# Patient Record
Sex: MALE
[De-identification: names, ages, dates, MRNs, and addresses within clinical notes are randomized per-mention and may not be internally consistent; named-entity substitution may affect disease eponyms.]

---

## 2017-01-12 ENCOUNTER — PRIOR ORIGINAL RECORDS (OUTPATIENT)
Dept: OTHER | Age: 52
End: 2017-01-12

## 2017-02-06 RX ORDER — FLUTICASONE PROPIONATE 50 MCG
SPRAY, SUSPENSION (ML) NASAL
Qty: 1 BOTTLE | Refills: 0 | Status: SHIPPED | OUTPATIENT
Start: 2017-02-06 | End: 2017-04-07

## 2017-03-14 DIAGNOSIS — I10 ESSENTIAL HYPERTENSION: Primary | ICD-10-CM

## 2017-03-15 RX ORDER — FUROSEMIDE 40 MG/1
TABLET ORAL
Qty: 30 TABLET | Refills: 8 | Status: SHIPPED | OUTPATIENT
Start: 2017-03-15 | End: 2017-04-07

## 2017-03-15 RX ORDER — CLOPIDOGREL BISULFATE 75 MG/1
TABLET ORAL
Refills: 6 | COMMUNITY
Start: 2017-02-05 | End: 2017-04-07

## 2017-03-27 ENCOUNTER — TELEPHONE (OUTPATIENT)
Dept: INTERNAL MEDICINE CLINIC | Facility: CLINIC | Age: 52
End: 2017-03-27

## 2017-03-27 RX ORDER — ROSUVASTATIN CALCIUM 10 MG/1
TABLET, COATED ORAL
Refills: 1 | COMMUNITY
Start: 2017-03-16 | End: 2017-04-07

## 2017-03-27 NOTE — TELEPHONE ENCOUNTER
Patient needs and appointment, [facility] insurance, called patient back unable to leave message voicemail is full

## 2017-04-07 ENCOUNTER — OFFICE VISIT (OUTPATIENT)
Dept: INTERNAL MEDICINE CLINIC | Facility: CLINIC | Age: 52
End: 2017-04-07

## 2017-04-07 ENCOUNTER — PRIOR ORIGINAL RECORDS (OUTPATIENT)
Dept: OTHER | Age: 52
End: 2017-04-07

## 2017-04-07 VITALS
RESPIRATION RATE: 14 BRPM | BODY MASS INDEX: 34.21 KG/M2 | WEIGHT: 218 LBS | DIASTOLIC BLOOD PRESSURE: 70 MMHG | TEMPERATURE: 98 F | OXYGEN SATURATION: 97 % | SYSTOLIC BLOOD PRESSURE: 136 MMHG | HEART RATE: 72 BPM | HEIGHT: 67 IN

## 2017-04-07 DIAGNOSIS — E78.2 MIXED HYPERLIPIDEMIA: ICD-10-CM

## 2017-04-07 DIAGNOSIS — E11.8 TYPE 2 DIABETES MELLITUS WITH COMPLICATION, WITHOUT LONG-TERM CURRENT USE OF INSULIN (HCC): ICD-10-CM

## 2017-04-07 DIAGNOSIS — I25.10 CORONARY ARTERY DISEASE DUE TO LIPID RICH PLAQUE: Primary | ICD-10-CM

## 2017-04-07 DIAGNOSIS — I25.83 CORONARY ARTERY DISEASE DUE TO LIPID RICH PLAQUE: Primary | ICD-10-CM

## 2017-04-07 DIAGNOSIS — R94.39 ABNORMAL STRESS TEST: ICD-10-CM

## 2017-04-07 DIAGNOSIS — I10 ESSENTIAL HYPERTENSION: ICD-10-CM

## 2017-04-07 PROCEDURE — 80061 LIPID PANEL: CPT | Performed by: INTERNAL MEDICINE

## 2017-04-07 PROCEDURE — 99214 OFFICE O/P EST MOD 30 MIN: CPT | Performed by: INTERNAL MEDICINE

## 2017-04-07 PROCEDURE — 83036 HEMOGLOBIN GLYCOSYLATED A1C: CPT | Performed by: INTERNAL MEDICINE

## 2017-04-07 PROCEDURE — 80053 COMPREHEN METABOLIC PANEL: CPT | Performed by: INTERNAL MEDICINE

## 2017-04-07 PROCEDURE — 36415 COLL VENOUS BLD VENIPUNCTURE: CPT | Performed by: INTERNAL MEDICINE

## 2017-04-07 RX ORDER — FLUTICASONE PROPIONATE 50 MCG
2 SPRAY, SUSPENSION (ML) NASAL DAILY
Qty: 1 BOTTLE | Refills: 6 | Status: SHIPPED | OUTPATIENT
Start: 2017-04-07 | End: 2018-06-26

## 2017-04-07 RX ORDER — LOSARTAN POTASSIUM 50 MG/1
50 TABLET ORAL
Qty: 90 TABLET | Refills: 3 | Status: SHIPPED | OUTPATIENT
Start: 2017-04-07 | End: 2018-06-26

## 2017-04-07 RX ORDER — CLOPIDOGREL BISULFATE 75 MG/1
75 TABLET ORAL DAILY
Qty: 90 TABLET | Refills: 3 | Status: SHIPPED | OUTPATIENT
Start: 2017-04-07 | End: 2018-06-26

## 2017-04-07 RX ORDER — CARVEDILOL 12.5 MG/1
25 TABLET ORAL 2 TIMES DAILY WITH MEALS
Qty: 180 TABLET | Refills: 3 | Status: SHIPPED | OUTPATIENT
Start: 2017-04-07 | End: 2018-04-02

## 2017-04-07 RX ORDER — ROSUVASTATIN CALCIUM 20 MG/1
20 TABLET, COATED ORAL NIGHTLY
Qty: 30 TABLET | Refills: 3 | Status: SHIPPED | OUTPATIENT
Start: 2017-04-07 | End: 2017-10-07

## 2017-04-07 RX ORDER — AMLODIPINE BESYLATE 5 MG/1
5 TABLET ORAL
Qty: 90 TABLET | Refills: 3 | Status: SHIPPED | OUTPATIENT
Start: 2017-04-07 | End: 2019-02-01

## 2017-04-07 RX ORDER — FUROSEMIDE 40 MG/1
40 TABLET ORAL 2 TIMES DAILY
Qty: 90 TABLET | Refills: 3 | Status: SHIPPED | OUTPATIENT
Start: 2017-04-07 | End: 2018-05-23

## 2017-04-07 NOTE — PROGRESS NOTES
HPI:    Patient ID: Delfina Daigle is a 46year old male. HPIpt here for fu on cad sp PCI of diagonal branch of LAD. Has been feeling well since procedure. BS control is good. Review of Systems   Constitutional: Positive for fatigue.    HENT: Posi Coronary artery disease due to lipid rich plaque  (primary encounter diagnosis)  Type 2 diabetes mellitus with complication, without long-term current use of insulin (hcc)  Mixed hyperlipidemia  Abnormal stress test  Essential hypertension    No orders o

## 2017-04-07 NOTE — PROGRESS NOTES
Pt's  verified  Pt presented for a fasting blood draw. Per Dr. Aleksandr Somers  ordered [facility]. Pt tolerated venipuncture well,specimens drawn from Lt  arm  and sent out to [address] [place] [address] lab for testing.

## 2017-04-14 LAB
CHOLESTEROL, TOTAL: 163 MG/DL
HDL CHOLESTEROL: 46 MG/DL
LDL CHOLESTEROL: 76 MG/DL
TRIGLYCERIDES: 207 MG/DL

## 2017-04-17 ENCOUNTER — PRIOR ORIGINAL RECORDS (OUTPATIENT)
Dept: OTHER | Age: 52
End: 2017-04-17

## 2017-07-05 ENCOUNTER — OFFICE VISIT (OUTPATIENT)
Dept: INTERNAL MEDICINE CLINIC | Facility: CLINIC | Age: 52
End: 2017-07-05

## 2017-07-05 VITALS
HEART RATE: 82 BPM | SYSTOLIC BLOOD PRESSURE: 136 MMHG | DIASTOLIC BLOOD PRESSURE: 76 MMHG | WEIGHT: 216 LBS | OXYGEN SATURATION: 97 % | TEMPERATURE: 98 F | RESPIRATION RATE: 19 BRPM | BODY MASS INDEX: 33.9 KG/M2 | HEIGHT: 67 IN

## 2017-07-05 DIAGNOSIS — E11.9 CONTROLLED TYPE 2 DIABETES MELLITUS WITHOUT COMPLICATION, WITHOUT LONG-TERM CURRENT USE OF INSULIN (HCC): Primary | ICD-10-CM

## 2017-07-05 DIAGNOSIS — I25.10 CORONARY ARTERY DISEASE INVOLVING NATIVE CORONARY ARTERY OF NATIVE HEART WITHOUT ANGINA PECTORIS: ICD-10-CM

## 2017-07-05 DIAGNOSIS — J20.9 ACUTE BRONCHITIS DUE TO INFECTION: ICD-10-CM

## 2017-07-05 PROCEDURE — 99214 OFFICE O/P EST MOD 30 MIN: CPT | Performed by: INTERNAL MEDICINE

## 2017-07-05 RX ORDER — AMOXICILLIN AND CLAVULANATE POTASSIUM 875; 125 MG/1; MG/1
1 TABLET, FILM COATED ORAL 2 TIMES DAILY
Qty: 20 TABLET | Refills: 0 | Status: SHIPPED | OUTPATIENT
Start: 2017-07-05 | End: 2017-07-15

## 2017-07-05 NOTE — PROGRESS NOTES
HPI:    Patient ID: Michel Encarnacion is a 46year old male. HPIpt here for fu after urgent care apt for asthmatic brocnhitis - rxed with Ventolin and prednisone. Also fu on DM type 2 . Needs fu labs.   Fu after recent PCI of LAD- has cardiology fu apt 1 Bottle Rfl: 6   furosemide 40 MG Oral Tab Take 1 tablet (40 mg total) by mouth 2 (two) times daily.  Disp: 90 tablet Rfl: 3   Mometasone Furoate 0.1 % External Cream Topically bid Disp: 60 g Rfl: 3   ASPIRIN EC LOW DOSE 81 MG Oral Tab EC  Disp:  Rfl: 1

## 2017-07-08 ENCOUNTER — NURSE ONLY (OUTPATIENT)
Dept: INTERNAL MEDICINE CLINIC | Facility: CLINIC | Age: 52
End: 2017-07-08

## 2017-07-08 DIAGNOSIS — E11.9 CONTROLLED TYPE 2 DIABETES MELLITUS WITHOUT COMPLICATION, WITHOUT LONG-TERM CURRENT USE OF INSULIN (HCC): ICD-10-CM

## 2017-07-08 LAB
ALBUMIN SERPL BCP-MCNC: 3.7 G/DL (ref 3.5–4.8)
ALBUMIN/GLOB SERPL: 1.3 {RATIO} (ref 1–2)
ALP SERPL-CCNC: 70 U/L (ref 32–100)
ALT SERPL-CCNC: 18 U/L (ref 17–63)
ANION GAP SERPL CALC-SCNC: 11 MMOL/L (ref 0–18)
AST SERPL-CCNC: 33 U/L (ref 15–41)
BILIRUB SERPL-MCNC: 0.6 MG/DL (ref 0.3–1.2)
BUN SERPL-MCNC: 14 MG/DL (ref 8–20)
BUN/CREAT SERPL: 18.4 (ref 10–20)
CALCIUM SERPL-MCNC: 8.7 MG/DL (ref 8.5–10.5)
CHLORIDE SERPL-SCNC: 101 MMOL/L (ref 95–110)
CHOLEST SERPL-MCNC: 215 MG/DL (ref 110–200)
CO2 SERPL-SCNC: 24 MMOL/L (ref 22–32)
CREAT SERPL-MCNC: 0.76 MG/DL (ref 0.5–1.5)
GLOBULIN PLAS-MCNC: 2.9 G/DL (ref 2.5–3.7)
GLUCOSE SERPL-MCNC: 147 MG/DL (ref 70–99)
HDLC SERPL-MCNC: 45 MG/DL
LDLC SERPL CALC-MCNC: 133 MG/DL (ref 0–99)
NONHDLC SERPL-MCNC: 170 MG/DL
OSMOLALITY UR CALC.SUM OF ELEC: 285 MOSM/KG (ref 275–295)
POTASSIUM SERPL-SCNC: 4 MMOL/L (ref 3.3–5.1)
PROT SERPL-MCNC: 6.6 G/DL (ref 5.9–8.4)
SODIUM SERPL-SCNC: 136 MMOL/L (ref 136–144)
TRIGL SERPL-MCNC: 186 MG/DL (ref 1–149)

## 2017-07-08 PROCEDURE — 80061 LIPID PANEL: CPT | Performed by: INTERNAL MEDICINE

## 2017-07-08 PROCEDURE — 36415 COLL VENOUS BLD VENIPUNCTURE: CPT | Performed by: INTERNAL MEDICINE

## 2017-07-08 PROCEDURE — 83036 HEMOGLOBIN GLYCOSYLATED A1C: CPT | Performed by: INTERNAL MEDICINE

## 2017-07-08 PROCEDURE — 80053 COMPREHEN METABOLIC PANEL: CPT | Performed by: INTERNAL MEDICINE

## 2017-07-08 NOTE — PROGRESS NOTES
Hemoglobin A1C,LIPID and CMP labs drawn per Dr Josiah Habermann orders.  Patient tolerated lab draw well

## 2017-07-09 LAB — HBA1C MFR BLD: 7.4 % (ref 4–6)

## 2017-07-10 ENCOUNTER — MYAURORA ACCOUNT LINK (OUTPATIENT)
Dept: OTHER | Age: 52
End: 2017-07-10

## 2017-07-10 ENCOUNTER — PRIOR ORIGINAL RECORDS (OUTPATIENT)
Dept: OTHER | Age: 52
End: 2017-07-10

## 2017-10-07 ENCOUNTER — OFFICE VISIT (OUTPATIENT)
Dept: INTERNAL MEDICINE CLINIC | Facility: CLINIC | Age: 52
End: 2017-10-07

## 2017-10-07 VITALS
HEART RATE: 97 BPM | DIASTOLIC BLOOD PRESSURE: 82 MMHG | OXYGEN SATURATION: 97 % | SYSTOLIC BLOOD PRESSURE: 138 MMHG | RESPIRATION RATE: 19 BRPM | TEMPERATURE: 99 F | WEIGHT: 211 LBS | HEIGHT: 67 IN | BODY MASS INDEX: 33.12 KG/M2

## 2017-10-07 DIAGNOSIS — I25.83 CORONARY ARTERY DISEASE DUE TO LIPID RICH PLAQUE: ICD-10-CM

## 2017-10-07 DIAGNOSIS — I25.10 CORONARY ARTERY DISEASE DUE TO LIPID RICH PLAQUE: ICD-10-CM

## 2017-10-07 DIAGNOSIS — Z23 INFLUENZA VACCINE NEEDED: Primary | ICD-10-CM

## 2017-10-07 DIAGNOSIS — E11.8 TYPE 2 DIABETES MELLITUS WITH COMPLICATION, WITHOUT LONG-TERM CURRENT USE OF INSULIN (HCC): ICD-10-CM

## 2017-10-07 DIAGNOSIS — Z12.11 COLON CANCER SCREENING: ICD-10-CM

## 2017-10-07 DIAGNOSIS — E11.9 DIABETES MELLITUS WITH NO COMPLICATION (HCC): ICD-10-CM

## 2017-10-07 DIAGNOSIS — E78.2 MIXED HYPERLIPIDEMIA: ICD-10-CM

## 2017-10-07 DIAGNOSIS — R94.39 ABNORMAL STRESS TEST: ICD-10-CM

## 2017-10-07 DIAGNOSIS — Z72.0 DECLINED SMOKING CESSATION: ICD-10-CM

## 2017-10-07 DIAGNOSIS — I10 ESSENTIAL HYPERTENSION: ICD-10-CM

## 2017-10-07 PROCEDURE — 90686 IIV4 VACC NO PRSV 0.5 ML IM: CPT | Performed by: INTERNAL MEDICINE

## 2017-10-07 PROCEDURE — 99214 OFFICE O/P EST MOD 30 MIN: CPT | Performed by: INTERNAL MEDICINE

## 2017-10-07 PROCEDURE — 90471 IMMUNIZATION ADMIN: CPT | Performed by: INTERNAL MEDICINE

## 2017-10-07 RX ORDER — ROSUVASTATIN CALCIUM 20 MG/1
20 TABLET, COATED ORAL NIGHTLY
Qty: 30 TABLET | Refills: 3 | Status: SHIPPED | OUTPATIENT
Start: 2017-10-07 | End: 2018-05-24

## 2017-10-07 RX ORDER — ROSUVASTATIN CALCIUM 10 MG/1
TABLET, COATED ORAL
Refills: 0 | COMMUNITY
Start: 2017-07-24 | End: 2017-10-07

## 2017-10-07 NOTE — PROGRESS NOTES
Pt's  verified  Per Dr Jamin Mckenzie  administered FLULAVAL in Pt's Lt arm. Pt tolerated injection well,signed consent sent to scanning.

## 2017-10-07 NOTE — PROGRESS NOTES
HPI:    Patient ID: Pallavi Hinds is a 45year old male. Pt here for a fu on dm htn and lipids. Is still struggling with weight loss and smoking but has good glucose control. Review of Systems   Constitutional: Positive for fatigue.  Negative for u Allergies:No Known Allergies   PHYSICAL EXAM:   Physical Exam   Nursing note reviewed. Constitutional: He is obese. HENT:   Head: Normocephalic and atraumatic.    Right Ear: Ear canal normal.   Left Ear: Ear canal normal.   Nose: Nose normal.   Mout

## 2018-05-23 DIAGNOSIS — I10 ESSENTIAL HYPERTENSION: ICD-10-CM

## 2018-05-23 DIAGNOSIS — I25.83 CORONARY ARTERY DISEASE DUE TO LIPID RICH PLAQUE: ICD-10-CM

## 2018-05-23 DIAGNOSIS — I25.10 CORONARY ARTERY DISEASE DUE TO LIPID RICH PLAQUE: ICD-10-CM

## 2018-05-23 DIAGNOSIS — E11.8 TYPE 2 DIABETES MELLITUS WITH COMPLICATION, WITHOUT LONG-TERM CURRENT USE OF INSULIN (HCC): ICD-10-CM

## 2018-05-23 DIAGNOSIS — E78.2 MIXED HYPERLIPIDEMIA: ICD-10-CM

## 2018-05-23 DIAGNOSIS — R94.39 ABNORMAL STRESS TEST: ICD-10-CM

## 2018-05-24 DIAGNOSIS — E78.2 MIXED HYPERLIPIDEMIA: ICD-10-CM

## 2018-05-24 DIAGNOSIS — I25.10 CORONARY ARTERY DISEASE DUE TO LIPID RICH PLAQUE: ICD-10-CM

## 2018-05-24 DIAGNOSIS — I10 ESSENTIAL HYPERTENSION: ICD-10-CM

## 2018-05-24 DIAGNOSIS — E11.8 TYPE 2 DIABETES MELLITUS WITH COMPLICATION, WITHOUT LONG-TERM CURRENT USE OF INSULIN (HCC): ICD-10-CM

## 2018-05-24 DIAGNOSIS — R94.39 ABNORMAL STRESS TEST: ICD-10-CM

## 2018-05-24 DIAGNOSIS — I25.83 CORONARY ARTERY DISEASE DUE TO LIPID RICH PLAQUE: ICD-10-CM

## 2018-05-24 RX ORDER — ROSUVASTATIN CALCIUM 20 MG/1
20 TABLET, COATED ORAL NIGHTLY
Qty: 30 TABLET | Refills: 3 | Status: SHIPPED | OUTPATIENT
Start: 2018-05-24 | End: 2018-06-26

## 2018-05-25 RX ORDER — FUROSEMIDE 40 MG/1
TABLET ORAL
Qty: 90 TABLET | Refills: 0 | Status: SHIPPED | OUTPATIENT
Start: 2018-05-25 | End: 2018-06-26

## 2018-06-07 ENCOUNTER — TELEPHONE (OUTPATIENT)
Dept: INTERNAL MEDICINE CLINIC | Facility: CLINIC | Age: 53
End: 2018-06-07

## 2018-06-07 NOTE — TELEPHONE ENCOUNTER
Atanacio Mcardle at [facility] disease management called states patient is past due for A1C last A1C done 2017

## 2018-06-26 ENCOUNTER — OFFICE VISIT (OUTPATIENT)
Dept: INTERNAL MEDICINE CLINIC | Facility: CLINIC | Age: 53
End: 2018-06-26

## 2018-06-26 VITALS
WEIGHT: 219 LBS | RESPIRATION RATE: 18 BRPM | SYSTOLIC BLOOD PRESSURE: 136 MMHG | BODY MASS INDEX: 34.37 KG/M2 | HEIGHT: 67 IN | DIASTOLIC BLOOD PRESSURE: 76 MMHG | TEMPERATURE: 99 F | HEART RATE: 81 BPM | OXYGEN SATURATION: 98 %

## 2018-06-26 DIAGNOSIS — I25.10 CORONARY ARTERY DISEASE DUE TO LIPID RICH PLAQUE: ICD-10-CM

## 2018-06-26 DIAGNOSIS — I25.83 CORONARY ARTERY DISEASE DUE TO LIPID RICH PLAQUE: ICD-10-CM

## 2018-06-26 DIAGNOSIS — I10 ESSENTIAL HYPERTENSION: ICD-10-CM

## 2018-06-26 DIAGNOSIS — Z12.11 COLON CANCER SCREENING: ICD-10-CM

## 2018-06-26 DIAGNOSIS — E78.2 MIXED HYPERLIPIDEMIA: ICD-10-CM

## 2018-06-26 DIAGNOSIS — K21.9 GASTROESOPHAGEAL REFLUX DISEASE WITHOUT ESOPHAGITIS: Primary | ICD-10-CM

## 2018-06-26 DIAGNOSIS — E11.8 TYPE 2 DIABETES MELLITUS WITH COMPLICATION, WITHOUT LONG-TERM CURRENT USE OF INSULIN (HCC): ICD-10-CM

## 2018-06-26 PROCEDURE — 99214 OFFICE O/P EST MOD 30 MIN: CPT | Performed by: INTERNAL MEDICINE

## 2018-06-26 RX ORDER — FUROSEMIDE 40 MG/1
40 TABLET ORAL DAILY
Qty: 90 TABLET | Refills: 3 | Status: SHIPPED | OUTPATIENT
Start: 2018-06-26 | End: 2019-02-01

## 2018-06-26 RX ORDER — LOSARTAN POTASSIUM 50 MG/1
50 TABLET ORAL
Qty: 90 TABLET | Refills: 3 | Status: SHIPPED | OUTPATIENT
Start: 2018-06-26 | End: 2019-02-01

## 2018-06-26 RX ORDER — CLOPIDOGREL BISULFATE 75 MG/1
75 TABLET ORAL DAILY
Qty: 90 TABLET | Refills: 3 | Status: SHIPPED | OUTPATIENT
Start: 2018-06-26 | End: 2019-02-01

## 2018-06-26 RX ORDER — PANTOPRAZOLE SODIUM 40 MG/1
40 TABLET, DELAYED RELEASE ORAL
Qty: 30 TABLET | Refills: 6 | Status: SHIPPED | OUTPATIENT
Start: 2018-06-26 | End: 2019-02-01

## 2018-06-26 RX ORDER — FLUTICASONE PROPIONATE 50 MCG
2 SPRAY, SUSPENSION (ML) NASAL DAILY
Qty: 1 BOTTLE | Refills: 6 | Status: SHIPPED | OUTPATIENT
Start: 2018-06-26 | End: 2019-02-01

## 2018-06-26 RX ORDER — ROSUVASTATIN CALCIUM 20 MG/1
20 TABLET, COATED ORAL NIGHTLY
Qty: 30 TABLET | Refills: 3 | Status: SHIPPED | OUTPATIENT
Start: 2018-06-26 | End: 2019-02-01

## 2018-06-26 NOTE — PROGRESS NOTES
HPI:    Patient ID: Gillian Yin is a 45year old male.         Review of Systems         Current Outpatient Prescriptions:  FUROSEMIDE 40 MG Oral Tab TAKE 1 TABLET(40 MG) BY MOUTH TWICE DAILY Disp: 90 tablet Rfl: 0   METFORMIN HCL 1000 MG Oral Tab TAKE

## 2018-06-26 NOTE — PROGRESS NOTES
HPI:    Patient ID: Misa Graves is a 45year old male. Pt here for fu on Dm type 2 , CAD, hyperlipidemia and COPD. Has been under a lot of stress with work. Is overdue for labs and fu. Has been out of metforminrx for over a month.     Review of Sys Mometasone Furoate 0.1 % External Cream Topically bid Disp: 60 g Rfl: 3     Allergies:No Known Allergies   PHYSICAL EXAM:   Physical Exam    Constitutional: He is obese. He appears well-developed. HENT:   Head: Normocephalic.    Post nasal drip   Eyes: Take 1 tablet (40 mg total) by mouth daily. MetFORMIN HCl 1000 MG Oral Tab 180 tablet 4      Sig: Take 1 tablet (1,000 mg total) by mouth 2 (two) times daily with meals.       Pantoprazole Sodium 40 MG Oral Tab EC 30 tablet 6      Sig: Take 1 tablet (4

## 2018-08-24 ENCOUNTER — NURSE ONLY (OUTPATIENT)
Dept: INTERNAL MEDICINE CLINIC | Facility: CLINIC | Age: 53
End: 2018-08-24

## 2018-08-24 DIAGNOSIS — E11.8 TYPE 2 DIABETES MELLITUS WITH COMPLICATION, WITHOUT LONG-TERM CURRENT USE OF INSULIN (HCC): ICD-10-CM

## 2018-08-24 DIAGNOSIS — E78.2 MIXED HYPERLIPIDEMIA: ICD-10-CM

## 2018-08-24 LAB
ALBUMIN SERPL BCP-MCNC: 3.6 G/DL (ref 3.5–4.8)
ALBUMIN/GLOB SERPL: 1.2 {RATIO} (ref 1–2)
ALP SERPL-CCNC: 75 U/L (ref 32–100)
ALT SERPL-CCNC: 21 U/L (ref 17–63)
ANION GAP SERPL CALC-SCNC: 11 MMOL/L (ref 0–18)
AST SERPL-CCNC: 35 U/L (ref 15–41)
BILIRUB SERPL-MCNC: 0.5 MG/DL (ref 0.3–1.2)
BUN SERPL-MCNC: 14 MG/DL (ref 8–20)
BUN/CREAT SERPL: 16.3 (ref 10–20)
CALCIUM SERPL-MCNC: 9 MG/DL (ref 8.5–10.5)
CHLORIDE SERPL-SCNC: 99 MMOL/L (ref 95–110)
CHOLEST SERPL-MCNC: 259 MG/DL (ref 110–200)
CO2 SERPL-SCNC: 25 MMOL/L (ref 22–32)
CREAT SERPL-MCNC: 0.86 MG/DL (ref 0.5–1.5)
GLOBULIN PLAS-MCNC: 3.1 G/DL (ref 2.5–3.7)
GLUCOSE SERPL-MCNC: 193 MG/DL (ref 70–99)
HDLC SERPL-MCNC: 41 MG/DL
LDLC SERPL CALC-MCNC: 165 MG/DL (ref 0–99)
NONHDLC SERPL-MCNC: 218 MG/DL
OSMOLALITY UR CALC.SUM OF ELEC: 286 MOSM/KG (ref 275–295)
PATIENT FASTING: YES
POTASSIUM SERPL-SCNC: 4 MMOL/L (ref 3.3–5.1)
PROT SERPL-MCNC: 6.7 G/DL (ref 5.9–8.4)
SODIUM SERPL-SCNC: 135 MMOL/L (ref 136–144)
TRIGL SERPL-MCNC: 263 MG/DL (ref 1–149)

## 2018-08-24 PROCEDURE — 80053 COMPREHEN METABOLIC PANEL: CPT | Performed by: INTERNAL MEDICINE

## 2018-08-24 PROCEDURE — 36415 COLL VENOUS BLD VENIPUNCTURE: CPT | Performed by: INTERNAL MEDICINE

## 2018-08-24 PROCEDURE — 83036 HEMOGLOBIN GLYCOSYLATED A1C: CPT | Performed by: INTERNAL MEDICINE

## 2018-08-24 PROCEDURE — 80061 LIPID PANEL: CPT | Performed by: INTERNAL MEDICINE

## 2018-08-25 LAB — HBA1C MFR BLD: 9.3 % (ref 4–6)

## 2018-09-27 ENCOUNTER — TELEPHONE (OUTPATIENT)
Dept: INTERNAL MEDICINE CLINIC | Facility: CLINIC | Age: 53
End: 2018-09-27

## 2018-09-27 NOTE — TELEPHONE ENCOUNTER
Called patient to schedule appt for A1C and to inform patient that he needs DM Eye exam. Patient stated that he does not have insurance and will call at a later time.

## 2018-10-25 ENCOUNTER — TELEPHONE (OUTPATIENT)
Dept: INTERNAL MEDICINE CLINIC | Facility: CLINIC | Age: 53
End: 2018-10-25

## 2019-01-01 ENCOUNTER — EXTERNAL RECORD (OUTPATIENT)
Dept: HEALTH INFORMATION MANAGEMENT | Facility: OTHER | Age: 54
End: 2019-01-01

## 2019-02-01 ENCOUNTER — OFFICE VISIT (OUTPATIENT)
Dept: INTERNAL MEDICINE CLINIC | Facility: CLINIC | Age: 54
End: 2019-02-01

## 2019-02-01 VITALS
BODY MASS INDEX: 32.96 KG/M2 | HEART RATE: 82 BPM | SYSTOLIC BLOOD PRESSURE: 156 MMHG | HEIGHT: 67 IN | WEIGHT: 210 LBS | OXYGEN SATURATION: 96 % | DIASTOLIC BLOOD PRESSURE: 80 MMHG

## 2019-02-01 DIAGNOSIS — I25.10 CORONARY ARTERY DISEASE DUE TO LIPID RICH PLAQUE: ICD-10-CM

## 2019-02-01 DIAGNOSIS — E11.8 TYPE 2 DIABETES MELLITUS WITH COMPLICATION, WITHOUT LONG-TERM CURRENT USE OF INSULIN (HCC): ICD-10-CM

## 2019-02-01 DIAGNOSIS — I10 ESSENTIAL HYPERTENSION: ICD-10-CM

## 2019-02-01 DIAGNOSIS — Z12.5 PROSTATE CANCER SCREENING: Primary | ICD-10-CM

## 2019-02-01 DIAGNOSIS — I25.83 CORONARY ARTERY DISEASE DUE TO LIPID RICH PLAQUE: ICD-10-CM

## 2019-02-01 DIAGNOSIS — E78.2 MIXED HYPERLIPIDEMIA: ICD-10-CM

## 2019-02-01 PROCEDURE — 99214 OFFICE O/P EST MOD 30 MIN: CPT | Performed by: INTERNAL MEDICINE

## 2019-02-01 RX ORDER — PANTOPRAZOLE SODIUM 40 MG/1
40 TABLET, DELAYED RELEASE ORAL
Qty: 30 TABLET | Refills: 6 | Status: SHIPPED | OUTPATIENT
Start: 2019-02-01 | End: 2019-05-06

## 2019-02-01 RX ORDER — FLUTICASONE PROPIONATE 50 MCG
2 SPRAY, SUSPENSION (ML) NASAL DAILY
Qty: 1 BOTTLE | Refills: 6 | Status: SHIPPED | OUTPATIENT
Start: 2019-02-01 | End: 2019-05-24

## 2019-02-01 RX ORDER — FUROSEMIDE 40 MG/1
40 TABLET ORAL DAILY
Qty: 90 TABLET | Refills: 3 | Status: SHIPPED | OUTPATIENT
Start: 2019-02-01 | End: 2020-03-03

## 2019-02-01 RX ORDER — CLOPIDOGREL BISULFATE 75 MG/1
75 TABLET ORAL DAILY
Qty: 90 TABLET | Refills: 3 | Status: SHIPPED | OUTPATIENT
Start: 2019-02-01 | End: 2020-03-03

## 2019-02-01 RX ORDER — ROSUVASTATIN CALCIUM 20 MG/1
20 TABLET, COATED ORAL NIGHTLY
Qty: 90 TABLET | Refills: 3 | Status: SHIPPED | OUTPATIENT
Start: 2019-02-01 | End: 2020-03-03

## 2019-02-01 RX ORDER — AMLODIPINE BESYLATE 5 MG/1
5 TABLET ORAL
Qty: 90 TABLET | Refills: 3 | Status: SHIPPED | OUTPATIENT
Start: 2019-02-01 | End: 2020-03-03

## 2019-02-01 RX ORDER — LOSARTAN POTASSIUM 50 MG/1
50 TABLET ORAL
Qty: 90 TABLET | Refills: 3 | Status: SHIPPED | OUTPATIENT
Start: 2019-02-01 | End: 2019-05-06

## 2019-02-01 NOTE — PROGRESS NOTES
HPI:    Patient ID: Michel Encarnacion is a 52year old male. Pt here for a fu after a long interval during which he had no insurance coverage. HAs had no meds for several days. Denies chest pains- is ooverdue for labs and cardiac fu.     Review of Syste Rfl: 6   Mometasone Furoate 0.1 % External Cream Topically bid Disp: 60 g Rfl: 3   ASPIRIN EC LOW DOSE 81 MG Oral Tab EC  Disp:  Rfl: 1     Allergies:No Known Allergies   PHYSICAL EXAM:   Physical Exam    Constitutional: He is oriented to person, place, an Tab EC 30 tablet 6     Sig: Take 1 tablet (40 mg total) by mouth every morning before breakfast.   • AmLODIPine Besylate 5 MG Oral Tab 90 tablet 3     Sig: Take 1 tablet (5 mg total) by mouth once daily.    • Glucose Blood (LORAINE CONTOUR TEST) In Vitro Stri

## 2019-02-18 ENCOUNTER — NURSE ONLY (OUTPATIENT)
Dept: INTERNAL MEDICINE CLINIC | Facility: CLINIC | Age: 54
End: 2019-02-18

## 2019-02-18 DIAGNOSIS — I25.83 CORONARY ARTERY DISEASE DUE TO LIPID RICH PLAQUE: ICD-10-CM

## 2019-02-18 DIAGNOSIS — E11.8 TYPE 2 DIABETES MELLITUS WITH COMPLICATION, WITHOUT LONG-TERM CURRENT USE OF INSULIN (HCC): ICD-10-CM

## 2019-02-18 DIAGNOSIS — I25.10 CORONARY ARTERY DISEASE DUE TO LIPID RICH PLAQUE: ICD-10-CM

## 2019-02-18 DIAGNOSIS — Z12.5 PROSTATE CANCER SCREENING: ICD-10-CM

## 2019-02-18 DIAGNOSIS — E78.2 MIXED HYPERLIPIDEMIA: ICD-10-CM

## 2019-02-18 LAB
ALBUMIN SERPL-MCNC: 3.8 G/DL (ref 3.4–5)
ALBUMIN/GLOB SERPL: 1 {RATIO} (ref 1–2)
ALP LIVER SERPL-CCNC: 79 U/L (ref 45–117)
ALT SERPL-CCNC: 15 U/L (ref 16–61)
ANION GAP SERPL CALC-SCNC: 6 MMOL/L (ref 0–18)
AST SERPL-CCNC: 19 U/L (ref 15–37)
BILIRUB SERPL-MCNC: 0.4 MG/DL (ref 0.1–2)
BUN BLD-MCNC: 9 MG/DL (ref 7–18)
BUN/CREAT SERPL: 10.1 (ref 10–20)
CALCIUM BLD-MCNC: 9.1 MG/DL (ref 8.5–10.1)
CHLORIDE SERPL-SCNC: 102 MMOL/L (ref 98–107)
CHOLEST SMN-MCNC: 180 MG/DL (ref ?–200)
CO2 SERPL-SCNC: 29 MMOL/L (ref 21–32)
COMPLEXED PSA SERPL-MCNC: 2.89 NG/ML (ref ?–4)
CREAT BLD-MCNC: 0.89 MG/DL (ref 0.7–1.3)
CREAT UR-SCNC: 203 MG/DL
EST. AVERAGE GLUCOSE BLD GHB EST-MCNC: 197 MG/DL (ref 68–126)
GLOBULIN PLAS-MCNC: 3.9 G/DL (ref 2.8–4.4)
GLUCOSE BLD-MCNC: 142 MG/DL (ref 70–99)
HBA1C MFR BLD HPLC: 8.5 % (ref ?–5.7)
HDLC SERPL-MCNC: 71 MG/DL (ref 40–59)
LDLC SERPL CALC-MCNC: 84 MG/DL (ref ?–100)
M PROTEIN MFR SERPL ELPH: 7.7 G/DL (ref 6.4–8.2)
MICROALBUMIN UR-MCNC: 8.45 MG/DL
MICROALBUMIN/CREAT 24H UR-RTO: 41.6 UG/MG (ref ?–30)
NONHDLC SERPL-MCNC: 109 MG/DL (ref ?–130)
OSMOLALITY SERPL CALC.SUM OF ELEC: 285 MOSM/KG (ref 275–295)
POTASSIUM SERPL-SCNC: 3.5 MMOL/L (ref 3.5–5.1)
SODIUM SERPL-SCNC: 137 MMOL/L (ref 136–145)
TRIGL SERPL-MCNC: 123 MG/DL (ref 30–149)

## 2019-02-18 PROCEDURE — 80061 LIPID PANEL: CPT | Performed by: INTERNAL MEDICINE

## 2019-02-18 PROCEDURE — 80053 COMPREHEN METABOLIC PANEL: CPT | Performed by: INTERNAL MEDICINE

## 2019-02-18 PROCEDURE — 82570 ASSAY OF URINE CREATININE: CPT | Performed by: INTERNAL MEDICINE

## 2019-02-18 PROCEDURE — 82043 UR ALBUMIN QUANTITATIVE: CPT | Performed by: INTERNAL MEDICINE

## 2019-02-18 PROCEDURE — 36415 COLL VENOUS BLD VENIPUNCTURE: CPT | Performed by: INTERNAL MEDICINE

## 2019-02-18 PROCEDURE — 83036 HEMOGLOBIN GLYCOSYLATED A1C: CPT | Performed by: INTERNAL MEDICINE

## 2019-02-18 NOTE — PROGRESS NOTES
Pt presented to clinic today for blood draw. Per physician able to draw orders. Orders  documented within chart. Pt tolerated lab draw well.  verified.   Orders drawn include: Microalb/creat, psa, a1c, lipid, and cmp  Site of draw: right arm

## 2019-02-28 VITALS
WEIGHT: 211 LBS | HEIGHT: 66 IN | SYSTOLIC BLOOD PRESSURE: 158 MMHG | HEART RATE: 68 BPM | BODY MASS INDEX: 33.91 KG/M2 | DIASTOLIC BLOOD PRESSURE: 70 MMHG | OXYGEN SATURATION: 97 % | RESPIRATION RATE: 10 BRPM

## 2019-03-01 VITALS
OXYGEN SATURATION: 98 % | HEART RATE: 77 BPM | DIASTOLIC BLOOD PRESSURE: 74 MMHG | WEIGHT: 220 LBS | SYSTOLIC BLOOD PRESSURE: 132 MMHG | HEIGHT: 66 IN | RESPIRATION RATE: 16 BRPM | BODY MASS INDEX: 35.36 KG/M2

## 2019-03-13 ENCOUNTER — OFFICE VISIT (OUTPATIENT)
Dept: CARDIOLOGY | Age: 54
End: 2019-03-13

## 2019-03-13 VITALS
DIASTOLIC BLOOD PRESSURE: 60 MMHG | HEART RATE: 88 BPM | WEIGHT: 211 LBS | SYSTOLIC BLOOD PRESSURE: 140 MMHG | BODY MASS INDEX: 33.12 KG/M2 | HEIGHT: 67 IN

## 2019-03-13 DIAGNOSIS — E11.59 TYPE 2 DIABETES MELLITUS WITH OTHER CIRCULATORY COMPLICATION, WITHOUT LONG-TERM CURRENT USE OF INSULIN (CMD): ICD-10-CM

## 2019-03-13 DIAGNOSIS — I25.118 ATHEROSCLEROSIS OF NATIVE CORONARY ARTERY OF NATIVE HEART WITH STABLE ANGINA PECTORIS (CMD): Primary | ICD-10-CM

## 2019-03-13 DIAGNOSIS — R09.89 CAROTID BRUIT, UNSPECIFIED LATERALITY: ICD-10-CM

## 2019-03-13 DIAGNOSIS — I10 ESSENTIAL HYPERTENSION: ICD-10-CM

## 2019-03-13 DIAGNOSIS — E78.2 MIXED HYPERLIPIDEMIA: ICD-10-CM

## 2019-03-13 PROCEDURE — 99214 OFFICE O/P EST MOD 30 MIN: CPT | Performed by: INTERNAL MEDICINE

## 2019-03-13 PROCEDURE — 3077F SYST BP >= 140 MM HG: CPT | Performed by: INTERNAL MEDICINE

## 2019-03-13 PROCEDURE — 3078F DIAST BP <80 MM HG: CPT | Performed by: INTERNAL MEDICINE

## 2019-03-13 RX ORDER — HYDROCHLOROTHIAZIDE 12.5 MG/1
12.5 TABLET ORAL DAILY
COMMUNITY
End: 2019-07-31 | Stop reason: ALTCHOICE

## 2019-03-13 RX ORDER — CARVEDILOL 12.5 MG/1
12.5 TABLET ORAL DAILY
COMMUNITY
End: 2019-06-04 | Stop reason: DRUGHIGH

## 2019-03-13 RX ORDER — CLOPIDOGREL BISULFATE 75 MG/1
75 TABLET ORAL DAILY
COMMUNITY

## 2019-03-13 RX ORDER — ATORVASTATIN CALCIUM 20 MG/1
20 TABLET, FILM COATED ORAL 2 TIMES DAILY
COMMUNITY
End: 2019-07-31 | Stop reason: ALTCHOICE

## 2019-03-13 RX ORDER — PRAVASTATIN SODIUM 80 MG/1
80 TABLET ORAL DAILY
COMMUNITY
End: 2019-03-13 | Stop reason: CLARIF

## 2019-03-13 RX ORDER — LOSARTAN POTASSIUM 50 MG/1
50 TABLET ORAL 2 TIMES DAILY
COMMUNITY

## 2019-03-13 RX ORDER — AMLODIPINE BESYLATE 5 MG/1
5 TABLET ORAL DAILY
COMMUNITY
End: 2019-06-04

## 2019-04-18 ENCOUNTER — HOSPITAL ENCOUNTER (OUTPATIENT)
Dept: CV DIAGNOSTICS | Facility: HOSPITAL | Age: 54
Discharge: HOME | End: 2019-04-18
Attending: INTERNAL MEDICINE
Payer: COMMERCIAL

## 2019-04-18 ENCOUNTER — TELEPHONE (OUTPATIENT)
Dept: CARDIOLOGY | Age: 54
End: 2019-04-18

## 2019-04-18 ENCOUNTER — TELEPHONE (OUTPATIENT)
Dept: INTERNAL MEDICINE CLINIC | Facility: CLINIC | Age: 54
End: 2019-04-18

## 2019-04-18 ENCOUNTER — HOSPITAL ENCOUNTER (OUTPATIENT)
Dept: ULTRASOUND IMAGING | Facility: HOSPITAL | Age: 54
Discharge: HOME | End: 2019-04-18
Attending: INTERNAL MEDICINE
Payer: COMMERCIAL

## 2019-04-18 DIAGNOSIS — I25.10 CAD IN NATIVE ARTERY: ICD-10-CM

## 2019-04-18 DIAGNOSIS — I25.118 ATHEROSCLEROSIS OF CORONARY ARTERY OF NATIVE HEART WITH OTHER FORM OF ANGINA PECTORIS, UNSPECIFIED VESSEL OR LESION TYPE (HCC): ICD-10-CM

## 2019-04-18 DIAGNOSIS — R09.89 CAROTID BRUIT, UNSPECIFIED LATERALITY: ICD-10-CM

## 2019-04-18 DIAGNOSIS — I25.10 CORONARY ATHEROSCLEROSIS OF NATIVE CORONARY ARTERY: ICD-10-CM

## 2019-04-18 PROCEDURE — 93306 TTE W/DOPPLER COMPLETE: CPT | Performed by: INTERNAL MEDICINE

## 2019-04-18 PROCEDURE — 93880 EXTRACRANIAL BILAT STUDY: CPT | Performed by: INTERNAL MEDICINE

## 2019-04-18 NOTE — TELEPHONE ENCOUNTER
Calling for results from Echo. Were told there were issues and to speak to MD.  The final results not in system at this time and informed spouse that PCP will be in tomorrow at which time the final results should also be readable.

## 2019-04-19 ENCOUNTER — TELEPHONE (OUTPATIENT)
Dept: CARDIOLOGY | Age: 54
End: 2019-04-19

## 2019-04-22 ENCOUNTER — OFFICE VISIT (OUTPATIENT)
Dept: CARDIOLOGY | Age: 54
End: 2019-04-22

## 2019-04-22 VITALS
DIASTOLIC BLOOD PRESSURE: 72 MMHG | BODY MASS INDEX: 33.43 KG/M2 | WEIGHT: 213 LBS | OXYGEN SATURATION: 96 % | SYSTOLIC BLOOD PRESSURE: 160 MMHG | HEART RATE: 84 BPM | HEIGHT: 67 IN

## 2019-04-22 DIAGNOSIS — E11.59 TYPE 2 DIABETES MELLITUS WITH OTHER CIRCULATORY COMPLICATION, WITHOUT LONG-TERM CURRENT USE OF INSULIN (CMD): ICD-10-CM

## 2019-04-22 DIAGNOSIS — I25.118 ATHEROSCLEROSIS OF NATIVE CORONARY ARTERY OF NATIVE HEART WITH STABLE ANGINA PECTORIS (CMD): Primary | ICD-10-CM

## 2019-04-22 DIAGNOSIS — I10 ESSENTIAL HYPERTENSION: ICD-10-CM

## 2019-04-22 DIAGNOSIS — E78.2 MIXED HYPERLIPIDEMIA: ICD-10-CM

## 2019-04-22 PROCEDURE — 99214 OFFICE O/P EST MOD 30 MIN: CPT | Performed by: INTERNAL MEDICINE

## 2019-04-22 PROCEDURE — 3078F DIAST BP <80 MM HG: CPT | Performed by: INTERNAL MEDICINE

## 2019-04-22 PROCEDURE — 3077F SYST BP >= 140 MM HG: CPT | Performed by: INTERNAL MEDICINE

## 2019-04-22 RX ORDER — FLUTICASONE PROPIONATE 50 MCG
2 SPRAY, SUSPENSION (ML) NASAL DAILY
COMMUNITY
Start: 2019-02-01 | End: 2019-04-22

## 2019-04-22 RX ORDER — PANTOPRAZOLE SODIUM 40 MG/1
40 TABLET, DELAYED RELEASE ORAL DAILY
COMMUNITY
Start: 2019-02-01 | End: 2019-07-31

## 2019-04-22 RX ORDER — ROSUVASTATIN CALCIUM 20 MG/1
20 TABLET, COATED ORAL NIGHTLY
COMMUNITY
Start: 2019-02-01 | End: 2019-06-04

## 2019-04-22 RX ORDER — ALBUTEROL SULFATE 90 UG/1
2 AEROSOL, METERED RESPIRATORY (INHALATION) PRN
COMMUNITY
Start: 2018-06-26 | End: 2019-07-31 | Stop reason: ALTCHOICE

## 2019-04-27 RX ORDER — FUROSEMIDE 40 MG/1
TABLET ORAL
COMMUNITY

## 2019-05-06 ENCOUNTER — OFFICE VISIT (OUTPATIENT)
Dept: INTERNAL MEDICINE CLINIC | Facility: CLINIC | Age: 54
End: 2019-05-06

## 2019-05-06 VITALS
DIASTOLIC BLOOD PRESSURE: 80 MMHG | HEART RATE: 98 BPM | OXYGEN SATURATION: 98 % | HEIGHT: 67 IN | SYSTOLIC BLOOD PRESSURE: 142 MMHG | WEIGHT: 215 LBS | BODY MASS INDEX: 33.74 KG/M2

## 2019-05-06 DIAGNOSIS — E11.8 TYPE 2 DIABETES MELLITUS WITH COMPLICATION, WITHOUT LONG-TERM CURRENT USE OF INSULIN (HCC): ICD-10-CM

## 2019-05-06 DIAGNOSIS — N40.1 BENIGN PROSTATIC HYPERPLASIA WITH LOWER URINARY TRACT SYMPTOMS, SYMPTOM DETAILS UNSPECIFIED: Primary | ICD-10-CM

## 2019-05-06 DIAGNOSIS — I25.10 CORONARY ARTERY DISEASE DUE TO LIPID RICH PLAQUE: ICD-10-CM

## 2019-05-06 DIAGNOSIS — E78.2 MIXED HYPERLIPIDEMIA: ICD-10-CM

## 2019-05-06 DIAGNOSIS — I10 ESSENTIAL HYPERTENSION: ICD-10-CM

## 2019-05-06 DIAGNOSIS — I25.83 CORONARY ARTERY DISEASE DUE TO LIPID RICH PLAQUE: ICD-10-CM

## 2019-05-06 PROCEDURE — 99214 OFFICE O/P EST MOD 30 MIN: CPT | Performed by: INTERNAL MEDICINE

## 2019-05-06 RX ORDER — TAMSULOSIN HYDROCHLORIDE 0.4 MG/1
0.4 CAPSULE ORAL DAILY
Qty: 90 CAPSULE | Refills: 3 | Status: SHIPPED | OUTPATIENT
Start: 2019-05-06 | End: 2019-05-06

## 2019-05-06 RX ORDER — LOSARTAN POTASSIUM 50 MG/1
50 TABLET ORAL
Qty: 180 TABLET | Refills: 3 | Status: SHIPPED | OUTPATIENT
Start: 2019-05-06 | End: 2019-05-31

## 2019-05-06 RX ORDER — PANTOPRAZOLE SODIUM 40 MG/1
40 TABLET, DELAYED RELEASE ORAL
Qty: 30 TABLET | Refills: 6 | Status: SHIPPED | OUTPATIENT
Start: 2019-05-06 | End: 2020-03-03

## 2019-05-06 RX ORDER — TAMSULOSIN HYDROCHLORIDE 0.4 MG/1
0.4 CAPSULE ORAL DAILY
Qty: 90 CAPSULE | Refills: 3 | Status: SHIPPED | OUTPATIENT
Start: 2019-05-06 | End: 2019-05-24

## 2019-05-06 RX ORDER — GLIMEPIRIDE 4 MG/1
4 TABLET ORAL
Qty: 90 TABLET | Refills: 3 | Status: SHIPPED | OUTPATIENT
Start: 2019-05-06 | End: 2019-05-24

## 2019-05-06 RX ORDER — LOSARTAN POTASSIUM 50 MG/1
50 TABLET ORAL
Qty: 180 TABLET | Refills: 3 | Status: SHIPPED | OUTPATIENT
Start: 2019-05-06 | End: 2019-05-06

## 2019-05-06 RX ORDER — GLIMEPIRIDE 4 MG/1
4 TABLET ORAL
Qty: 90 TABLET | Refills: 3 | Status: SHIPPED | OUTPATIENT
Start: 2019-05-06 | End: 2019-05-06

## 2019-05-06 NOTE — PROGRESS NOTES
HPI:    Patient ID: Ellie Coronado is a 52year old male. Pt here for a fu on Dmtype 2 - has had poor control of diabetes . Sees glucose reading over 200 lately. Is non compliant with diet.  Has stopped drinking alcohol after recent echocardiogramsho Inhale 2 puffs into the lungs every 6 (six) hours as needed for Wheezing.  Disp: 1 Inhaler Rfl: 1   LORAINE CONTOUR NEXT TEST In Vitro Strip To test 2 x daily Disp: 100 strip Rfl: 6   Mometasone Furoate 0.1 % External Cream Topically bid Disp: 60 g Rfl: 3   A mouth every morning before breakfast.   • tamsulosin HCl 0.4 MG Oral Cap 90 capsule 3     Sig: Take 1 capsule (0.4 mg total) by mouth daily.        Imaging & Referrals:  None         TIME COMPONENT:       Jesus Salgado MD  5/6/2019

## 2019-05-22 ENCOUNTER — HOSPITAL ENCOUNTER (OUTPATIENT)
Dept: NUCLEAR MEDICINE | Facility: HOSPITAL | Age: 54
Discharge: HOME | End: 2019-05-22
Attending: INTERNAL MEDICINE
Payer: COMMERCIAL

## 2019-05-22 ENCOUNTER — TELEPHONE (OUTPATIENT)
Dept: CARDIOLOGY | Age: 54
End: 2019-05-22

## 2019-05-22 ENCOUNTER — HOSPITAL ENCOUNTER (OUTPATIENT)
Dept: CV DIAGNOSTICS | Facility: HOSPITAL | Age: 54
Discharge: HOME | End: 2019-05-22
Attending: INTERNAL MEDICINE
Payer: COMMERCIAL

## 2019-05-22 DIAGNOSIS — I25.10 CORONARY ATHEROSCLEROSIS OF NATIVE CORONARY ARTERY: ICD-10-CM

## 2019-05-22 PROCEDURE — 93016 CV STRESS TEST SUPVJ ONLY: CPT | Performed by: INTERNAL MEDICINE

## 2019-05-22 PROCEDURE — 93017 CV STRESS TEST TRACING ONLY: CPT | Performed by: INTERNAL MEDICINE

## 2019-05-22 PROCEDURE — 93018 CV STRESS TEST I&R ONLY: CPT | Performed by: INTERNAL MEDICINE

## 2019-05-22 PROCEDURE — 78452 HT MUSCLE IMAGE SPECT MULT: CPT | Performed by: INTERNAL MEDICINE

## 2019-05-22 RX ORDER — 0.9 % SODIUM CHLORIDE 0.9 %
VIAL (ML) INJECTION
Status: COMPLETED
Start: 2019-05-22 | End: 2019-05-22

## 2019-05-22 RX ADMIN — 0.9 % SODIUM CHLORIDE: 0.9 % VIAL (ML) INJECTION at 09:21:00

## 2019-05-23 DIAGNOSIS — I25.118 ATHEROSCLEROSIS OF NATIVE CORONARY ARTERY OF NATIVE HEART WITH STABLE ANGINA PECTORIS (CMD): ICD-10-CM

## 2019-05-23 DIAGNOSIS — I10 BENIGN HYPERTENSION: ICD-10-CM

## 2019-05-23 DIAGNOSIS — R94.39 ABNORMAL CARDIOVASCULAR STRESS TEST: Primary | ICD-10-CM

## 2019-05-24 ENCOUNTER — PATIENT OUTREACH (OUTPATIENT)
Dept: INTERNAL MEDICINE CLINIC | Facility: CLINIC | Age: 54
End: 2019-05-24

## 2019-05-24 ENCOUNTER — LAB ENCOUNTER (OUTPATIENT)
Dept: LAB | Facility: HOSPITAL | Age: 54
End: 2019-05-24
Attending: INTERNAL MEDICINE
Payer: COMMERCIAL

## 2019-05-24 ENCOUNTER — CLINICAL ABSTRACT (OUTPATIENT)
Dept: CARDIOLOGY | Age: 54
End: 2019-05-24

## 2019-05-24 ENCOUNTER — HOSPITAL ENCOUNTER (OUTPATIENT)
Dept: GENERAL RADIOLOGY | Facility: HOSPITAL | Age: 54
Discharge: HOME | End: 2019-05-24
Attending: INTERNAL MEDICINE
Payer: COMMERCIAL

## 2019-05-24 DIAGNOSIS — I25.118 ATHEROSCLEROSIS OF NATIVE CORONARY ARTERY OF NATIVE HEART WITH STABLE ANGINA PECTORIS (HCC): ICD-10-CM

## 2019-05-24 DIAGNOSIS — E11.8 TYPE 2 DIABETES MELLITUS WITH COMPLICATION, WITHOUT LONG-TERM CURRENT USE OF INSULIN (HCC): ICD-10-CM

## 2019-05-24 DIAGNOSIS — R94.39 ABNORMAL CARDIOVASCULAR STRESS TEST: ICD-10-CM

## 2019-05-24 DIAGNOSIS — E78.2 MIXED HYPERLIPIDEMIA: ICD-10-CM

## 2019-05-24 DIAGNOSIS — I10 BENIGN HYPERTENSION: ICD-10-CM

## 2019-05-24 DIAGNOSIS — Z01.810 PRE-OPERATIVE CARDIOVASCULAR EXAMINATION: ICD-10-CM

## 2019-05-24 LAB
ABSOLUTE IMMATURE GRANULOCYTES (OFFPRE24): NORMAL
ABSOLUTE IMMATURE GRANULOCYTES (OFFPRE24): NORMAL
BASO+EOS+MONOS # BLD: NORMAL 10*3/UL
BASO+EOS+MONOS # BLD: NORMAL 10*3/UL
BASO+EOS+MONOS NFR BLD: NORMAL %
BASO+EOS+MONOS NFR BLD: NORMAL %
BASOPHILS # BLD: NORMAL 10*3/UL
BASOPHILS # BLD: NORMAL 10*3/UL
BASOPHILS NFR BLD: NORMAL %
BASOPHILS NFR BLD: NORMAL %
DIFFERENTIAL METHOD BLD: NORMAL
DIFFERENTIAL METHOD BLD: NORMAL
EOSINOPHIL # BLD: NORMAL 10*3/UL
EOSINOPHIL # BLD: NORMAL 10*3/UL
EOSINOPHIL NFR BLD: NORMAL %
EOSINOPHIL NFR BLD: NORMAL %
ERYTHROCYTE [DISTWIDTH] IN BLOOD: NORMAL %
ERYTHROCYTE [DISTWIDTH] IN BLOOD: NORMAL %
HCT VFR BLD CALC: 42.4 %
HCT VFR BLD CALC: 42.4 %
HGB BLD-MCNC: 13.8 G/DL
HGB BLD-MCNC: 13.8 G/DL
IMMATURE GRANULOCYTES (OFFPRE25): NORMAL
IMMATURE GRANULOCYTES (OFFPRE25): NORMAL
LYMPHOCYTES # BLD: NORMAL 10*3/UL
LYMPHOCYTES # BLD: NORMAL 10*3/UL
LYMPHOCYTES NFR BLD: NORMAL %
LYMPHOCYTES NFR BLD: NORMAL %
MCH RBC QN AUTO: 29.4 PG
MCH RBC QN AUTO: NORMAL PG
MCHC RBC AUTO-ENTMCNC: 32.5 G/DL
MCHC RBC AUTO-ENTMCNC: NORMAL G/DL
MCV RBC AUTO: 90.2 FL
MCV RBC AUTO: NORMAL FL
MONOCYTES # BLD: NORMAL 10*3/UL
MONOCYTES # BLD: NORMAL 10*3/UL
MONOCYTES NFR BLD: NORMAL %
MONOCYTES NFR BLD: NORMAL %
MPV (OFFPRE2): NORMAL
MPV (OFFPRE2): NORMAL
NEUTROPHILS # BLD: NORMAL 10*3/UL
NEUTROPHILS # BLD: NORMAL 10*3/UL
NEUTROPHILS NFR BLD: NORMAL %
NEUTROPHILS NFR BLD: NORMAL %
NRBC BLD MANUAL-RTO: NORMAL %
NRBC BLD MANUAL-RTO: NORMAL %
PLAT MORPH BLD: NORMAL
PLAT MORPH BLD: NORMAL
PLATELET # BLD: 310 10*3/UL
PLATELET # BLD: 310 10*3/UL
RBC # BLD: 4.7 10*6/UL
RBC # BLD: 4.7 10*6/UL
RBC MORPH BLD: NORMAL
RBC MORPH BLD: NORMAL
WBC # BLD: 10.3 10*3/UL
WBC # BLD: 10.3 10*3/UL
WBC MORPH BLD: NORMAL
WBC MORPH BLD: NORMAL

## 2019-05-24 PROCEDURE — 80061 LIPID PANEL: CPT

## 2019-05-24 PROCEDURE — 83721 ASSAY OF BLOOD LIPOPROTEIN: CPT

## 2019-05-24 PROCEDURE — 83036 HEMOGLOBIN GLYCOSYLATED A1C: CPT

## 2019-05-24 PROCEDURE — 82043 UR ALBUMIN QUANTITATIVE: CPT

## 2019-05-24 PROCEDURE — 36415 COLL VENOUS BLD VENIPUNCTURE: CPT

## 2019-05-24 PROCEDURE — 71046 X-RAY EXAM CHEST 2 VIEWS: CPT | Performed by: INTERNAL MEDICINE

## 2019-05-24 PROCEDURE — 85025 COMPLETE CBC W/AUTO DIFF WBC: CPT

## 2019-05-24 PROCEDURE — 82570 ASSAY OF URINE CREATININE: CPT

## 2019-05-24 PROCEDURE — 80053 COMPREHEN METABOLIC PANEL: CPT

## 2019-05-24 NOTE — PROGRESS NOTES
Letter and referral mailed to patient to schedule an appointment with Dr. Gail Holden office for a Colonoscopy.

## 2019-05-28 ENCOUNTER — TELEPHONE (OUTPATIENT)
Dept: CASE MANAGEMENT | Age: 54
End: 2019-05-28

## 2019-05-28 ENCOUNTER — APPOINTMENT (OUTPATIENT)
Dept: ULTRASOUND IMAGING | Facility: HOSPITAL | Age: 54
End: 2019-05-28
Attending: INTERNAL MEDICINE
Payer: COMMERCIAL

## 2019-05-28 ENCOUNTER — HOSPITAL ENCOUNTER (OUTPATIENT)
Dept: INTERVENTIONAL RADIOLOGY/VASCULAR | Facility: HOSPITAL | Age: 54
Discharge: HOME | End: 2019-05-28
Attending: INTERNAL MEDICINE | Admitting: INTERNAL MEDICINE
Payer: COMMERCIAL

## 2019-05-28 VITALS
SYSTOLIC BLOOD PRESSURE: 122 MMHG | BODY MASS INDEX: 34 KG/M2 | DIASTOLIC BLOOD PRESSURE: 74 MMHG | HEART RATE: 93 BPM | OXYGEN SATURATION: 92 % | WEIGHT: 214 LBS | RESPIRATION RATE: 15 BRPM

## 2019-05-28 DIAGNOSIS — I25.10 ASHD (ARTERIOSCLEROTIC HEART DISEASE): Primary | ICD-10-CM

## 2019-05-28 DIAGNOSIS — R94.39 ABNORMAL STRESS TEST: ICD-10-CM

## 2019-05-28 DIAGNOSIS — I25.10 CAD (CORONARY ARTERY DISEASE): ICD-10-CM

## 2019-05-28 DIAGNOSIS — I10 HTN (HYPERTENSION): ICD-10-CM

## 2019-05-28 PROCEDURE — 99153 MOD SED SAME PHYS/QHP EA: CPT

## 2019-05-28 PROCEDURE — 36415 COLL VENOUS BLD VENIPUNCTURE: CPT

## 2019-05-28 PROCEDURE — 99152 MOD SED SAME PHYS/QHP 5/>YRS: CPT

## 2019-05-28 PROCEDURE — 4A023N7 MEASUREMENT OF CARDIAC SAMPLING AND PRESSURE, LEFT HEART, PERCUTANEOUS APPROACH: ICD-10-PCS | Performed by: INTERNAL MEDICINE

## 2019-05-28 PROCEDURE — 93458 L HRT ARTERY/VENTRICLE ANGIO: CPT

## 2019-05-28 PROCEDURE — 93926 LOWER EXTREMITY STUDY: CPT | Performed by: INTERNAL MEDICINE

## 2019-05-28 PROCEDURE — B2151ZZ FLUOROSCOPY OF LEFT HEART USING LOW OSMOLAR CONTRAST: ICD-10-PCS | Performed by: INTERNAL MEDICINE

## 2019-05-28 PROCEDURE — 93010 ELECTROCARDIOGRAM REPORT: CPT | Performed by: INTERNAL MEDICINE

## 2019-05-28 PROCEDURE — 82962 GLUCOSE BLOOD TEST: CPT

## 2019-05-28 PROCEDURE — 93005 ELECTROCARDIOGRAM TRACING: CPT

## 2019-05-28 PROCEDURE — B2111ZZ FLUOROSCOPY OF MULTIPLE CORONARY ARTERIES USING LOW OSMOLAR CONTRAST: ICD-10-PCS | Performed by: INTERNAL MEDICINE

## 2019-05-28 RX ORDER — HYDRALAZINE HYDROCHLORIDE 20 MG/ML
INJECTION INTRAMUSCULAR; INTRAVENOUS
Status: COMPLETED
Start: 2019-05-28 | End: 2019-05-28

## 2019-05-28 RX ORDER — LIDOCAINE HYDROCHLORIDE 20 MG/ML
INJECTION, SOLUTION EPIDURAL; INFILTRATION; INTRACAUDAL; PERINEURAL
Status: COMPLETED
Start: 2019-05-28 | End: 2019-05-28

## 2019-05-28 RX ORDER — DEXTROSE MONOHYDRATE 25 G/50ML
50 INJECTION, SOLUTION INTRAVENOUS
Status: DISCONTINUED | OUTPATIENT
Start: 2019-05-28 | End: 2019-05-28

## 2019-05-28 RX ORDER — MIDAZOLAM HYDROCHLORIDE 1 MG/ML
INJECTION INTRAMUSCULAR; INTRAVENOUS
Status: COMPLETED
Start: 2019-05-28 | End: 2019-05-28

## 2019-05-28 RX ORDER — MORPHINE SULFATE 4 MG/ML
INJECTION, SOLUTION INTRAMUSCULAR; INTRAVENOUS
Status: DISCONTINUED
Start: 2019-05-28 | End: 2019-05-28

## 2019-05-28 RX ORDER — MORPHINE SULFATE 4 MG/ML
2 INJECTION, SOLUTION INTRAMUSCULAR; INTRAVENOUS EVERY 4 HOURS PRN
Status: DISCONTINUED | OUTPATIENT
Start: 2019-05-28 | End: 2019-05-28

## 2019-05-28 RX ORDER — SODIUM CHLORIDE 9 MG/ML
INJECTION, SOLUTION INTRAVENOUS
Status: DISCONTINUED
Start: 2019-05-28 | End: 2019-05-28

## 2019-05-28 RX ORDER — ASPIRIN 81 MG/1
324 TABLET, CHEWABLE ORAL ONCE
Status: COMPLETED | OUTPATIENT
Start: 2019-05-28 | End: 2019-05-28

## 2019-05-28 RX ORDER — DEXTROSE MONOHYDRATE 50 MG/ML
INJECTION, SOLUTION INTRAVENOUS CONTINUOUS
Status: DISCONTINUED | OUTPATIENT
Start: 2019-05-28 | End: 2019-05-28

## 2019-05-28 RX ORDER — INSULIN ASPART 100 [IU]/ML
INJECTION, SOLUTION INTRAVENOUS; SUBCUTANEOUS ONCE
Status: COMPLETED | OUTPATIENT
Start: 2019-05-28 | End: 2019-05-28

## 2019-05-28 RX ORDER — INSULIN ASPART 100 [IU]/ML
INJECTION, SOLUTION INTRAVENOUS; SUBCUTANEOUS
Status: DISCONTINUED
Start: 2019-05-28 | End: 2019-05-28 | Stop reason: WASHOUT

## 2019-05-28 RX ORDER — ASPIRIN 81 MG/1
TABLET, CHEWABLE ORAL
Status: DISCONTINUED
Start: 2019-05-28 | End: 2019-05-28

## 2019-05-28 RX ORDER — SODIUM CHLORIDE 9 MG/ML
INJECTION, SOLUTION INTRAVENOUS CONTINUOUS
Status: DISCONTINUED | OUTPATIENT
Start: 2019-05-28 | End: 2019-05-28

## 2019-05-28 RX ADMIN — ASPIRIN 324 MG: 81 TABLET, CHEWABLE ORAL at 08:45:00

## 2019-05-28 RX ADMIN — INSULIN ASPART 8 UNITS: 100 INJECTION, SOLUTION INTRAVENOUS; SUBCUTANEOUS at 09:15:00

## 2019-05-28 RX ADMIN — SODIUM CHLORIDE: 9 INJECTION, SOLUTION INTRAVENOUS at 11:23:00

## 2019-05-28 RX ADMIN — MORPHINE SULFATE 2 MG: 4 INJECTION, SOLUTION INTRAMUSCULAR; INTRAVENOUS at 11:23:00

## 2019-05-28 NOTE — TELEPHONE ENCOUNTER
Hi Dr. Radha Butler,    I received a call from Dr. Nomi Romo office in regards to him doing a consult visit in regards to Jose Alberto's cath procedure. Can you please sign this referral in regards to that consultation.     Thank you  Carolee Hooper

## 2019-05-28 NOTE — H&P
Brief history and physical.    5/28/2019    Telma Meza is a 52year old male. HPI:   18-year-old male with extensive history of CAD multiple interventions in the past most recent 1/2016 2.5 x 23 mm drug-eluting stent placed.   Presents for elective l Last dose of Plavix last night. Took 325 mg of aspirin today. Radhika Heart MD  [facility]. Interventional Cardiology.   [phone]

## 2019-05-28 NOTE — CONSULTS
[facility]    Report of Consultation    Manav Hannah Patient Status:  Outpatient in a Bed    1965 MRN [id number]   Location [facility] Attending Narciso Jaime, [address] [facility] Se Day # 0 SOLITARIO Jasmine History  History reviewed. No pertinent family history. Social History  Social History    Tobacco Use      Smoking status: Current Every Day Smoker        Packs/day: 1.00        Types: Cigarettes      Smokeless tobacco: Never Used    Alcohol use:  Yes hours as needed for Wheezing.    LORAINE CONTOUR NEXT TEST In Vitro Strip To test 2 x daily       Allergies  No Known Allergies    Review of Systems:   A limited review of systems was done due to language barrier but major findings are as above p  Physical Ex 05/24/2019     (H) 05/24/2019    CA 8.8 05/24/2019    ALB 3.8 05/24/2019    ALKPHO 99 05/24/2019    TP 7.6 05/24/2019    AST 27 05/24/2019    ALT 22 05/24/2019    TSH 1.18 11/15/2016         Imaging:           Impression:   CAD with left ventricular

## 2019-05-28 NOTE — PROGRESS NOTES
Patient awake and alert. Vitals stable. Patient started to c/o chest pain, diaphoretic, pale, BP decreased, stat ekg done, fluid bolus 250 cc started, DR Kimber Lopez aware of patients condition. MS ordered.  Later patient started oc/o right leg pain, Dr Quiroga Post

## 2019-05-28 NOTE — PROCEDURES
[facility]  [facility] Cardiac Cath Procedure Note  Ann Marie Joseia Patient Status:  Outpatient in a Bed    1965 MRN [id number]   Location [facility] Attending Oz Hameed, [address] [facility] Se Day # 0 PCP Vargas Lyons, MD  05/28/19

## 2019-05-31 ENCOUNTER — TELEPHONE (OUTPATIENT)
Dept: INTERNAL MEDICINE CLINIC | Facility: CLINIC | Age: 54
End: 2019-05-31

## 2019-06-03 PROBLEM — I25.5 ISCHEMIC CARDIOMYOPATHY: Status: ACTIVE | Noted: 2019-06-03

## 2019-06-04 ENCOUNTER — OFFICE VISIT (OUTPATIENT)
Dept: CARDIOLOGY | Age: 54
End: 2019-06-04

## 2019-06-04 VITALS
SYSTOLIC BLOOD PRESSURE: 128 MMHG | HEIGHT: 66 IN | OXYGEN SATURATION: 96 % | HEART RATE: 90 BPM | BODY MASS INDEX: 34.72 KG/M2 | DIASTOLIC BLOOD PRESSURE: 70 MMHG | WEIGHT: 216 LBS

## 2019-06-04 DIAGNOSIS — I50.22 CHRONIC SYSTOLIC HEART FAILURE (CMD): ICD-10-CM

## 2019-06-04 DIAGNOSIS — I10 ESSENTIAL HYPERTENSION: ICD-10-CM

## 2019-06-04 DIAGNOSIS — I25.118 ATHEROSCLEROSIS OF NATIVE CORONARY ARTERY OF NATIVE HEART WITH STABLE ANGINA PECTORIS (CMD): Primary | ICD-10-CM

## 2019-06-04 DIAGNOSIS — I25.5 ISCHEMIC CARDIOMYOPATHY: ICD-10-CM

## 2019-06-04 DIAGNOSIS — E78.2 MIXED HYPERLIPIDEMIA: ICD-10-CM

## 2019-06-04 PROBLEM — F17.210 DEPENDENCE ON NICOTINE FROM CIGARETTES: Status: ACTIVE | Noted: 2019-06-04

## 2019-06-04 PROCEDURE — 3074F SYST BP LT 130 MM HG: CPT | Performed by: NURSE PRACTITIONER

## 2019-06-04 PROCEDURE — 99214 OFFICE O/P EST MOD 30 MIN: CPT | Performed by: NURSE PRACTITIONER

## 2019-06-04 PROCEDURE — 3078F DIAST BP <80 MM HG: CPT | Performed by: NURSE PRACTITIONER

## 2019-06-04 RX ORDER — CARVEDILOL 25 MG/1
25 TABLET ORAL 2 TIMES DAILY WITH MEALS
Qty: 180 TABLET | Refills: 3 | Status: SHIPPED | OUTPATIENT
Start: 2019-06-04 | End: 2020-11-09

## 2019-06-05 ENCOUNTER — TELEPHONE (OUTPATIENT)
Dept: CARDIOLOGY | Age: 54
End: 2019-06-05

## 2019-06-05 ENCOUNTER — OFFICE VISIT (OUTPATIENT)
Dept: INTERVENTIONAL RADIOLOGY/VASCULAR | Facility: HOSPITAL | Age: 54
End: 2019-06-05
Attending: RADIOLOGY
Payer: COMMERCIAL

## 2019-06-05 DIAGNOSIS — I73.9 PAD (PERIPHERAL ARTERY DISEASE) (HCC): Primary | ICD-10-CM

## 2019-06-05 NOTE — CONSULTS
[facility]  Vascular Interventional Radiology Consultation    Katy Dieudonneadina Patient Status:  Outpatient    1965 MRN [id number]   Location [place] [address] Attending Capo Hinds MD   Hosp Day # 0 PCP Dereck Carmen, STILES  GI: denies abdominal pain, heartburn, diarrhea, blood in bm, tarry bm, constipation,    : denies difficulty urinating, pain, blood in urine, or frequency  SKIN: denies any unusual skin lesions or rashes  MUSCULOSKELETAL: denies back pain, joint pain glycemic control and he was strongly encouraged to quit smoking. I recommended a walking exercise program, which may aid in developing collateral perfusion and improving his claudication symptoms.   Should these conservative measures fail, he may obtain sy

## 2019-06-10 ENCOUNTER — TELEPHONE (OUTPATIENT)
Dept: CARDIOLOGY | Age: 54
End: 2019-06-10

## 2019-06-10 PROBLEM — I73.9 PAD (PERIPHERAL ARTERY DISEASE): Status: ACTIVE | Noted: 2019-06-10

## 2019-06-10 PROBLEM — I73.9 PAD (PERIPHERAL ARTERY DISEASE) (HCC): Status: ACTIVE | Noted: 2019-06-10

## 2019-06-14 ENCOUNTER — TELEPHONE (OUTPATIENT)
Dept: INTERNAL MEDICINE CLINIC | Facility: CLINIC | Age: 54
End: 2019-06-14

## 2019-06-14 NOTE — TELEPHONE ENCOUNTER
Naheed Skill - left a voice mail stating that the patient needs a referral for a life vest (CV).  Please call her back in re: to this referral

## 2019-07-15 ENCOUNTER — TELEPHONE (OUTPATIENT)
Dept: INTERNAL MEDICINE CLINIC | Facility: CLINIC | Age: 54
End: 2019-07-15

## 2019-07-15 NOTE — TELEPHONE ENCOUNTER
Needs to be 6/06/19 to 9/05/19. Needs to be billed monthly; can the authorization be for 3 visits. This is a rental piece of equipment. FAX: [phone].

## 2019-07-31 ENCOUNTER — OFFICE VISIT (OUTPATIENT)
Dept: CARDIOLOGY | Age: 54
End: 2019-07-31

## 2019-07-31 ENCOUNTER — TELEPHONE (OUTPATIENT)
Dept: CARDIOLOGY | Age: 54
End: 2019-07-31

## 2019-07-31 VITALS
OXYGEN SATURATION: 94 % | WEIGHT: 222 LBS | HEART RATE: 77 BPM | BODY MASS INDEX: 35.68 KG/M2 | SYSTOLIC BLOOD PRESSURE: 124 MMHG | HEIGHT: 66 IN | DIASTOLIC BLOOD PRESSURE: 56 MMHG

## 2019-07-31 DIAGNOSIS — I25.118 ATHEROSCLEROSIS OF NATIVE CORONARY ARTERY OF NATIVE HEART WITH STABLE ANGINA PECTORIS (CMD): ICD-10-CM

## 2019-07-31 DIAGNOSIS — I25.5 ISCHEMIC CARDIOMYOPATHY: ICD-10-CM

## 2019-07-31 DIAGNOSIS — F17.210 CIGARETTE NICOTINE DEPENDENCE WITHOUT COMPLICATION: Primary | ICD-10-CM

## 2019-07-31 DIAGNOSIS — I50.22 CHRONIC SYSTOLIC HEART FAILURE (CMD): ICD-10-CM

## 2019-07-31 PROCEDURE — 3074F SYST BP LT 130 MM HG: CPT | Performed by: INTERNAL MEDICINE

## 2019-07-31 PROCEDURE — 3078F DIAST BP <80 MM HG: CPT | Performed by: INTERNAL MEDICINE

## 2019-07-31 PROCEDURE — 99214 OFFICE O/P EST MOD 30 MIN: CPT | Performed by: INTERNAL MEDICINE

## 2019-07-31 RX ORDER — PRAVASTATIN SODIUM 80 MG/1
80 TABLET ORAL DAILY
COMMUNITY
End: 2019-09-18 | Stop reason: ALTCHOICE

## 2019-07-31 RX ORDER — AMLODIPINE BESYLATE 5 MG/1
5 TABLET ORAL DAILY
COMMUNITY

## 2019-07-31 RX ORDER — CILOSTAZOL 50 MG/1
50 TABLET ORAL 2 TIMES DAILY
Qty: 60 TABLET | Refills: 1 | Status: SHIPPED | OUTPATIENT
Start: 2019-07-31 | End: 2019-09-18 | Stop reason: ALTCHOICE

## 2019-07-31 RX ORDER — TAMSULOSIN HYDROCHLORIDE 0.4 MG/1
0.4 CAPSULE ORAL
COMMUNITY

## 2019-08-10 ENCOUNTER — HOSPITAL ENCOUNTER (OUTPATIENT)
Dept: CV DIAGNOSTICS | Facility: HOSPITAL | Age: 54
Discharge: HOME | End: 2019-08-10
Attending: INTERNAL MEDICINE
Payer: COMMERCIAL

## 2019-08-10 DIAGNOSIS — I25.5 ISCHEMIC CARDIOMYOPATHY: ICD-10-CM

## 2019-08-10 LAB
% SHORTENING: NORMAL (ref 28–41)
A' LATERAL: NORMAL
A' MEDIAL: NORMAL
ACQUISITION  TIME: NORMAL
AO CUSP SEP: NORMAL MM (ref 15–26)
AO ROOT: NORMAL MM (ref 20–37)
AORTIC VALVE: NORMAL
AV INSUFFIENCY: NORMAL
AV MEAN GRADE: NORMAL
AV PEAK GRADE: NORMAL
AV VMAX: NORMAL
AVA: NORMAL CM2 (ref 2–?)
DIMENSIONS: NORMAL
DOPPLER: NORMAL
DTI: NORMAL
E' LATERAL: NORMAL
E' MEDIAL: NORMAL
E/A: NORMAL
E/E': NORMAL
EDV: NORMAL ML
EPSS: NORMAL MM (ref 2–10)
FUNCTION: NORMAL
IVC: NORMAL MM (ref 11–25)
IVSD: NORMAL MM (ref 6–11)
L ATRIAL VOLUME: NORMAL CC/M2
L ATRIUM: NORMAL MM (ref 19–40)
LIVDD: NORMAL MM (ref 35–57)
LV EF: 42.5 %
LVIDS: NORMAL MM (ref 20–38)
LVOT DIAMETER: NORMAL MM
LVPWD: NORMAL MM (ref 6–11)
MITRAL VALVE: NORMAL CM2
MV DT: NORMAL
MV INSUFFIENCY: NORMAL
MV MAX A: NORMAL
MV MEAN GRADE: NORMAL
MV PK GRADE: NORMAL
MVA: NORMAL CM2 (ref 4–5)
MVMAX E: NORMAL VELOCITIES M/SEC
PULMONIC VALVE: NORMAL
PV INSUFFIENCY: NORMAL
PV MEAN GRADE: NORMAL
PV PK GRADE: NORMAL
PVMAX: NORMAL
RA: NORMAL
RVIDD: NORMAL MM (ref 9–26)
RVSP: NORMAL
SV: NORMAL ML
TRICUSPID VALVE: NORMAL
TV INSUFFIENCY: NORMAL
TV PK GRADE: NORMAL
TVMAX: NORMAL

## 2019-08-10 PROCEDURE — 93306 TTE W/DOPPLER COMPLETE: CPT | Performed by: INTERNAL MEDICINE

## 2019-08-12 ENCOUNTER — CLINICAL ABSTRACT (OUTPATIENT)
Dept: CARDIOLOGY | Age: 54
End: 2019-08-12

## 2019-08-13 ENCOUNTER — TELEPHONE (OUTPATIENT)
Dept: CARDIOLOGY | Age: 54
End: 2019-08-13

## 2019-08-14 ENCOUNTER — TELEPHONE (OUTPATIENT)
Dept: CARDIOLOGY | Age: 54
End: 2019-08-14

## 2019-09-10 ENCOUNTER — OFFICE VISIT (OUTPATIENT)
Dept: INTERNAL MEDICINE CLINIC | Facility: CLINIC | Age: 54
End: 2019-09-10

## 2019-09-10 VITALS
WEIGHT: 222.19 LBS | OXYGEN SATURATION: 98 % | DIASTOLIC BLOOD PRESSURE: 80 MMHG | BODY MASS INDEX: 34.87 KG/M2 | HEIGHT: 67 IN | HEART RATE: 73 BPM | SYSTOLIC BLOOD PRESSURE: 144 MMHG

## 2019-09-10 DIAGNOSIS — Z12.5 PROSTATE CANCER SCREENING: ICD-10-CM

## 2019-09-10 DIAGNOSIS — F32.0 CURRENT MILD EPISODE OF MAJOR DEPRESSIVE DISORDER WITHOUT PRIOR EPISODE (HCC): ICD-10-CM

## 2019-09-10 DIAGNOSIS — E11.8 TYPE 2 DIABETES MELLITUS WITH COMPLICATION, WITHOUT LONG-TERM CURRENT USE OF INSULIN (HCC): ICD-10-CM

## 2019-09-10 DIAGNOSIS — I25.83 CORONARY ARTERY DISEASE DUE TO LIPID RICH PLAQUE: Primary | ICD-10-CM

## 2019-09-10 DIAGNOSIS — I25.10 CORONARY ARTERY DISEASE DUE TO LIPID RICH PLAQUE: Primary | ICD-10-CM

## 2019-09-10 DIAGNOSIS — K21.9 GASTROESOPHAGEAL REFLUX DISEASE WITHOUT ESOPHAGITIS: ICD-10-CM

## 2019-09-10 LAB
ALBUMIN SERPL-MCNC: 3.4 G/DL
ALBUMIN SERPL-MCNC: 3.4 G/DL (ref 3.4–5)
ALBUMIN/GLOB SERPL: 0.9 {RATIO} (ref 1–2)
ALBUMIN/GLOB SERPL: NORMAL {RATIO}
ALP LIVER SERPL-CCNC: 68 U/L (ref 45–117)
ALP SERPL-CCNC: 68 U/L
ALT SERPL-CCNC: 23 U/L
ALT SERPL-CCNC: 23 U/L (ref 16–61)
ANION GAP SERPL CALC-SCNC: 5 MMOL/L
ANION GAP SERPL CALC-SCNC: 5 MMOL/L (ref 0–18)
AST SERPL-CCNC: 25 U/L
AST SERPL-CCNC: 25 U/L (ref 15–37)
BILIRUB SERPL-MCNC: 0.2 MG/DL (ref 0.1–2)
BILIRUB SERPL-MCNC: NORMAL MG/DL
BUN BLD-MCNC: 17 MG/DL (ref 7–18)
BUN SERPL-MCNC: 17 MG/DL
BUN/CREAT SERPL: 16.8 (ref 10–20)
BUN/CREAT SERPL: NORMAL
CALCIUM BLD-MCNC: 8.6 MG/DL (ref 8.5–10.1)
CALCIUM SERPL-MCNC: NORMAL MG/DL
CHLORIDE SERPL-SCNC: 107 MMOL/L
CHLORIDE SERPL-SCNC: 107 MMOL/L (ref 98–112)
CHOLEST SERPL-MCNC: 186 MG/DL
CHOLEST SERPL-MCNC: NORMAL MG/DL
CHOLEST SMN-MCNC: 186 MG/DL (ref ?–200)
CHOLEST/HDLC SERPL: NORMAL {RATIO}
CHOLEST/HDLC SERPL: NORMAL {RATIO}
CO2 SERPL-SCNC: 29 MMOL/L
CO2 SERPL-SCNC: 29 MMOL/L (ref 21–32)
CREAT BLD-MCNC: 1.01 MG/DL (ref 0.7–1.3)
CREAT SERPL-MCNC: 1.01 MG/DL
EST. AVERAGE GLUCOSE BLD GHB EST-MCNC: 177 MG/DL (ref 68–126)
GLOBULIN PLAS-MCNC: 3.8 G/DL (ref 2.8–4.4)
GLOBULIN SER-MCNC: 3.8 G/DL
GLUCOSE BLD-MCNC: 169 MG/DL (ref 70–99)
GLUCOSE SERPL-MCNC: 169 MG/DL
HBA1C MFR BLD HPLC: 7.8 % (ref ?–5.7)
HDLC SERPL-MCNC: 38 MG/DL
HDLC SERPL-MCNC: 38 MG/DL (ref 40–59)
HDLC SERPL-MCNC: NORMAL MG/DL
LDLC SERPL CALC-MCNC: 80 MG/DL
LDLC SERPL CALC-MCNC: 80 MG/DL (ref ?–100)
LDLC SERPL CALC-MCNC: NORMAL MG/DL
LENGTH OF FAST TIME PATIENT: NORMAL H
M PROTEIN MFR SERPL ELPH: 7.2 G/DL (ref 6.4–8.2)
NONHDLC SERPL-MCNC: 148 MG/DL
NONHDLC SERPL-MCNC: 148 MG/DL (ref ?–130)
NONHDLC SERPL-MCNC: NORMAL MG/DL
OSMOLALITY SERPL CALC.SUM OF ELEC: 297 MOSM/KG (ref 275–295)
PATIENT FASTING: YES
PATIENT FASTING: YES
POTASSIUM SERPL-SCNC: 3.7 MMOL/L
POTASSIUM SERPL-SCNC: 3.7 MMOL/L (ref 3.5–5.1)
PROT SERPL-MCNC: 7.2 G/DL
SODIUM SERPL-SCNC: 141 MMOL/L
SODIUM SERPL-SCNC: 141 MMOL/L (ref 136–145)
TRIGL SERPL-MCNC: 341 MG/DL
TRIGL SERPL-MCNC: 341 MG/DL (ref 30–149)
TRIGL SERPL-MCNC: NORMAL MG/DL
VLDLC SERPL CALC-MCNC: 68 MG/DL
VLDLC SERPL CALC-MCNC: 68 MG/DL (ref 0–30)
VLDLC SERPL CALC-MCNC: NORMAL MG/DL

## 2019-09-10 PROCEDURE — 80053 COMPREHEN METABOLIC PANEL: CPT | Performed by: INTERNAL MEDICINE

## 2019-09-10 PROCEDURE — 83036 HEMOGLOBIN GLYCOSYLATED A1C: CPT | Performed by: INTERNAL MEDICINE

## 2019-09-10 PROCEDURE — 99214 OFFICE O/P EST MOD 30 MIN: CPT | Performed by: INTERNAL MEDICINE

## 2019-09-10 PROCEDURE — 80061 LIPID PANEL: CPT | Performed by: INTERNAL MEDICINE

## 2019-09-10 RX ORDER — ESCITALOPRAM OXALATE 10 MG/1
10 TABLET ORAL DAILY
Qty: 30 TABLET | Refills: 11 | Status: SHIPPED | OUTPATIENT
Start: 2019-09-10 | End: 2020-03-03

## 2019-09-10 NOTE — PROGRESS NOTES
HPI:    Patient ID: Natalie Crigler is a 62year old male. HPIm   Pt here for fu on Dm, obesity, Cad and Gerd. Has better Lv funcion and no longer wearing life vest. Is seeking medical disability due to cardiac problems.     Review of Systems   Constit for Wheezing. Disp: 1 Inhaler Rfl: 1   LORAINE CONTOUR NEXT TEST In Vitro Strip To test 2 x daily Disp: 100 strip Rfl: 6     Allergies:No Known Allergies   PHYSICAL EXAM:   Physical Exam   Constitutional: He is oriented to person, place, and time.  He appears

## 2019-09-11 LAB — COMPLEXED PSA SERPL-MCNC: 2.37 NG/ML (ref ?–4)

## 2019-09-16 RX ORDER — CEFDINIR 300 MG/1
300 CAPSULE ORAL 2 TIMES DAILY
COMMUNITY
Start: 2019-07-03 | End: 2019-09-18 | Stop reason: ALTCHOICE

## 2019-09-17 ENCOUNTER — TELEPHONE (OUTPATIENT)
Dept: ADMINISTRATIVE | Facility: OTHER | Age: 54
End: 2019-09-17

## 2019-09-18 ENCOUNTER — OFFICE VISIT (OUTPATIENT)
Dept: CARDIOLOGY | Age: 54
End: 2019-09-18

## 2019-09-18 VITALS
BODY MASS INDEX: 35.03 KG/M2 | HEIGHT: 66 IN | SYSTOLIC BLOOD PRESSURE: 134 MMHG | DIASTOLIC BLOOD PRESSURE: 64 MMHG | HEART RATE: 76 BPM | WEIGHT: 218 LBS

## 2019-09-18 DIAGNOSIS — I10 ESSENTIAL HYPERTENSION: ICD-10-CM

## 2019-09-18 DIAGNOSIS — I25.5 ISCHEMIC CARDIOMYOPATHY: ICD-10-CM

## 2019-09-18 DIAGNOSIS — E78.2 MIXED HYPERLIPIDEMIA: ICD-10-CM

## 2019-09-18 DIAGNOSIS — I25.118 ATHEROSCLEROSIS OF NATIVE CORONARY ARTERY OF NATIVE HEART WITH STABLE ANGINA PECTORIS (CMD): Primary | ICD-10-CM

## 2019-09-18 PROCEDURE — 3078F DIAST BP <80 MM HG: CPT | Performed by: INTERNAL MEDICINE

## 2019-09-18 PROCEDURE — 99214 OFFICE O/P EST MOD 30 MIN: CPT | Performed by: INTERNAL MEDICINE

## 2019-09-18 PROCEDURE — 3075F SYST BP GE 130 - 139MM HG: CPT | Performed by: INTERNAL MEDICINE

## 2019-09-18 RX ORDER — ROSUVASTATIN CALCIUM 10 MG/1
10 TABLET, COATED ORAL DAILY
Status: SHIPPED | COMMUNITY
Start: 2019-09-18

## 2019-09-18 RX ORDER — PANTOPRAZOLE SODIUM 40 MG/1
40 TABLET, DELAYED RELEASE ORAL DAILY
COMMUNITY
End: 2020-08-05 | Stop reason: ALTCHOICE

## 2019-09-18 RX ORDER — ROSUVASTATIN CALCIUM 20 MG/1
20 TABLET, COATED ORAL DAILY
COMMUNITY
End: 2019-09-18 | Stop reason: ALTCHOICE

## 2019-10-21 ENCOUNTER — TELEPHONE (OUTPATIENT)
Dept: INTERNAL MEDICINE CLINIC | Facility: CLINIC | Age: 54
End: 2019-10-21

## 2019-10-21 NOTE — TELEPHONE ENCOUNTER
Jardiance 10mg daily. Was prescribed by [facility] and he requested refills be done by Dr. Darell Deluna going forward. Needs the refill now. Send to [address].

## 2019-10-24 ENCOUNTER — OFFICE VISIT (OUTPATIENT)
Dept: OPHTHALMOLOGY | Facility: CLINIC | Age: 54
End: 2019-10-24

## 2019-10-24 DIAGNOSIS — H02.886 MEIBOMIAN GLAND DYSFUNCTION (MGD) OF BOTH EYES: ICD-10-CM

## 2019-10-24 DIAGNOSIS — E11.9 TYPE 2 DIABETES MELLITUS WITHOUT RETINOPATHY (HCC): Primary | ICD-10-CM

## 2019-10-24 DIAGNOSIS — H25.13 AGE-RELATED NUCLEAR CATARACT OF BOTH EYES: ICD-10-CM

## 2019-10-24 DIAGNOSIS — H02.883 MEIBOMIAN GLAND DYSFUNCTION (MGD) OF BOTH EYES: ICD-10-CM

## 2019-10-24 DIAGNOSIS — H52.4 PRESBYOPIA: ICD-10-CM

## 2019-10-24 PROCEDURE — 99243 OFF/OP CNSLTJ NEW/EST LOW 30: CPT | Performed by: OPHTHALMOLOGY

## 2019-10-24 NOTE — PATIENT INSTRUCTIONS
Type 2 diabetes mellitus without retinopathy ([facility] [address]  Diabetes type II: no background of retinopathy, no signs of neovascularization noted. Discussed ocular and systemic benefits of blood sugar control.   Diagnosis and treatment discussed in detail with virginia

## 2019-10-24 NOTE — ASSESSMENT & PLAN NOTE
Discussed very mild cataracts in both eyes that are not affecting vision and are not surgical at this time.

## 2019-10-24 NOTE — PROGRESS NOTES
Telma Meza is a 62year old male.     HPI:     HPI     Consult      Additional comments: Consult per Dr. Renetta King              Diabetic Eye Exam      Additional comments: Pt has been a diabetic for 15 years  15 years on pills/  0 years on Insulin   Pt c Take 1 tablet (50 mg total) by mouth 2 (two) times daily. , Disp: 60 tablet, Rfl: 11  Pantoprazole Sodium 40 MG Oral Tab EC, Take 1 tablet (40 mg total) by mouth every morning before breakfast., Disp: 30 tablet, Rfl: 6  Clopidogrel Bisulfate 75 MG Oral Tab, Synephrine @ 9:01 AM            Slit Lamp and Fundus Exam     Slit Lamp Exam       Right Left    Lids/Lashes Dermatochalasis, Meibomian gland dysfunction Dermatochalasis, Meibomian gland dysfunction    Conjunctiva/Sclera Nasal/temp pinguecula Nasal/temp pi given.        Presbyopia  Suggest continue +1.50  over the counter glasses for reading. Age-related nuclear cataract of both eyes  Discussed very mild cataracts in both eyes that are not affecting vision and are not surgical at this time.           No

## 2020-03-03 ENCOUNTER — OFFICE VISIT (OUTPATIENT)
Dept: INTERNAL MEDICINE CLINIC | Facility: CLINIC | Age: 55
End: 2020-03-03

## 2020-03-03 VITALS
HEART RATE: 97 BPM | BODY MASS INDEX: 34.25 KG/M2 | DIASTOLIC BLOOD PRESSURE: 76 MMHG | SYSTOLIC BLOOD PRESSURE: 136 MMHG | OXYGEN SATURATION: 98 % | WEIGHT: 218.19 LBS | HEIGHT: 67 IN

## 2020-03-03 DIAGNOSIS — I25.10 CORONARY ARTERY DISEASE DUE TO LIPID RICH PLAQUE: ICD-10-CM

## 2020-03-03 DIAGNOSIS — Z12.11 COLON CANCER SCREENING: ICD-10-CM

## 2020-03-03 DIAGNOSIS — E78.2 MIXED HYPERLIPIDEMIA: ICD-10-CM

## 2020-03-03 DIAGNOSIS — I25.83 CORONARY ARTERY DISEASE DUE TO LIPID RICH PLAQUE: ICD-10-CM

## 2020-03-03 DIAGNOSIS — E11.8 TYPE 2 DIABETES MELLITUS WITH COMPLICATION, WITHOUT LONG-TERM CURRENT USE OF INSULIN (HCC): ICD-10-CM

## 2020-03-03 DIAGNOSIS — I10 ESSENTIAL HYPERTENSION: ICD-10-CM

## 2020-03-03 DIAGNOSIS — K21.9 GASTROESOPHAGEAL REFLUX DISEASE WITHOUT ESOPHAGITIS: ICD-10-CM

## 2020-03-03 DIAGNOSIS — Z00.00 WELLNESS EXAMINATION: Primary | ICD-10-CM

## 2020-03-03 LAB
ALBUMIN SERPL-MCNC: 3.4 G/DL
ALBUMIN SERPL-MCNC: 3.4 G/DL (ref 3.4–5)
ALBUMIN/GLOB SERPL: 0.9 {RATIO}
ALBUMIN/GLOB SERPL: 0.9 {RATIO} (ref 1–2)
ALP LIVER SERPL-CCNC: 68 U/L (ref 45–117)
ALP SERPL-CCNC: 68 U/L
ALT SERPL-CCNC: 13 U/L (ref 16–61)
ALT SERPL-CCNC: 13 UNITS/L
ANION GAP SERPL CALC-SCNC: 7 MMOL/L
ANION GAP SERPL CALC-SCNC: 7 MMOL/L (ref 0–18)
AST SERPL-CCNC: 13 U/L (ref 15–37)
AST SERPL-CCNC: 13 UNITS/L
BILIRUB SERPL-MCNC: 0.3 MG/DL
BILIRUB SERPL-MCNC: 0.3 MG/DL (ref 0.1–2)
BUN BLD-MCNC: 15 MG/DL (ref 7–18)
BUN SERPL-MCNC: 15 MG/DL
BUN/CREAT SERPL: 18.1
BUN/CREAT SERPL: 18.1 (ref 10–20)
CALCIUM BLD-MCNC: 8.8 MG/DL (ref 8.5–10.1)
CALCIUM SERPL-MCNC: 8.8 MG/DL
CHLORIDE SERPL-SCNC: 108 MMOL/L
CHLORIDE SERPL-SCNC: 108 MMOL/L (ref 98–112)
CHOLEST SERPL-MCNC: 177 MG/DL
CHOLEST SMN-MCNC: 177 MG/DL (ref ?–200)
CO2 SERPL-SCNC: 24 MMOL/L
CO2 SERPL-SCNC: 24 MMOL/L (ref 21–32)
COMPLEXED PSA SERPL-MCNC: 2.11 NG/ML (ref ?–4)
CREAT BLD-MCNC: 0.83 MG/DL (ref 0.7–1.3)
CREAT SERPL-MCNC: 0.83 MG/DL
CREAT UR-SCNC: 203 MG/DL
EST. AVERAGE GLUCOSE BLD GHB EST-MCNC: 171 MG/DL (ref 68–126)
GLOBULIN PLAS-MCNC: 3.9 G/DL (ref 2.8–4.4)
GLOBULIN SER-MCNC: 3.9 G/DL
GLUCOSE BLD-MCNC: 137 MG/DL (ref 70–99)
GLUCOSE SERPL-MCNC: 137 MG/DL
HBA1C MFR BLD HPLC: 7.6 % (ref ?–5.7)
HDLC SERPL-MCNC: 35 MG/DL
HDLC SERPL-MCNC: 35 MG/DL (ref 40–59)
LDLC SERPL CALC-MCNC: 113 MG/DL
LDLC SERPL CALC-MCNC: 113 MG/DL (ref ?–100)
LENGTH OF FAST TIME PATIENT: YES H
LENGTH OF FAST TIME PATIENT: YES H
M PROTEIN MFR SERPL ELPH: 7.3 G/DL (ref 6.4–8.2)
MICROALBUMIN UR-MCNC: 6.8 MG/DL
MICROALBUMIN/CREAT 24H UR-RTO: 33.5 UG/MG (ref ?–30)
NONHDLC SERPL-MCNC: 142 MG/DL
NONHDLC SERPL-MCNC: 142 MG/DL (ref ?–130)
OSMOLALITY SERPL CALC.SUM OF ELEC: 291 MOSM/KG (ref 275–295)
PATIENT FASTING Y/N/NP: YES
PATIENT FASTING Y/N/NP: YES
POTASSIUM SERPL-SCNC: 3.9 MMOL/L
POTASSIUM SERPL-SCNC: 3.9 MMOL/L (ref 3.5–5.1)
PROT SERPL-MCNC: 7.3 G/DL
SODIUM SERPL-SCNC: 139 MMOL/L
SODIUM SERPL-SCNC: 139 MMOL/L (ref 136–145)
TRIGL SERPL-MCNC: 147 MG/DL
TRIGL SERPL-MCNC: 147 MG/DL (ref 30–149)
VLDLC SERPL CALC-MCNC: 29 MG/DL
VLDLC SERPL CALC-MCNC: 29 MG/DL (ref 0–30)

## 2020-03-03 PROCEDURE — 82043 UR ALBUMIN QUANTITATIVE: CPT | Performed by: INTERNAL MEDICINE

## 2020-03-03 PROCEDURE — G0438 PPPS, INITIAL VISIT: HCPCS | Performed by: INTERNAL MEDICINE

## 2020-03-03 PROCEDURE — 82570 ASSAY OF URINE CREATININE: CPT | Performed by: INTERNAL MEDICINE

## 2020-03-03 PROCEDURE — 99396 PREV VISIT EST AGE 40-64: CPT | Performed by: INTERNAL MEDICINE

## 2020-03-03 PROCEDURE — 80053 COMPREHEN METABOLIC PANEL: CPT | Performed by: INTERNAL MEDICINE

## 2020-03-03 PROCEDURE — 80061 LIPID PANEL: CPT | Performed by: INTERNAL MEDICINE

## 2020-03-03 PROCEDURE — 83036 HEMOGLOBIN GLYCOSYLATED A1C: CPT | Performed by: INTERNAL MEDICINE

## 2020-03-03 RX ORDER — LOSARTAN POTASSIUM 50 MG/1
50 TABLET ORAL 2 TIMES DAILY
Qty: 60 TABLET | Refills: 11 | Status: SHIPPED | OUTPATIENT
Start: 2020-03-03 | End: 2020-03-03

## 2020-03-03 RX ORDER — AMLODIPINE BESYLATE 5 MG/1
5 TABLET ORAL
Qty: 90 TABLET | Refills: 3 | Status: SHIPPED | OUTPATIENT
Start: 2020-03-03 | End: 2020-03-03

## 2020-03-03 RX ORDER — ESCITALOPRAM OXALATE 10 MG/1
10 TABLET ORAL DAILY
Qty: 30 TABLET | Refills: 11 | Status: SHIPPED | OUTPATIENT
Start: 2020-03-03 | End: 2020-03-03

## 2020-03-03 RX ORDER — FUROSEMIDE 40 MG/1
40 TABLET ORAL DAILY
Qty: 90 TABLET | Refills: 3 | Status: SHIPPED | OUTPATIENT
Start: 2020-03-03 | End: 2020-03-03

## 2020-03-03 RX ORDER — BLOOD-GLUCOSE METER
KIT MISCELLANEOUS
Qty: 100 STRIP | Refills: 5 | Status: SHIPPED | OUTPATIENT
Start: 2020-03-03 | End: 2020-03-06

## 2020-03-03 RX ORDER — PANTOPRAZOLE SODIUM 40 MG/1
40 TABLET, DELAYED RELEASE ORAL
Qty: 30 TABLET | Refills: 6 | Status: SHIPPED | OUTPATIENT
Start: 2020-03-03 | End: 2020-07-20

## 2020-03-03 RX ORDER — PANTOPRAZOLE SODIUM 40 MG/1
40 TABLET, DELAYED RELEASE ORAL
Qty: 30 TABLET | Refills: 6 | Status: SHIPPED | OUTPATIENT
Start: 2020-03-03 | End: 2020-03-03

## 2020-03-03 RX ORDER — CLOPIDOGREL BISULFATE 75 MG/1
75 TABLET ORAL DAILY
Qty: 90 TABLET | Refills: 3 | Status: SHIPPED | OUTPATIENT
Start: 2020-03-03 | End: 2020-07-20

## 2020-03-03 RX ORDER — ESCITALOPRAM OXALATE 10 MG/1
10 TABLET ORAL DAILY
Qty: 30 TABLET | Refills: 11 | Status: SHIPPED | OUTPATIENT
Start: 2020-03-03 | End: 2020-07-20

## 2020-03-03 RX ORDER — ROSUVASTATIN CALCIUM 20 MG/1
20 TABLET, COATED ORAL NIGHTLY
Qty: 90 TABLET | Refills: 3 | Status: SHIPPED | OUTPATIENT
Start: 2020-03-03 | End: 2020-03-03

## 2020-03-03 RX ORDER — CLOPIDOGREL BISULFATE 75 MG/1
75 TABLET ORAL DAILY
Qty: 90 TABLET | Refills: 3 | Status: SHIPPED | OUTPATIENT
Start: 2020-03-03 | End: 2020-03-03

## 2020-03-03 RX ORDER — AMLODIPINE BESYLATE 5 MG/1
5 TABLET ORAL
Qty: 90 TABLET | Refills: 3 | Status: SHIPPED | OUTPATIENT
Start: 2020-03-03 | End: 2020-07-20

## 2020-03-03 RX ORDER — LOSARTAN POTASSIUM 50 MG/1
50 TABLET ORAL 2 TIMES DAILY
Qty: 60 TABLET | Refills: 11 | Status: SHIPPED | OUTPATIENT
Start: 2020-03-03 | End: 2020-07-20

## 2020-03-03 RX ORDER — FUROSEMIDE 40 MG/1
40 TABLET ORAL DAILY
Qty: 90 TABLET | Refills: 3 | Status: SHIPPED | OUTPATIENT
Start: 2020-03-03 | End: 2020-07-20

## 2020-03-03 RX ORDER — ROSUVASTATIN CALCIUM 20 MG/1
20 TABLET, COATED ORAL NIGHTLY
Qty: 90 TABLET | Refills: 3 | Status: SHIPPED | OUTPATIENT
Start: 2020-03-03 | End: 2020-07-20

## 2020-03-03 NOTE — PROGRESS NOTES
HPI:    Patient ID: Shade Choi is a 62year old male. HPI pt here for fu on Dm type 2, CAD, htn, and PVD and GERD. Has better glucose control with addition of Jardiance. Has no angina but does have some claudication symptoms.  Has chronic GERD  Has 90 tablet 3   • VENTOLIN  (90 Base) MCG/ACT Inhalation Aero Soln Inhale 2 puffs into the lungs every 6 (six) hours as needed for Wheezing.  1 Inhaler 1   • LORAINE CONTOUR NEXT TEST In Vitro Strip To test 2 x daily 100 strip 6     Allergies:No Known Al

## 2020-03-05 ENCOUNTER — TELEPHONE (OUTPATIENT)
Dept: INTERNAL MEDICINE CLINIC | Facility: CLINIC | Age: 55
End: 2020-03-05

## 2020-03-05 RX ORDER — BLOOD-GLUCOSE METER
KIT MISCELLANEOUS
Qty: 100 STRIP | Refills: 5 | Status: CANCELLED | OUTPATIENT
Start: 2020-03-05 | End: 2021-03-05

## 2020-03-16 ENCOUNTER — TELEPHONE (OUTPATIENT)
Dept: INTERNAL MEDICINE CLINIC | Facility: CLINIC | Age: 55
End: 2020-03-16

## 2020-03-16 RX ORDER — BLOOD-GLUCOSE METER
KIT MISCELLANEOUS
Qty: 100 STRIP | Refills: 5 | Status: SHIPPED | OUTPATIENT
Start: 2020-03-16 | End: 2020-08-07

## 2020-04-09 DIAGNOSIS — E11.8 TYPE 2 DIABETES MELLITUS WITH COMPLICATION, WITHOUT LONG-TERM CURRENT USE OF INSULIN (HCC): ICD-10-CM

## 2020-04-09 DIAGNOSIS — E78.2 MIXED HYPERLIPIDEMIA: ICD-10-CM

## 2020-04-09 DIAGNOSIS — I10 ESSENTIAL HYPERTENSION: ICD-10-CM

## 2020-04-09 DIAGNOSIS — I25.83 CORONARY ARTERY DISEASE DUE TO LIPID RICH PLAQUE: ICD-10-CM

## 2020-04-09 DIAGNOSIS — I25.10 CORONARY ARTERY DISEASE DUE TO LIPID RICH PLAQUE: ICD-10-CM

## 2020-07-11 DIAGNOSIS — I10 ESSENTIAL HYPERTENSION: ICD-10-CM

## 2020-07-11 DIAGNOSIS — I25.83 CORONARY ARTERY DISEASE DUE TO LIPID RICH PLAQUE: ICD-10-CM

## 2020-07-11 DIAGNOSIS — I25.10 CORONARY ARTERY DISEASE DUE TO LIPID RICH PLAQUE: ICD-10-CM

## 2020-07-11 DIAGNOSIS — E11.8 TYPE 2 DIABETES MELLITUS WITH COMPLICATION, WITHOUT LONG-TERM CURRENT USE OF INSULIN (HCC): ICD-10-CM

## 2020-07-11 DIAGNOSIS — E78.2 MIXED HYPERLIPIDEMIA: ICD-10-CM

## 2020-07-11 NOTE — TELEPHONE ENCOUNTER
Diabetic Medication Protocol Failed7/11 11:33 AM   Last HgBA1C < 7.5    HgBA1C procedure resulted in past 6 months    Microalbumin procedure in past 12 months or taking ACE/ARB    Appointment in past 6 or next 3 months        Last OV 3/3/20   No Future ap

## 2020-07-20 ENCOUNTER — TELEPHONE (OUTPATIENT)
Dept: INTERNAL MEDICINE CLINIC | Facility: CLINIC | Age: 55
End: 2020-07-20

## 2020-07-20 DIAGNOSIS — E11.8 TYPE 2 DIABETES MELLITUS WITH COMPLICATION, WITHOUT LONG-TERM CURRENT USE OF INSULIN (HCC): Primary | ICD-10-CM

## 2020-07-20 NOTE — TELEPHONE ENCOUNTER
Pt's daughter called and states that Pt needs a refill on \"all of his medication\" Pt has an appointment on 8/7/20

## 2020-08-05 ENCOUNTER — OFFICE VISIT (OUTPATIENT)
Dept: CARDIOLOGY | Age: 55
End: 2020-08-05

## 2020-08-05 VITALS
WEIGHT: 215 LBS | OXYGEN SATURATION: 97 % | HEIGHT: 67 IN | DIASTOLIC BLOOD PRESSURE: 60 MMHG | BODY MASS INDEX: 33.74 KG/M2 | HEART RATE: 68 BPM | SYSTOLIC BLOOD PRESSURE: 116 MMHG

## 2020-08-05 DIAGNOSIS — I10 ESSENTIAL HYPERTENSION: ICD-10-CM

## 2020-08-05 DIAGNOSIS — F17.210 CIGARETTE NICOTINE DEPENDENCE WITHOUT COMPLICATION: Primary | ICD-10-CM

## 2020-08-05 DIAGNOSIS — I25.5 ISCHEMIC CARDIOMYOPATHY: ICD-10-CM

## 2020-08-05 DIAGNOSIS — I50.22 CHRONIC SYSTOLIC HEART FAILURE (CMD): ICD-10-CM

## 2020-08-05 DIAGNOSIS — E78.2 MIXED HYPERLIPIDEMIA: ICD-10-CM

## 2020-08-05 PROCEDURE — 3078F DIAST BP <80 MM HG: CPT | Performed by: INTERNAL MEDICINE

## 2020-08-05 PROCEDURE — 99214 OFFICE O/P EST MOD 30 MIN: CPT | Performed by: INTERNAL MEDICINE

## 2020-08-05 PROCEDURE — 3074F SYST BP LT 130 MM HG: CPT | Performed by: INTERNAL MEDICINE

## 2020-08-05 RX ORDER — ESCITALOPRAM OXALATE 10 MG/1
10 TABLET ORAL DAILY
COMMUNITY

## 2020-08-05 RX ORDER — CILOSTAZOL 100 MG/1
100 TABLET ORAL 2 TIMES DAILY
Qty: 60 TABLET | Refills: 3 | Status: SHIPPED | OUTPATIENT
Start: 2020-08-05

## 2020-08-05 RX ORDER — FAMOTIDINE 20 MG/1
20 TABLET, FILM COATED ORAL 2 TIMES DAILY
Qty: 60 TABLET | Refills: 0 | Status: SHIPPED | OUTPATIENT
Start: 2020-08-05

## 2020-08-05 RX ORDER — CILOSTAZOL 50 MG/1
50 TABLET ORAL 2 TIMES DAILY
COMMUNITY
End: 2020-08-05 | Stop reason: SDUPTHER

## 2020-08-05 ASSESSMENT — PATIENT HEALTH QUESTIONNAIRE - PHQ9
2. FEELING DOWN, DEPRESSED OR HOPELESS: NOT AT ALL
CLINICAL INTERPRETATION OF PHQ9 SCORE: NO FURTHER SCREENING NEEDED
SUM OF ALL RESPONSES TO PHQ9 QUESTIONS 1 AND 2: 0
CLINICAL INTERPRETATION OF PHQ2 SCORE: NO FURTHER SCREENING NEEDED
1. LITTLE INTEREST OR PLEASURE IN DOING THINGS: NOT AT ALL
SUM OF ALL RESPONSES TO PHQ9 QUESTIONS 1 AND 2: 0

## 2020-08-07 ENCOUNTER — OFFICE VISIT (OUTPATIENT)
Dept: INTERNAL MEDICINE CLINIC | Facility: CLINIC | Age: 55
End: 2020-08-07

## 2020-08-07 VITALS
HEART RATE: 73 BPM | DIASTOLIC BLOOD PRESSURE: 68 MMHG | WEIGHT: 215.81 LBS | BODY MASS INDEX: 33.87 KG/M2 | HEIGHT: 67 IN | OXYGEN SATURATION: 97 % | SYSTOLIC BLOOD PRESSURE: 128 MMHG | TEMPERATURE: 97 F

## 2020-08-07 DIAGNOSIS — I25.10 CORONARY ARTERY DISEASE DUE TO LIPID RICH PLAQUE: ICD-10-CM

## 2020-08-07 DIAGNOSIS — E78.2 MIXED HYPERLIPIDEMIA: ICD-10-CM

## 2020-08-07 DIAGNOSIS — I73.9 PVD (PERIPHERAL VASCULAR DISEASE) (HCC): ICD-10-CM

## 2020-08-07 DIAGNOSIS — I25.83 CORONARY ARTERY DISEASE DUE TO LIPID RICH PLAQUE: ICD-10-CM

## 2020-08-07 DIAGNOSIS — E08.65 DIABETES MELLITUS DUE TO UNDERLYING CONDITION, UNCONTROLLED, WITH HYPERGLYCEMIA (HCC): Primary | ICD-10-CM

## 2020-08-07 LAB
ALBUMIN SERPL-MCNC: 3.6 G/DL (ref 3.4–5)
ALBUMIN/GLOB SERPL: 0.9 {RATIO} (ref 1–2)
ALP LIVER SERPL-CCNC: 65 U/L (ref 45–117)
ALT SERPL-CCNC: 13 U/L (ref 16–61)
ANION GAP SERPL CALC-SCNC: 6 MMOL/L (ref 0–18)
AST SERPL-CCNC: 16 U/L (ref 15–37)
BILIRUB SERPL-MCNC: 0.5 MG/DL (ref 0.1–2)
BUN BLD-MCNC: 12 MG/DL (ref 7–18)
BUN/CREAT SERPL: 13 (ref 10–20)
CALCIUM BLD-MCNC: 9.2 MG/DL (ref 8.5–10.1)
CHLORIDE SERPL-SCNC: 105 MMOL/L (ref 98–112)
CHOLEST SMN-MCNC: 200 MG/DL (ref ?–200)
CO2 SERPL-SCNC: 26 MMOL/L (ref 21–32)
CREAT BLD-MCNC: 0.92 MG/DL (ref 0.7–1.3)
EST. AVERAGE GLUCOSE BLD GHB EST-MCNC: 160 MG/DL (ref 68–126)
GLOBULIN PLAS-MCNC: 4 G/DL (ref 2.8–4.4)
GLUCOSE BLD-MCNC: 155 MG/DL (ref 70–99)
HBA1C MFR BLD HPLC: 7.2 % (ref ?–5.7)
HDLC SERPL-MCNC: 39 MG/DL (ref 40–59)
LDLC SERPL CALC-MCNC: 137 MG/DL (ref ?–100)
M PROTEIN MFR SERPL ELPH: 7.6 G/DL (ref 6.4–8.2)
NONHDLC SERPL-MCNC: 161 MG/DL (ref ?–130)
OSMOLALITY SERPL CALC.SUM OF ELEC: 287 MOSM/KG (ref 275–295)
PATIENT FASTING Y/N/NP: YES
PATIENT FASTING Y/N/NP: YES
POTASSIUM SERPL-SCNC: 4 MMOL/L (ref 3.5–5.1)
SODIUM SERPL-SCNC: 137 MMOL/L (ref 136–145)
TRIGL SERPL-MCNC: 121 MG/DL (ref 30–149)
VLDLC SERPL CALC-MCNC: 24 MG/DL (ref 0–30)

## 2020-08-07 PROCEDURE — 3074F SYST BP LT 130 MM HG: CPT | Performed by: INTERNAL MEDICINE

## 2020-08-07 PROCEDURE — 83036 HEMOGLOBIN GLYCOSYLATED A1C: CPT | Performed by: INTERNAL MEDICINE

## 2020-08-07 PROCEDURE — 99214 OFFICE O/P EST MOD 30 MIN: CPT | Performed by: INTERNAL MEDICINE

## 2020-08-07 PROCEDURE — 80061 LIPID PANEL: CPT | Performed by: INTERNAL MEDICINE

## 2020-08-07 PROCEDURE — 80053 COMPREHEN METABOLIC PANEL: CPT | Performed by: INTERNAL MEDICINE

## 2020-08-07 PROCEDURE — 3078F DIAST BP <80 MM HG: CPT | Performed by: INTERNAL MEDICINE

## 2020-08-07 PROCEDURE — 3008F BODY MASS INDEX DOCD: CPT | Performed by: INTERNAL MEDICINE

## 2020-08-07 RX ORDER — BLOOD-GLUCOSE METER
KIT MISCELLANEOUS
Qty: 100 STRIP | Refills: 5 | Status: SHIPPED | OUTPATIENT
Start: 2020-08-07 | End: 2021-08-07

## 2020-08-07 RX ORDER — CILOSTAZOL 50 MG/1
50 TABLET ORAL 2 TIMES DAILY
Qty: 60 TABLET | Refills: 11 | Status: SHIPPED | OUTPATIENT
Start: 2020-08-07 | End: 2021-04-05

## 2020-08-07 RX ORDER — CILOSTAZOL 50 MG/1
50 TABLET ORAL 2 TIMES DAILY
Qty: 60 TABLET | Refills: 11 | Status: SHIPPED | OUTPATIENT
Start: 2020-08-07 | End: 2020-08-07

## 2020-08-07 NOTE — PROGRESS NOTES
HPI:    Patient ID: Delfina Daigle is a 60year old male. HPI Patient here for fu on Dm type 2 which has not been well controlled well. Has just seen Dr. Mónica Bailey and added Jardiance 10 mg daily .   Also is having leg claudications- is trying to avoid puffs into the lungs every 6 (six) hours as needed for Wheezing.  1 Inhaler 1   • LORAINE CONTOUR NEXT TEST In Vitro Strip To test 2 x daily 100 strip 6     Allergies:No Known Allergies   PHYSICAL EXAM:   Physical Exam   Constitutional: He is oriented to Leonel Lemus

## 2020-08-31 RX ORDER — TAMSULOSIN HYDROCHLORIDE 0.4 MG/1
CAPSULE ORAL
Qty: 90 CAPSULE | Refills: 0 | Status: SHIPPED | OUTPATIENT
Start: 2020-08-31 | End: 2021-01-29

## 2020-08-31 NOTE — TELEPHONE ENCOUNTER
Requested Prescriptions     Pending Prescriptions Disp Refills   • tamsulosin (FLOMAX) cap [Pharmacy Med Name: Tamsulosin HCl 0.4 MG Oral Capsule] 90 capsule 0     Sig: Take 1 capsule by mouth once daily     Last office visit: 8-7-2020  Medication last ref

## 2020-09-09 ENCOUNTER — TELEPHONE (OUTPATIENT)
Dept: INTERNAL MEDICINE CLINIC | Facility: CLINIC | Age: 55
End: 2020-09-09

## 2020-09-10 NOTE — TELEPHONE ENCOUNTER
LVM on wife's phone that this information is being sent via "[employer] to 's account. Delroy Ba S.  [address] [place] [address] [phone]

## 2020-09-11 ENCOUNTER — OFFICE VISIT (OUTPATIENT)
Dept: OTOLARYNGOLOGY | Facility: CLINIC | Age: 55
End: 2020-09-11

## 2020-09-11 VITALS
SYSTOLIC BLOOD PRESSURE: 105 MMHG | TEMPERATURE: 98 F | DIASTOLIC BLOOD PRESSURE: 64 MMHG | BODY MASS INDEX: 33.87 KG/M2 | WEIGHT: 215.81 LBS | HEIGHT: 67 IN

## 2020-09-11 DIAGNOSIS — J32.9 CHRONIC SINUSITIS, UNSPECIFIED LOCATION: Primary | ICD-10-CM

## 2020-09-11 PROCEDURE — 99243 OFF/OP CNSLTJ NEW/EST LOW 30: CPT | Performed by: OTOLARYNGOLOGY

## 2020-09-11 PROCEDURE — 3078F DIAST BP <80 MM HG: CPT | Performed by: OTOLARYNGOLOGY

## 2020-09-11 PROCEDURE — 3008F BODY MASS INDEX DOCD: CPT | Performed by: OTOLARYNGOLOGY

## 2020-09-11 PROCEDURE — 3074F SYST BP LT 130 MM HG: CPT | Performed by: OTOLARYNGOLOGY

## 2020-09-11 NOTE — PROGRESS NOTES
Nichelle Garcia is a 60year old male. Patient presents with:  Sinus Problem: sinus headache, sinus congestion, sinus pressure for a year    HPI:   For many years he has had problems with frequent facial pressure congestion and headaches.   Headaches are o 1 Inhaler 1   • LORAINE CONTOUR NEXT TEST In Vitro Strip To test 2 x daily 100 strip 6   • cefdinir 300 MG Oral Cap Take 1 capsule (300 mg total) by mouth 2 (two) times daily.  (Patient not taking: Reported on 9/11/2020 ) 20 capsule 0      Past Medical Histor Normal Inspection - Right: Normal, Left: Normal. Canal - Left: Normal. TM - Right: Normal, Left: Normal.     ASSESSMENT AND PLAN:   1.  Chronic sinusitis, unspecified location  Chronic sinusitis refractory to antibiotics and nasal steroids and antihistamine

## 2020-09-16 ENCOUNTER — HOSPITAL ENCOUNTER (OUTPATIENT)
Dept: CT IMAGING | Age: 55
Discharge: HOME | End: 2020-09-16
Attending: OTOLARYNGOLOGY
Payer: COMMERCIAL

## 2020-09-16 DIAGNOSIS — J32.9 CHRONIC SINUSITIS, UNSPECIFIED LOCATION: ICD-10-CM

## 2020-09-16 PROCEDURE — 70486 CT MAXILLOFACIAL W/O DYE: CPT | Performed by: OTOLARYNGOLOGY

## 2020-09-22 ENCOUNTER — OFFICE VISIT (OUTPATIENT)
Dept: OTOLARYNGOLOGY | Facility: CLINIC | Age: 55
End: 2020-09-22

## 2020-09-22 VITALS
DIASTOLIC BLOOD PRESSURE: 62 MMHG | SYSTOLIC BLOOD PRESSURE: 124 MMHG | BODY MASS INDEX: 33.74 KG/M2 | WEIGHT: 215 LBS | HEIGHT: 67 IN | TEMPERATURE: 98 F

## 2020-09-22 DIAGNOSIS — J32.9 CHRONIC SINUSITIS, UNSPECIFIED LOCATION: Primary | ICD-10-CM

## 2020-09-22 PROCEDURE — 3074F SYST BP LT 130 MM HG: CPT | Performed by: OTOLARYNGOLOGY

## 2020-09-22 PROCEDURE — 3078F DIAST BP <80 MM HG: CPT | Performed by: OTOLARYNGOLOGY

## 2020-09-22 PROCEDURE — 3008F BODY MASS INDEX DOCD: CPT | Performed by: OTOLARYNGOLOGY

## 2020-09-22 PROCEDURE — 99213 OFFICE O/P EST LOW 20 MIN: CPT | Performed by: OTOLARYNGOLOGY

## 2020-09-22 NOTE — PROGRESS NOTES
Belinda Rock is a 60year old male. Patient presents with:  Results: ct scan done on 9/16/20    HPI:   He is in follow-up of a CT scan of his sinuses. I reviewed the CT scan myself and the findings with the patient and his daughter.   CT demonstrates a ) 20 capsule 0      Past Medical History:   Diagnosis Date   • Coronary atherosclerosis    • Diabetes ([facility] [address]    • High blood pressure    • High cholesterol       Social History:  Social History    Tobacco Use      Smoking status: Current Every Day Smoker 1. Chronic sinusitis, unspecified location  Nasal septal deviation with ethmoid and maxillary sinus opacification and polypoid changes. I recommended septoplasty and endoscopic sinus surgery. Procedure, risks, alternatives, applications were discussed.

## 2020-09-30 ENCOUNTER — TELEPHONE (OUTPATIENT)
Dept: INTERNAL MEDICINE CLINIC | Facility: CLINIC | Age: 55
End: 2020-09-30

## 2020-09-30 NOTE — TELEPHONE ENCOUNTER
Dr. Aubree Clarke office called - pt needs to schedule a preop exam has surgery scheduled 10/14/20. Also would like  To speak to pt regarding stopping Plavix prior to surgery.

## 2020-10-02 NOTE — TELEPHONE ENCOUNTER
Scheduled appointment for 10/09 for pre-surg visit. Need to call [facility] to see what tests they may also need. ~~~~~~~  Per Dr. Tosin Ramachandran office: Tests needed for clearance are up to PCP. [facility] will oversee the Covid testing. MRSA most likely not needed.   Please advise patient so he can have labs drawn &/or CXR ahead of exam.

## 2020-10-08 RX ORDER — FAMOTIDINE 20 MG/1
TABLET, FILM COATED ORAL
Qty: 60 TABLET | Refills: 0 | OUTPATIENT
Start: 2020-10-08

## 2020-10-09 ENCOUNTER — OFFICE VISIT (OUTPATIENT)
Dept: INTERNAL MEDICINE CLINIC | Facility: CLINIC | Age: 55
End: 2020-10-09

## 2020-10-09 VITALS
SYSTOLIC BLOOD PRESSURE: 148 MMHG | OXYGEN SATURATION: 95 % | HEIGHT: 67 IN | BODY MASS INDEX: 34.16 KG/M2 | HEART RATE: 75 BPM | DIASTOLIC BLOOD PRESSURE: 70 MMHG | WEIGHT: 217.63 LBS

## 2020-10-09 DIAGNOSIS — Z01.818 PREOPERATIVE CLEARANCE: Primary | ICD-10-CM

## 2020-10-09 DIAGNOSIS — J32.4 CHRONIC PANSINUSITIS: ICD-10-CM

## 2020-10-09 PROCEDURE — 99214 OFFICE O/P EST MOD 30 MIN: CPT | Performed by: INTERNAL MEDICINE

## 2020-10-09 PROCEDURE — 3008F BODY MASS INDEX DOCD: CPT | Performed by: INTERNAL MEDICINE

## 2020-10-09 PROCEDURE — 3077F SYST BP >= 140 MM HG: CPT | Performed by: INTERNAL MEDICINE

## 2020-10-09 PROCEDURE — 3078F DIAST BP <80 MM HG: CPT | Performed by: INTERNAL MEDICINE

## 2020-10-09 NOTE — PROGRESS NOTES
HPI:    Patient ID: Anastasiia Maldonado is a 60year old male. HPI Pt here for a preoperative clearance for planned endoscopic surgery on sinuses on 10/14. Has been suffering from chronic sinusitis which is refractory to medical mgt.  Has underlying issues • Glucose Blood (LORAINE CONTOUR TEST) In Vitro Strip To test daily 100 strip 3   • VENTOLIN  (90 Base) MCG/ACT Inhalation Aero Soln Inhale 2 puffs into the lungs every 6 (six) hours as needed for Wheezing.  1 Inhaler 1   • LORAINE CONTOUR NEXT TEST In

## 2020-10-11 ENCOUNTER — APPOINTMENT (OUTPATIENT)
Dept: LAB | Facility: HOSPITAL | Age: 55
End: 2020-10-11
Attending: OTOLARYNGOLOGY
Payer: COMMERCIAL

## 2020-10-11 DIAGNOSIS — Z01.818 PREOPERATIVE TESTING: ICD-10-CM

## 2020-10-12 RX ORDER — CARVEDILOL 25 MG/1
25 TABLET ORAL 2 TIMES DAILY WITH MEALS
COMMUNITY

## 2020-10-14 ENCOUNTER — ANESTHESIA EVENT (OUTPATIENT)
Dept: SURGERY | Facility: HOSPITAL | Age: 55
End: 2020-10-14
Payer: COMMERCIAL

## 2020-10-14 ENCOUNTER — ANESTHESIA (OUTPATIENT)
Dept: SURGERY | Facility: HOSPITAL | Age: 55
End: 2020-10-14
Payer: COMMERCIAL

## 2020-10-14 ENCOUNTER — HOSPITAL ENCOUNTER (OUTPATIENT)
Facility: HOSPITAL | Age: 55
Setting detail: HOSPITAL OUTPATIENT SURGERY
Discharge: HOME | End: 2020-10-14
Attending: OTOLARYNGOLOGY | Admitting: OTOLARYNGOLOGY
Payer: COMMERCIAL

## 2020-10-14 VITALS
RESPIRATION RATE: 18 BRPM | WEIGHT: 213 LBS | HEART RATE: 77 BPM | HEIGHT: 67 IN | OXYGEN SATURATION: 92 % | BODY MASS INDEX: 33.43 KG/M2 | SYSTOLIC BLOOD PRESSURE: 135 MMHG | TEMPERATURE: 98 F | DIASTOLIC BLOOD PRESSURE: 64 MMHG

## 2020-10-14 DIAGNOSIS — J32.9 CHRONIC SINUSITIS, UNSPECIFIED LOCATION: ICD-10-CM

## 2020-10-14 DIAGNOSIS — J34.2 DEVIATED NASAL SEPTUM: ICD-10-CM

## 2020-10-14 DIAGNOSIS — Z01.818 PREOPERATIVE TESTING: Primary | ICD-10-CM

## 2020-10-14 PROBLEM — J32.0 CHRONIC MAXILLARY SINUSITIS: Status: ACTIVE | Noted: 2020-10-14

## 2020-10-14 PROCEDURE — 31255 NSL/SINS NDSC W/TOT ETHMDCT: CPT | Performed by: OTOLARYNGOLOGY

## 2020-10-14 PROCEDURE — 09SM0ZZ REPOSITION NASAL SEPTUM, OPEN APPROACH: ICD-10-PCS | Performed by: OTOLARYNGOLOGY

## 2020-10-14 PROCEDURE — 099R8ZZ DRAINAGE OF LEFT MAXILLARY SINUS, VIA NATURAL OR ARTIFICIAL OPENING ENDOSCOPIC: ICD-10-PCS | Performed by: OTOLARYNGOLOGY

## 2020-10-14 PROCEDURE — 099Q8ZZ DRAINAGE OF RIGHT MAXILLARY SINUS, VIA NATURAL OR ARTIFICIAL OPENING ENDOSCOPIC: ICD-10-PCS | Performed by: OTOLARYNGOLOGY

## 2020-10-14 PROCEDURE — 8E09XBZ COMPUTER ASSISTED PROCEDURE OF HEAD AND NECK REGION: ICD-10-PCS | Performed by: OTOLARYNGOLOGY

## 2020-10-14 PROCEDURE — 09TU8ZZ RESECTION OF RIGHT ETHMOID SINUS, VIA NATURAL OR ARTIFICIAL OPENING ENDOSCOPIC: ICD-10-PCS | Performed by: OTOLARYNGOLOGY

## 2020-10-14 PROCEDURE — 30520 REPAIR OF NASAL SEPTUM: CPT | Performed by: OTOLARYNGOLOGY

## 2020-10-14 PROCEDURE — 09TV8ZZ RESECTION OF LEFT ETHMOID SINUS, VIA NATURAL OR ARTIFICIAL OPENING ENDOSCOPIC: ICD-10-PCS | Performed by: OTOLARYNGOLOGY

## 2020-10-14 PROCEDURE — 31267 ENDOSCOPY MAXILLARY SINUS: CPT | Performed by: OTOLARYNGOLOGY

## 2020-10-14 PROCEDURE — 61782 SCAN PROC CRANIAL EXTRA: CPT | Performed by: OTOLARYNGOLOGY

## 2020-10-14 RX ORDER — HYDROMORPHONE HYDROCHLORIDE 1 MG/ML
0.6 INJECTION, SOLUTION INTRAMUSCULAR; INTRAVENOUS; SUBCUTANEOUS EVERY 5 MIN PRN
Status: DISCONTINUED | OUTPATIENT
Start: 2020-10-14 | End: 2020-10-14

## 2020-10-14 RX ORDER — SODIUM CHLORIDE, SODIUM LACTATE, POTASSIUM CHLORIDE, CALCIUM CHLORIDE 600; 310; 30; 20 MG/100ML; MG/100ML; MG/100ML; MG/100ML
INJECTION, SOLUTION INTRAVENOUS CONTINUOUS
Status: DISCONTINUED | OUTPATIENT
Start: 2020-10-14 | End: 2020-10-14

## 2020-10-14 RX ORDER — FAMOTIDINE 20 MG/1
20 TABLET ORAL ONCE
Status: COMPLETED | OUTPATIENT
Start: 2020-10-14 | End: 2020-10-14

## 2020-10-14 RX ORDER — CEPHALEXIN 500 MG/1
500 CAPSULE ORAL EVERY 8 HOURS
Qty: 21 CAPSULE | Refills: 0 | Status: SHIPPED | OUTPATIENT
Start: 2020-10-14 | End: 2021-09-22

## 2020-10-14 RX ORDER — DEXTROSE MONOHYDRATE 25 G/50ML
50 INJECTION, SOLUTION INTRAVENOUS
Status: DISCONTINUED | OUTPATIENT
Start: 2020-10-14 | End: 2020-10-14

## 2020-10-14 RX ORDER — LIDOCAINE HYDROCHLORIDE 10 MG/ML
INJECTION, SOLUTION EPIDURAL; INFILTRATION; INTRACAUDAL; PERINEURAL AS NEEDED
Status: DISCONTINUED | OUTPATIENT
Start: 2020-10-14 | End: 2020-10-14 | Stop reason: SURG

## 2020-10-14 RX ORDER — LIDOCAINE HYDROCHLORIDE AND EPINEPHRINE 10; 10 MG/ML; UG/ML
INJECTION, SOLUTION INFILTRATION; PERINEURAL AS NEEDED
Status: DISCONTINUED | OUTPATIENT
Start: 2020-10-14 | End: 2020-10-14 | Stop reason: HOSPADM

## 2020-10-14 RX ORDER — MIDAZOLAM HYDROCHLORIDE 1 MG/ML
INJECTION INTRAMUSCULAR; INTRAVENOUS AS NEEDED
Status: DISCONTINUED | OUTPATIENT
Start: 2020-10-14 | End: 2020-10-14 | Stop reason: SURG

## 2020-10-14 RX ORDER — HYDROCODONE BITARTRATE AND ACETAMINOPHEN 5; 325 MG/1; MG/1
1 TABLET ORAL AS NEEDED
Status: DISCONTINUED | OUTPATIENT
Start: 2020-10-14 | End: 2020-10-14

## 2020-10-14 RX ORDER — HYDROCODONE BITARTRATE AND ACETAMINOPHEN 5; 325 MG/1; MG/1
TABLET ORAL
Qty: 20 TABLET | Refills: 0 | Status: SHIPPED | OUTPATIENT
Start: 2020-10-14 | End: 2021-09-22

## 2020-10-14 RX ORDER — HYDROMORPHONE HYDROCHLORIDE 1 MG/ML
0.4 INJECTION, SOLUTION INTRAMUSCULAR; INTRAVENOUS; SUBCUTANEOUS EVERY 5 MIN PRN
Status: DISCONTINUED | OUTPATIENT
Start: 2020-10-14 | End: 2020-10-14

## 2020-10-14 RX ORDER — IPRATROPIUM BROMIDE AND ALBUTEROL SULFATE 2.5; .5 MG/3ML; MG/3ML
3 SOLUTION RESPIRATORY (INHALATION) ONCE
Status: COMPLETED | OUTPATIENT
Start: 2020-10-14 | End: 2020-10-14

## 2020-10-14 RX ORDER — MORPHINE SULFATE 4 MG/ML
2 INJECTION, SOLUTION INTRAMUSCULAR; INTRAVENOUS EVERY 10 MIN PRN
Status: DISCONTINUED | OUTPATIENT
Start: 2020-10-14 | End: 2020-10-14

## 2020-10-14 RX ORDER — DEXAMETHASONE SODIUM PHOSPHATE 4 MG/ML
VIAL (ML) INJECTION AS NEEDED
Status: DISCONTINUED | OUTPATIENT
Start: 2020-10-14 | End: 2020-10-14 | Stop reason: SURG

## 2020-10-14 RX ORDER — ONDANSETRON 2 MG/ML
INJECTION INTRAMUSCULAR; INTRAVENOUS AS NEEDED
Status: DISCONTINUED | OUTPATIENT
Start: 2020-10-14 | End: 2020-10-14 | Stop reason: SURG

## 2020-10-14 RX ORDER — NALOXONE HYDROCHLORIDE 0.4 MG/ML
80 INJECTION, SOLUTION INTRAMUSCULAR; INTRAVENOUS; SUBCUTANEOUS AS NEEDED
Status: DISCONTINUED | OUTPATIENT
Start: 2020-10-14 | End: 2020-10-14

## 2020-10-14 RX ORDER — PHENYLEPHRINE HCL 10 MG/ML
VIAL (ML) INJECTION AS NEEDED
Status: DISCONTINUED | OUTPATIENT
Start: 2020-10-14 | End: 2020-10-14 | Stop reason: SURG

## 2020-10-14 RX ORDER — ONDANSETRON 2 MG/ML
4 INJECTION INTRAMUSCULAR; INTRAVENOUS ONCE AS NEEDED
Status: DISCONTINUED | OUTPATIENT
Start: 2020-10-14 | End: 2020-10-14

## 2020-10-14 RX ORDER — HYDROCODONE BITARTRATE AND ACETAMINOPHEN 5; 325 MG/1; MG/1
2 TABLET ORAL AS NEEDED
Status: DISCONTINUED | OUTPATIENT
Start: 2020-10-14 | End: 2020-10-14

## 2020-10-14 RX ORDER — SODIUM CHLORIDE 0.9 % (FLUSH) 0.9 %
10 SYRINGE (ML) INJECTION AS NEEDED
Status: DISCONTINUED | OUTPATIENT
Start: 2020-10-14 | End: 2020-10-14

## 2020-10-14 RX ORDER — MORPHINE SULFATE 10 MG/ML
6 INJECTION, SOLUTION INTRAMUSCULAR; INTRAVENOUS EVERY 10 MIN PRN
Status: DISCONTINUED | OUTPATIENT
Start: 2020-10-14 | End: 2020-10-14

## 2020-10-14 RX ORDER — HYDROMORPHONE HYDROCHLORIDE 1 MG/ML
0.2 INJECTION, SOLUTION INTRAMUSCULAR; INTRAVENOUS; SUBCUTANEOUS EVERY 5 MIN PRN
Status: DISCONTINUED | OUTPATIENT
Start: 2020-10-14 | End: 2020-10-14

## 2020-10-14 RX ORDER — PROCHLORPERAZINE EDISYLATE 5 MG/ML
5 INJECTION INTRAMUSCULAR; INTRAVENOUS ONCE AS NEEDED
Status: DISCONTINUED | OUTPATIENT
Start: 2020-10-14 | End: 2020-10-14

## 2020-10-14 RX ORDER — ACETAMINOPHEN 500 MG
1000 TABLET ORAL ONCE
Status: COMPLETED | OUTPATIENT
Start: 2020-10-14 | End: 2020-10-14

## 2020-10-14 RX ORDER — EPHEDRINE SULFATE 50 MG/ML
INJECTION, SOLUTION INTRAVENOUS AS NEEDED
Status: DISCONTINUED | OUTPATIENT
Start: 2020-10-14 | End: 2020-10-14 | Stop reason: SURG

## 2020-10-14 RX ORDER — ROCURONIUM BROMIDE 10 MG/ML
INJECTION, SOLUTION INTRAVENOUS AS NEEDED
Status: DISCONTINUED | OUTPATIENT
Start: 2020-10-14 | End: 2020-10-14 | Stop reason: SURG

## 2020-10-14 RX ORDER — DIAPER,BRIEF,INFANT-TODD,DISP
EACH MISCELLANEOUS AS NEEDED
Status: DISCONTINUED | OUTPATIENT
Start: 2020-10-14 | End: 2020-10-14 | Stop reason: HOSPADM

## 2020-10-14 RX ORDER — METOPROLOL TARTRATE 5 MG/5ML
2.5 INJECTION INTRAVENOUS ONCE
Status: DISCONTINUED | OUTPATIENT
Start: 2020-10-14 | End: 2020-10-14

## 2020-10-14 RX ORDER — ONDANSETRON 4 MG/1
4 TABLET, ORALLY DISINTEGRATING ORAL EVERY 6 HOURS PRN
Status: DISCONTINUED | OUTPATIENT
Start: 2020-10-14 | End: 2020-10-14

## 2020-10-14 RX ORDER — HYDROCODONE BITARTRATE AND ACETAMINOPHEN 5; 325 MG/1; MG/1
1 TABLET ORAL EVERY 4 HOURS PRN
Status: DISCONTINUED | OUTPATIENT
Start: 2020-10-14 | End: 2020-10-14

## 2020-10-14 RX ORDER — ONDANSETRON 2 MG/ML
4 INJECTION INTRAMUSCULAR; INTRAVENOUS EVERY 6 HOURS PRN
Status: DISCONTINUED | OUTPATIENT
Start: 2020-10-14 | End: 2020-10-14

## 2020-10-14 RX ORDER — MORPHINE SULFATE 4 MG/ML
4 INJECTION, SOLUTION INTRAMUSCULAR; INTRAVENOUS EVERY 10 MIN PRN
Status: DISCONTINUED | OUTPATIENT
Start: 2020-10-14 | End: 2020-10-14

## 2020-10-14 RX ADMIN — PHENYLEPHRINE HCL 50 MCG: 10 MG/ML VIAL (ML) INJECTION at 10:02:00

## 2020-10-14 RX ADMIN — ONDANSETRON 4 MG: 2 INJECTION INTRAMUSCULAR; INTRAVENOUS at 09:46:00

## 2020-10-14 RX ADMIN — EPHEDRINE SULFATE 10 MG: 50 INJECTION, SOLUTION INTRAVENOUS at 09:58:00

## 2020-10-14 RX ADMIN — PHENYLEPHRINE HCL 50 MCG: 10 MG/ML VIAL (ML) INJECTION at 10:10:00

## 2020-10-14 RX ADMIN — LIDOCAINE HYDROCHLORIDE 50 MG: 10 INJECTION, SOLUTION EPIDURAL; INFILTRATION; INTRACAUDAL; PERINEURAL at 09:36:00

## 2020-10-14 RX ADMIN — ROCURONIUM BROMIDE 5 MG: 10 INJECTION, SOLUTION INTRAVENOUS at 09:36:00

## 2020-10-14 RX ADMIN — EPHEDRINE SULFATE 10 MG: 50 INJECTION, SOLUTION INTRAVENOUS at 09:55:00

## 2020-10-14 RX ADMIN — MIDAZOLAM HYDROCHLORIDE 1 MG: 1 INJECTION INTRAMUSCULAR; INTRAVENOUS at 09:44:00

## 2020-10-14 RX ADMIN — DEXAMETHASONE SODIUM PHOSPHATE 4 MG: 4 MG/ML VIAL (ML) INJECTION at 09:46:00

## 2020-10-14 RX ADMIN — MIDAZOLAM HYDROCHLORIDE 1 MG: 1 INJECTION INTRAMUSCULAR; INTRAVENOUS at 09:31:00

## 2020-10-14 NOTE — H&P
He is in follow-up of a CT scan of his sinuses. I reviewed the CT scan myself and the findings with the patient and his daughter.   CT demonstrates a deviated nasal septum with ethmoid and maxillary sinus opacification right greater than left  [facility]     • High blood pressure     • High cholesterol         Social History:  Social History    Tobacco Use      Smoking status: Current Every Day Smoker        Packs/day: 1.00        Types: Cigarettes      Smokeless tobacco: Never Used    OpenFeintsale maxillary sinus opacification and polypoid changes. I recommended septoplasty and endoscopic sinus surgery. Procedure, risks, alternatives, applications were discussed.   Family will consider this and call back if they decide to proceed        The patient as needed for Wheezing. 1 Inhaler 1   • LORAINE CONTOUR NEXT TEST In Vitro Strip To test 2 x daily 100 strip 6   • cefdinir 300 MG Oral Cap Take 1 capsule (300 mg total) by mouth 2 (two) times daily.  (Patient not taking: Reported on 9/22/2020 ) 20 capsule 0 cervical. Supraclavicular.    Eyes Normal Conjunctiva - Right: Normal, Left: Normal. Pupil - Right: Normal, Left: Normal.    Ears Normal Inspection - Right: Normal, Left: Normal. Canal - Left: Normal. TM - Right: Normal, Left: Normal.      ASSESSMENT AND PL

## 2020-10-14 NOTE — ANESTHESIA POSTPROCEDURE EVALUATION
Patient: Shade Ala    Procedure Summary     Date: 10/14/20 Room / Location: [address] [place] [address] MAIN OR 08 / [address] [facility] MAIN OR    Anesthesia Start: 6058 Anesthesia Stop:     Procedure: NASAL SEPTOPLASTY BILAT SINUS ENDOSCOPY ETHM MAX (Bilateral Nose) Diagnosis:       Dev

## 2020-10-14 NOTE — BRIEF OP NOTE
Pre-Operative Diagnosis: Deviated nasal septum [J34.2]  Chronic sinusitis, unspecified location [J32.9]     Post-Operative Diagnosis: Deviated nasal septum [D34. 2]Chronic sinusitis, unspecified location [J32.9]      Procedure Performed:   Procedure(s):  Se

## 2020-10-14 NOTE — ANESTHESIA PREPROCEDURE EVALUATION
Anesthesia PreOp Note    HPI:     Anastasiia Maldonado is a 60year old male who presents for preoperative consultation requested by: Elizabeth Louis MD    Date of Surgery: 10/14/2020    Procedure(s):  NASAL SEPTOPLASTY BILAT SINUS ENDOSCOPY ETHM MAX  Indicati •  tamsulosin (FLOMAX) cap, Take 1 capsule by mouth once daily, Disp: 90 capsule, Rfl: 0, 10/13/2020 at Unknown time    •  cilostazol 50 MG Oral Tab, Take 1 tablet (50 mg total) by mouth 2 (two) times daily. , Disp: 60 tablet, Rfl: 11, 10/13/2020 at Unknown •  lactated ringers infusion, , Intravenous, Continuous, Melo Verma MD, Last Rate: 20 mL/hr at 10/14/20 0903    •  metoprolol Tartrate (LOPRESSOR) tab 25 mg, 25 mg, Oral, Once PRN, Deanna Trammell MD    No current [facility]-ordered outpatient medications o Emotionally abused: Not on file        Physically abused: Not on file        Forced sexual activity: Not on file    Other Topics      Concerns:        Not on file    Social History Narrative      Not on file      Available pre-op labs reviewed.      L I have informed Lesly Dasilvaer and/or legal guardian or family member of the nature of the anesthetic plan, benefits, risks including possible dental damage if relevant, major complications, and any alternative forms of anesthetic management.    All of th

## 2020-10-14 NOTE — INTERVAL H&P NOTE
Pre-op Diagnosis: Deviated nasal septum [J34.2]  Chronic sinusitis, unspecified location [J32.9]    The above referenced H&P was reviewed by Ramón Cota.  Kristy Mackay MD on 10/14/2020, the patient was examined and no significant changes have occurred in the patient'

## 2020-10-15 ENCOUNTER — TELEPHONE (OUTPATIENT)
Dept: OTOLARYNGOLOGY | Facility: CLINIC | Age: 55
End: 2020-10-15

## 2020-10-15 NOTE — OPERATIVE REPORT
[facility]    PATIENT'S NAME: Fátima Howelljuanita   ATTENDING PHYSICIAN: Melo Castellanos MD   OPERATING PHYSICIAN: Melo Castellanos MD   PATIENT ACCOUNT#:   [id number]    LOCATION:  [facility] 7 [facility] 10  MEDICAL RECORD #:   [id number]       DATE OF with a chisel. This allowed the septum to straighten significantly. The hemitransfixion incision was then closed with interrupted chromic sutures. The 0-degree endoscope was used to visualize the nasal cavity on the right side.   The middle turbinate w

## 2020-10-15 NOTE — TELEPHONE ENCOUNTER
Language Line used Cort[place] [id number], Pt is post op day 1 endo maxillary with tissue removal, endo total ethmoidectomy, septoplasty, SMR.  Per  Pt pt is doing well no bleeding, advised pt no bending or heavy lifting for the next week and pt is not to blow nose

## 2020-10-20 ENCOUNTER — OFFICE VISIT (OUTPATIENT)
Dept: OTOLARYNGOLOGY | Facility: CLINIC | Age: 55
End: 2020-10-20

## 2020-10-20 VITALS
SYSTOLIC BLOOD PRESSURE: 152 MMHG | HEIGHT: 67 IN | WEIGHT: 213 LBS | TEMPERATURE: 98 F | DIASTOLIC BLOOD PRESSURE: 72 MMHG | BODY MASS INDEX: 33.43 KG/M2

## 2020-10-20 DIAGNOSIS — J32.9 CHRONIC SINUSITIS, UNSPECIFIED LOCATION: Primary | ICD-10-CM

## 2020-10-20 PROCEDURE — 3008F BODY MASS INDEX DOCD: CPT | Performed by: OTOLARYNGOLOGY

## 2020-10-20 PROCEDURE — 3078F DIAST BP <80 MM HG: CPT | Performed by: OTOLARYNGOLOGY

## 2020-10-20 PROCEDURE — 99024 POSTOP FOLLOW-UP VISIT: CPT | Performed by: OTOLARYNGOLOGY

## 2020-10-20 PROCEDURE — 3077F SYST BP >= 140 MM HG: CPT | Performed by: OTOLARYNGOLOGY

## 2020-10-20 NOTE — PROGRESS NOTES
He has been very congested following his septoplasty and sinus surgery. He is not having any drainage or pain    Exam:  Nasal splints removed. Debris cleared from the nasal passages bilaterally. Assessment/plan:  Doing well following his surgery.   Sta

## 2020-11-09 RX ORDER — CARVEDILOL 25 MG/1
TABLET ORAL
Qty: 180 TABLET | Refills: 0 | Status: SHIPPED | OUTPATIENT
Start: 2020-11-09

## 2021-01-29 RX ORDER — TAMSULOSIN HYDROCHLORIDE 0.4 MG/1
0.4 CAPSULE ORAL DAILY
Qty: 90 CAPSULE | Refills: 0 | Status: SHIPPED | OUTPATIENT
Start: 2021-01-29 | End: 2021-04-05

## 2021-01-29 NOTE — TELEPHONE ENCOUNTER
A refill request was received for:  Requested Prescriptions     Pending Prescriptions Disp Refills   • tamsulosin (FLOMAX) cap 90 capsule 0     Sig: Take 1 capsule (0.4 mg total) by mouth daily.      Last refill date: 8/31/20  Qty: 90  Last office visit: 10

## 2021-03-17 DIAGNOSIS — Z23 NEED FOR VACCINATION: ICD-10-CM

## 2021-04-05 ENCOUNTER — OFFICE VISIT (OUTPATIENT)
Dept: INTERNAL MEDICINE CLINIC | Facility: CLINIC | Age: 56
End: 2021-04-05

## 2021-04-05 VITALS
SYSTOLIC BLOOD PRESSURE: 168 MMHG | WEIGHT: 218.63 LBS | BODY MASS INDEX: 34.31 KG/M2 | HEIGHT: 67 IN | DIASTOLIC BLOOD PRESSURE: 82 MMHG | HEART RATE: 84 BPM | OXYGEN SATURATION: 96 %

## 2021-04-05 DIAGNOSIS — E08.65 DIABETES MELLITUS DUE TO UNDERLYING CONDITION, UNCONTROLLED, WITH HYPERGLYCEMIA (HCC): ICD-10-CM

## 2021-04-05 DIAGNOSIS — E78.2 MIXED HYPERLIPIDEMIA: ICD-10-CM

## 2021-04-05 DIAGNOSIS — Z00.00 WELLNESS EXAMINATION: Primary | ICD-10-CM

## 2021-04-05 DIAGNOSIS — I25.83 CORONARY ARTERY DISEASE DUE TO LIPID RICH PLAQUE: ICD-10-CM

## 2021-04-05 DIAGNOSIS — I25.10 CORONARY ARTERY DISEASE DUE TO LIPID RICH PLAQUE: ICD-10-CM

## 2021-04-05 DIAGNOSIS — I73.9 PVD (PERIPHERAL VASCULAR DISEASE) (HCC): ICD-10-CM

## 2021-04-05 DIAGNOSIS — E11.8 TYPE 2 DIABETES MELLITUS WITH COMPLICATION, WITHOUT LONG-TERM CURRENT USE OF INSULIN (HCC): ICD-10-CM

## 2021-04-05 DIAGNOSIS — I10 ESSENTIAL HYPERTENSION: ICD-10-CM

## 2021-04-05 PROCEDURE — 99396 PREV VISIT EST AGE 40-64: CPT | Performed by: INTERNAL MEDICINE

## 2021-04-05 PROCEDURE — 3079F DIAST BP 80-89 MM HG: CPT | Performed by: INTERNAL MEDICINE

## 2021-04-05 PROCEDURE — G0438 PPPS, INITIAL VISIT: HCPCS | Performed by: INTERNAL MEDICINE

## 2021-04-05 PROCEDURE — 3077F SYST BP >= 140 MM HG: CPT | Performed by: INTERNAL MEDICINE

## 2021-04-05 PROCEDURE — 83036 HEMOGLOBIN GLYCOSYLATED A1C: CPT | Performed by: INTERNAL MEDICINE

## 2021-04-05 PROCEDURE — 3008F BODY MASS INDEX DOCD: CPT | Performed by: INTERNAL MEDICINE

## 2021-04-05 RX ORDER — LOSARTAN POTASSIUM 50 MG/1
50 TABLET ORAL 2 TIMES DAILY
Qty: 60 TABLET | Refills: 11 | Status: SHIPPED | OUTPATIENT
Start: 2021-04-05

## 2021-04-05 RX ORDER — CILOSTAZOL 50 MG/1
50 TABLET ORAL 2 TIMES DAILY
Qty: 60 TABLET | Refills: 11 | Status: SHIPPED | OUTPATIENT
Start: 2021-04-05

## 2021-04-05 RX ORDER — AMLODIPINE BESYLATE 5 MG/1
5 TABLET ORAL
Qty: 90 TABLET | Refills: 3 | Status: SHIPPED | OUTPATIENT
Start: 2021-04-05 | End: 2022-01-24

## 2021-04-05 RX ORDER — TAMSULOSIN HYDROCHLORIDE 0.4 MG/1
0.4 CAPSULE ORAL DAILY
Qty: 90 CAPSULE | Refills: 0 | Status: SHIPPED | OUTPATIENT
Start: 2021-04-05 | End: 2021-06-03

## 2021-04-05 NOTE — PROGRESS NOTES
HPI/Subjective:   Patient ID: Linda Castro is a 60year old male. HPIPt here for an annual physical and fu on dm, Cad and obesity. Has quit smoking. Bs are variable. Overall has decent control.   Has some claudication in the legs    History/Other: mouth every morning before breakfast. 30 tablet 6   • Rosuvastatin Calcium 20 MG Oral Tab Take 1 tablet (20 mg total) by mouth nightly.  90 tablet 3   • Glucose Blood (LORAINE CONTOUR TEST) In Vitro Strip To test daily 100 strip 3   • VENTOLIN  (90 Bas

## 2021-04-30 RX ORDER — EMPAGLIFLOZIN 10 MG/1
10 TABLET, FILM COATED ORAL DAILY
COMMUNITY
Start: 2021-03-26 | End: 2021-04-30

## 2021-04-30 NOTE — TELEPHONE ENCOUNTER
Insurance prefers 90 day supply on Branded products. Jardiance appears to have  and is no longer on patient's medication list, however it does appear like he is still filling this medication.  Pended new script for Jardiance 10 mg daily #90 to prefer

## 2021-05-21 RX ORDER — EMPAGLIFLOZIN 10 MG/1
10 TABLET, FILM COATED ORAL DAILY
Qty: 90 TABLET | Refills: 0 | Status: SHIPPED | OUTPATIENT
Start: 2021-05-21

## 2021-05-27 ENCOUNTER — HOSPITAL ENCOUNTER (OUTPATIENT)
Dept: ULTRASOUND IMAGING | Facility: HOSPITAL | Age: 56
Discharge: HOME | End: 2021-05-27
Attending: INTERNAL MEDICINE
Payer: COMMERCIAL

## 2021-05-27 DIAGNOSIS — I73.9 PVD (PERIPHERAL VASCULAR DISEASE) (HCC): ICD-10-CM

## 2021-05-27 PROCEDURE — 93925 LOWER EXTREMITY STUDY: CPT | Performed by: INTERNAL MEDICINE

## 2021-06-03 RX ORDER — TAMSULOSIN HYDROCHLORIDE 0.4 MG/1
CAPSULE ORAL
Qty: 90 CAPSULE | Refills: 0 | Status: SHIPPED | OUTPATIENT
Start: 2021-06-03 | End: 2021-11-20

## 2021-06-03 NOTE — TELEPHONE ENCOUNTER
Requested Prescriptions     Pending Prescriptions Disp Refills   • tamsulosin (FLOMAX) cap [Pharmacy Med Name: TAMSULOSIN 0.4MG CAPSULES] 90 capsule 0     Sig: TAKE 1 CAPSULE(0.4 MG) BY MOUTH DAILY     Last office visit: 4-5-21  Medication last refilled: 4

## 2021-07-15 ENCOUNTER — TELEPHONE (OUTPATIENT)
Dept: INTERNAL MEDICINE CLINIC | Facility: CLINIC | Age: 56
End: 2021-07-15

## 2021-07-15 ENCOUNTER — LAB ENCOUNTER (OUTPATIENT)
Dept: LAB | Facility: HOSPITAL | Age: 56
End: 2021-07-15
Attending: INTERNAL MEDICINE
Payer: COMMERCIAL

## 2021-07-15 DIAGNOSIS — E78.2 MIXED HYPERLIPIDEMIA: ICD-10-CM

## 2021-07-15 DIAGNOSIS — I10 HTN (HYPERTENSION): ICD-10-CM

## 2021-07-15 DIAGNOSIS — I25.10 CORONARY ATHEROSCLEROSIS OF NATIVE CORONARY ARTERY: Primary | ICD-10-CM

## 2021-07-15 LAB
CHOLEST SMN-MCNC: 166 MG/DL (ref ?–200)
HDLC SERPL-MCNC: 62 MG/DL (ref 40–59)
LDLC SERPL CALC-MCNC: 84 MG/DL (ref ?–100)
NONHDLC SERPL-MCNC: 104 MG/DL (ref ?–130)
PATIENT FASTING Y/N/NP: YES
TRIGL SERPL-MCNC: 115 MG/DL (ref 30–149)
VLDLC SERPL CALC-MCNC: 18 MG/DL (ref 0–30)

## 2021-07-15 PROCEDURE — 80061 LIPID PANEL: CPT

## 2021-07-15 PROCEDURE — 36415 COLL VENOUS BLD VENIPUNCTURE: CPT

## 2021-08-31 ENCOUNTER — HOSPITAL ENCOUNTER (OUTPATIENT)
Dept: CT IMAGING | Facility: HOSPITAL | Age: 56
Discharge: HOME | End: 2021-08-31
Attending: RADIOLOGY
Payer: COMMERCIAL

## 2021-08-31 DIAGNOSIS — I73.9 PAD (PERIPHERAL ARTERY DISEASE) (HCC): ICD-10-CM

## 2021-08-31 LAB — CREAT BLD-MCNC: 0.8 MG/DL

## 2021-08-31 PROCEDURE — 73706 CT ANGIO LWR EXTR W/O&W/DYE: CPT | Performed by: RADIOLOGY

## 2021-08-31 PROCEDURE — 82565 ASSAY OF CREATININE: CPT

## 2021-09-13 DIAGNOSIS — E11.8 TYPE 2 DIABETES MELLITUS WITH COMPLICATION, WITHOUT LONG-TERM CURRENT USE OF INSULIN (HCC): ICD-10-CM

## 2021-09-13 DIAGNOSIS — I25.10 CORONARY ARTERY DISEASE DUE TO LIPID RICH PLAQUE: ICD-10-CM

## 2021-09-13 DIAGNOSIS — I25.83 CORONARY ARTERY DISEASE DUE TO LIPID RICH PLAQUE: ICD-10-CM

## 2021-09-13 DIAGNOSIS — E78.2 MIXED HYPERLIPIDEMIA: ICD-10-CM

## 2021-09-13 DIAGNOSIS — I10 ESSENTIAL HYPERTENSION: ICD-10-CM

## 2021-09-13 RX ORDER — CLOPIDOGREL BISULFATE 75 MG/1
TABLET ORAL
Qty: 90 TABLET | Refills: 3 | Status: SHIPPED | OUTPATIENT
Start: 2021-09-13

## 2021-09-22 ENCOUNTER — LAB ENCOUNTER (OUTPATIENT)
Dept: LAB | Facility: HOSPITAL | Age: 56
End: 2021-09-22
Attending: RADIOLOGY
Payer: COMMERCIAL

## 2021-09-22 ENCOUNTER — NURSE ONLY (OUTPATIENT)
Dept: LAB | Facility: HOSPITAL | Age: 56
End: 2021-09-22
Attending: RADIOLOGY
Payer: COMMERCIAL

## 2021-09-22 DIAGNOSIS — Z01.818 PRE-OP TESTING: ICD-10-CM

## 2021-09-22 LAB
ANION GAP SERPL CALC-SCNC: 7 MMOL/L (ref 0–18)
BASOPHILS # BLD AUTO: 0.05 X10(3) UL (ref 0–0.2)
BASOPHILS NFR BLD AUTO: 0.6 %
BUN BLD-MCNC: 17 MG/DL (ref 7–18)
BUN/CREAT SERPL: 21.3 (ref 10–20)
CALCIUM BLD-MCNC: 9.3 MG/DL (ref 8.5–10.1)
CHLORIDE SERPL-SCNC: 103 MMOL/L (ref 98–112)
CO2 SERPL-SCNC: 28 MMOL/L (ref 21–32)
CREAT BLD-MCNC: 0.8 MG/DL
DEPRECATED RDW RBC AUTO: 44.9 FL (ref 35.1–46.3)
EOSINOPHIL # BLD AUTO: 0.15 X10(3) UL (ref 0–0.7)
EOSINOPHIL NFR BLD AUTO: 1.9 %
ERYTHROCYTE [DISTWIDTH] IN BLOOD BY AUTOMATED COUNT: 14.9 % (ref 11–15)
GLUCOSE BLD-MCNC: 138 MG/DL (ref 70–99)
HCT VFR BLD AUTO: 41.3 %
HGB BLD-MCNC: 12.8 G/DL
IMM GRANULOCYTES # BLD AUTO: 0.03 X10(3) UL (ref 0–1)
IMM GRANULOCYTES NFR BLD: 0.4 %
LYMPHOCYTES # BLD AUTO: 2.07 X10(3) UL (ref 1–4)
LYMPHOCYTES NFR BLD AUTO: 25.7 %
MCH RBC QN AUTO: 25.4 PG (ref 26–34)
MCHC RBC AUTO-ENTMCNC: 31 G/DL (ref 31–37)
MCV RBC AUTO: 81.9 FL
MONOCYTES # BLD AUTO: 0.67 X10(3) UL (ref 0.1–1)
MONOCYTES NFR BLD AUTO: 8.3 %
NEUTROPHILS # BLD AUTO: 5.07 X10 (3) UL (ref 1.5–7.7)
NEUTROPHILS # BLD AUTO: 5.07 X10(3) UL (ref 1.5–7.7)
NEUTROPHILS NFR BLD AUTO: 63.1 %
OSMOLALITY SERPL CALC.SUM OF ELEC: 290 MOSM/KG (ref 275–295)
PATIENT FASTING Y/N/NP: YES
PLATELET # BLD AUTO: 297 10(3)UL (ref 150–450)
POTASSIUM SERPL-SCNC: 3.5 MMOL/L (ref 3.5–5.1)
RBC # BLD AUTO: 5.04 X10(6)UL
SODIUM SERPL-SCNC: 138 MMOL/L (ref 136–145)
WBC # BLD AUTO: 8 X10(3) UL (ref 4–11)

## 2021-09-22 PROCEDURE — 36415 COLL VENOUS BLD VENIPUNCTURE: CPT

## 2021-09-22 PROCEDURE — 85025 COMPLETE CBC W/AUTO DIFF WBC: CPT

## 2021-09-22 PROCEDURE — 80048 BASIC METABOLIC PNL TOTAL CA: CPT

## 2021-09-22 RX ORDER — ASPIRIN 81 MG/1
81 TABLET ORAL NIGHTLY
COMMUNITY

## 2021-09-23 LAB — SARS-COV-2 RNA RESP QL NAA+PROBE: NOT DETECTED

## 2021-09-24 ENCOUNTER — HOSPITAL ENCOUNTER (OUTPATIENT)
Dept: INTERVENTIONAL RADIOLOGY/VASCULAR | Facility: HOSPITAL | Age: 56
Discharge: HOME | End: 2021-09-24
Attending: RADIOLOGY | Admitting: RADIOLOGY
Payer: COMMERCIAL

## 2021-09-24 VITALS
BODY MASS INDEX: 32 KG/M2 | WEIGHT: 206 LBS | RESPIRATION RATE: 17 BRPM | OXYGEN SATURATION: 91 % | HEART RATE: 71 BPM | TEMPERATURE: 97 F | DIASTOLIC BLOOD PRESSURE: 66 MMHG | SYSTOLIC BLOOD PRESSURE: 146 MMHG

## 2021-09-24 DIAGNOSIS — Z01.818 PRE-OP TESTING: Primary | ICD-10-CM

## 2021-09-24 DIAGNOSIS — I73.9 PAD (PERIPHERAL ARTERY DISEASE) (HCC): ICD-10-CM

## 2021-09-24 LAB
GLUCOSE BLDC GLUCOMTR-MCNC: 145 MG/DL (ref 70–99)
ISTAT ACTIVATED CLOTTING TIME: 169 SECONDS (ref 125–137)
ISTAT ACTIVATED CLOTTING TIME: 191 SECONDS (ref 125–137)

## 2021-09-24 PROCEDURE — 99152 MOD SED SAME PHYS/QHP 5/>YRS: CPT

## 2021-09-24 PROCEDURE — 85347 COAGULATION TIME ACTIVATED: CPT

## 2021-09-24 PROCEDURE — 82962 GLUCOSE BLOOD TEST: CPT

## 2021-09-24 PROCEDURE — 04FK3ZZ FRAGMENTATION OF RIGHT FEMORAL ARTERY, PERCUTANEOUS APPROACH: ICD-10-PCS | Performed by: RADIOLOGY

## 2021-09-24 PROCEDURE — 37226 HC TRANSLUM ANGIOPLASTY W STENT FEM POP UNILAT: CPT

## 2021-09-24 PROCEDURE — X27H395 DILATION OF RIGHT FEMORAL ARTERY WITH TWO SUSTAINED RELEASE DRUG-ELUTING INTRALUMINAL DEVICES, PERCUTANEOUS APPROACH, NEW TECHNOLOGY GROUP 5: ICD-10-PCS | Performed by: RADIOLOGY

## 2021-09-24 PROCEDURE — 37252 INTRVASC US NONCORONARY 1ST: CPT

## 2021-09-24 PROCEDURE — 99153 MOD SED SAME PHYS/QHP EA: CPT

## 2021-09-24 PROCEDURE — 37227 HC TRANSL ANGIO STENT ATHERECT FEM POP UNILAT: CPT

## 2021-09-24 PROCEDURE — B44LZZ3 ULTRASONOGRAPHY OF FEMORAL ARTERY, INTRAVASCULAR: ICD-10-PCS | Performed by: RADIOLOGY

## 2021-09-24 PROCEDURE — 36415 COLL VENOUS BLD VENIPUNCTURE: CPT

## 2021-09-24 RX ORDER — VERAPAMIL HYDROCHLORIDE 2.5 MG/ML
INJECTION, SOLUTION INTRAVENOUS
Status: COMPLETED
Start: 2021-09-24 | End: 2021-09-24

## 2021-09-24 RX ORDER — MIDAZOLAM HYDROCHLORIDE 1 MG/ML
INJECTION INTRAMUSCULAR; INTRAVENOUS
Status: COMPLETED
Start: 2021-09-24 | End: 2021-09-24

## 2021-09-24 RX ORDER — HYDROCODONE BITARTRATE AND ACETAMINOPHEN 5; 325 MG/1; MG/1
TABLET ORAL
Status: COMPLETED
Start: 2021-09-24 | End: 2021-09-24

## 2021-09-24 RX ORDER — HEPARIN SODIUM 1000 [USP'U]/ML
INJECTION, SOLUTION INTRAVENOUS; SUBCUTANEOUS
Status: COMPLETED
Start: 2021-09-24 | End: 2021-09-24

## 2021-09-24 RX ORDER — NITROGLYCERIN 20 MG/100ML
INJECTION INTRAVENOUS
Status: COMPLETED
Start: 2021-09-24 | End: 2021-09-24

## 2021-09-24 RX ORDER — CLOPIDOGREL BISULFATE 75 MG/1
TABLET ORAL
Status: COMPLETED
Start: 2021-09-24 | End: 2021-09-24

## 2021-09-24 RX ORDER — HYDROCODONE BITARTRATE AND ACETAMINOPHEN 5; 325 MG/1; MG/1
2 TABLET ORAL ONCE
Status: COMPLETED | OUTPATIENT
Start: 2021-09-24 | End: 2021-09-24

## 2021-09-24 RX ORDER — SODIUM CHLORIDE 9 MG/ML
INJECTION, SOLUTION INTRAVENOUS CONTINUOUS
Status: DISCONTINUED | OUTPATIENT
Start: 2021-09-24 | End: 2021-09-24

## 2021-09-24 RX ADMIN — HYDROCODONE BITARTRATE AND ACETAMINOPHEN 2 TABLET: 5; 325 TABLET ORAL at 12:45:00

## 2021-09-24 NOTE — IVS NOTE
DISCHARGE NOTE     Pt is able to sit up and ambulate without difficulty. Pt voided and tolerated fluids and food. Procedural site remains dry and intact with good circulation, motion, and sensation. No signs and symptoms of bleeding/hematoma noted.  Patient

## 2021-09-24 NOTE — IVS NOTE
Patient complaints of right inner thigh pain, 5/10. Assessment done. No signs of hematoma or any change in color and size. Annalise Noble notified and she will come to see patient.

## 2021-09-27 NOTE — INTERVAL H&P NOTE
The above referenced H&P was reviewed by Sherif Satnana MD on 9/27/2021, the patient was examined and no significant changes have occurred in the patient's condition since the H&P was performed.   Risks, benefits, alternative treatments and consequences of no

## 2021-10-28 VITALS — WEIGHT: 208 LBS | BODY MASS INDEX: 33 KG/M2

## 2021-11-03 ENCOUNTER — LAB ENCOUNTER (OUTPATIENT)
Dept: LAB | Facility: HOSPITAL | Age: 56
End: 2021-11-03
Attending: RADIOLOGY
Payer: COMMERCIAL

## 2021-11-03 DIAGNOSIS — Z01.818 PRE-OP TESTING: ICD-10-CM

## 2021-11-03 RX ORDER — SODIUM CHLORIDE 9 MG/ML
INJECTION, SOLUTION INTRAVENOUS CONTINUOUS
Status: CANCELLED | OUTPATIENT
Start: 2021-11-03

## 2021-11-04 ENCOUNTER — TELEPHONE (OUTPATIENT)
Dept: INTERVENTIONAL RADIOLOGY/VASCULAR | Facility: HOSPITAL | Age: 56
End: 2021-11-04

## 2021-11-04 NOTE — PROGRESS NOTES
Called and spoke to daughter Jill Jackson (interpreting for patient) regarding the positive Covid test result. Procedure will be cancelled tomorrow per Dr Katty Pool and can be rescheduled in 2 weeks if pt is asymptomatic. He has no fever but has occasional cough.  Pe

## 2021-11-05 ENCOUNTER — HOSPITAL ENCOUNTER (OUTPATIENT)
Dept: INTERVENTIONAL RADIOLOGY/VASCULAR | Facility: HOSPITAL | Age: 56
Discharge: HOME | End: 2021-11-05
Attending: RADIOLOGY
Payer: COMMERCIAL

## 2021-11-05 DIAGNOSIS — Z01.818 PRE-OP TESTING: Primary | ICD-10-CM

## 2021-11-20 RX ORDER — TAMSULOSIN HYDROCHLORIDE 0.4 MG/1
CAPSULE ORAL
Qty: 90 CAPSULE | Refills: 0 | Status: SHIPPED | OUTPATIENT
Start: 2021-11-20 | End: 2022-04-04

## 2021-11-20 RX ORDER — ESCITALOPRAM OXALATE 10 MG/1
10 TABLET ORAL DAILY
Qty: 90 TABLET | Refills: 0 | Status: SHIPPED | OUTPATIENT
Start: 2021-11-20 | End: 2022-01-31 | Stop reason: WASHOUT

## 2021-12-20 VITALS — WEIGHT: 208 LBS | BODY MASS INDEX: 33 KG/M2

## 2021-12-22 ENCOUNTER — HOSPITAL ENCOUNTER (OUTPATIENT)
Dept: INTERVENTIONAL RADIOLOGY/VASCULAR | Facility: HOSPITAL | Age: 56
Discharge: HOME | End: 2021-12-22
Attending: RADIOLOGY
Payer: COMMERCIAL

## 2022-01-07 ENCOUNTER — HOSPITAL ENCOUNTER (OUTPATIENT)
Dept: INTERVENTIONAL RADIOLOGY/VASCULAR | Facility: HOSPITAL | Age: 57
Discharge: HOME | End: 2022-01-07
Attending: RADIOLOGY | Admitting: RADIOLOGY
Payer: COMMERCIAL

## 2022-01-07 VITALS
OXYGEN SATURATION: 93 % | HEART RATE: 81 BPM | BODY MASS INDEX: 33 KG/M2 | DIASTOLIC BLOOD PRESSURE: 75 MMHG | WEIGHT: 208 LBS | RESPIRATION RATE: 14 BRPM | SYSTOLIC BLOOD PRESSURE: 156 MMHG | TEMPERATURE: 98 F

## 2022-01-07 DIAGNOSIS — I73.9 PAD (PERIPHERAL ARTERY DISEASE) (HCC): ICD-10-CM

## 2022-01-07 LAB
ANION GAP SERPL CALC-SCNC: 4 MMOL/L (ref 0–18)
BASOPHILS # BLD AUTO: 0.07 X10(3) UL (ref 0–0.2)
BASOPHILS NFR BLD AUTO: 0.9 %
BUN BLD-MCNC: 12 MG/DL (ref 7–18)
BUN/CREAT SERPL: 15.2 (ref 10–20)
CALCIUM BLD-MCNC: 9.3 MG/DL (ref 8.5–10.1)
CHLORIDE SERPL-SCNC: 104 MMOL/L (ref 98–112)
CO2 SERPL-SCNC: 29 MMOL/L (ref 21–32)
CREAT BLD-MCNC: 0.79 MG/DL
DEPRECATED RDW RBC AUTO: 46.1 FL (ref 35.1–46.3)
EOSINOPHIL # BLD AUTO: 0.17 X10(3) UL (ref 0–0.7)
EOSINOPHIL NFR BLD AUTO: 2.1 %
ERYTHROCYTE [DISTWIDTH] IN BLOOD BY AUTOMATED COUNT: 17.1 % (ref 11–15)
GLUCOSE BLD-MCNC: 192 MG/DL (ref 70–99)
GLUCOSE BLDC GLUCOMTR-MCNC: 210 MG/DL (ref 70–99)
HCT VFR BLD AUTO: 38.8 %
HGB BLD-MCNC: 11.8 G/DL
IMM GRANULOCYTES # BLD AUTO: 0.02 X10(3) UL (ref 0–1)
IMM GRANULOCYTES NFR BLD: 0.2 %
ISTAT ACTIVATED CLOTTING TIME: 231 SECONDS (ref 125–137)
LYMPHOCYTES # BLD AUTO: 2.46 X10(3) UL (ref 1–4)
LYMPHOCYTES NFR BLD AUTO: 30.7 %
MCH RBC QN AUTO: 23.1 PG (ref 26–34)
MCHC RBC AUTO-ENTMCNC: 30.4 G/DL (ref 31–37)
MCV RBC AUTO: 75.9 FL
MONOCYTES # BLD AUTO: 0.86 X10(3) UL (ref 0.1–1)
MONOCYTES NFR BLD AUTO: 10.7 %
NEUTROPHILS # BLD AUTO: 4.43 X10 (3) UL (ref 1.5–7.7)
NEUTROPHILS # BLD AUTO: 4.43 X10(3) UL (ref 1.5–7.7)
NEUTROPHILS NFR BLD AUTO: 55.4 %
OSMOLALITY SERPL CALC.SUM OF ELEC: 289 MOSM/KG (ref 275–295)
PLATELET # BLD AUTO: 245 10(3)UL (ref 150–450)
POTASSIUM SERPL-SCNC: 3.6 MMOL/L (ref 3.5–5.1)
RBC # BLD AUTO: 5.11 X10(6)UL
SODIUM SERPL-SCNC: 137 MMOL/L (ref 136–145)
WBC # BLD AUTO: 8 X10(3) UL (ref 4–11)

## 2022-01-07 PROCEDURE — 99153 MOD SED SAME PHYS/QHP EA: CPT

## 2022-01-07 PROCEDURE — B41G1ZZ FLUOROSCOPY OF LEFT LOWER EXTREMITY ARTERIES USING LOW OSMOLAR CONTRAST: ICD-10-PCS | Performed by: RADIOLOGY

## 2022-01-07 PROCEDURE — 04FL3ZZ FRAGMENTATION OF LEFT FEMORAL ARTERY, PERCUTANEOUS APPROACH: ICD-10-PCS | Performed by: RADIOLOGY

## 2022-01-07 PROCEDURE — 75716 ARTERY X-RAYS ARMS/LEGS: CPT

## 2022-01-07 PROCEDURE — 85347 COAGULATION TIME ACTIVATED: CPT

## 2022-01-07 PROCEDURE — 36246 INS CATH ABD/L-EXT ART 2ND: CPT

## 2022-01-07 PROCEDURE — 047L3DZ DILATION OF LEFT FEMORAL ARTERY WITH INTRALUMINAL DEVICE, PERCUTANEOUS APPROACH: ICD-10-PCS | Performed by: RADIOLOGY

## 2022-01-07 PROCEDURE — 36415 COLL VENOUS BLD VENIPUNCTURE: CPT

## 2022-01-07 PROCEDURE — 37226 HC TRANSLUM ANGIOPLASTY W STENT FEM POP UNILAT: CPT

## 2022-01-07 PROCEDURE — 82962 GLUCOSE BLOOD TEST: CPT

## 2022-01-07 PROCEDURE — 80048 BASIC METABOLIC PNL TOTAL CA: CPT | Performed by: RADIOLOGY

## 2022-01-07 PROCEDURE — 99152 MOD SED SAME PHYS/QHP 5/>YRS: CPT

## 2022-01-07 PROCEDURE — 85025 COMPLETE CBC W/AUTO DIFF WBC: CPT | Performed by: RADIOLOGY

## 2022-01-07 PROCEDURE — 75710 ARTERY X-RAYS ARM/LEG: CPT

## 2022-01-07 RX ORDER — SODIUM CHLORIDE 9 MG/ML
INJECTION, SOLUTION INTRAVENOUS CONTINUOUS
Status: DISCONTINUED | OUTPATIENT
Start: 2022-01-07 | End: 2022-01-07

## 2022-01-07 RX ORDER — AMLODIPINE BESYLATE 5 MG/1
TABLET ORAL
Status: COMPLETED
Start: 2022-01-07 | End: 2022-01-07

## 2022-01-07 RX ORDER — VERAPAMIL HYDROCHLORIDE 2.5 MG/ML
INJECTION, SOLUTION INTRAVENOUS
Status: COMPLETED
Start: 2022-01-07 | End: 2022-01-07

## 2022-01-07 RX ORDER — MIDAZOLAM HYDROCHLORIDE 1 MG/ML
INJECTION INTRAMUSCULAR; INTRAVENOUS
Status: COMPLETED
Start: 2022-01-07 | End: 2022-01-07

## 2022-01-07 RX ORDER — LIDOCAINE HYDROCHLORIDE 20 MG/ML
INJECTION, SOLUTION EPIDURAL; INFILTRATION; INTRACAUDAL; PERINEURAL
Status: COMPLETED
Start: 2022-01-07 | End: 2022-01-07

## 2022-01-07 RX ORDER — HYDROCODONE BITARTRATE AND ACETAMINOPHEN 5; 325 MG/1; MG/1
1-2 TABLET ORAL EVERY 4 HOURS PRN
Qty: 20 TABLET | Refills: 0 | Status: SHIPPED | OUTPATIENT
Start: 2022-01-07

## 2022-01-07 RX ORDER — METHYLPREDNISOLONE 4 MG/1
TABLET ORAL
Qty: 21 TABLET | Refills: 0 | Status: SHIPPED | OUTPATIENT
Start: 2022-01-07

## 2022-01-07 RX ORDER — AMLODIPINE BESYLATE 5 MG/1
5 TABLET ORAL DAILY
Status: DISCONTINUED | OUTPATIENT
Start: 2022-01-07 | End: 2022-01-07

## 2022-01-07 RX ORDER — HEPARIN SODIUM 1000 [USP'U]/ML
INJECTION, SOLUTION INTRAVENOUS; SUBCUTANEOUS
Status: COMPLETED
Start: 2022-01-07 | End: 2022-01-07

## 2022-01-07 RX ORDER — HYDROCODONE BITARTRATE AND ACETAMINOPHEN 10; 325 MG/1; MG/1
TABLET ORAL
Status: COMPLETED
Start: 2022-01-07 | End: 2022-01-07

## 2022-01-07 RX ORDER — NITROGLYCERIN 20 MG/100ML
INJECTION INTRAVENOUS
Status: COMPLETED
Start: 2022-01-07 | End: 2022-01-07

## 2022-01-07 RX ORDER — CLOPIDOGREL BISULFATE 75 MG/1
TABLET ORAL
Status: COMPLETED
Start: 2022-01-07 | End: 2022-01-07

## 2022-01-07 RX ADMIN — HYDROCODONE BITARTRATE AND ACETAMINOPHEN 1 TABLET: 10; 325 TABLET ORAL at 11:20:00

## 2022-01-07 RX ADMIN — CLOPIDOGREL BISULFATE 300 MG: 75 TABLET ORAL at 12:30:00

## 2022-01-07 RX ADMIN — AMLODIPINE BESYLATE 5 MG: 5 TABLET ORAL at 11:35:00

## 2022-01-07 NOTE — IVS NOTE
Tolerated procedure well. Right groin site clean and dry  Left foot with manual device on, clean and dry  Tolerated PO crackers and fluids. Able to void x2  Manual device on left foot removed and Bandaid applied. Ambulated in hallway.   Discharge and fol

## 2022-01-13 NOTE — H&P
History & Physical Examination    Patient Name: Honorio Waite  MRN: [id number]  CSN: [id number]  YOB: 1965    Procedue date: 1/7/22    Diagnosis: Peripheral arterial disease    Present Illness: 43-year-old History of peripheral artery dise [ x]    ABDOMEN [x ] [ x]          EXTREMITIES [x ] [ x] nonpalp L DP, PT. Doppler only           [ x ] I have discussed the risks and benefits and alternatives with the patient/family. They understand and agree to proceed with plan of care.   [ x ] I hav

## 2022-01-22 DIAGNOSIS — E78.2 MIXED HYPERLIPIDEMIA: ICD-10-CM

## 2022-01-22 DIAGNOSIS — E11.8 TYPE 2 DIABETES MELLITUS WITH COMPLICATION, WITHOUT LONG-TERM CURRENT USE OF INSULIN (HCC): ICD-10-CM

## 2022-01-22 DIAGNOSIS — I25.10 CORONARY ARTERY DISEASE DUE TO LIPID RICH PLAQUE: ICD-10-CM

## 2022-01-22 DIAGNOSIS — I25.83 CORONARY ARTERY DISEASE DUE TO LIPID RICH PLAQUE: ICD-10-CM

## 2022-01-22 DIAGNOSIS — I10 ESSENTIAL HYPERTENSION: ICD-10-CM

## 2022-01-24 RX ORDER — FUROSEMIDE 40 MG/1
TABLET ORAL
Qty: 90 TABLET | Refills: 3 | Status: SHIPPED
Start: 2022-01-24

## 2022-01-24 RX ORDER — AMLODIPINE BESYLATE 5 MG/1
TABLET ORAL
Qty: 90 TABLET | Refills: 3 | Status: SHIPPED | OUTPATIENT
Start: 2022-01-24

## 2022-01-31 ENCOUNTER — OFFICE VISIT (OUTPATIENT)
Dept: INTERNAL MEDICINE CLINIC | Facility: CLINIC | Age: 57
End: 2022-01-31
Payer: COMMERCIAL

## 2022-01-31 VITALS
BODY MASS INDEX: 34.55 KG/M2 | DIASTOLIC BLOOD PRESSURE: 80 MMHG | SYSTOLIC BLOOD PRESSURE: 146 MMHG | WEIGHT: 215 LBS | HEIGHT: 66 IN

## 2022-01-31 DIAGNOSIS — E11.8 TYPE 2 DIABETES MELLITUS WITH COMPLICATION, WITHOUT LONG-TERM CURRENT USE OF INSULIN (HCC): Primary | ICD-10-CM

## 2022-01-31 DIAGNOSIS — I10 ESSENTIAL HYPERTENSION: ICD-10-CM

## 2022-01-31 DIAGNOSIS — I25.10 CORONARY ARTERY DISEASE DUE TO LIPID RICH PLAQUE: ICD-10-CM

## 2022-01-31 DIAGNOSIS — I73.9 PVD (PERIPHERAL VASCULAR DISEASE) (HCC): ICD-10-CM

## 2022-01-31 DIAGNOSIS — I25.83 CORONARY ARTERY DISEASE DUE TO LIPID RICH PLAQUE: ICD-10-CM

## 2022-01-31 LAB
ALBUMIN SERPL-MCNC: 3.6 G/DL (ref 3.4–5)
ALBUMIN/GLOB SERPL: 0.9 {RATIO} (ref 1–2)
ALP LIVER SERPL-CCNC: 77 U/L
ALT SERPL-CCNC: 15 U/L
ANION GAP SERPL CALC-SCNC: 6 MMOL/L (ref 0–18)
AST SERPL-CCNC: 14 U/L (ref 15–37)
BILIRUB SERPL-MCNC: 0.2 MG/DL (ref 0.1–2)
BUN BLD-MCNC: 12 MG/DL (ref 7–18)
BUN/CREAT SERPL: 15.8 (ref 10–20)
CALCIUM BLD-MCNC: 8.6 MG/DL (ref 8.5–10.1)
CHLORIDE SERPL-SCNC: 105 MMOL/L (ref 98–112)
CHOLEST SERPL-MCNC: 216 MG/DL (ref ?–200)
CO2 SERPL-SCNC: 27 MMOL/L (ref 21–32)
CREAT BLD-MCNC: 0.76 MG/DL
CREAT UR-SCNC: 69.6 MG/DL
EST. AVERAGE GLUCOSE BLD GHB EST-MCNC: 212 MG/DL (ref 68–126)
FASTING PATIENT LIPID ANSWER: YES
FASTING STATUS PATIENT QL REPORTED: YES
GLOBULIN PLAS-MCNC: 4.1 G/DL (ref 2.8–4.4)
GLUCOSE BLD-MCNC: 185 MG/DL (ref 70–99)
HDLC SERPL-MCNC: 66 MG/DL (ref 40–59)
LDLC SERPL CALC-MCNC: 133 MG/DL (ref ?–100)
MICROALBUMIN UR-MCNC: 7.61 MG/DL
MICROALBUMIN/CREAT 24H UR-RTO: 109.3 UG/MG (ref ?–30)
NONHDLC SERPL-MCNC: 150 MG/DL (ref ?–130)
OSMOLALITY SERPL CALC.SUM OF ELEC: 291 MOSM/KG (ref 275–295)
POTASSIUM SERPL-SCNC: 3.7 MMOL/L (ref 3.5–5.1)
PROT SERPL-MCNC: 7.7 G/DL (ref 6.4–8.2)
SODIUM SERPL-SCNC: 138 MMOL/L (ref 136–145)
TRIGL SERPL-MCNC: 98 MG/DL (ref 30–149)
VLDLC SERPL CALC-MCNC: 18 MG/DL (ref 0–30)

## 2022-01-31 PROCEDURE — 82570 ASSAY OF URINE CREATININE: CPT | Performed by: INTERNAL MEDICINE

## 2022-01-31 PROCEDURE — 83036 HEMOGLOBIN GLYCOSYLATED A1C: CPT | Performed by: INTERNAL MEDICINE

## 2022-01-31 PROCEDURE — 3060F POS MICROALBUMINURIA REV: CPT | Performed by: INTERNAL MEDICINE

## 2022-01-31 PROCEDURE — 3052F HG A1C>EQUAL 8.0%<EQUAL 9.0%: CPT | Performed by: INTERNAL MEDICINE

## 2022-01-31 PROCEDURE — 3079F DIAST BP 80-89 MM HG: CPT | Performed by: INTERNAL MEDICINE

## 2022-01-31 PROCEDURE — 3061F NEG MICROALBUMINURIA REV: CPT | Performed by: INTERNAL MEDICINE

## 2022-01-31 PROCEDURE — 3077F SYST BP >= 140 MM HG: CPT | Performed by: INTERNAL MEDICINE

## 2022-01-31 PROCEDURE — 82043 UR ALBUMIN QUANTITATIVE: CPT | Performed by: INTERNAL MEDICINE

## 2022-01-31 PROCEDURE — 3008F BODY MASS INDEX DOCD: CPT | Performed by: INTERNAL MEDICINE

## 2022-01-31 PROCEDURE — 80061 LIPID PANEL: CPT | Performed by: INTERNAL MEDICINE

## 2022-01-31 PROCEDURE — 99214 OFFICE O/P EST MOD 30 MIN: CPT | Performed by: INTERNAL MEDICINE

## 2022-01-31 PROCEDURE — 80053 COMPREHEN METABOLIC PANEL: CPT | Performed by: INTERNAL MEDICINE

## 2022-01-31 RX ORDER — BLOOD-GLUCOSE METER
KIT MISCELLANEOUS
Qty: 100 STRIP | Refills: 11 | Status: SHIPPED | OUTPATIENT
Start: 2022-01-31 | End: 2023-01-31

## 2022-01-31 RX ORDER — EMPAGLIFLOZIN 25 MG/1
25 TABLET, FILM COATED ORAL DAILY
Qty: 90 TABLET | Refills: 3 | Status: SHIPPED | OUTPATIENT
Start: 2022-01-31

## 2022-03-08 ENCOUNTER — NURSE ONLY (OUTPATIENT)
Dept: LAB | Facility: HOSPITAL | Age: 57
End: 2022-03-08
Attending: INTERNAL MEDICINE
Payer: COMMERCIAL

## 2022-03-08 ENCOUNTER — HOSPITAL ENCOUNTER (OUTPATIENT)
Dept: GENERAL RADIOLOGY | Facility: HOSPITAL | Age: 57
Discharge: HOME | End: 2022-03-08
Attending: INTERNAL MEDICINE
Payer: COMMERCIAL

## 2022-03-08 DIAGNOSIS — Z01.818 PRE-OP TESTING: ICD-10-CM

## 2022-03-08 LAB
ANION GAP SERPL CALC-SCNC: 5 MMOL/L (ref 0–18)
BUN BLD-MCNC: 14 MG/DL (ref 7–18)
BUN/CREAT SERPL: 16.3 (ref 10–20)
CALCIUM BLD-MCNC: 9.2 MG/DL (ref 8.5–10.1)
CHLORIDE SERPL-SCNC: 103 MMOL/L (ref 98–112)
CO2 SERPL-SCNC: 28 MMOL/L (ref 21–32)
CREAT BLD-MCNC: 0.86 MG/DL
DEPRECATED RDW RBC AUTO: 48.8 FL (ref 35.1–46.3)
ERYTHROCYTE [DISTWIDTH] IN BLOOD BY AUTOMATED COUNT: 17.3 % (ref 11–15)
FASTING STATUS PATIENT QL REPORTED: YES
GLUCOSE BLD-MCNC: 166 MG/DL (ref 70–99)
HCT VFR BLD AUTO: 41.2 %
HGB BLD-MCNC: 12.4 G/DL
INR BLD: 0.97 (ref 0.8–1.2)
MCH RBC QN AUTO: 23.6 PG (ref 26–34)
MCHC RBC AUTO-ENTMCNC: 30.1 G/DL (ref 31–37)
MCV RBC AUTO: 78.5 FL
OSMOLALITY SERPL CALC.SUM OF ELEC: 286 MOSM/KG (ref 275–295)
PLATELET # BLD AUTO: 312 10(3)UL (ref 150–450)
POTASSIUM SERPL-SCNC: 4 MMOL/L (ref 3.5–5.1)
PROTHROMBIN TIME: 13 SECONDS (ref 11.6–14.8)
RBC # BLD AUTO: 5.25 X10(6)UL
SARS-COV-2 RNA RESP QL NAA+PROBE: NOT DETECTED
SODIUM SERPL-SCNC: 136 MMOL/L (ref 136–145)
WBC # BLD AUTO: 9.9 X10(3) UL (ref 4–11)

## 2022-03-08 PROCEDURE — 71046 X-RAY EXAM CHEST 2 VIEWS: CPT | Performed by: INTERNAL MEDICINE

## 2022-03-08 PROCEDURE — 85027 COMPLETE CBC AUTOMATED: CPT

## 2022-03-08 PROCEDURE — 80048 BASIC METABOLIC PNL TOTAL CA: CPT

## 2022-03-08 PROCEDURE — 85610 PROTHROMBIN TIME: CPT

## 2022-03-08 PROCEDURE — 36415 COLL VENOUS BLD VENIPUNCTURE: CPT

## 2022-03-11 ENCOUNTER — TELEPHONE (OUTPATIENT)
Dept: INTERNAL MEDICINE CLINIC | Facility: CLINIC | Age: 57
End: 2022-03-11

## 2022-03-11 ENCOUNTER — HOSPITAL ENCOUNTER (OUTPATIENT)
Dept: INTERVENTIONAL RADIOLOGY/VASCULAR | Facility: HOSPITAL | Age: 57
Discharge: HOME | End: 2022-03-11
Attending: INTERNAL MEDICINE | Admitting: INTERNAL MEDICINE
Payer: COMMERCIAL

## 2022-03-11 VITALS
RESPIRATION RATE: 19 BRPM | HEART RATE: 58 BPM | SYSTOLIC BLOOD PRESSURE: 116 MMHG | TEMPERATURE: 98 F | DIASTOLIC BLOOD PRESSURE: 70 MMHG | OXYGEN SATURATION: 94 %

## 2022-03-11 DIAGNOSIS — R94.39 ABNORMAL NUCLEAR STRESS TEST: ICD-10-CM

## 2022-03-11 DIAGNOSIS — Z01.818 PRE-OP TESTING: Primary | ICD-10-CM

## 2022-03-11 PROCEDURE — 99152 MOD SED SAME PHYS/QHP 5/>YRS: CPT

## 2022-03-11 PROCEDURE — 36415 COLL VENOUS BLD VENIPUNCTURE: CPT

## 2022-03-11 PROCEDURE — 82962 GLUCOSE BLOOD TEST: CPT

## 2022-03-11 PROCEDURE — B2151ZZ FLUOROSCOPY OF LEFT HEART USING LOW OSMOLAR CONTRAST: ICD-10-PCS | Performed by: INTERNAL MEDICINE

## 2022-03-11 PROCEDURE — B2111ZZ FLUOROSCOPY OF MULTIPLE CORONARY ARTERIES USING LOW OSMOLAR CONTRAST: ICD-10-PCS | Performed by: INTERNAL MEDICINE

## 2022-03-11 PROCEDURE — 93458 L HRT ARTERY/VENTRICLE ANGIO: CPT

## 2022-03-11 PROCEDURE — 4A023N7 MEASUREMENT OF CARDIAC SAMPLING AND PRESSURE, LEFT HEART, PERCUTANEOUS APPROACH: ICD-10-PCS | Performed by: INTERNAL MEDICINE

## 2022-03-11 RX ORDER — ACETAMINOPHEN 325 MG/1
650 TABLET ORAL EVERY 4 HOURS PRN
Status: DISCONTINUED | OUTPATIENT
Start: 2022-03-11 | End: 2022-03-11

## 2022-03-11 RX ORDER — TRAMADOL HYDROCHLORIDE 50 MG/1
50 TABLET ORAL EVERY 6 HOURS PRN
Status: DISCONTINUED | OUTPATIENT
Start: 2022-03-11 | End: 2022-03-11

## 2022-03-11 RX ORDER — ASPIRIN 81 MG/1
324 TABLET, CHEWABLE ORAL ONCE
Status: COMPLETED | OUTPATIENT
Start: 2022-03-11 | End: 2022-03-11

## 2022-03-11 RX ORDER — ASPIRIN 81 MG/1
TABLET, CHEWABLE ORAL
Status: DISCONTINUED
Start: 2022-03-11 | End: 2022-03-11

## 2022-03-11 RX ORDER — MIDAZOLAM HYDROCHLORIDE 1 MG/ML
INJECTION INTRAMUSCULAR; INTRAVENOUS
Status: COMPLETED
Start: 2022-03-11 | End: 2022-03-11

## 2022-03-11 RX ORDER — DIPHENHYDRAMINE HYDROCHLORIDE 50 MG/ML
INJECTION INTRAMUSCULAR; INTRAVENOUS
Status: COMPLETED
Start: 2022-03-11 | End: 2022-03-11

## 2022-03-11 RX ORDER — LIDOCAINE HYDROCHLORIDE 20 MG/ML
INJECTION, SOLUTION EPIDURAL; INFILTRATION; INTRACAUDAL; PERINEURAL
Status: COMPLETED
Start: 2022-03-11 | End: 2022-03-11

## 2022-03-11 RX ORDER — TRAMADOL HYDROCHLORIDE 50 MG/1
100 TABLET ORAL EVERY 6 HOURS PRN
Status: DISCONTINUED | OUTPATIENT
Start: 2022-03-11 | End: 2022-03-11

## 2022-03-11 RX ORDER — SODIUM CHLORIDE 9 MG/ML
INJECTION, SOLUTION INTRAVENOUS
Status: COMPLETED | OUTPATIENT
Start: 2022-03-11 | End: 2022-03-11

## 2022-03-11 RX ORDER — HYDRALAZINE HYDROCHLORIDE 20 MG/ML
INJECTION INTRAMUSCULAR; INTRAVENOUS
Status: COMPLETED
Start: 2022-03-11 | End: 2022-03-11

## 2022-03-11 RX ADMIN — SODIUM CHLORIDE: 9 INJECTION, SOLUTION INTRAVENOUS at 08:30:00

## 2022-03-11 RX ADMIN — ASPIRIN 324 MG: 81 TABLET, CHEWABLE ORAL at 08:45:00

## 2022-03-11 NOTE — PROCEDURES
[facility]  Josiane Douglas Patient Status:  Outpatient in a Bed    1965 MRN [id number]   Location [facility] Attending Joellen Resendez MD   Hosp Day # 0 PCP Natanael Myers MD       Cardiologist: Marichuy King MD  Primary Proceduralist: Marichuy King MD  Procedure Performed: [facility]  Date of Procedure: 3/11/2022   Indication: Abnormal stress test.    Summary of findings:  Multivessel CAD      Left Ventriculography and hemodynamics: LVEDP 24 mmHg LVEF estimated 35 to 40%. Akinesis of the inferior wall. Coronary Angiography  RCA: Codominant ostial 90% stenosis, distal portion 100% occluded small vessel  Left main: 20% stenosis  Left anterior descendin lesions proximal portion 80% stenosis and 60% stenosis midportion previous stent patent supplies 2 diagonals, first diagonal previous stent patent. Circumflex: Proximal portion  100% occluded with collaterals from left to to left      Assessment:  Multivessel CAD    Mean gradient 24 mmHg of the aortic valve. Recommendations:  Consult cardiac surgery for bypass evaluation and possible AVR. Description of Procedure:   After written informed consent was obtained from the patient, patient was brought to the cardiac catheterization laboratory. Patient was prepped and draped in the usual sterile fashion. Lidocaine 1% was used to infiltrate the right groin for local anesthesia and a 6 Kuwaiti introducer sheath was inserted into the right femoral artery. Selective coronary angiography performed with JR4 catheter for RCA and JL4 catheter for LCA. Angiography performed in standard projections. 6 Yi pigtail placed in LV for LV angiogram in QUICK projection and LV hemodynamics. Selective right femoral angiogram done assess anatomy for closure.       Specimen sent to: No specimen collected  Estimated blood loss: 10 cc  Closure: Angio-Seal.      IV was maintained by RN and moderate conscious sedation of versed and fentanyl was given. Patient was assessed and monitoring of oxygen, heart rate and blood pressure by nurse and myself during the exam 20 minutes.       Robby James MD  03/11/22

## 2022-03-11 NOTE — IVS NOTE
PATIENT pOLISH SPEAKING ONLY, LANGUAGE LINE USED MANY TIMES DURING RECOVERY, INCLUDING INFORMATION ABOUT HIS MEDICATIONS AND CARE OF THE ACCESS SITE. pATIENT AWAKE AND ALERT X 4    OSMAN,vss    RIGHT GROIN DRESSING DRY, INTACT    RIGHT GROIN SITE SOFT, NON TENDER, NO HEMATOMA    PATIENT WAS ABLE TO DRINK, EAT, AMBULATE AND VOID BEFORE DISCHARGE    aGAIN WITH USE OF THE LANGUAGE LINE INSTRUCTIONS WERE GIVEN, HE STATED UNDERSTANDING    dR Valderrama AND Sebastian ESTRADA CAME BY AND USED THE LANGUAGE LINE TO DISCUSS CONTINUING CARE OF THIS PATIENT.

## 2022-03-11 NOTE — PROGRESS NOTES
[facility]. Note    CV Surgery consulted by Dr. Sarika Friend for CABG and +/- AVR w/mod AS   Pt only [place] speaking; language line used for interpretation. Written and verbal info provided to pt regarding CAD findings and plan of care which including outpatient clinic visit with Dr. Odessa Barreto on 3/17 @2pm. No visitors at bedside. Recommended, if possible, to bring a family/friend to appt to assist with interpretation of info. He has a daughter who is 17yo and may be able to attend and can help with interpreting. Pt verbalized understanding of info and has no questions at this time.

## 2022-03-11 NOTE — INTERVAL H&P NOTE
Pre-op Diagnosis: * No pre-op diagnosis entered *    The above referenced H&P was reviewed by Haley Gutierrez MD on 3/11/2022, the patient was examined and no significant changes have occurred in the patient's condition since the H&P was performed. I discussed with the patient and/or legal representative the potential benefits, risks and side effects of this procedure; the likelihood of the patient achieving goals; and potential problems that might occur during recuperation. I discussed reasonable alternatives to the procedure, including risks, benefits and side effects related to the alternatives and risks related to not receiving this procedure. We will proceed with procedure as planned.

## 2022-04-04 RX ORDER — TAMSULOSIN HYDROCHLORIDE 0.4 MG/1
CAPSULE ORAL
Qty: 180 CAPSULE | Refills: 3 | Status: SHIPPED | OUTPATIENT
Start: 2022-04-04

## 2022-05-28 ENCOUNTER — NURSE ONLY (OUTPATIENT)
Dept: LAB | Age: 57
End: 2022-05-28
Attending: INTERNAL MEDICINE
Payer: COMMERCIAL

## 2022-05-28 ENCOUNTER — LAB ENCOUNTER (OUTPATIENT)
Dept: LAB | Age: 57
End: 2022-05-28
Attending: INTERNAL MEDICINE
Payer: COMMERCIAL

## 2022-05-28 DIAGNOSIS — Z01.818 PRE-OP TESTING: ICD-10-CM

## 2022-05-28 LAB
ANION GAP SERPL CALC-SCNC: 9 MMOL/L (ref 0–18)
BUN BLD-MCNC: 20 MG/DL (ref 7–18)
BUN/CREAT SERPL: 20.8 (ref 10–20)
CALCIUM BLD-MCNC: 9 MG/DL (ref 8.5–10.1)
CHLORIDE SERPL-SCNC: 101 MMOL/L (ref 98–112)
CO2 SERPL-SCNC: 28 MMOL/L (ref 21–32)
CREAT BLD-MCNC: 0.96 MG/DL
DEPRECATED RDW RBC AUTO: 61 FL (ref 35.1–46.3)
ERYTHROCYTE [DISTWIDTH] IN BLOOD BY AUTOMATED COUNT: 20.5 % (ref 11–15)
GLUCOSE BLD-MCNC: 219 MG/DL (ref 70–99)
HCT VFR BLD AUTO: 40.8 %
HGB BLD-MCNC: 12.9 G/DL
INR BLD: 0.95 (ref 0.8–1.2)
MCH RBC QN AUTO: 26.3 PG (ref 26–34)
MCHC RBC AUTO-ENTMCNC: 31.6 G/DL (ref 31–37)
MCV RBC AUTO: 83.3 FL
OSMOLALITY SERPL CALC.SUM OF ELEC: 295 MOSM/KG (ref 275–295)
PLATELET # BLD AUTO: 262 10(3)UL (ref 150–450)
POTASSIUM SERPL-SCNC: 3.5 MMOL/L (ref 3.5–5.1)
PROTHROMBIN TIME: 12.7 SECONDS (ref 11.6–14.8)
RBC # BLD AUTO: 4.9 X10(6)UL
SODIUM SERPL-SCNC: 138 MMOL/L (ref 136–145)
WBC # BLD AUTO: 8.5 X10(3) UL (ref 4–11)

## 2022-05-28 PROCEDURE — 36415 COLL VENOUS BLD VENIPUNCTURE: CPT

## 2022-05-28 PROCEDURE — 80048 BASIC METABOLIC PNL TOTAL CA: CPT

## 2022-05-28 PROCEDURE — 85610 PROTHROMBIN TIME: CPT

## 2022-05-28 PROCEDURE — 93005 ELECTROCARDIOGRAM TRACING: CPT

## 2022-05-28 PROCEDURE — 85027 COMPLETE CBC AUTOMATED: CPT

## 2022-05-28 PROCEDURE — 93010 ELECTROCARDIOGRAM REPORT: CPT | Performed by: INTERNAL MEDICINE

## 2022-05-29 LAB — SARS-COV-2 RNA RESP QL NAA+PROBE: NOT DETECTED

## 2022-05-31 ENCOUNTER — HOSPITAL ENCOUNTER (OUTPATIENT)
Dept: INTERVENTIONAL RADIOLOGY/VASCULAR | Facility: HOSPITAL | Age: 57
Discharge: HOME | End: 2022-06-01
Attending: INTERNAL MEDICINE | Admitting: INTERNAL MEDICINE
Payer: COMMERCIAL

## 2022-05-31 DIAGNOSIS — E11.8 TYPE 2 DIABETES MELLITUS WITH COMPLICATION, WITHOUT LONG-TERM CURRENT USE OF INSULIN (HCC): ICD-10-CM

## 2022-05-31 DIAGNOSIS — I42.9 CARDIOMYOPATHY (HCC): ICD-10-CM

## 2022-05-31 DIAGNOSIS — I25.10 CAD (CORONARY ARTERY DISEASE): ICD-10-CM

## 2022-05-31 DIAGNOSIS — I25.83 CORONARY ARTERY DISEASE DUE TO LIPID RICH PLAQUE: ICD-10-CM

## 2022-05-31 DIAGNOSIS — I25.10 CORONARY ARTERY DISEASE DUE TO LIPID RICH PLAQUE: ICD-10-CM

## 2022-05-31 DIAGNOSIS — I10 ESSENTIAL HYPERTENSION: ICD-10-CM

## 2022-05-31 DIAGNOSIS — E78.2 MIXED HYPERLIPIDEMIA: ICD-10-CM

## 2022-05-31 DIAGNOSIS — Z01.818 PRE-OP TESTING: Primary | ICD-10-CM

## 2022-05-31 DIAGNOSIS — I25.118 ATHEROSCLEROSIS OF NATIVE CORONARY ARTERY OF NATIVE HEART WITH STABLE ANGINA PECTORIS (HCC): ICD-10-CM

## 2022-05-31 LAB
GLUCOSE BLDC GLUCOMTR-MCNC: 205 MG/DL (ref 70–99)
GLUCOSE BLDC GLUCOMTR-MCNC: 219 MG/DL (ref 70–99)
GLUCOSE BLDC GLUCOMTR-MCNC: 244 MG/DL (ref 70–99)

## 2022-05-31 PROCEDURE — 36415 COLL VENOUS BLD VENIPUNCTURE: CPT

## 2022-05-31 PROCEDURE — 92978 ENDOLUMINL IVUS OCT C 1ST: CPT

## 2022-05-31 PROCEDURE — 82962 GLUCOSE BLOOD TEST: CPT

## 2022-05-31 PROCEDURE — 99152 MOD SED SAME PHYS/QHP 5/>YRS: CPT

## 2022-05-31 PROCEDURE — 027034Z DILATION OF CORONARY ARTERY, ONE ARTERY WITH DRUG-ELUTING INTRALUMINAL DEVICE, PERCUTANEOUS APPROACH: ICD-10-PCS | Performed by: INTERNAL MEDICINE

## 2022-05-31 PROCEDURE — 99153 MOD SED SAME PHYS/QHP EA: CPT

## 2022-05-31 PROCEDURE — B240ZZ3 ULTRASONOGRAPHY OF SINGLE CORONARY ARTERY, INTRAVASCULAR: ICD-10-PCS | Performed by: INTERNAL MEDICINE

## 2022-05-31 RX ORDER — CLOPIDOGREL BISULFATE 75 MG/1
75 TABLET ORAL DAILY
Status: DISCONTINUED | OUTPATIENT
Start: 2022-06-01 | End: 2022-06-01

## 2022-05-31 RX ORDER — NICOTINE POLACRILEX 4 MG
30 LOZENGE BUCCAL
Status: DISCONTINUED | OUTPATIENT
Start: 2022-05-31 | End: 2022-06-01

## 2022-05-31 RX ORDER — TAMSULOSIN HYDROCHLORIDE 0.4 MG/1
0.4 CAPSULE ORAL
Status: DISCONTINUED | OUTPATIENT
Start: 2022-06-01 | End: 2022-06-01

## 2022-05-31 RX ORDER — ASPIRIN 81 MG/1
TABLET, CHEWABLE ORAL
Status: COMPLETED
Start: 2022-05-31 | End: 2022-05-31

## 2022-05-31 RX ORDER — ROSUVASTATIN CALCIUM 20 MG/1
40 TABLET, COATED ORAL NIGHTLY
Status: DISCONTINUED | OUTPATIENT
Start: 2022-05-31 | End: 2022-06-01

## 2022-05-31 RX ORDER — LIDOCAINE HYDROCHLORIDE 20 MG/ML
INJECTION, SOLUTION EPIDURAL; INFILTRATION; INTRACAUDAL; PERINEURAL
Status: COMPLETED
Start: 2022-05-31 | End: 2022-05-31

## 2022-05-31 RX ORDER — CARVEDILOL 25 MG/1
25 TABLET ORAL 2 TIMES DAILY WITH MEALS
Status: DISCONTINUED | OUTPATIENT
Start: 2022-05-31 | End: 2022-06-01

## 2022-05-31 RX ORDER — ASPIRIN 81 MG/1
324 TABLET, CHEWABLE ORAL ONCE
Status: COMPLETED | OUTPATIENT
Start: 2022-05-31 | End: 2022-05-31

## 2022-05-31 RX ORDER — MIDAZOLAM HYDROCHLORIDE 1 MG/ML
INJECTION INTRAMUSCULAR; INTRAVENOUS
Status: COMPLETED
Start: 2022-05-31 | End: 2022-05-31

## 2022-05-31 RX ORDER — TRAMADOL HYDROCHLORIDE 50 MG/1
50 TABLET ORAL EVERY 6 HOURS PRN
Status: DISCONTINUED | OUTPATIENT
Start: 2022-05-31 | End: 2022-06-01

## 2022-05-31 RX ORDER — ASPIRIN 81 MG/1
81 TABLET ORAL DAILY
Status: DISCONTINUED | OUTPATIENT
Start: 2022-06-01 | End: 2022-06-01

## 2022-05-31 RX ORDER — SODIUM CHLORIDE 9 MG/ML
INJECTION, SOLUTION INTRAVENOUS CONTINUOUS
Status: ACTIVE | OUTPATIENT
Start: 2022-05-31 | End: 2022-05-31

## 2022-05-31 RX ORDER — ACETAMINOPHEN 325 MG/1
650 TABLET ORAL EVERY 4 HOURS PRN
Status: DISCONTINUED | OUTPATIENT
Start: 2022-05-31 | End: 2022-06-01

## 2022-05-31 RX ORDER — NICOTINE POLACRILEX 4 MG
15 LOZENGE BUCCAL
Status: DISCONTINUED | OUTPATIENT
Start: 2022-05-31 | End: 2022-06-01

## 2022-05-31 RX ORDER — FUROSEMIDE 40 MG/1
40 TABLET ORAL DAILY
Status: DISCONTINUED | OUTPATIENT
Start: 2022-06-01 | End: 2022-06-01

## 2022-05-31 RX ORDER — DEXTROSE MONOHYDRATE 25 G/50ML
50 INJECTION, SOLUTION INTRAVENOUS
Status: DISCONTINUED | OUTPATIENT
Start: 2022-05-31 | End: 2022-06-01

## 2022-05-31 RX ORDER — TRAMADOL HYDROCHLORIDE 50 MG/1
100 TABLET ORAL EVERY 6 HOURS PRN
Status: DISCONTINUED | OUTPATIENT
Start: 2022-05-31 | End: 2022-06-01

## 2022-05-31 RX ORDER — CLOPIDOGREL BISULFATE 75 MG/1
600 TABLET ORAL ONCE
Status: COMPLETED | OUTPATIENT
Start: 2022-05-31 | End: 2022-05-31

## 2022-05-31 RX ORDER — SODIUM CHLORIDE 9 MG/ML
INJECTION, SOLUTION INTRAVENOUS
Status: COMPLETED | OUTPATIENT
Start: 2022-05-31 | End: 2022-05-31

## 2022-05-31 RX ORDER — HYDRALAZINE HYDROCHLORIDE 20 MG/ML
INJECTION INTRAMUSCULAR; INTRAVENOUS
Status: COMPLETED
Start: 2022-05-31 | End: 2022-05-31

## 2022-05-31 RX ORDER — HEPARIN SODIUM 1000 [USP'U]/ML
INJECTION, SOLUTION INTRAVENOUS; SUBCUTANEOUS
Status: DISCONTINUED
Start: 2022-05-31 | End: 2022-05-31 | Stop reason: WASHOUT

## 2022-05-31 RX ORDER — DIPHENHYDRAMINE HYDROCHLORIDE 50 MG/ML
INJECTION INTRAMUSCULAR; INTRAVENOUS
Status: COMPLETED
Start: 2022-05-31 | End: 2022-05-31

## 2022-05-31 RX ORDER — MIDAZOLAM HYDROCHLORIDE 1 MG/ML
INJECTION INTRAMUSCULAR; INTRAVENOUS
Status: DISCONTINUED
Start: 2022-05-31 | End: 2022-05-31 | Stop reason: WASHOUT

## 2022-05-31 RX ORDER — LOSARTAN POTASSIUM 50 MG/1
50 TABLET ORAL 2 TIMES DAILY
Status: DISCONTINUED | OUTPATIENT
Start: 2022-05-31 | End: 2022-06-01

## 2022-05-31 RX ORDER — AMLODIPINE BESYLATE 10 MG/1
10 TABLET ORAL DAILY
Status: DISCONTINUED | OUTPATIENT
Start: 2022-05-31 | End: 2022-06-01

## 2022-05-31 RX ORDER — CLOPIDOGREL BISULFATE 75 MG/1
TABLET ORAL
Status: COMPLETED
Start: 2022-05-31 | End: 2022-05-31

## 2022-05-31 RX ADMIN — ROSUVASTATIN CALCIUM 40 MG: 20 TABLET, COATED ORAL at 20:29:00

## 2022-05-31 RX ADMIN — SODIUM CHLORIDE: 9 INJECTION, SOLUTION INTRAVENOUS at 10:15:00

## 2022-05-31 RX ADMIN — LOSARTAN POTASSIUM 50 MG: 50 TABLET ORAL at 18:42:00

## 2022-05-31 RX ADMIN — ASPIRIN 324 MG: 81 TABLET, CHEWABLE ORAL at 07:19:00

## 2022-05-31 RX ADMIN — SODIUM CHLORIDE: 9 INJECTION, SOLUTION INTRAVENOUS at 07:00:00

## 2022-05-31 RX ADMIN — CLOPIDOGREL BISULFATE 600 MG: 75 TABLET ORAL at 07:53:00

## 2022-05-31 RX ADMIN — CARVEDILOL 25 MG: 25 TABLET ORAL at 18:42:00

## 2022-05-31 NOTE — INTERVAL H&P NOTE
Pre-op Diagnosis: * No pre-op diagnosis entered *    The above referenced H&P was reviewed by Ming Holbrook MD on 5/31/2022, the patient was examined and no significant changes have occurred in the patient's condition since the H&P was performed. I discussed with the patient and/or legal representative the potential benefits, risks and side effects of this procedure; the likelihood of the patient achieving goals; and potential problems that might occur during recuperation. I discussed reasonable alternatives to the procedure, including risks, benefits and side effects related to the alternatives and risks related to not receiving this procedure. We will proceed with procedure as planned.

## 2022-05-31 NOTE — PROCEDURES
[facility]  Antonio Mackey Patient Status:  Outpatient in a Bed    1965 MRN [id number]   Location [facility] Attending Daryl Garzon MD   Hosp Day # 0 PCP Kusum Gonzalez MD       Cardiologist: Joseline Pena MD  Primary Proceduralist: Joseline Pena MD  Procedure Performed: [facility], shockwave atherectomy of the LAD, IVUS LAD, stent PCI of the LAD. Date of Procedure: 2022   Indication: Staged intervention of the LAD, abnormal stress test refused bypass surgery. Summary of findings: Severe stenosis of the proximal LAD, known  of the left circumflex and nondominant right      Left anterior descending artery proximal 80% stenosis heavily calcified. Diagonal 50 to 60% stenosis    Lesion 1 proximal LAD    Lesion Characteristics-  torturous,  calcified. Type non-C lesion. Pre-intervention stenosis 80%, Post intervention stenosis 0%. Pre ANDREI 1, Post ANDREI 3.    Guide-EBU 3.5, 6 Cook Islander  Wire-TOMODO  Shockwave atherectomy was performed using 3.0 x 12 mm balloon initially for atmospheres inflation followed by 6 yenny inflation 3 rounds performed improved flow after shockwave. Predilated using 3.5 x 20 mm balloon. Stent PCI using Synergy drug-eluting stent 3.5 x 24 mm stent. IVUS showed proximal portion of the stent not well opposed. Postdilated using 4.0 x 20 mm noncompliant balloon. Good results. Patient was preloaded with 600 mg of Plavix earlier today in the Cath Lab. Angiomax drip and bolus was administered throughout the procedure. IVUS of the diagonal performed 50 to 60% stenosis    Assessment:  Successful stent PCI of the LAD proximal calcified lesion with atherectomy with shockwave. Description of Procedure:   After written informed consent was obtained from the patient, patient was brought to the cardiac catheterization laboratory. Patient was prepped and draped in the usual sterile fashion.  Lidocaine 1% was used to infiltrate the right groin for local anesthesia and a 6 Ecuadorean introducer sheath was inserted into the right femoral artery. Selective right femoral angiogram done assess anatomy for closure. Specimen sent to: No specimen collected  Estimated blood loss: 10 cc  Closure: Angio-Seal      IV was maintained by RN and moderate conscious sedation of versed and fentanyl was given. Patient was assessed and monitoring of oxygen, heart rate and blood pressure by nurse and myself during the exam 48 minutes.       Kourtney Ely MD  05/31/22

## 2022-05-31 NOTE — IVS NOTE
Procedure hand off report given to TATYANA Mead. Procedural access site is dry and intact with no signs and symptoms of bleeding and hematoma. Dr Toby Herndon came to see pt /family at bedside post procedure. Pt is generally stable.

## 2022-05-31 NOTE — DIETARY NOTE
NUTRITION EDUCATION NOTE     Received consult for cardiac nutrition education. Pt is [place] speaking, translation services used. Provided with Eating Heart-Healthy and NCM handout to reinforce. States his daughter is [place] speaking and would still like to have the handout in [place]. Declined verbal instruction. Would benefit from outpt f/u. Expect fair compliance.         Eric Ermias COREA, BETHANYN  Clinical Dietitian  P: [phone]

## 2022-06-01 VITALS
WEIGHT: 214.44 LBS | SYSTOLIC BLOOD PRESSURE: 134 MMHG | BODY MASS INDEX: 34.46 KG/M2 | DIASTOLIC BLOOD PRESSURE: 74 MMHG | HEART RATE: 72 BPM | HEIGHT: 66 IN | OXYGEN SATURATION: 94 % | RESPIRATION RATE: 18 BRPM | TEMPERATURE: 98 F

## 2022-06-01 LAB
ANION GAP SERPL CALC-SCNC: 5 MMOL/L (ref 0–18)
BUN BLD-MCNC: 16 MG/DL (ref 7–18)
BUN/CREAT SERPL: 21.6 (ref 10–20)
CALCIUM BLD-MCNC: 8.6 MG/DL (ref 8.5–10.1)
CHLORIDE SERPL-SCNC: 106 MMOL/L (ref 98–112)
CO2 SERPL-SCNC: 26 MMOL/L (ref 21–32)
CREAT BLD-MCNC: 0.74 MG/DL
DEPRECATED RDW RBC AUTO: 62.8 FL (ref 35.1–46.3)
ERYTHROCYTE [DISTWIDTH] IN BLOOD BY AUTOMATED COUNT: 21.1 % (ref 11–15)
EST. AVERAGE GLUCOSE BLD GHB EST-MCNC: 220 MG/DL (ref 68–126)
GLUCOSE BLD-MCNC: 217 MG/DL (ref 70–99)
GLUCOSE BLDC GLUCOMTR-MCNC: 188 MG/DL (ref 70–99)
HBA1C MFR BLD: 9.3 % (ref ?–5.7)
HCT VFR BLD AUTO: 40.4 %
HGB BLD-MCNC: 12.8 G/DL
MCH RBC QN AUTO: 26.2 PG (ref 26–34)
MCHC RBC AUTO-ENTMCNC: 31.7 G/DL (ref 31–37)
MCV RBC AUTO: 82.8 FL
OSMOLALITY SERPL CALC.SUM OF ELEC: 292 MOSM/KG (ref 275–295)
PLATELET # BLD AUTO: 257 10(3)UL (ref 150–450)
POTASSIUM SERPL-SCNC: 4 MMOL/L (ref 3.5–5.1)
RBC # BLD AUTO: 4.88 X10(6)UL
SODIUM SERPL-SCNC: 137 MMOL/L (ref 136–145)
WBC # BLD AUTO: 8.3 X10(3) UL (ref 4–11)

## 2022-06-01 PROCEDURE — 3046F HEMOGLOBIN A1C LEVEL >9.0%: CPT | Performed by: INTERNAL MEDICINE

## 2022-06-01 PROCEDURE — 85027 COMPLETE CBC AUTOMATED: CPT | Performed by: INTERNAL MEDICINE

## 2022-06-01 PROCEDURE — 80048 BASIC METABOLIC PNL TOTAL CA: CPT | Performed by: INTERNAL MEDICINE

## 2022-06-01 PROCEDURE — 82962 GLUCOSE BLOOD TEST: CPT

## 2022-06-01 PROCEDURE — 83036 HEMOGLOBIN GLYCOSYLATED A1C: CPT | Performed by: INTERNAL MEDICINE

## 2022-06-01 RX ADMIN — LOSARTAN POTASSIUM 50 MG: 50 TABLET ORAL at 09:31:00

## 2022-06-01 RX ADMIN — CARVEDILOL 25 MG: 25 TABLET ORAL at 09:30:00

## 2022-06-01 RX ADMIN — FUROSEMIDE 40 MG: 40 TABLET ORAL at 09:29:00

## 2022-06-01 RX ADMIN — CLOPIDOGREL BISULFATE 75 MG: 75 TABLET ORAL at 09:30:00

## 2022-06-01 RX ADMIN — TAMSULOSIN HYDROCHLORIDE 0.4 MG: 0.4 CAPSULE ORAL at 06:07:00

## 2022-06-01 RX ADMIN — AMLODIPINE BESYLATE 10 MG: 10 TABLET ORAL at 09:30:00

## 2022-06-01 RX ADMIN — ASPIRIN 81 MG: 81 TABLET ORAL at 09:30:00

## 2022-06-01 NOTE — PLAN OF CARE
Received patient alert and orientated x4, on room air. R groin site checked at bed side shift report. Language line utilized as needed for polish . Dishcarge AVS reviewed with patient with language line. Patient reported he did not want a family member or friend present for discharge instructions. Problem: Patient Centered Care  Goal: Patient preferences are identified and integrated in the patient's plan of care  Description: Interventions:  - What would you like us to know as we care for you? I am from home with my wife, kids, and dog  - Provide timely, complete, and accurate information to patient/family  - Incorporate patient and family knowledge, values, beliefs, and cultural backgrounds into the planning and delivery of care  - Encourage patient/family to participate in care and decision-making at the level they choose  - Honor patient and family perspectives and choices  Outcome: Progressing     Problem: CARDIOVASCULAR - ADULT  Goal: Maintains optimal cardiac output and hemodynamic stability  Description: INTERVENTIONS:  - Monitor vital signs, rhythm, and trends  - Monitor for bleeding, hypotension and signs of decreased cardiac output  - Evaluate effectiveness of vasoactive medications to optimize hemodynamic stability  - Monitor arterial and/or venous puncture sites for bleeding and/or hematoma  - Assess quality of pulses, skin color and temperature  - Assess for signs of decreased coronary artery perfusion - ex.  Angina  - Evaluate fluid balance, assess for edema, trend weights  Outcome: Progressing  Goal: Absence of cardiac arrhythmias or at baseline  Description: INTERVENTIONS:  - Continuous cardiac monitoring, monitor vital signs, obtain 12 lead EKG if indicated  - Evaluate effectiveness of antiarrhythmic and heart rate control medications as ordered  - Initiate emergency measures for life threatening arrhythmias  - Monitor electrolytes and administer replacement therapy as ordered  Outcome: Progressing     Problem: METABOLIC/FLUID AND ELECTROLYTES - ADULT  Goal: Glucose maintained within prescribed range  Description: INTERVENTIONS:  - Monitor Blood Glucose as ordered  - Assess for signs and symptoms of hyperglycemia and hypoglycemia  - Administer ordered medications to maintain glucose within target range  - Assess barriers to adequate nutritional intake and initiate nutrition consult as needed  - Instruct patient on self management of diabetes  Outcome: Progressing     Problem: SKIN/TISSUE INTEGRITY - ADULT  Goal: Incision(s), wounds(s) or drain site(s) healing without S/S of infection  Description: INTERVENTIONS:  - Assess and document risk factors for pressure ulcer development  - Assess and document skin integrity  - Assess and document dressing/incision, wound bed, drain sites and surrounding tissue  - Implement wound care per orders  - Initiate isolation precautions as appropriate  - Initiate Pressure Ulcer prevention bundle as indicated  Outcome: Progressing

## 2022-06-01 NOTE — PLAN OF CARE
Problem: Patient Centered Care  Goal: Patient preferences are identified and integrated in the patient's plan of care  Description: Interventions:  - What would you like us to know as we care for you? I am from home with my wife, kids, and dog  - Provide timely, complete, and accurate information to patient/family  - Incorporate patient and family knowledge, values, beliefs, and cultural backgrounds into the planning and delivery of care  - Encourage patient/family to participate in care and decision-making at the level they choose  - Honor patient and family perspectives and choices  Outcome: Progressing     Problem: CARDIOVASCULAR - ADULT  Goal: Maintains optimal cardiac output and hemodynamic stability  Description: INTERVENTIONS:  - Monitor vital signs, rhythm, and trends  - Monitor for bleeding, hypotension and signs of decreased cardiac output  - Evaluate effectiveness of vasoactive medications to optimize hemodynamic stability  - Monitor arterial and/or venous puncture sites for bleeding and/or hematoma  - Assess quality of pulses, skin color and temperature  - Assess for signs of decreased coronary artery perfusion - ex.  Angina  - Evaluate fluid balance, assess for edema, trend weights  Outcome: Progressing  Goal: Absence of cardiac arrhythmias or at baseline  Description: INTERVENTIONS:  - Continuous cardiac monitoring, monitor vital signs, obtain 12 lead EKG if indicated  - Evaluate effectiveness of antiarrhythmic and heart rate control medications as ordered  - Initiate emergency measures for life threatening arrhythmias  - Monitor electrolytes and administer replacement therapy as ordered  Outcome: Progressing     Problem: METABOLIC/FLUID AND ELECTROLYTES - ADULT  Goal: Glucose maintained within prescribed range  Description: INTERVENTIONS:  - Monitor Blood Glucose as ordered  - Assess for signs and symptoms of hyperglycemia and hypoglycemia  - Administer ordered medications to maintain glucose within target range  - Assess barriers to adequate nutritional intake and initiate nutrition consult as needed  - Instruct patient on self management of diabetes  Outcome: Progressing     Problem: SKIN/TISSUE INTEGRITY - ADULT  Goal: Incision(s), wounds(s) or drain site(s) healing without S/S of infection  Description: INTERVENTIONS:  - Assess and document risk factors for pressure ulcer development  - Assess and document skin integrity  - Assess and document dressing/incision, wound bed, drain sites and surrounding tissue  - Implement wound care per orders  - Initiate isolation precautions as appropriate  - Initiate Pressure Ulcer prevention bundle as indicated  Outcome: Progressing     Patient vital signs stable. Right groin dressing clean, dry and intact. Patient denies pain at this time. Patient ambulates independently to the bathroom. Fall safety educated to patient. Possible home today once medically cleared.

## 2022-06-01 NOTE — CARDIAC REHAB
Cardiac Rehab Phase I    Activity:  Distance   Assistance needed   Patient tolerated activity . Education:  Handouts provided and reviewed: [address] Diet: Healthy Cardiac diet reviewed. Disease Process: Disease process reviewed. Reviewed the following:       RISK FACTORS: Reviewed      SMOKING CESSATION: Reviewed      HOME EXERCISE ACTIVITY: Reviewed      OUTPATIENT CARDIAC REHAB: Referred to Cardiac Rehabilitation Phase 2.

## 2022-06-01 NOTE — PLAN OF CARE
RG site, clean dry, intact and soft. Call light within reach. Fall precautions in place. Problem: Patient Centered Care  Goal: Patient preferences are identified and integrated in the patient's plan of care  Description: Interventions:  - What would you like us to know as we care for you?  I am from home with my wife, kids, and dog  - Provide timely, complete, and accurate information to patient/family  - Incorporate patient and family knowledge, values, beliefs, and cultural backgrounds into the planning and delivery of care  - Encourage patient/family to participate in care and decision-making at the level they choose  - Honor patient and family perspectives and choices  Outcome: Progressing

## 2022-07-22 DIAGNOSIS — E11.8 TYPE 2 DIABETES MELLITUS WITH COMPLICATION, WITHOUT LONG-TERM CURRENT USE OF INSULIN (HCC): ICD-10-CM

## 2022-07-22 DIAGNOSIS — I25.10 CORONARY ARTERY DISEASE DUE TO LIPID RICH PLAQUE: ICD-10-CM

## 2022-07-22 DIAGNOSIS — I10 ESSENTIAL HYPERTENSION: ICD-10-CM

## 2022-07-22 DIAGNOSIS — I25.83 CORONARY ARTERY DISEASE DUE TO LIPID RICH PLAQUE: ICD-10-CM

## 2022-07-22 DIAGNOSIS — E78.2 MIXED HYPERLIPIDEMIA: ICD-10-CM

## 2022-07-25 RX ORDER — LOSARTAN POTASSIUM 50 MG/1
50 TABLET ORAL 2 TIMES DAILY
Qty: 60 TABLET | Refills: 11 | Status: SHIPPED | OUTPATIENT
Start: 2022-07-25

## 2022-09-01 ENCOUNTER — OFFICE VISIT (OUTPATIENT)
Dept: INTERNAL MEDICINE CLINIC | Facility: CLINIC | Age: 57
End: 2022-09-01
Payer: COMMERCIAL

## 2022-09-01 VITALS
BODY MASS INDEX: 33.11 KG/M2 | HEART RATE: 93 BPM | HEIGHT: 66 IN | SYSTOLIC BLOOD PRESSURE: 130 MMHG | OXYGEN SATURATION: 78 % | DIASTOLIC BLOOD PRESSURE: 70 MMHG | WEIGHT: 206 LBS

## 2022-09-01 DIAGNOSIS — Z12.11 COLON CANCER SCREENING: ICD-10-CM

## 2022-09-01 DIAGNOSIS — K01.1 IMPACTED THIRD MOLAR TOOTH: ICD-10-CM

## 2022-09-01 DIAGNOSIS — Z00.00 WELLNESS EXAMINATION: Primary | ICD-10-CM

## 2022-09-01 DIAGNOSIS — E78.2 MIXED HYPERLIPIDEMIA: ICD-10-CM

## 2022-09-01 DIAGNOSIS — I25.10 CORONARY ARTERY DISEASE DUE TO LIPID RICH PLAQUE: ICD-10-CM

## 2022-09-01 DIAGNOSIS — I25.83 CORONARY ARTERY DISEASE DUE TO LIPID RICH PLAQUE: ICD-10-CM

## 2022-09-01 DIAGNOSIS — I10 ESSENTIAL HYPERTENSION: ICD-10-CM

## 2022-09-01 DIAGNOSIS — E11.8 TYPE 2 DIABETES MELLITUS WITH COMPLICATION, WITHOUT LONG-TERM CURRENT USE OF INSULIN (HCC): ICD-10-CM

## 2022-09-01 LAB
ALBUMIN SERPL-MCNC: 3.6 G/DL (ref 3.4–5)
ALBUMIN/GLOB SERPL: 0.8 {RATIO} (ref 1–2)
ALP LIVER SERPL-CCNC: 78 U/L
ALT SERPL-CCNC: 15 U/L
ANION GAP SERPL CALC-SCNC: 7 MMOL/L (ref 0–18)
AST SERPL-CCNC: 16 U/L (ref 15–37)
BILIRUB SERPL-MCNC: 0.5 MG/DL (ref 0.1–2)
BUN BLD-MCNC: 14 MG/DL (ref 7–18)
BUN/CREAT SERPL: 14.6 (ref 10–20)
CALCIUM BLD-MCNC: 9.6 MG/DL (ref 8.5–10.1)
CHLORIDE SERPL-SCNC: 103 MMOL/L (ref 98–112)
CHOLEST SERPL-MCNC: 174 MG/DL (ref ?–200)
CO2 SERPL-SCNC: 28 MMOL/L (ref 21–32)
CREAT BLD-MCNC: 0.96 MG/DL
CREAT UR-SCNC: 114 MG/DL
EST. AVERAGE GLUCOSE BLD GHB EST-MCNC: 194 MG/DL (ref 68–126)
FASTING PATIENT LIPID ANSWER: YES
FASTING STATUS PATIENT QL REPORTED: YES
GFR SERPLBLD BASED ON 1.73 SQ M-ARVRAT: 91 ML/MIN/1.73M2 (ref 60–?)
GLOBULIN PLAS-MCNC: 4.3 G/DL (ref 2.8–4.4)
GLUCOSE BLD-MCNC: 175 MG/DL (ref 70–99)
HBA1C MFR BLD: 8.4 % (ref ?–5.7)
HDLC SERPL-MCNC: 46 MG/DL (ref 40–59)
LDLC SERPL CALC-MCNC: 92 MG/DL (ref ?–100)
MICROALBUMIN UR-MCNC: 2.62 MG/DL
MICROALBUMIN/CREAT 24H UR-RTO: 23 UG/MG (ref ?–30)
NONHDLC SERPL-MCNC: 128 MG/DL (ref ?–130)
OSMOLALITY SERPL CALC.SUM OF ELEC: 291 MOSM/KG (ref 275–295)
POTASSIUM SERPL-SCNC: 3.6 MMOL/L (ref 3.5–5.1)
PROT SERPL-MCNC: 7.9 G/DL (ref 6.4–8.2)
SODIUM SERPL-SCNC: 138 MMOL/L (ref 136–145)
TRIGL SERPL-MCNC: 213 MG/DL (ref 30–149)
VLDLC SERPL CALC-MCNC: 35 MG/DL (ref 0–30)

## 2022-09-01 PROCEDURE — 99396 PREV VISIT EST AGE 40-64: CPT | Performed by: INTERNAL MEDICINE

## 2022-09-01 PROCEDURE — 80061 LIPID PANEL: CPT | Performed by: INTERNAL MEDICINE

## 2022-09-01 PROCEDURE — 82043 UR ALBUMIN QUANTITATIVE: CPT | Performed by: INTERNAL MEDICINE

## 2022-09-01 PROCEDURE — 80053 COMPREHEN METABOLIC PANEL: CPT | Performed by: INTERNAL MEDICINE

## 2022-09-01 PROCEDURE — 3078F DIAST BP <80 MM HG: CPT | Performed by: INTERNAL MEDICINE

## 2022-09-01 PROCEDURE — 3008F BODY MASS INDEX DOCD: CPT | Performed by: INTERNAL MEDICINE

## 2022-09-01 PROCEDURE — 83036 HEMOGLOBIN GLYCOSYLATED A1C: CPT | Performed by: INTERNAL MEDICINE

## 2022-09-01 PROCEDURE — 82570 ASSAY OF URINE CREATININE: CPT | Performed by: INTERNAL MEDICINE

## 2022-09-01 PROCEDURE — 3075F SYST BP GE 130 - 139MM HG: CPT | Performed by: INTERNAL MEDICINE

## 2022-09-01 RX ORDER — AMOXICILLIN 500 MG/1
500 CAPSULE ORAL 3 TIMES DAILY
Qty: 30 CAPSULE | Refills: 0 | Status: SHIPPED | OUTPATIENT
Start: 2022-09-01 | End: 2022-09-11

## 2022-09-01 RX ORDER — LOSARTAN POTASSIUM 50 MG/1
50 TABLET ORAL 2 TIMES DAILY
Qty: 180 TABLET | Refills: 3 | Status: SHIPPED | OUTPATIENT
Start: 2022-09-01

## 2022-09-01 RX ORDER — AMLODIPINE BESYLATE 5 MG/1
5 TABLET ORAL DAILY
Qty: 90 TABLET | Refills: 3 | Status: SHIPPED | OUTPATIENT
Start: 2022-09-01

## 2022-09-01 RX ORDER — CLOPIDOGREL BISULFATE 75 MG/1
75 TABLET ORAL DAILY
Qty: 90 TABLET | Refills: 3 | Status: SHIPPED | OUTPATIENT
Start: 2022-09-01

## 2022-09-01 RX ORDER — ROSUVASTATIN CALCIUM 20 MG/1
20 TABLET, COATED ORAL NIGHTLY
Qty: 90 TABLET | Refills: 3 | Status: SHIPPED | OUTPATIENT
Start: 2022-09-01

## 2022-09-01 RX ORDER — BLOOD-GLUCOSE METER
KIT MISCELLANEOUS
Qty: 100 STRIP | Refills: 11 | Status: SHIPPED | OUTPATIENT
Start: 2022-09-01 | End: 2022-09-02

## 2022-09-01 RX ORDER — FUROSEMIDE 40 MG/1
40 TABLET ORAL DAILY
Qty: 90 TABLET | Refills: 3 | Status: SHIPPED | OUTPATIENT
Start: 2022-09-01

## 2022-09-01 RX ORDER — EMPAGLIFLOZIN 25 MG/1
25 TABLET, FILM COATED ORAL DAILY
Qty: 90 TABLET | Refills: 3 | Status: SHIPPED | OUTPATIENT
Start: 2022-09-01

## 2022-10-10 ENCOUNTER — HOSPITAL ENCOUNTER (OUTPATIENT)
Dept: ULTRASOUND IMAGING | Facility: HOSPITAL | Age: 57
Discharge: HOME | End: 2022-10-10
Attending: RADIOLOGY
Payer: COMMERCIAL

## 2022-10-10 DIAGNOSIS — I73.9 PAD (PERIPHERAL ARTERY DISEASE) (HCC): ICD-10-CM

## 2022-10-10 PROCEDURE — 93925 LOWER EXTREMITY STUDY: CPT | Performed by: RADIOLOGY

## 2023-03-16 ENCOUNTER — OFFICE VISIT (OUTPATIENT)
Dept: INTERNAL MEDICINE CLINIC | Facility: CLINIC | Age: 58
End: 2023-03-16
Payer: MEDICAID

## 2023-03-16 VITALS
DIASTOLIC BLOOD PRESSURE: 60 MMHG | WEIGHT: 214 LBS | SYSTOLIC BLOOD PRESSURE: 140 MMHG | HEIGHT: 66 IN | BODY MASS INDEX: 34.39 KG/M2

## 2023-03-16 DIAGNOSIS — I25.10 CORONARY ARTERY DISEASE DUE TO LIPID RICH PLAQUE: ICD-10-CM

## 2023-03-16 DIAGNOSIS — I25.83 CORONARY ARTERY DISEASE DUE TO LIPID RICH PLAQUE: ICD-10-CM

## 2023-03-16 DIAGNOSIS — R21 RASH: ICD-10-CM

## 2023-03-16 DIAGNOSIS — Z12.11 COLON CANCER SCREENING: Primary | ICD-10-CM

## 2023-03-16 DIAGNOSIS — I10 ESSENTIAL HYPERTENSION: ICD-10-CM

## 2023-03-16 DIAGNOSIS — E11.8 TYPE 2 DIABETES MELLITUS WITH COMPLICATION, WITHOUT LONG-TERM CURRENT USE OF INSULIN (HCC): ICD-10-CM

## 2023-03-16 DIAGNOSIS — E78.2 MIXED HYPERLIPIDEMIA: ICD-10-CM

## 2023-03-16 PROCEDURE — 99214 OFFICE O/P EST MOD 30 MIN: CPT | Performed by: INTERNAL MEDICINE

## 2023-03-16 PROCEDURE — 3077F SYST BP >= 140 MM HG: CPT | Performed by: INTERNAL MEDICINE

## 2023-03-16 PROCEDURE — 3078F DIAST BP <80 MM HG: CPT | Performed by: INTERNAL MEDICINE

## 2023-03-16 PROCEDURE — 3008F BODY MASS INDEX DOCD: CPT | Performed by: INTERNAL MEDICINE

## 2023-08-10 PROCEDURE — 85025 COMPLETE CBC W/AUTO DIFF WBC: CPT | Performed by: INTERNAL MEDICINE

## 2023-08-10 PROCEDURE — 80053 COMPREHEN METABOLIC PANEL: CPT | Performed by: INTERNAL MEDICINE

## 2023-08-10 PROCEDURE — 83036 HEMOGLOBIN GLYCOSYLATED A1C: CPT | Performed by: INTERNAL MEDICINE

## 2023-08-10 PROCEDURE — 82570 ASSAY OF URINE CREATININE: CPT | Performed by: INTERNAL MEDICINE

## 2023-08-10 PROCEDURE — 82043 UR ALBUMIN QUANTITATIVE: CPT | Performed by: INTERNAL MEDICINE

## 2023-08-10 PROCEDURE — 84153 ASSAY OF PSA TOTAL: CPT | Performed by: INTERNAL MEDICINE

## 2023-08-18 RX ORDER — TAMSULOSIN HYDROCHLORIDE 0.4 MG/1
CAPSULE ORAL
Qty: 180 CAPSULE | Refills: 3 | Status: SHIPPED | OUTPATIENT
Start: 2023-08-18

## 2023-08-18 NOTE — TELEPHONE ENCOUNTER
A refill request was received for:  Requested Prescriptions     Pending Prescriptions Disp Refills    tamsulosin 0.4 MG Oral Cap [Pharmacy Med Name: TAMSULOSIN 0.4MG CAPSULES] 180 capsule 3     Sig: TAKE 2 CAPSULES BY MOUTH DAILY     Last refill date:  4/4/22    Last office visit: 8/10/23      No future appointments.

## 2023-11-24 DIAGNOSIS — I10 ESSENTIAL HYPERTENSION: ICD-10-CM

## 2023-11-24 DIAGNOSIS — E78.2 MIXED HYPERLIPIDEMIA: ICD-10-CM

## 2023-11-24 DIAGNOSIS — E11.8 TYPE 2 DIABETES MELLITUS WITH COMPLICATION, WITHOUT LONG-TERM CURRENT USE OF INSULIN (HCC): ICD-10-CM

## 2023-11-24 DIAGNOSIS — I25.10 CORONARY ARTERY DISEASE DUE TO LIPID RICH PLAQUE: ICD-10-CM

## 2023-11-24 DIAGNOSIS — I25.83 CORONARY ARTERY DISEASE DUE TO LIPID RICH PLAQUE: ICD-10-CM

## 2023-11-27 RX ORDER — CLOPIDOGREL BISULFATE 75 MG/1
75 TABLET ORAL DAILY
Qty: 90 TABLET | Refills: 3 | Status: SHIPPED | OUTPATIENT
Start: 2023-11-27

## 2023-11-27 RX ORDER — LOSARTAN POTASSIUM 50 MG/1
50 TABLET ORAL 2 TIMES DAILY
Qty: 180 TABLET | Refills: 3 | Status: SHIPPED | OUTPATIENT
Start: 2023-11-27

## 2023-11-27 NOTE — TELEPHONE ENCOUNTER
A refill request was received for:  Requested Prescriptions     Pending Prescriptions Disp Refills    CLOPIDOGREL 75 MG Oral Tab [Pharmacy Med Name: CLOPIDOGREL 75MG TABLETS] 90 tablet 3     Sig: TAKE 1 TABLET(75 MG) BY MOUTH DAILY    LOSARTAN 50 MG Oral Tab [Pharmacy Med Name: LOSARTAN 50MG TABLETS] 180 tablet 3     Sig: TAKE 1 TABLET(50 MG) BY MOUTH TWICE DAILY     Last refill date:  9/1/22    Last office visit: 8/10/23      Future Appointments   Date Time Provider Blayne Gonzalez   1/16/2024 11:00 AM Jas Su MD [facility]

## 2023-12-05 DIAGNOSIS — I10 ESSENTIAL HYPERTENSION: ICD-10-CM

## 2023-12-05 DIAGNOSIS — E78.2 MIXED HYPERLIPIDEMIA: ICD-10-CM

## 2023-12-05 DIAGNOSIS — I25.83 CORONARY ARTERY DISEASE DUE TO LIPID RICH PLAQUE: ICD-10-CM

## 2023-12-05 DIAGNOSIS — E11.8 TYPE 2 DIABETES MELLITUS WITH COMPLICATION, WITHOUT LONG-TERM CURRENT USE OF INSULIN (HCC): ICD-10-CM

## 2023-12-05 DIAGNOSIS — I25.10 CORONARY ARTERY DISEASE DUE TO LIPID RICH PLAQUE: ICD-10-CM

## 2023-12-05 NOTE — TELEPHONE ENCOUNTER
A refill request was received for:  Requested Prescriptions     Pending Prescriptions Disp Refills    METFORMIN HCL 1000 MG Oral Tab [Pharmacy Med Name: METFORMIN 1000MG TABLETS] 180 tablet 3     Sig: TAKE 1 TABLET(1000 MG) BY MOUTH TWICE DAILY WITH MEALS     Last refill date:  9/1/22    Last office visit: 8/10/23      Future Appointments   Date Time Provider Blayne Gonzalez   1/16/2024 11:00 AM Reji Patricia MD [facility]

## 2024-01-08 RX ORDER — EMPAGLIFLOZIN 25 MG/1
1 TABLET, FILM COATED ORAL DAILY
Qty: 90 TABLET | Refills: 3 | Status: SHIPPED | OUTPATIENT
Start: 2024-01-08

## 2024-01-08 NOTE — TELEPHONE ENCOUNTER
A refill request was received for:  Requested Prescriptions     Pending Prescriptions Disp Refills    JARDIANCE 25 MG Oral Tab [Pharmacy Med Name: JARDIANCE 25MG TABLETS] 90 tablet 3     Sig: TAKE 1 TABLET BY MOUTH DAILY     Last refill date:  9/1/22    Last office visit: 8/10/23      Future Appointments   Date Time Provider Department Center   1/16/2024 11:00 AM Attila Li MD [id number]   1/19/2024  8:30 AM Fiona Sweeney MD [facility] [address]

## 2024-01-16 ENCOUNTER — TELEPHONE (OUTPATIENT)
Facility: CLINIC | Age: 59
End: 2024-01-16

## 2024-01-16 ENCOUNTER — OFFICE VISIT (OUTPATIENT)
Facility: CLINIC | Age: 59
End: 2024-01-16

## 2024-01-16 ENCOUNTER — TELEPHONE (OUTPATIENT)
Dept: GASTROENTEROLOGY | Facility: CLINIC | Age: 59
End: 2024-01-16

## 2024-01-16 VITALS
HEIGHT: 66 IN | DIASTOLIC BLOOD PRESSURE: 71 MMHG | BODY MASS INDEX: 34.55 KG/M2 | HEART RATE: 102 BPM | WEIGHT: 215 LBS | SYSTOLIC BLOOD PRESSURE: 169 MMHG

## 2024-01-16 DIAGNOSIS — K21.9 GASTROESOPHAGEAL REFLUX DISEASE, UNSPECIFIED WHETHER ESOPHAGITIS PRESENT: ICD-10-CM

## 2024-01-16 DIAGNOSIS — Z12.12 ENCOUNTER FOR COLORECTAL CANCER SCREENING: Primary | ICD-10-CM

## 2024-01-16 DIAGNOSIS — K21.9 GASTROESOPHAGEAL REFLUX DISEASE WITHOUT ESOPHAGITIS: ICD-10-CM

## 2024-01-16 DIAGNOSIS — Z12.11 ENCOUNTER FOR COLORECTAL CANCER SCREENING: Primary | ICD-10-CM

## 2024-01-16 DIAGNOSIS — Z12.11 SCREENING FOR COLON CANCER: Primary | ICD-10-CM

## 2024-01-16 PROCEDURE — 3078F DIAST BP <80 MM HG: CPT | Performed by: INTERNAL MEDICINE

## 2024-01-16 PROCEDURE — 3077F SYST BP >= 140 MM HG: CPT | Performed by: INTERNAL MEDICINE

## 2024-01-16 PROCEDURE — 3008F BODY MASS INDEX DOCD: CPT | Performed by: INTERNAL MEDICINE

## 2024-01-16 PROCEDURE — 99204 OFFICE O/P NEW MOD 45 MIN: CPT | Performed by: INTERNAL MEDICINE

## 2024-01-16 NOTE — TELEPHONE ENCOUNTER
GI staff:    Please make sure the patient follows up with his cardiologist prior to the procedure as I discussed with him in the office today and see if ok with his cardiologist to hold clopidogrel 5 days prior to the procedure    Thanks    MD Jabier Perez-[facility]  1/16/2024  11:59 AM

## 2024-01-16 NOTE — TELEPHONE ENCOUNTER
Scheduled for:  Colonoscopy [id number] EGD [id number]  Provider Name:  Dr Li  Date:  4/22/2024  Location:  [facility]  Sedation:  MAC  Time:  9:15 am. (pt is aware to arrive at 8:15 am)  Prep:  Split dose Golytely  Meds/Allergies Reconciled?:  Physician Reviewed  Diagnosis with codes:    CRC screening Z12.11  GERD K21.9  Was patient informed to call insurance with codes (Y/N):  Yes  Referral sent?:  Referral was sent at the time of electronic surgical scheduling.  [facility] or [facility] notified?:  I sent an electronic request to Endo Scheduling and received a confirmation today.  Medication Orders:  Pt is aware to NOT take iron pills, herbal meds and diet supplements for 7 days before exam.   Hold your clopidogrel 5 days prior to the procedure.  Hold metformin the day before and day of the procedure.  Hold jardiance 4 days prior to the procedure.  Also to NOT take any form of alcohol, recreational drugs and any forms of ED meds 24 hours before exam.   Misc Orders:  Will need to see if your cardiologist is ok with you holding your clopidogrel 5 days prior to the procedure    Further instructions given by staff:  I discussed the prep instructions with the patient which he verbally understood. I provided patient with prep instruction's sheet in office.      Patient was informed about the new cancellation policy for his/her procedure. Patient was also given a copy of the cancellation policy at the time of the appointment and verbalized understanding.   risk factors

## 2024-01-16 NOTE — PATIENT INSTRUCTIONS
1. Schedule EGD and colonoscopy with MAC at the hospital    Please follow up with your cardiologist prior to the procedure    2.  bowel prep from pharmacy (split trilyte or golytely). As we discussed it is important to take the bowel preparation in two parts taking 2L of the liquid the night before the procedure and the second 2L the morning of the procedure starting approximately 6 hours prior to your scheduled procedure time.    3. Medication    Will need to see if your cardiologist is ok with you holding your clopidogrel 5 days prior to the procedure  Diabetic medications; hold metformin the day before and day of the procedure and hold jardiance 4 days prior to the procedure  Otherwise, continue all medications for procedure    4. Read all bowel prep instructions carefully    5. AVOID seeds, nuts, popcorn, raw fruits and vegetables (cooked is okay) for 2-3 days before procedure    >>>Please note: if you were prescribed a bowel prep and it is too expensive or not covered by insurance, it is okay to substitute Trilyte or Golytely (or any similar generic prep). This can be done by notifying the pharmacy or calling our office.

## 2024-01-16 NOTE — H&P
[facility] - Gastroenterology                                                                                                  Clinic History and Physical       Referring provider: Zahra    Chief Complaint   Patient presents with    Consult     Colonoscopy    Reflux     The following visit was carried out with a phone polish  including obtaining the history of present illness and discussing the assessment and plan as well as obtaining consent    HPI:   Jose Alberto Lizama is a 58 year old Polish speaking man with history of BMI 34, diabetes, peripheral vascular disease, diabetes, coronary artery disease s/p placement of a drug eluting stent, hyperlipidemia, hypertension, chronic sinusitis, several listed eye problems, tobacco use, BPH, here regarding colorectal cancer screening    Says he wants gastroscopy and colonoscopy. Says he wants gastroscopy because he has acid reflux. Denies any changes in acid reflux. Denies dysphagia. Stopped taking pantoprazole sometime ago because he thought it made it him feel funny like woozy or dizzy. Though did help with the reflux. Denies any other GI issues like diarrhea, bleeding, abdominal pain. No known family history of GI cancer.    History, Medications, Allergies, ROS:      Past Medical History:   Diagnosis Date    Back problem     BPH (benign prostatic hyperplasia)     Coronary atherosclerosis     Depression     Diabetes (HCC)     Esophageal reflux     High blood pressure     High cholesterol     Peripheral vascular disease (HCC)     Shortness of breath     related to smoking     Visual impairment     glasses      Past Surgical History:   Procedure Laterality Date    CARDIAC CATHETERIZATION  2016    [facility]    CATH DRUG ELUTING STENT      HERNIA SURGERY      as a child    NASAL SCOPY,REMV PART ETHMOID  10/14/2020    NASAL SCOPY,RMV TISS MAXILL SINUS   10/14/2020    REPAIR OF NASAL SEPTUM  10/14/2020      Family Hx:   Family History   Problem Relation Age of Onset    Diabetes Father     Diabetes Paternal Aunt     Diabetes Paternal Uncle     Seizure Disorder Mother     No Known Problems Brother     No Known Problems Daughter     Glaucoma Neg     Macular degeneration Neg       Social History:   Social History     Socioeconomic History    Marital status:    Tobacco Use    Smoking status: Every Day     Packs/day: 1.00     Years: 20.00     Additional pack years: 0.00     Total pack years: 20.00     Types: Cigarettes    Smokeless tobacco: Never   Vaping Use    Vaping Use: Never used   Substance and Sexual Activity    Alcohol use: Yes     Alcohol/week: 0.0 standard drinks of alcohol     Comment: social    Drug use: No        Medications (Active prior to today's visit):  Current Outpatient Medications   Medication Sig Dispense Refill    Empagliflozin (JARDIANCE) 25 MG Oral Tab Take 1 tablet by mouth daily. 90 tablet 3    metFORMIN HCl 1000 MG Oral Tab Take 1 tablet (1,000 mg total) by mouth 2 (two) times daily with meals. 180 tablet 3    clopidogrel 75 MG Oral Tab Take 1 tablet (75 mg total) by mouth daily. 90 tablet 3    losartan 50 MG Oral Tab Take 1 tablet (50 mg total) by mouth 2 (two) times daily. 180 tablet 3    tamsulosin 0.4 MG Oral Cap TAKE 2 CAPSULES BY MOUTH DAILY 180 capsule 3    escitalopram 10 MG Oral Tab Take 1 tablet (10 mg total) by mouth daily. 90 tablet 3    furosemide 40 MG Oral Tab Take 1 tablet (40 mg total) by mouth daily. 90 tablet 3    amLODIPine 5 MG Oral Tab Take 1 tablet (5 mg total) by mouth daily. 90 tablet 3    rosuvastatin 20 MG Oral Tab Take 1 tablet (20 mg total) by mouth nightly. 90 tablet 3    HYDROcodone-acetaminophen 5-325 MG Oral Tab Take 1-2 tablets by mouth every 4 (four) hours as needed for Pain. (Patient not taking: Reported on 5/31/2022) 20 tablet 0    aspirin 81 MG Oral Tab EC Take 81 mg by mouth nightly.      carvedilol  25 MG Oral Tab Take 25 mg by mouth 2 (two) times daily with meals.      Pantoprazole Sodium 40 MG Oral Tab EC Take 1 tablet (40 mg total) by mouth every morning before breakfast. 30 tablet 6    Glucose Blood (LORAINE CONTOUR TEST) In Vitro Strip To test daily 100 strip 3    LORAINE CONTOUR NEXT TEST In Vitro Strip To test 2 x daily 100 strip 6       Allergies:  No Known Allergies    ROS:   Systems were reviewed and were negative except as noted in the HPI    PHYSICAL EXAM:   Blood pressure (!) 169/71, pulse 102, height 5' 6\" (1.676 m), weight 215 lb (97.5 kg).    General:awake, cooperative, no acute distress  HEENT: EOMI, no scleral icterus, MMM; oral pharnyx is without exudates or lesions  Neck: no lymphadenopathy; thyroid is not enlarged and without nodules  CV: RRR  Resp: non-labored breathing  Abd: soft, non-tender, non-distended  Ext: no lower extremity swelling  Neuro: Alert, Oriented X 3  Skin: no rashes, bruises  Psych: normal affect    Labs/Imaging:     Reviewed as noted in the HPI and A/P    ASSESSMENT/PLAN:   Jose Alberto Lizama is a 58 year old Polish speaking man with history of BMI 34, diabetes, peripheral vascular disease, diabetes, coronary artery disease s/p placement of a drug eluting stent, hyperlipidemia, hypertension, chronic sinusitis, several listed eye problems, tobacco use, BPH, here regarding colorectal cancer screening    Review of the chart notes a colonoscopy August 2013 for colorectal cancer screening (listed in care everywhere and report reviewed) with findings of hemorrhoids and recommendation to repeat in 10 years. There was an EGD done at that time for heartburn and failure to response to medical treatment. The procedure report notes LA grade A reflux esophagitis and gastritis.     Discussed recommendation for colonoscopy given timing since last.     Discussed can plan for EGD to assess degree if esophagitis at that time to see if trying a different PPI may be better or if none or minimal  an H2 blocker may be acceptable.     Recommend:  -EGD and colonoscopy with MAC at [facility] with split golytely - will need updated cardiac evaluation prior to this (we discussed specifically seeing his cardiologist prior to the procedures); will need to see if clopidogrel can be held 5 days prior; hold jardiance and metformin per anesthesia required protocol  -consider restarting a different PPI or H2 blocker pending EGD    EGD Consent: I have discussed the risks, benefits, and alternatives to EGD with the patient [who demonstrated understanding], including but not limited to the risks of bleeding, infection, pain, perforation as well as the cardiopulmonary risks of anesthesia all leading to prolonged hospital stay or surgical intervention. All questions were answered to the patient’s satisfaction. The patient elected to proceed with EGD with intervention [i.e. Biopsy, dilatation, control of bleeding, etc.] as indicated.    Colonoscopy consent: I have discussed the risks, benefits, and alternatives (including stool testing) to colonoscopy with the patient [who demonstrated understanding], including but not limited to the risks of bleeding, infection, pain, as well as the risks of anesthesia and perforation all leading to prolonged hospitalization, surgical intervention, or could be life threatening. I also specifically mentioned the risk of missed lesions/polyps. All questions were answered to the patient’s satisfaction. The patient signed informed consent and elected to proceed with colonoscopy with intervention [i.e. polypectomy, biopsy, control of bleeding, etc.] as indicated. I also discussed a ride home from a family member of friend is required and driving after the procedure with sedation is not safe or recommended.    Orders This Visit:  No orders of the defined types were placed in this encounter.    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  Within the tumor masses, there are varying proportions of normal squamous cells and anaplastic squamous cells.  The anaplastic cells have variation in size and shape with hyperplasia and hyperchromasia of the nuclei, absence of intracellular bridges and varying degrees of keratinization.  An acantholytic pattern is noted.

## 2024-01-19 ENCOUNTER — OFFICE VISIT (OUTPATIENT)
Dept: INTERNAL MEDICINE CLINIC | Facility: CLINIC | Age: 59
End: 2024-01-19
Payer: MEDICAID

## 2024-01-19 VITALS
DIASTOLIC BLOOD PRESSURE: 82 MMHG | HEIGHT: 66 IN | WEIGHT: 213 LBS | HEART RATE: 82 BPM | SYSTOLIC BLOOD PRESSURE: 128 MMHG | OXYGEN SATURATION: 97 % | BODY MASS INDEX: 34.23 KG/M2

## 2024-01-19 DIAGNOSIS — I25.10 CORONARY ARTERY DISEASE DUE TO LIPID RICH PLAQUE: ICD-10-CM

## 2024-01-19 DIAGNOSIS — Z00.00 WELLNESS EXAMINATION: Primary | ICD-10-CM

## 2024-01-19 DIAGNOSIS — E78.2 MIXED HYPERLIPIDEMIA: ICD-10-CM

## 2024-01-19 DIAGNOSIS — I10 ESSENTIAL HYPERTENSION: ICD-10-CM

## 2024-01-19 DIAGNOSIS — I25.83 CORONARY ARTERY DISEASE DUE TO LIPID RICH PLAQUE: ICD-10-CM

## 2024-01-19 DIAGNOSIS — E11.8 TYPE 2 DIABETES MELLITUS WITH COMPLICATION, WITHOUT LONG-TERM CURRENT USE OF INSULIN (HCC): ICD-10-CM

## 2024-01-19 LAB
ALBUMIN SERPL-MCNC: 4.4 G/DL (ref 3.2–4.8)
ALBUMIN/GLOB SERPL: 1.4 {RATIO} (ref 1–2)
ALP LIVER SERPL-CCNC: 64 U/L
ALT SERPL-CCNC: 8 U/L
ANION GAP SERPL CALC-SCNC: 6 MMOL/L (ref 0–18)
AST SERPL-CCNC: 18 U/L (ref ?–34)
BILIRUB SERPL-MCNC: 0.3 MG/DL (ref 0.2–1.1)
BUN BLD-MCNC: 14 MG/DL (ref 9–23)
BUN/CREAT SERPL: 14.1 (ref 10–20)
CALCIUM BLD-MCNC: 9.4 MG/DL (ref 8.7–10.4)
CHLORIDE SERPL-SCNC: 105 MMOL/L (ref 98–112)
CHOLEST SERPL-MCNC: 181 MG/DL (ref ?–200)
CO2 SERPL-SCNC: 27 MMOL/L (ref 21–32)
CREAT BLD-MCNC: 0.99 MG/DL
CREAT UR-SCNC: 230 MG/DL
EGFRCR SERPLBLD CKD-EPI 2021: 87 ML/MIN/1.73M2 (ref 60–?)
EST. AVERAGE GLUCOSE BLD GHB EST-MCNC: 194 MG/DL (ref 68–126)
FASTING PATIENT LIPID ANSWER: YES
FASTING STATUS PATIENT QL REPORTED: YES
GLOBULIN PLAS-MCNC: 3.1 G/DL (ref 2.8–4.4)
GLUCOSE BLD-MCNC: 214 MG/DL (ref 70–99)
HBA1C MFR BLD: 8.4 % (ref ?–5.7)
HDLC SERPL-MCNC: 52 MG/DL (ref 40–59)
LDLC SERPL CALC-MCNC: 95 MG/DL (ref ?–100)
MICROALBUMIN UR-MCNC: 16.2 MG/DL
MICROALBUMIN/CREAT 24H UR-RTO: 70.4 UG/MG (ref ?–30)
NONHDLC SERPL-MCNC: 129 MG/DL (ref ?–130)
OSMOLALITY SERPL CALC.SUM OF ELEC: 293 MOSM/KG (ref 275–295)
POTASSIUM SERPL-SCNC: 4.2 MMOL/L (ref 3.5–5.1)
PROT SERPL-MCNC: 7.5 G/DL (ref 5.7–8.2)
SODIUM SERPL-SCNC: 138 MMOL/L (ref 136–145)
TRIGL SERPL-MCNC: 197 MG/DL (ref 30–149)
VLDLC SERPL CALC-MCNC: 32 MG/DL (ref 0–30)

## 2024-01-19 PROCEDURE — 99396 PREV VISIT EST AGE 40-64: CPT | Performed by: INTERNAL MEDICINE

## 2024-01-19 PROCEDURE — 3079F DIAST BP 80-89 MM HG: CPT | Performed by: INTERNAL MEDICINE

## 2024-01-19 PROCEDURE — 80061 LIPID PANEL: CPT | Performed by: INTERNAL MEDICINE

## 2024-01-19 PROCEDURE — 80053 COMPREHEN METABOLIC PANEL: CPT | Performed by: INTERNAL MEDICINE

## 2024-01-19 PROCEDURE — 82043 UR ALBUMIN QUANTITATIVE: CPT | Performed by: INTERNAL MEDICINE

## 2024-01-19 PROCEDURE — 83036 HEMOGLOBIN GLYCOSYLATED A1C: CPT | Performed by: INTERNAL MEDICINE

## 2024-01-19 PROCEDURE — 3008F BODY MASS INDEX DOCD: CPT | Performed by: INTERNAL MEDICINE

## 2024-01-19 PROCEDURE — 82570 ASSAY OF URINE CREATININE: CPT | Performed by: INTERNAL MEDICINE

## 2024-01-19 PROCEDURE — 3074F SYST BP LT 130 MM HG: CPT | Performed by: INTERNAL MEDICINE

## 2024-01-19 RX ORDER — PANTOPRAZOLE SODIUM 40 MG/1
40 TABLET, DELAYED RELEASE ORAL
Qty: 30 TABLET | Refills: 6 | Status: SHIPPED | OUTPATIENT
Start: 2024-01-19

## 2024-01-19 RX ORDER — LOSARTAN POTASSIUM 50 MG/1
50 TABLET ORAL 2 TIMES DAILY
Qty: 180 TABLET | Refills: 3 | Status: SHIPPED | OUTPATIENT
Start: 2024-01-19

## 2024-01-19 RX ORDER — EMPAGLIFLOZIN 25 MG/1
1 TABLET, FILM COATED ORAL DAILY
Qty: 90 TABLET | Refills: 3 | Status: SHIPPED | OUTPATIENT
Start: 2024-01-19

## 2024-01-19 NOTE — PROGRESS NOTES
Subjective:   Jose Alberto Lizama is a 58 year old male who presents for Physical     Patient here for a wellness examination and fu on CAD, Dm type2 and hyperlipdiemia.   Had a diabetic eye examination and is scheduled for colonoscopy.  Has been trying to exercise more and smoking e cigarets only  History/Other:    Chief Complaint Reviewed and Verified  No Further Nursing Notes to   Review  Tobacco Reviewed  Medications Reviewed  Problem List Reviewed           Tobacco:  He currently  does not smoke tobacco.  Social History    Tobacco Use      Smoking status: Every Day        Packs/day: 1.00        Years: 20.00        Additional pack years: 0.00        Total pack years: 20.00        Types: Cigarettes      Smokeless tobacco: Never     Tobacco Cessation Documentation (Smoking and Smokeless included):  I had an in depth therapy session with Jose Alberto Lizama about his tobacco use risks and options using the [employer]PSTF's Five A's approach:    Ask: Jose Alberto is using tobacco products.  Assess: We asked about/assessed behavioral health risk and factors affecting choice of behavior change goals/methods.  Specifically I asked about readiness to quit tobacco.  Advise: We gave clear, specific, and personalized behavior change advice, including information about personal health harms and benefits. Specifically, he was told that Quitting tobacco is one of the best things he can do for his health. I strongly encouraged him to quit.      Agree: We collaboratively selected appropriate treatment goals and methods based on the patient’s interest in and willingness to change the behavior.   Assist: We used behavior change techniques (self-help and/or counseling), to aid Jose Alberto in achieving agreed-upon goals by acquiring the skills, confidence, and social/environmental supports for behavior change, supplemented with adjunctive medical treatments when appropriate. Additionally, national quitting tobacco aides were discussed.   Arrange:  Follow up at next visit- see specific follow up below.    Time Counseled: 5 minutes       Current Outpatient Medications   Medication Sig Dispense Refill    Empagliflozin (JARDIANCE) 25 MG Oral Tab Take 1 tablet by mouth daily. 90 tablet 3    metFORMIN HCl 1000 MG Oral Tab Take 1 tablet (1,000 mg total) by mouth 2 (two) times daily with meals. 180 tablet 3    losartan 50 MG Oral Tab Take 1 tablet (50 mg total) by mouth 2 (two) times daily. 180 tablet 3    clopidogrel 75 MG Oral Tab Take 1 tablet (75 mg total) by mouth daily. 90 tablet 3    tamsulosin 0.4 MG Oral Cap TAKE 2 CAPSULES BY MOUTH DAILY 180 capsule 3    escitalopram 10 MG Oral Tab Take 1 tablet (10 mg total) by mouth daily. 90 tablet 3    furosemide 40 MG Oral Tab Take 1 tablet (40 mg total) by mouth daily. 90 tablet 3    amLODIPine 5 MG Oral Tab Take 1 tablet (5 mg total) by mouth daily. 90 tablet 3    rosuvastatin 20 MG Oral Tab Take 1 tablet (20 mg total) by mouth nightly. 90 tablet 3    HYDROcodone-acetaminophen 5-325 MG Oral Tab Take 1-2 tablets by mouth every 4 (four) hours as needed for Pain. 20 tablet 0    aspirin 81 MG Oral Tab EC Take 1 tablet (81 mg total) by mouth nightly.      carvedilol 25 MG Oral Tab Take 1 tablet (25 mg total) by mouth 2 (two) times daily with meals.      Pantoprazole Sodium 40 MG Oral Tab EC Take 1 tablet (40 mg total) by mouth every morning before breakfast. 30 tablet 6    Glucose Blood (LORAINE CONTOUR TEST) In Vitro Strip To test daily 100 strip 3    LORAINE CONTOUR NEXT TEST In Vitro Strip To test 2 x daily 100 strip 6         Review of Systems:  Review of Systems   Respiratory:  Negative for shortness of breath and wheezing.    Cardiovascular:  Negative for chest pain, palpitations and leg swelling.   Neurological:  Negative for numbness.         Objective:   /82   Pulse 82   Ht 5' 6\" (1.676 m)   Wt 213 lb (96.6 kg)   SpO2 97%   BMI 34.38 kg/m²  Estimated body mass index is 34.38 kg/m² as calculated from the  following:    Height as of this encounter: 5' 6\" (1.676 m).    Weight as of this encounter: 213 lb (96.6 kg).  Physical Exam  Constitutional:       Appearance: He is obese.   HENT:      Head: Normocephalic and atraumatic.      Right Ear: Ear canal normal.      Left Ear: Ear canal normal.   Eyes:      General: No scleral icterus.     Pupils: Pupils are equal, round, and reactive to light.   Neck:      Vascular: No carotid bruit.   Cardiovascular:      Rate and Rhythm: Normal rate and regular rhythm.   Pulmonary:      Effort: Pulmonary effort is normal.      Breath sounds: Normal breath sounds. No wheezing.   Abdominal:      Palpations: Abdomen is soft.      Tenderness: There is no abdominal tenderness.   Musculoskeletal:      Cervical back: Neck supple.      Right lower leg: No edema.      Left lower leg: No edema.   Neurological:      Mental Status: He is alert and oriented to person, place, and time.      Comments: Microfilament  sensation intact on both feet no trophic skin changes pulses full   Psychiatric:         Thought Content: Thought content normal.           Assessment & Plan:   1. Wellness examination (Primary) check fasting labs  2. Type 2 diabetes mellitus with complication, without long-term current use of insulin (HCC)on Jardiance and metformin  -     metFORMIN HCl; Take 1 tablet (1,000 mg total) by mouth 2 (two) times daily with meals.  Dispense: 180 tablet; Refill: 3  -     Losartan Potassium; Take 1 tablet (50 mg total) by mouth 2 (two) times daily.  Dispense: 180 tablet; Refill: 3  3. Coronary artery disease due to lipid rich plaque on stati and Plavix  -     metFORMIN HCl; Take 1 tablet (1,000 mg total) by mouth 2 (two) times daily with meals.  Dispense: 180 tablet; Refill: 3  -     Losartan Potassium; Take 1 tablet (50 mg total) by mouth 2 (two) times daily.  Dispense: 180 tablet; Refill: 3  4. Mixed hyperlipidemia on statin  -     metFORMIN HCl; Take 1 tablet (1,000 mg total) by mouth 2 (two)  times daily with meals.  Dispense: 180 tablet; Refill: 3  -     Losartan Potassium; Take 1 tablet (50 mg total) by mouth 2 (two) times daily.  Dispense: 180 tablet; Refill: 3  5. Essential hypertension controlled with losartan  -     metFORMIN HCl; Take 1 tablet (1,000 mg total) by mouth 2 (two) times daily with meals.  Dispense: 180 tablet; Refill: 3  -     Losartan Potassium; Take 1 tablet (50 mg total) by mouth 2 (two) times daily.  Dispense: 180 tablet; Refill: 3  Other orders  -     Jardiance; Take 1 tablet by mouth daily.  Dispense: 90 tablet; Refill: 3        No follow-ups on file.    Fiona Sweeney MD, 1/19/2024, 8:40 AM

## 2024-02-23 NOTE — TELEPHONE ENCOUNTER
Fax sent to [facility] requesting orders to hold clopidogrel for 5 days prior to procedure.

## 2024-03-08 DIAGNOSIS — I25.10 CORONARY ARTERY DISEASE DUE TO LIPID RICH PLAQUE: ICD-10-CM

## 2024-03-08 DIAGNOSIS — E11.8 TYPE 2 DIABETES MELLITUS WITH COMPLICATION, WITHOUT LONG-TERM CURRENT USE OF INSULIN (HCC): ICD-10-CM

## 2024-03-08 DIAGNOSIS — I10 ESSENTIAL HYPERTENSION: ICD-10-CM

## 2024-03-08 DIAGNOSIS — I25.83 CORONARY ARTERY DISEASE DUE TO LIPID RICH PLAQUE: ICD-10-CM

## 2024-03-08 DIAGNOSIS — E78.2 MIXED HYPERLIPIDEMIA: ICD-10-CM

## 2024-03-08 RX ORDER — AMLODIPINE BESYLATE 5 MG/1
5 TABLET ORAL DAILY
Qty: 90 TABLET | Refills: 3 | Status: SHIPPED | OUTPATIENT
Start: 2024-03-08

## 2024-03-08 RX ORDER — CLOPIDOGREL BISULFATE 75 MG/1
75 TABLET ORAL DAILY
Qty: 90 TABLET | Refills: 3 | Status: SHIPPED | OUTPATIENT
Start: 2024-03-08

## 2024-03-08 RX ORDER — FUROSEMIDE 40 MG/1
40 TABLET ORAL DAILY
Qty: 90 TABLET | Refills: 3 | Status: SHIPPED | OUTPATIENT
Start: 2024-03-08

## 2024-03-08 NOTE — TELEPHONE ENCOUNTER
MEDICATION REFILL REQUEST:    Future Appointment: NO FUTURE APPT     Last appointment: 01/19/2024    Medication requested:   Requested Prescriptions     Pending Prescriptions Disp Refills    clopidogrel 75 MG Oral Tab [Pharmacy Med Name: CLOPIDOGREL 75MG TABLETS] 90 tablet 3     Sig: Take 1 tablet (75 mg total) by mouth daily.     Signed Prescriptions Disp Refills    amLODIPine 5 MG Oral Tab 90 tablet 3     Sig: Take 1 tablet (5 mg total) by mouth daily.     Authorizing Provider: WANDA SOTO     Ordering User: GILL MCWILLIAMS    furosemide 40 MG Oral Tab 90 tablet 3     Sig: Take 1 tablet (40 mg total) by mouth daily.     Authorizing Provider: WANDA SOTO     Ordering User: GILL MCWILLIAMS         Last refill date:    Disp Refills Start End    clopidogrel 75 MG Oral Tab 90 tablet 3 11/27/2023 --    Sig - Route: Take 1 tablet (75 mg total) by mouth daily. - Oral    Sent to pharmacy as: Clopidogrel Bisulfate 75 MG Oral Tablet (Plavix)    E-Prescribing Status: Receipt confirmed by pharmacy (11/27/2023  5:13 PM CST)

## 2024-04-11 NOTE — TELEPHONE ENCOUNTER
Dr. Jen RATLIFF-    Patient received ok to hold Plavix for 3 days prior to procedure from cardiologist. Initial request was for 5 days per your orders.

## 2024-04-12 NOTE — TELEPHONE ENCOUNTER
Ok thanks for letting me know    MD Jabier Perez-[facility] - Gastroenterology  4/12/2024  11:32 AM

## 2024-04-12 NOTE — TELEPHONE ENCOUNTER
Polish  used #[id number]    Spoke to patient and reviewed Plavix order as well as diabetic medication orders. Patient verbalized understanding. Also resent prep to pharmacy.

## 2024-04-18 NOTE — PAT NURSING NOTE
Patient instructed to hold   metformin (per patient last dose on 4/18/24) and clopidogrel (per patient  last dose on 4/16/24 )per Dr orders Jardiance(Per patient last  dose on 4/18/24) for 4 days prior to procedure per the Pre-surgical testing policy.

## 2024-04-22 ENCOUNTER — HOSPITAL ENCOUNTER (OUTPATIENT)
Facility: HOSPITAL | Age: 59
Setting detail: HOSPITAL OUTPATIENT SURGERY
Discharge: HOME | End: 2024-04-22
Attending: INTERNAL MEDICINE | Admitting: INTERNAL MEDICINE
Payer: MEDICAID

## 2024-04-22 ENCOUNTER — ANESTHESIA (OUTPATIENT)
Dept: ENDOSCOPY | Facility: HOSPITAL | Age: 59
End: 2024-04-22
Payer: MEDICAID

## 2024-04-22 ENCOUNTER — TELEPHONE (OUTPATIENT)
Facility: CLINIC | Age: 59
End: 2024-04-22

## 2024-04-22 ENCOUNTER — ANESTHESIA EVENT (OUTPATIENT)
Dept: ENDOSCOPY | Facility: HOSPITAL | Age: 59
End: 2024-04-22
Payer: MEDICAID

## 2024-04-22 VITALS
HEIGHT: 66 IN | WEIGHT: 220 LBS | RESPIRATION RATE: 16 BRPM | SYSTOLIC BLOOD PRESSURE: 166 MMHG | BODY MASS INDEX: 35.36 KG/M2 | OXYGEN SATURATION: 95 % | DIASTOLIC BLOOD PRESSURE: 71 MMHG | HEART RATE: 81 BPM | TEMPERATURE: 99 F

## 2024-04-22 DIAGNOSIS — K21.9 GASTROESOPHAGEAL REFLUX DISEASE, UNSPECIFIED WHETHER ESOPHAGITIS PRESENT: ICD-10-CM

## 2024-04-22 DIAGNOSIS — Z12.11 SCREENING FOR COLON CANCER: ICD-10-CM

## 2024-04-22 PROBLEM — Z12.12 ENCOUNTER FOR COLORECTAL CANCER SCREENING: Status: ACTIVE | Noted: 2024-04-22

## 2024-04-22 LAB — GLUCOSE BLDC GLUCOMTR-MCNC: 212 MG/DL (ref 70–99)

## 2024-04-22 PROCEDURE — 0DJ08ZZ INSPECTION OF UPPER INTESTINAL TRACT, VIA NATURAL OR ARTIFICIAL OPENING ENDOSCOPIC: ICD-10-PCS | Performed by: INTERNAL MEDICINE

## 2024-04-22 PROCEDURE — 0DJD8ZZ INSPECTION OF LOWER INTESTINAL TRACT, VIA NATURAL OR ARTIFICIAL OPENING ENDOSCOPIC: ICD-10-PCS | Performed by: INTERNAL MEDICINE

## 2024-04-22 PROCEDURE — 45378 DIAGNOSTIC COLONOSCOPY: CPT | Performed by: INTERNAL MEDICINE

## 2024-04-22 PROCEDURE — 43235 EGD DIAGNOSTIC BRUSH WASH: CPT | Performed by: INTERNAL MEDICINE

## 2024-04-22 RX ORDER — LABETALOL HYDROCHLORIDE 5 MG/ML
INJECTION, SOLUTION INTRAVENOUS AS NEEDED
Status: DISCONTINUED | OUTPATIENT
Start: 2024-04-22 | End: 2024-04-22 | Stop reason: SURG

## 2024-04-22 RX ORDER — SODIUM CHLORIDE, SODIUM LACTATE, POTASSIUM CHLORIDE, CALCIUM CHLORIDE 600; 310; 30; 20 MG/100ML; MG/100ML; MG/100ML; MG/100ML
INJECTION, SOLUTION INTRAVENOUS CONTINUOUS
Status: DISCONTINUED | OUTPATIENT
Start: 2024-04-22 | End: 2024-04-22

## 2024-04-22 RX ORDER — GLYCOPYRROLATE 0.2 MG/ML
INJECTION, SOLUTION INTRAMUSCULAR; INTRAVENOUS AS NEEDED
Status: DISCONTINUED | OUTPATIENT
Start: 2024-04-22 | End: 2024-04-22 | Stop reason: SURG

## 2024-04-22 RX ORDER — LIDOCAINE HYDROCHLORIDE 10 MG/ML
INJECTION, SOLUTION EPIDURAL; INFILTRATION; INTRACAUDAL; PERINEURAL AS NEEDED
Status: DISCONTINUED | OUTPATIENT
Start: 2024-04-22 | End: 2024-04-22 | Stop reason: SURG

## 2024-04-22 RX ADMIN — LIDOCAINE HYDROCHLORIDE 50 MG: 10 INJECTION, SOLUTION EPIDURAL; INFILTRATION; INTRACAUDAL; PERINEURAL at 09:24:00

## 2024-04-22 RX ADMIN — LABETALOL HYDROCHLORIDE 10 MG: 5 INJECTION, SOLUTION INTRAVENOUS at 09:46:00

## 2024-04-22 RX ADMIN — SODIUM CHLORIDE, SODIUM LACTATE, POTASSIUM CHLORIDE, CALCIUM CHLORIDE: 600; 310; 30; 20 INJECTION, SOLUTION INTRAVENOUS at 09:21:00

## 2024-04-22 RX ADMIN — GLYCOPYRROLATE 0.2 MG: 0.2 INJECTION, SOLUTION INTRAMUSCULAR; INTRAVENOUS at 09:24:00

## 2024-04-22 NOTE — DISCHARGE INSTRUCTIONS
Home Care Instructions for Colonoscopy and/or Gastroscopy with Sedation    Diet:  - Resume your regular diet as tolerated unless otherwise instructed.  - Start with light meals to minimize bloating.  - Do not drink alcohol today.    Medication:  - If you have questions about resuming your normal medications, please contact your Primary Care Physician.  - Resume clopidogrel today per     Activities:  - Take it easy today. Do not return to work today.  - Do not drive today.  - Do not operate any machinery today (including kitchen equipment).  - Do not make any critical decisions or sign any paperwork.  - Do not exercise today.    Colonoscopy:  - You may notice some rectal \"spotting\" (a little blood on the toilet tissue) for a day or two after the exam. This is normal.  - If you experience any rectal bleeding (not spotting), persistent tenderness or sharp severe abdominal pains, oral temperature over 100 degrees Fahrenheit, light-headedness or dizziness, or any other problems, contact your doctor.    Gastroscopy:  - You may have a sore throat for 2-3 days following the exam. This is normal. Gargling with warm salt water (1/2 tsp salt to 1 glass warm water) or using throat lozenges will help.  - If you experience any sharp pain in your neck, abdomen or chest, vomiting of blood, oral temperature over 100 degrees Fahrenheit, light-headedness or dizziness, or any other problems, contact your doctor.    **If unable to reach your doctor, please go to the [facility] Emergency Room**    - Your referring physician will receive a full report of your examination.  - If you do not hear from your doctor's office within two weeks of your biopsy, please call them for your results.    You may be able to see your laboratory results in [employer] between 4 and 7 business days.  In some cases, your physician may not have viewed the results before they are released to [employer].  If you have questions regarding your  results contact the physician who ordered the test/exam by phone or via [employer] by choosing \"Ask a Medical Question.\"

## 2024-04-22 NOTE — OPERATIVE REPORT
Esophagogastroduodenoscopy (EGD) & Colonoscopy Report    Jose Alberto Lizama     1965 Age 58 year old   PCP Fiona Sweeney MD Endoscopist Attila Li MD     Date of procedure: 24    Procedure: EGD & Colonoscopy    Pre-operative diagnosis: GERD, colorectal cancer screening    Post-operative diagnosis: hemorrhoids    Sedation: monitored anesthesia care (MAC)    Consent: We discussed the risks/benefits and alternatives to this procedure, as well as the planned sedation. Informed consent was obtained from the patient after the risks of the procedure were discussed, including but not limited to bleeding, perforation, aspiration, infection, or possibility of a missed lesion as well as the risks of anesthesia including but not limited to cardiopulmonary complications. The patient signed informed consent and elected to proceed with EGD/Colonoscopy with intervention [i.e. Biopsy, control of bleeding, dilatation, polypectomy, endoscopic mucosal resection, etc.] as indicated.    EGD procedure: The patient was placed in the left lateral decubitus position and begun on continuous blood pressure pulse oximetry and EKG monitoring and this was maintained throughout the procedure. Once an adequate level of sedation was obtained a bite block was placed. Then the lubricated tip of the [id number] diagnostic video upper endoscope was carefully inserted and advanced using direct visualization into the posterior pharynx and ultimately into the esophagus. Air was then withdrawn and the endoscope was removed.    Colonoscopy procedure: Once an adequate level of sedation was obtained a digital rectal exam was completed, with normal tone and no masses palpated. Then the lubricated tip of the [id number] diagnostic video colonoscope was carefully inserted and advanced without difficulty to the cecum using the air insufflation technique (only Co2 was used for insufflation). The cecum was identified by localizing the  trifold, the appendix and the ileocecal valve. Withdrawal was begun with thorough washing and careful examination of the colonic walls and folds. The ascending colon was viewed twice in the forward view. Photodocumentation was obtained. The bowel prep was good. Views of the colon were good with washing. Withdrawal time was 16 minutes.    Air was then withdrawn and the endoscope was removed. The patient tolerated the procedure well. There were no immediate postoperative complications. The patient’s vital signs were monitored throughout the procedure and remained stable.    Estimated blood loss: none    Specimens collected:  none    Complications: none    EGD findings:      1. Esophagus: The squamocolumnar junction was noted at 42 cm and appeared regular. The esophageal mucosa appeared unremarkable.  2. Stomach: The stomach distended normally. Normal rugal folds were seen. The pylorus was patent. The gastric mucosa appeared unremarkable. Retroflexion revealed a normal fundus and cardia.   3. Duodenum: The duodenal mucosa appeared normal in the 1st and 2nd portion of the duodenum.     Colonoscopy findings:    Cecum: normal mucosa and vascular pattern    Ascending colon: normal mucosa and vascular pattern    Transverse colon: normal mucosa and vascular pattern    Descending colon: normal mucosa and vascular pattern    Sigmoid colon: normal mucosa and vascular pattern    Rectum: retroflexed view showed small non-bleeding hemorrhoids. Otherwise, normal mucosa and vascular pattern.    Impression:  1. Small hemorrhoids  2. Otherwise, unremarkable colonoscopy and upper endoscopy    Recommend:  1. Colonoscopy for colorectal cancer screening in 10 years. If new signs or symptoms develop, procedure may need to be repeated sooner.   2. Consider trying famotidine prn or regular pantoprazole for GERD/reflux symptoms  3. Continue your current medications  4. Follow up with your primary care physician on a routine basis    Attila  MD Jabier Li-[facility]  [facility] - Gastroenterology  4/22/2024

## 2024-04-22 NOTE — H&P
History & Physical Examination    Patient Name: Jose Alberto Lizama  MRN: [id number]  [facility]: [id number]  YOB: 1965    Diagnosis: GERD, colorectal cancer screening    Last colonoscopy in . Says his reflux has been bad. Stopped taking omeprazole because he thought it made him feel weird.    Medications Prior to Admission   Medication Sig Dispense Refill Last Dose    [] polyethylene glycol, PEG 3350-KCl-NaBcb-NaCl-NaSulf, 236 g Oral Recon Soln Take 4,000 mL by mouth once for 1 dose. Take as directed by your Gastroenterology clinic. 4000 mL 0     amLODIPine 5 MG Oral Tab Take 1 tablet (5 mg total) by mouth daily. 90 tablet 3 2024    furosemide 40 MG Oral Tab Take 1 tablet (40 mg total) by mouth daily. 90 tablet 3 2024    Empagliflozin (JARDIANCE) 25 MG Oral Tab Take 1 tablet by mouth daily. 90 tablet 3 2024    losartan 50 MG Oral Tab Take 1 tablet (50 mg total) by mouth 2 (two) times daily. 180 tablet 3 2024    metFORMIN HCl 1000 MG Oral Tab Take 1 tablet (1,000 mg total) by mouth 2 (two) times daily with meals. 180 tablet 3 2024    tamsulosin 0.4 MG Oral Cap TAKE 2 CAPSULES BY MOUTH DAILY 180 capsule 3 2024    escitalopram 10 MG Oral Tab Take 1 tablet (10 mg total) by mouth daily. 90 tablet 3 1 month ago    rosuvastatin 20 MG Oral Tab Take 1 tablet (20 mg total) by mouth nightly. 90 tablet 3 2024    aspirin 81 MG Oral Tab EC Take 1 tablet (81 mg total) by mouth nightly.   2024    carvedilol 25 MG Oral Tab Take 1 tablet (25 mg total) by mouth 2 (two) times daily with meals.   2024    clopidogrel 75 MG Oral Tab Take 1 tablet (75 mg total) by mouth daily. 90 tablet 3 2024    pantoprazole 40 MG Oral Tab EC Take 1 tablet (40 mg total) by mouth every morning before breakfast. (Patient not taking: Reported on 2024) 30 tablet 6 Not Taking    HYDROcodone-acetaminophen 5-325 MG Oral Tab Take 1-2 tablets by mouth every 4 (four) hours as needed for Pain.  (Patient not taking: Reported on 4/18/2024) 20 tablet 0 Not Taking    Glucose Blood (LORAINE CONTOUR TEST) In Vitro Strip To test daily 100 strip 3     LORAINE CONTOUR NEXT TEST In Vitro Strip To test 2 x daily 100 strip 6      Current Facility-Administered Medications   Medication Dose Route Frequency    lactated ringers infusion   Intravenous Continuous       Allergies: No Known Allergies    Past Medical History:    Back problem    BPH (benign prostatic hyperplasia)    Coronary atherosclerosis    Depression    Diabetes (HCC)    Esophageal reflux    High blood pressure    High cholesterol    Peripheral vascular disease (HCC)    Shortness of breath    related to smoking     Visual impairment    glasses     Past Surgical History:   Procedure Laterality Date    Cardiac catheterization  2016    [facility]    Cath drug eluting stent      Hernia surgery      as a child    Nasal scopy,remv part ethmoid  10/14/2020    Nasal scopy,rmv tiss maxill sinus  10/14/2020    Repair of nasal septum  10/14/2020     Family History   Problem Relation Age of Onset    Diabetes Father     Diabetes Paternal Aunt     Diabetes Paternal Uncle     Seizure Disorder Mother     No Known Problems Brother     No Known Problems Daughter     Glaucoma Neg     Macular degeneration Neg      Social History     Tobacco Use    Smoking status: Former     Current packs/day: 1.00     Average packs/day: 1 pack/day for 20.0 years (20.0 ttl pk-yrs)     Types: Cigarettes    Smokeless tobacco: Never   Substance Use Topics    Alcohol use: Yes     Comment: 1-2 drinks a week       SYSTEM Check if Physical Exam is Normal If not normal, please explain:   HEENT [ X]    NECK  [ X]    HEART [ X]    LUNGS [ X]    ABDOMEN [ X]    EXTREMITIES [ X]      General:awake, cooperative, no acute distress  HEENT: EOMI, no scleral icterus, MMM; oral pharnyx is without exudates or lesions  Neck: no lymphadenopathy; thyroid is not enlarged and without nodules  CV:  RRR  Resp: non-labored breathing  Abd: soft, non-tender, non-distended  Ext: no lower extremity swelling  Neuro: Alert, Oriented X 3  Skin: no rashes, bruises  Psych: normal affect  I have discussed the risks and benefits and alternatives of the procedure with the patient/family.  He understand and agreed to proceed with plan of care.   Attila Li MD  [facility] - Gastroenterology  4/22/2024  9:13 AM

## 2024-04-22 NOTE — TELEPHONE ENCOUNTER
Health Maintenance Updated.    10 year colonoscopy recall entered into patient outreach in [facility].  Next colonoscopy will be due 4/22/2034.

## 2024-04-22 NOTE — TELEPHONE ENCOUNTER
GI staff: please place recall in for colonoscopy in 10 years     Thanks    Attila Li MD  [facility] - Gastroenterology  4/22/2024  10:04 AM

## 2024-04-22 NOTE — ANESTHESIA PREPROCEDURE EVALUATION
Anesthesia PreOp Note    HPI:     Jose Alberto Lizama is a 58 year old male who presents for preoperative consultation requested by: Attila Li MD    Date of Surgery: 2024    Procedure(s):  COLONOSCOPY/ESOPHAGOGASTRODUODENOSCOPY  ESOPHAGOGASTRODUODENOSCOPY (EGD)  Indication: Screening for colon cancer /Gastroesophageal reflux disease, unspecified whether esophagitis present    Relevant Problems   No relevant active problems       NPO:  Last Liquid Consumption Date: 24  Last Liquid Consumption Time: 1130  Last Solid Consumption Date: 24  Last Solid Consumption Time: 2300  Last Liquid Consumption Date: 24          History Review:  Patient Active Problem List    Diagnosis Date Noted    Chronic maxillary sinusitis 10/14/2020    Type 2 diabetes mellitus without retinopathy (HCC) 10/24/2019    Meibomian gland dysfunction (MGD) of both eyes 10/24/2019    Presbyopia 10/24/2019    Age-related nuclear cataract of both eyes 10/24/2019    PAD (peripheral artery disease) (HCC) 06/10/2019    DM (diabetes mellitus), type 2 with complications (HCC) 10/31/2015    CAD in native artery 10/31/2015    Hyperlipidemia 10/31/2015    HTN (hypertension) 10/31/2015       Past Medical History:    Back problem    BPH (benign prostatic hyperplasia)    Coronary atherosclerosis    Depression    Diabetes (HCC)    Esophageal reflux    High blood pressure    High cholesterol    Peripheral vascular disease (HCC)    Shortness of breath    related to smoking     Visual impairment    glasses       Past Surgical History:   Procedure Laterality Date    Cardiac catheterization      [facility]    Cath drug eluting stent      Hernia surgery      as a child    Nasal scopy,remv part ethmoid  10/14/2020    Nasal scopy,rmv tiss maxill sinus  10/14/2020    Repair of nasal septum  10/14/2020       Medications Prior to Admission   Medication Sig Dispense Refill Last Dose    [] polyethylene glycol, PEG  3350-KCl-NaBcb-NaCl-NaSulf, 236 g Oral Recon Soln Take 4,000 mL by mouth once for 1 dose. Take as directed by your Gastroenterology clinic. 4000 mL 0     amLODIPine 5 MG Oral Tab Take 1 tablet (5 mg total) by mouth daily. 90 tablet 3 4/18/2024    furosemide 40 MG Oral Tab Take 1 tablet (40 mg total) by mouth daily. 90 tablet 3 4/18/2024    Empagliflozin (JARDIANCE) 25 MG Oral Tab Take 1 tablet by mouth daily. 90 tablet 3 4/18/2024    losartan 50 MG Oral Tab Take 1 tablet (50 mg total) by mouth 2 (two) times daily. 180 tablet 3 4/18/2024    metFORMIN HCl 1000 MG Oral Tab Take 1 tablet (1,000 mg total) by mouth 2 (two) times daily with meals. 180 tablet 3 4/18/2024    tamsulosin 0.4 MG Oral Cap TAKE 2 CAPSULES BY MOUTH DAILY 180 capsule 3 4/18/2024    escitalopram 10 MG Oral Tab Take 1 tablet (10 mg total) by mouth daily. 90 tablet 3 1 month ago    rosuvastatin 20 MG Oral Tab Take 1 tablet (20 mg total) by mouth nightly. 90 tablet 3 4/18/2024    aspirin 81 MG Oral Tab EC Take 1 tablet (81 mg total) by mouth nightly.   4/18/2024    carvedilol 25 MG Oral Tab Take 1 tablet (25 mg total) by mouth 2 (two) times daily with meals.   4/18/2024    clopidogrel 75 MG Oral Tab Take 1 tablet (75 mg total) by mouth daily. 90 tablet 3 4/16/2024    pantoprazole 40 MG Oral Tab EC Take 1 tablet (40 mg total) by mouth every morning before breakfast. (Patient not taking: Reported on 4/18/2024) 30 tablet 6 Not Taking    HYDROcodone-acetaminophen 5-325 MG Oral Tab Take 1-2 tablets by mouth every 4 (four) hours as needed for Pain. (Patient not taking: Reported on 4/18/2024) 20 tablet 0 Not Taking    Glucose Blood (LORAINE CONTOUR TEST) In Vitro Strip To test daily 100 strip 3     LORAINE CONTOUR NEXT TEST In Vitro Strip To test 2 x daily 100 strip 6      Current Facility-Administered Medications Ordered in Epic   Medication Dose Route Frequency Provider Last Rate Last Admin    lactated ringers infusion   Intravenous Continuous Jen,  MD Attila 20 mL/hr at 04/22/24 0841 New Bag at 04/22/24 0841     No current Epic-ordered outpatient medications on file.       No Known Allergies    Family History   Problem Relation Age of Onset    Diabetes Father     Diabetes Paternal Aunt     Diabetes Paternal Uncle     Seizure Disorder Mother     No Known Problems Brother     No Known Problems Daughter     Glaucoma Neg     Macular degeneration Neg      Social History     Socioeconomic History    Marital status:    Tobacco Use    Smoking status: Former     Current packs/day: 1.00     Average packs/day: 1 pack/day for 20.0 years (20.0 ttl pk-yrs)     Types: Cigarettes    Smokeless tobacco: Never   Vaping Use    Vaping status: Every Day    Substances: Nicotine   Substance and Sexual Activity    Alcohol use: Yes     Comment: 1-2 drinks a week    Drug use: No       Available pre-op labs reviewed.     Lab Results   Component Value Date    PGLU 212 (H) 04/22/2024          Vital Signs:  Body mass index is 35.51 kg/m².   height is 1.676 m (5' 6\") and weight is 99.8 kg (220 lb). His blood pressure is 186/82 (abnormal) and his pulse is 79. His respiration is 14 and oxygen saturation is 98%.   Vitals:    04/18/24 1750 04/22/24 0835 04/22/24 0837   BP:  (!) 168/84 (!) 186/82   Pulse:  80 79   Resp:  18 14   SpO2:   98%   Weight: 99.8 kg (220 lb)     Height: 1.676 m (5' 6\")          Anesthesia Evaluation      Airway   Mallampati: III  TM distance: <3 FB  Neck ROM: full  Dental      Comment: Multiple teeth missing. Many are loose.    Pulmonary - normal exam   (+) sleep apnea  Cardiovascular - normal exam  (+) CABG/stent    Rhythm: regular  Rate: normal    Neuro/Psych      GI/Hepatic/Renal      Endo/Other    Abdominal                  Anesthesia Plan:   ASA:  3  Plan:   General and MAC  Informed Consent Plan and Risks Discussed With:  Patient  Consent Comment: Patient is polish speaking. History obtained, procedure explained, consent obtained & all questions answered  with help of .  Discussed plan with:  Surgeon      I have informed Jose Albertobjorn Walsholimpiamatt and/or legal guardian or family member of the nature of the anesthetic plan, benefits, risks including possible dental damage if relevant, major complications, and any alternative forms of anesthetic management.   All of the patient's questions were answered to the best of my ability. The patient desires the anesthetic management as planned.  Marcia Muro CRNA  4/22/2024 9:07 AM  Present on Admission:  **None**

## 2024-04-22 NOTE — ANESTHESIA POSTPROCEDURE EVALUATION
Patient: Jose Alberto Lizama    Procedure Summary       Date: 04/22/24 Room / Location: [facility] ENDOSCOPY 01 / [facility] ENDOSCOPY    Anesthesia Start: 0921 Anesthesia Stop: 1007    Procedures:       COLONOSCOPY/ESOPHAGOGASTRODUODENOSCOPY      ESOPHAGOGASTRODUODENOSCOPY (EGD) Diagnosis:       Screening for colon cancer      Gastroesophageal reflux disease, unspecified whether esophagitis present      (E: normal; C: hemorrhoids)    Surgeons: Attila Li MD Anesthesiologist: Marcia Muro CRNA    Anesthesia Type: general ASA Status: 3            Anesthesia Type: general    Vitals Value Taken Time   /79 04/22/24 1006   Temp 98.6 °F (37 °C) 04/22/24 1006   Pulse 87 04/22/24 1006   Resp 23 04/22/24 1006   SpO2 96 % 04/22/24 1006       [facility] AN Post Evaluation:   Patient Evaluated in PACU  Patient Participation: complete - patient participated  Level of Consciousness: awake  Pain Score: 0  Pain Management: adequate  Airway Patency:patent  Dental exam unchanged from preop  Yes    Nausea/Vomiting: none  Cardiovascular Status: acceptable and stable  Respiratory Status: acceptable and room air  Postoperative Hydration acceptable  Comments: Teeth intact.      Marcia Muro CRNA  4/22/2024 10:07 AM

## 2024-09-24 ENCOUNTER — OFFICE VISIT (OUTPATIENT)
Dept: INTERNAL MEDICINE CLINIC | Facility: CLINIC | Age: 59
End: 2024-09-24
Payer: MEDICAID

## 2024-09-24 VITALS
DIASTOLIC BLOOD PRESSURE: 60 MMHG | WEIGHT: 220 LBS | HEART RATE: 72 BPM | OXYGEN SATURATION: 96 % | HEIGHT: 66 IN | BODY MASS INDEX: 35.36 KG/M2 | SYSTOLIC BLOOD PRESSURE: 100 MMHG

## 2024-09-24 DIAGNOSIS — I10 ESSENTIAL HYPERTENSION: ICD-10-CM

## 2024-09-24 DIAGNOSIS — I25.10 CORONARY ARTERY DISEASE DUE TO LIPID RICH PLAQUE: Primary | ICD-10-CM

## 2024-09-24 DIAGNOSIS — E11.8 TYPE 2 DIABETES MELLITUS WITH COMPLICATION, WITHOUT LONG-TERM CURRENT USE OF INSULIN (HCC): ICD-10-CM

## 2024-09-24 DIAGNOSIS — E78.2 MIXED HYPERLIPIDEMIA: ICD-10-CM

## 2024-09-24 DIAGNOSIS — I25.83 CORONARY ARTERY DISEASE DUE TO LIPID RICH PLAQUE: Primary | ICD-10-CM

## 2024-09-24 DIAGNOSIS — I35.0 AORTIC STENOSIS, SEVERE: ICD-10-CM

## 2024-09-24 LAB
ALBUMIN SERPL-MCNC: 4.5 G/DL (ref 3.2–4.8)
ALBUMIN/GLOB SERPL: 1.7 {RATIO} (ref 1–2)
ALP LIVER SERPL-CCNC: 69 U/L
ALT SERPL-CCNC: <7 U/L
ANION GAP SERPL CALC-SCNC: 9 MMOL/L (ref 0–18)
AST SERPL-CCNC: 21 U/L (ref ?–34)
BILIRUB SERPL-MCNC: 0.3 MG/DL (ref 0.2–1.1)
BUN BLD-MCNC: 17 MG/DL (ref 9–23)
BUN/CREAT SERPL: 18.5 (ref 10–20)
CALCIUM BLD-MCNC: 9.6 MG/DL (ref 8.7–10.4)
CHLORIDE SERPL-SCNC: 104 MMOL/L (ref 98–112)
CHOLEST SERPL-MCNC: 229 MG/DL (ref ?–200)
CO2 SERPL-SCNC: 26 MMOL/L (ref 21–32)
CREAT BLD-MCNC: 0.92 MG/DL
EGFRCR SERPLBLD CKD-EPI 2021: 95 ML/MIN/1.73M2 (ref 60–?)
EST. AVERAGE GLUCOSE BLD GHB EST-MCNC: 189 MG/DL (ref 68–126)
FASTING PATIENT LIPID ANSWER: NO
FASTING STATUS PATIENT QL REPORTED: NO
GLOBULIN PLAS-MCNC: 2.7 G/DL (ref 2–3.5)
GLUCOSE BLD-MCNC: 205 MG/DL (ref 70–99)
HBA1C MFR BLD: 8.2 % (ref ?–5.7)
HDLC SERPL-MCNC: 47 MG/DL (ref 40–59)
LDLC SERPL CALC-MCNC: 95 MG/DL (ref ?–100)
NONHDLC SERPL-MCNC: 182 MG/DL (ref ?–130)
OSMOLALITY SERPL CALC.SUM OF ELEC: 295 MOSM/KG (ref 275–295)
POTASSIUM SERPL-SCNC: 4.1 MMOL/L (ref 3.5–5.1)
PROT SERPL-MCNC: 7.2 G/DL (ref 5.7–8.2)
SODIUM SERPL-SCNC: 139 MMOL/L (ref 136–145)
TRIGL SERPL-MCNC: 522 MG/DL (ref 30–149)
VLDLC SERPL CALC-MCNC: 88 MG/DL (ref 0–30)

## 2024-09-24 PROCEDURE — 99214 OFFICE O/P EST MOD 30 MIN: CPT | Performed by: INTERNAL MEDICINE

## 2024-09-24 PROCEDURE — 80061 LIPID PANEL: CPT | Performed by: INTERNAL MEDICINE

## 2024-09-24 PROCEDURE — 80053 COMPREHEN METABOLIC PANEL: CPT | Performed by: INTERNAL MEDICINE

## 2024-09-24 PROCEDURE — 83036 HEMOGLOBIN GLYCOSYLATED A1C: CPT | Performed by: INTERNAL MEDICINE

## 2024-09-24 RX ORDER — TAMSULOSIN HYDROCHLORIDE 0.4 MG/1
CAPSULE ORAL
Qty: 180 CAPSULE | Refills: 3 | Status: SHIPPED | OUTPATIENT
Start: 2024-09-24

## 2024-09-24 RX ORDER — ROSUVASTATIN CALCIUM 20 MG/1
20 TABLET, COATED ORAL NIGHTLY
Qty: 90 TABLET | Refills: 3 | Status: SHIPPED | OUTPATIENT
Start: 2024-09-24

## 2024-09-24 NOTE — PROGRESS NOTES
Subjective:   Jose Alberto Lizama is a 59 year old male who presents for No chief complaint on file.     Patient here for a fu on Dm type 2, Cad sp Pci , hyperlipidemia and new dx of severe aortic stenosis.  Is going to need aortic valvuloplasty in the next year. Denies dyspnea or chect pain.  Bs control is fair.  History/Other:    No Further Nursing Notes to Review  Medications Reviewed  Problem List   Reviewed         Tobacco:  Social History     Tobacco Use   Smoking Status Former    Current packs/day: 1.00    Average packs/day: 1 pack/day for 20.0 years (20.0 ttl pk-yrs)    Types: Cigarettes   Smokeless Tobacco Never     E-Cigarettes/Vaping       Questions Responses    E-Cigarette Use Current Every Day User          E-Cigarette/Vaping Substances       Questions Responses    Nicotine Yes    THC No    CBD No    Flavoring No          E-Cigarette/Vaping Devices       Questions Responses    Disposable No    Pre-filled or Refillable Cartridge No    Refillable Tank No    Pre-filled Pod No           Tobacco cessation counseling for 3-10 minutes (add E/M code #46042).  He smoked tobacco in the past but quit greater than 12 months ago.  Social History     Tobacco Use   Smoking Status Former    Current packs/day: 1.00    Average packs/day: 1 pack/day for 20.0 years (20.0 ttl pk-yrs)    Types: Cigarettes   Smokeless Tobacco Never          Current Outpatient Medications   Medication Sig Dispense Refill    amLODIPine 5 MG Oral Tab Take 1 tablet (5 mg total) by mouth daily. 90 tablet 3    furosemide 40 MG Oral Tab Take 1 tablet (40 mg total) by mouth daily. 90 tablet 3    clopidogrel 75 MG Oral Tab Take 1 tablet (75 mg total) by mouth daily. 90 tablet 3    Empagliflozin (JARDIANCE) 25 MG Oral Tab Take 1 tablet by mouth daily. 90 tablet 3    losartan 50 MG Oral Tab Take 1 tablet (50 mg total) by mouth 2 (two) times daily. 180 tablet 3    metFORMIN HCl 1000 MG Oral Tab Take 1 tablet (1,000 mg total) by mouth 2 (two) times daily  with meals. 180 tablet 3    pantoprazole 40 MG Oral Tab EC Take 1 tablet (40 mg total) by mouth every morning before breakfast. (Patient not taking: Reported on 4/18/2024) 30 tablet 6    tamsulosin 0.4 MG Oral Cap TAKE 2 CAPSULES BY MOUTH DAILY 180 capsule 3    escitalopram 10 MG Oral Tab Take 1 tablet (10 mg total) by mouth daily. 90 tablet 3    rosuvastatin 20 MG Oral Tab Take 1 tablet (20 mg total) by mouth nightly. 90 tablet 3    HYDROcodone-acetaminophen 5-325 MG Oral Tab Take 1-2 tablets by mouth every 4 (four) hours as needed for Pain. (Patient not taking: Reported on 4/18/2024) 20 tablet 0    aspirin 81 MG Oral Tab EC Take 1 tablet (81 mg total) by mouth nightly.      carvedilol 25 MG Oral Tab Take 1 tablet (25 mg total) by mouth 2 (two) times daily with meals.      Glucose Blood (LORAINE CONTOUR TEST) In Vitro Strip To test daily 100 strip 3    LORAINE CONTOUR NEXT TEST In Vitro Strip To test 2 x daily 100 strip 6         Review of Systems:  Review of Systems   Constitutional:  Positive for fatigue. Negative for unexpected weight change.   Respiratory:  Negative for shortness of breath.    Cardiovascular:  Negative for chest pain, palpitations and leg swelling.   Gastrointestinal:  Negative for abdominal pain.   Endocrine:        Bs control is fair   Musculoskeletal: Negative.    Neurological:  Negative for light-headedness.   Psychiatric/Behavioral:  Negative for dysphoric mood.          Objective:   /60   Pulse 72   Ht 5' 6\" (1.676 m)   Wt 220 lb (99.8 kg)   SpO2 96%   BMI 35.51 kg/m²  Estimated body mass index is 35.51 kg/m² as calculated from the following:    Height as of this encounter: 5' 6\" (1.676 m).    Weight as of this encounter: 220 lb (99.8 kg).  Physical Exam  Constitutional:       Appearance: He is obese.   HENT:      Head: Normocephalic and atraumatic.   Eyes:      General: No scleral icterus.     Pupils: Pupils are equal, round, and reactive to light.   Neck:      Vascular: No  carotid bruit.   Cardiovascular:      Rate and Rhythm: Normal rate and regular rhythm.      Heart sounds: Murmur (3/6 systolic murmur) heard.   Pulmonary:      Breath sounds: No rales.   Abdominal:      Palpations: Abdomen is soft.      Tenderness: There is no abdominal tenderness.   Musculoskeletal:      Cervical back: Neck supple.      Right lower leg: No edema.      Left lower leg: No edema.   Neurological:      Mental Status: He is alert. Mental status is at baseline.   Psychiatric:         Thought Content: Thought content normal.           Assessment & Plan:   1. Coronary artery disease due to lipid rich plaque (Primary) on statin and asa  2. Type 2 diabetes mellitus with complication, without long-term current use of insulin (HCC)on metformin and jardiance  3. Mixed hyperlipidemia on statin  4. Aortic stenosis, severe - close observation        No follow-ups on file.    Fiona Sweeney MD, 9/24/2024, 10:33 AM

## 2025-02-18 ENCOUNTER — OFFICE VISIT (OUTPATIENT)
Dept: INTERNAL MEDICINE CLINIC | Facility: CLINIC | Age: 60
End: 2025-02-18
Payer: MEDICAID

## 2025-02-18 VITALS
HEIGHT: 66 IN | BODY MASS INDEX: 33.11 KG/M2 | DIASTOLIC BLOOD PRESSURE: 60 MMHG | WEIGHT: 206 LBS | SYSTOLIC BLOOD PRESSURE: 130 MMHG

## 2025-02-18 DIAGNOSIS — Z00.00 WELLNESS EXAMINATION: Primary | ICD-10-CM

## 2025-02-18 DIAGNOSIS — I25.83 CORONARY ARTERY DISEASE DUE TO LIPID RICH PLAQUE: ICD-10-CM

## 2025-02-18 DIAGNOSIS — I10 ESSENTIAL HYPERTENSION: ICD-10-CM

## 2025-02-18 DIAGNOSIS — E11.8 TYPE 2 DIABETES MELLITUS WITH COMPLICATION, WITHOUT LONG-TERM CURRENT USE OF INSULIN (HCC): ICD-10-CM

## 2025-02-18 DIAGNOSIS — I25.10 CORONARY ARTERY DISEASE INVOLVING NATIVE CORONARY ARTERY OF NATIVE HEART WITHOUT ANGINA PECTORIS: ICD-10-CM

## 2025-02-18 DIAGNOSIS — I34.0 NONRHEUMATIC MITRAL VALVE REGURGITATION: ICD-10-CM

## 2025-02-18 DIAGNOSIS — E78.2 MIXED HYPERLIPIDEMIA: ICD-10-CM

## 2025-02-18 DIAGNOSIS — K05.4: ICD-10-CM

## 2025-02-18 DIAGNOSIS — E11.9 DIABETES MELLITUS TYPE 2, NONINSULIN DEPENDENT (HCC): ICD-10-CM

## 2025-02-18 DIAGNOSIS — I25.10 CORONARY ARTERY DISEASE DUE TO LIPID RICH PLAQUE: ICD-10-CM

## 2025-02-18 LAB — HEMOGLOBIN A1C: 8.1 % (ref 4.3–5.6)

## 2025-02-18 PROCEDURE — 83036 HEMOGLOBIN GLYCOSYLATED A1C: CPT | Performed by: INTERNAL MEDICINE

## 2025-02-18 PROCEDURE — 99396 PREV VISIT EST AGE 40-64: CPT | Performed by: INTERNAL MEDICINE

## 2025-02-18 RX ORDER — CLOPIDOGREL BISULFATE 75 MG/1
75 TABLET ORAL DAILY
Qty: 90 TABLET | Refills: 3 | Status: SHIPPED | OUTPATIENT
Start: 2025-02-18

## 2025-02-18 RX ORDER — CARVEDILOL 25 MG/1
25 TABLET ORAL 2 TIMES DAILY WITH MEALS
Qty: 180 TABLET | Refills: 3 | Status: SHIPPED | OUTPATIENT
Start: 2025-02-18

## 2025-02-18 RX ORDER — LOSARTAN POTASSIUM 50 MG/1
50 TABLET ORAL 2 TIMES DAILY
Qty: 180 TABLET | Refills: 3 | Status: SHIPPED | OUTPATIENT
Start: 2025-02-18

## 2025-02-18 RX ORDER — FUROSEMIDE 40 MG/1
40 TABLET ORAL DAILY
Qty: 90 TABLET | Refills: 3 | Status: SHIPPED | OUTPATIENT
Start: 2025-02-18

## 2025-02-18 RX ORDER — TAMSULOSIN HYDROCHLORIDE 0.4 MG/1
CAPSULE ORAL
Qty: 180 CAPSULE | Refills: 3 | Status: SHIPPED | OUTPATIENT
Start: 2025-02-18

## 2025-02-18 RX ORDER — AMLODIPINE BESYLATE 5 MG/1
5 TABLET ORAL DAILY
Qty: 90 TABLET | Refills: 3 | Status: SHIPPED | OUTPATIENT
Start: 2025-02-18

## 2025-02-18 RX ORDER — ROSUVASTATIN CALCIUM 20 MG/1
20 TABLET, COATED ORAL NIGHTLY
Qty: 90 TABLET | Refills: 3 | Status: SHIPPED | OUTPATIENT
Start: 2025-02-18

## 2025-02-18 NOTE — PROGRESS NOTES
Subjective:   Jose Alberto Lizama is a 59 year old male who presents for Physical     Patient here for wellness check up and fu on DM type2, Cad, and mitral insufficiency and recent problems with paradentosis of gums. Has no angina or shortness of breath. Is being watched for mitral insufficiency.  History/Other:    Chief Complaint Reviewed and Verified  No Further Nursing Notes to   Review  Medications Reviewed  Problem List Reviewed         Tobacco:vapes  Social History     Tobacco Use   Smoking Status Former    Current packs/day: 1.00    Average packs/day: 1 pack/day for 20.0 years (20.0 ttl pk-yrs)    Types: Cigarettes   Smokeless Tobacco Never     E-Cigarettes/Vaping       Questions Responses    E-Cigarette Use Current Every Day User          E-Cigarette/Vaping Substances       Questions Responses    Nicotine Yes    THC No    CBD No    Flavoring No          E-Cigarette/Vaping Devices       Questions Responses    Disposable No    Pre-filled or Refillable Cartridge No    Refillable Tank No    Pre-filled Pod No           Tobacco cessation counseling for 3-10 minutes (add E/M code #42362).  He smoked tobacco in the past but quit greater than 12 months ago.  Social History     Tobacco Use   Smoking Status Former    Current packs/day: 1.00    Average packs/day: 1 pack/day for 20.0 years (20.0 ttl pk-yrs)    Types: Cigarettes   Smokeless Tobacco Never          Current Outpatient Medications   Medication Sig Dispense Refill    Glucose Blood (CONTOUR NEXT TEST) In Vitro Strip To test 2 x daily  Dx E11.9 100 strip 5    Glucose Blood (CONTOUR NEXT TEST) In Vitro Strip To check glucose bid   Dx  E11.9 200 strip 11    rosuvastatin 20 MG Oral Tab Take 1 tablet (20 mg total) by mouth nightly. 90 tablet 3    tamsulosin 0.4 MG Oral Cap TAKE 2 CAPSULES BY MOUTH DAILY 180 capsule 3    amLODIPine 5 MG Oral Tab Take 1 tablet (5 mg total) by mouth daily. 90 tablet 3    furosemide 40 MG Oral Tab Take 1 tablet (40 mg total) by mouth  daily. 90 tablet 3    clopidogrel 75 MG Oral Tab Take 1 tablet (75 mg total) by mouth daily. 90 tablet 3    Empagliflozin (JARDIANCE) 25 MG Oral Tab Take 1 tablet by mouth daily. 90 tablet 3    losartan 50 MG Oral Tab Take 1 tablet (50 mg total) by mouth 2 (two) times daily. 180 tablet 3    metFORMIN HCl 1000 MG Oral Tab Take 1 tablet (1,000 mg total) by mouth 2 (two) times daily with meals. 180 tablet 3    pantoprazole 40 MG Oral Tab EC Take 1 tablet (40 mg total) by mouth every morning before breakfast. (Patient not taking: Reported on 4/18/2024) 30 tablet 6    escitalopram 10 MG Oral Tab Take 1 tablet (10 mg total) by mouth daily. 90 tablet 3    HYDROcodone-acetaminophen 5-325 MG Oral Tab Take 1-2 tablets by mouth every 4 (four) hours as needed for Pain. (Patient not taking: Reported on 4/18/2024) 20 tablet 0    aspirin 81 MG Oral Tab EC Take 1 tablet (81 mg total) by mouth nightly.      carvedilol 25 MG Oral Tab Take 1 tablet (25 mg total) by mouth 2 (two) times daily with meals.      Glucose Blood (LORAINE CONTOUR TEST) In Vitro Strip To test daily 100 strip 3    LORAINE CONTOUR NEXT TEST In Vitro Strip To test 2 x daily 100 strip 6         Review of Systems:  Review of Systems   Constitutional:  Negative for unexpected weight change.   Respiratory:  Positive for shortness of breath.    Cardiovascular:  Negative for chest pain, palpitations and leg swelling.   Gastrointestinal: Negative.    Endocrine:        Bs fairly controlled   Genitourinary:  Positive for urgency.   Musculoskeletal: Negative.    Neurological:  Negative for numbness.   Psychiatric/Behavioral: Negative.           Objective:   /60   Ht 5' 6\" (1.676 m)   Wt 198 lb (89.8 kg)   BMI 31.96 kg/m²  Estimated body mass index is 31.96 kg/m² as calculated from the following:    Height as of this encounter: 5' 6\" (1.676 m).    Weight as of this encounter: 198 lb (89.8 kg).  Physical Exam  Constitutional:       Appearance: He is obese.   HENT:       Head: Normocephalic and atraumatic.      Right Ear: Ear canal normal.      Left Ear: Ear canal normal.   Eyes:      General: No scleral icterus.     Pupils: Pupils are equal, round, and reactive to light.   Neck:      Vascular: No carotid bruit.   Cardiovascular:      Rate and Rhythm: Normal rate and regular rhythm.      Heart sounds: Murmur (3/6 murmur at apex and LSB) heard.   Pulmonary:      Effort: Pulmonary effort is normal.      Breath sounds: Normal breath sounds.   Abdominal:      Palpations: Abdomen is soft.      Tenderness: There is no abdominal tenderness.   Musculoskeletal:      Cervical back: Neck supple.      Right lower leg: No edema.      Left lower leg: No edema.   Neurological:      Mental Status: He is alert and oriented to person, place, and time.      Comments: Bilateral barefoot skin diabetic exam is normal, visualized feet and the appearance is normal.  Bilateral monofilament/sensation of both feet is normal.  Pulsation pedal pulse exam of both lower legs/feet is normal as well.         Psychiatric:         Thought Content: Thought content normal.           Assessment & Plan:   1. Wellness examination (Primary) check fasting labs  -     CBC With Differential With Platelet; Future; Expected date: 02/18/2025  -     PSA Total, Screen; Future; Expected date: 02/18/2025  2. Coronary artery disease involving native coronary artery of native heart without angina pectoris  -     Lipid Panel; Future; Expected date: 02/18/2025 On Plavix and statin  3. Diabetes mellitus type 2, noninsulin dependent (HCC) on metformin jardiance and glipizide  -     Comp Metabolic Panel (14); Future; Expected date: 02/18/2025  4. Nonrheumatic mitral valve regurgitation - seeing cardiology next month        No follow-ups on file.    Fiona Sweeney MD, 2/18/2025, 9:35 AM

## 2025-03-04 NOTE — LETTER
No referring provider defined for this encounter. 09/22/20        Patient: Tra David   YOB: 1965   Date of Visit: 9/22/2020       Dear  Dr. Jose Antonio Díaz MD,      Thank you for referring Tra David to my practice.   Please f Female

## 2025-03-27 ENCOUNTER — TELEPHONE (OUTPATIENT)
Dept: INTERNAL MEDICINE CLINIC | Facility: CLINIC | Age: 60
End: 2025-03-27

## 2025-03-27 DIAGNOSIS — I25.10 CORONARY ARTERY DISEASE INVOLVING NATIVE CORONARY ARTERY OF NATIVE HEART WITHOUT ANGINA PECTORIS: Primary | ICD-10-CM

## 2025-03-27 DIAGNOSIS — I73.9 PERIPHERAL VASCULAR DISEASE: ICD-10-CM

## 2025-05-15 ENCOUNTER — HOSPITAL ENCOUNTER (OUTPATIENT)
Dept: ULTRASOUND IMAGING | Age: 60
Discharge: HOME | End: 2025-05-15
Attending: CLINICAL NURSE SPECIALIST
Payer: MEDICAID

## 2025-05-15 DIAGNOSIS — R42 DIZZINESS: ICD-10-CM

## 2025-05-15 PROCEDURE — 93880 EXTRACRANIAL BILAT STUDY: CPT | Performed by: CLINICAL NURSE SPECIALIST

## 2025-05-21 ENCOUNTER — HOSPITAL ENCOUNTER (OUTPATIENT)
Dept: CT IMAGING | Age: 60
Discharge: HOME | End: 2025-05-21
Attending: CLINICAL NURSE SPECIALIST
Payer: MEDICAID

## 2025-05-21 DIAGNOSIS — Z01.818 PRE-OP EXAMINATION: ICD-10-CM

## 2025-05-21 DIAGNOSIS — I35.0 NONRHEUMATIC AORTIC VALVE STENOSIS: ICD-10-CM

## 2025-05-21 PROCEDURE — 71250 CT THORAX DX C-: CPT | Performed by: CLINICAL NURSE SPECIALIST

## 2025-06-04 ENCOUNTER — TELEPHONE (OUTPATIENT)
Dept: INTERNAL MEDICINE CLINIC | Facility: CLINIC | Age: 60
End: 2025-06-04

## 2025-06-04 DIAGNOSIS — I25.10 CORONARY ARTERY DISEASE INVOLVING NATIVE CORONARY ARTERY OF NATIVE HEART WITHOUT ANGINA PECTORIS: ICD-10-CM

## 2025-06-04 DIAGNOSIS — I73.9 PVD (PERIPHERAL VASCULAR DISEASE): Primary | ICD-10-CM

## 2025-06-04 NOTE — TELEPHONE ENCOUNTER
OFFICE VISIT    Patient: Walt Merlos   : 1953 MRN: [id number]    SUBJECTIVE:  Chief Complaint   Patient presents with    Office Visit    Establish Care     A 70 year old male presents for an establishment of care.     HISTORY OF PRESENT ILLNESS:  Historian: Self.    1. Type 2 diabetes mellitus without complication, without long-term current use of insulin (CMD): His previous HbA1c was checked on , and it was 6.4%. He is on Metformin two tablets in the morning and two tablets at night. He mentions that he stopped seeing his ophthalmologist due to some issues, and requests for a new ophthalmologist referral. He also consulted his foot doctor in the past. His BMI is 35.     2. Dyslipidemia: He has a history of dyslipidemia or high cholesterol. He is doing well on Simvastatin.     3. Essential hypertension: His blood pressure measured today is 112/66 mmHg. Currently, he is Amlodipine 5 mg and Valsartan-Hydrochlorothiazide 160-25 mg tablet every day. He states that he is drinking enough water a day around 2-3 half liter bottles.     4. History of prostate cancer: He is on Tamsulosin one tablet in the evening. He is following-up with his radiation oncologist Dr. Carlos Blanco. His father had a history of prostate cancer.     5. Screening for skin cancer: Due.     Additional comments:  He is on Pantoprazole as prescribed by Dr. Carmichael for his GERD.      Had a history of gall stones. He mentions that he was unable to walk and cannot be able to stand too long due to sciatica before his back surgery.     His left side of the colon resected due to diverticulitis. 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 Had back surgery in the past for benign schwannoma.  Underwent EMG by his previous doctor.     He mentions that his sister has a breast cancer. His father had a history of Non-Hodgkins and a triple bypass.     PAST MEDICAL HISTORY:  Past Medical History:   Diagnosis Date    Appendiceal tumor      Referral entered for Dr. Pacheco. Referral printed and faxed to [phone]   Diabetes mellitus (CMD)     Dyslipidemia     Essential (primary) hypertension     GERD (gastroesophageal reflux disease)     Prostate cancer (CMD) 07/2022    Sleep apnea      MEDICATIONS:  Current Outpatient Medications   Medication Sig Dispense Refill    atorvastatin (LIPITOR) 40 MG tablet Take 1 tablet by mouth daily. 90 tablet 0    amLODIPine (NORVASC) 5 MG tablet Take 1 tablet by mouth daily. 30 tablet 0    valsartan-hydrochlorothiazide (DIOVAN-HCT) 160-25 MG per tablet Take 1 tablet by mouth daily. 90 tablet 3    metFORMIN (GLUCOPHAGE-XR) 500 MG 24 hr tablet Take 2 tablets by mouth in the morning and 2 tablets in the evening. 360 tablet 1    tamsulosin (FLOMAX) 0.4 MG Cap Take 1 capsule by mouth daily after a meal. Do not start before April 7, 2022. (Patient taking differently: Take 0.4 mg by mouth daily.) 30 capsule 0    pantoprazole (PROTONIX) 40 MG tablet Take 40 mg by mouth daily.       No current facility-administered medications for this visit.     ALLERGIES:  Allergies as of 03/01/2024    (No Known Allergies)     FAMILY HISTORY:  Family History   Problem Relation Age of Onset    Dementia/Alzheimers Mother     Heart disease Mother     Diabetes Father     Heart disease Father     Hypertension Father     Non-Hodgkin's Lymphoma Father     Cancer, Prostate Father     Coronary Artery Disease Father     Cancer, Breast Sister      SOCIAL HISTORY:  Social History     Tobacco Use    Smoking status: Never    Smokeless tobacco: Never   Vaping Use    Vaping Use: never used   Substance Use Topics    Alcohol use: Not Currently    Drug use: Never     Past Surgical HISTORY  Past Surgical History:   Procedure Laterality Date    Appendectomy      Colonoscopy  09/2015 and 2021    no polyps, repeat in 5 years.    Colonoscopy  10/27/2021    w/polypectomy, negative for dysplasia and malignancy    Esophagogastroduodenoscopy transoral flex w/bx single or mult  10/27/2021    Laminectomy,lumbar  2022    Part removal colon w end  colostomy      Removal gallbladder         REVIEW OF SYSTEMS:  Endocrine: As per HPI.   Cardiovascular: As per HPI.   Gastrointestinal: As per HPI.  Musculoskeletal: As per HPI.   Skin: As per HPI.     All Review of Systems are negative except as noted in HPI.    OBJECTIVE:  Visit Vitals  /66   Pulse (!) 107   Temp 98.3 °F (36.8 °C) (Oral)   Resp 18   Ht 5' 4\"   Wt 93.6 kg (206 lb 5.6 oz)   SpO2 95%   BMI 35.42 kg/m²       PHYSICAL EXAM:  Vitals reviewed.  Constitutional:  General: He is not in acute distress.  Appearance: Normal appearance. He is not ill-appearing.  HENT:  Head: Normocephalic and atraumatic.  Right Ear: Tympanic membrane, ear canal and external ear normal. There is no impacted cerumen.  Left Ear: Tympanic membrane, ear canal and external ear normal. There is no impacted cerumen.  Nose: Nose normal.  Eyes:  General:  Right eye: No discharge.  Left eye: No discharge.  Conjunctiva/sclera: Conjunctivae normal.  Cardiovascular:  Rate and Rhythm: Normal rate and regular rhythm.  Heart sounds: No murmur heard.  Pulmonary:  Effort: Pulmonary effort is normal. No respiratory distress. No wheezing, no rhonchi, and no stridor.   Breath sounds: Normal breath sounds. No stridor. No wheezing.  Abdomen: Abdomen had a surgical scar down the midline. No tenderness. No guarding. No masses. Bowel sounds were present.   Skin:  General: Skin is warm.  Neurological:  General: No focal deficit present.  Mental Status: He is alert and oriented to person, place, and time.  Psychiatric:  Mood and Affect: Mood normal.  Behavior: Behavior normal.    DIAGNOSTIC STUDIES:  LAB RESULTS:  No visits with results within 1 Month(s) from this visit.   Latest known visit with results is:   Lab Services on 11/29/2023   Component Date Value Ref Range Status    Prostate Specific Antigen 11/29/2023 1.27  <=6.50 ng/mL Final       ASSESSMENT AND PLAN:  This 70 year old male presents with :  1. Type 2 diabetes mellitus without  complication, without long-term current use of insulin (CMD)    2. Dyslipidemia    3. Essential hypertension    4. History of prostate cancer    5. Screening for skin cancer        Orders Placed This Encounter    Glycohemoglobin    Comprehensive Metabolic Panel    CBC with Automated Differential    SERVICE TO OPHTHALMOLOGY    SERVICE TO DERMATOLOGY    atorvastatin (LIPITOR) 40 MG tablet    amLODIPine (NORVASC) 5 MG tablet       PLAN:  1. Type 2 diabetes mellitus without complication, without long-term current use of insulin (CMD):  Ordered Glycohemoglobin; Future  Recommended continuing current medication.   Referred SERVICE TO OPHTHALMOLOGY.     2. Dyslipidemia:  His ASCVD score is 29.4%.   Patient is agreeable for decision-making to change Simvastatin 20 mg to Lipitor 40 mg. Since we are making this change, I will check his lipid panel in the future to get to goal LDL of <70  Discontinue Simvastatin  With SDM patient agreeable to Atorvastatin (LIPITOR) 40 MG tablet; Take 1 tablet by mouth daily.  Dispense: 90 tablet; Refill: 0  Discussed the side effects of Atorvastatin.   Discussed on ASCVD risk score and informed that this score is higher as he is having diabetes and hypertension that is being treated.     3. Essential hypertension:  Ordered Comprehensive Metabolic Panel; Future  Refilled Amlodipine (NORVASC) 5 MG tablet; Take 1 tablet by mouth daily.  Dispense: 30 tablet; Refill: 0  Continue above and Valsartan-Hydrochlorothiazide 160-25 mg tablet every day.     4. History of prostate cancer:  Ordered CBC with Automated Differential; Future    5. Screening for skin cancer:  Referred SERVICE TO DERMATOLOGY    Additional plan:  Reviewed and discussed his past medical, surgical and family histories.     Follow up in four months for his Medicare Wellness Visit and diabetes or sooner as needed.     Refer to orders.  Medical compliance with plan discussed and risks of non-compliance reviewed.  Patient education  completed on disease process, etiology & prognosis.  Proper usage and side effects of medications reviewed & discussed.  Return to clinic as clinically indicated as discussed with the patient who verbalized understanding of the plan and is in agreement with the plan.    I,  Kati Dave, have created a visit summary document based on the audio recording between Dr. Roscoe Lau DO and this patient for the physician to review, edit as needed, and authenticate.    Creation Date: 3/2/2024     I personally saw this patient and agree with the scribes documentation.

## 2025-06-05 NOTE — H&P (VIEW-ONLY)
[facility]    Report of Consultation    Jose Alberto Lizama Patient Status:  No patient class for patient encounter    1965 MRN [id number]   Location [facility] Attending No att. providers found   Hosp Day # 0 PCP Fiona Sweeney MD     Date of Consult:  2025    Reason for Consultation:  Aortic stenosis.    History of Present Illness:  Jose Alberto Lizama is a a(n) 59 year old male.  History of multivessel coronary artery disease status post PCI and stent of his left anterior descending artery and diagonal artery. Also with history of peripheral vascular disease with stents in his right and left superior femoral arteries. Known ejection fraction around 30% with likely ischemic cardiomyopathy. Follows regular with cardiologist. Most recent echocardiogram now demonstrates severe aortic valve stenosis the mean gradient 55 mmHg and peak velocity of 4.6 m/s. He has left trickle hypertrophy with hypokinetic segments of left ventricle and mild to moderate mitral stenosis with mean gradient of 6. He is endorsing symptoms of occasional lightheadedness and shortness of breath, especially when he was recently lifting his granddaughter and walking with her. Denies any syncope or heart failure admissions or symptoms. He was being referred for evaluation for valve replacement given his symptoms and aortic valve severity. His wife is present today. Patient is seen with the assistance of a video Polish  for the duration of our encounter.     I initially saw him in consultation on 3/26/2025.  He is obtain dental clearance and presents today for rediscussion of surgery.  No new symptoms, no new complaints, no new medications or surgical history.  No changes overall to his health in the last 2 months.    History:  Past Medical History[1]  Past Surgical History[2]  Family History[3]   reports that he has quit smoking. His smoking use included cigarettes. He has a 20 pack-year  smoking history. He has never used smokeless tobacco. He reports current alcohol use. He reports that he does not use drugs.    Allergies:  Allergies[4]    Medications:  Current Hospital Medications[5]    Review of Systems:  Pertinent items are noted in HPI.    Physical Exam:  GENERAL: No acute distress.  VITAL SIGNS: See above.  HEENT: Trachea midline.  No jugular venous distention.  No carotid bruit.  CHEST: Chest wall is nontender.  HEART: Regular rate and rhythm, 4 out of 6 systolic ejection murmur loudest left upper sternal border  LUNGS: Clear to auscultation bilaterally.  ABDOMEN: Soft, nontender nondistended.  VASCULAR: Palpable radial artery pulses 2+ bilateral.  SKIN: No rash, no excessive bruising, petechiae, or purpura.  NEUROLOGIC: Cranial nerves II-XII intact without motor/sensory deficit.    Laboratory Data:  White blood cell count 7.9 hematocrit 43.1 platelet count 2 90,000 creatinine 0.92     Echocardiogram ejection fraction 30% by ventricular bridge if he severe attic valve stenosis mild to moderate mitral stenosis    Impression and Plan:  Problem List[6]    59-year-old male with reduced ejection fraction, severe aortic valve stenosis, symptomatic     Patient will benefit from valve replacement given his symptomatic, severe aortic stenosis.  I had a long discussion with patient and his wife.  We discussed indications for operation.  We discussed alternatives including medical management.  I explained that given his age of less than 65, he I believe he is a better candidate for surgery over TAVR given recent data showing improved long-term survival with surgery in the less than 65 years of age population.  We discussed mechanical vs bioprostheteic valves, benefits and risks of each and anticoagulation needs of each.  Patient elects for a BIOPROSTHETIC valve which I feel is appropriate given his desire to avoid anticoagulation.  Would also plan to amputate his left atrial appendage at the time of  surgery..  We discussed risks of surgery including but not limited to: bleeding, coagulopathy, transfusion (to which he agrees to accept after discussing risks of transfusion), infection, sternal dehiscence, prolonged ventilation, myocardial infarction, pacemaker, stroke, arrhythmia, atrial fibrillation, kidney injury, dialysis, multisystem organ dysfunction, imponderables and death.  Voiced understanding and wishes to proceed.    Plan for surgery next Thursday, 6/12/2025 at 7 AM..  He will stop Plavix 5 days prior, his last dose of Plavix is tomorrow.  Patient and wife's excellent questions were answered to their satisfaction.    Leonides Pacheco MD  Cardiothoracic Surgery  [facility]    Time spent on counseling/coordination of care:  08186- 80 min    Total time spent with patient:  1 Hour    Leonides Pacheco MD  6/5/2025  5:25 PM       [1]   Past Medical History:   Back problem    BPH (benign prostatic hyperplasia)    Coronary atherosclerosis    Depression    Diabetes (HCC)    Esophageal reflux    High blood pressure    High cholesterol    Peripheral vascular disease    Shortness of breath    related to smoking     Visual impairment    glasses   [2]   Past Surgical History:  Procedure Laterality Date    Cardiac catheterization  2016    [facility]    Cath drug eluting stent      Colonoscopy  04/22/2024    Colonoscopy N/A 4/22/2024    Procedure: COLONOSCOPY;  Surgeon: Attila Li MD;  Location: [facility] ENDOSCOPY    Esophagoscopy,diagnostic  04/22/2024    Dr. Li    Hernia surgery      as a child    Nasal scopy,remv part ethmoid  10/14/2020    Nasal scopy,rmv tiss maxill sinus  10/14/2020    Repair of nasal septum  10/14/2020   [3]   Family History  Problem Relation Age of Onset    Diabetes Father     Diabetes Paternal Aunt     Diabetes Paternal Uncle     Seizure Disorder Mother     No Known Problems Brother     No Known Problems Daughter     Glaucoma Neg     Macular degeneration Neg     [4] No Known Allergies  [5] No current facility-administered medications for this visit.  [6]   Patient Active Problem List  Diagnosis    DM (diabetes mellitus), type 2 with complications (HCC)    CAD in native artery    Hyperlipidemia    HTN (hypertension)    PAD (peripheral artery disease)    Type 2 diabetes mellitus without retinopathy (HCC)    Meibomian gland dysfunction (MGD) of both eyes    Presbyopia    Age-related nuclear cataract of both eyes    Chronic maxillary sinusitis    Gastroesophageal reflux disease    Encounter for colorectal cancer screening

## 2025-06-05 NOTE — H&P (VIEW-ONLY)
[facility]    Report of Consultation    Jose Alberto Lizama Patient Status:  No patient class for patient encounter    1965 MRN [id number]   Location [facility] Attending No att. providers found   Hosp Day # 0 PCP Finoa Sweeney MD     Date of Consult:  2025    Reason for Consultation:  Aortic stenosis.    History of Present Illness:  Jose Alberto Lizama is a a(n) 59 year old male.  History of multivessel coronary artery disease status post PCI and stent of his left anterior descending artery and diagonal artery. Also with history of peripheral vascular disease with stents in his right and left superior femoral arteries. Known ejection fraction around 30% with likely ischemic cardiomyopathy. Follows regular with cardiologist. Most recent echocardiogram now demonstrates severe aortic valve stenosis the mean gradient 55 mmHg and peak velocity of 4.6 m/s. He has left trickle hypertrophy with hypokinetic segments of left ventricle and mild to moderate mitral stenosis with mean gradient of 6. He is endorsing symptoms of occasional lightheadedness and shortness of breath, especially when he was recently lifting his granddaughter and walking with her. Denies any syncope or heart failure admissions or symptoms. He was being referred for evaluation for valve replacement given his symptoms and aortic valve severity. His wife is present today. Patient is seen with the assistance of a video Polish  for the duration of our encounter.     I initially saw him in consultation on 3/26/2025.  He is obtain dental clearance and presents today for rediscussion of surgery.  No new symptoms, no new complaints, no new medications or surgical history.  No changes overall to his health in the last 2 months.    History:  Past Medical History[1]  Past Surgical History[2]  Family History[3]   reports that he has quit smoking. His smoking use included cigarettes. He has a 20 pack-year  smoking history. He has never used smokeless tobacco. He reports current alcohol use. He reports that he does not use drugs.    Allergies:  Allergies[4]    Medications:  Current Hospital Medications[5]    Review of Systems:  Pertinent items are noted in HPI.    Physical Exam:  GENERAL: No acute distress.  VITAL SIGNS: See above.  HEENT: Trachea midline.  No jugular venous distention.  No carotid bruit.  CHEST: Chest wall is nontender.  HEART: Regular rate and rhythm, 4 out of 6 systolic ejection murmur loudest left upper sternal border  LUNGS: Clear to auscultation bilaterally.  ABDOMEN: Soft, nontender nondistended.  VASCULAR: Palpable radial artery pulses 2+ bilateral.  SKIN: No rash, no excessive bruising, petechiae, or purpura.  NEUROLOGIC: Cranial nerves II-XII intact without motor/sensory deficit.    Laboratory Data:  White blood cell count 7.9 hematocrit 43.1 platelet count 2 90,000 creatinine 0.92     Echocardiogram ejection fraction 30% by ventricular bridge if he severe attic valve stenosis mild to moderate mitral stenosis    Impression and Plan:  Problem List[6]    59-year-old male with reduced ejection fraction, severe aortic valve stenosis, symptomatic     Patient will benefit from valve replacement given his symptomatic, severe aortic stenosis.  I had a long discussion with patient and his wife.  We discussed indications for operation.  We discussed alternatives including medical management.  I explained that given his age of less than 65, he I believe he is a better candidate for surgery over TAVR given recent data showing improved long-term survival with surgery in the less than 65 years of age population.  We discussed mechanical vs bioprostheteic valves, benefits and risks of each and anticoagulation needs of each.  Patient elects for a BIOPROSTHETIC valve which I feel is appropriate given his desire to avoid anticoagulation.  Would also plan to amputate his left atrial appendage at the time of  surgery..  We discussed risks of surgery including but not limited to: bleeding, coagulopathy, transfusion (to which he agrees to accept after discussing risks of transfusion), infection, sternal dehiscence, prolonged ventilation, myocardial infarction, pacemaker, stroke, arrhythmia, atrial fibrillation, kidney injury, dialysis, multisystem organ dysfunction, imponderables and death.  Voiced understanding and wishes to proceed.    Plan for surgery next Thursday, 6/12/2025 at 7 AM..  He will stop Plavix 5 days prior, his last dose of Plavix is tomorrow.  Patient and wife's excellent questions were answered to their satisfaction.    Leonides Pacheco MD  Cardiothoracic Surgery  [facility]    Time spent on counseling/coordination of care:  50624- 80 min    Total time spent with patient:  1 Hour    Leonides Pacheco MD  6/5/2025  5:25 PM       [1]   Past Medical History:   Back problem    BPH (benign prostatic hyperplasia)    Coronary atherosclerosis    Depression    Diabetes (HCC)    Esophageal reflux    High blood pressure    High cholesterol    Peripheral vascular disease    Shortness of breath    related to smoking     Visual impairment    glasses   [2]   Past Surgical History:  Procedure Laterality Date    Cardiac catheterization  2016    [facility]    Cath drug eluting stent      Colonoscopy  04/22/2024    Colonoscopy N/A 4/22/2024    Procedure: COLONOSCOPY;  Surgeon: Attila Li MD;  Location: [facility] ENDOSCOPY    Esophagoscopy,diagnostic  04/22/2024    Dr. Li    Hernia surgery      as a child    Nasal scopy,remv part ethmoid  10/14/2020    Nasal scopy,rmv tiss maxill sinus  10/14/2020    Repair of nasal septum  10/14/2020   [3]   Family History  Problem Relation Age of Onset    Diabetes Father     Diabetes Paternal Aunt     Diabetes Paternal Uncle     Seizure Disorder Mother     No Known Problems Brother     No Known Problems Daughter     Glaucoma Neg     Macular degeneration Neg     [4] No Known Allergies  [5] No current facility-administered medications for this visit.  [6]   Patient Active Problem List  Diagnosis    DM (diabetes mellitus), type 2 with complications (HCC)    CAD in native artery    Hyperlipidemia    HTN (hypertension)    PAD (peripheral artery disease)    Type 2 diabetes mellitus without retinopathy (HCC)    Meibomian gland dysfunction (MGD) of both eyes    Presbyopia    Age-related nuclear cataract of both eyes    Chronic maxillary sinusitis    Gastroesophageal reflux disease    Encounter for colorectal cancer screening

## 2025-06-09 VITALS — BODY MASS INDEX: 32.14 KG/M2 | HEIGHT: 66 IN | WEIGHT: 200 LBS

## 2025-06-09 RX ORDER — MULTIVITAMIN WITH IRON
100 TABLET ORAL EVERY OTHER DAY
Status: ON HOLD | COMMUNITY

## 2025-06-09 RX ORDER — MULTIVITAMIN WITH IRON
500 TABLET ORAL EVERY OTHER DAY
Status: ON HOLD | COMMUNITY

## 2025-06-09 RX ORDER — CHLORHEXIDINE GLUCONATE 40 MG/ML
SOLUTION TOPICAL
OUTPATIENT
Start: 2025-06-10 | End: 2025-06-10

## 2025-06-09 RX ORDER — SACUBITRIL AND VALSARTAN 24; 26 MG/1; MG/1
TABLET, FILM COATED ORAL
COMMUNITY
Start: 2025-03-11 | End: 2025-07-03

## 2025-06-09 RX ORDER — MULTIVITAMIN WITH IRON
250 TABLET ORAL EVERY OTHER DAY
COMMUNITY

## 2025-06-09 RX ORDER — SODIUM CHLORIDE 9 MG/ML
INJECTION, SOLUTION INTRAVENOUS
OUTPATIENT
Start: 2025-06-10 | End: 2025-06-10

## 2025-06-10 ENCOUNTER — NURSE ONLY (OUTPATIENT)
Dept: LAB | Facility: HOSPITAL | Age: 60
End: 2025-06-10
Attending: THORACIC SURGERY (CARDIOTHORACIC VASCULAR SURGERY)
Payer: MEDICAID

## 2025-06-10 ENCOUNTER — HOSPITAL ENCOUNTER (OUTPATIENT)
Dept: INTERVENTIONAL RADIOLOGY/VASCULAR | Facility: HOSPITAL | Age: 60
Discharge: HOME | End: 2025-06-10
Attending: THORACIC SURGERY (CARDIOTHORACIC VASCULAR SURGERY)
Payer: MEDICAID

## 2025-06-10 ENCOUNTER — HOSPITAL ENCOUNTER (OUTPATIENT)
Dept: GENERAL RADIOLOGY | Facility: HOSPITAL | Age: 60
Discharge: HOME | End: 2025-06-10
Attending: THORACIC SURGERY (CARDIOTHORACIC VASCULAR SURGERY)
Payer: MEDICAID

## 2025-06-10 ENCOUNTER — APPOINTMENT (OUTPATIENT)
Dept: GENERAL RADIOLOGY | Facility: HOSPITAL | Age: 60
End: 2025-06-10
Attending: THORACIC SURGERY (CARDIOTHORACIC VASCULAR SURGERY)
Payer: MEDICAID

## 2025-06-10 ENCOUNTER — ANESTHESIA EVENT (OUTPATIENT)
Dept: SURGERY | Facility: HOSPITAL | Age: 60
End: 2025-06-10
Payer: MEDICAID

## 2025-06-10 DIAGNOSIS — Z01.818 PRE-OP TESTING: Primary | ICD-10-CM

## 2025-06-10 DIAGNOSIS — Z01.818 PRE-OP TESTING: ICD-10-CM

## 2025-06-10 DIAGNOSIS — Z01.818 PRE-OP EVALUATION: ICD-10-CM

## 2025-06-10 LAB
ALBUMIN SERPL-MCNC: 4.6 G/DL (ref 3.2–4.8)
ALBUMIN/GLOB SERPL: 1.9 {RATIO} (ref 1–2)
ALP LIVER SERPL-CCNC: 60 U/L (ref 45–117)
ALT SERPL-CCNC: 8 U/L (ref 10–49)
ANION GAP SERPL CALC-SCNC: 6 MMOL/L (ref 0–18)
ANTIBODY SCREEN: NEGATIVE
APTT PPP: 27.2 SECONDS (ref 23–36)
AST SERPL-CCNC: 21 U/L (ref ?–34)
BASOPHILS # BLD AUTO: 0.05 X10(3) UL (ref 0–0.2)
BASOPHILS NFR BLD AUTO: 0.9 %
BILIRUB SERPL-MCNC: 0.4 MG/DL (ref 0.2–1.1)
BILIRUB UR QL: NEGATIVE
BUN BLD-MCNC: 15 MG/DL (ref 9–23)
BUN/CREAT SERPL: 15.6 (ref 10–20)
CALCIUM BLD-MCNC: 9.1 MG/DL (ref 8.7–10.4)
CHLORIDE SERPL-SCNC: 105 MMOL/L (ref 98–112)
CLARITY UR: CLEAR
CO2 SERPL-SCNC: 26 MMOL/L (ref 21–32)
CREAT BLD-MCNC: 0.96 MG/DL (ref 0.7–1.3)
DEPRECATED RDW RBC AUTO: 46.2 FL (ref 35.1–46.3)
EGFRCR SERPLBLD CKD-EPI 2021: 90 ML/MIN/1.73M2 (ref 60–?)
EOSINOPHIL # BLD AUTO: 0.11 X10(3) UL (ref 0–0.7)
EOSINOPHIL NFR BLD AUTO: 1.9 %
ERYTHROCYTE [DISTWIDTH] IN BLOOD BY AUTOMATED COUNT: 15.9 % (ref 11–15)
EST. AVERAGE GLUCOSE BLD GHB EST-MCNC: 186 MG/DL (ref 68–126)
FASTING STATUS PATIENT QL REPORTED: YES
GLOBULIN PLAS-MCNC: 2.4 G/DL (ref 2–3.5)
GLUCOSE BLD-MCNC: 207 MG/DL (ref 70–99)
GLUCOSE UR-MCNC: >1000 MG/DL
HBA1C MFR BLD: 8.1 % (ref ?–5.7)
HCT VFR BLD AUTO: 40.1 % (ref 39–53)
HGB BLD-MCNC: 12.6 G/DL (ref 13–17.5)
HGB UR QL STRIP.AUTO: NEGATIVE
IMM GRANULOCYTES # BLD AUTO: 0.02 X10(3) UL (ref 0–1)
IMM GRANULOCYTES NFR BLD: 0.4 %
INR BLD: 0.95 (ref 0.8–1.2)
KETONES UR-MCNC: 10 MG/DL
LEUKOCYTE ESTERASE UR QL STRIP.AUTO: NEGATIVE
LYMPHOCYTES # BLD AUTO: 1.87 X10(3) UL (ref 1–4)
LYMPHOCYTES NFR BLD AUTO: 33.1 %
MAGNESIUM SERPL-MCNC: 2 MG/DL (ref 1.6–2.6)
MCH RBC QN AUTO: 25.2 PG (ref 26–34)
MCHC RBC AUTO-ENTMCNC: 31.4 G/DL (ref 31–37)
MCV RBC AUTO: 80.2 FL (ref 80–100)
MONOCYTES # BLD AUTO: 0.52 X10(3) UL (ref 0.1–1)
MONOCYTES NFR BLD AUTO: 9.2 %
NEUTROPHILS # BLD AUTO: 3.08 X10 (3) UL (ref 1.5–7.7)
NEUTROPHILS # BLD AUTO: 3.08 X10(3) UL (ref 1.5–7.7)
NEUTROPHILS NFR BLD AUTO: 54.5 %
NITRITE UR QL STRIP.AUTO: NEGATIVE
OSMOLALITY SERPL CALC.SUM OF ELEC: 291 MOSM/KG (ref 275–295)
PH UR: 5 [PH] (ref 5–8)
PLATELET # BLD AUTO: 326 10(3)UL (ref 150–450)
POTASSIUM SERPL-SCNC: 4 MMOL/L (ref 3.5–5.1)
PROT SERPL-MCNC: 7 G/DL (ref 5.7–8.2)
PROT UR-MCNC: 20 MG/DL
PROTHROMBIN TIME: 13.3 SECONDS (ref 11.6–14.8)
RBC # BLD AUTO: 5 X10(6)UL (ref 4.3–5.7)
RH BLOOD TYPE: POSITIVE
SODIUM SERPL-SCNC: 137 MMOL/L (ref 136–145)
SP GR UR STRIP: >1.03 (ref 1–1.03)
UROBILINOGEN UR STRIP-ACNC: NORMAL
WBC # BLD AUTO: 5.7 X10(3) UL (ref 4–11)

## 2025-06-10 PROCEDURE — 93005 ELECTROCARDIOGRAM TRACING: CPT

## 2025-06-10 PROCEDURE — 85610 PROTHROMBIN TIME: CPT

## 2025-06-10 PROCEDURE — 86850 RBC ANTIBODY SCREEN: CPT

## 2025-06-10 PROCEDURE — 86900 BLOOD TYPING SEROLOGIC ABO: CPT

## 2025-06-10 PROCEDURE — 85730 THROMBOPLASTIN TIME PARTIAL: CPT

## 2025-06-10 PROCEDURE — 81001 URINALYSIS AUTO W/SCOPE: CPT

## 2025-06-10 PROCEDURE — 86901 BLOOD TYPING SEROLOGIC RH(D): CPT

## 2025-06-10 PROCEDURE — 83735 ASSAY OF MAGNESIUM: CPT

## 2025-06-10 PROCEDURE — 87641 MR-STAPH DNA AMP PROBE: CPT

## 2025-06-10 PROCEDURE — 80053 COMPREHEN METABOLIC PANEL: CPT

## 2025-06-10 PROCEDURE — 71046 X-RAY EXAM CHEST 2 VIEWS: CPT | Performed by: THORACIC SURGERY (CARDIOTHORACIC VASCULAR SURGERY)

## 2025-06-10 PROCEDURE — 36415 COLL VENOUS BLD VENIPUNCTURE: CPT

## 2025-06-10 PROCEDURE — 85025 COMPLETE CBC W/AUTO DIFF WBC: CPT

## 2025-06-10 PROCEDURE — 93010 ELECTROCARDIOGRAM REPORT: CPT | Performed by: INTERNAL MEDICINE

## 2025-06-10 PROCEDURE — 83036 HEMOGLOBIN GLYCOSYLATED A1C: CPT

## 2025-06-10 PROCEDURE — 86920 COMPATIBILITY TEST SPIN: CPT

## 2025-06-10 NOTE — ANESTHESIA PREPROCEDURE EVALUATION
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]    Relevant Problems   No relevant active problems       NPO:                         History Review:  Patient Active Problem List    Diagnosis Date Noted    Gastroesophageal reflux disease 04/22/2024    Encounter for colorectal cancer screening 04/22/2024    Chronic maxillary sinusitis 10/14/2020    Type 2 diabetes mellitus without retinopathy (HCC) 10/24/2019    Meibomian gland dysfunction (MGD) of both eyes 10/24/2019    Presbyopia 10/24/2019    Age-related nuclear cataract of both eyes 10/24/2019    PAD (peripheral artery disease) 06/10/2019    DM (diabetes mellitus), type 2 with complications (HCC) 10/31/2015    CAD in native artery 10/31/2015    Hyperlipidemia 10/31/2015    HTN (hypertension) 10/31/2015       Past Medical History[1]    Past Surgical History[2]    Prescriptions Prior to Admission[3]  Current Medications and Prescriptions Ordered in Epic[4]    Allergies[5]    Family History[6]  Social Hx on file[7]    Available pre-op labs reviewed.  Lab Results   Component Value Date    WBC 5.7 06/10/2025    RBC 5.00 06/10/2025    HGB 12.6 (L) 06/10/2025    HCT 40.1 06/10/2025    MCV 80.2 06/10/2025    MCH 25.2 (L) 06/10/2025    MCHC 31.4 06/10/2025    RDW 15.9 (H) 06/10/2025    .0 06/10/2025     Lab Results   Component Value Date     06/10/2025    K 4.0 06/10/2025     06/10/2025    CO2 26.0 06/10/2025    BUN 15 06/10/2025    CREATSERUM 0.96 06/10/2025     (H) 06/10/2025    CA 9.1 06/10/2025     Lab Results   Component Value Date    INR 0.95 06/10/2025       Vital Signs:  Body mass index is 32.28 kg/m².   height is 1.676 m (5' 6\") and weight is 90.7  kg (200 lb).   Vitals:    06/10/25 0808   Weight: 90.7 kg (200 lb)   Height: 1.676 m (5' 6\")        Anesthesia Evaluation     Patient summary reviewed and Nursing notes reviewed    Airway   Mallampati: II  TM distance: >3 FB  Neck ROM: full  Dental - Dentition appears grossly intact     Pulmonary - normal exam   (+) shortness of breath    ROS comment: Ex smoker, still vapes  Cardiovascular - normal exam  (+) hypertension, valvular problems/murmurs AS, CAD, CABG/stent (Coronary disease, stents in situ)    Neuro/Psych    (+)   depression      GI/Hepatic/Renal    (+) GERD    Endo/Other    (+) diabetes mellitus type 2 well controlled    Comments: Obese  Abdominal                  Anesthesia Plan:   ASA:  4  Plan:   General  Monitors and Lines:   A-line, Additonal IV, BIS, Brain oximetry, Central line, CVP, Arthur-Mike and GAB  Airway:  ETT  Post-op Pain Management: IV analgesics and Oral pain medication  Plan Comments: Pt will have coronary angiography on 06/11/2025,check results  Informed Consent Plan and Risks Discussed With:  Patient and spouse  Use of Blood Products Discussed With:  Patient and spouse  Blood Product Use Consented    Consent Comment: Interviewed via   Discussed plan with:  Surgeon      I have informed Jose Alberto Lizama and/or legal guardian or family member of the nature of the anesthetic plan, benefits, risks including possible dental damage if relevant, major complications, and any alternative forms of anesthetic management.   All of the patient's questions were answered to the best of my ability. The patient desires the anesthetic management as planned.  Dwight Rosario MD  6/10/2025 4:49 PM  Present on Admission:  **None**           [1]   Past Medical History:   Aortic stenosis    Back problem    BPH (benign prostatic hyperplasia)    Cardiomyopathy (HCC)    Coronary atherosclerosis    Depression    Diabetes (HCC)    Esophageal reflux    Heart valve disease    High blood pressure     High cholesterol    Peripheral vascular disease    Shortness of breath    related to smoking     Visual impairment    glasses   [2]   Past Surgical History:  Procedure Laterality Date    Cardiac catheterization  2016    [facility]    Cath drug eluting stent      Colonoscopy  04/22/2024    Colonoscopy N/A 4/22/2024    Procedure: COLONOSCOPY;  Surgeon: Attila Li MD;  Location: [facility] ENDOSCOPY    Esophagoscopy,diagnostic  04/22/2024    Dr. Li    Hernia surgery      as a child    Nasal scopy,remv part ethmoid  10/14/2020    Nasal scopy,rmv tiss maxill sinus  10/14/2020    Repair of nasal septum  10/14/2020   [3]   No medications prior to admission.   [4]   No current Epic-ordered facility-administered medications on file.     Current Outpatient Medications Ordered in Epic   Medication Sig Dispense Refill    ENTRESTO 24-26 MG Oral Tab Entresto 24 mg-26 mg tablet, [RxNorm: 6962640]      magnesium 250 MG Oral Tab Take 1 tablet (250 mg total) by mouth every other day.      metFORMIN HCl 1000 MG Oral Tab Take 1 tablet (1,000 mg total) by mouth 2 (two) times daily with meals. 180 tablet 3    amLODIPine 5 MG Oral Tab Take 1 tablet (5 mg total) by mouth daily. 90 tablet 3    carvedilol 25 MG Oral Tab Take 1 tablet (25 mg total) by mouth 2 (two) times daily with meals. 180 tablet 3    clopidogrel 75 MG Oral Tab Take 1 tablet (75 mg total) by mouth daily. 90 tablet 3    empagliflozin (JARDIANCE) 25 MG Oral Tab Take 1 tablet (25 mg total) by mouth daily. 90 tablet 3    furosemide 40 MG Oral Tab Take 1 tablet (40 mg total) by mouth daily. 90 tablet 3    rosuvastatin 20 MG Oral Tab Take 1 tablet (20 mg total) by mouth nightly. 90 tablet 3    aspirin 81 MG Oral Tab EC Take 1 tablet (81 mg total) by mouth nightly.      Thiamine HCl 250 MG Oral Tab Take 1 tablet (250 mg total) by mouth every other day.      pyridoxine 100 MG Oral Tab Take 1 tablet (100 mg total) by mouth every other day.       cyanocobalamin 500 MCG Oral Tab Take 1 tablet (500 mcg total) by mouth every other day.      tamsulosin 0.4 MG Oral Cap TAKE 2 CAPSULES BY MOUTH DAILY (Patient taking differently: Take 1 capsule (0.4 mg total) by mouth in the morning and 1 capsule (0.4 mg total) before bedtime.) 180 capsule 3    Glucose Blood (CONTOUR NEXT TEST) In Vitro Strip To test 2 x daily  Dx E11.9 100 strip 5    Glucose Blood (CONTOUR NEXT TEST) In Vitro Strip To check glucose bid   Dx  E11.9 200 strip 11    Glucose Blood (LORAINE CONTOUR TEST) In Vitro Strip To test daily 100 strip 3    LORAINE CONTOUR NEXT TEST In Vitro Strip To test 2 x daily 100 strip 6   [5] No Known Allergies  [6]   Family History  Problem Relation Age of Onset    Diabetes Father     Diabetes Paternal Aunt     Diabetes Paternal Uncle     Seizure Disorder Mother     No Known Problems Brother     No Known Problems Daughter     Glaucoma Neg     Macular degeneration Neg    [7]   Social History  Socioeconomic History    Marital status:    Tobacco Use    Smoking status: Former     Current packs/day: 1.00     Average packs/day: 1 pack/day for 20.0 years (20.0 ttl pk-yrs)     Types: Cigarettes    Smokeless tobacco: Never   Vaping Use    Vaping status: Every Day    Substances: Nicotine   Substance and Sexual Activity    Alcohol use: Yes     Comment: 1-2 drinks a week    Drug use: No

## 2025-06-10 NOTE — PAT NURSING NOTE
OPEN HEART SURGERY PRE-OPERATIVE PREPARATION  Shower with BETACEPT soap every day for 3 days prior to surgery, including the day of surgery. No powder, lotion, deodorant, or nail polish, or nail polish and do not apply makeup as well on the day of the surgery. On the morning of surgery, remove ALL jewelry and leave all valuables at home. Do not shave chest area.  Nothing to eat or drink after midnight the day before your surgery.  NO ASPIRIN, PLAVIX, OR BLOOD THINNERS.  Please take the following medications with only sips of water the morning of surgery      carvedilol 25 MG      Please STOP taking the following medications:    (For Rescheduled date 6/19/25)  STOP ALL VITAMINS/SUPPLEMENTS/NSAIDS 1 WEEK PRIOR TO SURGERY    Thiamine HCl 250 MG ,  cyanocobalamin 500 MCG    clopidogrel 75 MG - last dose 6/14/25    empagliflozin (JARDIANCE) 25 MG - last dose 6/15/25    ENTRESTO 24-26 MG - last dose 6/15/25    metFORMIN HCl 1000 MG - last dose 6/15/25    magnesium 250 MG - last dose 6/18/25    pyridoxine (Vitamin B6) - ok to take until last dose 6/18/25    amLODIPine 5 MG - last dose 6/18/25    furosemide 40 MG - last dose 6/17/25    rosuvastatin 20 MG - last dose 6/18/25    aspirin 81 MG  - last dose 6/18/25    tamsulosin 0.4 MG - last dose 6/18/25    Please arrive at the hospital at 5:30 AM as your surgery is scheduled for 7:00 AM. Park in the BLUE or GREEN parking lot at the [facility]. Complimentary  parking is available at the main entrance Monday - Friday between 7:30am - 4pm. Enter the hospital at the MAIN Entrance and use the elevators within the main entrance lobby to get to the second floor. Check in at the Surgery Reception Desk, which will be on your left when you exit the elevator.  You will be taken to the surgery suite at about 6:45 AM. Once you are taken to the surgery suite, your family will be directed to the critical care [facility] area in the Critical Care Unit on the second floor.  They are also welcome to visit the retail and lounge spaces on the first floor and the [facility] on the first and second floor near the Critical Care Unit.  Your surgery will last approximately 4-6 hours. Your family will be notified about 30 minutes prior to completion. Your cardiovascular surgeon will meet with your family once you are transferred out of the surgery suite into a private room where you will recover for the remainder of your hospital visit, which can be expected to last about four to five days.  If you have any special needs, concerns or questions, feel free to call us at [phone] or call Dr. Pacheco's office at [phone].  Warmest Regards.  Keira Vilchis, RN, BSN, BS  CV Pre-admission Testing (PAT) RN, Interventional Suites  [address] [address]  P [phone] F [phone]  [email or web address]

## 2025-06-10 NOTE — CM/SW NOTE
06/10/25 1400   CM/SW Referral Data   Referral Source Physician   Reason for Referral Protocol order set   Specify order set TAVR   Informant Patient;Spouse/Significant Other   Medical Hx   Does patient have an established PCP? Yes   Patient Info   Advanced directives?   (wife is surrogate decision maker)   Patient Communication Issues Language barrier  (video interpretor used.  Attila [id number])   Patient's Home Environment House   Number of Levels in Home 2   Number of Stair in Home 6 to enter, 6 to upstairs   Patient Status Prior to Admission   Independent with ADLs and Mobility Yes   Discharge Needs   Anticipated D/C needs Home health care     Patient lives with wife.  Reviewed needs post discharge for home.  Home health referrals to be send after scheduled surgery.    Tyra BEARN RN NAVNEET JAVIER  RN Case Manager PMU

## 2025-06-10 NOTE — PAT NURSING NOTE
Patient is having preadmission teaching for AORTIC VALVE REPLACEMENT; INTRAOPERATIVE TRANSESOPHAGEAL ECHOCARDIOGRAM; LEFT ARTIAL APPENDAGE CLIP with Dr. Pacheco on date:    6/19/25 .  Pre-op testing performed today with patient. Written and verbal information provided re: west operative expectations with all questions answered. Patient and wife, Maddy , who came with patient, verbalized understanding. Consents obtained.   Instructions reviewed regarding medications to hold, Betasept showers, NPO after MN before surgery, and time to arrive 5:30 AM on __6/19/25__, at Surgery Check-in.      Patient and wife verbalized understanding and are both in agreement with treatment plan, also reviewed new stop dates for medications due to rescheduled surgery date (moved to 6/19/25).    Completed tests reviewed OR PAT RN, and with KIRK Bhatt, as well as pending test results for A1C and MRSA PCR screen, collected 6/10/25, and Angiogram, to be done 6/18/25.    S/W Layla in Blood Bank - notified of new surgery date 6/19/25    All CV pre-admission education with Language Line translators: Bryant/ID# [id number] for preadmission testing, preparation, surgery, and recovery plan, and Xiomy/ID# [id number] and Ira/ID# [id number] for rescheduling information and questions with Dr. Pacheco present.

## 2025-06-11 ENCOUNTER — HOSPITAL ENCOUNTER (OUTPATIENT)
Dept: INTERVENTIONAL RADIOLOGY/VASCULAR | Facility: HOSPITAL | Age: 60
Discharge: HOME | End: 2025-06-11
Attending: INTERNAL MEDICINE
Payer: MEDICAID

## 2025-06-11 LAB
ATRIAL RATE: 72 BPM
MRSA DNA SPEC QL NAA+PROBE: NEGATIVE
P AXIS: 57 DEGREES
P-R INTERVAL: 154 MS
Q-T INTERVAL: 422 MS
QRS DURATION: 132 MS
QTC CALCULATION (BEZET): 462 MS
R AXIS: -39 DEGREES
T AXIS: 147 DEGREES
VENTRICULAR RATE: 72 BPM

## 2025-06-18 ENCOUNTER — HOSPITAL ENCOUNTER (OUTPATIENT)
Dept: INTERVENTIONAL RADIOLOGY/VASCULAR | Facility: HOSPITAL | Age: 60
Discharge: HOME | End: 2025-06-18
Attending: INTERNAL MEDICINE | Admitting: INTERNAL MEDICINE
Payer: MEDICAID

## 2025-06-18 VITALS
HEART RATE: 77 BPM | WEIGHT: 201.81 LBS | HEIGHT: 66 IN | RESPIRATION RATE: 20 BRPM | SYSTOLIC BLOOD PRESSURE: 145 MMHG | OXYGEN SATURATION: 96 % | DIASTOLIC BLOOD PRESSURE: 82 MMHG | BODY MASS INDEX: 32.43 KG/M2 | TEMPERATURE: 99 F

## 2025-06-18 DIAGNOSIS — I25.10 CAD (CORONARY ARTERY DISEASE): ICD-10-CM

## 2025-06-18 LAB — GLUCOSE BLDC GLUCOMTR-MCNC: 194 MG/DL (ref 70–99)

## 2025-06-18 PROCEDURE — 99153 MOD SED SAME PHYS/QHP EA: CPT | Performed by: INTERNAL MEDICINE

## 2025-06-18 PROCEDURE — 82962 GLUCOSE BLOOD TEST: CPT

## 2025-06-18 PROCEDURE — 99152 MOD SED SAME PHYS/QHP 5/>YRS: CPT | Performed by: INTERNAL MEDICINE

## 2025-06-18 PROCEDURE — 93458 L HRT ARTERY/VENTRICLE ANGIO: CPT | Performed by: INTERNAL MEDICINE

## 2025-06-18 PROCEDURE — 93454 CORONARY ARTERY ANGIO S&I: CPT | Performed by: INTERNAL MEDICINE

## 2025-06-18 PROCEDURE — 93799 UNLISTED CV SVC/PROCEDURE: CPT | Performed by: INTERNAL MEDICINE

## 2025-06-18 RX ORDER — IOPAMIDOL 612 MG/ML
90 INJECTION, SOLUTION INTRAVASCULAR
Status: COMPLETED | OUTPATIENT
Start: 2025-06-18 | End: 2025-06-18

## 2025-06-18 RX ORDER — LIDOCAINE HYDROCHLORIDE 20 MG/ML
INJECTION, SOLUTION EPIDURAL; INFILTRATION; INTRACAUDAL; PERINEURAL
Status: COMPLETED
Start: 2025-06-18 | End: 2025-06-18

## 2025-06-18 RX ORDER — SODIUM CHLORIDE 9 MG/ML
INJECTION, SOLUTION INTRAVENOUS
Status: COMPLETED | OUTPATIENT
Start: 2025-06-18 | End: 2025-06-18

## 2025-06-18 RX ORDER — HEPARIN SODIUM 1000 [USP'U]/ML
INJECTION, SOLUTION INTRAVENOUS; SUBCUTANEOUS
Status: COMPLETED
Start: 2025-06-18 | End: 2025-06-18

## 2025-06-18 RX ORDER — HYDRALAZINE HYDROCHLORIDE 20 MG/ML
INJECTION INTRAMUSCULAR; INTRAVENOUS
Status: COMPLETED
Start: 2025-06-18 | End: 2025-06-18

## 2025-06-18 RX ORDER — MIDAZOLAM HYDROCHLORIDE 1 MG/ML
INJECTION INTRAMUSCULAR; INTRAVENOUS
Status: COMPLETED
Start: 2025-06-18 | End: 2025-06-18

## 2025-06-18 RX ORDER — ASPIRIN 81 MG/1
TABLET, CHEWABLE ORAL
Status: COMPLETED
Start: 2025-06-18 | End: 2025-06-18

## 2025-06-18 RX ORDER — CHLORHEXIDINE GLUCONATE 40 MG/ML
SOLUTION TOPICAL
Status: DISCONTINUED | OUTPATIENT
Start: 2025-06-18 | End: 2025-06-18

## 2025-06-18 RX ADMIN — ASPIRIN 243 MG: 81 TABLET, CHEWABLE ORAL at 09:02:00

## 2025-06-18 RX ADMIN — SODIUM CHLORIDE: 9 INJECTION, SOLUTION INTRAVENOUS at 08:30:00

## 2025-06-18 RX ADMIN — IOPAMIDOL 90 ML: 612 INJECTION, SOLUTION INTRAVASCULAR at 10:18:00

## 2025-06-18 NOTE — IVS NOTE
DISCHARGE NOTE      Pt is able to sit up and ambulate without difficulty.   Pt voided and tolerated fluids and food.   Procedural site remains dry and intact with good circulation, motion, and sensation.   No signs and symptoms of bleeding/hematoma noted.   Instruction provided, patient/family verbalizes understanding using Video   Dr. Osullivan spoke with patient/family post procedure.

## 2025-06-18 NOTE — PROCEDURES
[facility]  [facility] Cardiac Cath Procedure Note  Jose Alberto Lizama Patient Status:  Outpatient    1965 MRN [id number]   Location [facility] INTERVENTIONAL SUITES Attending David Osullivan MD   Hosp Day # 0 PCP Fiona Sweeney MD       Cardiologist: David Osullivan MD  Primary Proceduralist: David Osullivan MD  Procedure Performed: LHC, IFR LAD  Date of Procedure: 2025   Indication: Preop aortic valve replacement    Summary of findings: Significant stenosis of the LAD            Coronary Angiography  RCA: Small nondominant mild nonobstructive disease  left main:  Patent, free of obstructive disease  Left anterior descending: Previous stents heavily calcified 60 to 70% stenosis proximally visually, first diagonal ostial 90% stenosis  circumflex: 100% stenosis      Assessment:  IFR of the LAD performed [place] system 1.0 decreased to 0.66 positive  EBU 3.56 Swiss      Recommendations:  Proceed with AVR with possible bypass of the LAD      Description of Procedure:   After written informed consent was obtained from the patient, patient was brought to the cardiac catheterization laboratory.  Patient was prepped and draped in the usual sterile fashion. Lidocaine 1% was used to infiltrate the left groin for local anesthesia and a 6 Swiss introducer sheath was inserted into the left femoral artery.      Selective coronary angiography performed with JR4 catheter for RCA and JL4 catheter for LCA.  Angiography performed in standard projections.        Selective left femoral angiogram done assess anatomy for closure.      Specimen sent to: No specimen collected  Estimated blood loss: 10 cc  Closure: Mynx       IV was maintained by RN and moderate conscious sedation of versed and fentanyl was given.  Patient was assessed and monitoring of oxygen, heart rate and blood pressure by nurse and myself during the exam 41 minutes.      David Osullivan MD  25

## 2025-06-18 NOTE — INTERVAL H&P NOTE
Pre-op Diagnosis: * No pre-op diagnosis entered *    The above referenced H&P was reviewed by David Osullivan MD on 6/18/2025, the patient was examined and no significant changes have occurred in the patient's condition since the H&P was performed.  I discussed with the patient and/or legal representative the potential benefits, risks and side effects of this procedure; the likelihood of the patient achieving goals; and potential problems that might occur during recuperation.  I discussed reasonable alternatives to the procedure, including risks, benefits and side effects related to the alternatives and risks related to not receiving this procedure.  We will proceed with procedure as planned.

## 2025-06-18 NOTE — DISCHARGE INSTRUCTIONS
HOME CARE INSTRUCTIONS FOLLOWING CORONARY ANGIOGRAPHY, ANGIOPLASTY (PTCA/PTA) OR INSERTION OF STENT IN THE CORONARY      Activity  DO NOT drive after the procedure.  You may resume driving late the following day according to the nurse or physician's instructions  Plan on resting and relaxing tonight and tomorrow  Resume your normal activity after 48 hours, or as instructed by your physician  Avoid drinking alcohol for the next 24 hours  If the groin site was used, avoid repeated stair use and excessive walking for the next 24 hours  Do not lift anything over 10 pounds for 1 week    What is Normal?  A small lump at the procedure site associated with mild tenderness when touched  The procedure site may be bruised or discolored  There may be a small amount of drainage on the bandage    Special Instructions  Drink plenty of fluids during the next 24 hours to \"flush\" the contrast from your system  Do NOT take meformin/glucophage containing medications for 48 hours  The bandage is to remain in place for 24 hours  Keep the bandage clean and dry  Do NOT shower for 24 hours  After 24 hours, you must remove the bandage  You should shower after removing the bandage, and wash the procedure site gently with soap and water  DO NOT submerge the procedure site for 1 week (no bath tubs or pools)    When you should NOTIFY YOUR PHYSICIAN  Bleeding can occur at the procedure site - both on the outside of the skin and/or beneath the surface of the skin  Swelling or a large lump at the procedure site can occur, which may be accompanied by moderate to severe pain    If either of the above occurs, lie down flat.  Have someone apply pressure to the procedure site with both hands, as instructed by the nurse.  Hold pressure for 20 minutes and the bleeding should stop.  Notify your physician of the occurrence  If the bleeding does not stop, call 911 and continue to apply pressure    If you experience signs of a fever, temperature > 101°,  chills, infection (redness, swelling, thick yellow drainage, or a foul odor from the procedure site)  If you notice any numbness, tingling, or loss of feeling to your leg or foot or groin access  If you notice any numbness, tingling, or loss of feeling to your fingers or hand, if wrist access was utilized    May call on call PCI RN at [phone] for any problems or concerns        Closure device utilized  type of closure used:      Mynx      Additional Instructions:  Leave the dressing/band-aid over the site for 24 hours.   You may feel a \"pea or olive pit\" sized lumpand/or note mild tenderness in the groin area for approximately 5-7 days.         The collagen will be resorbed (broken down) by your body in approximately 6-12 weeks.     See appropriate pamphlet information   Continue to HOLD all medications pre-surgery and as instructed by the surgeon's office.

## 2025-06-19 ENCOUNTER — APPOINTMENT (OUTPATIENT)
Dept: GENERAL RADIOLOGY | Facility: HOSPITAL | Age: 60
DRG: 219 | End: 2025-06-19
Attending: INTERNAL MEDICINE
Payer: MEDICAID

## 2025-06-19 ENCOUNTER — HOSPITAL ENCOUNTER (INPATIENT)
Facility: HOSPITAL | Age: 60
LOS: 14 days | Discharge: HOME HEALTH | DRG: 219 | End: 2025-07-03
Attending: THORACIC SURGERY (CARDIOTHORACIC VASCULAR SURGERY) | Admitting: THORACIC SURGERY (CARDIOTHORACIC VASCULAR SURGERY)
Payer: MEDICAID

## 2025-06-19 ENCOUNTER — ANESTHESIA (OUTPATIENT)
Dept: SURGERY | Facility: HOSPITAL | Age: 60
End: 2025-06-19
Payer: MEDICAID

## 2025-06-19 ENCOUNTER — APPOINTMENT (OUTPATIENT)
Dept: GENERAL RADIOLOGY | Facility: HOSPITAL | Age: 60
DRG: 219 | End: 2025-06-19
Attending: CLINICAL NURSE SPECIALIST
Payer: MEDICAID

## 2025-06-19 DIAGNOSIS — Z01.818 PRE-OP EVALUATION: Primary | ICD-10-CM

## 2025-06-19 DIAGNOSIS — Z95.1 S/P CABG (CORONARY ARTERY BYPASS GRAFT): ICD-10-CM

## 2025-06-19 DIAGNOSIS — I06.0 RHEUMATIC AORTIC STENOSIS: ICD-10-CM

## 2025-06-19 PROBLEM — I35.0 AORTIC STENOSIS: Status: ACTIVE | Noted: 2025-06-19

## 2025-06-19 LAB
ALBUMIN SERPL-MCNC: 3.3 G/DL (ref 3.2–4.8)
ALP LIVER SERPL-CCNC: 44 U/L (ref 45–117)
ALT SERPL-CCNC: 12 U/L (ref 10–49)
ANION GAP SERPL CALC-SCNC: 8 MMOL/L (ref 0–18)
APTT PPP: 30.6 SECONDS (ref 23–36)
AST SERPL-CCNC: 71 U/L (ref ?–34)
ATRIAL RATE: 103 BPM
BASE EXCESS BLD CALC-SCNC: 2.3 MMOL/L (ref ?–2)
BASE EXCESS BLD CALC-SCNC: 2.8 MMOL/L (ref ?–2)
BILIRUB DIRECT SERPL-MCNC: 0.1 MG/DL (ref ?–0.3)
BILIRUB SERPL-MCNC: 0.4 MG/DL (ref 0.2–1.1)
BUN BLD-MCNC: 13 MG/DL (ref 9–23)
BUN/CREAT SERPL: 16.7 (ref 10–20)
CA-I BLD-SCNC: 1.24 MMOL/L (ref 0.95–1.32)
CALCIUM BLD-MCNC: 9.2 MG/DL (ref 8.7–10.4)
CHLORIDE SERPL-SCNC: 111 MMOL/L (ref 98–112)
CO2 SERPL-SCNC: 27 MMOL/L (ref 21–32)
COHGB MFR BLD: 1.5 % (ref 0–3)
CREAT BLD-MCNC: 0.78 MG/DL (ref 0.7–1.3)
DEPRECATED RDW RBC AUTO: 46.4 FL (ref 35.1–46.3)
DEPRECATED RDW RBC AUTO: 47.8 FL (ref 35.1–46.3)
EGFRCR SERPLBLD CKD-EPI 2021: 101 ML/MIN/1.73M2 (ref 60–?)
ERYTHROCYTE [DISTWIDTH] IN BLOOD BY AUTOMATED COUNT: 15.8 % (ref 11–15)
ERYTHROCYTE [DISTWIDTH] IN BLOOD BY AUTOMATED COUNT: 15.8 % (ref 11–15)
FIBRINOGEN PPP-MCNC: 279 MG/DL (ref 200–480)
GLUCOSE BLD-MCNC: 183 MG/DL (ref 70–99)
GLUCOSE BLDC GLUCOMTR-MCNC: 122 MG/DL (ref 70–99)
GLUCOSE BLDC GLUCOMTR-MCNC: 132 MG/DL (ref 70–99)
GLUCOSE BLDC GLUCOMTR-MCNC: 136 MG/DL (ref 70–99)
GLUCOSE BLDC GLUCOMTR-MCNC: 140 MG/DL (ref 70–99)
GLUCOSE BLDC GLUCOMTR-MCNC: 147 MG/DL (ref 70–99)
GLUCOSE BLDC GLUCOMTR-MCNC: 150 MG/DL (ref 70–99)
GLUCOSE BLDC GLUCOMTR-MCNC: 165 MG/DL (ref 70–99)
GLUCOSE BLDC GLUCOMTR-MCNC: 172 MG/DL (ref 70–99)
GLUCOSE BLDC GLUCOMTR-MCNC: 184 MG/DL (ref 70–99)
GLUCOSE BLDC GLUCOMTR-MCNC: 210 MG/DL (ref 70–99)
HCO3 BLDA-SCNC: 26.7 MEQ/L (ref 21–27)
HCO3 BLDA-SCNC: 27.1 MEQ/L (ref 21–27)
HCT VFR BLD AUTO: 24.9 % (ref 39–53)
HCT VFR BLD AUTO: 27.7 % (ref 39–53)
HCT VFR BLD AUTO: 27.9 % (ref 39–53)
HGB BLD-MCNC: 8 G/DL (ref 13–17.5)
HGB BLD-MCNC: 8.3 G/DL (ref 13–17.5)
HGB BLD-MCNC: 8.8 G/DL (ref 13–17.5)
HGB BLD-MCNC: 9 G/DL (ref 13–17.5)
INR BLD: 1.17 (ref 0.8–1.2)
ISTAT ACTIVATED CLOTTING TIME: 129 SECONDS (ref 125–137)
ISTAT ACTIVATED CLOTTING TIME: 130 SECONDS (ref 125–137)
ISTAT ACTIVATED CLOTTING TIME: 423 SECONDS (ref 125–137)
ISTAT ACTIVATED CLOTTING TIME: 441 SECONDS (ref 125–137)
ISTAT ACTIVATED CLOTTING TIME: 458 SECONDS (ref 125–137)
ISTAT ACTIVATED CLOTTING TIME: 504 SECONDS (ref 125–137)
ISTAT ACTIVATED CLOTTING TIME: 533 SECONDS (ref 125–137)
ISTAT ACTIVATED CLOTTING TIME: 550 SECONDS (ref 125–137)
LACTATE BLD-SCNC: 2.1 MMOL/L (ref 0.5–2)
LACTATE SERPL-SCNC: 1.8 MMOL/L (ref 0.5–2)
LACTATE SERPL-SCNC: 2.5 MMOL/L (ref 0.5–2)
MAGNESIUM SERPL-MCNC: 2 MG/DL (ref 1.6–2.6)
MCH RBC QN AUTO: 25.4 PG (ref 26–34)
MCH RBC QN AUTO: 26.3 PG (ref 26–34)
MCHC RBC AUTO-ENTMCNC: 31.5 G/DL (ref 31–37)
MCHC RBC AUTO-ENTMCNC: 32.1 G/DL (ref 31–37)
MCV RBC AUTO: 80.6 FL (ref 80–100)
MCV RBC AUTO: 81.9 FL (ref 80–100)
METHGB MFR BLD: 1.3 % SAT (ref 0.4–1.5)
NITRIC OXIDE: 40 PPM
NITRIC OXIDE: 40 PPM
O2 CT BLD-SCNC: 11.5 VOL% (ref 15–23)
O2 CT BLD-SCNC: 11.8 VOL% (ref 15–23)
O2/TOTAL GAS SETTING VFR VENT: 55 %
O2/TOTAL GAS SETTING VFR VENT: 55 %
OSMOLALITY SERPL CALC.SUM OF ELEC: 307 MOSM/KG (ref 275–295)
P AXIS: 80 DEGREES
P-R INTERVAL: 166 MS
PCO2 BLDA: 38 MM HG (ref 35–45)
PCO2 BLDA: 48 MM HG (ref 35–45)
PEEP SETTING VENT: 5 CM H2O
PEEP SETTING VENT: 5 CM H2O
PH BLDA: 7.38 [PH] (ref 7.35–7.45)
PH BLDA: 7.45 [PH] (ref 7.35–7.45)
PLATELET # BLD AUTO: 182 10(3)UL (ref 150–450)
PLATELET # BLD AUTO: 199 10(3)UL (ref 150–450)
PO2 BLDA: 136 MM HG (ref 80–100)
PO2 BLDA: 194 MM HG (ref 80–100)
POTASSIUM BLD-SCNC: 3.2 MMOL/L (ref 3.6–5.1)
POTASSIUM SERPL-SCNC: 3.3 MMOL/L (ref 3.5–5.1)
PROT SERPL-MCNC: 5 G/DL (ref 5.7–8.2)
PROTHROMBIN TIME: 15.6 SECONDS (ref 11.6–14.8)
PUNCTURE CHARGE: NO
PUNCTURE CHARGE: NO
Q-T INTERVAL: 396 MS
QRS DURATION: 142 MS
QTC CALCULATION (BEZET): 518 MS
R AXIS: -52 DEGREES
RBC # BLD AUTO: 3.04 X10(6)UL (ref 4.3–5.7)
RBC # BLD AUTO: 3.46 X10(6)UL (ref 4.3–5.7)
RESP RATE: 12 BPM
RESP RATE: 12 BPM
RH BLOOD TYPE: POSITIVE
SAO2 % BLDA: 97.7 % (ref 94–100)
SAO2 % BLDA: 97.8 % (ref 94–100)
SODIUM BLD-SCNC: 140 MMOL/L (ref 135–145)
SODIUM SERPL-SCNC: 146 MMOL/L (ref 136–145)
SPECIMEN VOL 24H UR: 500 ML
SPECIMEN VOL 24H UR: 500 ML
T AXIS: 133 DEGREES
TSI SER-ACNC: 0.7 UIU/ML (ref 0.55–4.78)
VENTRICULAR RATE: 103 BPM
WBC # BLD AUTO: 8.8 X10(3) UL (ref 4–11)
WBC # BLD AUTO: 9.7 X10(3) UL (ref 4–11)

## 2025-06-19 PROCEDURE — 71045 X-RAY EXAM CHEST 1 VIEW: CPT | Performed by: CLINICAL NURSE SPECIALIST

## 2025-06-19 PROCEDURE — 30233K1 TRANSFUSION OF NONAUTOLOGOUS FROZEN PLASMA INTO PERIPHERAL VEIN, PERCUTANEOUS APPROACH: ICD-10-PCS | Performed by: HOSPITALIST

## 2025-06-19 PROCEDURE — 71045 X-RAY EXAM CHEST 1 VIEW: CPT | Performed by: INTERNAL MEDICINE

## 2025-06-19 PROCEDURE — 99233 SBSQ HOSP IP/OBS HIGH 50: CPT | Performed by: INTERNAL MEDICINE

## 2025-06-19 PROCEDURE — 36430 TRANSFUSION BLD/BLD COMPNT: CPT | Performed by: ANESTHESIOLOGY

## 2025-06-19 PROCEDURE — 02RF08Z REPLACEMENT OF AORTIC VALVE WITH ZOOPLASTIC TISSUE, OPEN APPROACH: ICD-10-PCS | Performed by: THORACIC SURGERY (CARDIOTHORACIC VASCULAR SURGERY)

## 2025-06-19 PROCEDURE — 02L70CK OCCLUSION OF LEFT ATRIAL APPENDAGE WITH EXTRALUMINAL DEVICE, OPEN APPROACH: ICD-10-PCS | Performed by: THORACIC SURGERY (CARDIOTHORACIC VASCULAR SURGERY)

## 2025-06-19 PROCEDURE — 30233N1 TRANSFUSION OF NONAUTOLOGOUS RED BLOOD CELLS INTO PERIPHERAL VEIN, PERCUTANEOUS APPROACH: ICD-10-PCS | Performed by: HOSPITALIST

## 2025-06-19 PROCEDURE — 06BP4ZZ EXCISION OF RIGHT SAPHENOUS VEIN, PERCUTANEOUS ENDOSCOPIC APPROACH: ICD-10-PCS | Performed by: THORACIC SURGERY (CARDIOTHORACIC VASCULAR SURGERY)

## 2025-06-19 PROCEDURE — 02100Z9 BYPASS CORONARY ARTERY, ONE ARTERY FROM LEFT INTERNAL MAMMARY, OPEN APPROACH: ICD-10-PCS | Performed by: THORACIC SURGERY (CARDIOTHORACIC VASCULAR SURGERY)

## 2025-06-19 PROCEDURE — 04HY32Z INSERTION OF MONITORING DEVICE INTO LOWER ARTERY, PERCUTANEOUS APPROACH: ICD-10-PCS | Performed by: THORACIC SURGERY (CARDIOTHORACIC VASCULAR SURGERY)

## 2025-06-19 PROCEDURE — 93312 ECHO TRANSESOPHAGEAL: CPT | Performed by: ANESTHESIOLOGY

## 2025-06-19 PROCEDURE — 30233M1 TRANSFUSION OF NONAUTOLOGOUS PLASMA CRYOPRECIPITATE INTO PERIPHERAL VEIN, PERCUTANEOUS APPROACH: ICD-10-PCS | Performed by: HOSPITALIST

## 2025-06-19 PROCEDURE — 5A02210 ASSISTANCE WITH CARDIAC OUTPUT USING BALLOON PUMP, CONTINUOUS: ICD-10-PCS | Performed by: THORACIC SURGERY (CARDIOTHORACIC VASCULAR SURGERY)

## 2025-06-19 PROCEDURE — 021009W BYPASS CORONARY ARTERY, ONE ARTERY FROM AORTA WITH AUTOLOGOUS VENOUS TISSUE, OPEN APPROACH: ICD-10-PCS | Performed by: THORACIC SURGERY (CARDIOTHORACIC VASCULAR SURGERY)

## 2025-06-19 PROCEDURE — 30233H1 TRANSFUSION OF NONAUTOLOGOUS WHOLE BLOOD INTO PERIPHERAL VEIN, PERCUTANEOUS APPROACH: ICD-10-PCS | Performed by: HOSPITALIST

## 2025-06-19 PROCEDURE — 30233R1 TRANSFUSION OF NONAUTOLOGOUS PLATELETS INTO PERIPHERAL VEIN, PERCUTANEOUS APPROACH: ICD-10-PCS | Performed by: HOSPITALIST

## 2025-06-19 PROCEDURE — 5A1955Z RESPIRATORY VENTILATION, GREATER THAN 96 CONSECUTIVE HOURS: ICD-10-PCS | Performed by: HOSPITALIST

## 2025-06-19 PROCEDURE — 99223 1ST HOSP IP/OBS HIGH 75: CPT | Performed by: HOSPITALIST

## 2025-06-19 PROCEDURE — 5A1221Z PERFORMANCE OF CARDIAC OUTPUT, CONTINUOUS: ICD-10-PCS | Performed by: THORACIC SURGERY (CARDIOTHORACIC VASCULAR SURGERY)

## 2025-06-19 PROCEDURE — B24BZZ4 ULTRASONOGRAPHY OF HEART WITH AORTA, TRANSESOPHAGEAL: ICD-10-PCS | Performed by: THORACIC SURGERY (CARDIOTHORACIC VASCULAR SURGERY)

## 2025-06-19 DEVICE — PLATE BNE 100DEG 4 H L SHP STERNALOCK BLU PRI: Type: IMPLANTABLE DEVICE | Site: CHEST | Status: FUNCTIONAL

## 2025-06-19 DEVICE — DEVICE ATRICLIP FLEXV 40 SMALL: Type: IMPLANTABLE DEVICE | Site: HEART | Status: FUNCTIONAL

## 2025-06-19 DEVICE — IMPLANTABLE DEVICE: Type: IMPLANTABLE DEVICE | Site: HEART | Status: FUNCTIONAL

## 2025-06-19 DEVICE — PLATE BNE 8 H BLU X PRI CLSR SYS STERNALOCK: Type: IMPLANTABLE DEVICE | Site: CHEST | Status: FUNCTIONAL

## 2025-06-19 DEVICE — SCREW BNE 2.4X14MM CANC LOK SLF DRL: Type: IMPLANTABLE DEVICE | Site: CHEST | Status: FUNCTIONAL

## 2025-06-19 DEVICE — PLATE BNE 8 H JL SHP STERNALOCK BLU PRI CLSR: Type: IMPLANTABLE DEVICE | Site: CHEST | Status: FUNCTIONAL

## 2025-06-19 DEVICE — SCREW BNE 2.4X12MM G CANC TI SLF DRL: Type: IMPLANTABLE DEVICE | Site: CHEST | Status: FUNCTIONAL

## 2025-06-19 RX ORDER — TAMSULOSIN HYDROCHLORIDE 0.4 MG/1
0.4 CAPSULE ORAL 2 TIMES DAILY
Status: DISCONTINUED | OUTPATIENT
Start: 2025-06-20 | End: 2025-07-03

## 2025-06-19 RX ORDER — NITROGLYCERIN 20 MG/100ML
INJECTION INTRAVENOUS CONTINUOUS PRN
Status: DISCONTINUED | OUTPATIENT
Start: 2025-06-19 | End: 2025-06-23

## 2025-06-19 RX ORDER — DOCUSATE SODIUM 100 MG/1
100 CAPSULE, LIQUID FILLED ORAL 2 TIMES DAILY
Status: DISCONTINUED | OUTPATIENT
Start: 2025-06-20 | End: 2025-06-24

## 2025-06-19 RX ORDER — DOBUTAMINE HYDROCHLORIDE 200 MG/100ML
2 INJECTION INTRAVENOUS CONTINUOUS PRN
Status: DISCONTINUED | OUTPATIENT
Start: 2025-06-19 | End: 2025-06-23

## 2025-06-19 RX ORDER — DEXTROSE MONOHYDRATE 25 G/50ML
50 INJECTION, SOLUTION INTRAVENOUS
Status: DISCONTINUED | OUTPATIENT
Start: 2025-06-19 | End: 2025-07-03

## 2025-06-19 RX ORDER — POTASSIUM CHLORIDE 29.8 MG/ML
40 INJECTION INTRAVENOUS AS NEEDED
Status: DISCONTINUED | OUTPATIENT
Start: 2025-06-19 | End: 2025-07-03

## 2025-06-19 RX ORDER — ETOMIDATE 2 MG/ML
INJECTION INTRAVENOUS AS NEEDED
Status: DISCONTINUED | OUTPATIENT
Start: 2025-06-19 | End: 2025-06-20 | Stop reason: SURG

## 2025-06-19 RX ORDER — ASCORBIC ACID 500 MG
1000 TABLET ORAL ONCE
Status: COMPLETED | OUTPATIENT
Start: 2025-06-19 | End: 2025-06-19

## 2025-06-19 RX ORDER — ENOXAPARIN SODIUM 100 MG/ML
40 INJECTION SUBCUTANEOUS DAILY
Status: DISCONTINUED | OUTPATIENT
Start: 2025-06-20 | End: 2025-07-03

## 2025-06-19 RX ORDER — VANCOMYCIN HYDROCHLORIDE 1 G/20ML
INJECTION, POWDER, LYOPHILIZED, FOR SOLUTION INTRAVENOUS AS NEEDED
Status: DISCONTINUED | OUTPATIENT
Start: 2025-06-19 | End: 2025-06-19 | Stop reason: HOSPADM

## 2025-06-19 RX ORDER — MIDAZOLAM HYDROCHLORIDE 1 MG/ML
INJECTION INTRAMUSCULAR; INTRAVENOUS AS NEEDED
Status: DISCONTINUED | OUTPATIENT
Start: 2025-06-19 | End: 2025-06-20 | Stop reason: SURG

## 2025-06-19 RX ORDER — ROCURONIUM BROMIDE 10 MG/ML
INJECTION, SOLUTION INTRAVENOUS AS NEEDED
Status: DISCONTINUED | OUTPATIENT
Start: 2025-06-19 | End: 2025-06-19

## 2025-06-19 RX ORDER — MORPHINE SULFATE 2 MG/ML
2 INJECTION, SOLUTION INTRAMUSCULAR; INTRAVENOUS ONCE
Status: COMPLETED | OUTPATIENT
Start: 2025-06-19 | End: 2025-06-19

## 2025-06-19 RX ORDER — MAGNESIUM SULFATE HEPTAHYDRATE 40 MG/ML
2 INJECTION, SOLUTION INTRAVENOUS AS NEEDED
Status: DISCONTINUED | OUTPATIENT
Start: 2025-06-19 | End: 2025-07-03

## 2025-06-19 RX ORDER — CHLORHEXIDINE GLUCONATE ORAL RINSE 1.2 MG/ML
15 SOLUTION DENTAL 2 TIMES DAILY
Status: DISCONTINUED | OUTPATIENT
Start: 2025-06-19 | End: 2025-06-30

## 2025-06-19 RX ORDER — CEFAZOLIN SODIUM 1 G/3ML
INJECTION, POWDER, FOR SOLUTION INTRAMUSCULAR; INTRAVENOUS AS NEEDED
Status: DISCONTINUED | OUTPATIENT
Start: 2025-06-19 | End: 2025-06-19 | Stop reason: HOSPADM

## 2025-06-19 RX ORDER — CLOPIDOGREL BISULFATE 75 MG/1
75 TABLET ORAL DAILY
Status: DISCONTINUED | OUTPATIENT
Start: 2025-06-20 | End: 2025-07-03

## 2025-06-19 RX ORDER — DEXMEDETOMIDINE HYDROCHLORIDE 4 UG/ML
INJECTION, SOLUTION INTRAVENOUS CONTINUOUS
Status: DISCONTINUED | OUTPATIENT
Start: 2025-06-19 | End: 2025-06-19

## 2025-06-19 RX ORDER — BISACODYL 10 MG
10 SUPPOSITORY, RECTAL RECTAL
Status: DISCONTINUED | OUTPATIENT
Start: 2025-06-19 | End: 2025-07-03

## 2025-06-19 RX ORDER — METOCLOPRAMIDE HYDROCHLORIDE 5 MG/ML
10 INJECTION INTRAMUSCULAR; INTRAVENOUS ONCE
Status: COMPLETED | OUTPATIENT
Start: 2025-06-19 | End: 2025-06-19

## 2025-06-19 RX ORDER — ALBUMIN HUMAN 50 G/1000ML
12.5 SOLUTION INTRAVENOUS ONCE AS NEEDED
Status: DISPENSED | OUTPATIENT
Start: 2025-06-19 | End: 2025-06-22

## 2025-06-19 RX ORDER — GABAPENTIN 300 MG/1
300 CAPSULE ORAL ONCE
Status: COMPLETED | OUTPATIENT
Start: 2025-06-19 | End: 2025-06-19

## 2025-06-19 RX ORDER — HYDROCODONE BITARTRATE AND ACETAMINOPHEN 5; 325 MG/1; MG/1
1 TABLET ORAL EVERY 4 HOURS PRN
Status: DISCONTINUED | OUTPATIENT
Start: 2025-06-19 | End: 2025-07-03

## 2025-06-19 RX ORDER — METOCLOPRAMIDE 10 MG/1
10 TABLET ORAL ONCE
Status: COMPLETED | OUTPATIENT
Start: 2025-06-19 | End: 2025-06-19

## 2025-06-19 RX ORDER — HEPARIN SODIUM 1000 [USP'U]/ML
INJECTION, SOLUTION INTRAVENOUS; SUBCUTANEOUS AS NEEDED
Status: DISCONTINUED | OUTPATIENT
Start: 2025-06-19 | End: 2025-06-20 | Stop reason: SURG

## 2025-06-19 RX ORDER — MAGNESIUM SULFATE 1 G/100ML
1 INJECTION INTRAVENOUS AS NEEDED
Status: DISCONTINUED | OUTPATIENT
Start: 2025-06-19 | End: 2025-07-03

## 2025-06-19 RX ORDER — METOCLOPRAMIDE HYDROCHLORIDE 5 MG/ML
10 INJECTION INTRAMUSCULAR; INTRAVENOUS EVERY 6 HOURS
Status: DISCONTINUED | OUTPATIENT
Start: 2025-06-19 | End: 2025-06-22

## 2025-06-19 RX ORDER — ONDANSETRON 2 MG/ML
4 INJECTION INTRAMUSCULAR; INTRAVENOUS EVERY 6 HOURS PRN
Status: DISCONTINUED | OUTPATIENT
Start: 2025-06-19 | End: 2025-06-19 | Stop reason: ALTCHOICE

## 2025-06-19 RX ORDER — POTASSIUM CHLORIDE 14.9 MG/ML
20 INJECTION INTRAVENOUS AS NEEDED
Status: DISCONTINUED | OUTPATIENT
Start: 2025-06-19 | End: 2025-07-03

## 2025-06-19 RX ORDER — METOPROLOL TARTRATE 25 MG/1
25 TABLET, FILM COATED ORAL
Status: DISCONTINUED | OUTPATIENT
Start: 2025-06-20 | End: 2025-06-26

## 2025-06-19 RX ORDER — METOPROLOL TARTRATE 25 MG/1
25 TABLET, FILM COATED ORAL ONCE AS NEEDED
Status: COMPLETED | OUTPATIENT
Start: 2025-06-19 | End: 2025-06-19

## 2025-06-19 RX ORDER — ASCORBIC ACID 500 MG
500 TABLET ORAL 3 TIMES DAILY
Status: DISCONTINUED | OUTPATIENT
Start: 2025-06-20 | End: 2025-06-30

## 2025-06-19 RX ORDER — NICOTINE POLACRILEX 4 MG
30 LOZENGE BUCCAL
Status: DISCONTINUED | OUTPATIENT
Start: 2025-06-19 | End: 2025-07-03

## 2025-06-19 RX ORDER — METOPROLOL TARTRATE 25 MG/1
25 TABLET, FILM COATED ORAL ONCE
Status: DISCONTINUED | OUTPATIENT
Start: 2025-06-19 | End: 2025-06-19 | Stop reason: HOSPADM

## 2025-06-19 RX ORDER — DOXEPIN HYDROCHLORIDE 50 MG/1
1 CAPSULE ORAL DAILY
Status: DISCONTINUED | OUTPATIENT
Start: 2025-06-20 | End: 2025-06-23

## 2025-06-19 RX ORDER — AMIODARONE HYDROCHLORIDE 200 MG/1
200 TABLET ORAL
Status: DISCONTINUED | OUTPATIENT
Start: 2025-06-20 | End: 2025-06-25

## 2025-06-19 RX ORDER — FAMOTIDINE 20 MG/1
20 TABLET, FILM COATED ORAL 2 TIMES DAILY
Status: DISCONTINUED | OUTPATIENT
Start: 2025-06-19 | End: 2025-06-30

## 2025-06-19 RX ORDER — MORPHINE SULFATE 2 MG/ML
2 INJECTION, SOLUTION INTRAMUSCULAR; INTRAVENOUS EVERY 2 HOUR PRN
Status: DISCONTINUED | OUTPATIENT
Start: 2025-06-19 | End: 2025-06-30

## 2025-06-19 RX ORDER — SODIUM CHLORIDE 9 MG/ML
83 INJECTION, SOLUTION INTRAVENOUS CONTINUOUS
Status: DISCONTINUED | OUTPATIENT
Start: 2025-06-19 | End: 2025-06-22

## 2025-06-19 RX ORDER — FAMOTIDINE 10 MG/ML
20 INJECTION, SOLUTION INTRAVENOUS 2 TIMES DAILY
Status: DISCONTINUED | OUTPATIENT
Start: 2025-06-19 | End: 2025-06-30

## 2025-06-19 RX ORDER — ACETAMINOPHEN 325 MG/1
650 TABLET ORAL EVERY 4 HOURS PRN
Status: DISCONTINUED | OUTPATIENT
Start: 2025-06-19 | End: 2025-07-03

## 2025-06-19 RX ORDER — DEXTROSE MONOHYDRATE AND SODIUM CHLORIDE 5; .45 G/100ML; G/100ML
INJECTION, SOLUTION INTRAVENOUS CONTINUOUS
Status: DISCONTINUED | OUTPATIENT
Start: 2025-06-19 | End: 2025-06-24

## 2025-06-19 RX ORDER — PHENYLEPHRINE HCL 10 MG/ML
VIAL (ML) INJECTION AS NEEDED
Status: DISCONTINUED | OUTPATIENT
Start: 2025-06-19 | End: 2025-06-20 | Stop reason: SURG

## 2025-06-19 RX ORDER — SENNOSIDES 8.6 MG
8.6 TABLET ORAL 2 TIMES DAILY
Status: DISCONTINUED | OUTPATIENT
Start: 2025-06-20 | End: 2025-07-03

## 2025-06-19 RX ORDER — SODIUM CHLORIDE 9 MG/ML
INJECTION, SOLUTION INTRAVENOUS CONTINUOUS
Status: DISCONTINUED | OUTPATIENT
Start: 2025-06-19 | End: 2025-06-29

## 2025-06-19 RX ORDER — MILRINONE LACTATE 0.2 MG/ML
INJECTION, SOLUTION INTRAVENOUS
Status: DISCONTINUED | OUTPATIENT
Start: 2025-06-19 | End: 2025-06-19 | Stop reason: HOSPADM

## 2025-06-19 RX ORDER — ASPIRIN 81 MG/1
81 TABLET ORAL DAILY
Status: DISCONTINUED | OUTPATIENT
Start: 2025-06-20 | End: 2025-06-24

## 2025-06-19 RX ORDER — HYDROCODONE BITARTRATE AND ACETAMINOPHEN 5; 325 MG/1; MG/1
2 TABLET ORAL EVERY 4 HOURS PRN
Status: DISCONTINUED | OUTPATIENT
Start: 2025-06-19 | End: 2025-06-26

## 2025-06-19 RX ORDER — MORPHINE SULFATE 4 MG/ML
4 INJECTION, SOLUTION INTRAMUSCULAR; INTRAVENOUS EVERY 2 HOUR PRN
Status: DISCONTINUED | OUTPATIENT
Start: 2025-06-19 | End: 2025-06-23

## 2025-06-19 RX ORDER — ACETAMINOPHEN 10 MG/ML
1000 INJECTION, SOLUTION INTRAVENOUS EVERY 8 HOURS
Status: COMPLETED | OUTPATIENT
Start: 2025-06-19 | End: 2025-06-20

## 2025-06-19 RX ORDER — POLYETHYLENE GLYCOL 3350 17 G/17G
17 POWDER, FOR SOLUTION ORAL DAILY PRN
Status: DISCONTINUED | OUTPATIENT
Start: 2025-06-19 | End: 2025-06-30

## 2025-06-19 RX ORDER — LORAZEPAM 1 MG/1
1 TABLET ORAL ONCE
Status: COMPLETED | OUTPATIENT
Start: 2025-06-19 | End: 2025-06-19

## 2025-06-19 RX ORDER — FAMOTIDINE 10 MG/ML
20 INJECTION, SOLUTION INTRAVENOUS ONCE
Status: COMPLETED | OUTPATIENT
Start: 2025-06-19 | End: 2025-06-19

## 2025-06-19 RX ORDER — FAMOTIDINE 20 MG/1
20 TABLET, FILM COATED ORAL ONCE
Status: COMPLETED | OUTPATIENT
Start: 2025-06-19 | End: 2025-06-19

## 2025-06-19 RX ORDER — NICOTINE POLACRILEX 4 MG
15 LOZENGE BUCCAL
Status: DISCONTINUED | OUTPATIENT
Start: 2025-06-19 | End: 2025-07-03

## 2025-06-19 RX ORDER — DEXMEDETOMIDINE HYDROCHLORIDE 4 UG/ML
INJECTION, SOLUTION INTRAVENOUS CONTINUOUS
Status: DISCONTINUED | OUTPATIENT
Start: 2025-06-19 | End: 2025-06-27

## 2025-06-19 RX ADMIN — MIDAZOLAM HYDROCHLORIDE 2 MG: 1 INJECTION INTRAMUSCULAR; INTRAVENOUS at 07:18:00

## 2025-06-19 RX ADMIN — PHENYLEPHRINE HCL 100 MCG: 10 MG/ML VIAL (ML) INJECTION at 07:41:00

## 2025-06-19 RX ADMIN — HEPARIN SODIUM 37000 UNITS: 1000 INJECTION, SOLUTION INTRAVENOUS; SUBCUTANEOUS at 09:17:00

## 2025-06-19 RX ADMIN — CALCIUM CHLORIDE 1 G: 100 INJECTION INTRAVENOUS; INTRAVENTRICULAR at 14:03:00

## 2025-06-19 RX ADMIN — MAGNESIUM SULFATE HEPTAHYDRATE 1 G: 500 INJECTION, SOLUTION INTRAMUSCULAR; INTRAVENOUS at 12:15:00

## 2025-06-19 RX ADMIN — ETOMIDATE 14 MG: 2 INJECTION INTRAVENOUS at 07:23:00

## 2025-06-19 RX ADMIN — INDOMETHACIN 50 ML: 25 CAPSULE ORAL at 14:03:00

## 2025-06-19 RX ADMIN — ROCURONIUM BROMIDE 70 MG: 10 INJECTION, SOLUTION INTRAVENOUS at 07:23:00

## 2025-06-19 RX ADMIN — MIDAZOLAM HYDROCHLORIDE 2 MG: 1 INJECTION INTRAMUSCULAR; INTRAVENOUS at 09:48:00

## 2025-06-19 RX ADMIN — ROCURONIUM BROMIDE 50 MG: 10 INJECTION, SOLUTION INTRAVENOUS at 12:15:00

## 2025-06-19 RX ADMIN — ROCURONIUM BROMIDE 50 MG: 10 INJECTION, SOLUTION INTRAVENOUS at 09:48:00

## 2025-06-19 RX ADMIN — CALCIUM CHLORIDE 1 G: 100 INJECTION INTRAVENOUS; INTRAVENTRICULAR at 13:18:00

## 2025-06-19 RX ADMIN — LIDOCAINE HYDROCHLORIDE 100 MG: 10 INJECTION, SOLUTION EPIDURAL; INFILTRATION; INTRACAUDAL; PERINEURAL at 12:15:00

## 2025-06-19 RX ADMIN — PROTAMINE SULFATE 500 MG: 10 INJECTION, SOLUTION INTRAVENOUS at 12:48:00

## 2025-06-19 RX ADMIN — MIDAZOLAM HYDROCHLORIDE 2 MG: 1 INJECTION INTRAMUSCULAR; INTRAVENOUS at 12:15:00

## 2025-06-19 RX ADMIN — ROCURONIUM BROMIDE 30 MG: 10 INJECTION, SOLUTION INTRAVENOUS at 14:50:00

## 2025-06-19 RX ADMIN — PHENYLEPHRINE HCL 100 MCG: 10 MG/ML VIAL (ML) INJECTION at 07:43:00

## 2025-06-19 NOTE — CM/SW NOTE
MDO for HH eval.     Previous CM obtained baseline assessment on 6/10- see note.     CM requested [facility] to send HH referrals. Order/F2F in for RN/PT.     PT/OT will eval when appropriate.     Massiel Vazquez, RN, BSN

## 2025-06-19 NOTE — CONSULTS
[facility] - CARDIOLOGY CONSULT NOTE    Jose Alberto Lizama Patient Status:  Inpatient    1965 MRN [id number]   Location [facility] 2W/SW Attending Leonides Pacheco MD   Hosp Day # 0 PCP Fiona Sweeney MD     Date of Admission:  2025  Date of Consult:  2025  I was asked by Leonides Pacheco MD to provide recommendations for evaluation and management of cardiac issues.  Impression:     59-year-old male history of CAD, hypertension, hyperlipidemia, diabetes, severe aortic stenosis now postop day #0    Recommendations:  1.  History of CAD difficult operation status post CABG x 2 postop day #0 LIMA to LAD and SVG to diagonal.  Severe LV dysfunction  Small RCA and  of the circumflex unable to be bypassed.  Left atrial appendage closure.  Resume aspirin and Plavix once extubated.  Continue supportive care with balloon pump Levophed and epinephrine wean off as tolerated  2.  Hyperlipidemia  Resume rosuvastatin 20 mg daily refused PCSK9 as outpatient.  Latest LDL 95,  repeat lipid panel tomorrow  3.  Diabetes mellitus  On metformin and Jardiance as outpatient currently on insulin management as per primary team.  4.  Postop hypotension continue supportive care with balloon pump.  Entresto and amlodipine, carvedilol all on hold  5.  Severe aortic stenosis status post AVR.  6.  Severe PVD.  Resume aspirin and Plavix as above      L5 consult will follow.       Reason for Consultation:   Post bypass management      History of Present Illness:   Patient is a 59 year old male who was admitted to the hospital for elective coronary artery bypass and AVR done earlier today.  Severe history of CAD, PVD, hypertension, hyperlipidemia.  Now status post CABG x 2 LIMA to LAD SVG to diagonal and 21 mm aortic valve replacement for severe aortic stenosis.  Cardiology was consulted for postop management.    Drips  Levophed 2 mcg  Epinephrine 2 mcg    Balloon pump one-to-one ratio    Cardiac output 6.1 L/min,  cardiac index 3.0.      Cardiac tests:    Past Medical History  Past Medical History[1]    Past Surgical History  Past Surgical History[2]    Family History  Family History[3]    Social History  Pediatric History   Patient Parents    Not on file     Other Topics Concern    Not on file   Social History Narrative    Not on file       No smoking or drug use.  Works as a  cleaning offices.    Current Medications:  Current Hospital Medications[4]  Prescriptions Prior to Admission[5]    Allergies  Allergies[6]    Review of Systems:   10 pt ROS performed, separate from HPI  Review of Systems:  Unable to obtain patient is intubated and sedated.  Physical Exam:   Blood pressure 151/72, pulse 72, temperature 97.9 °F (36.6 °C), temperature source Oral, resp. rate 20, height 5' 6\" (1.676 m), weight 203 lb (92.1 kg), SpO2 96%.    Scheduled Meds: Scheduled Medications[7]      Physical Exam:    General: Intubated and sedated  male no apparent distress. No respiratory or constitutional distress.  HEENT: Normocephalic, anicteric sclera, neck supple  Neck: No JVD, carotids 2+, supple  Cardiac: Regular rate. No pathologic murmur.  Distal sounds  Lungs: Coarse breath sounds   abdomen: Soft, BS-present.  Extremities: Without clubbing, cyanosis.  Peripheral pulsespresent.  Balloon pump in place  Neurologic: Limited exam intubated and sedated  Skin: Warm and dry.     Results:     Laboratory Data:  Lab Results   Component Value Date    WBC 9.7 06/19/2025    HGB 8.0 (L) 06/19/2025    HCT 24.9 (L) 06/19/2025    .0 06/19/2025    CREATSERUM 0.78 06/19/2025    BUN 13 06/19/2025     (H) 06/19/2025    K 3.3 (L) 06/19/2025     06/19/2025    CO2 27.0 06/19/2025     (H) 06/19/2025    CA 9.2 06/19/2025    ALB 4.6 06/10/2025    ALKPHO 60 06/10/2025    TP 7.0 06/10/2025    AST 21 06/10/2025    ALT 8 (L) 06/10/2025    PTT 30.6 06/19/2025    INR 1.17 06/19/2025    PTP 15.6 (H) 06/19/2025    TSH 1.18  11/15/2016    PSA 2.62 08/10/2023    MG 2.0 06/19/2025         Thank you for allowing me to participate in the care of your patient.    David Osullivan MD  [facility]  Interventional Cardiology  6/19/2025         [1]   Past Medical History:   Aortic stenosis    Back problem    BPH (benign prostatic hyperplasia)    Cardiomyopathy (HCC)    Coronary atherosclerosis    Depression    Diabetes (HCC)    Esophageal reflux    Heart valve disease    High blood pressure    High cholesterol    Peripheral vascular disease    Shortness of breath    related to smoking     Visual impairment    glasses   [2]   Past Surgical History:  Procedure Laterality Date    Cardiac catheterization  2016    [facility]    Cath drug eluting stent      Colonoscopy  04/22/2024    Colonoscopy N/A 4/22/2024    Procedure: COLONOSCOPY;  Surgeon: Attila Li MD;  Location: [facility] ENDOSCOPY    Esophagoscopy,diagnostic  04/22/2024    Dr. Li    Hernia surgery      as a child    Nasal scopy,remv part ethmoid  10/14/2020    Nasal scopy,rmv tiss maxill sinus  10/14/2020    Repair of nasal septum  10/14/2020   [3]   Family History  Problem Relation Age of Onset    Diabetes Father     Diabetes Paternal Aunt     Diabetes Paternal Uncle     Seizure Disorder Mother     No Known Problems Brother     No Known Problems Daughter     Glaucoma Neg     Macular degeneration Neg    [4]   Current Facility-Administered Medications   Medication Dose Route Frequency    sodium chloride 0.9% infusion  83 mL/hr Intravenous Continuous    norepinephrine (Levophed) 4 mg/250mL infusion premix  0.5-30 mcg/min Intravenous Continuous    sugammadex (Bridion) 200 MG/2ML injection 180 mg  2 mg/kg Intravenous Once    sodium chloride 0.9% infusion   Intravenous Continuous    acetaminophen (Ofirmev) 10 mg/mL infusion premix 1,000 mg  1,000 mg Intravenous Q8H    melatonin tab 3 mg  3 mg Oral Nightly PRN    [START ON 6/20/2025] sennosides  (Senokot) tab 8.6 mg  8.6 mg Oral BID    [START ON 6/20/2025] docusate sodium (Colace) cap 100 mg  100 mg Oral BID    polyethylene glycol (PEG 3350) (Miralax) 17 g oral packet 17 g  17 g Oral Daily PRN    bisacodyl (Dulcolax) 10 MG rectal suppository 10 mg  10 mg Rectal Daily PRN    metoclopramide (Reglan) 5 mg/mL injection 10 mg  10 mg Intravenous Q6H    ondansetron (Zofran) 4 MG/2ML injection 4 mg  4 mg Intravenous Q6H PRN    famotidine (Pepcid) tab 20 mg  20 mg Oral BID    Or    famotidine (Pepcid) 20 mg/2mL injection 20 mg  20 mg Intravenous BID    dexmedeTOMIDine in sodium chloride 0.9% (Precedex) 400 mcg/100mL infusion premix  0.2-1.5 mcg/kg/hr (Dosing Weight) Intravenous Continuous    DOBUTamine in dextrose 5% (Dobutrex) 500 mg/250mL infusion premix  2.5-20 mcg/kg/min (Dosing Weight) Intravenous Continuous PRN    nitroGLYCERIN in dextrose 5% 50 mg/250mL infusion premix  5-300 mcg/min Intravenous Continuous PRN    norepinephrine (Levophed) 4 mg/250mL infusion premix  0.5-30 mcg/min Intravenous Continuous PRN    [START ON 6/20/2025] metoprolol tartrate (Lopressor) tab 25 mg  25 mg Oral 2x Daily(Beta Blocker)    clevidipine (Cleviprex) 25 MG/50ML IV infusion  1-6 mg/hr Intravenous Continuous    potassium chloride 20 mEq/100mL IVPB premix 20 mEq  20 mEq Intravenous PRN    Or    potassium chloride 40 mEq/100mL IVPB premix (central line) 40 mEq  40 mEq Intravenous PRN    magnesium sulfate in dextrose 5% 1 g/100mL infusion premix 1 g  1 g Intravenous PRN    magnesium sulfate in sterile water for injection 2 g/50mL IVPB premix 2 g  2 g Intravenous PRN    mupirocin (Bactroban) 2% nasal ointment 1 Application  1 Application Nasal BID    chlorhexidine gluconate (Peridex) 0.12 % oral solution 15 mL  15 mL Mouth/Throat BID    [START ON 6/20/2025] multivitamin (Tab-A-Елена/Beta Carotene) tab 1 tablet  1 tablet Oral Daily    [START ON 6/20/2025] ascorbic acid (Vitamin C) tab 500 mg  500 mg Oral TID    dextrose 5%-sodium  chloride 0.45% infusion   mL/hr Intravenous Continuous    [START ON 6/20/2025] enoxaparin (Lovenox) 40 MG/0.4ML SUBQ injection 40 mg  40 mg Subcutaneous Daily    acetaminophen (Tylenol) tab 650 mg  650 mg Oral Q4H PRN    Or    HYDROcodone-acetaminophen (Norco) 5-325 MG per tab 1 tablet  1 tablet Oral Q4H PRN    Or    HYDROcodone-acetaminophen (Norco) 5-325 MG per tab 2 tablet  2 tablet Oral Q4H PRN    morphINE PF 2 MG/ML injection 2 mg  2 mg Intravenous Q2H PRN    Or    morphINE PF 4 MG/ML injection 4 mg  4 mg Intravenous Q2H PRN    sodium chloride 0.9 % IV bolus 250 mL  250 mL Intravenous PRN    albumin human (Albumin) 5% injection 12.5 g  12.5 g Intravenous Once PRN    [START ON 6/20/2025] clopidogrel (Plavix) tab 75 mg  75 mg Oral Daily    [START ON 6/20/2025] aspirin DR tab 81 mg  81 mg Oral Daily    ceFAZolin (Ancef) 2g in 10mL IV syringe premix  2 g Intravenous Q8H    And    [START ON 6/20/2025] gentamicin (Garamycin) 380 mg in sodium chloride 0.9% 100 mL IVPB  5 mg/kg (Adjusted) Intravenous Once    glucose (Dex4) 15 GM/59ML oral liquid 15 g  15 g Oral Q15 Min PRN    Or    glucose (Glutose) 40% oral gel 15 g  15 g Oral Q15 Min PRN    Or    glucose-vitamin C (Dex-4) chewable tab 4 tablet  4 tablet Oral Q15 Min PRN    Or    dextrose 50% injection 50 mL  50 mL Intravenous Q15 Min PRN    Or    glucose (Dex4) 15 GM/59ML oral liquid 30 g  30 g Oral Q15 Min PRN    Or    glucose (Glutose) 40% oral gel 30 g  30 g Oral Q15 Min PRN    Or    glucose-vitamin C (Dex-4) chewable tab 8 tablet  8 tablet Oral Q15 Min PRN    insulin regular human (Novolin R, Humulin R) 100 Units in sodium chloride 0.9% 100 mL standard infusion (100 mL)  1-28 Units/hr Intravenous Continuous    [START ON 6/20/2025] tamsulosin (Flomax) cap 0.4 mg  0.4 mg Oral BID    propofol (Diprivan) 10 mg/mL infusion premix  5-50 mcg/kg/min (Dosing Weight) Intravenous Continuous     Facility-Administered Medications Ordered in Other Encounters   Medication  Dose Route Frequency    midazolam (Versed) 2 MG/2ML injection   Intravenous PRN    fentaNYL (Sublimaze) 50 mcg/mL injection   Intravenous PRN    etomidate (Amidate) 2 mg/mL injection   Intravenous PRN    phenylephrine (Pankaj-Synephrine) 10 MG/ML injection   Intravenous PRN    aminocaproic acid BOLUS FROM BAG infusion   Intravenous PRN    aminocaproic acid (Amicar) 10 g in sodium chloride 0.9% 250 mL infusion   Intravenous Continuous PRN    heparin (Porcine) 1000 UNIT/ML injection   Intravenous PRN    propofol (Diprivan) 10 mg/mL infusion premix   Intravenous Continuous PRN   [5]   Medications Prior to Admission   Medication Sig    ENTRESTO 24-26 MG Oral Tab Entresto 24 mg-26 mg tablet, [RxNorm: 5500081]    magnesium 250 MG Oral Tab Take 1 tablet (250 mg total) by mouth every other day.    cyanocobalamin 500 MCG Oral Tab Take 1 tablet (500 mcg total) by mouth every other day.    metFORMIN HCl 1000 MG Oral Tab Take 1 tablet (1,000 mg total) by mouth 2 (two) times daily with meals.    amLODIPine 5 MG Oral Tab Take 1 tablet (5 mg total) by mouth daily.    carvedilol 25 MG Oral Tab Take 1 tablet (25 mg total) by mouth 2 (two) times daily with meals.    clopidogrel 75 MG Oral Tab Take 1 tablet (75 mg total) by mouth daily.    empagliflozin (JARDIANCE) 25 MG Oral Tab Take 1 tablet (25 mg total) by mouth daily.    furosemide 40 MG Oral Tab Take 1 tablet (40 mg total) by mouth daily.    rosuvastatin 20 MG Oral Tab Take 1 tablet (20 mg total) by mouth nightly.    tamsulosin 0.4 MG Oral Cap TAKE 2 CAPSULES BY MOUTH DAILY (Patient taking differently: Take 1 capsule (0.4 mg total) by mouth in the morning and 1 capsule (0.4 mg total) before bedtime.)    aspirin 81 MG Oral Tab EC Take 1 tablet (81 mg total) by mouth nightly.    Thiamine HCl 250 MG Oral Tab Take 1 tablet (250 mg total) by mouth every other day.    pyridoxine 100 MG Oral Tab Take 1 tablet (100 mg total) by mouth every other day.    Glucose Blood (CONTOUR NEXT TEST) In  Vitro Strip To test 2 x daily  Dx E11.9    Glucose Blood (CONTOUR NEXT TEST) In Vitro Strip To check glucose bid   Dx  E11.9    Glucose Blood (LORAINE CONTOUR TEST) In Vitro Strip To test daily    LORAINE CONTOUR NEXT TEST In Vitro Strip To test 2 x daily   [6] No Known Allergies  [7]    sugammadex  2 mg/kg Intravenous Once    acetaminophen  1,000 mg Intravenous Q8H    [START ON 6/20/2025] sennosides  8.6 mg Oral BID    [START ON 6/20/2025] docusate sodium  100 mg Oral BID    metoclopramide  10 mg Intravenous Q6H    famotidine  20 mg Oral BID    Or    famotidine  20 mg Intravenous BID    [START ON 6/20/2025] metoprolol tartrate  25 mg Oral 2x Daily(Beta Blocker)    mupirocin  1 Application Nasal BID    chlorhexidine gluconate  15 mL Mouth/Throat BID    [START ON 6/20/2025] multivitamin  1 tablet Oral Daily    [START ON 6/20/2025] ascorbic acid  500 mg Oral TID    [START ON 6/20/2025] enoxaparin  40 mg Subcutaneous Daily    [START ON 6/20/2025] clopidogrel  75 mg Oral Daily    [START ON 6/20/2025] aspirin  81 mg Oral Daily    ceFAZolin  2 g Intravenous Q8H    And    [START ON 6/20/2025] gentamicin  5 mg/kg (Adjusted) Intravenous Once    [START ON 6/20/2025] tamsulosin  0.4 mg Oral BID

## 2025-06-19 NOTE — INTERVAL H&P NOTE
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  I discussed with the patient and/or legal representative the potential benefits, risks and side effects of this procedure; the likelihood of the patient achieving goals; and potential problems that might occur during recuperation.  I discussed reasonable alternatives to the procedure, including risks, benefits and side effects related to the alternatives and risks related to not receiving this procedure.  We will proceed with procedure as planned.    Underwent left heart cath yesterday found to have proximal left anterior descending artery stenosis greater than 70% and a 90% lesion in the high diagonal artery.  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  Given these findings, indication for revascularization is met.  With the [place] video  I discussed including bypass graft to his left anterior descending artery and obtuse marginal arteries in addition to left atrial pended amputation in addition to his aortic valve replacement surgery today.  We discussed conduits including mammary artery and greater saphenous vein we discussed patency rates.  We discussed risk including those described previously which were incorporated with addition of these additional procedures.  I explained this would involve increased operative time and slightly higher risk overall of morbidity mortality and in such however given his valve and multivessel disease and young age and reduced ejection fraction, I do believe surgery still represents his best treatment modality.  Patient voiced understanding and consent was amended and updated and obtained.    Leonides Pacheco MD  Cardiothoracic Surgery  [facility]

## 2025-06-19 NOTE — HISTORICAL OFFICE NOTE
Continuity of Care Document  3/11/2025  ALTHEA CHAHAL - 59 y.o. Male; born [birth year]  Summary of episode note  , generated on Mar. 26, 2025March 26, 2025   Additional Documents     ClinicalDocument.xml - Referral Note (Last Received: 3/11/2025 12:20 PM)   Plain Text Document (Last Received: 3/11/2025 12:20 PM)   Continuity of Care Document (Last Received: 3/26/2025  2:10 PM) - Currently Viewing  CHIEF COMPLAINT    CHIEF COMPLAINT  Reason for Visit/Chief Complaint   follow up   59-year-old male Polish speaking only with a history of multivessel coronary artery disease here for follow-up. He has a prior history of diagonal stent, diabetes, hypertension and hyperlipidemia. He comes today after staged PCI of an 80% LAD stenosis. This was performed on 5/31/2022 with shockwave arthrectomy and drug-eluting stent placement. Patient has a residual 50 to 60% stenosis in the diagonal and he has a known circumflex  with collaterals. The RCA is small with a 90% proximal and 80% marginal stenosis. His procedure was performed through his right femoral artery and he was discharged on aspirin and clopidogrel.  Still works nights cleaning offices. Here to follow-up after repeat echo feels that shortness of breath on exertion is getting worse no palpitations no chest pain     PROBLEMS    PROBLEMS  Problem Effective Dates Date resolved Problem Status   Cardiomyopathy, Ischemic - CMO, [SNOMED-CT: 480495979] 7/15/2021 - Active   History of Diabetes Mellitus Type 2 - Hx, [SNOMED-CT: 113844279] 7/15/2021 - Active   Hyperlipidemia, mixed, [SNOMED-CT: 918938489] 7/15/2021 - Active   Hypertension (HTN), primary, [SNOMED-CT: 70356473] 7/15/2021 - Active   CAD native (coronary artery disease), [SNOMED-CT: 26947431] 7/15/2021 - Active     ENCOUNTER    ENCOUNTER  Problem Effective Dates Date resolved Problem Status   Aortic valve stenosis AS (nonrheumatic), [SNOMED-CT: 64884637] 3/11/2025 - Active   Cardiomyopathy, Ischemic  - CMO, [SNOMED-CT: 671806395] 7/15/2021 - Active   History of Diabetes Mellitus Type 2 - Hx, [SNOMED-CT: 726915286] 7/15/2021 - Active   Hyperlipidemia, mixed, [SNOMED-CT: 745234238] 7/15/2021 - Active   Hypertension (HTN), primary, [SNOMED-CT: 48309070] 7/15/2021 - Active   CAD native (coronary artery disease), [SNOMED-CT: 14280594] 7/15/2021 - Active     VITAL SIGNS    VITAL SIGNS  Date / Time: 3/11/2025   BP Systolic 154 mmHg   BP Diastolic 70 mmHg   Height 66 inches   Weight 212 lbs   Pulse Rate 70 bpm   BSA (Body Surface Area) 2.2 cc/m2   BMI (Body Mass Index) 34.2 cc/m2   Blood Pressure 154 / 70 mmHg     PHYSICAL EXAMINATION    PHYSICAL EXAMINATION  Header Details   Constitutional 98% O2 RA   Vitals Left Arm Sitting  / 70 mmHg, Pulse rate 70 bpm, Regular, Height in 5' 6\", BMI: 34.2, Weight in 211.64 lbs (or) 96 kgs, BSA : 2.15 cc/m²   General Appearance No Acute Distress, Obese   Head/Eyes/Ears/Nose/Mouth/Throat Sclera Clear, EOMI, PERRLA, No pallor   Respiratory Lungs clear with normal breath sounds, Equal bilaterally   Cardiovascular Intact distal pulses, Regular rhythm. Normal rate present. Normal and normal S1 and S2  Harsh mid murmur is present with a grade of 5/6 at the upper right sternal border radiating to the neck.  Carotid pulses are 3+ on the right side and 3+ on the left side.     Gastrointestinal Abdomen soft, Non-tender, Normoactive bowel sounds, No masses   Gait Normal gait   Strength and tone Normal muscle strength   Upper Extremities No cyanosis, No edema   Lower Extremities Pulses 2+ and equal bilaterally, No edema     ALLERGIES, ADVERSE REACTIONS, ALERTS    No data available    MEDICATIONS ADMINISTERED DURING VISIT    No data available    MEDICATIONS  Reconcile with Patient's ChartMEDICATIONS  Medication Start Date Route/Frequency Status   amLODIPine 5 mg tablet, [RxNorm: 709435] 3/11/2025 Take 1 tablet orally once a day. Active   Aspirin Low Dose 81 mg tablet,delayed release,  [RxNorm: 982066] 7/15/2021 Take 1 tablet orally once a day. Active   carvediloL 25 mg tablet, [RxNorm: 231673] 11/27/2023 Take 1 tablet orally 2 times a day. Active   clopidogreL 75 mg tablet, [RxNorm: 797179] 7/15/2021 Take 1 tablet orally once a day. Active   FUROSEMIDE 40MG TABLETS, [RxNorm: 775851] - TAKE 1 TABLET BY MOUTH EVERY DAY Active   HYDROcodone 5 mg-acetaminophen 325 mg tablet, [RxNorm: 075123] 3/11/2025 Take 1 tablet orally every 4-6 hours as needed. Active   Jardiance 25 mg tablet, [RxNorm: 7916648] 9/18/2023 Take 1 tablet orally once a day. Active   losartan 50 mg tablet, [RxNorm: 436254] 7/15/2021 Take 1 tablet orally 2 times a day. Active   metFORMIN 1,000 mg tablet, [RxNorm: 692901] 7/15/2021 Take 1 tablet orally 2 times a day. Active   pantoprazole 40 mg tablet,delayed release, [RxNorm: 482134] 3/11/2025 Take 1 tablet orally once in the morning. Active   rosuvastatin 20 mg tablet, [RxNorm: 702756] 3/11/2025 Take 1 tablet orally once in the evening. Active   tamsulosin 0.4 mg capsule, [RxNorm: 253867] 6/6/2022 Take 1 capsule orally 2 times a day. Active   Entresto 24 mg-26 mg tablet, [RxNorm: 0662839] 3/11/2025 Take 1 tablet orally 2 times a day. Active     ASSESSMENT    1. Atherosclerosis of native coronary artery of native heart with stable angina pectoris (CMS/HCC)  Continue aspirin and Plavix as long as tolerating.  S/p multivessel CAD PCI continue aspirin Plavix indefinitely  2. Essential hypertension  Continue Coreg 25 mg twice a day, amlodipine to 5 mg twice a day.  3. Mixed hyperlipidemia  Continue rosuvastatin 20 mg daily.  4. Type 2 diabetes mellitus with other circulatory complication, without long-term current use of insulin (CMS/HCC)  Continue Jardiance taking 25 mg daily.  5 carotid disease  Carotid ultrasound shows no significant stenosis mild plaquing.  6. Leg claudication  Reports having improvement while taking cilostazol, continue.  7. Severe cardiomyopathy  Still severe LV  dysfunction. Discontinue losartan after washout. Start Entresto  8. Aortic stenosis  Appears to progress now if severe based on the gradients and valve area Long discussion regarding importance of having this fixed patient finally agrees will refer to CT surgery to evaluate for AVRPlan  Referral to , discontinue losartan start Entresto 24/26 mg twice a day. 30-day supply follow-up 1 monthRecommend low sodium diet, daily exercise and maintain a normal BMI. Recommend monitor blood pressure at home.Increased BMI: Provide patient with information regarding diet and lifestyle changes.     FAMILY HISTORY    No data available    GENERAL STATUS    No data available    PAST MEDICAL HISTORY    PAST MEDICAL HISTORY  Problem Date diagonsed Date resolved Status   Cardiomyopathy, Ischemic - CMO, [SNOMED-CT: 747532730] 7/15/2021 - Active   History of Diabetes Mellitus Type 2 - Hx, [SNOMED-CT: 355834879] 7/15/2021 - Active   Hyperlipidemia, mixed, [SNOMED-CT: 700329656] 7/15/2021 - Active   Hypertension (HTN), primary, [SNOMED-CT: 27949378] 7/15/2021 - Active   CAD native (coronary artery disease), [SNOMED-CT: 01528381] 7/15/2021 - Active     HISTORY OF PRESENT ILLNESS    59-year-old male Polish speaking only with a history of multivessel coronary artery disease here for follow-up. He has a prior history of diagonal stent, diabetes, hypertension and hyperlipidemia. He comes today after staged PCI of an 80% LAD stenosis. This was performed on 5/31/2022 with shockwave arthrectomy and drug-eluting stent placement. Patient has a residual 50 to 60% stenosis in the diagonal and he has a known circumflex  with collaterals. The RCA is small with a 90% proximal and 80% marginal stenosis. His procedure was performed through his right femoral artery and he was discharged on aspirin and clopidogrel.  Still works nights cleaning offices. Here to follow-up after repeat echo feels that shortness of breath on exertion is getting worse  no palpitations no chest pain     IMMUNIZATIONS    No data available    PLAN OF CARE    PLAN OF CARE  Planned Care Date   Take 1 tablet orally 2 times a day.-Entresto 24 mg-26 mg tablet 3/11/2025   Referral Visit - Fiona Sweeney ([email or web address]) : 1/1/1900   Follow up visit - David Osullivan MD 1/1/1900     PROCEDURES    PROCEDURES  NAME TYPE DATE   PTCA with insertion of stent [SNOMED CT: 90386362] Procedure 2/28/2022     RESULTS    RESULTS  Name Result Date Location - Ordered By   Myocardial Perfusion Imaging 1.Stress EKG is abnormal due to 1mm additional ST depression as compared to baseline ECG. Specificity decreased due to baseline ECG2.First walked for 5 minutes on a Ramped Artem protocol, maximum  bpm achieving 69% MTHR. Unable to continue walking on treadmill due to L hip/leg discomfort. Test changed to this nuclear lexiscan stress test. 3.Occasional PVCs4.No chest pain1.This is an abnormal perfusion study. 2.Large size moderate intensity fixed inferior defect. Medium size moderate intensity reversible lateral defect. Lateral wall hypokinesis.3.The study quality is average. 4.The left ventricular cavity is noted to be markedly enlarged on the stress study. The left ventricular ejection fraction was calculated to be 28% and left ventricular global function is severely reduced. 2/19/2022 7:45:00 AM David Osullivan MD   Trans Thoracic Echocardiogram 1.The left ventricle is mildly enlarged. Concentric left ventricular hypertrophy is present. Global left ventricular systolic function is moderately decreased. The left ventricular ejection fraction is 30%. The left ventricle diastolic function is impaired (Grade I) with normal left atrial pressure. Regional wall motion analysis shows hypokinesis of basal inferoseptal segment, basal inferior segment, basal inferolateral segment and mid inferolateral segment.2.Right ventricular systolic function is normal. 3.Severe calcification of the aortic valve  Severe aortic valve stenosis is present. The trans-aortic mean gradient is 55.0 mmHg. The trans-aortic peak velocity is 4.6 m/s. AV VR 0.274.Mild mitral annular calcification is noted. Mild calcification of the mitral valve Mitral stenosis is mild to moderate . The mean trans mitral gradient is 6.0 mmHg. Mild mitral regurgitation. 5.The left atrial diameter is mildly increased. 3/7/2025 7:00:00 AM David Osullivan MD     REVIEW OF SYSTEMS    REVIEW OF SYSTEMS  Header Details   Constitutional No history of Weight Loss   Eyes No history of Blurry vision   ENT No history of Bloody nose (epistaxis)   Gastrointestinal No history of Abdominal pain   Cardiovascular STILES  No history of Chest pain, Palpitations, Syncope, PND, Orthopnea, Edema, Claudication   Musculoskeletal Arthritis   Respiratory SOB  No history of Wheezing, Sputum   Hem/Lymphatic No history of Easy bruising, Blood clots, Hx of blood transfusion, Anemia, Bleeding problems     SOCIAL HISTORY    SOCIAL HISTORY  Social History Element Description Effective Dates   Smoking status Never smoked -     FUNCTIONAL STATUS    No data available    INSTRUCTIONS    INSTRUCTIONS  NAME TYPE DATE   Increased BMI: Provide patient with information regarding diet and lifestyle changes. Goals 3/11/2025   Recommend low sodium diet, daily exercise and maintain a normal BMI. Recommend monitor blood pressure at home. Goals 3/11/2025     MEDICAL EQUIPMENT    No data available    Goals Sections    No data available    REASON FOR REFERRAL                         Health Concerns Section    No data available    COGNITIVE/MENTAL STATUS    No data available    Patient Demographics    Patient Demographics  Patient Address Patient Name Communication   [address] [place] [address] ALTHEA CHAHAL [phone] (Mobile)     Patient Demographics  Language Race / Ethnicity Marital Status   Unknown - Spoken Unknown / Unknown      Document Information    Primary Care  Provider Other Service Providers Document Coverage Dates   David Osullivan  NPI: [id number]  [phone] (Work)  [address], [address]  [place] [address]  [place] [address]  Interpreting Physicians  [facility]  [phone] (Work)  [address] [place] [address] Dianeleelee Montana  NPI: [id number]  [phone] (Work)  [address], [address]  [place] [address]  [place] [address]  Nurses     Doretha Patel  NPI: [id number]  [phone] (Work)  [address], [address]  [place] [address]  [place] [address]  Nurses Mar. 11, 2025March 11, 2025      Organization   [facility]  [phone] (Work)  [address], [address]  [place] [address]  [place] [address]     Encounter Providers Encounter Date    Mar. 11, 2025March 11, 2025     Legal Authenticator    David Osullivan  NPI: [id number]  [phone] (Work)  [address], [address]  [place] [address]  [place] [address]

## 2025-06-19 NOTE — CONSULTS
[facility]    PATIENT'S NAME: ALTHEA CHAHAL   ATTENDING PHYSICIAN: Leonides Pacheco MD   CONSULTING PHYSICIAN: Madonna Obrien MD   PATIENT ACCOUNT#:   [id number]    LOCATION:  [address] [place] [address]  MEDICAL RECORD #:   [id number]       YOB: 1965  ADMISSION DATE:       06/19/2025      CONSULT DATE:  06/19/2025    REPORT OF CONSULTATION      REASON FOR ADMISSION:  Post coronary artery bypass graft surgery, aortic valve replacement.    HISTORY OF PRESENT ILLNESS:  The patient is a 59-year-old  male with a history of coronary artery disease, multi-vessel, peripheral arterial disease.  Had a history of LAD and diagonal PCI stents.  Last intervention 2022.  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  He had recurrent dyspnea on exertion and chest pain.  Cardiac angiogram on May 17 showed LAD recurrent stenosis at 70%, ostial first diagonal was 90%, left circumflex 100% stenosis.  He had carotid ultrasound, showed 50% bilaterally, no hemodynamic stenosis.  Referred to Cardiac Surgery and scheduled today for above-mentioned procedure by Dr. Pacheco.  Postoperatively transferred to PCCU for close monitoring.    PAST MEDICAL HISTORY:  Multi-vessel coronary artery disease, peripheral arterial disease, diabetes mellitus type 2, ischemic cardiomyopathy, ejection fraction 30%, progressive aortic stenosis moderate to severe, hypertension, hyperlipidemia, benign prostatic hypertrophy.    PAST SURGICAL HISTORY:  Nasal septoplasty.    MEDICATIONS:  Please see medication reconciliation list.     ALLERGIES:  No known drug allergies.    SOCIAL HISTORY:  Ex-tobacco user.  Social alcohol.  No drug use.      FAMILY HISTORY:  Father had diabetes mellitus type 2.      REVIEW OF SYSTEMS:  Currently with intubated mechanical ventilation support.      PHYSICAL EXAMINATION:    GENERAL:  Currently sedated with mechanical ventilation support and aortic balloon support.    VITAL SIGNS:   Temperature 97.9, pulse 72, respiratory rate 20, blood pressure 151/72, pulse ox 96%.  HEENT:  Eyes:  Anicteric sclerae.  Oral cavity unremarkable.  NECK:  Trachea midline.  Right internal jugular central line with Freeman-Mike catheter.  LUNGS:  Crackles auscultated on both lung fields.    CHEST:  Mid sternal dressing with chest tube projecting, attached to an atrium.  HEART:  Regular rate and rhythm.  S1 and S2 auscultated.   ABDOMEN:  Soft, nondistended.    EXTREMITIES:  Left lower extremity dressing.  No leg edema.  Right femoral arterial line.  Left radial arterial line.  Left femoral intra-aortic balloon pump.    ASSESSMENT AND PLAN:  Multi-vessel coronary artery disease with severe aortic stenosis, status post coronary artery bypass graft surgery x2 utilizing left internal mammary artery to left anterior descending, saphenous vein graft to the diagonal artery; right leg endoscopic greater saphenous vein graft harvest; left leg endoscopic exploration; aortic valve replacement with Inspiris tissue valve, size 21 mm; left atrial appendage clip, size 40; intraoperative transesophageal echocardiogram; insertion of temporary ventricular pacing wire; sternal plating; and insertion of left femoral intra-aortic balloon pump.  Continue IV pressors.  Monitor hemodynamic status via arterial line and monitor Freeman-Mike tracings.  Insulin drip.  Close monitoring of glucose.  Monitor hemodynamic status.  Cardiology and Intensive Care consult.  Continue mechanical ventilation support.  Further recommendations to follow.     Dictated By Madonna Obrien MD  d: 06/19/2025 15:52:55  t: 06/19/2025 16:18:09  Job [id number]  FB/    cc: Leonides Pacheco MD

## 2025-06-19 NOTE — ANESTHESIA PROCEDURE NOTES
Procedure Performed: GAB       Start Time:  6/19/2025 7:37 AM       End Time:   6/19/2025 7:50 AM    Preanesthesia Checklist:  Patient identified, IV assessed, risks and benefits discussed, monitors and equipment assessed, procedure being performed at surgeon's request and anesthesia consent obtained.    General Procedure Information  Diagnostic Indications for Echo:  assessment of ascending aorta, assessment of surgical repair and hemodynamic monitoring  Physician Requesting Echo: Loenides Pacheco MD  Location performed:  OR  Intubated  Bite block placed  Heart visualized  Probe Insertion:  Easy  Probe Type:  Biplane  Modalities:  2D only and color flow mapping    Echocardiographic and Doppler Measurements    Ventricles    Right Ventricle:  Cavity size normal.  Hypertrophy not present.  Thrombus not present.  Global function mildly impaired.    Left Ventricle:  Cavity size dilated.  Hypertrophy not present.  Thrombus not present.  Global Function severely impaired.  Ejection Fraction 20%.      Ventricular Regional Function:  1- Basal Anteroseptal:  akinetic  2- Basal Anterior:  hypokinetic  3- Basal Anterolateral:  akinetic  4- Basal Inferolateral:  akinetic  5- Basal Inferior:  akinetic  6- Basal Inferoseptal:  akinetic  7- Mid Anteroseptal:  akinetic  8- Mid Anterior:  hypokinetic  10- Mid Inferolateral:  hypokinetic  11- Mid Inferior:  hypokinetic  12- Mid Inferoseptal:  hypokinetic  13- Apical Anterior:  akinetic  14- Apical Lateral:  hypokinetic  15- Apical Inferior:  hypokinetic  16- Apical Septal:  akinetic  17- Franklin:  hypokinetic      Valves    Aortic Valve:  Annulus calcified.  Stenosis moderate.  Regurgitation absent.  Leaflets calcified.  Leaflet motions restricted.      Mitral Valve:  Annulus normal.  Stenosis mild.  Regurgitation absent.  Leaflets normal.  Leaflet motions normal.      Tricuspid Valve:  Annulus normal.  Stenosis not present.  Regurgitation +1.  Leaflets normal.  Leaflet motions normal.     Pulmonic Valve:  Annulus normal.  Stenosis not present.  Regurgitation absent.        Aorta    Ascending Aorta:  Size normal.  Dissection not present.  Plaque thickness less than 3 mm.  Mobile plaque not present.    Aortic Arch:  Size normal.  Dissection not present.  Plaque thickness less than 3 mm.  Mobile plaque not present.    Descending Aorta:  Size normal.  Dissection not present.  Plaque thickness less than 3 mm.  Mobile plaque not present.    Other Aortic Findings:       Balloon pump device in the descending aorta    Atria    Right Atrium:  Size normal.  Spontaneous echo contrast not present.  Thrombus not present.  Tumor not present.  Device not present.      Left Atrium:  Size normal.  Spontaneous echo contrast not present.  Thrombus not present.  Tumor not present.  Device not present.    Left atrial appendage normal.      Septa    Atrial Septum:  Intra-atrial septal morphology normal.      Ventricular Septum:  Intra-ventricular septum morphology normal.          Other Findings  Pericardium:  normal  Pleural Effusion:  none  Pulmonary Arteries:  normal  Pulmonary Venous Flow:  normal    Anesthesia Information  Performed Personally  Anesthesiologist:  Nishant Lala MD

## 2025-06-19 NOTE — ANESTHESIA PROCEDURE NOTES
Peripheral IV  Date/Time: 6/19/2025 7:30 AM  Inserted by: Nishant Lala MD    Placement  Needle size: 18 G  Laterality: left  Location: antecubital  Site prep: alcohol  Technique: anatomical landmarks  Attempts: 1

## 2025-06-19 NOTE — ANESTHESIA PROCEDURE NOTES
Airway  Date/Time: 6/19/2025 7:24 AM  Reason: Elective      General Information and Staff   Patient location during procedure: OR  Anesthesiologist: Nishant Lala MD  Performed: anesthesiologist   Performed by: Nishant Lala MD  Authorized by: Nishant Lala MD        Indications and Patient Condition  Indications for airway management: anesthesia  Sedation level: deep      Preoxygenated: yesPatient position: sniffing    Mask difficulty assessment: 1 - vent by mask    Final Airway Details    Final airway type: endotracheal airway    Successful airway: ETT  Cuffed: yes   Successful intubation technique: direct laryngoscopy  Endotracheal tube insertion site: oral  Blade: Filippo  Blade size: #4  ETT size (mm): 8.0    Cormack-Lehane Classification: grade IIA - partial view of glottis  Placement verified by: capnometry   Measured from: teeth  Number of attempts at approach: 1

## 2025-06-19 NOTE — ANESTHESIA PROCEDURE NOTES
Arterial Line    Date/Time: 6/19/2025 7:20 AM    Performed by: Nishant Lala MD  Authorized by: Nishant Lala MD    General Information and Staff    Procedure Start:  6/19/2025 7:20 AM  Procedure End:  6/19/2025 7:22 AM  Anesthesiologist:  Nishant Lala MD  Performed By:  Anesthesiologist  Patient Location:  OR  Indication: continuous blood pressure monitoring and blood sampling needed    Site Identification: surface landmarks    Preanesthetic Checklist: 2 patient identifiers, IV checked, risks and benefits discussed, monitors and equipment checked, pre-op evaluation, timeout performed, anesthesia consent and sterile technique used    Procedure Details    Catheter Size:  20 G  Catheter Length:  1 and 3/4 inch  Catheter Type:  Arrow  Seldinger Technique?: Yes    Laterality:  Left  Site:  Radial artery  Site Prep: chlorhexidine    Line Secured:  Wrist Brace, tape and Tegaderm    Assessment    Events: patient tolerated procedure well with no complications      Medications  6/19/2025 7:20 AM      Additional Comments

## 2025-06-19 NOTE — ANESTHESIA PROCEDURE NOTES
Central Line    Date/Time: 6/19/2025 7:30 AM    Performed by: Nishant Lala MD  Authorized by: Nishant Lala MD    General Information and Staff    Procedure Start:  6/19/2025 7:30 AM  Procedure End:  6/19/2025 7:43 AM  Anesthesiologist:  Nishant Lala MD  Performed by:  Anesthesiologist  Patient Location:  OR  Indication: central venous access and CVP monitoring    Site Identification: real time ultrasound guided    Preanesthetic Checklist: 2 patient identifiers, IV checked, risks and benefits discussed, monitors and equipment checked, pre-op evaluation, timeout performed, anesthesia consent and sterile technique used    Procedure Detail    Patient Position:  Trendelenburg  Laterality:  Right  Site:  Internal jugular  Prep:  Chloraprep  Catheter Size:  9 Fr  Catheter Type:  MAC introducer  Procedure Detail: target vein identified, needle advanced into vein and blood aspirated and guidewire advanced into vein    Seldinger Technique?: Yes    Intravenous Verification: verified by ultrasound and venous blood return    Post Insertion: all ports aspirated, all ports flushed easily, guidewire was removed intact, line was sutured in place and dressing was applied      Assessment    Events: patient tolerated procedure well with no complications      PA Catheter Placement    PA Catheter Placed?: Yes    PA Catheter Type:  Oximetric  PA Catheter Size:  8  Laterality:  Right  Site:  Internal jugular  Placement Confirmation: pressure tracing changes and verified by GAB    Events: dysrhythmias      Additional Comments

## 2025-06-19 NOTE — CONSULTS
Initial Pulmonary/ICU/Critical Care Consultation        Reason for Consultation: post CABG, IABP  Referring Physician: Dr. Pacheco      HPI: 60 yo male with hx of smoking, HTN, HLP, PVD, severe aortic stenosis, moderate mitral stenosis, multivessel coronary artery disease s/p PCI and stent of LAD and diagonal artery seen by CV surgery and underwent CABG x 2, aortic valve replacement.   Now in the ICU post op with IABP, sedated on MV support.   Intra-op given 2 FFP, 2 cryo, 2 PLT  On epi 2 and levo 3 gtts as per CV surgery  On NO 40 PPM  Sedation propofol, insulin, dextrose      REVIEW OF SYSTEMS:  Positives and negatives as noted in HPI. All other review of systems otherwise are either limited (due to pt/family inability to provide) or negative.      PAST MEDICAL HISTORY:  Past Medical History[1]      PAST SURGICAL HISTORY:  Past Surgical History[2]      PAST SOCIAL HISTORY:  Social Hx on file[3]      PAST FAMILY HISTORY:  Family History[4]      ALLERGIES:  Allergies[5]      MEDS:  Home Medications:  Medications Taking[6]    Scheduled Medication:  Scheduled Medications[7]  Continuous Infusing Medication:  Medication Infusions[8]  PRN Medications:  PRN Medications[9]       PHYSICAL EXAM:  /72 (BP Location: Right arm)   Pulse 72   Temp 97.9 °F (36.6 °C) (Oral)   Resp 20   Ht 5' 6\" (1.676 m)   Wt 203 lb (92.1 kg)   SpO2 96%   BMI 32.77 kg/m²      CONSTITUTIONAL: sedated intubated  HEENT: atraumatic normocephalic  MOUTH: ETT OGT  NECK/THROAT: no JVD. Trachea midline. No obvious thyromegaly.+ cordis and swan  LUNG: clear upper b/l no wheezing, crackles. Diminished bases. Chest symmetric with respiratory motion  HEART: midsternal incision dressed, regular rate and rhythm, IABP sounds, no obvious murmers or gallops noted. + chest tube  ABD: soft non tender. + bowel sounds. No organomegaly noted  EXT: no clubbing, cyanosis, or edema noted. Pulses intact grossly  NEURO/MUSCULOSKELETAL: sedated intubated  SKIN:  warm, dry. No obvious lesions noted  LYMPH: no obvious LAD      IMAGES:   CXR ordered-pending      LABS:  No results for input(s): \"RBC\", \"HGB\", \"HCT\", \"MCV\", \"MCH\", \"MCHC\", \"RDW\", \"NEPRELIM\", \"WBC\", \"PLT\" in the last 168 hours.    No results for input(s): \"GLU\", \"BUN\", \"CREATSERUM\", \"GFRAA\", \"GFRNAA\", \"EGFRCR\", \"CA\", \"ALB\", \"NA\", \"K\", \"CL\", \"CO2\", \"ALKPHO\", \"AST\", \"ALT\", \"BILT\", \"TP\" in the last 168 hours.      ASSESSMENT/PLAN:  CAD s/p multivessel disease and severe aortic stenosis  -s/p CABG x 2 and AV repair  -IABP and cardiac gtts as per CV and cardiology  -plavix and ASA  -post op abx    Post op ventilator mgmt  -switch SIMV to AC  -continue NO as per CV  -check ABG and CXR  -remain intubated overnight as per CV surgery  -sedation with propofol/precedex  -PRN duonebs    PVD  -plavix, ASA    DM2  -insulin/dextrose  -monitor BG    BPH  -flomax  -stafford    Proph  -DVT: lovenox  -GI: pepcid    Dispo  -full code    Upon my evaluation, this patient had a high probability of imminent or life-threatening deterioration due to circulatory failure and respiratory failure, which required my direct attention, intervention, and personal management.    I have personally provided 31 minutes of critical care time, exclusive of time spent on separately billable procedures.  Time includes review of all pertinent laboratory/radiology results, discussion with consultants, and monitoring for potential decompensation.  Performed interventions included managing mechanical ventilation.      Thank you for the opportunity to care for Jose Alberto Nieves      DERRICK Rahman DO, MPH  Pulmonary Critical Care Medicine  [facility]                         [1]   Past Medical History:  Diagnosis Date    Aortic stenosis     Back problem     BPH (benign prostatic hyperplasia)     Cardiomyopathy (HCC)     Coronary atherosclerosis     Depression     Diabetes (HCC)     Esophageal reflux     Heart valve  disease     High blood pressure     High cholesterol     Peripheral vascular disease     Shortness of breath     related to smoking     Visual impairment     glasses   [2]   Past Surgical History:  Procedure Laterality Date    Cardiac catheterization  2016    [facility]    Cath drug eluting stent      Colonoscopy  04/22/2024    Colonoscopy N/A 4/22/2024    Procedure: COLONOSCOPY;  Surgeon: Attila Li MD;  Location: [facility] ENDOSCOPY    Esophagoscopy,diagnostic  04/22/2024    Dr. Li    Hernia surgery      as a child    Nasal scopy,remv part ethmoid  10/14/2020    Nasal scopy,rmv tiss maxill sinus  10/14/2020    Repair of nasal septum  10/14/2020   [3]   Social History  Socioeconomic History    Marital status:    Tobacco Use    Smoking status: Former     Current packs/day: 1.00     Average packs/day: 1 pack/day for 20.0 years (20.0 ttl pk-yrs)     Types: Cigarettes    Smokeless tobacco: Never   Vaping Use    Vaping status: Every Day    Substances: Nicotine   Substance and Sexual Activity    Alcohol use: Yes     Comment: 1-2 drinks a week    Drug use: No   [4]   Family History  Problem Relation Age of Onset    Diabetes Father     Diabetes Paternal Aunt     Diabetes Paternal Uncle     Seizure Disorder Mother     No Known Problems Brother     No Known Problems Daughter     Glaucoma Neg     Macular degeneration Neg    [5] No Known Allergies  [6]   Outpatient Medications Marked as Taking for the 6/19/25 encounter (Hospital Encounter)   Medication Sig Dispense Refill    ENTRESTO 24-26 MG Oral Tab Entresto 24 mg-26 mg tablet, [RxNorm: 6971754]      magnesium 250 MG Oral Tab Take 1 tablet (250 mg total) by mouth every other day.      cyanocobalamin 500 MCG Oral Tab Take 1 tablet (500 mcg total) by mouth every other day.      metFORMIN HCl 1000 MG Oral Tab Take 1 tablet (1,000 mg total) by mouth 2 (two) times daily with meals. 180 tablet 3    amLODIPine 5 MG Oral Tab Take 1 tablet (5 mg  total) by mouth daily. 90 tablet 3    carvedilol 25 MG Oral Tab Take 1 tablet (25 mg total) by mouth 2 (two) times daily with meals. 180 tablet 3    clopidogrel 75 MG Oral Tab Take 1 tablet (75 mg total) by mouth daily. 90 tablet 3    empagliflozin (JARDIANCE) 25 MG Oral Tab Take 1 tablet (25 mg total) by mouth daily. 90 tablet 3    furosemide 40 MG Oral Tab Take 1 tablet (40 mg total) by mouth daily. 90 tablet 3    [DISCONTINUED] losartan 50 MG Oral Tab Take 1 tablet (50 mg total) by mouth 2 (two) times daily. 180 tablet 3    rosuvastatin 20 MG Oral Tab Take 1 tablet (20 mg total) by mouth nightly. 90 tablet 3    tamsulosin 0.4 MG Oral Cap TAKE 2 CAPSULES BY MOUTH DAILY (Patient taking differently: Take 1 capsule (0.4 mg total) by mouth in the morning and 1 capsule (0.4 mg total) before bedtime.) 180 capsule 3    aspirin 81 MG Oral Tab EC Take 1 tablet (81 mg total) by mouth nightly.     [7]    sugammadex  2 mg/kg Intravenous Once    acetaminophen  1,000 mg Intravenous Q8H    [START ON 6/20/2025] sennosides  8.6 mg Oral BID    [START ON 6/20/2025] docusate sodium  100 mg Oral BID    metoclopramide  10 mg Intravenous Q6H    famotidine  20 mg Oral BID    Or    famotidine  20 mg Intravenous BID    [START ON 6/20/2025] metoprolol tartrate  25 mg Oral 2x Daily(Beta Blocker)    mupirocin  1 Application Nasal BID    chlorhexidine gluconate  15 mL Mouth/Throat BID    [START ON 6/20/2025] multivitamin  1 tablet Oral Daily    [START ON 6/20/2025] ascorbic acid  500 mg Oral TID    [START ON 6/20/2025] enoxaparin  40 mg Subcutaneous Daily    [START ON 6/20/2025] clopidogrel  75 mg Oral Daily    [START ON 6/20/2025] aspirin  81 mg Oral Daily    ceFAZolin  2 g Intravenous Q8H    And    gentamicin  5 mg/kg (Adjusted) Intravenous Once    [START ON 6/20/2025] tamsulosin  0.4 mg Oral BID   [8]    sodium chloride 83 mL/hr (06/19/25 0618)    norepinephrine      dexmedetomidine      insulin regular 8 Units/hr (06/19/25 1120)    sodium  chloride      dexmedetomidine      DOBUTamine      nitroGLYCERIN in dextrose 5%      norepinephrine      clevidipine      dextrose 5%-sodium chloride 0.45%      insulin regular     [9]   melatonin    polyethylene glycol (PEG 3350)    bisacodyl    ondansetron    DOBUTamine    nitroGLYCERIN in dextrose 5%    norepinephrine    potassium chloride **OR** potassium chloride    magnesium sulfate in dextrose 5%    magnesium sulfate in sterile water for injection    acetaminophen **OR** HYDROcodone-acetaminophen **OR** HYDROcodone-acetaminophen    morphINE **OR** morphINE    sodium chloride    albumin human    glucose **OR** glucose **OR** glucose-vitamin C **OR** dextrose **OR** glucose **OR** glucose **OR** glucose-vitamin C

## 2025-06-19 NOTE — OR PREOP
DR. Pacheco came to patient preop room to explain new procedure. Video  on Ipad - [employer] [id number]. Patient and spouse had all questions answered and new consent signed.

## 2025-06-19 NOTE — CM/SW NOTE
Department  notified of request for HHC, aidin referrals started. Assigned CM/SW to follow up with pt/family on further discharge planning.     Magda Dickey, DSC

## 2025-06-19 NOTE — OPERATIVE REPORT
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      Procedures:   Insertion of left common femoral arterial line under ultrasound guidance and Seldinger technique  Insertion of right common femoral arterial line under ultrasound guidance and Seldinger technique  Insertion of intra-aortic balloon pump via left common femoral arterial line  Median sternotomy  Coronary artery bypass grafting x 2  Left internal mammary artery to left anterior descending artery  Reverse saphenous vein graft to diagonal artery  Left atrial appendage amputation 40 mm atrial clip  Aortic valve replacement with 21 mm Peres Inspiris Resilia bioprosthetic aortic valve  Complex sternal closure sternal plating system, sternal lock blue    Indication:  59-year-old male with history of cardiac stents and known occlusions of the right and circumflex coronary arteries found to have severe borderline critical aortic stenosis.  After obtaining dental clearance and cardiac catheterization, indication was met for valve replacement in addition to coronary artery bypass grafting.  I discussed the patient on 3 separate occasions alternatives, benefits and risks of surgery, he consents for surgery today and wish for a bioprosthetic valve.    Surgical Findings:    Significant peripheral vascular disease, unable to pass wire from the right femoral artery into the aorta before meeting resistance  Successful placement of a left common femoral arterial intra-aortic balloon pump under ultrasound guidance    Ascending aorta and aortic arch with heavy calcium burden, a minefield of calcium in various locations.  The calcium in the aorta is friable, fragile, almost sand-like consistency in some places and was debrided.    Significant cardiomegaly, very boggy, very heavy and very large/thick heart.    Left anterior descending artery 2 mm vessel, good target  Diagonal artery 1.5 mm vessel, reasonable target  No appreciable target in the right coronary system  There is single lateral obtuse marginal artery however it appears to be 1 mm in size at best and there is heavy calcification circumferentially on the walls, no safe target for bypass    Trileaflet aortic valve, heavy calcium throughout the leaflets and along the annulus    No clot left atrial appendage    Complete echocardiogram demonstrates a well-seated prosthetic valve with no evidence of perivalvular leak, ejection fraction is around 30% with improved contractility of the walls.    Description of procedure:  Informed consent was obtained.  Patient was taken to the operative room, anesthesia was induced and lines were placed.  Echocardiogram demonstrated reduced ejection fraction realistically 15% range.  Around this time, the EKG began to show some signs of ST depressions and therefore elected to place an intra-aortic balloon pump to assist with coronary perfusion given his coronary disease.  A right common femoral arterial line was placed under ultrasound guidance with a single stick and Seldinger technique.  However when placing the wire, the wire did need some resistance somewhere in the iliac system and would not go into the aorta freely.  The 5 Citizen of [place] sheath was placed out issue and there was excellent return of  arterial blood.  Then the left common femoral artery was stuck with a needle  Ultrasound guidance and a 5 Trin[place] sheath was placed, the wire did pass freely into the aorta without resistance.  The wire for the intro to balloon pump is in place through the sheath and into the proximal descending thoracic aorta which was seen on transesophageal cardiogram and then the intro to balloon pump was then placed in the distal and proximal descending thoracic aorta, with the tip near the subclavian artery.  Balloon pump was started at one-to-one and immediately the ST depressions significantly improved.  Patient was then positioned prepped and draped you sterile fashion timeout was performed verifying patient procedure.  A skin incision was made and median sternotomy was performed.  Pericardium opened.  Right ventricle appears rigorous at this time, left ventricle appears similar with an ejection fraction around 15 to 20% but the EKG demonstrates nearly resolved STs therefore elected to proceed with mammary artery harvest.  The left internal mammary artery was harvested in a pedicled fashion underside of left endothoracic fashion simultaneously a piece of greater saphenous vein was harvested from the right lower extremity in endoscopic fashion as well.  Systemic heparin is then given.  The mammary is clipped thrice a distally beyond suffocation and then transected there is excellent flow.  A clip is applied distally on the pedicle and is wrapped papaverine gauze and placed in the left chest.  Simultaneously the greater sinus vein is prepared by clipping and tying the branches.  Pericardial well is created.  The aorta is inspected.  There is heavy circumferential calcium in the arch and distal ascending aorta.  In the mid to distal portion there is heavy calcium burden and plaque in the posterior aorta and then there is a large plaque of calcium in the anterior aorta proximally just outside of the root.  Ultimately after  mapping out the aorta was able to identify sites for cannulation cross-clamp proximal antegrade site and an 8 arteriotomy but the real estate was strongly limited only allowed for these various cannulas and proximals.  Patient is then cannulated in the mid ascending aorta and right atrial appendage with 3 stage venous cannula.  ACT confirmed over 480 and cardiopulmonary bypass commenced.  Retrograde catheter placed in the coronary sinus, antegrade needle placed in the mid ascending aorta.  Cross-clamp is applied and the heart is arrested with antegrade and retrograde cardioplegia there is excellent arrest after 300 mL antegrade.  Patient is cooled 32 °C and cardioplegia's given at regular 15-20 minute intervals of the operation.  Heart rate is rotated to expose obtuse marginal artery, and arch artery is identified but found as above.Artery bypass.  Diagonal artery is exposed.  Arteriotomy is made a 1.5 mm vessel and a 1 mm probe passed freely proximal and distal, end-to-side anastomosis then made with reverse saphenous vein and a 7-0 Prolene.  Prior to tying down, a 1 mm probe passed freely proximal distal, tying down there is excellent antegrade and retrograde flow.  Before doing the proximal, left atrial pended was sized for a 40 mm atrial clip which is applied to the base of the left atrial appendage.  Heart is then placed back in the pericardial well, the vein graft is then sized to the ascending aorta, transected and spatulated anastomosed to the aorta in end-to-side fashion with 6-0 Prolene.  Heart then rotated expose left anterior descending artery.  There was a large vein overlying however immediately lateral to the vein deep in the groove, the left anterior descending artery is identified, and arteriotomy is made and is a 2 mm vessel and a 1 mm probe passed freely proximal and distal.  The distal end of the mammary pedicle is then partially skeletonized, transected and spatulated and anastomosed to the left  anterior descending artery with an 8-0 Prolene.  Prior to tying down, a 1 mm probe passed freely proximal and distal to the target vessel, after tying down the bulldog is removed anastomosis hemostatic and Doppler exam confirmed flow in the antegrade and retrograde left anterior descending artery.  The pedicle is secured to the heart with 6-0 Prolene x 2.  A left ventricular vent is placed through the right superior pulmonary vein an arteriotomy was then made transversely and opened in an inverted hockey-stick fashion toward the viral of the noncoronary leaflet.  The valve is trileaflet a very heavy calcium burden minimally opening.  Valve leaflets are excised.  Annulus is debrided and free of calcium.  Some of this calcium is quite friable and fragile and there is a large plaque of calcium in the ascending aorta proximal to the arteriotomy which is also fragile and sand-like and required some debridement with irrigation and rongeur.  Ultimately, the annulus was fully debrided and the aorta appeared stable.  Valve she was then placed inferential around the annulus with pledgets in the ventricular side.  The valve was sized for a 21 mm bioprosthetic valve, Resilia.  The valve was an open repair manufacture instructions, the sutures are placed to the sewing ring and the valve is in seat in supra annular position and secured with a core knot device.  Inspection of straits a well-seated valve with no evidence of gaps, and both right and left coronary ostia are visualized.  Patient is rewarmed.  Aorta is closed in a 2 layer fashion with 4-0 Prolene.  De-airing maneuvers are performed.  Hotshot cardioplegia given retrograde and antegrade.  Cross-clamp is removed.  A few additional sutures are placed in the aortotomy which provides hemostasis.  Patient is defibrillated once normal sinus rhythm.  Intra-aortic balloon pump removed started at one-to-one.  Patient is then fully weaned and after resting on cart for bypass  approximately 10 to 15 minutes, he is slowly weaned off of cardiopulmonary bypass without event on low-dose epinephrine and nitric oxide infusions.  Valve appears to be seated well with no evidence of paravalvular leak and ejection fraction is now around 30-lisa percent with improved contractility of the left ventricle.  Patient is decannulated and protamine is given.  Cannulation sites are oversewn.  There is diffuse bleeding from all raw surfaces and needle holes therefore blood products are given with improvement in coagulopathy.  Inspection for hemostasis demonstrates some venous bleeding around the area of the LIMA to LAD anastomosis.  A lap the heart in this area is inspected.  It appears to be venous bleeding from the lateral edges of the tunnel which were not bleeding at all throughout the operation but now are.  The anastomosis is hemostatic and there is no arterial type of bleeding, it is all dark.  The tunnel is then closed proximal distal anastomosis with 4 oh pledgeted suture with excellent hemostasis.  At this point, cannulae sites are hemostatic as are the the operative sites including the anastomotic sites mammary bed and bone edges.  Attention is turned to closure.  A 24 [place] Dane drain is placed in the pericardial space and to 32 [place] chest tubes are placed, with a straight 1 in the right pleural space in the mediastinal space and an angled on the left little space.  Pericardium is reapproximated to the aorta.  Unable to reapproximate the pericardium over the left ventricle or right ventricle due to the significant cardiomegaly and overall big heart.  The sternum was then reapproximated with multiple double stainless steel wires reinforced sternal plates and sternal lock blue plating system.  All the wounds were then copiously irrigated, closed multiple layers and sterile dressings are applied.  We then performed bleeding trials which the patient passed without issue.  He is then taken to ICU  in critical condition with intra-aortic balloon pump at one-to-one, low-dose epinephrine and Levophed infusions and in normal sinus rhythm with echo findings as above.  Sponge, needle and instrument counts were all correct in the case.    Anesthesia: Cardiac    Complications: None    Implants:   40 mm atrial clip  21 mm Edward Inspiris Resilia bioprosthetic aortic valve  Sternal plating system, sternal lock blue    Specimen: Aortic valve leaflets    Drains: 32 Latvian chest tubes x 2, 24 Latvian Dane drain x 1    Condition: Critical to CVICU    Estimated Blood Loss: 400 mL     Leonides Pacheco MD

## 2025-06-19 NOTE — OR NURSING
Pt family, Maddy (spouse) [phone], updated of procedure start at 0836. Also updated (daughter) Boby, [phone], of procedure start at 0837.

## 2025-06-20 ENCOUNTER — APPOINTMENT (OUTPATIENT)
Dept: GENERAL RADIOLOGY | Facility: HOSPITAL | Age: 60
DRG: 219 | End: 2025-06-20
Attending: CLINICAL NURSE SPECIALIST
Payer: MEDICAID

## 2025-06-20 ENCOUNTER — APPOINTMENT (OUTPATIENT)
Dept: CT IMAGING | Facility: HOSPITAL | Age: 60
DRG: 219 | End: 2025-06-20
Attending: INTERNAL MEDICINE
Payer: MEDICAID

## 2025-06-20 LAB
ANION GAP SERPL CALC-SCNC: 3 MMOL/L (ref 0–18)
ANION GAP SERPL CALC-SCNC: 4 MMOL/L (ref 0–18)
ANION GAP SERPL CALC-SCNC: 7 MMOL/L (ref 0–18)
ATRIAL RATE: 78 BPM
BASE EXCESS BLD CALC-SCNC: -1.7 MMOL/L (ref ?–2)
BASE EXCESS BLD CALC-SCNC: 0.5 MMOL/L (ref ?–2)
BASE EXCESS BLD CALC-SCNC: 1.2 MMOL/L (ref ?–2)
BASOPHILS # BLD AUTO: 0.04 X10(3) UL (ref 0–0.2)
BASOPHILS NFR BLD AUTO: 0.4 %
BLOOD TYPE BARCODE: 5100
BUN BLD-MCNC: 11 MG/DL (ref 9–23)
BUN BLD-MCNC: 11 MG/DL (ref 9–23)
BUN BLD-MCNC: 12 MG/DL (ref 9–23)
BUN/CREAT SERPL: 13.8 (ref 10–20)
BUN/CREAT SERPL: 15.5 (ref 10–20)
BUN/CREAT SERPL: 18.5 (ref 10–20)
CALCIUM BLD-MCNC: 8.2 MG/DL (ref 8.7–10.4)
CALCIUM BLD-MCNC: 8.3 MG/DL (ref 8.7–10.4)
CALCIUM BLD-MCNC: 8.3 MG/DL (ref 8.7–10.4)
CHLORIDE SERPL-SCNC: 109 MMOL/L (ref 98–112)
CHLORIDE SERPL-SCNC: 109 MMOL/L (ref 98–112)
CHLORIDE SERPL-SCNC: 113 MMOL/L (ref 98–112)
CHOLEST SERPL-MCNC: 107 MG/DL (ref ?–200)
CO2 SERPL-SCNC: 24 MMOL/L (ref 21–32)
CO2 SERPL-SCNC: 24 MMOL/L (ref 21–32)
CO2 SERPL-SCNC: 26 MMOL/L (ref 21–32)
CREAT BLD-MCNC: 0.65 MG/DL (ref 0.7–1.3)
CREAT BLD-MCNC: 0.71 MG/DL (ref 0.7–1.3)
CREAT BLD-MCNC: 0.8 MG/DL (ref 0.7–1.3)
DEPRECATED RDW RBC AUTO: 45.9 FL (ref 35.1–46.3)
EGFRCR SERPLBLD CKD-EPI 2021: 101 ML/MIN/1.73M2 (ref 60–?)
EGFRCR SERPLBLD CKD-EPI 2021: 104 ML/MIN/1.73M2 (ref 60–?)
EGFRCR SERPLBLD CKD-EPI 2021: 107 ML/MIN/1.73M2 (ref 60–?)
EOSINOPHIL # BLD AUTO: 0.04 X10(3) UL (ref 0–0.7)
EOSINOPHIL NFR BLD AUTO: 0.4 %
ERYTHROCYTE [DISTWIDTH] IN BLOOD BY AUTOMATED COUNT: 16.2 % (ref 11–15)
GLUCOSE BLD-MCNC: 120 MG/DL (ref 70–99)
GLUCOSE BLD-MCNC: 126 MG/DL (ref 70–99)
GLUCOSE BLD-MCNC: 164 MG/DL (ref 70–99)
GLUCOSE BLDC GLUCOMTR-MCNC: 100 MG/DL (ref 70–99)
GLUCOSE BLDC GLUCOMTR-MCNC: 110 MG/DL (ref 70–99)
GLUCOSE BLDC GLUCOMTR-MCNC: 111 MG/DL (ref 70–99)
GLUCOSE BLDC GLUCOMTR-MCNC: 113 MG/DL (ref 70–99)
GLUCOSE BLDC GLUCOMTR-MCNC: 113 MG/DL (ref 70–99)
GLUCOSE BLDC GLUCOMTR-MCNC: 114 MG/DL (ref 70–99)
GLUCOSE BLDC GLUCOMTR-MCNC: 116 MG/DL (ref 70–99)
GLUCOSE BLDC GLUCOMTR-MCNC: 117 MG/DL (ref 70–99)
GLUCOSE BLDC GLUCOMTR-MCNC: 121 MG/DL (ref 70–99)
GLUCOSE BLDC GLUCOMTR-MCNC: 121 MG/DL (ref 70–99)
GLUCOSE BLDC GLUCOMTR-MCNC: 122 MG/DL (ref 70–99)
GLUCOSE BLDC GLUCOMTR-MCNC: 123 MG/DL (ref 70–99)
GLUCOSE BLDC GLUCOMTR-MCNC: 124 MG/DL (ref 70–99)
GLUCOSE BLDC GLUCOMTR-MCNC: 124 MG/DL (ref 70–99)
GLUCOSE BLDC GLUCOMTR-MCNC: 126 MG/DL (ref 70–99)
GLUCOSE BLDC GLUCOMTR-MCNC: 129 MG/DL (ref 70–99)
GLUCOSE BLDC GLUCOMTR-MCNC: 130 MG/DL (ref 70–99)
GLUCOSE BLDC GLUCOMTR-MCNC: 132 MG/DL (ref 70–99)
GLUCOSE BLDC GLUCOMTR-MCNC: 151 MG/DL (ref 70–99)
GLUCOSE BLDC GLUCOMTR-MCNC: 160 MG/DL (ref 70–99)
GLUCOSE BLDC GLUCOMTR-MCNC: 172 MG/DL (ref 70–99)
HCO3 BLDA-SCNC: 23.5 MEQ/L (ref 21–27)
HCO3 BLDA-SCNC: 25.9 MEQ/L (ref 21–27)
HCO3 BLDV-SCNC: 24.2 MEQ/L (ref 22–26)
HCT VFR BLD AUTO: 28.5 % (ref 39–53)
HDLC SERPL-MCNC: 42 MG/DL (ref 40–59)
HGB BLD-MCNC: 9.1 G/DL (ref 13–17.5)
IMM GRANULOCYTES # BLD AUTO: 0.03 X10(3) UL (ref 0–1)
IMM GRANULOCYTES NFR BLD: 0.3 %
LACTATE SERPL-SCNC: 1.2 MMOL/L (ref 0.5–2)
LDLC SERPL CALC-MCNC: 43 MG/DL (ref ?–100)
LYMPHOCYTES # BLD AUTO: 1.86 X10(3) UL (ref 1–4)
LYMPHOCYTES NFR BLD AUTO: 20.6 %
MAGNESIUM SERPL-MCNC: 1.6 MG/DL (ref 1.6–2.6)
MAGNESIUM SERPL-MCNC: 1.7 MG/DL (ref 1.6–2.6)
MCH RBC QN AUTO: 24.9 PG (ref 26–34)
MCHC RBC AUTO-ENTMCNC: 31.9 G/DL (ref 31–37)
MCV RBC AUTO: 78.1 FL (ref 80–100)
MODIFIED ALLEN TEST: POSITIVE
MONOCYTES # BLD AUTO: 0.8 X10(3) UL (ref 0.1–1)
MONOCYTES NFR BLD AUTO: 8.8 %
NEUTROPHILS # BLD AUTO: 6.28 X10 (3) UL (ref 1.5–7.7)
NEUTROPHILS # BLD AUTO: 6.28 X10(3) UL (ref 1.5–7.7)
NEUTROPHILS NFR BLD AUTO: 69.5 %
NONHDLC SERPL-MCNC: 65 MG/DL (ref ?–130)
O2 CT BLD-SCNC: 13.3 VOL% (ref 15–23)
O2 CT BLD-SCNC: 13.5 VOL% (ref 15–23)
O2/TOTAL GAS SETTING VFR VENT: 40 %
OSMOLALITY SERPL CALC.SUM OF ELEC: 287 MOSM/KG (ref 275–295)
OSMOLALITY SERPL CALC.SUM OF ELEC: 291 MOSM/KG (ref 275–295)
OSMOLALITY SERPL CALC.SUM OF ELEC: 295 MOSM/KG (ref 275–295)
P AXIS: 68 DEGREES
P-R INTERVAL: 168 MS
PCO2 BLDA: 28 MM HG (ref 35–45)
PCO2 BLDA: 38 MM HG (ref 35–45)
PCO2 BLDV: 44 MM HG (ref 38–50)
PEEP SETTING VENT: 5 CM H2O
PH BLDA: 7.39 [PH] (ref 7.35–7.45)
PH BLDA: 7.53 [PH] (ref 7.35–7.45)
PH BLDV: 7.38 [PH] (ref 7.32–7.43)
PLATELET # BLD AUTO: 203 10(3)UL (ref 150–450)
PO2 BLDA: 64 MM HG (ref 80–100)
PO2 BLDA: 86 MM HG (ref 80–100)
PO2 BLDV: 31 MM HG (ref 35–40)
POTASSIUM SERPL-SCNC: 3.2 MMOL/L (ref 3.5–5.1)
POTASSIUM SERPL-SCNC: 3.6 MMOL/L (ref 3.5–5.1)
POTASSIUM SERPL-SCNC: 4 MMOL/L (ref 3.5–5.1)
PUNCTURE CHARGE: NO
Q-T INTERVAL: 426 MS
QRS DURATION: 124 MS
QTC CALCULATION (BEZET): 485 MS
R AXIS: -40 DEGREES
RBC # BLD AUTO: 3.65 X10(6)UL (ref 4.3–5.7)
RESP RATE: 12 BPM
SAO2 % BLDA: 93.4 % (ref 94–100)
SAO2 % BLDA: 97.1 % (ref 94–100)
SAO2 % BLDV: 62 % (ref 60–85)
SODIUM SERPL-SCNC: 137 MMOL/L (ref 136–145)
SODIUM SERPL-SCNC: 140 MMOL/L (ref 136–145)
SODIUM SERPL-SCNC: 142 MMOL/L (ref 136–145)
SPECIMEN VOL 24H UR: 450 ML
SPECIMEN VOL 24H UR: 450 ML
SPECIMEN VOL 24H UR: 500 ML
T AXIS: 156 DEGREES
TRIGL SERPL-MCNC: 120 MG/DL (ref 30–149)
UNIT VOLUME: 130 ML
UNIT VOLUME: 191 ML
VENTRICULAR RATE: 78 BPM
VLDLC SERPL CALC-MCNC: 17 MG/DL (ref 0–30)
WBC # BLD AUTO: 9.1 X10(3) UL (ref 4–11)

## 2025-06-20 PROCEDURE — 70450 CT HEAD/BRAIN W/O DYE: CPT | Performed by: INTERNAL MEDICINE

## 2025-06-20 PROCEDURE — 71045 X-RAY EXAM CHEST 1 VIEW: CPT | Performed by: CLINICAL NURSE SPECIALIST

## 2025-06-20 PROCEDURE — 99233 SBSQ HOSP IP/OBS HIGH 50: CPT | Performed by: HOSPITALIST

## 2025-06-20 PROCEDURE — 99233 SBSQ HOSP IP/OBS HIGH 50: CPT | Performed by: INTERNAL MEDICINE

## 2025-06-20 RX ORDER — LORAZEPAM 2 MG/ML
4 INJECTION INTRAMUSCULAR ONCE
Status: COMPLETED | OUTPATIENT
Start: 2025-06-20 | End: 2025-06-20

## 2025-06-20 RX ORDER — CALCIUM CHLORIDE 100 MG/ML
INJECTION INTRAVENOUS; INTRAVENTRICULAR AS NEEDED
Status: DISCONTINUED | OUTPATIENT
Start: 2025-06-19 | End: 2025-06-20 | Stop reason: SURG

## 2025-06-20 RX ORDER — INDOMETHACIN 25 MG/1
CAPSULE ORAL AS NEEDED
Status: DISCONTINUED | OUTPATIENT
Start: 2025-06-19 | End: 2025-06-20 | Stop reason: SURG

## 2025-06-20 RX ORDER — IPRATROPIUM BROMIDE AND ALBUTEROL SULFATE 2.5; .5 MG/3ML; MG/3ML
3 SOLUTION RESPIRATORY (INHALATION)
Status: DISCONTINUED | OUTPATIENT
Start: 2025-06-20 | End: 2025-06-23

## 2025-06-20 RX ORDER — LIDOCAINE HYDROCHLORIDE 10 MG/ML
INJECTION, SOLUTION EPIDURAL; INFILTRATION; INTRACAUDAL; PERINEURAL AS NEEDED
Status: DISCONTINUED | OUTPATIENT
Start: 2025-06-19 | End: 2025-06-20 | Stop reason: SURG

## 2025-06-20 RX ORDER — FUROSEMIDE 10 MG/ML
20 INJECTION INTRAMUSCULAR; INTRAVENOUS ONCE
Status: COMPLETED | OUTPATIENT
Start: 2025-06-20 | End: 2025-06-20

## 2025-06-20 RX ORDER — PROTAMINE SULFATE 10 MG/ML
INJECTION, SOLUTION INTRAVENOUS AS NEEDED
Status: DISCONTINUED | OUTPATIENT
Start: 2025-06-19 | End: 2025-06-20 | Stop reason: SURG

## 2025-06-20 RX ORDER — MAGNESIUM SULFATE HEPTAHYDRATE 500 MG/ML
INJECTION, SOLUTION INTRAMUSCULAR; INTRAVENOUS AS NEEDED
Status: DISCONTINUED | OUTPATIENT
Start: 2025-06-19 | End: 2025-06-20 | Stop reason: SURG

## 2025-06-20 NOTE — DISCHARGE INSTRUCTIONS
Sometimes managing your health at home requires assistance.  The [facility] team has recognized your preference to use [facility].  They can be reached by phone at [phone].  The fax number for your reference is [phone].  A representative from the home health agency will contact you or your family to schedule your first visit.       CARDIAC SURGERY DISCHARGE INSTRUCTIONS  Shower daily and clean your surgical incisions with soap and water then swab with Betadine 2 x day until your follow up office visit with Eri SMALLS) at Dr. Pacheco's office.  Do not drive for one month  Do not lift/push/pull greater than 10 lbs for 3 months   Do not soak (submerge) your incision in water such as tub/pool/etc for one month  You have had valve surgery- you need to take antibiotics prior to any dental or surgical procedures. Please advise your physician that you have had valve surgery so they can prescribe the antibiotic for you.   Take plain Tylenol over-the-counter as needed for pain

## 2025-06-20 NOTE — PROGRESS NOTES
Pulmonary/ICU/Critical Care Progress Note         Reason for Consultation: post CABG, IABP  Referring Physician: Dr. Pacheco    Subjective:  Remains intubated, on IABP 1:1  NO is off since overnight  On levo 4, epi 2, propofol/precedex, insulin, dextrose gtts  S/p albumin this am and lasix for low UO       From the initial consultation  HPI: 60 yo male with hx of smoking, HTN, HLP, PVD, severe aortic stenosis, moderate mitral stenosis, multivessel coronary artery disease s/p PCI and stent of LAD and diagonal artery seen by CV surgery and underwent CABG x 2, aortic valve replacement.   Now in the ICU post op with IABP, sedated on MV support.   Intra-op given 2 FFP, 2 cryo, 2 PLT  On epi 2 and levo 3 gtts as per CV surgery  On NO 40 PPM  Sedation propofol, insulin, dextrose      REVIEW OF SYSTEMS:  Positives and negatives as noted in HPI. All other review of systems otherwise are either limited (due to pt/family inability to provide) or negative.      PAST MEDICAL HISTORY:  Past Medical History[1]      PAST SURGICAL HISTORY:  Past Surgical History[2]      PAST SOCIAL HISTORY:  Social Hx on file[3]      PAST FAMILY HISTORY:  Family History[4]      ALLERGIES:  Allergies[5]      MEDS:  Home Medications:  Medications Taking[6]    Scheduled Medication:  Scheduled Medications[7]  Continuous Infusing Medication:  Medication Infusions[8]  PRN Medications:  PRN Medications[9]       PHYSICAL EXAM:  /56 (BP Location: Right arm)   Pulse 74   Temp 99.4 °F (37.4 °C) (Pulmonary Artery)   Resp 25   Ht 5' 6\" (1.676 m)   Wt 216 lb 14.9 oz (98.4 kg)   SpO2 97%   BMI 35.01 kg/m²   Vent Mode: VC/AC  FiO2 (%):  [30 %-55 %] 40 %  S RR:  [12] 12  S VT:  [500 mL] 500 mL  PEEP/CPAP (cm H2O):  [5 cm H20] 5 cm H20  MAP (cm H2O):  [10-12] 11  CONSTITUTIONAL: sedated intubated  HEENT: atraumatic normocephalic  MOUTH: ETT OGT  NECK/THROAT: no JVD. Trachea midline. No obvious thyromegaly.+ cordis and swan  LUNG: clear upper b/l no  wheezing, crackles. Diminished bases. Chest symmetric with respiratory motion  HEART: midsternal incision dressed, regular rate and rhythm, IABP sounds, no obvious murmers or gallops noted. + chest tube  ABD: soft non tender. + bowel sounds. No organomegaly noted  EXT: no clubbing, cyanosis, or edema noted. Pulses intact grossly  NEURO/MUSCULOSKELETAL: sedated intubated  SKIN: warm, dry. No obvious lesions noted  LYMPH: no obvious LAD      IMAGES:   CXR ordered 6/20  CONCLUSION: Post CABG and aortic valve prosthesis.  Stable/satisfactory position of lines and tubes.  Stable vascular congestion/interstitial edema and stable small left effusion.        LABS:  Recent Labs   Lab 06/19/25  1413 06/19/25  1819 06/19/25  2114 06/20/25  0411   RBC 3.04* 3.46*  --  3.65*   HGB 8.0* 8.8* 9.0* 9.1*   HCT 24.9* 27.9* 27.7* 28.5*   MCV 81.9 80.6  --  78.1*   MCH 26.3 25.4*  --  24.9*   MCHC 32.1 31.5  --  31.9   RDW 15.8* 15.8*  --  16.2*   NEPRELIM  --   --   --  6.28   WBC 9.7 8.8  --  9.1   .0 182.0  --  203.0       Recent Labs   Lab 06/19/25  1413 06/19/25  1645 06/20/25  0411   *  --  126*   BUN 13  --  12   CREATSERUM 0.78  --  0.65*   EGFRCR 101  --  107   CA 9.2  --  8.2*   ALB  --  3.3  --    *  --  142   K 3.3*  --  3.6     --  113*   CO2 27.0  --  26.0   ALKPHO  --  44*  --    AST  --  71*  --    ALT  --  12  --    BILT  --  0.4  --    TP  --  5.0*  --          ASSESSMENT/PLAN:  CAD s/p multivessel disease and severe aortic stenosis  -s/p CABG x 2 and AV repair  -IABP and cardiac gtts as per CV and cardiology  -on epi, levo, amio gtts  -plavix and ASA  -post op abx  -s/p albumin and lasix for low UO    Post op ventilator mgmt  -switched SIMV to AC  -weaned off NO as per CV  -checked ABG stable  -daily CXR  -remain intubated as per CV surgery  -sedation with propofol/precedex  -PRN duonebs    PVD  -plavix, ASA    DM2  -insulin/dextrose  -monitor BG    BPH  -flomax  -stafford    Proph  -DVT:  lovenox  -GI: pepcid    Dispo  -full code    Thank you for the opportunity to care for Jose Alberto Nieves      DERRICK Rahman DO, MPH  Pulmonary Critical Care Medicine  [facility]                         [1]   Past Medical History:  Diagnosis Date    Aortic stenosis     Back problem     BPH (benign prostatic hyperplasia)     Cardiomyopathy (HCC)     Coronary atherosclerosis     Depression     Diabetes (HCC)     Esophageal reflux     Heart valve disease     High blood pressure     High cholesterol     Peripheral vascular disease     Shortness of breath     related to smoking     Visual impairment     glasses   [2]   Past Surgical History:  Procedure Laterality Date    Cardiac catheterization  2016    [facility]    Cath drug eluting stent      Colonoscopy  04/22/2024    Colonoscopy N/A 4/22/2024    Procedure: COLONOSCOPY;  Surgeon: Attila Li MD;  Location: [facility] ENDOSCOPY    Esophagoscopy,diagnostic  04/22/2024    Dr. Li    Hernia surgery      as a child    Nasal scopy,remv part ethmoid  10/14/2020    Nasal scopy,rmv tiss maxill sinus  10/14/2020    Repair of nasal septum  10/14/2020   [3]   Social History  Socioeconomic History    Marital status:    Tobacco Use    Smoking status: Former     Current packs/day: 1.00     Average packs/day: 1 pack/day for 20.0 years (20.0 ttl pk-yrs)     Types: Cigarettes    Smokeless tobacco: Never   Vaping Use    Vaping status: Every Day    Substances: Nicotine   Substance and Sexual Activity    Alcohol use: Yes     Comment: 1-2 drinks a week    Drug use: No   [4]   Family History  Problem Relation Age of Onset    Diabetes Father     Diabetes Paternal Aunt     Diabetes Paternal Uncle     Seizure Disorder Mother     No Known Problems Brother     No Known Problems Daughter     Glaucoma Neg     Macular degeneration Neg    [5] No Known Allergies  [6]   Outpatient Medications Marked as Taking for the 6/19/25  encounter (Hospital Encounter)   Medication Sig Dispense Refill    ENTRESTO 24-26 MG Oral Tab Entresto 24 mg-26 mg tablet, [RxNorm: 6027703]      magnesium 250 MG Oral Tab Take 1 tablet (250 mg total) by mouth every other day.      cyanocobalamin 500 MCG Oral Tab Take 1 tablet (500 mcg total) by mouth every other day.      metFORMIN HCl 1000 MG Oral Tab Take 1 tablet (1,000 mg total) by mouth 2 (two) times daily with meals. 180 tablet 3    amLODIPine 5 MG Oral Tab Take 1 tablet (5 mg total) by mouth daily. 90 tablet 3    carvedilol 25 MG Oral Tab Take 1 tablet (25 mg total) by mouth 2 (two) times daily with meals. 180 tablet 3    clopidogrel 75 MG Oral Tab Take 1 tablet (75 mg total) by mouth daily. 90 tablet 3    empagliflozin (JARDIANCE) 25 MG Oral Tab Take 1 tablet (25 mg total) by mouth daily. 90 tablet 3    furosemide 40 MG Oral Tab Take 1 tablet (40 mg total) by mouth daily. 90 tablet 3    [DISCONTINUED] losartan 50 MG Oral Tab Take 1 tablet (50 mg total) by mouth 2 (two) times daily. 180 tablet 3    rosuvastatin 20 MG Oral Tab Take 1 tablet (20 mg total) by mouth nightly. 90 tablet 3    tamsulosin 0.4 MG Oral Cap TAKE 2 CAPSULES BY MOUTH DAILY (Patient taking differently: Take 1 capsule (0.4 mg total) by mouth in the morning and 1 capsule (0.4 mg total) before bedtime.) 180 capsule 3    aspirin 81 MG Oral Tab EC Take 1 tablet (81 mg total) by mouth nightly.     [7]    sugammadex  2 mg/kg Intravenous Once    sennosides  8.6 mg Oral BID    docusate sodium  100 mg Oral BID    metoclopramide  10 mg Intravenous Q6H    famotidine  20 mg Oral BID    Or    famotidine  20 mg Intravenous BID    metoprolol tartrate  25 mg Oral 2x Daily(Beta Blocker)    mupirocin  1 Application Nasal BID    chlorhexidine gluconate  15 mL Mouth/Throat BID    multivitamin  1 tablet Oral Daily    ascorbic acid  500 mg Oral TID    enoxaparin  40 mg Subcutaneous Daily    clopidogrel  75 mg Oral Daily    aspirin  81 mg Oral Daily    ceFAZolin   2 g Intravenous Q8H    tamsulosin  0.4 mg Oral BID    amiodarone  200 mg Oral TID CC   [8]    sodium chloride Stopped (06/19/25 1748)    norepinephrine 3 mcg/min (06/19/25 1510)    sodium chloride 10 mL/hr at 06/19/25 1653    dexmedetomidine 0.4 mcg/kg/hr (06/20/25 0415)    DOBUTamine      nitroGLYCERIN in dextrose 5%      norepinephrine 4 mcg/min (06/20/25 0610)    clevidipine      dextrose 5%-sodium chloride 0.45% 40 mL/hr (06/20/25 0400)    insulin regular 4 Units/hr (06/20/25 0700)    propofol 50 mcg/kg/min (06/20/25 1135)    amiodarone 0.5 mg/min (06/20/25 0400)    EPINEPHrine (Adrenalin) 5 mg in sodium chloride 0.9% 250 mL infusion 1 mcg/min (06/20/25 0400)   [9]   melatonin    polyethylene glycol (PEG 3350)    bisacodyl    DOBUTamine    nitroGLYCERIN in dextrose 5%    norepinephrine    potassium chloride **OR** potassium chloride    magnesium sulfate in dextrose 5%    magnesium sulfate in sterile water for injection    acetaminophen **OR** HYDROcodone-acetaminophen **OR** HYDROcodone-acetaminophen    morphINE **OR** morphINE    sodium chloride    albumin human    glucose **OR** glucose **OR** glucose-vitamin C **OR** dextrose **OR** glucose **OR** glucose **OR** glucose-vitamin C

## 2025-06-20 NOTE — PROGRESS NOTES
06/20/25 0350   Vent Information   Vent Mode VC/AC   Settings   FiO2 (%) (S)  30 %   Resp Rate (Set) 12   Vt (Set, mL) 500 mL   PEEP/CPAP (cm H2O) 5 cm H20   ETT   Placement Date/Time: 06/19/25 (c) 0778   Airway Size: 8 mm  Cuffed: Cuffed  Insertion attempts: 1  Technique: Direct laryngoscopy  Placement Verification: Capnometry  Placed By: Anesthesiologist   Secured at (cm) 26 cm     Pt. is stable on the monitor and vent, no acute changes overnight. Suction is provided and as needed. Bilateral breath sounds auscultated. FiO2 and Nitric Oxide were weaned throughout the night. RT will continue to monitor.

## 2025-06-20 NOTE — PLAN OF CARE
Discontinued pt's Right Femoral A-line in AM Per CV Surgery; pt tolerated procedure well without complications; CV Surgical team discontinued pt's Right Femoral IABP without complications at noon; pt did not tolerate weaning of sedation and SBT, was having labored breathing with usage of abdominal and accessory muscles; CV surgery decided to withhold pt's extubation process and re-attempt tomorrow AM (June 21); pt started on lasix drip to increase urine output; CT Head obtained per Pulm/Crit.    Problem: CARDIOVASCULAR - ADULT  Goal: Maintains optimal cardiac output and hemodynamic stability  Description: INTERVENTIONS:  - Monitor vital signs, rhythm, and trends  - Monitor for bleeding, hypotension and signs of decreased cardiac output  - Evaluate effectiveness of vasoactive medications to optimize hemodynamic stability  - Monitor arterial and/or venous puncture sites for bleeding and/or hematoma  - Assess quality of pulses, skin color and temperature  - Assess for signs of decreased coronary artery perfusion - ex. Angina  - Evaluate fluid balance, assess for edema, trend weights  Outcome: Progressing  Goal: Absence of cardiac arrhythmias or at baseline  Description: INTERVENTIONS:  - Continuous cardiac monitoring, monitor vital signs, obtain 12 lead EKG if indicated  - Evaluate effectiveness of antiarrhythmic and heart rate control medications as ordered  - Initiate emergency measures for life threatening arrhythmias  - Monitor electrolytes and administer replacement therapy as ordered  Outcome: Progressing     Problem: RESPIRATORY - ADULT  Goal: Achieves optimal ventilation and oxygenation  Description: INTERVENTIONS:  - Assess for changes in respiratory status  - Assess for changes in mentation and behavior  - Position to facilitate oxygenation and minimize respiratory effort  - Oxygen supplementation based on oxygen saturation or ABGs  - Provide Smoking Cessation handout, if applicable  - Encourage  broncho-pulmonary hygiene including cough, deep breathe, Incentive Spirometry  - Assess the need for suctioning and perform as needed  - Assess and instruct to report SOB or any respiratory difficulty  - Respiratory Therapy support as indicated  - Manage/alleviate anxiety  - Monitor for signs/symptoms of CO2 retention  Outcome: Progressing     Problem: GASTROINTESTINAL - ADULT  Goal: Minimal or absence of nausea and vomiting  Description: INTERVENTIONS:  - Maintain adequate hydration with IV or PO as ordered and tolerated  - Nasogastric tube to low intermittent suction as ordered  - Evaluate effectiveness of ordered antiemetic medications  - Provide nonpharmacologic comfort measures as appropriate  - Advance diet as tolerated, if ordered  - Obtain nutritional consult as needed  - Evaluate fluid balance  Outcome: Progressing  Goal: Maintains or returns to baseline bowel function  Description: INTERVENTIONS:  - Assess bowel function  - Maintain adequate hydration with IV or PO as ordered and tolerated  - Evaluate effectiveness of GI medications  - Encourage mobilization and activity  - Obtain nutritional consult as needed  - Establish a toileting routine/schedule  - Consider collaborating with pharmacy to review patient's medication profile  Outcome: Progressing     Problem: GENITOURINARY - ADULT  Goal: Absence of urinary retention  Description: INTERVENTIONS:  - Assess patient’s ability to void and empty bladder  - Monitor intake/output and perform bladder scan as needed  - Follow urinary retention protocol/standard of care  - Consider collaborating with pharmacy to review patient's medication profile  - Implement strategies to promote bladder emptying  Outcome: Progressing     Problem: METABOLIC/FLUID AND ELECTROLYTES - ADULT  Goal: Glucose maintained within prescribed range  Description: INTERVENTIONS:  - Monitor Blood Glucose as ordered  - Assess for signs and symptoms of hyperglycemia and hypoglycemia  -  Administer ordered medications to maintain glucose within target range  - Assess barriers to adequate nutritional intake and initiate nutrition consult as needed  - Instruct patient on self management of diabetes  Outcome: Progressing  Goal: Electrolytes maintained within normal limits  Description: INTERVENTIONS:  - Monitor labs and rhythm and assess patient for signs and symptoms of electrolyte imbalances  - Administer electrolyte replacement as ordered  - Monitor response to electrolyte replacements, including rhythm and repeat lab results as appropriate  - Fluid restriction as ordered  - Instruct patient on fluid and nutrition restrictions as appropriate  Outcome: Progressing  Goal: Hemodynamic stability and optimal renal function maintained  Description: INTERVENTIONS:  - Monitor labs and assess for signs and symptoms of volume excess or deficit  - Monitor intake, output and patient weight  - Monitor urine specific gravity, serum osmolarity and serum sodium as indicated or ordered  - Monitor response to interventions for patient's volume status, including labs, urine output, blood pressure (other measures as available)  - Encourage oral intake as appropriate  - Instruct patient on fluid and nutrition restrictions as appropriate  Outcome: Progressing     Problem: SKIN/TISSUE INTEGRITY - ADULT  Goal: Incision(s), wounds(s) or drain site(s) healing without S/S of infection  Description: INTERVENTIONS:  - Assess and document risk factors for pressure ulcer development  - Assess and document skin integrity  - Assess and document dressing/incision, wound bed, drain sites and surrounding tissue  - Implement wound care per orders  - Initiate isolation precautions as appropriate  - Initiate Pressure Ulcer prevention bundle as indicated  Outcome: Progressing     Problem: HEMATOLOGIC - ADULT  Goal: Maintains hematologic stability  Description: INTERVENTIONS  - Assess for signs and symptoms of bleeding or hemorrhage  -  Monitor labs and vital signs for trends  - Administer supportive blood products/factors, fluids and medications as ordered and appropriate  - Administer supportive blood products/factors as ordered and appropriate  Outcome: Progressing  Goal: Free from bleeding injury  Description: (Example usage: patient with low platelets)  INTERVENTIONS:  - Avoid intramuscular injections, enemas and rectal medication administration  - Ensure safe mobilization of patient  - Hold pressure on venipuncture sites to achieve adequate hemostasis  - Assess for signs and symptoms of internal bleeding  - Monitor lab trends  - Patient is to report abnormal signs of bleeding to staff  - Avoid use of toothpicks and dental floss  - Use electric shaver for shaving  - Use soft bristle tooth brush  - Limit straining and forceful nose blowing  Outcome: Progressing     Problem: MUSCULOSKELETAL - ADULT  Goal: Return mobility to safest level of function  Description: INTERVENTIONS:  - Assess patient stability and activity tolerance for standing, transferring and ambulating w/ or w/o assistive devices  - Assist with transfers and ambulation using safe patient handling equipment as needed  - Ensure adequate protection for wounds/incisions during mobilization  - Obtain PT/OT consults as needed  - Advance activity as appropriate  - Communicate ordered activity level and limitations with patient/family  Outcome: Progressing     Problem: NEUROLOGICAL - ADULT  Goal: Achieves stable or improved neurological status  Description: INTERVENTIONS  - Assess for and report changes in neurological status  - Initiate measures to prevent increased intracranial pressure  - Maintain blood pressure and fluid volume within ordered parameters to optimize cerebral perfusion and minimize risk of hemorrhage  - Monitor temperature, glucose, and sodium. Initiate appropriate interventions as ordered  Outcome: Progressing

## 2025-06-20 NOTE — PROGRESS NOTES
[facility]  part of [facility]    Progress Note    Jose Alberto Lizama Patient Status:  Inpatient    1965 MRN [id number]   Location [facility] 2W/SW Attending Leonides Pacheco MD   Hosp Day # 1 PCP Fiona Sweeney MD     Subjective:  Intubated and sedated, remains on IABP 1: 1, and oh weaned off at 0600.  Remains on levo 5, epi 1, amio 0.5, insulin, propofol, Precedex.  CI 2.1    Objective:  BP 93/52 (BP Location: Right arm)   Pulse 81   Temp 99.7 °F (37.6 °C) (Pulmonary Artery)   Resp 20   Ht 5' 6\" (1.676 m)   Wt 216 lb 14.9 oz (98.4 kg)   SpO2 95%   BMI 35.01 kg/m²     Temp (24hrs), Av.4 °F (37.4 °C), Min:98.4 °F (36.9 °C), Max:99.8 °F (37.7 °C)  Telemetry-NSR    Intake/Output:    Intake/Output Summary (Last 24 hours) at 2025 0819  Last data filed at 2025 0800  Gross per 24 hour   Intake 7801.9 ml   Output 2034 ml   Net 5767.9 ml       Wt Readings from Last 3 Encounters:   25 216 lb 14.9 oz (98.4 kg)   25 201 lb 12.8 oz (91.5 kg)   25 200 lb (90.7 kg)       Allergies:  Allergies[1]    Labs:  Lab Results   Component Value Date    WBC 9.1 2025    HGB 9.1 2025    HCT 28.5 2025    .0 2025    CREATSERUM 0.65 2025    BUN 12 2025     2025    K 3.6 2025     2025    CO2 26.0 2025     2025    CA 8.2 2025    ALB 3.3 2025    ALKPHO 44 2025    BILT 0.4 2025    TP 5.0 2025    AST 71 2025    ALT 12 2025    PTT 30.6 2025    INR 1.17 2025    TSH 0.696 2025    MG 1.6 2025       Physical Exam:  Blood pressure 93/52, pulse 81, temperature 99.7 °F (37.6 °C), temperature source Pulmonary Artery, resp. rate 20, height 5' 6\" (1.676 m), weight 216 lb 14.9 oz (98.4 kg), SpO2 95%.  General: NAD  Neck: RIJ cordis/[place], JVD difficult to assess due to lines  Lungs: Diminished laterally, otherwise CTA  CT-390 cc /  12-hour, no airleak noted  Mediastinal Dane-70 cc / 12-hour, no airleak  Heart: RRR, S1, S2  Abdomen: Soft, NT/ND, BS hypoactive,-flatus/-BM  Extremities: Warm, dry, trace-1+ generalized edema  R groin arterial sheath-CDI/soft, no hematoma  Pulses: Dopplerable PT bilateral  Skin: sternotomy stable, incision CDI. Ace wrap RLE, bilateral SVG site CDI  L groin IABP-CDI/soft, no hematoma  Neurological: Sedated/intubated, Precedex added overnight secondary to agitation    Assessment/Plan:  S/p CABG x 2 (LIMA-LAD, SVG-diagonal), AVR with 21 mm Inspiris tissue valve, MELINDA clip, placement of L IABP -POD #1  Intubated/sedated-Endo weaned off this a.m. 0600, FiO2 increased to 50%-wean vent per pulmonary as tolerated.  Plan to leave intubated until IABP removed this afternoon.  CXR reviewed  Hemodynamically stable on IABP 1: 1, levo, epi-plan to DC IVP this afternoon if remains hemodynamically stable.  Maintain CI greater than 2, will wean vasoactive agents over weekend as patient remained stable.  DC our arterial sheath this a.m.  Pain meds as needed  Increase activity once appropriate-IABP removed/extubated  DVT prevention - SCDs/Lovenox  Expected postop anemia - mild/stable, Hgb 9.1 -likely some component delusional, will start iron once able to take p.o.  Expected postop volume overload - started Lasix gtt 5mg/hr, monitor BMP Q 6, I/O  Hypokalemia/hypomagnesmia-replete lites per protocol  Insulin coverage per protocol -continue insulin drip until extubated, history DM preop, anticipate resuming home meds at or near discharge    Discharge planning:  Patient lives at home with wife, anticipate home with HH once medically stable.  Appreciate SW/CM to assist with DC planning      Plan of care discussed with pt, bedside RN, and CV Surgeon: Dr. Tara Lewis, APRN  6/20/2025  8:19 AM      Addendum: 1245  IABP placed 1:2 for 1 hour per Dr. Pacheco - remained hemodynamically stable. L fem IABP removed by Dr. Pacheco without  difficulty.  Patient tolerated procedure well. Slight bleeding noted upon initial removal with hemostasis achieved with manual pressure x 15 minutes.  L trish CDI, no bleeding or hematoma, + PT pulse by Doppler, femstop applied. Left femoral site CDI, and soft. 1+ Doppler PT pulse, foot warm. Nursing monitoring..         [1] No Known Allergies

## 2025-06-20 NOTE — PROGRESS NOTES
[facility] - CARDIOLOGY CONSULT NOTE    Jose Alberto Lizama Patient Status:  Inpatient    1965 MRN [id number]   Location [facility] 2W/SW Attending Leonides Pacheco MD   Hosp Day # 1 PCP Fiona Sweeney MD     Date of Admission:  2025  Date of Consult:  2025  I was asked by Leonides Pacheco MD to provide recommendations for evaluation and management of cardiac issues.  Impression:     59-year-old male history of CAD, hypertension, hyperlipidemia, diabetes, severe aortic stenosis now postop day #1    Recommendations:  1.  History of CAD difficult operation status post CABG x 2 postop day #1  LIMA to LAD and SVG to diagonal.  Severe LV dysfunction  EKG stable from this morning  Small RCA and  of the circumflex unable to be bypassed.  Left atrial appendage closure.  Resume aspirin and Plavix once extubated.  Continue supportive care wean off pressors as tolerated.  Maintaining sinus rhythm postop A-fib protocol.    2.  Hyperlipidemia  Resume rosuvastatin 20 mg daily refused PCSK9 as outpatient.  Latest LDL 95,  repeat lipid panel tomorrow  3.  Diabetes mellitus  On metformin and Jardiance as outpatient currently on insulin management as per primary team.  4.  Postop hypotension continue supportive care now improving.  Entresto and amlodipine, carvedilol all on hold.  Plan to start Lasix drip today.  5.  Severe aortic stenosis status post AVR.  6.  Severe PVD.  Resume aspirin and Plavix as above      Critical care time 32 minutes, will follow.      No events overnight balloon pump has been removed.  Telemetry sinus rhythm    Remains intubated and sedated  Drips  Levophed 1 mcg  Norepinephrine 4 mcg    Balloon pump one-to-one ratio    Cardiac output 3.8 L/min, cardiac index 1.8      Cardiac tests:    Past Medical History  Past Medical History[1]    Past Surgical History  Past Surgical History[2]    Family History  Family History[3]    Social History  Pediatric History   Patient Parents     Not on file     Other Topics Concern    Not on file   Social History Narrative    Not on file       No smoking or drug use.  Works as a  cleaning offices.    Current Medications:  Current Hospital Medications[4]  Prescriptions Prior to Admission[5]    Allergies  Allergies[6]    Review of Systems:   10 pt ROS performed, separate from HPI  Review of Systems:  Unable to obtain patient is intubated and sedated.  Physical Exam:   Blood pressure 92/58, pulse 73, temperature 98.7 °F (37.1 °C), temperature source Pulmonary Artery, resp. rate 24, height 5' 6\" (1.676 m), weight 216 lb 14.9 oz (98.4 kg), SpO2 100%.    Scheduled Meds: Scheduled Medications[7]      Physical Exam:    General: Intubated and sedated  male no apparent distress. No respiratory or constitutional distress.  HEENT: Normocephalic, anicteric sclera, neck supple  Neck: No JVD, carotids 2+, supple  Cardiac: Regular rate. No pathologic murmur.  Distal sounds  Lungs: Coarse breath sounds   abdomen: Soft, BS-present.  Extremities: Without clubbing, cyanosis.  Peripheral pulsespresent.  Balloon pump in place  Neurologic: Limited exam intubated and sedated  Skin: Warm and dry.     Results:     Laboratory Data:  Lab Results   Component Value Date    WBC 9.1 06/20/2025    HGB 9.1 (L) 06/20/2025    HCT 28.5 (L) 06/20/2025    .0 06/20/2025    CREATSERUM 0.65 (L) 06/20/2025    BUN 12 06/20/2025     06/20/2025    K 3.6 06/20/2025     (H) 06/20/2025    CO2 26.0 06/20/2025     (H) 06/20/2025    CA 8.2 (L) 06/20/2025    ALB 3.3 06/19/2025    ALKPHO 44 (L) 06/19/2025    TP 5.0 (L) 06/19/2025    AST 71 (H) 06/19/2025    ALT 12 06/19/2025    PTT 30.6 06/19/2025    INR 1.17 06/19/2025    PTP 15.6 (H) 06/19/2025    TSH 0.696 06/19/2025    PSA 2.62 08/10/2023    MG 1.6 06/20/2025         Thank you for allowing me to participate in the care of your patient.    David Osullivan MD  [facility]  [place]  Interventional Cardiology  6/20/2025         [1]   Past Medical History:   Aortic stenosis    Back problem    BPH (benign prostatic hyperplasia)    Cardiomyopathy (HCC)    Coronary atherosclerosis    Depression    Diabetes (HCC)    Esophageal reflux    Heart valve disease    High blood pressure    High cholesterol    Peripheral vascular disease    Shortness of breath    related to smoking     Visual impairment    glasses   [2]   Past Surgical History:  Procedure Laterality Date    Cardiac catheterization  2016    [facility]    Cath drug eluting stent      Colonoscopy  04/22/2024    Colonoscopy N/A 4/22/2024    Procedure: COLONOSCOPY;  Surgeon: Attila iL MD;  Location: [facility] ENDOSCOPY    Esophagoscopy,diagnostic  04/22/2024    Dr. Li    Hernia surgery      as a child    Nasal scopy,remv part ethmoid  10/14/2020    Nasal scopy,rmv tiss maxill sinus  10/14/2020    Repair of nasal septum  10/14/2020   [3]   Family History  Problem Relation Age of Onset    Diabetes Father     Diabetes Paternal Aunt     Diabetes Paternal Uncle     Seizure Disorder Mother     No Known Problems Brother     No Known Problems Daughter     Glaucoma Neg     Macular degeneration Neg    [4]   Current Facility-Administered Medications   Medication Dose Route Frequency    furosemide (Lasix) 500 mg in sodium chloride 0.9% 125 mL infusion  5 mg/hr Intravenous Continuous    sodium chloride 0.9% infusion  83 mL/hr Intravenous Continuous    sugammadex (Bridion) 200 MG/2ML injection 180 mg  2 mg/kg Intravenous Once    sodium chloride 0.9% infusion   Intravenous Continuous    melatonin tab 3 mg  3 mg Oral Nightly PRN    sennosides (Senokot) tab 8.6 mg  8.6 mg Oral BID    docusate sodium (Colace) cap 100 mg  100 mg Oral BID    polyethylene glycol (PEG 3350) (Miralax) 17 g oral packet 17 g  17 g Oral Daily PRN    bisacodyl (Dulcolax) 10 MG rectal suppository 10 mg  10 mg Rectal Daily PRN    metoclopramide (Reglan)  5 mg/mL injection 10 mg  10 mg Intravenous Q6H    famotidine (Pepcid) tab 20 mg  20 mg Oral BID    Or    famotidine (Pepcid) 20 mg/2mL injection 20 mg  20 mg Intravenous BID    dexmedeTOMIDine in sodium chloride 0.9% (Precedex) 400 mcg/100mL infusion premix  0.2-1.5 mcg/kg/hr (Dosing Weight) Intravenous Continuous    DOBUTamine in dextrose 5% (Dobutrex) 500 mg/250mL infusion premix  2.5-20 mcg/kg/min (Dosing Weight) Intravenous Continuous PRN    nitroGLYCERIN in dextrose 5% 50 mg/250mL infusion premix  5-300 mcg/min Intravenous Continuous PRN    norepinephrine (Levophed) 4 mg/250mL infusion premix  0.5-30 mcg/min Intravenous Continuous PRN    metoprolol tartrate (Lopressor) tab 25 mg  25 mg Oral 2x Daily(Beta Blocker)    clevidipine (Cleviprex) 25 MG/50ML IV infusion  1-6 mg/hr Intravenous Continuous    potassium chloride 20 mEq/100mL IVPB premix 20 mEq  20 mEq Intravenous PRN    Or    potassium chloride 40 mEq/100mL IVPB premix (central line) 40 mEq  40 mEq Intravenous PRN    magnesium sulfate in dextrose 5% 1 g/100mL infusion premix 1 g  1 g Intravenous PRN    magnesium sulfate in sterile water for injection 2 g/50mL IVPB premix 2 g  2 g Intravenous PRN    mupirocin (Bactroban) 2% nasal ointment 1 Application  1 Application Nasal BID    chlorhexidine gluconate (Peridex) 0.12 % oral solution 15 mL  15 mL Mouth/Throat BID    multivitamin (Tab-A-Елена/Beta Carotene) tab 1 tablet  1 tablet Oral Daily    ascorbic acid (Vitamin C) tab 500 mg  500 mg Oral TID    dextrose 5%-sodium chloride 0.45% infusion   mL/hr Intravenous Continuous    enoxaparin (Lovenox) 40 MG/0.4ML SUBQ injection 40 mg  40 mg Subcutaneous Daily    acetaminophen (Tylenol) tab 650 mg  650 mg Oral Q4H PRN    Or    HYDROcodone-acetaminophen (Norco) 5-325 MG per tab 1 tablet  1 tablet Oral Q4H PRN    Or    HYDROcodone-acetaminophen (Norco) 5-325 MG per tab 2 tablet  2 tablet Oral Q4H PRN    morphINE PF 2 MG/ML injection 2 mg  2 mg Intravenous Q2H  PRN    Or    morphINE PF 4 MG/ML injection 4 mg  4 mg Intravenous Q2H PRN    sodium chloride 0.9 % IV bolus 250 mL  250 mL Intravenous PRN    albumin human (Albumin) 5% injection 12.5 g  12.5 g Intravenous Once PRN    clopidogrel (Plavix) tab 75 mg  75 mg Oral Daily    aspirin DR tab 81 mg  81 mg Oral Daily    ceFAZolin (Ancef) 2g in 10mL IV syringe premix  2 g Intravenous Q8H    glucose (Dex4) 15 GM/59ML oral liquid 15 g  15 g Oral Q15 Min PRN    Or    glucose (Glutose) 40% oral gel 15 g  15 g Oral Q15 Min PRN    Or    glucose-vitamin C (Dex-4) chewable tab 4 tablet  4 tablet Oral Q15 Min PRN    Or    dextrose 50% injection 50 mL  50 mL Intravenous Q15 Min PRN    Or    glucose (Dex4) 15 GM/59ML oral liquid 30 g  30 g Oral Q15 Min PRN    Or    glucose (Glutose) 40% oral gel 30 g  30 g Oral Q15 Min PRN    Or    glucose-vitamin C (Dex-4) chewable tab 8 tablet  8 tablet Oral Q15 Min PRN    insulin regular human (Novolin R, Humulin R) 100 Units in sodium chloride 0.9% 100 mL standard infusion (100 mL)  1-28 Units/hr Intravenous Continuous    tamsulosin (Flomax) cap 0.4 mg  0.4 mg Oral BID    propofol (Diprivan) 10 mg/mL infusion premix  5-50 mcg/kg/min (Dosing Weight) Intravenous Continuous    amiodarone in dextrose 4.14% (Cordarone) 360 mg/200mL infusion premix  0.5 mg/min Intravenous Continuous    amiodarone (Pacerone) tab 200 mg  200 mg Oral TID CC    EPINEPHrine (Adrenalin) 5 mg in sodium chloride 0.9% 250 mL infusion  2 mcg/min Intravenous Continuous   [5]   Medications Prior to Admission   Medication Sig    ENTRESTO 24-26 MG Oral Tab Entresto 24 mg-26 mg tablet, [RxNorm: 3842638]    magnesium 250 MG Oral Tab Take 1 tablet (250 mg total) by mouth every other day.    cyanocobalamin 500 MCG Oral Tab Take 1 tablet (500 mcg total) by mouth every other day.    metFORMIN HCl 1000 MG Oral Tab Take 1 tablet (1,000 mg total) by mouth 2 (two) times daily with meals.    amLODIPine 5 MG Oral Tab Take 1 tablet (5 mg total) by  mouth daily.    carvedilol 25 MG Oral Tab Take 1 tablet (25 mg total) by mouth 2 (two) times daily with meals.    clopidogrel 75 MG Oral Tab Take 1 tablet (75 mg total) by mouth daily.    empagliflozin (JARDIANCE) 25 MG Oral Tab Take 1 tablet (25 mg total) by mouth daily.    furosemide 40 MG Oral Tab Take 1 tablet (40 mg total) by mouth daily.    rosuvastatin 20 MG Oral Tab Take 1 tablet (20 mg total) by mouth nightly.    tamsulosin 0.4 MG Oral Cap TAKE 2 CAPSULES BY MOUTH DAILY (Patient taking differently: Take 1 capsule (0.4 mg total) by mouth in the morning and 1 capsule (0.4 mg total) before bedtime.)    aspirin 81 MG Oral Tab EC Take 1 tablet (81 mg total) by mouth nightly.    Thiamine HCl 250 MG Oral Tab Take 1 tablet (250 mg total) by mouth every other day.    pyridoxine 100 MG Oral Tab Take 1 tablet (100 mg total) by mouth every other day.    Glucose Blood (CONTOUR NEXT TEST) In Vitro Strip To test 2 x daily  Dx E11.9    Glucose Blood (CONTOUR NEXT TEST) In Vitro Strip To check glucose bid   Dx  E11.9    Glucose Blood (LORAINE CONTOUR TEST) In Vitro Strip To test daily    LORAINE CONTOUR NEXT TEST In Vitro Strip To test 2 x daily   [6] No Known Allergies  [7]    sugammadex  2 mg/kg Intravenous Once    sennosides  8.6 mg Oral BID    docusate sodium  100 mg Oral BID    metoclopramide  10 mg Intravenous Q6H    famotidine  20 mg Oral BID    Or    famotidine  20 mg Intravenous BID    metoprolol tartrate  25 mg Oral 2x Daily(Beta Blocker)    mupirocin  1 Application Nasal BID    chlorhexidine gluconate  15 mL Mouth/Throat BID    multivitamin  1 tablet Oral Daily    ascorbic acid  500 mg Oral TID    enoxaparin  40 mg Subcutaneous Daily    clopidogrel  75 mg Oral Daily    aspirin  81 mg Oral Daily    ceFAZolin  2 g Intravenous Q8H    tamsulosin  0.4 mg Oral BID    amiodarone  200 mg Oral TID CC

## 2025-06-20 NOTE — CM/SW NOTE
HH referrals sent by previous CM after receiving MD ordered for HH at [place]. [facility] is only accepting agency at [place]. Agency reserved in [facility], liaison Kamila also notified.    Pt remains intubated on sedation while requiring pressor support x 2. Both AMIO and insulin drips ordered.    PT/OT evals will be ordered for dc recommendation once appropriate.    Plan: Home w/spouse with [facility] pending extubation, evals, and medical clearance.    MARIANELA MartinesN    [phone]

## 2025-06-20 NOTE — PROGRESS NOTES
Received intubated pt from OR on Nitric Oxide set at 40ppm. Pt placed on the following vent settings:   06/19/25 1517   Vent Information   Vent Mode VC/AC   Settings   FiO2 (%) 55 %   Resp Rate (Set) 12   Vt (Set, mL) 500 mL   Waveform Decelerating ramp   PEEP/CPAP (cm H2O) 5 cm H20     Repeat abg analyzed, results WNL, FiO2 weaned to 40%. Nitric Oxide remains at 40ppm at this time. Plan to wean Nitric overnight per Dr Pacheco w/ goal of maintaining systolic PAP <65. ETT initially secured at 22cm at the lip. Pt became difficult to ventilate w/ slight audible gurgling heard when applying commercial ETT mccabe, no ETT migration observed. ETT subsequently advanced 1cm to 23cm at the lip and CXR ordered. ETT later advanced to 26cm at the lip w/ Xray at bedside. Repeat CXR revealed ETT ~5cm above fred w/ ETT at 26cm at the lip. BS equal bilaterally w/ good EtCO2 and acceptable PIPS on vent.  D/w covering pulmonologist Dr Zamora, no further adjustments made. Suction was provided before & immediately after ETT position adjustments. RT to continue to monitor.

## 2025-06-20 NOTE — PLAN OF CARE
Patient sedated. AET 1510. Patient arrived from OR on epi gtt, levo gtt, insulin gtt, propofol gtt. IVF d5.45 at 40ml/hr. Patient sedated to be kept intubated overnight. On Nitric. Replaced 40meq K.  [place] adjusted by Dr. Pacheco at bedside and advanced to 52cm. ETT readjusted at bedside a few times with RT confirmed with CXR. X1 unit PRBC given. H and H resulted t 8.8, Dr. Pacheco aware, no blood product to be given, wait for redraw in AM. Dr. Pacheco aware lactic elevated at 2.5. Epi gtt decreased to 1mcg per Dr. Pacheco. Redraw at 1945. Q6 lactic. Notify Dr. Pacheco if lactic continues to trend upward. Dane level at 104,  Y Chest tubes level at 50.  Problem: CARDIOVASCULAR - ADULT  Goal: Maintains optimal cardiac output and hemodynamic stability  Description: INTERVENTIONS:  - Monitor vital signs, rhythm, and trends  - Monitor for bleeding, hypotension and signs of decreased cardiac output  - Evaluate effectiveness of vasoactive medications to optimize hemodynamic stability  - Monitor arterial and/or venous puncture sites for bleeding and/or hematoma  - Assess quality of pulses, skin color and temperature  - Assess for signs of decreased coronary artery perfusion - ex. Angina  - Evaluate fluid balance, assess for edema, trend weights  Outcome: Progressing  Goal: Absence of cardiac arrhythmias or at baseline  Description: INTERVENTIONS:  - Continuous cardiac monitoring, monitor vital signs, obtain 12 lead EKG if indicated  - Evaluate effectiveness of antiarrhythmic and heart rate control medications as ordered  - Initiate emergency measures for life threatening arrhythmias  - Monitor electrolytes and administer replacement therapy as ordered  Outcome: Progressing     Problem: RESPIRATORY - ADULT  Goal: Achieves optimal ventilation and oxygenation  Description: INTERVENTIONS:  - Assess for changes in respiratory status  - Assess for changes in mentation and behavior  - Position to facilitate oxygenation and minimize  respiratory effort  - Oxygen supplementation based on oxygen saturation or ABGs  - Provide Smoking Cessation handout, if applicable  - Encourage broncho-pulmonary hygiene including cough, deep breathe, Incentive Spirometry  - Assess the need for suctioning and perform as needed  - Assess and instruct to report SOB or any respiratory difficulty  - Respiratory Therapy support as indicated  - Manage/alleviate anxiety  - Monitor for signs/symptoms of CO2 retention  Outcome: Not Progressing     Problem: METABOLIC/FLUID AND ELECTROLYTES - ADULT  Goal: Glucose maintained within prescribed range  Description: INTERVENTIONS:  - Monitor Blood Glucose as ordered  - Assess for signs and symptoms of hyperglycemia and hypoglycemia  - Administer ordered medications to maintain glucose within target range  - Assess barriers to adequate nutritional intake and initiate nutrition consult as needed  - Instruct patient on self management of diabetes  Outcome: Not Progressing  Goal: Electrolytes maintained within normal limits  Description: INTERVENTIONS:  - Monitor labs and rhythm and assess patient for signs and symptoms of electrolyte imbalances  - Administer electrolyte replacement as ordered  - Monitor response to electrolyte replacements, including rhythm and repeat lab results as appropriate  - Fluid restriction as ordered  - Instruct patient on fluid and nutrition restrictions as appropriate  Outcome: Progressing  Goal: Hemodynamic stability and optimal renal function maintained  Description: INTERVENTIONS:  - Monitor labs and assess for signs and symptoms of volume excess or deficit  - Monitor intake, output and patient weight  - Monitor urine specific gravity, serum osmolarity and serum sodium as indicated or ordered  - Monitor response to interventions for patient's volume status, including labs, urine output, blood pressure (other measures as available)  - Encourage oral intake as appropriate  - Instruct patient on fluid and  nutrition restrictions as appropriate  Outcome: Progressing     Problem: HEMATOLOGIC - ADULT  Goal: Maintains hematologic stability  Description: INTERVENTIONS  - Assess for signs and symptoms of bleeding or hemorrhage  - Monitor labs and vital signs for trends  - Administer supportive blood products/factors, fluids and medications as ordered and appropriate  - Administer supportive blood products/factors as ordered and appropriate  Outcome: Progressing  Goal: Free from bleeding injury  Description: (Example usage: patient with low platelets)  INTERVENTIONS:  - Avoid intramuscular injections, enemas and rectal medication administration  - Ensure safe mobilization of patient  - Hold pressure on venipuncture sites to achieve adequate hemostasis  - Assess for signs and symptoms of internal bleeding  - Monitor lab trends  - Patient is to report abnormal signs of bleeding to staff  - Avoid use of toothpicks and dental floss  - Use electric shaver for shaving  - Use soft bristle tooth brush  - Limit straining and forceful nose blowing  Outcome: Progressing

## 2025-06-20 NOTE — ANESTHESIA POSTPROCEDURE EVALUATION
Patient: Jose Alberto Lizama    Procedure Summary       Date: 06/19/25 Room / Location: [facility] MAIN OR  / [facility] MAIN OR    Anesthesia Start: 0718 Anesthesia Stop: 1516    Procedure: CORONARY ARTERY BYPASS GRAFT X 2; UTILIZING THE LEFT INTERNAL MAMMARY ARTERY TO THE LAD; SVG TO THE DIAG; RIGHT LEG ENDOSCOPIC GREATER SAPHENOUS VEIN GRAFT HARVEST, LEFT LEG ENDOSCOPIC EXPLORATION; AORTIC VALVE REPLACEMENT WITH INSPIRIS TISSUE VALVE SIZE 21MM; LEFT ATRIAL APPENDAGE CLIP SIZE 40; INTRAOPERATIVE TRANSESOHPAGEAL ECHOCARDIOGRAM; INSERTION OF TEMPORARY VENTRICULAR PACING WIRE; STERNAL PLATING Diagnosis:       Rheumatic aortic stenosis      (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])    Surgeons: Leonides Pacheco MD Anesthesiologist: Nishant Lala MD    Anesthesia Type: general ASA Status: 4            Anesthesia Type: general    Vitals Value Taken Time   BP 98/57 06/20/25 07:57   Temp 98.7 06/20/25 07:57   Pulse 99 06/20/25 07:57   Resp 12 06/20/25 07:57   SpO2 100 06/20/25 07:57       [facility] AN Post Evaluation:   Patient Evaluated in ICU  Patient Participation: complete - patient cannot participate  Level of Consciousness: Post-procedure mental status: intubated and sedated.  Airway Patency:patent (8.0 ett in place)  Dental exam unchanged from preop  Yes    Cardiovascular Status: acceptable, hemodynamically stable and stable  Respiratory Status: ETT and ventilator (+nitric oxide)  Postoperative Hydration acceptable      Nishant Lala MD  6/20/2025 7:57 AM

## 2025-06-21 ENCOUNTER — APPOINTMENT (OUTPATIENT)
Dept: GENERAL RADIOLOGY | Facility: HOSPITAL | Age: 60
DRG: 219 | End: 2025-06-21
Attending: NURSE PRACTITIONER
Payer: MEDICAID

## 2025-06-21 LAB
ANION GAP SERPL CALC-SCNC: 6 MMOL/L (ref 0–18)
ANION GAP SERPL CALC-SCNC: 6 MMOL/L (ref 0–18)
ANION GAP SERPL CALC-SCNC: 8 MMOL/L (ref 0–18)
ANION GAP SERPL CALC-SCNC: 9 MMOL/L (ref 0–18)
BUN BLD-MCNC: 10 MG/DL (ref 9–23)
BUN BLD-MCNC: 11 MG/DL (ref 9–23)
BUN BLD-MCNC: 8 MG/DL (ref 9–23)
BUN BLD-MCNC: 9 MG/DL (ref 9–23)
BUN/CREAT SERPL: 10.1 (ref 10–20)
BUN/CREAT SERPL: 12.7 (ref 10–20)
BUN/CREAT SERPL: 13.7 (ref 10–20)
BUN/CREAT SERPL: 14.7 (ref 10–20)
CALCIUM BLD-MCNC: 8.1 MG/DL (ref 8.7–10.4)
CALCIUM BLD-MCNC: 8.1 MG/DL (ref 8.7–10.4)
CALCIUM BLD-MCNC: 8.3 MG/DL (ref 8.7–10.4)
CALCIUM BLD-MCNC: 8.3 MG/DL (ref 8.7–10.4)
CHLORIDE SERPL-SCNC: 105 MMOL/L (ref 98–112)
CHLORIDE SERPL-SCNC: 106 MMOL/L (ref 98–112)
CO2 SERPL-SCNC: 23 MMOL/L (ref 21–32)
CO2 SERPL-SCNC: 24 MMOL/L (ref 21–32)
CO2 SERPL-SCNC: 25 MMOL/L (ref 21–32)
CO2 SERPL-SCNC: 26 MMOL/L (ref 21–32)
CREAT BLD-MCNC: 0.71 MG/DL (ref 0.7–1.3)
CREAT BLD-MCNC: 0.73 MG/DL (ref 0.7–1.3)
CREAT BLD-MCNC: 0.75 MG/DL (ref 0.7–1.3)
CREAT BLD-MCNC: 0.79 MG/DL (ref 0.7–1.3)
DEPRECATED RDW RBC AUTO: 47.2 FL (ref 35.1–46.3)
EGFRCR SERPLBLD CKD-EPI 2021: 101 ML/MIN/1.73M2 (ref 60–?)
EGFRCR SERPLBLD CKD-EPI 2021: 103 ML/MIN/1.73M2 (ref 60–?)
EGFRCR SERPLBLD CKD-EPI 2021: 104 ML/MIN/1.73M2 (ref 60–?)
EGFRCR SERPLBLD CKD-EPI 2021: 104 ML/MIN/1.73M2 (ref 60–?)
ERYTHROCYTE [DISTWIDTH] IN BLOOD BY AUTOMATED COUNT: 16.6 % (ref 11–15)
GLUCOSE BLD-MCNC: 107 MG/DL (ref 70–99)
GLUCOSE BLD-MCNC: 116 MG/DL (ref 70–99)
GLUCOSE BLD-MCNC: 121 MG/DL (ref 70–99)
GLUCOSE BLD-MCNC: 136 MG/DL (ref 70–99)
GLUCOSE BLDC GLUCOMTR-MCNC: 107 MG/DL (ref 70–99)
GLUCOSE BLDC GLUCOMTR-MCNC: 108 MG/DL (ref 70–99)
GLUCOSE BLDC GLUCOMTR-MCNC: 112 MG/DL (ref 70–99)
GLUCOSE BLDC GLUCOMTR-MCNC: 113 MG/DL (ref 70–99)
GLUCOSE BLDC GLUCOMTR-MCNC: 114 MG/DL (ref 70–99)
GLUCOSE BLDC GLUCOMTR-MCNC: 115 MG/DL (ref 70–99)
GLUCOSE BLDC GLUCOMTR-MCNC: 117 MG/DL (ref 70–99)
GLUCOSE BLDC GLUCOMTR-MCNC: 120 MG/DL (ref 70–99)
GLUCOSE BLDC GLUCOMTR-MCNC: 121 MG/DL (ref 70–99)
GLUCOSE BLDC GLUCOMTR-MCNC: 122 MG/DL (ref 70–99)
GLUCOSE BLDC GLUCOMTR-MCNC: 122 MG/DL (ref 70–99)
GLUCOSE BLDC GLUCOMTR-MCNC: 125 MG/DL (ref 70–99)
GLUCOSE BLDC GLUCOMTR-MCNC: 125 MG/DL (ref 70–99)
GLUCOSE BLDC GLUCOMTR-MCNC: 127 MG/DL (ref 70–99)
GLUCOSE BLDC GLUCOMTR-MCNC: 127 MG/DL (ref 70–99)
GLUCOSE BLDC GLUCOMTR-MCNC: 128 MG/DL (ref 70–99)
GLUCOSE BLDC GLUCOMTR-MCNC: 132 MG/DL (ref 70–99)
GLUCOSE BLDC GLUCOMTR-MCNC: 134 MG/DL (ref 70–99)
GLUCOSE BLDC GLUCOMTR-MCNC: 137 MG/DL (ref 70–99)
GLUCOSE BLDC GLUCOMTR-MCNC: 145 MG/DL (ref 70–99)
GLUCOSE BLDC GLUCOMTR-MCNC: 155 MG/DL (ref 70–99)
GLUCOSE BLDC GLUCOMTR-MCNC: 156 MG/DL (ref 70–99)
HCT VFR BLD AUTO: 28.5 % (ref 39–53)
HGB BLD-MCNC: 9.1 G/DL (ref 13–17.5)
MAGNESIUM SERPL-MCNC: 1.8 MG/DL (ref 1.6–2.6)
MCH RBC QN AUTO: 25.2 PG (ref 26–34)
MCHC RBC AUTO-ENTMCNC: 31.9 G/DL (ref 31–37)
MCV RBC AUTO: 78.9 FL (ref 80–100)
OSMOLALITY SERPL CALC.SUM OF ELEC: 284 MOSM/KG (ref 275–295)
OSMOLALITY SERPL CALC.SUM OF ELEC: 285 MOSM/KG (ref 275–295)
OSMOLALITY SERPL CALC.SUM OF ELEC: 286 MOSM/KG (ref 275–295)
OSMOLALITY SERPL CALC.SUM OF ELEC: 286 MOSM/KG (ref 275–295)
PLATELET # BLD AUTO: 173 10(3)UL (ref 150–450)
POTASSIUM SERPL-SCNC: 3.4 MMOL/L (ref 3.5–5.1)
POTASSIUM SERPL-SCNC: 3.5 MMOL/L (ref 3.5–5.1)
POTASSIUM SERPL-SCNC: 3.7 MMOL/L (ref 3.5–5.1)
POTASSIUM SERPL-SCNC: 3.9 MMOL/L (ref 3.5–5.1)
RBC # BLD AUTO: 3.61 X10(6)UL (ref 4.3–5.7)
SODIUM SERPL-SCNC: 137 MMOL/L (ref 136–145)
SODIUM SERPL-SCNC: 137 MMOL/L (ref 136–145)
SODIUM SERPL-SCNC: 138 MMOL/L (ref 136–145)
SODIUM SERPL-SCNC: 138 MMOL/L (ref 136–145)
WBC # BLD AUTO: 12.6 X10(3) UL (ref 4–11)

## 2025-06-21 PROCEDURE — 71045 X-RAY EXAM CHEST 1 VIEW: CPT | Performed by: NURSE PRACTITIONER

## 2025-06-21 PROCEDURE — 99233 SBSQ HOSP IP/OBS HIGH 50: CPT | Performed by: HOSPITALIST

## 2025-06-21 PROCEDURE — 99233 SBSQ HOSP IP/OBS HIGH 50: CPT | Performed by: INTERNAL MEDICINE

## 2025-06-21 NOTE — RESPIRATORY THERAPY NOTE
Received intubated pt on the following vent settings with NO on standby: AC/VC 12, 500, +5, 50%. FiO2 weaned to 40%. ABG later performed showing respiratory alkalosis, pt breathing over vent throughout shift, Vt decreased to 450mls per Dr Rahman. Repeat abg performed, FiO2 increased to 55% per results, d/w Dr Rahman, no further changes made. Pt transported to & from CT without complication. ETT remains secured at 26cm at the lip. RT to continue to monitor.

## 2025-06-21 NOTE — PROGRESS NOTES
[facility] - CARDIOLOGY CONSULT NOTE    Jose Alberto Lizama Patient Status:  Inpatient    1965 MRN [id number]   Location [facility] 2W/SW Attending Leonides Pacheco MD   Hosp Day # 2 PCP Fiona Sweeney MD     Date of Admission:  2025  Date of Consult:  2025  I was asked by Leonides Pacheco MD to provide recommendations for evaluation and management of cardiac issues.  Impression:     Overnight remains intubated  Telemetry sinus rhythm with inverted T waves.          59-year-old male history of CAD, hypertension, hyperlipidemia, diabetes, severe aortic stenosis now postop day #2    Recommendations:  1.  History of CAD difficult operation status post CABG x 2 postop day #2  LIMA to LAD and SVG to diagonal.  Severe LV dysfunction  Repeat EKG today.  Small RCA and  of the circumflex unable to be bypassed.  Left atrial appendage closure.  Resume aspirin and Plavix once extubated.  Continue supportive care wean off pressors as tolerated.  Maintaining sinus rhythm postop A-fib protocol.    2.  Hyperlipidemia  Resume rosuvastatin 20 mg daily refused PCSK9 as outpatient.  Latest LDL 95,  repeat lipid panel tomorrow  3.  Diabetes mellitus  On metformin and Jardiance as outpatient currently on insulin management as per primary team.  4.  Postop hypotension continue supportive care now improving.  Entresto and amlodipine, carvedilol all on hold.  Continue Lasix drip good output so far.  5.  Severe aortic stenosis status post AVR.  6.  Severe PVD.  Resume aspirin and Plavix as above once extubated.  7.  Postop confusion all sedation will be held appreciate ICU care team brain CT chronic microvascular changes from yesterday      Await chest x-ray from today.  Critical care time 32 minutes, will follow.      No events overnight balloon pump has been removed.  Telemetry sinus rhythm    Remains intubated and sedated  Drips  Levophed 1 mcg  Dobutamine 2 mcg         Cardiac output 4.9 L/min,   cardiac  index 2.4      Cardiac tests:    Past Medical History  Past Medical History[1]    Past Surgical History  Past Surgical History[2]    Family History  Family History[3]    Social History  Pediatric History   Patient Parents    Not on file     Other Topics Concern    Not on file   Social History Narrative    Not on file       No smoking or drug use.  Works as a  cleaning offices.    Current Medications:  Current Hospital Medications[4]  Prescriptions Prior to Admission[5]    Allergies  Allergies[6]    Review of Systems:   10 pt ROS performed, separate from HPI  Review of Systems:  Unable to obtain patient is intubated and sedated.  Physical Exam:   Blood pressure 117/67, pulse 91, temperature 100.2 °F (37.9 °C), temperature source Pulmonary Artery, resp. rate 23, height 5' 6\" (1.676 m), weight 215 lb 2.7 oz (97.6 kg), SpO2 97%.    Scheduled Meds: Scheduled Medications[7]      Physical Exam:    General: Intubated and sedated  male no apparent distress. No respiratory or constitutional distress.  HEENT: Normocephalic, anicteric sclera, neck supple  Neck: No JVD, carotids 2+, supple  Cardiac: Regular rate. No pathologic murmur.  Distal sounds  Lungs: Coarse breath sounds   abdomen: Soft, BS-present.  Extremities: Without clubbing, cyanosis.  Peripheral pulsespresent.  Balloon pump in place  Neurologic: Limited exam intubated does not respond to verbal commands  Skin: Warm and dry.     Results:     Laboratory Data:  Lab Results   Component Value Date    WBC 12.6 (H) 06/21/2025    HGB 9.1 (L) 06/21/2025    HCT 28.5 (L) 06/21/2025    .0 06/21/2025    CREATSERUM 0.79 06/21/2025    BUN 8 (L) 06/21/2025     06/21/2025    K 3.4 (L) 06/21/2025     06/21/2025    CO2 26.0 06/21/2025     (H) 06/21/2025    CA 8.1 (L) 06/21/2025    ALB 3.3 06/19/2025    ALKPHO 44 (L) 06/19/2025    TP 5.0 (L) 06/19/2025    AST 71 (H) 06/19/2025    ALT 12 06/19/2025    PTT 30.6 06/19/2025    INR  1.17 06/19/2025    PTP 15.6 (H) 06/19/2025    TSH 0.696 06/19/2025    PSA 2.62 08/10/2023    MG 1.8 06/21/2025         Thank you for allowing me to participate in the care of your patient.    David Osullivan MD  [facility]  Interventional Cardiology  6/21/2025         [1]   Past Medical History:   Aortic stenosis    Back problem    BPH (benign prostatic hyperplasia)    Cardiomyopathy (HCC)    Coronary atherosclerosis    Depression    Diabetes (HCC)    Esophageal reflux    Heart valve disease    High blood pressure    High cholesterol    Peripheral vascular disease    Shortness of breath    related to smoking     Visual impairment    glasses   [2]   Past Surgical History:  Procedure Laterality Date    Cardiac catheterization  2016    [facility]    Cath drug eluting stent      Colonoscopy  04/22/2024    Colonoscopy N/A 4/22/2024    Procedure: COLONOSCOPY;  Surgeon: Attila Li MD;  Location: [facility] ENDOSCOPY    Esophagoscopy,diagnostic  04/22/2024    Dr. Li    Hernia surgery      as a child    Nasal scopy,remv part ethmoid  10/14/2020    Nasal scopy,rmv tiss maxill sinus  10/14/2020    Repair of nasal septum  10/14/2020   [3]   Family History  Problem Relation Age of Onset    Diabetes Father     Diabetes Paternal Aunt     Diabetes Paternal Uncle     Seizure Disorder Mother     No Known Problems Brother     No Known Problems Daughter     Glaucoma Neg     Macular degeneration Neg    [4]   Current Facility-Administered Medications   Medication Dose Route Frequency    furosemide (Lasix) 500 mg in sodium chloride 0.9% 125 mL infusion  5 mg/hr Intravenous Continuous    DOBUTamine (Dobutrex) 500 mg in sodium chloride 0.9% 125 mL infusion  2.5 mcg/kg/min (Dosing Weight) Intravenous Continuous    ipratropium-albuterol (Duoneb) 0.5-2.5 (3) MG/3ML inhalation solution 3 mL  3 mL Nebulization 4 times per day    sodium chloride 0.9% infusion  83 mL/hr Intravenous Continuous     sugammadex (Bridion) 200 MG/2ML injection 180 mg  2 mg/kg Intravenous Once    sodium chloride 0.9% infusion   Intravenous Continuous    melatonin tab 3 mg  3 mg Oral Nightly PRN    sennosides (Senokot) tab 8.6 mg  8.6 mg Oral BID    docusate sodium (Colace) cap 100 mg  100 mg Oral BID    polyethylene glycol (PEG 3350) (Miralax) 17 g oral packet 17 g  17 g Oral Daily PRN    bisacodyl (Dulcolax) 10 MG rectal suppository 10 mg  10 mg Rectal Daily PRN    metoclopramide (Reglan) 5 mg/mL injection 10 mg  10 mg Intravenous Q6H    famotidine (Pepcid) tab 20 mg  20 mg Oral BID    Or    famotidine (Pepcid) 20 mg/2mL injection 20 mg  20 mg Intravenous BID    dexmedeTOMIDine in sodium chloride 0.9% (Precedex) 400 mcg/100mL infusion premix  0.2-1.5 mcg/kg/hr (Dosing Weight) Intravenous Continuous    DOBUTamine in dextrose 5% (Dobutrex) 500 mg/250mL infusion premix  2 mcg/kg/min (Dosing Weight) Intravenous Continuous PRN    nitroGLYCERIN in dextrose 5% 50 mg/250mL infusion premix  5-300 mcg/min Intravenous Continuous PRN    norepinephrine (Levophed) 4 mg/250mL infusion premix  0.5-30 mcg/min Intravenous Continuous PRN    metoprolol tartrate (Lopressor) tab 25 mg  25 mg Oral 2x Daily(Beta Blocker)    clevidipine (Cleviprex) 25 MG/50ML IV infusion  1-6 mg/hr Intravenous Continuous    potassium chloride 20 mEq/100mL IVPB premix 20 mEq  20 mEq Intravenous PRN    Or    potassium chloride 40 mEq/100mL IVPB premix (central line) 40 mEq  40 mEq Intravenous PRN    magnesium sulfate in dextrose 5% 1 g/100mL infusion premix 1 g  1 g Intravenous PRN    magnesium sulfate in sterile water for injection 2 g/50mL IVPB premix 2 g  2 g Intravenous PRN    mupirocin (Bactroban) 2% nasal ointment 1 Application  1 Application Nasal BID    chlorhexidine gluconate (Peridex) 0.12 % oral solution 15 mL  15 mL Mouth/Throat BID    multivitamin (Tab-A-Елена/Beta Carotene) tab 1 tablet  1 tablet Oral Daily    ascorbic acid (Vitamin C) tab 500 mg  500 mg Oral  TID    dextrose 5%-sodium chloride 0.45% infusion   mL/hr Intravenous Continuous    enoxaparin (Lovenox) 40 MG/0.4ML SUBQ injection 40 mg  40 mg Subcutaneous Daily    acetaminophen (Tylenol) tab 650 mg  650 mg Oral Q4H PRN    Or    HYDROcodone-acetaminophen (Norco) 5-325 MG per tab 1 tablet  1 tablet Oral Q4H PRN    Or    HYDROcodone-acetaminophen (Norco) 5-325 MG per tab 2 tablet  2 tablet Oral Q4H PRN    morphINE PF 2 MG/ML injection 2 mg  2 mg Intravenous Q2H PRN    Or    morphINE PF 4 MG/ML injection 4 mg  4 mg Intravenous Q2H PRN    albumin human (Albumin) 5% injection 12.5 g  12.5 g Intravenous Once PRN    clopidogrel (Plavix) tab 75 mg  75 mg Oral Daily    aspirin DR tab 81 mg  81 mg Oral Daily    ceFAZolin (Ancef) 2g in 10mL IV syringe premix  2 g Intravenous Q8H    glucose (Dex4) 15 GM/59ML oral liquid 15 g  15 g Oral Q15 Min PRN    Or    glucose (Glutose) 40% oral gel 15 g  15 g Oral Q15 Min PRN    Or    glucose-vitamin C (Dex-4) chewable tab 4 tablet  4 tablet Oral Q15 Min PRN    Or    dextrose 50% injection 50 mL  50 mL Intravenous Q15 Min PRN    Or    glucose (Dex4) 15 GM/59ML oral liquid 30 g  30 g Oral Q15 Min PRN    Or    glucose (Glutose) 40% oral gel 30 g  30 g Oral Q15 Min PRN    Or    glucose-vitamin C (Dex-4) chewable tab 8 tablet  8 tablet Oral Q15 Min PRN    insulin regular human (Novolin R, Humulin R) 100 Units in sodium chloride 0.9% 100 mL standard infusion (100 mL)  1-28 Units/hr Intravenous Continuous    tamsulosin (Flomax) cap 0.4 mg  0.4 mg Oral BID    propofol (Diprivan) 10 mg/mL infusion premix  5-50 mcg/kg/min (Dosing Weight) Intravenous Continuous    amiodarone in dextrose 4.14% (Cordarone) 360 mg/200mL infusion premix  0.5 mg/min Intravenous Continuous    amiodarone (Pacerone) tab 200 mg  200 mg Oral TID CC    EPINEPHrine (Adrenalin) 5 mg in sodium chloride 0.9% 250 mL infusion  2 mcg/min Intravenous Continuous   [5]   Medications Prior to Admission   Medication Sig     ENTRESTO 24-26 MG Oral Tab Entresto 24 mg-26 mg tablet, [RxNorm: 8838397]    magnesium 250 MG Oral Tab Take 1 tablet (250 mg total) by mouth every other day.    cyanocobalamin 500 MCG Oral Tab Take 1 tablet (500 mcg total) by mouth every other day.    metFORMIN HCl 1000 MG Oral Tab Take 1 tablet (1,000 mg total) by mouth 2 (two) times daily with meals.    amLODIPine 5 MG Oral Tab Take 1 tablet (5 mg total) by mouth daily.    carvedilol 25 MG Oral Tab Take 1 tablet (25 mg total) by mouth 2 (two) times daily with meals.    clopidogrel 75 MG Oral Tab Take 1 tablet (75 mg total) by mouth daily.    empagliflozin (JARDIANCE) 25 MG Oral Tab Take 1 tablet (25 mg total) by mouth daily.    furosemide 40 MG Oral Tab Take 1 tablet (40 mg total) by mouth daily.    rosuvastatin 20 MG Oral Tab Take 1 tablet (20 mg total) by mouth nightly.    tamsulosin 0.4 MG Oral Cap TAKE 2 CAPSULES BY MOUTH DAILY (Patient taking differently: Take 1 capsule (0.4 mg total) by mouth in the morning and 1 capsule (0.4 mg total) before bedtime.)    aspirin 81 MG Oral Tab EC Take 1 tablet (81 mg total) by mouth nightly.    Thiamine HCl 250 MG Oral Tab Take 1 tablet (250 mg total) by mouth every other day.    pyridoxine 100 MG Oral Tab Take 1 tablet (100 mg total) by mouth every other day.    Glucose Blood (CONTOUR NEXT TEST) In Vitro Strip To test 2 x daily  Dx E11.9    Glucose Blood (CONTOUR NEXT TEST) In Vitro Strip To check glucose bid   Dx  E11.9    Glucose Blood (LORAINE CONTOUR TEST) In Vitro Strip To test daily    LORAINE CONTOUR NEXT TEST In Vitro Strip To test 2 x daily   [6] No Known Allergies  [7]    ipratropium-albuterol  3 mL Nebulization 4 times per day    sugammadex  2 mg/kg Intravenous Once    sennosides  8.6 mg Oral BID    docusate sodium  100 mg Oral BID    metoclopramide  10 mg Intravenous Q6H    famotidine  20 mg Oral BID    Or    famotidine  20 mg Intravenous BID    metoprolol tartrate  25 mg Oral 2x Daily(Beta Blocker)    mupirocin  1  Application Nasal BID    chlorhexidine gluconate  15 mL Mouth/Throat BID    multivitamin  1 tablet Oral Daily    ascorbic acid  500 mg Oral TID    enoxaparin  40 mg Subcutaneous Daily    clopidogrel  75 mg Oral Daily    aspirin  81 mg Oral Daily    ceFAZolin  2 g Intravenous Q8H    tamsulosin  0.4 mg Oral BID    amiodarone  200 mg Oral TID CC

## 2025-06-21 NOTE — PROGRESS NOTES
Pulmonary/ICU/Critical Care Progress Note         Reason for Consultation: post CABG, IABP  Referring Physician: Dr. Pacheco    Subjective:  Remains intubated  IABP removed yesterday  Low grade fevers  Off propofol. On lasix gtt 2.5, levo 1 and dobutamine 2  Precedex at 1.     From the initial consultation  HPI: 58 yo male with hx of smoking, HTN, HLP, PVD, severe aortic stenosis, moderate mitral stenosis, multivessel coronary artery disease s/p PCI and stent of LAD and diagonal artery seen by CV surgery and underwent CABG x 2, aortic valve replacement.   Now in the ICU post op with IABP, sedated on MV support.   Intra-op given 2 FFP, 2 cryo, 2 PLT  On epi 2 and levo 3 gtts as per CV surgery  On NO 40 PPM  Sedation propofol, insulin, dextrose      REVIEW OF SYSTEMS:  Positives and negatives as noted in HPI. All other review of systems otherwise are either limited (due to pt/family inability to provide) or negative.      PAST MEDICAL HISTORY:  Past Medical History[1]      PAST SURGICAL HISTORY:  Past Surgical History[2]      PAST SOCIAL HISTORY:  Social Hx on file[3]      PAST FAMILY HISTORY:  Family History[4]      ALLERGIES:  Allergies[5]      MEDS:  Home Medications:  Medications Taking[6]    Scheduled Medication:  Scheduled Medications[7]  Continuous Infusing Medication:  Medication Infusions[8]  PRN Medications:  PRN Medications[9]       PHYSICAL EXAM:  /67 (BP Location: Right arm)   Pulse 91   Temp 100.2 °F (37.9 °C) (Pulmonary Artery)   Resp 23   Ht 5' 6\" (1.676 m)   Wt 215 lb 2.7 oz (97.6 kg)   SpO2 97%   BMI 34.73 kg/m²   Vent Mode: VC/AC  FiO2 (%):  [40 %-55 %] 40 %  S RR:  [12] 12  S VT:  [450 mL-500 mL] 450 mL  PEEP/CPAP (cm H2O):  [5 cm H20] 5 cm H20  MAP (cm H2O):  [10-12] 11  CONSTITUTIONAL: sedated intubated  HEENT: atraumatic normocephalic  MOUTH: ETT OGT  NECK/THROAT: no JVD. Trachea midline. No obvious thyromegaly.+ cordis and swan  LUNG: clear upper b/l no wheezing, crackles. Diminished  bases. Chest symmetric with respiratory motion  HEART: midsternal incision dressed, regular rate and rhythm, no obvious murmers or gallops noted. + chest tube  ABD: soft non tender. + bowel sounds. No organomegaly noted  EXT: no clubbing, cyanosis, or edema noted. Pulses intact grossly  NEURO/MUSCULOSKELETAL: sedated intubated but opens eyes, weak cough  SKIN: warm, dry. No obvious lesions noted  LYMPH: no obvious LAD      IMAGES:   CT brain 6/20/25  CONCLUSION:   1. Small soft tissue/scalp hematoma at the posterior vertex with no underlying calvarial fracture or acute intracranial process.   2. Small to moderate sized areas of low-attenuation in the subcortical and periventricular white matter most compatible with chronic ischemic white matter changes.   3. Mild to moderate paranasal sinus disease.          CXR ordered 6/20  CONCLUSION: Post CABG and aortic valve prosthesis.  Stable/satisfactory position of lines and tubes.  Stable vascular congestion/interstitial edema and stable small left effusion.        LABS:  Recent Labs   Lab 06/19/25  1819 06/19/25  2114 06/20/25  0411 06/21/25  0542   RBC 3.46*  --  3.65* 3.61*   HGB 8.8* 9.0* 9.1* 9.1*   HCT 27.9* 27.7* 28.5* 28.5*   MCV 80.6  --  78.1* 78.9*   MCH 25.4*  --  24.9* 25.2*   MCHC 31.5  --  31.9 31.9   RDW 15.8*  --  16.2* 16.6*   NEPRELIM  --   --  6.28  --    WBC 8.8  --  9.1 12.6*   .0  --  203.0 173.0       Recent Labs   Lab 06/19/25  1645 06/20/25  0411 06/20/25  1837 06/21/25  0004 06/21/25  0542   GLU  --    < > 164* 116* 121*   BUN  --    < > 11 9 8*   CREATSERUM  --    < > 0.80 0.71 0.79   EGFRCR  --    < > 101 104 101   CA  --    < > 8.3* 8.1* 8.1*   ALB 3.3  --   --   --   --    NA  --    < > 137 138 138   K  --    < > 4.0 3.5 3.4*   CL  --    < > 109 106 106   CO2  --    < > 24.0 24.0 26.0   ALKPHO 44*  --   --   --   --    AST 71*  --   --   --   --    ALT 12  --   --   --   --    BILT 0.4  --   --   --   --    TP 5.0*  --   --   --   --      < > = values in this interval not displayed.         ASSESSMENT/PLAN:  CAD s/p multivessel disease and severe aortic stenosis  -s/p CABG x 2 and AV repair  -IABP and cardiac gtts as per CV and cardiology  -on lasix gtt 2.5, levo 1 and dobutamine 2  -plavix and ASA, amio  -post op abx    Post op ventilator mgmt  -switched SIMV to AC  -weaned off NO as per CV  -checked ABG stable  -daily CXR  -remain intubated as per CV surgery  -weaning sedation. Propofol off. Precedex at 1 and being slowly weaned  -scheduled duonebs for hx of smoking/vaping    PVD  -plavix, ASA    DM2  -insulin/dextrose  -monitor BG    BPH  -flomax  -stafford    Proph  -DVT: lovenox  -GI: pepcid    Dispo  -full code    Thank you for the opportunity to care for Jose Alberto Lizama.      DERRICK Rahman DO, MPH  Pulmonary Critical Care Medicine  [facility] Critical [facility] Medicine                         [1]   Past Medical History:  Diagnosis Date    Aortic stenosis     Back problem     BPH (benign prostatic hyperplasia)     Cardiomyopathy (HCC)     Coronary atherosclerosis     Depression     Diabetes (HCC)     Esophageal reflux     Heart valve disease     High blood pressure     High cholesterol     Peripheral vascular disease     Shortness of breath     related to smoking     Visual impairment     glasses   [2]   Past Surgical History:  Procedure Laterality Date    Cardiac catheterization  2016    [facility]    Cath drug eluting stent      Colonoscopy  04/22/2024    Colonoscopy N/A 4/22/2024    Procedure: COLONOSCOPY;  Surgeon: Attila Li MD;  Location: [facility] ENDOSCOPY    Esophagoscopy,diagnostic  04/22/2024    Dr. Li    Hernia surgery      as a child    Nasal scopy,remv part ethmoid  10/14/2020    Nasal scopy,rmv tiss maxill sinus  10/14/2020    Repair of nasal septum  10/14/2020   [3]   Social History  Socioeconomic History    Marital status:    Tobacco Use    Smoking status: Former     Current  packs/day: 1.00     Average packs/day: 1 pack/day for 20.0 years (20.0 ttl pk-yrs)     Types: Cigarettes    Smokeless tobacco: Never   Vaping Use    Vaping status: Every Day    Substances: Nicotine   Substance and Sexual Activity    Alcohol use: Yes     Comment: 1-2 drinks a week    Drug use: No   [4]   Family History  Problem Relation Age of Onset    Diabetes Father     Diabetes Paternal Aunt     Diabetes Paternal Uncle     Seizure Disorder Mother     No Known Problems Brother     No Known Problems Daughter     Glaucoma Neg     Macular degeneration Neg    [5] No Known Allergies  [6]   Outpatient Medications Marked as Taking for the 6/19/25 encounter (Hospital Encounter)   Medication Sig Dispense Refill    ENTRESTO 24-26 MG Oral Tab Entresto 24 mg-26 mg tablet, [RxNorm: 4924145]      magnesium 250 MG Oral Tab Take 1 tablet (250 mg total) by mouth every other day.      cyanocobalamin 500 MCG Oral Tab Take 1 tablet (500 mcg total) by mouth every other day.      metFORMIN HCl 1000 MG Oral Tab Take 1 tablet (1,000 mg total) by mouth 2 (two) times daily with meals. 180 tablet 3    amLODIPine 5 MG Oral Tab Take 1 tablet (5 mg total) by mouth daily. 90 tablet 3    carvedilol 25 MG Oral Tab Take 1 tablet (25 mg total) by mouth 2 (two) times daily with meals. 180 tablet 3    clopidogrel 75 MG Oral Tab Take 1 tablet (75 mg total) by mouth daily. 90 tablet 3    empagliflozin (JARDIANCE) 25 MG Oral Tab Take 1 tablet (25 mg total) by mouth daily. 90 tablet 3    furosemide 40 MG Oral Tab Take 1 tablet (40 mg total) by mouth daily. 90 tablet 3    [DISCONTINUED] losartan 50 MG Oral Tab Take 1 tablet (50 mg total) by mouth 2 (two) times daily. 180 tablet 3    rosuvastatin 20 MG Oral Tab Take 1 tablet (20 mg total) by mouth nightly. 90 tablet 3    tamsulosin 0.4 MG Oral Cap TAKE 2 CAPSULES BY MOUTH DAILY (Patient taking differently: Take 1 capsule (0.4 mg total) by mouth in the morning and 1 capsule (0.4 mg total) before bedtime.)  180 capsule 3    aspirin 81 MG Oral Tab EC Take 1 tablet (81 mg total) by mouth nightly.     [7]    ipratropium-albuterol  3 mL Nebulization 4 times per day    sugammadex  2 mg/kg Intravenous Once    sennosides  8.6 mg Oral BID    docusate sodium  100 mg Oral BID    metoclopramide  10 mg Intravenous Q6H    famotidine  20 mg Oral BID    Or    famotidine  20 mg Intravenous BID    metoprolol tartrate  25 mg Oral 2x Daily(Beta Blocker)    mupirocin  1 Application Nasal BID    chlorhexidine gluconate  15 mL Mouth/Throat BID    multivitamin  1 tablet Oral Daily    ascorbic acid  500 mg Oral TID    enoxaparin  40 mg Subcutaneous Daily    clopidogrel  75 mg Oral Daily    aspirin  81 mg Oral Daily    ceFAZolin  2 g Intravenous Q8H    tamsulosin  0.4 mg Oral BID    amiodarone  200 mg Oral TID CC   [8]    furosemide (Lasix) 500 mg in sodium chloride 0.9% 125 mL infusion 5 mg/hr (06/21/25 0400)    DOBUTamine 2 mcg/kg/min (06/20/25 2000)    sodium chloride Stopped (06/19/25 1748)    sodium chloride 10 mL/hr at 06/19/25 1653    dexmedetomidine 1 mcg/kg/hr (06/21/25 0900)    DOBUTamine 2 mcg/kg/min (06/21/25 0400)    nitroGLYCERIN in dextrose 5%      norepinephrine 2 mcg/min (06/21/25 0400)    clevidipine      dextrose 5%-sodium chloride 0.45% 40 mL/hr (06/21/25 0400)    insulin regular 4 Units/hr (06/21/25 0727)    propofol Stopped (06/21/25 0830)    amiodarone 0.5 mg/min (06/20/25 0400)    EPINEPHrine (Adrenalin) 5 mg in sodium chloride 0.9% 250 mL infusion 2 mcg/min (06/20/25 1630)   [9]   melatonin    polyethylene glycol (PEG 3350)    bisacodyl    DOBUTamine    nitroGLYCERIN in dextrose 5%    norepinephrine    potassium chloride **OR** potassium chloride    magnesium sulfate in dextrose 5%    magnesium sulfate in sterile water for injection    acetaminophen **OR** HYDROcodone-acetaminophen **OR** HYDROcodone-acetaminophen    morphINE **OR** morphINE    albumin human    glucose **OR** glucose **OR** glucose-vitamin C **OR**  dextrose **OR** glucose **OR** glucose **OR** glucose-vitamin C

## 2025-06-21 NOTE — PLAN OF CARE
Problem: RESPIRATORY - ADULT  Goal: Achieves optimal ventilation and oxygenation  Description: INTERVENTIONS:  - Assess for changes in respiratory status  - Assess for changes in mentation and behavior  - Position to facilitate oxygenation and minimize respiratory effort  - Oxygen supplementation based on oxygen saturation or ABGs  - Provide Smoking Cessation handout, if applicable  - Encourage broncho-pulmonary hygiene including cough, deep breathe, Incentive Spirometry  - Assess the need for suctioning and perform as needed  - Assess and instruct to report SOB or any respiratory difficulty  - Respiratory Therapy support as indicated  - Manage/alleviate anxiety  - Monitor for signs/symptoms of CO2 retention  Outcome: Progressing     Patient received on mechanical ventilation, settings follow. FiO2 weaned from 55% to 45%.No acute events or changes overnight. RT continue to monitor.      06/21/25 0350   Vent Information   Vent Mode VC/AC   Settings   FiO2 (%) 45 %   Resp Rate (Set) 12   Vt (Set, mL) 450 mL   Waveform Decelerating ramp   PEEP/CPAP (cm H2O) 5 cm H20

## 2025-06-21 NOTE — PROGRESS NOTES
[facility]  part of [facility]    Progress Note    Jose Alberto Lizama Patient Status:  Inpatient    1965 MRN [id number]   Location [facility] 2W/SW Attending Erin Richardson MD   Hosp Day # 1 PCP Fiona Sweeney MD     Chief complaint: post op  Subjective:     Intubated, sedated    Objective:   Blood pressure 117/62, pulse 86, temperature 99 °F (37.2 °C), temperature source Pulmonary Artery, resp. rate 21, height 5' 6\" (1.676 m), weight 216 lb 14.9 oz (98.4 kg), SpO2 99%.    HEENT: conjunctivae/corneas clear.    Neck: no adenopathy, no carotid bruit, supple  Pulmonary:  clear to auscultation bilaterally  Cardiovascular: S1, S2 normal, no murmur, click, rub or gallop, regular rate and rhythm  Abdominal: soft, non-tender; bowel sounds normal; no masses,  no organomegaly  Extremities: no cyanosis or edema  Pulses: palpable and symmetric  Skin: Warm and dry  Neurologic: Deferred  Psychiatric: Deferred    Assessment and Plan:     Multi-vessel coronary artery disease, h/o prior stents  Ischemic cardiomyopathy, prior EF 30%  PAD  Severe aortic stenosis  Now s/p  coronary artery bypass graft surgery x2 utilizing left internal mammary artery to left anterior descending, saphenous vein graft to the diagonal artery; right leg endoscopic greater saphenous vein graft harvest; left leg endoscopic exploration; aortic valve replacement with Inspiris tissue valve, size 21 mm; left atrial appendage clip, size 40; intraoperative transesophageal echocardiogram; insertion of temporary ventricular pacing wire; sternal plating; and insertion of left femoral intra-aortic balloon pump.   - Complex surgery  -per cardiology and CV surgery  -Pulmonary/critical care  -IV pressors support.     DM  -At home on metformin and Jardiance  - Currently insulin drip. Close monitoring of glucose.     HL  BPH  - Currently with Linda    Dispo: ICU        Supplementary Documentation:   DVT Mechanical Prophylaxis:    SCDs,    DVT Pharmacologic Prophylaxis   Medication    enoxaparin (Lovenox) 40 MG/0.4ML SUBQ injection 40 mg         DVT Pharmacologic prophylaxis: Aspirin 81 mg      Code Status: Full Code  Mckeon: Mckeon catheter in place  Mckeon Duration (in days): 2  Central line:    VIDAL:                         Chart reviewed, including current vitals, notes, labs and imaging  Pertinent past medical records reviewed  Labs ordered and medications adjusted as outlined above  Home medications reconciliation completed  Coordinate care with care team/consultants     D/w RN     [facility]  severe acute illness/or exacerbation of chronic illness posing a threat to life, IV medications, requiring close monitoring in hospital.       Results:     Lab Results   Component Value Date    WBC 9.1 06/20/2025    HGB 9.1 (L) 06/20/2025    HCT 28.5 (L) 06/20/2025    .0 06/20/2025    CREATSERUM 0.80 06/20/2025    BUN 11 06/20/2025     06/20/2025    K 4.0 06/20/2025     06/20/2025    CO2 24.0 06/20/2025     (H) 06/20/2025    CA 8.3 (L) 06/20/2025    ALB 3.3 06/19/2025    ALKPHO 44 (L) 06/19/2025    BILT 0.4 06/19/2025    TP 5.0 (L) 06/19/2025    AST 71 (H) 06/19/2025    ALT 12 06/19/2025    PTT 30.6 06/19/2025    INR 1.17 06/19/2025    TSH 0.696 06/19/2025    PSA 2.62 08/10/2023    MG 1.7 06/20/2025       CT BRAIN OR HEAD (CPT=70450)  Result Date: 6/20/2025  CONCLUSION:   1. Small soft tissue/scalp hematoma at the posterior vertex with no underlying calvarial fracture or acute intracranial process.  2. Small to moderate sized areas of low-attenuation in the subcortical and periventricular white matter most compatible with chronic ischemic white matter changes.  3. Mild to moderate paranasal sinus disease.     Dictated by (CST): Eligio Andrade MD on 6/20/2025 at 6:27 PM     Finalized by (CST): Eligio Andrade MD on 6/20/2025 at 6:29 PM          XR CHEST AP PORTABLE  (CPT=71045)  Result Date: 6/20/2025  CONCLUSION: Post CABG  and aortic valve prosthesis.  Stable/satisfactory position of lines and tubes.  Stable vascular congestion/interstitial edema and stable small left effusion.    Dictated by (CST): Bal Stewart MD on 6/20/2025 at 6:45 AM     Finalized by (CST): Bal Stewart MD on 6/20/2025 at 6:49 AM          XR CHEST AP PORTABLE  (CPT=71045)  Result Date: 6/19/2025  PROCEDURE: XR CHEST AP PORTABLE  (CPT=71045) TIME: 1921.   COMPARISON: [facility], XR CHEST AP PORTABLE (CPT=71045), 6/19/2025, 3:29 PM.  INDICATIONS: ETT and OG placement  TECHNIQUE:   Single view.   FINDINGS/IMPRESSION:   1. There is an endotracheal tube with tip approximately 5.3 cm above the fred.  There is an enteric feeding tube with tip overlying the gastric fundus.  There is a right IJ [place]-Mike catheter with tip overlying the proximal main pulmonary outflow tract.  There are bilateral mediastinal drains as well as a left-sided chest tube in expected configuration there is no pneumothorax.  2. The heart mediastinal structures are enlarged.  Pulmonary vascularity is slightly increased.  The findings likely represent minimal fluid overload.  3. Lung volumes are diminished.  There is minimal hazy opacities at the lung bases which could represent atelectasis, infiltrate, or a combination.     Dictated by (CST): Eligio Andrade MD on 6/19/2025 at 7:37 PM     Finalized by (CST): Eligio Andrade MD on 6/19/2025 at 7:39 PM          XR CHEST AP PORTABLE  (CPT=71045)  Result Date: 6/19/2025  CONCLUSION: Post CABG and aortic valve prosthesis.  The cardiac silhouette is mildly enlarged and there is mild vascular congestion with left pleural effusion and basal atelectasis.  Findings are compatible with recent surgery.  There is expected position of lines and tubes as discussed above.   Dictated by (CST): Bal Stewart MD on 6/19/2025 at 4:48 PM     Finalized by (CST): Bal Stewart MD on 6/19/2025 at 4:52 PM          EKG 12 Lead  Result Date:  6/20/2025  Normal sinus rhythm Left axis deviation T wave abnormality, consider anterolateral ischemia LVH with QRS widening and repolarization abnormality ( Jigar product ) Abnormal ECG When compared with ECG of 19-JUN-2025 15:26, T wave inversion more evident in Lateral leads Confirmed by ALEC WHIPPLE JORDAN ([id number]) on 6/20/2025 7:56:34 AM    EKG 12 Lead  Result Date: 6/19/2025  Sinus tachycardia Left axis deviation Nonspecific intraventricular block Minimal voltage criteria for LVH, may be normal variant ( Jigar product ) Prolonged QT interval or tu fusion, consider myocardial disease, electrolyte imbalance, or drug effects Abnormal ECG When compared with ECG of 10-ERNESTO-2025 12:10, T wave inversion now evident in Anterior leads T wave inversion less evident in Lateral leads QT has lengthened Confirmed by lydia gutierrez ([id number]) on 6/19/2025 4:07:02 PM      Note: This chart was prepared using voice recognition software and may contain unintended word substitution errors.     VERONA JIMENEZ MD  6/20/2025

## 2025-06-21 NOTE — PLAN OF CARE
Remains on low dose Levo & Dobut. Excellent response to Lasix gtt overnight. Urine now clear/yellow. Minimal CT & laurence tube output. FiO2 down to 45%.    Total Urine output: 2225cc  Total CT output: 10cc  Total Laurence output: 46cc    Current gtts:  Levo 2mcg  Dobut 2mcg  Lasix 5mg  Insulin 2.5units Algo 5  Propofol 50mcg  Precedex 0.3mcg        Problem: CARDIOVASCULAR - ADULT  Goal: Maintains optimal cardiac output and hemodynamic stability  Description: INTERVENTIONS:  - Monitor vital signs, rhythm, and trends  - Monitor for bleeding, hypotension and signs of decreased cardiac output  - Evaluate effectiveness of vasoactive medications to optimize hemodynamic stability  - Monitor arterial and/or venous puncture sites for bleeding and/or hematoma  - Assess quality of pulses, skin color and temperature  - Assess for signs of decreased coronary artery perfusion - ex. Angina  - Evaluate fluid balance, assess for edema, trend weights  Outcome: Progressing  Goal: Absence of cardiac arrhythmias or at baseline  Description: INTERVENTIONS:  - Continuous cardiac monitoring, monitor vital signs, obtain 12 lead EKG if indicated  - Evaluate effectiveness of antiarrhythmic and heart rate control medications as ordered  - Initiate emergency measures for life threatening arrhythmias  - Monitor electrolytes and administer replacement therapy as ordered  Outcome: Progressing     Problem: RESPIRATORY - ADULT  Goal: Achieves optimal ventilation and oxygenation  Description: INTERVENTIONS:  - Assess for changes in respiratory status  - Assess for changes in mentation and behavior  - Position to facilitate oxygenation and minimize respiratory effort  - Oxygen supplementation based on oxygen saturation or ABGs  - Provide Smoking Cessation handout, if applicable  - Encourage broncho-pulmonary hygiene including cough, deep breathe, Incentive Spirometry  - Assess the need for suctioning and perform as needed  - Assess and instruct to report  SOB or any respiratory difficulty  - Respiratory Therapy support as indicated  - Manage/alleviate anxiety  - Monitor for signs/symptoms of CO2 retention  Outcome: Progressing     Problem: METABOLIC/FLUID AND ELECTROLYTES - ADULT  Goal: Glucose maintained within prescribed range  Description: INTERVENTIONS:  - Monitor Blood Glucose as ordered  - Assess for signs and symptoms of hyperglycemia and hypoglycemia  - Administer ordered medications to maintain glucose within target range  - Assess barriers to adequate nutritional intake and initiate nutrition consult as needed  - Instruct patient on self management of diabetes  Outcome: Progressing  Goal: Electrolytes maintained within normal limits  Description: INTERVENTIONS:  - Monitor labs and rhythm and assess patient for signs and symptoms of electrolyte imbalances  - Administer electrolyte replacement as ordered  - Monitor response to electrolyte replacements, including rhythm and repeat lab results as appropriate  - Fluid restriction as ordered  - Instruct patient on fluid and nutrition restrictions as appropriate  Outcome: Progressing  Goal: Hemodynamic stability and optimal renal function maintained  Description: INTERVENTIONS:  - Monitor labs and assess for signs and symptoms of volume excess or deficit  - Monitor intake, output and patient weight  - Monitor urine specific gravity, serum osmolarity and serum sodium as indicated or ordered  - Monitor response to interventions for patient's volume status, including labs, urine output, blood pressure (other measures as available)  - Encourage oral intake as appropriate  - Instruct patient on fluid and nutrition restrictions as appropriate  Outcome: Progressing     Problem: SKIN/TISSUE INTEGRITY - ADULT  Goal: Incision(s), wounds(s) or drain site(s) healing without S/S of infection  Description: INTERVENTIONS:  - Assess and document risk factors for pressure ulcer development  - Assess and document skin integrity  -  Assess and document dressing/incision, wound bed, drain sites and surrounding tissue  - Implement wound care per orders  - Initiate isolation precautions as appropriate  - Initiate Pressure Ulcer prevention bundle as indicated  Outcome: Progressing     Problem: HEMATOLOGIC - ADULT  Goal: Maintains hematologic stability  Description: INTERVENTIONS  - Assess for signs and symptoms of bleeding or hemorrhage  - Monitor labs and vital signs for trends  - Administer supportive blood products/factors, fluids and medications as ordered and appropriate  - Administer supportive blood products/factors as ordered and appropriate  Outcome: Progressing  Goal: Free from bleeding injury  Description: (Example usage: patient with low platelets)  INTERVENTIONS:  - Avoid intramuscular injections, enemas and rectal medication administration  - Ensure safe mobilization of patient  - Hold pressure on venipuncture sites to achieve adequate hemostasis  - Assess for signs and symptoms of internal bleeding  - Monitor lab trends  - Patient is to report abnormal signs of bleeding to staff  - Avoid use of toothpicks and dental floss  - Use electric shaver for shaving  - Use soft bristle tooth brush  - Limit straining and forceful nose blowing  Outcome: Progressing

## 2025-06-21 NOTE — PLAN OF CARE
Problem: RESPIRATORY - ADULT  Goal: Achieves optimal ventilation and oxygenation  Description: INTERVENTIONS:  - Assess for changes in respiratory status  - Assess for changes in mentation and behavior  - Position to facilitate oxygenation and minimize respiratory effort  - Oxygen supplementation based on oxygen saturation or ABGs  - Provide Smoking Cessation handout, if applicable  - Encourage broncho-pulmonary hygiene including cough, deep breathe, Incentive Spirometry  - Assess the need for suctioning and perform as needed  - Assess and instruct to report SOB or any respiratory difficulty  - Respiratory Therapy support as indicated  - Manage/alleviate anxiety  - Monitor for signs/symptoms of CO2 retention  Outcome: Progressing     RESPIRATORY THERAPY MECHANICAL VENTILATION PROGRESS NOTE    Ventilator Weaning:  Patient meets criteria for weaning? yes Weaning was attempted yes using pressure support 5 cmH2O + PEEP 5 cmH2O. The patient tolerated well for 30 minutes. The patient was returned to original ventilator settings at AC 12 450 +5 40% due to Increased SOB and WOB. Patient remained tachypneic throughout trial with RR>35. MD in room, patient placed back on ventilator for more dieresis and remain comfortable. Trial to resume again in the morning if X-ray improved.       Current Ventilator Data:       06/21/25 1222   Spontaneous Breathing Trial   Spontaneous Breathing Trial Complete Y   Is the FiO2 <= 0.5? (titrated for sats 92-94%) Y   Is the PEEP <= 5? Y   Is the RSBI <=104 Y   Is the patient off pressor and narcotic / sedation drips? Y   Is the patient free of ventricular arrhythmias in the past 24 hours? Y   Is the patient's cough adequate? Y   Is the patient alert (neuro), able to follow commands? Y   Daily Screen Meets All Criteria Yes   Spontaneous Parameters   Spontaneous RR Rate 30   Spontaneous Minute Volume 460   Average Spontaneous Tidal Volume 16.8   $ Spontaneous Vital Capacity 820   Total RSBI 65    Weaning Trials   Patient self-extubated? No   Compassionate wean? No   Spontaneous Breathing Trial Time Initiated 1225   Spontaneous Breathing Trial Duration 30   Spontaneous Breathing Trial Method CPAP/PS   Spontaneous Breathing Trial Settings 5/5   Pre Trial    Pre Trial RR 30   Pre Trial SpO2 100 %   Pre Trial /59   Pre Trial Vt 430   Post Trial    Post Trial RR 34   Post Trial SpO2 100 %   Post Trial /60   Post Trial Vt 468

## 2025-06-21 NOTE — PROGRESS NOTES
[facility]  part of [facility]    Progress Note    Jose Alberto Lizama Patient Status:  Inpatient    1965 MRN [id number]   Location [facility] 2W/SW Attending Leonides Pacheco MD   Hosp Day # 2 PCP Fiona Sweeney MD     Subjective:  Intubated and sedated, IABP removed yesterday.  Remains on: Levo 2, dobutamine 2, Precedex/propofol/insulin.    Objective:  /64 (BP Location: Right arm)   Pulse 93   Temp 100.1 °F (37.8 °C) (Pulmonary Artery)   Resp 22   Ht 5' 6\" (1.676 m)   Wt 215 lb 2.7 oz (97.6 kg)   SpO2 96%   BMI 34.73 kg/m²     Temp (24hrs), Av.7 °F (37.6 °C), Min:98.7 °F (37.1 °C), Max:100.2 °F (37.9 °C)  Telemetry-NSR    Intake/Output:    Intake/Output Summary (Last 24 hours) at 2025 0729  Last data filed at 2025 0600  Gross per 24 hour   Intake 3423.2 ml   Output 3895 ml   Net -471.8 ml       Wt Readings from Last 3 Encounters:   25 215 lb 2.7 oz (97.6 kg)   25 201 lb 12.8 oz (91.5 kg)   25 200 lb (90.7 kg)       Allergies:  Allergies[1]    Labs:  Lab Results   Component Value Date    WBC 12.6 2025    HGB 9.1 2025    HCT 28.5 2025    .0 2025    CREATSERUM 0.79 2025    BUN 8 2025     2025    K 3.4 2025     2025    CO2 26.0 2025     2025    CA 8.1 2025    MG 1.8 2025       Physical Exam:  Blood pressure 101/64, pulse 93, temperature 100.1 °F (37.8 °C), temperature source Pulmonary Artery, resp. rate 22, height 5' 6\" (1.676 m), weight 215 lb 2.7 oz (97.6 kg), SpO2 96%.  General: NAD  Neck: RIJ cordis/Shaw, JVD difficult to assess due to lines  Lungs: Diminished laterally, few scattered rhonchi  CT-10 cc / 12-hour, no airleak noted  Mediastinal Dane-46cc / 12-hour, no airleak  Heart: RRR, S1, S2  Abdomen: Soft, NT/ND, BS hypoactive,/-BM  OGT - 400cc bilious drainage  Extremities: Warm, dry, trace-1+ generalized edema  Bilateral  groins-soft/no hematoma, CDI  Pulses: Dopplerable PT bilateral  Skin: sternotomy stable, incision CDI. bilateral SVG site CDI  Neurological: Sedated/intubated, with sedation holiday yesterday, patient opened eyes/nonpurposeful/twitching noted, no movement otherwise, attempted to bite ETT/tachypneic      Assessment/Plan:  S/p CABG x 2 (LIMA-LAD, SVG-diagonal), AVR with 21 mm Inspiris tissue valve, MELINDA clip, placement of L IABP -POD #2  Intubated/sedated-remains on vent FiO2 45%-wean per pulmonary.  CXR reviewed.  Plan to decrease propofol, attempt to wake patient again today.  Yesterday nonpurposeful, I open, attempted to buy ETT/tachypneic with sedation holiday, CI/BP dropped at this time.  Hemodynamically stable at present on levo 2/dobutamine 2-epi weaned off IABP removed yesterday, -CI remains 2.1 or greater - wean off Levo to maintain MAP > 65, CI > 2.  Continue Dobs  On p.o. Amio-completed IV drip yesterday, for high risk A-fib protocol.  Anticipate stopping at discharge if no AF noted.  Currently maintaining NSR  Pain meds as needed -limit narcotics if able with decreased mental status, IV Tylenol as needed  Increase activity once appropriate  DVT prevention - SCDs/Lovenox  Expected postop anemia - mild/stable, Hgb 9.1 -likely some component delutional, will start iron once able to take p.o. repeat CBC in a.m.  Expected postop volume overload -decrease Lasix drip 2.5 mg per hour-2.2 L urine output since Lasix drip started yesterday, CVP 4-6.  Hypokalemia/hypomagnesmia as expected with diuresis-replete lytes per protocol  Insulin coverage per protocol -continue insulin drip until extubated, history DM preop, anticipate resuming home meds at or near discharge  Neuro-head CT negative for CVA yesterday, continue to monitor mental status.  Nonpurposeful yesterday during sedation holiday, I opened, attempted to by ETT, tachypneic.  No movement of extremities noted.  Plan to wean propofol, continue Precedex.   Minimize narcotics if able.  Continue soft restraints until patient AAO.  Patient is Polish speaking-using /family to communicate to patient.  Per family-no history of daily EtOH use, just social    Discharge planning:  Patient lives at home with wife, anticipate home with HH once medically stable.  Appreciate SW/CM to assist with DC planning    Plan of care discussed with pt, bedside RN, and CV Surgeon: Dr. Tara Lewis, APRN  6/21/2025  0 730         [1] No Known Allergies

## 2025-06-22 ENCOUNTER — APPOINTMENT (OUTPATIENT)
Dept: GENERAL RADIOLOGY | Facility: HOSPITAL | Age: 60
DRG: 219 | End: 2025-06-22
Attending: NURSE PRACTITIONER
Payer: MEDICAID

## 2025-06-22 PROBLEM — F05 DELIRIUM DUE TO ANOTHER MEDICAL CONDITION: Status: ACTIVE | Noted: 2025-06-22

## 2025-06-22 PROBLEM — F39 EPISODIC MOOD DISORDER: Status: ACTIVE | Noted: 2025-06-22

## 2025-06-22 LAB
ALBUMIN SERPL-MCNC: 3.2 G/DL (ref 3.2–4.8)
ALBUMIN/GLOB SERPL: 1.3 {RATIO} (ref 1–2)
ALP LIVER SERPL-CCNC: 53 U/L (ref 45–117)
ALT SERPL-CCNC: 10 U/L (ref 10–49)
ANION GAP SERPL CALC-SCNC: 11 MMOL/L (ref 0–18)
ANION GAP SERPL CALC-SCNC: 11 MMOL/L (ref 0–18)
ANION GAP SERPL CALC-SCNC: 6 MMOL/L (ref 0–18)
ANION GAP SERPL CALC-SCNC: 6 MMOL/L (ref 0–18)
ANION GAP SERPL CALC-SCNC: 7 MMOL/L (ref 0–18)
AST SERPL-CCNC: 44 U/L (ref ?–34)
BASE EXCESS BLD CALC-SCNC: 2.6 MMOL/L (ref ?–2)
BILIRUB SERPL-MCNC: 0.5 MG/DL (ref 0.2–1.1)
BLOOD TYPE BARCODE: 5100
BUN BLD-MCNC: 12 MG/DL (ref 9–23)
BUN BLD-MCNC: 13 MG/DL (ref 9–23)
BUN/CREAT SERPL: 16.4 (ref 10–20)
BUN/CREAT SERPL: 16.4 (ref 10–20)
BUN/CREAT SERPL: 17.6 (ref 10–20)
BUN/CREAT SERPL: 18.8 (ref 10–20)
BUN/CREAT SERPL: 19 (ref 10–20)
CA-I BLD-SCNC: 1.07 MMOL/L (ref 0.95–1.32)
CALCIUM BLD-MCNC: 8.3 MG/DL (ref 8.7–10.4)
CALCIUM BLD-MCNC: 8.4 MG/DL (ref 8.7–10.4)
CHLORIDE SERPL-SCNC: 103 MMOL/L (ref 98–112)
CHLORIDE SERPL-SCNC: 103 MMOL/L (ref 98–112)
CHLORIDE SERPL-SCNC: 105 MMOL/L (ref 98–112)
CHLORIDE SERPL-SCNC: 105 MMOL/L (ref 98–112)
CHLORIDE SERPL-SCNC: 106 MMOL/L (ref 98–112)
CO2 SERPL-SCNC: 22 MMOL/L (ref 21–32)
CO2 SERPL-SCNC: 22 MMOL/L (ref 21–32)
CO2 SERPL-SCNC: 25 MMOL/L (ref 21–32)
CO2 SERPL-SCNC: 25 MMOL/L (ref 21–32)
CO2 SERPL-SCNC: 26 MMOL/L (ref 21–32)
COHGB MFR BLD: 1.5 % (ref 0–3)
CREAT BLD-MCNC: 0.63 MG/DL (ref 0.7–1.3)
CREAT BLD-MCNC: 0.68 MG/DL (ref 0.7–1.3)
CREAT BLD-MCNC: 0.69 MG/DL (ref 0.7–1.3)
CREAT BLD-MCNC: 0.73 MG/DL (ref 0.7–1.3)
CREAT BLD-MCNC: 0.73 MG/DL (ref 0.7–1.3)
DEPRECATED RDW RBC AUTO: 46.8 FL (ref 35.1–46.3)
EGFRCR SERPLBLD CKD-EPI 2021: 104 ML/MIN/1.73M2 (ref 60–?)
EGFRCR SERPLBLD CKD-EPI 2021: 104 ML/MIN/1.73M2 (ref 60–?)
EGFRCR SERPLBLD CKD-EPI 2021: 105 ML/MIN/1.73M2 (ref 60–?)
EGFRCR SERPLBLD CKD-EPI 2021: 106 ML/MIN/1.73M2 (ref 60–?)
EGFRCR SERPLBLD CKD-EPI 2021: 108 ML/MIN/1.73M2 (ref 60–?)
ERYTHROCYTE [DISTWIDTH] IN BLOOD BY AUTOMATED COUNT: 16.3 % (ref 11–15)
GLOBULIN PLAS-MCNC: 2.4 G/DL (ref 2–3.5)
GLUCOSE BLD-MCNC: 116 MG/DL (ref 70–99)
GLUCOSE BLD-MCNC: 118 MG/DL (ref 70–99)
GLUCOSE BLD-MCNC: 134 MG/DL (ref 70–99)
GLUCOSE BLD-MCNC: 157 MG/DL (ref 70–99)
GLUCOSE BLD-MCNC: 157 MG/DL (ref 70–99)
GLUCOSE BLDC GLUCOMTR-MCNC: 106 MG/DL (ref 70–99)
GLUCOSE BLDC GLUCOMTR-MCNC: 109 MG/DL (ref 70–99)
GLUCOSE BLDC GLUCOMTR-MCNC: 113 MG/DL (ref 70–99)
GLUCOSE BLDC GLUCOMTR-MCNC: 114 MG/DL (ref 70–99)
GLUCOSE BLDC GLUCOMTR-MCNC: 117 MG/DL (ref 70–99)
GLUCOSE BLDC GLUCOMTR-MCNC: 118 MG/DL (ref 70–99)
GLUCOSE BLDC GLUCOMTR-MCNC: 125 MG/DL (ref 70–99)
GLUCOSE BLDC GLUCOMTR-MCNC: 126 MG/DL (ref 70–99)
GLUCOSE BLDC GLUCOMTR-MCNC: 128 MG/DL (ref 70–99)
GLUCOSE BLDC GLUCOMTR-MCNC: 128 MG/DL (ref 70–99)
GLUCOSE BLDC GLUCOMTR-MCNC: 131 MG/DL (ref 70–99)
GLUCOSE BLDC GLUCOMTR-MCNC: 131 MG/DL (ref 70–99)
GLUCOSE BLDC GLUCOMTR-MCNC: 133 MG/DL (ref 70–99)
GLUCOSE BLDC GLUCOMTR-MCNC: 135 MG/DL (ref 70–99)
GLUCOSE BLDC GLUCOMTR-MCNC: 144 MG/DL (ref 70–99)
GLUCOSE BLDC GLUCOMTR-MCNC: 153 MG/DL (ref 70–99)
GLUCOSE BLDC GLUCOMTR-MCNC: 153 MG/DL (ref 70–99)
GLUCOSE BLDC GLUCOMTR-MCNC: 160 MG/DL (ref 70–99)
HCO3 BLDA-SCNC: 26.9 MEQ/L (ref 21–27)
HCT VFR BLD AUTO: 27 % (ref 39–53)
HGB BLD-MCNC: 8.5 G/DL (ref 13–17.5)
HGB BLD-MCNC: 8.7 G/DL (ref 13–17.5)
LACTATE BLD-SCNC: 1 MMOL/L (ref 0.5–2)
MCH RBC QN AUTO: 24.7 PG (ref 26–34)
MCHC RBC AUTO-ENTMCNC: 31.5 G/DL (ref 31–37)
MCV RBC AUTO: 78.5 FL (ref 80–100)
METHGB MFR BLD: 0.4 % SAT (ref 0.4–1.5)
MRSA DNA SPEC QL NAA+PROBE: NEGATIVE
O2 CT BLD-SCNC: 11.8 VOL% (ref 15–23)
O2/TOTAL GAS SETTING VFR VENT: 30 %
OSMOLALITY SERPL CALC.SUM OF ELEC: 285 MOSM/KG (ref 275–295)
OSMOLALITY SERPL CALC.SUM OF ELEC: 286 MOSM/KG (ref 275–295)
PCO2 BLDA: 33 MM HG (ref 35–45)
PEEP SETTING VENT: 5 CM H2O
PH BLDA: 7.5 [PH] (ref 7.35–7.45)
PLATELET # BLD AUTO: 148 10(3)UL (ref 150–450)
PLATELETS.RETICULATED NFR BLD AUTO: 6.3 % (ref 0–7)
PO2 BLDA: 73 MM HG (ref 80–100)
POTASSIUM BLD-SCNC: 4 MMOL/L (ref 3.6–5.1)
POTASSIUM SERPL-SCNC: 3.5 MMOL/L (ref 3.5–5.1)
POTASSIUM SERPL-SCNC: 3.6 MMOL/L (ref 3.5–5.1)
POTASSIUM SERPL-SCNC: 3.9 MMOL/L (ref 3.5–5.1)
PROT SERPL-MCNC: 5.6 G/DL (ref 5.7–8.2)
PUNCTURE CHARGE: NO
RBC # BLD AUTO: 3.44 X10(6)UL (ref 4.3–5.7)
RESP RATE: 12 BPM
SAO2 % BLDA: 97 % (ref 94–100)
SODIUM BLD-SCNC: 131 MMOL/L (ref 135–145)
SODIUM SERPL-SCNC: 136 MMOL/L (ref 136–145)
SODIUM SERPL-SCNC: 136 MMOL/L (ref 136–145)
SODIUM SERPL-SCNC: 137 MMOL/L (ref 136–145)
SPECIMEN VOL 24H UR: 450 ML
UNIT VOLUME: 264 ML
WBC # BLD AUTO: 11.7 X10(3) UL (ref 4–11)

## 2025-06-22 PROCEDURE — 99233 SBSQ HOSP IP/OBS HIGH 50: CPT | Performed by: INTERNAL MEDICINE

## 2025-06-22 PROCEDURE — 99233 SBSQ HOSP IP/OBS HIGH 50: CPT | Performed by: HOSPITALIST

## 2025-06-22 PROCEDURE — 71045 X-RAY EXAM CHEST 1 VIEW: CPT | Performed by: NURSE PRACTITIONER

## 2025-06-22 PROCEDURE — 90792 PSYCH DIAG EVAL W/MED SRVCS: CPT | Performed by: OTHER

## 2025-06-22 RX ORDER — LORAZEPAM 2 MG/ML
1 INJECTION INTRAMUSCULAR
Status: DISCONTINUED | OUTPATIENT
Start: 2025-06-22 | End: 2025-07-03

## 2025-06-22 RX ORDER — LORAZEPAM 2 MG/ML
3 INJECTION INTRAMUSCULAR
Status: DISCONTINUED | OUTPATIENT
Start: 2025-06-22 | End: 2025-07-03

## 2025-06-22 RX ORDER — LORAZEPAM 1 MG/1
1 TABLET ORAL
Status: DISCONTINUED | OUTPATIENT
Start: 2025-06-22 | End: 2025-07-03

## 2025-06-22 RX ORDER — ALBUMIN HUMAN 50 G/1000ML
12.5 SOLUTION INTRAVENOUS EVERY 8 HOURS
Status: DISCONTINUED | OUTPATIENT
Start: 2025-06-22 | End: 2025-06-23

## 2025-06-22 RX ORDER — BUDESONIDE 0.5 MG/2ML
0.5 INHALANT ORAL
Status: DISCONTINUED | OUTPATIENT
Start: 2025-06-22 | End: 2025-07-03

## 2025-06-22 RX ORDER — DOXEPIN HYDROCHLORIDE 50 MG/1
1 CAPSULE ORAL DAILY
Status: DISCONTINUED | OUTPATIENT
Start: 2025-06-22 | End: 2025-07-03

## 2025-06-22 RX ORDER — MAGNESIUM SULFATE HEPTAHYDRATE 40 MG/ML
2 INJECTION, SOLUTION INTRAVENOUS ONCE
Status: COMPLETED | OUTPATIENT
Start: 2025-06-22 | End: 2025-06-22

## 2025-06-22 RX ORDER — THIAMINE HYDROCHLORIDE 100 MG/ML
100 INJECTION, SOLUTION INTRAMUSCULAR; INTRAVENOUS ONCE
Status: COMPLETED | OUTPATIENT
Start: 2025-06-22 | End: 2025-06-22

## 2025-06-22 RX ORDER — LORAZEPAM 2 MG/ML
1 INJECTION INTRAMUSCULAR EVERY 6 HOURS PRN
Status: DISCONTINUED | OUTPATIENT
Start: 2025-06-22 | End: 2025-07-03

## 2025-06-22 RX ORDER — LORAZEPAM 2 MG/ML
6 INJECTION INTRAMUSCULAR EVERY 10 MIN PRN
Status: DISCONTINUED | OUTPATIENT
Start: 2025-06-22 | End: 2025-07-03

## 2025-06-22 RX ORDER — ROSUVASTATIN CALCIUM 20 MG/1
20 TABLET, COATED ORAL NIGHTLY
Status: DISCONTINUED | OUTPATIENT
Start: 2025-06-22 | End: 2025-07-03

## 2025-06-22 RX ORDER — LORAZEPAM 2 MG/ML
4 INJECTION INTRAMUSCULAR EVERY 30 MIN PRN
Status: DISCONTINUED | OUTPATIENT
Start: 2025-06-22 | End: 2025-07-03

## 2025-06-22 RX ORDER — HALOPERIDOL 5 MG/ML
5 INJECTION INTRAMUSCULAR EVERY 6 HOURS PRN
Status: DISCONTINUED | OUTPATIENT
Start: 2025-06-22 | End: 2025-07-01

## 2025-06-22 RX ORDER — ALBUMIN (HUMAN) 12.5 G/50ML
25 SOLUTION INTRAVENOUS EVERY 8 HOURS
Status: DISCONTINUED | OUTPATIENT
Start: 2025-06-22 | End: 2025-06-22

## 2025-06-22 RX ORDER — MELATONIN
100 DAILY
Status: DISCONTINUED | OUTPATIENT
Start: 2025-06-23 | End: 2025-07-03

## 2025-06-22 RX ORDER — FOLIC ACID 1 MG/1
1 TABLET ORAL DAILY
Status: DISCONTINUED | OUTPATIENT
Start: 2025-06-22 | End: 2025-07-03

## 2025-06-22 RX ORDER — HALOPERIDOL 5 MG/ML
2 INJECTION INTRAMUSCULAR EVERY 4 HOURS PRN
Status: DISCONTINUED | OUTPATIENT
Start: 2025-06-22 | End: 2025-07-01

## 2025-06-22 RX ORDER — LORAZEPAM 2 MG/ML
5 INJECTION INTRAMUSCULAR
Status: DISCONTINUED | OUTPATIENT
Start: 2025-06-22 | End: 2025-07-03

## 2025-06-22 RX ORDER — LORAZEPAM 2 MG/ML
2 INJECTION INTRAMUSCULAR
Status: DISCONTINUED | OUTPATIENT
Start: 2025-06-22 | End: 2025-07-03

## 2025-06-22 RX ORDER — LORAZEPAM 1 MG/1
2 TABLET ORAL
Status: DISCONTINUED | OUTPATIENT
Start: 2025-06-22 | End: 2025-07-03

## 2025-06-22 RX ORDER — LORAZEPAM 1 MG/1
3 TABLET ORAL
Status: DISCONTINUED | OUTPATIENT
Start: 2025-06-22 | End: 2025-07-03

## 2025-06-22 NOTE — CONSULTS
[facility]  part of [facility]    Report of Consultation    Jose Alberto Lizama Patient Status:  Inpatient    1965 MRN [id number]   Location [facility] 2W/SW Attending Erin Richardson MD   Hosp Day # 3 PCP Fiona Sweeney MD     Date of Admission:  2025  Date of Consult:  2025   Reason for Consultation:   Patient presented with increased confusion, restlessness, agitation, Erin Tran MD requested psychiatric consult for evaluation and advice.    Consult Duration     The patient seen for initial psychiatric consult evaluation.   Record reviewed, communication with attending, communication with RN and patient seen face to face evaluation.    History of Present Illness:   Patient is a 59 year old   male with past medical history of CAD, PVD,  hypertension, hyperlipidemia, diabetes, severe aortic stenosis. who was admitted to the hospital for a scheduled coronary artery bypass graft surgery, aortic valve replacement on . The patient is intubated and sedated with propofol and precedex. Otherwise the patient has been demonstrating agitation and confusion when weaning from mechanical ventilation is attempted.   Patient indicated for psych consult for evaluation and advise.    Per chart review, the patient presented to the hospital for a scheduled coronary artery bypass graft surgery and aortic valve replacement on .  Patient continued to be on intubation with propofol and Precedex.   failed SBT due to severe agitation and became unstable with increased respiratory rate to 48 and decreased saturation to 74%.  Patient put on the CIWA protocol with suspiciousness of some withdrawal.    The patient received the following psychotropic medications: Lorazepam 2 mg.    Labs and imaging reviewed: TSH .696 MG 1.8 AST 44    The patient is new to the psychiatry team.      The patient seen today sedated and intubated, with wife and daughter in  the room providing information.    Patient is not able to cooperate with interview due to being sedated.    According to collected data the patient has no previous official psychiatric history and no official withdrawal symptom who has been currently demonstrating increased confusion, alternation in his mood and cognition and episodes of agitation upon lowering sedation.  Patient with possible slight mood disorder otherwise no major concern about withdrawal process.    Currently with a delirious episode has been imposed on his current medical condition causing challenging with lowering sedation.  No report of history of suicide or homicidal ideation or action.  No history of hallucination, psychosis or manic episode.      Goal of lowering sedation and attempt to extubate patient discussed with RN.    Collateral obtained from the patient's wife and daughter.   His daughter states that he drinks 3-5 drinks of Brandon walker on the weekend socially, with a hx of a \"little bit\" of depression and anxiety. 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 The patient's daughter states he quit smoking cigarettes 3 years ago and now vapes instead. She denied any family history of dementia.      Past Psychiatric/Medication History:  1. Prior diagnoses: none reported  2. Past psychiatric inpatient: none reported  3. Past outpatient history: none reported  4. Past suicide history: none reported  5. Medication history: none reported    Social History:   The patient is a 59 yr old polish speaking  white male with one adult daughter. He is reported to have 3-5 drinks every weekend socially, does not use illicit drugs and now vapes nicotine daily after quitting cigarettes 3 years ago.     Family History:  Father, paternal aunt and uncle all have hx of diabetes.  Medical History:   Past Medical History  Past Medical History:    Aortic stenosis    Back problem     BPH (benign prostatic hyperplasia)    Cardiomyopathy (HCC)    Coronary atherosclerosis    Depression    Diabetes (HCC)    Esophageal reflux    Heart valve disease    High blood pressure    High cholesterol    Peripheral vascular disease    Shortness of breath    related to smoking     Visual impairment    glasses       Past Surgical History  Past Surgical History:   Procedure Laterality Date    Cardiac catheterization  2016    [facility]    Cath drug eluting stent      Colonoscopy  04/22/2024    Colonoscopy N/A 4/22/2024    Procedure: COLONOSCOPY;  Surgeon: Attila Li MD;  Location: [facility] ENDOSCOPY    Esophagoscopy,diagnostic  04/22/2024    Dr. Li    Hernia surgery      as a child    Nasal scopy,remv part ethmoid  10/14/2020    Nasal scopy,rmv tiss maxill sinus  10/14/2020    Repair of nasal septum  10/14/2020       Family History  Family History   Problem Relation Age of Onset    Diabetes Father     Diabetes Paternal Aunt     Diabetes Paternal Uncle     Seizure Disorder Mother     No Known Problems Brother     No Known Problems Daughter     Glaucoma Neg     Macular degeneration Neg        Social History  Social History     Socioeconomic History    Marital status:    Tobacco Use    Smoking status: Former     Current packs/day: 1.00     Average packs/day: 1 pack/day for 20.0 years (20.0 ttl pk-yrs)     Types: Cigarettes    Smokeless tobacco: Never   Vaping Use    Vaping status: Every Day    Substances: Nicotine   Substance and Sexual Activity    Alcohol use: Yes     Comment: 1-2 drinks a week    Drug use: No           Current Medications:  Current Facility-Administered Medications   Medication Dose Route Frequency    albumin human (Albumin) 5% injection 12.5 g  12.5 g Intravenous Q8H    piperacillin-tazobactam (Zosyn) 3.375 g in dextrose 5% 100 mL IVPB-ADDV  3.375 g Intravenous Q8H    budesonide (Pulmicort) 0.5 MG/2ML nebulizer suspension 0.5 mg  0.5 mg Nebulization 2 times  daily    rosuvastatin (Crestor) tab 20 mg  20 mg Per NG Tube Nightly    LORazepam (Ativan) tab 1 mg  1 mg Oral Q1H PRN    Or    LORazepam (Ativan) 2 mg/mL injection 1 mg  1 mg Intravenous Q1H PRN    LORazepam (Ativan) tab 2 mg  2 mg Oral Q1H PRN    Or    LORazepam (Ativan) 2 mg/mL injection 2 mg  2 mg Intravenous Q1H PRN    LORazepam (Ativan) tab 3 mg  3 mg Oral Q1H PRN    Or    LORazepam (Ativan) 2 mg/mL injection 3 mg  3 mg Intravenous Q1H PRN    LORazepam (Ativan) 2 mg/mL injection 4 mg  4 mg Intravenous Q30 Min PRN    LORazepam (Ativan) 2 mg/mL injection 5 mg  5 mg Intravenous Q15 Min PRN    LORazepam (Ativan) 2 mg/mL injection 6 mg  6 mg Intravenous Q10 Min PRN    [START ON 6/23/2025] thiamine (Vitamin B1) tab 100 mg  100 mg Oral Daily    multivitamin (Tab-A-Елена/Beta Carotene) tab 1 tablet  1 tablet Oral Daily    folic acid (Folvite) tab 1 mg  1 mg Oral Daily    furosemide (Lasix) 500 mg in sodium chloride 0.9% 125 mL infusion  2.5 mg/hr Intravenous Continuous    DOBUTamine (Dobutrex) 500 mg in sodium chloride 0.9% 125 mL infusion  2.5 mcg/kg/min (Dosing Weight) Intravenous Continuous    ipratropium-albuterol (Duoneb) 0.5-2.5 (3) MG/3ML inhalation solution 3 mL  3 mL Nebulization 4 times per day    sugammadex (Bridion) 200 MG/2ML injection 180 mg  2 mg/kg Intravenous Once    sodium chloride 0.9% infusion   Intravenous Continuous    melatonin tab 3 mg  3 mg Oral Nightly PRN    sennosides (Senokot) tab 8.6 mg  8.6 mg Oral BID    docusate sodium (Colace) cap 100 mg  100 mg Oral BID    polyethylene glycol (PEG 3350) (Miralax) 17 g oral packet 17 g  17 g Oral Daily PRN    bisacodyl (Dulcolax) 10 MG rectal suppository 10 mg  10 mg Rectal Daily PRN    metoclopramide (Reglan) 5 mg/mL injection 10 mg  10 mg Intravenous Q6H    famotidine (Pepcid) tab 20 mg  20 mg Oral BID    Or    famotidine (Pepcid) 20 mg/2mL injection 20 mg  20 mg Intravenous BID    dexmedeTOMIDine in sodium chloride 0.9% (Precedex) 400 mcg/100mL  infusion premix  0.2-1.5 mcg/kg/hr (Dosing Weight) Intravenous Continuous    DOBUTamine in dextrose 5% (Dobutrex) 500 mg/250mL infusion premix  2 mcg/kg/min (Dosing Weight) Intravenous Continuous PRN    nitroGLYCERIN in dextrose 5% 50 mg/250mL infusion premix  5-300 mcg/min Intravenous Continuous PRN    norepinephrine (Levophed) 4 mg/250mL infusion premix  0.5-30 mcg/min Intravenous Continuous PRN    metoprolol tartrate (Lopressor) tab 25 mg  25 mg Oral 2x Daily(Beta Blocker)    potassium chloride 20 mEq/100mL IVPB premix 20 mEq  20 mEq Intravenous PRN    Or    potassium chloride 40 mEq/100mL IVPB premix (central line) 40 mEq  40 mEq Intravenous PRN    magnesium sulfate in dextrose 5% 1 g/100mL infusion premix 1 g  1 g Intravenous PRN    magnesium sulfate in sterile water for injection 2 g/50mL IVPB premix 2 g  2 g Intravenous PRN    mupirocin (Bactroban) 2% nasal ointment 1 Application  1 Application Nasal BID    chlorhexidine gluconate (Peridex) 0.12 % oral solution 15 mL  15 mL Mouth/Throat BID    multivitamin (Tab-A-Елена/Beta Carotene) tab 1 tablet  1 tablet Oral Daily    ascorbic acid (Vitamin C) tab 500 mg  500 mg Oral TID    dextrose 5%-sodium chloride 0.45% infusion   mL/hr Intravenous Continuous    enoxaparin (Lovenox) 40 MG/0.4ML SUBQ injection 40 mg  40 mg Subcutaneous Daily    acetaminophen (Tylenol) tab 650 mg  650 mg Oral Q4H PRN    Or    HYDROcodone-acetaminophen (Norco) 5-325 MG per tab 1 tablet  1 tablet Oral Q4H PRN    Or    HYDROcodone-acetaminophen (Norco) 5-325 MG per tab 2 tablet  2 tablet Oral Q4H PRN    morphINE PF 2 MG/ML injection 2 mg  2 mg Intravenous Q2H PRN    Or    morphINE PF 4 MG/ML injection 4 mg  4 mg Intravenous Q2H PRN    clopidogrel (Plavix) tab 75 mg  75 mg Oral Daily    aspirin DR tab 81 mg  81 mg Oral Daily    glucose (Dex4) 15 GM/59ML oral liquid 15 g  15 g Oral Q15 Min PRN    Or    glucose (Glutose) 40% oral gel 15 g  15 g Oral Q15 Min PRN    Or    glucose-vitamin C  (Dex-4) chewable tab 4 tablet  4 tablet Oral Q15 Min PRN    Or    dextrose 50% injection 50 mL  50 mL Intravenous Q15 Min PRN    Or    glucose (Dex4) 15 GM/59ML oral liquid 30 g  30 g Oral Q15 Min PRN    Or    glucose (Glutose) 40% oral gel 30 g  30 g Oral Q15 Min PRN    Or    glucose-vitamin C (Dex-4) chewable tab 8 tablet  8 tablet Oral Q15 Min PRN    insulin regular human (Novolin R, Humulin R) 100 Units in sodium chloride 0.9% 100 mL standard infusion (100 mL)  1-28 Units/hr Intravenous Continuous    tamsulosin (Flomax) cap 0.4 mg  0.4 mg Oral BID    propofol (Diprivan) 10 mg/mL infusion premix  5-50 mcg/kg/min (Dosing Weight) Intravenous Continuous    amiodarone (Pacerone) tab 200 mg  200 mg Oral TID CC    EPINEPHrine (Adrenalin) 5 mg in sodium chloride 0.9% 250 mL infusion  2 mcg/min Intravenous Continuous     Medications Prior to Admission   Medication Sig    ENTRESTO 24-26 MG Oral Tab Entresto 24 mg-26 mg tablet, [RxNorm: 2879296]    magnesium 250 MG Oral Tab Take 1 tablet (250 mg total) by mouth every other day.    cyanocobalamin 500 MCG Oral Tab Take 1 tablet (500 mcg total) by mouth every other day.    metFORMIN HCl 1000 MG Oral Tab Take 1 tablet (1,000 mg total) by mouth 2 (two) times daily with meals.    amLODIPine 5 MG Oral Tab Take 1 tablet (5 mg total) by mouth daily.    carvedilol 25 MG Oral Tab Take 1 tablet (25 mg total) by mouth 2 (two) times daily with meals.    clopidogrel 75 MG Oral Tab Take 1 tablet (75 mg total) by mouth daily.    empagliflozin (JARDIANCE) 25 MG Oral Tab Take 1 tablet (25 mg total) by mouth daily.    furosemide 40 MG Oral Tab Take 1 tablet (40 mg total) by mouth daily.    rosuvastatin 20 MG Oral Tab Take 1 tablet (20 mg total) by mouth nightly.    tamsulosin 0.4 MG Oral Cap TAKE 2 CAPSULES BY MOUTH DAILY (Patient taking differently: Take 1 capsule (0.4 mg total) by mouth in the morning and 1 capsule (0.4 mg total) before bedtime.)    aspirin 81 MG Oral Tab EC Take 1 tablet  (81 mg total) by mouth nightly.    Thiamine HCl 250 MG Oral Tab Take 1 tablet (250 mg total) by mouth every other day.    pyridoxine 100 MG Oral Tab Take 1 tablet (100 mg total) by mouth every other day.    Glucose Blood (CONTOUR NEXT TEST) In Vitro Strip To test 2 x daily  Dx E11.9    Glucose Blood (CONTOUR NEXT TEST) In Vitro Strip To check glucose bid   Dx  E11.9    Glucose Blood (LORAINE CONTOUR TEST) In Vitro Strip To test daily    LORAINE CONTOUR NEXT TEST In Vitro Strip To test 2 x daily       Allergies  No Known Allergies    Review of Systems:   As by Admitting/Attending    Results:   Laboratory Data:  Lab Results   Component Value Date    WBC 11.7 (H) 06/22/2025    HGB 8.5 (L) 06/22/2025    HCT 27.0 (L) 06/22/2025    .0 (L) 06/22/2025    CREATSERUM 0.69 (L) 06/22/2025    BUN 13 06/22/2025     06/22/2025    K 3.6 06/22/2025     06/22/2025    CO2 25.0 06/22/2025     (H) 06/22/2025    CA 8.3 (L) 06/22/2025    ALB 3.2 06/22/2025    ALKPHO 53 06/22/2025    TP 5.6 (L) 06/22/2025    AST 44 (H) 06/22/2025    ALT 10 06/22/2025    PTT 30.6 06/19/2025    INR 1.17 06/19/2025    PTP 15.6 (H) 06/19/2025    TSH 0.696 06/19/2025    PSA 2.62 08/10/2023    MG 1.8 06/21/2025         Imaging:  XR CHEST AP PORTABLE  (CPT=71045)  Result Date: 6/22/2025  CONCLUSION:   Mild improved vascular congestion.  Mild improved aeration of the right lower lung.  Stable opacity in the left lung base which may reflect combination of pleural effusion and parenchymal opacities.  Support devices not significantly changed.    Dictated by (CST): Antonio Hitchcock MD on 6/22/2025 at 7:35 AM     Finalized by (CST): Antonio Hitchcock MD on 6/22/2025 at 7:37 AM          XR CHEST AP PORTABLE  (CPT=71045)  Result Date: 6/21/2025  PROCEDURE: XR CHEST AP PORTABLE  (CPT=71045) TIME: 5:36 a.m..   COMPARISON: [facility], XR CHEST AP PORTABLE (CPT=71045), 6/20/2025, 6:08 AM.  INDICATIONS: Shortness of breath.  TECHNIQUE:    Single view.   FINDINGS/IMPRESSION:   1. There is an endotracheal tube with tip approximately 4.7 cm above the fred.  There is an enteric feeding tube in expected configuration.  There is a right IJ Hudson-Mike catheter with tip projecting over the proximal main pulmonary outflow tract.  There are mediastinal drain/left-sided chest tube in unchanged position.  There is no pneumothorax.  2. The heart mediastinal structures are minimally enlarged.  Pulmonary vascularity is slightly increased.  The findings are compatible with moderate fluid overload.  3. There are increased densities at the lung bases which could represent atelectasis, infiltrate, or a combination in addition to small pleural effusions.     Dictated by (CST): Eligio Andrade MD on 6/21/2025 at 11:36 AM     Finalized by (CST): Eligio Andrade MD on 6/21/2025 at 11:37 AM          CT BRAIN OR HEAD (CPT=70450)  Result Date: 6/20/2025  CONCLUSION:   1. Small soft tissue/scalp hematoma at the posterior vertex with no underlying calvarial fracture or acute intracranial process.  2. Small to moderate sized areas of low-attenuation in the subcortical and periventricular white matter most compatible with chronic ischemic white matter changes.  3. Mild to moderate paranasal sinus disease.     Dictated by (CST): Eligio Andrade MD on 6/20/2025 at 6:27 PM     Finalized by (CST): Eligio Andrade MD on 6/20/2025 at 6:29 PM          XR CHEST AP PORTABLE  (CPT=71045)  Result Date: 6/20/2025  CONCLUSION: Post CABG and aortic valve prosthesis.  Stable/satisfactory position of lines and tubes.  Stable vascular congestion/interstitial edema and stable small left effusion.    Dictated by (CST): Bal Stewart MD on 6/20/2025 at 6:45 AM     Finalized by (CST): Bal Stewart MD on 6/20/2025 at 6:49 AM          XR CHEST AP PORTABLE  (CPT=71045)  Result Date: 6/19/2025  PROCEDURE: XR CHEST AP PORTABLE  (CPT=71045) TIME: 1921.   COMPARISON: [facility], XR  CHEST AP PORTABLE (CPT=71045), 6/19/2025, 3:29 PM.  INDICATIONS: ETT and OG placement  TECHNIQUE:   Single view.   FINDINGS/IMPRESSION:   1. There is an endotracheal tube with tip approximately 5.3 cm above the fred.  There is an enteric feeding tube with tip overlying the gastric fundus.  There is a right IJ Woodman-Mike catheter with tip overlying the proximal main pulmonary outflow tract.  There are bilateral mediastinal drains as well as a left-sided chest tube in expected configuration there is no pneumothorax.  2. The heart mediastinal structures are enlarged.  Pulmonary vascularity is slightly increased.  The findings likely represent minimal fluid overload.  3. Lung volumes are diminished.  There is minimal hazy opacities at the lung bases which could represent atelectasis, infiltrate, or a combination.     Dictated by (CST): Eligio Andrade MD on 6/19/2025 at 7:37 PM     Finalized by (CST): Eligio Andrade MD on 6/19/2025 at 7:39 PM          XR CHEST AP PORTABLE  (CPT=71045)  Result Date: 6/19/2025  CONCLUSION: Post CABG and aortic valve prosthesis.  The cardiac silhouette is mildly enlarged and there is mild vascular congestion with left pleural effusion and basal atelectasis.  Findings are compatible with recent surgery.  There is expected position of lines and tubes as discussed above.   Dictated by (CST): Bal Stewart MD on 6/19/2025 at 4:48 PM     Finalized by (CST): Bal Stewart MD on 6/19/2025 at 4:52 PM            Vital Signs:   Blood pressure 125/70, pulse 85, temperature 99 °F (37.2 °C), temperature source Pulmonary Artery, resp. rate 19, height 66\", weight 98.5 kg (217 lb 2.5 oz), SpO2 100%.    Mental Status Exam:   Appearance: Stated age, male in hospital gown, laying down in hospital bed, intubated and sedated.  Psychomotor: Patient has been demonstrating restlessness and agitation when he is weaned from mechanical ventilation.  Orientation: obtunded.  Gait: Not  evaluated.  Attitude/Coorperation: limited cooperation due to sedation.  Behavior: episodes of restlessness and agitation.  Speech: Speech impacted due to sedation.  Mood: anxious  Affect: restricted  Thought process: Confused, disorganized  Thought content: No reports of  suicidal or homicidal ideation.  Perceptions: Patient has been demonstrating response to internal stimuli.  Concentration: Grossly impaired  Memory: Grossly impaired  Intellect: Average.  Judgment and Insight: Questionable.     Impression:     Episodic mood disorder.  Delirium due to a medical condition    Aortic stenosis   Hx of CABG      Patient is a 59 year old polish speaking white  male with a PMH of CAD, PVD, hypertension, hyperlipidemia, diabetes, severe aortic stenosis and recent CABG on 6/19 who continue indicated sedation and intubation and SBT failed due to excessive agitation..    The patient has been demonstrating delirium episode with alternation in mood and cognition with episodes of  increased confusion, restlessness, agitation and response to internal stimuli. This has led to extended extubation and sedation of the patient.    QTc is 485.  Otherwise current EKG demonstrated sinus rhythm.  Magnesium level is 1.8.  Patient with no risk of arrhythmia other than the underlying recent surgical procedure.  It is appropriate to have nightly sedation with the Zyprexa to stabilize the mood currently and utilize the combination of Haldol and Ativan 5/2 prior to extubation.  Otherwise tapering sedation slowly utilizing Haldol 2 mg as needed for restlessness.    Discussed risk and benefit, acknowledging the current symptom and severity.  At this point, I would recommend the following approach:     Focus on safety  Focus on education and support.  Focus on insight orientation helping the patient understand diagnosis and treatment plan.  Required 1 dose of magnesium 2 g IV today.  Start Zyprexa 5 mg nightly  Give Haldol 5 mg and Ativan 2  mg IM 30 min prior to extubation.  Start Haldol 2 mg PRN for restlessness.  Processed with patient/family/RN at length, the initiation of the above psychotropic medications I advised the patient of the risks, benefits, alternatives and potential side effects. The patient consents to administration of the medications and understands the right to refuse medications at any time. The patient verbalized understanding.   Coordinate plan with team    Orders This Visit:  Orders Placed This Encounter   Procedures    Basic Metabolic Panel (8)    CBC, Platelet; No Differential    CBC With Differential With Platelet    Basic Metabolic Panel (8)    Prothrombin Time (PT)    PTT, Activated    Fibrinogen Activity    Magnesium    Magnesium    Expanded Arterial Blood Gas    Arterial blood gas    Assay, Thyroid Stim Hormone    Lactic Acid, Plasma    Hepatic Function Panel (7)    CBC, Platelet; No Differential    Lactic Acid Reflex Post Positive    Hemoglobin & Hematocrit    CBC, Platelet; No Differential    Magnesium    Lactic Acid, Plasma    Arterial blood gas    Basic Metabolic Panel (8)    Lipid Panel    Basic Metabolic Panel (8)    Venous Blood Gas    Arterial blood gas    Magnesium    Comp Metabolic Panel (14)    CBC, Platelet; No Differential    Expanded Arterial Blood Gas    Basic Metabolic Panel (8)    CBC, Platelet; No Differential    Magnesium    Prepare RBC STAT    ABORH Confirmation    Prepare platelets Once    Prepare fresh frozen plasma Once    Prepare cryoprecipitate Once    Specimen to Pathology Tissue    MRSA Screen by PCR    Sputum culture    Blood Culture    Emergency MRSA Screen by PCR       Meds This Visit:  Requested Prescriptions      No prescriptions requested or ordered in this encounter       Chivo Solares MD  6/22/2025    Note to Patient: The 21st Century Cures Act makes medical notes like these available to patients in the interest of transparency. However, be advised this is a medical document. It is  intended as peer to peer communication. It is written in medical language and may contain abbreviations or verbiage that are unfamiliar. It may appear blunt or direct. Medical documents are intended to carry relevant information, facts as evident, and the clinical opinion of the practitioner. This note may have been transcribed using a voice dictation system. Voice recognition errors may occur. This should not be taken to alter the content or meaning of this note.

## 2025-06-22 NOTE — PLAN OF CARE
Received Pt intubated and sedated. Attempted SBT however during sedation holiday. Pt was minimally following commands in Polish (i.e. opened eyes to command, squeezed his hands, gave a \"thumbs up\" on the Right thumb). However, Pt became severely agitated & hemodynamically unstable; Resp rate <48, SBP per A-Line > 198 mm/hg. Dr. Pacheco & CV Surgery team at the bedside, decision made to restart sedation. After SBT, Pt was fighting the ventilator, coughing aggressively, SPO2 dropped precipitously. Aggressively suctioned ETT, obtained thick, yellow sputum x3, possible mucous plug noted. Pt manually bagged briefly, RT assisted to then suction & lavage, Pt's oxygenation & ventilation improved. Dr. Rahman at the bedside, care plan updates discussed, sputum sent to lab for evaluation.    Family remained at the bedside throughout the afternoon. Pt's daughter, Agatha, states she & her Mother will be at the bedside tomorrow AM near 8 o'clock to assist with communication in Polish & aid in keeping the Pt calm during planned SBT.    CHG bath given in the afternoon, Pt Turned Q2. Pt able to titrate off Levophed & Lasix gtt continued. Mckeon draining yen urine, trace sediment noted at times. Bilateral wrist restraints continued for safety.     Of note, Consult placed to Psych for poss ETOH withdrawl-> Plans for IM Haldol/ Ativan IM combo prior to SBT (See notes per Dr. Solares). Starting Zyprexa, as well as Haldol. CIWA protocol also initiated.    See doc flowsheets for current continuous infusions as well as accurate I & O. RN-RN report given & care endorsed accordingly.    Problem: Patient Centered Care  Goal: Patient preferences are identified and integrated in the patient's plan of care  Description: Interventions:  - What would you like us to know as we care for you? Unable to assess;  - Provide timely, complete, and accurate information to patient/family  - Incorporate patient and family knowledge, values, beliefs, and  cultural backgrounds into the planning and delivery of care  - Encourage patient/family to participate in care and decision-making at the level they choose  - Honor patient and family perspectives and choices  Outcome: Progressing      Problem: CARDIOVASCULAR - ADULT  Goal: Maintains optimal cardiac output and hemodynamic stability  Description: INTERVENTIONS:  - Monitor vital signs, rhythm, and trends  - Monitor for bleeding, hypotension and signs of decreased cardiac output  - Evaluate effectiveness of vasoactive medications to optimize hemodynamic stability  - Monitor arterial and/or venous puncture sites for bleeding and/or hematoma  - Assess quality of pulses, skin color and temperature  - Assess for signs of decreased coronary artery perfusion - ex. Angina  - Evaluate fluid balance, assess for edema, trend weights  Outcome: Progressing  Goal: Absence of cardiac arrhythmias or at baseline  Description: INTERVENTIONS:  - Continuous cardiac monitoring, monitor vital signs, obtain 12 lead EKG if indicated  - Evaluate effectiveness of antiarrhythmic and heart rate control medications as ordered  - Initiate emergency measures for life threatening arrhythmias  - Monitor electrolytes and administer replacement therapy as ordered  Outcome: Progressing     Problem: RESPIRATORY - ADULT  Goal: Achieves optimal ventilation and oxygenation  Description: INTERVENTIONS:  - Assess for changes in respiratory status  - Assess for changes in mentation and behavior  - Position to facilitate oxygenation and minimize respiratory effort  - Oxygen supplementation based on oxygen saturation or ABGs  - Provide Smoking Cessation handout, if applicable  - Encourage broncho-pulmonary hygiene including cough, deep breathe, Incentive Spirometry  - Assess the need for suctioning and perform as needed  - Assess and instruct to report SOB or any respiratory difficulty  - Respiratory Therapy support as indicated  - Manage/alleviate anxiety  -  Monitor for signs/symptoms of CO2 retention  Outcome: Progressing     Problem: GASTROINTESTINAL - ADULT  Goal: Minimal or absence of nausea and vomiting  Description: INTERVENTIONS:  - Maintain adequate hydration with IV or PO as ordered and tolerated  - Nasogastric tube to low intermittent suction as ordered  - Evaluate effectiveness of ordered antiemetic medications  - Provide nonpharmacologic comfort measures as appropriate  - Advance diet as tolerated, if ordered  - Obtain nutritional consult as needed  - Evaluate fluid balance  Outcome: Progressing  Goal: Maintains or returns to baseline bowel function  Description: INTERVENTIONS:  - Assess bowel function  - Maintain adequate hydration with IV or PO as ordered and tolerated  - Evaluate effectiveness of GI medications  - Encourage mobilization and activity  - Obtain nutritional consult as needed  - Establish a toileting routine/schedule  - Consider collaborating with pharmacy to review patient's medication profile  Outcome: Progressing     Problem: GENITOURINARY - ADULT  Goal: Absence of urinary retention  Description: INTERVENTIONS:  - Assess patient’s ability to void and empty bladder  - Monitor intake/output and perform bladder scan as needed  - Follow urinary retention protocol/standard of care  - Consider collaborating with pharmacy to review patient's medication profile  - Implement strategies to promote bladder emptying  Outcome: Progressing     Problem: METABOLIC/FLUID AND ELECTROLYTES - ADULT  Goal: Glucose maintained within prescribed range  Description: INTERVENTIONS:  - Monitor Blood Glucose as ordered  - Assess for signs and symptoms of hyperglycemia and hypoglycemia  - Administer ordered medications to maintain glucose within target range  - Assess barriers to adequate nutritional intake and initiate nutrition consult as needed  - Instruct patient on self management of diabetes  Outcome: Progressing  Goal: Electrolytes maintained within normal  limits  Description: INTERVENTIONS:  - Monitor labs and rhythm and assess patient for signs and symptoms of electrolyte imbalances  - Administer electrolyte replacement as ordered  - Monitor response to electrolyte replacements, including rhythm and repeat lab results as appropriate  - Fluid restriction as ordered  - Instruct patient on fluid and nutrition restrictions as appropriate  Outcome: Progressing  Goal: Hemodynamic stability and optimal renal function maintained  Description: INTERVENTIONS:  - Monitor labs and assess for signs and symptoms of volume excess or deficit  - Monitor intake, output and patient weight  - Monitor urine specific gravity, serum osmolarity and serum sodium as indicated or ordered  - Monitor response to interventions for patient's volume status, including labs, urine output, blood pressure (other measures as available)  - Encourage oral intake as appropriate  - Instruct patient on fluid and nutrition restrictions as appropriate  Outcome: Progressing     Problem: SKIN/TISSUE INTEGRITY - ADULT  Goal: Incision(s), wounds(s) or drain site(s) healing without S/S of infection  Description: INTERVENTIONS:  - Assess and document risk factors for pressure ulcer development  - Assess and document skin integrity  - Assess and document dressing/incision, wound bed, drain sites and surrounding tissue  - Implement wound care per orders  - Initiate isolation precautions as appropriate  - Initiate Pressure Ulcer prevention bundle as indicated  Outcome: Progressing  Goal: Oral mucous membranes remain intact  Description: INTERVENTIONS  - Assess oral mucosa and hygiene practices  - Implement preventative oral hygiene regimen  - Implement oral medicated treatments as ordered  Outcome: Progressing     Problem: HEMATOLOGIC - ADULT  Goal: Maintains hematologic stability  Description: INTERVENTIONS  - Assess for signs and symptoms of bleeding or hemorrhage  - Monitor labs and vital signs for trends  -  Administer supportive blood products/factors, fluids and medications as ordered and appropriate  - Administer supportive blood products/factors as ordered and appropriate  Outcome: Progressing  Goal: Free from bleeding injury  Description: (Example usage: patient with low platelets)  INTERVENTIONS:  - Avoid intramuscular injections, enemas and rectal medication administration  - Ensure safe mobilization of patient  - Hold pressure on venipuncture sites to achieve adequate hemostasis  - Assess for signs and symptoms of internal bleeding  - Monitor lab trends  - Patient is to report abnormal signs of bleeding to staff  - Avoid use of toothpicks and dental floss  - Use electric shaver for shaving  - Use soft bristle tooth brush  - Limit straining and forceful nose blowing  Outcome: Progressing     Problem: MUSCULOSKELETAL - ADULT  Goal: Return mobility to safest level of function  Description: INTERVENTIONS:  - Assess patient stability and activity tolerance for standing, transferring and ambulating w/ or w/o assistive devices  - Assist with transfers and ambulation using safe patient handling equipment as needed  - Ensure adequate protection for wounds/incisions during mobilization  - Obtain PT/OT consults as needed  - Advance activity as appropriate  - Communicate ordered activity level and limitations with patient/family  Outcome: Progressing     Problem: NEUROLOGICAL - ADULT  Goal: Achieves stable or improved neurological status  Description: INTERVENTIONS  - Assess for and report changes in neurological status  - Initiate measures to prevent increased intracranial pressure  - Maintain blood pressure and fluid volume within ordered parameters to optimize cerebral perfusion and minimize risk of hemorrhage  - Monitor temperature, glucose, and sodium. Initiate appropriate interventions as ordered  Outcome: Progressing     Problem: Impaired Functional Mobility  Goal: Achieve highest/safest level of  mobility/gait  Description: Interventions:  - Assess patient's functional ability and stability  - Promote increasing activity/tolerance for mobility and gait  - Educate and engage patient/family in tolerated activity level and precautions  - Recommend use of total lift for transfers  Outcome: Progressing     Problem: Impaired Cognition  Goal: Patient will exhibit improved attention, thought processing and/or memory  Description: Interventions:  - Minimize distractions in the room when full attention is required  Outcome: Progressing     Problem: Safety Risk - Non-Violent Restraints  Goal: Patient will remain free from self-harm  Description: INTERVENTIONS:  - Apply the least restrictive restraint to prevent harm  - Notify patient and family of reasons restraints applied  - Assess for any contributing factors to confusion (electrolyte disturbances, delirium, medications)  - Discontinue any unnecessary medical devices as soon as possible  - Assess the patient's physical comfort, circulation, skin condition, hydration, nutrition and elimination needs   - Reorient and redirection as needed  - Assess for the need to continue restraints  Outcome: Progressing      Marni Mckeon, RN, BSN, CCRN

## 2025-06-22 NOTE — PROGRESS NOTES
[facility]  part of [facility]    Progress Note    Jose Alberto Lizama Patient Status:  Inpatient    1965 MRN [id number]   Location [facility] 2W/SW Attending Erin Richardson MD   Hosp Day # 2 PCP Fiona Sweeney MD     Chief complaint: post op  Subjective:     Intubated, sedated  Urine output better after lasix    Objective:   Blood pressure 98/63, pulse 90, temperature 100.1 °F (37.8 °C), temperature source Pulmonary Artery, resp. rate 19, height 5' 6\" (1.676 m), weight 215 lb 2.7 oz (97.6 kg), SpO2 100%.    HEENT: conjunctivae/corneas clear.    Neck: no adenopathy, no carotid bruit, supple  Pulmonary:  clear to auscultation bilaterally  Cardiovascular: S1, S2 normal, no murmur, click, rub or gallop, regular rate and rhythm  Abdominal: soft, non-tender; bowel sounds normal; no masses,  no organomegaly  Extremities: no cyanosis or edema  Pulses: palpable and symmetric  Skin: Warm and dry  Neurologic: Deferred  Psychiatric: Deferred    Assessment and Plan:     Multi-vessel coronary artery disease, h/o prior stents  Ischemic cardiomyopathy, prior EF 30%  PAD  Severe aortic stenosis  Now s/p  coronary artery bypass graft surgery x2 utilizing left internal mammary artery to left anterior descending, saphenous vein graft to the diagonal artery; right leg endoscopic greater saphenous vein graft harvest; left leg endoscopic exploration; aortic valve replacement with Inspiris tissue valve, size 21 mm; left atrial appendage clip, size 40; intraoperative transesophageal echocardiogram; insertion of temporary ventricular pacing wire; sternal plating; and insertion of left femoral intra-aortic balloon pump.   - Complex surgery  -per cardiology and CV surgery  -Pulmonary/critical care  -IV pressors support.     DM  -At home on metformin and Jardiance  - Currently insulin drip. Close monitoring of glucose.     HL  BPH  - Currently with Mckeon    Dispo: ICU        Supplementary Documentation:    DVT Mechanical Prophylaxis:   SCDs,    DVT Pharmacologic Prophylaxis   Medication    enoxaparin (Lovenox) 40 MG/0.4ML SUBQ injection 40 mg         DVT Pharmacologic prophylaxis: Aspirin 81 mg      Code Status: Full Code  Mckeon: Mckeon catheter in place  Mckeon Duration (in days): 2  Central line:    VIDAL:                         Chart reviewed, including current vitals, notes, labs and imaging  Pertinent past medical records reviewed  Labs ordered and medications adjusted as outlined above  Home medications reconciliation completed  Coordinate care with care team/consultants     D/w RN     [facility]  severe acute illness/or exacerbation of chronic illness posing a threat to life, IV medications, requiring close monitoring in hospital.       Results:     Lab Results   Component Value Date    WBC 12.6 (H) 06/21/2025    HGB 9.1 (L) 06/21/2025    HCT 28.5 (L) 06/21/2025    .0 06/21/2025    CREATSERUM 0.75 06/21/2025    BUN 11 06/21/2025     06/21/2025    K 3.9 06/21/2025     06/21/2025    CO2 25.0 06/21/2025     (H) 06/21/2025    CA 8.3 (L) 06/21/2025    ALB 3.3 06/19/2025    ALKPHO 44 (L) 06/19/2025    BILT 0.4 06/19/2025    TP 5.0 (L) 06/19/2025    AST 71 (H) 06/19/2025    ALT 12 06/19/2025    PTT 30.6 06/19/2025    INR 1.17 06/19/2025    TSH 0.696 06/19/2025    PSA 2.62 08/10/2023    MG 1.8 06/21/2025       XR CHEST AP PORTABLE  (CPT=71045)  Result Date: 6/21/2025  PROCEDURE: XR CHEST AP PORTABLE  (CPT=71045) TIME: 5:36 a.m..   COMPARISON: [facility], XR CHEST AP PORTABLE (CPT=71045), 6/20/2025, 6:08 AM.  INDICATIONS: Shortness of breath.  TECHNIQUE:   Single view.   FINDINGS/IMPRESSION:   1. There is an endotracheal tube with tip approximately 4.7 cm above the fred.  There is an enteric feeding tube in expected configuration.  There is a right IJ [place]-Mike catheter with tip projecting over the proximal main pulmonary outflow tract.  There are mediastinal drain/left-sided  chest tube in unchanged position.  There is no pneumothorax.  2. The heart mediastinal structures are minimally enlarged.  Pulmonary vascularity is slightly increased.  The findings are compatible with moderate fluid overload.  3. There are increased densities at the lung bases which could represent atelectasis, infiltrate, or a combination in addition to small pleural effusions.     Dictated by (CST): Eligio Andrade MD on 6/21/2025 at 11:36 AM     Finalized by (CST): Eligio Andrade MD on 6/21/2025 at 11:37 AM          CT BRAIN OR HEAD (CPT=70450)  Result Date: 6/20/2025  CONCLUSION:   1. Small soft tissue/scalp hematoma at the posterior vertex with no underlying calvarial fracture or acute intracranial process.  2. Small to moderate sized areas of low-attenuation in the subcortical and periventricular white matter most compatible with chronic ischemic white matter changes.  3. Mild to moderate paranasal sinus disease.     Dictated by (CST): Eligio Andrade MD on 6/20/2025 at 6:27 PM     Finalized by (CST): Eligio Andrade MD on 6/20/2025 at 6:29 PM          XR CHEST AP PORTABLE  (CPT=71045)  Result Date: 6/20/2025  CONCLUSION: Post CABG and aortic valve prosthesis.  Stable/satisfactory position of lines and tubes.  Stable vascular congestion/interstitial edema and stable small left effusion.    Dictated by (CST): Bal Stewart MD on 6/20/2025 at 6:45 AM     Finalized by (CST): Bal Stewart MD on 6/20/2025 at 6:49 AM          XR CHEST AP PORTABLE  (CPT=71045)  Result Date: 6/19/2025  PROCEDURE: XR CHEST AP PORTABLE  (CPT=71045) TIME: 1921.   COMPARISON: [facility], XR CHEST AP PORTABLE (CPT=71045), 6/19/2025, 3:29 PM.  INDICATIONS: ETT and OG placement  TECHNIQUE:   Single view.   FINDINGS/IMPRESSION:   1. There is an endotracheal tube with tip approximately 5.3 cm above the fred.  There is an enteric feeding tube with tip overlying the gastric fundus.  There is a right IJ Eliot-Mike catheter  with tip overlying the proximal main pulmonary outflow tract.  There are bilateral mediastinal drains as well as a left-sided chest tube in expected configuration there is no pneumothorax.  2. The heart mediastinal structures are enlarged.  Pulmonary vascularity is slightly increased.  The findings likely represent minimal fluid overload.  3. Lung volumes are diminished.  There is minimal hazy opacities at the lung bases which could represent atelectasis, infiltrate, or a combination.     Dictated by (CST): Eligio Andrade MD on 6/19/2025 at 7:37 PM     Finalized by (CST): Eligio Andrade MD on 6/19/2025 at 7:39 PM          EKG 12 Lead  Result Date: 6/20/2025  Normal sinus rhythm Left axis deviation T wave abnormality, consider anterolateral ischemia LVH with QRS widening and repolarization abnormality ( Vinton product ) Abnormal ECG When compared with ECG of 19-JUN-2025 15:26, T wave inversion more evident in Lateral leads Confirmed by ALEC WHIPPLE, DENNIS ([id number] on 6/20/2025 7:56:34 AM      Note: This chart was prepared using voice recognition software and may contain unintended word substitution errors.     VERONA JIMENEZ MD  6/21/2025

## 2025-06-22 NOTE — PROGRESS NOTES
Pulmonary/ICU/Critical Care Progress Note         Reason for Consultation: post CABG, IABP  Referring Physician: Dr. Pacheco    Subjective:  Agitated off sedation and unable tolerate SBT so back on propofol 20 and precedex 0.6  Also mucus plugged later this am with thick phlegm. Had to be bagged. Now back on vent 30%  Low grade fever overnight  Levo is off. On dobutamine 2 and receiving albumin  Off lasix gtt      From the initial consultation  HPI: 60 yo male with hx of smoking, HTN, HLP, PVD, severe aortic stenosis, moderate mitral stenosis, multivessel coronary artery disease s/p PCI and stent of LAD and diagonal artery seen by CV surgery and underwent CABG x 2, aortic valve replacement.   Now in the ICU post op with IABP, sedated on MV support.   Intra-op given 2 FFP, 2 cryo, 2 PLT  On epi 2 and levo 3 gtts as per CV surgery  On NO 40 PPM  Sedation propofol, insulin, dextrose      REVIEW OF SYSTEMS:  Positives and negatives as noted in HPI. All other review of systems otherwise are either limited (due to pt/family inability to provide) or negative.      PAST MEDICAL HISTORY:  Past Medical History[1]      PAST SURGICAL HISTORY:  Past Surgical History[2]      PAST SOCIAL HISTORY:  Social Hx on file[3]      PAST FAMILY HISTORY:  Family History[4]      ALLERGIES:  Allergies[5]      MEDS:  Home Medications:  Medications Taking[6]    Scheduled Medication:  Scheduled Medications[7]  Continuous Infusing Medication:  Medication Infusions[8]  PRN Medications:  PRN Medications[9]       PHYSICAL EXAM:  BP 92/61 (BP Location: Right arm)   Pulse 94   Temp 100 °F (37.8 °C) (Pulmonary Artery)   Resp 19   Ht 5' 6\" (1.676 m)   Wt 217 lb 2.5 oz (98.5 kg)   SpO2 96%   BMI 35.05 kg/m²   Vent Mode: VC/AC  FiO2 (%):  [30 %-40 %] 30 %  S RR:  [12] 12  S VT:  [450 mL] 450 mL  PEEP/CPAP (cm H2O):  [5 cm H20] 5 cm H20  MAP (cm H2O):  [8-13] 13  CONSTITUTIONAL: sedated intubated  HEENT: atraumatic normocephalic  MOUTH: ETT  OGT  NECK/THROAT: no JVD. Trachea midline. No obvious thyromegaly.+ cordis and swan  LUNG: clear upper b/l no wheezing, crackles. Diminished bases. Chest symmetric with respiratory motion  HEART: midsternal incision dressed, regular rate and rhythm, no obvious murmers or gallops noted. + chest tube  ABD: soft non tender. + bowel sounds. No organomegaly noted  EXT: no clubbing, cyanosis, or edema noted. Pulses intact grossly  NEURO/MUSCULOSKELETAL: sedated intubated but opens eyes, weak cough  SKIN: warm, dry. No obvious lesions noted  LYMPH: no obvious LAD      IMAGES:   CT brain 6/20/25  CONCLUSION:   1. Small soft tissue/scalp hematoma at the posterior vertex with no underlying calvarial fracture or acute intracranial process.   2. Small to moderate sized areas of low-attenuation in the subcortical and periventricular white matter most compatible with chronic ischemic white matter changes.   3. Mild to moderate paranasal sinus disease.          CXR ordered 6/20  CONCLUSION: Post CABG and aortic valve prosthesis.  Stable/satisfactory position of lines and tubes.  Stable vascular congestion/interstitial edema and stable small left effusion.        LABS:  Recent Labs   Lab 06/20/25  0411 06/21/25  0542 06/22/25  0506   RBC 3.65* 3.61* 3.44*   HGB 9.1* 9.1* 8.5*   HCT 28.5* 28.5* 27.0*   MCV 78.1* 78.9* 78.5*   MCH 24.9* 25.2* 24.7*   MCHC 31.9 31.9 31.5   RDW 16.2* 16.6* 16.3*   NEPRELIM 6.28  --   --    WBC 9.1 12.6* 11.7*   .0 173.0 148.0*       Recent Labs   Lab 06/19/25  1645 06/20/25  0411 06/21/25  1813 06/22/25  0013 06/22/25  0506   GLU  --    < > 107* 118* 157*  157*   BUN  --    < > 11 12 12  12   CREATSERUM  --    < > 0.75 0.68* 0.73  0.73   EGFRCR  --    < > 103 106 104  104   CA  --    < > 8.3* 8.3* 8.3*  8.3*   ALB 3.3  --   --   --  3.2   NA  --    < > 137 137 136  136   K  --    < > 3.9 3.5 3.6  3.6   CL  --    < > 106 105 103  103   CO2  --    < > 25.0 25.0 22.0  22.0   ALKPHO 44*  --    --   --  53   AST 71*  --   --   --  44*   ALT 12  --   --   --  10   BILT 0.4  --   --   --  0.5   TP 5.0*  --   --   --  5.6*    < > = values in this interval not displayed.         ASSESSMENT/PLAN:  CAD s/p multivessel disease and severe aortic stenosis  -s/p CABG x 2 and AV repair  -IABP and cardiac gtts as per CV and cardiology. IABP removed  -off lasix and levo. Remains on dobutamine 2  -s/p albumin this am   -plavix and ASA, amio  -post op abx    Post op ventilator mgmt  -continue full MV support  -weaned off NO as per CV  -checked ABG stable  -daily CXR  -failed SBT this am d/t agitated. Has reported hx of drinking  -also mucus plugged this am and needed to be bagged  -remains on propofol/precedex    Possible PNA with mucus plugging, leukocytosis   -check ETT asp, blood cx, MRSA nares  -start empiric zosyn    Possible ETOH use. Possible withdrawal?  -reported hx of drinking  -start CIWA protocol   -recommend consulting psych in am    Hx of vaping  -duonebs  -add pulmicort nebs     PVD  -plavix, ASA    DM2  -insulin/dextrose  -monitor BG    BPH  -flomax  -stafford    Proph  -DVT: lovenox  -GI: pepcid    Dispo  -full code    Thank you for the opportunity to care for Jose Alberto Lizama.      DERRICK Rahman DO, MPH  Pulmonary Critical Care Medicine  [facility]                         [1]   Past Medical History:  Diagnosis Date    Aortic stenosis     Back problem     BPH (benign prostatic hyperplasia)     Cardiomyopathy (HCC)     Coronary atherosclerosis     Depression     Diabetes (HCC)     Esophageal reflux     Heart valve disease     High blood pressure     High cholesterol     Peripheral vascular disease     Shortness of breath     related to smoking     Visual impairment     glasses   [2]   Past Surgical History:  Procedure Laterality Date    Cardiac catheterization  2016    [facility]    Cath drug eluting stent      Colonoscopy  04/22/2024     Colonoscopy N/A 4/22/2024    Procedure: COLONOSCOPY;  Surgeon: Attila Li MD;  Location: [facility] ENDOSCOPY    Esophagoscopy,diagnostic  04/22/2024    Dr. Li    Hernia surgery      as a child    Nasal scopy,remv part ethmoid  10/14/2020    Nasal scopy,rmv tiss maxill sinus  10/14/2020    Repair of nasal septum  10/14/2020   [3]   Social History  Socioeconomic History    Marital status:    Tobacco Use    Smoking status: Former     Current packs/day: 1.00     Average packs/day: 1 pack/day for 20.0 years (20.0 ttl pk-yrs)     Types: Cigarettes    Smokeless tobacco: Never   Vaping Use    Vaping status: Every Day    Substances: Nicotine   Substance and Sexual Activity    Alcohol use: Yes     Comment: 1-2 drinks a week    Drug use: No   [4]   Family History  Problem Relation Age of Onset    Diabetes Father     Diabetes Paternal Aunt     Diabetes Paternal Uncle     Seizure Disorder Mother     No Known Problems Brother     No Known Problems Daughter     Glaucoma Neg     Macular degeneration Neg    [5] No Known Allergies  [6]   Outpatient Medications Marked as Taking for the 6/19/25 encounter (Hospital Encounter)   Medication Sig Dispense Refill    ENTRESTO 24-26 MG Oral Tab Entresto 24 mg-26 mg tablet, [RxNorm: 1741536]      magnesium 250 MG Oral Tab Take 1 tablet (250 mg total) by mouth every other day.      cyanocobalamin 500 MCG Oral Tab Take 1 tablet (500 mcg total) by mouth every other day.      metFORMIN HCl 1000 MG Oral Tab Take 1 tablet (1,000 mg total) by mouth 2 (two) times daily with meals. 180 tablet 3    amLODIPine 5 MG Oral Tab Take 1 tablet (5 mg total) by mouth daily. 90 tablet 3    carvedilol 25 MG Oral Tab Take 1 tablet (25 mg total) by mouth 2 (two) times daily with meals. 180 tablet 3    clopidogrel 75 MG Oral Tab Take 1 tablet (75 mg total) by mouth daily. 90 tablet 3    empagliflozin (JARDIANCE) 25 MG Oral Tab Take 1 tablet (25 mg total) by mouth daily. 90 tablet 3    furosemide 40 MG  Oral Tab Take 1 tablet (40 mg total) by mouth daily. 90 tablet 3    [DISCONTINUED] losartan 50 MG Oral Tab Take 1 tablet (50 mg total) by mouth 2 (two) times daily. 180 tablet 3    rosuvastatin 20 MG Oral Tab Take 1 tablet (20 mg total) by mouth nightly. 90 tablet 3    tamsulosin 0.4 MG Oral Cap TAKE 2 CAPSULES BY MOUTH DAILY (Patient taking differently: Take 1 capsule (0.4 mg total) by mouth in the morning and 1 capsule (0.4 mg total) before bedtime.) 180 capsule 3    aspirin 81 MG Oral Tab EC Take 1 tablet (81 mg total) by mouth nightly.     [7]    albumin human  12.5 g Intravenous Q8H    ipratropium-albuterol  3 mL Nebulization 4 times per day    sugammadex  2 mg/kg Intravenous Once    sennosides  8.6 mg Oral BID    docusate sodium  100 mg Oral BID    metoclopramide  10 mg Intravenous Q6H    famotidine  20 mg Oral BID    Or    famotidine  20 mg Intravenous BID    metoprolol tartrate  25 mg Oral 2x Daily(Beta Blocker)    mupirocin  1 Application Nasal BID    chlorhexidine gluconate  15 mL Mouth/Throat BID    multivitamin  1 tablet Oral Daily    ascorbic acid  500 mg Oral TID    enoxaparin  40 mg Subcutaneous Daily    clopidogrel  75 mg Oral Daily    aspirin  81 mg Oral Daily    tamsulosin  0.4 mg Oral BID    amiodarone  200 mg Oral TID CC   [8]    furosemide (Lasix) 500 mg in sodium chloride 0.9% 125 mL infusion Stopped (06/22/25 0713)    DOBUTamine 2 mcg/kg/min (06/22/25 0716)    sodium chloride 10 mL/hr at 06/19/25 1653    dexmedetomidine 0.8 mcg/kg/hr (06/22/25 0858)    DOBUTamine 2 mcg/kg/min (06/22/25 0858)    nitroGLYCERIN in dextrose 5%      norepinephrine Stopped (06/22/25 0800)    dextrose 5%-sodium chloride 0.45% 40 mL/hr (06/21/25 1201)    insulin regular 3 Units/hr (06/22/25 0800)    propofol 25 mcg/kg/min (06/22/25 0858)    EPINEPHrine (Adrenalin) 5 mg in sodium chloride 0.9% 250 mL infusion 2 mcg/min (06/20/25 1630)   [9]   melatonin    polyethylene glycol (PEG 3350)    bisacodyl    DOBUTamine     nitroGLYCERIN in dextrose 5%    norepinephrine    potassium chloride **OR** potassium chloride    magnesium sulfate in dextrose 5%    magnesium sulfate in sterile water for injection    acetaminophen **OR** HYDROcodone-acetaminophen **OR** HYDROcodone-acetaminophen    morphINE **OR** morphINE    glucose **OR** glucose **OR** glucose-vitamin C **OR** dextrose **OR** glucose **OR** glucose **OR** glucose-vitamin C

## 2025-06-22 NOTE — PLAN OF CARE
Problem: Safety Risk - Non-Violent Restraints  Goal: Patient will remain free from self-harm  Description: INTERVENTIONS:  - Apply the least restrictive restraint to prevent harm  - Notify patient and family of reasons restraints applied  - Assess for any contributing factors to confusion (electrolyte disturbances, delirium, medications)  - Discontinue any unnecessary medical devices as soon as possible  - Assess the patient's physical comfort, circulation, skin condition, hydration, nutrition and elimination needs   - Reorient and redirection as needed  - Assess for the need to continue restraints  6/21/2025 2336 by Karson Park, RN  Outcome: Progressing  6/21/2025 2336 by Karson Park, RN  Outcome: Progressing

## 2025-06-22 NOTE — PROGRESS NOTES
[facility]  part of [facility]    Progress Note    Jose Alberto Lizama Patient Status:  Inpatient    1965 MRN [id number]   Location [facility] 2W/SW Attending Leonides Pacheco MD   Hosp Day # 3 PCP Fiona Sweeney MD     Subjective:  Intubated and sedated, FiO2 30%.  Remains on: Levo 2, dobutamine 2, Precedex/propofol/insulin.  With sedation holiday, arousable/follows commands-needs Polish .  Gets tachypneic quickly    Objective:  /63 (BP Location: Right arm)   Pulse 91   Temp 100.1 °F (37.8 °C) (Pulmonary Artery)   Resp 24   Ht 5' 6\" (1.676 m)   Wt 215 lb 2.7 oz (97.6 kg)   SpO2 96%   BMI 34.73 kg/m²     Temp (24hrs), Av.3 °F (37.9 °C), Min:100 °F (37.8 °C), Max:100.5 °F (38.1 °C)  Telemetry-NSR    Intake/Output:    Intake/Output Summary (Last 24 hours) at 2025 0711  Last data filed at 2025 0706  Gross per 24 hour   Intake 1089 ml   Output 1955 ml   Net -866 ml       Wt Readings from Last 3 Encounters:   25 215 lb 2.7 oz (97.6 kg)   25 201 lb 12.8 oz (91.5 kg)   25 200 lb (90.7 kg)       Allergies:  Allergies[1]    Labs:  Lab Results   Component Value Date    WBC 11.7 2025    HGB 8.5 2025    HCT 27.0 2025    .0 2025    CREATSERUM 0.73 2025    CREATSERUM 0.73 2025    BUN 12 2025    BUN 12 2025     2025     2025    K 3.6 2025    K 3.6 2025     2025     2025    CO2 22.0 2025    CO2 22.0 2025     2025     2025    CA 8.3 2025    CA 8.3 2025    ALB 3.2 2025    ALKPHO 53 2025    BILT 0.5 2025    TP 5.6 2025    AST 44 2025    ALT 10 2025       Physical Exam:  Blood pressure 111/63, pulse 91, temperature 100.1 °F (37.8 °C), temperature source Pulmonary Artery, resp. rate 24, height 5' 6\" (1.676 m), weight 215 lb 2.7 oz (97.6 kg), SpO2  96%.  General: NAD  Neck: RIJ cordis/[place], JVD difficult to assess due to lines  Lungs: Diminished laterally, few scattered rhonchi  CT-10 cc / 12-hour, no airleak noted  Mediastinal Dane-20cc / 12-hour, no airleak  Heart: RRR, S1, S2  Abdomen: Sot/mild distention, BS hypoactive,/-BM  Extremities: Warm, dry, trace-1+ generalized edema  Bilateral groins-soft/no hematoma, CDI  Pulses: Dopplerable PT bilateral  Skin: sternotomy stable, incision CDI. bilateral SVG site CDI  Neurological: Sedated/intubated, with sedation holiday yesterday, patient arousable/follows commands, yet gets tachypneic/agitated very quickly      Assessment/Plan:  S/p CABG x 2 (LIMA-LAD, SVG-diagonal), AVR with 21 mm Inspiris tissue valve, MELINDA clip, placement of L IABP -POD #3  Intubated/sedated-remains on vent FiO2 30%-wean per pulmonary.  CXR pending this a.m.  Continue SBT trials, hopefully extubate by tomorrow  Hemodynamically stable at present on levo 2/dobutamine 2-epi weaned off IABP removed yesterday, -CI remains 2.1 or greater - wean off Levo to maintain MAP > 65, CI > 2.   On p.o. Amio-for high risk A-fib protocol.  Anticipate stopping at discharge if no AF noted.  Currently maintaining NSR  Pain meds as needed -limit narcotics if able with decreased mental status, IV Tylenol as needed  Increase activity once appropriate  DVT prevention - SCDs/Lovenox  Nutrition - if unable to extubate tomorrow, start trickle TF  Expected postop anemia - mild/stable, Hgb 8.5-likely some component delutional, will start iron once able to take p.o. repeat CBC in a.m.  Expected postop volume overload -DC Lasix drip today, we use Lasix as needed, CVP 3 - give albuin 250cc Q 8 x 3   Hypokalemia/hypomagnesmia as expected with diuresis-replete lytes per protocol  Insulin coverage per protocol -continue insulin drip until extubated, history DM preop, anticipate resuming home meds at or near discharge  Neuro-head CT negative for CVA, follows commands with  sedation holiday now.  Continue soft restraints until patient AAO.  Patient is Polish speaking-using /family to communicate to patient.  Per family-no history of daily EtOH use, just social    Discharge planning:  Patient lives at home with wife, anticipate home with HH once medically stable.  Appreciate SW/CM to assist with DC planning    Plan of care discussed with pt, bedside RN, and CV Surgeon: Dr. Tara Lewis, APRN  6/22/2025  0 730         [1] No Known Allergies

## 2025-06-22 NOTE — RESPIRATORY THERAPY NOTE
Received patient on full ventilator support, with settings of:    Vent Information   Vent Mode VC/AC   Settings   FiO2 (%) 40 %   Resp Rate (Set) 12   Vt (Set, mL) 450 mL   Waveform Decelerating ramp   PEEP/CPAP (cm H2O) 5 cm H20      Endotracheal tube (ETT) size 8 remained secured at 26 at the lip. Bilateral breath sounds were auscultated, and suctioning was performed as needed. FIO2 was wean down to 30%. There were no acute events reported during this period. The next respiratory therapist will continue to monitor.

## 2025-06-22 NOTE — DIETARY NOTE
ADULT NUTRITION INITIAL ASSESSMENT    Pt is at high nutrition risk.  Pt does not meet malnutrition criteria.      RECOMMENDATIONS TO MD:   Consider early initiation of EN feeds for critical illness.   See Nutrition Intervention for enteral nutrition (EN) and free water flushes (FWF) recommendation specifics     ADMITTING DIAGNOSIS:  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]  Aortic stenosis  PERTINENT PAST MEDICAL HISTORY: Past Medical History[1]    PATIENT STATUS:   Initial 06/22/25: Pt assessed due to critically ill - NPO day #3, Pt presented to the hospital for scheduled cardiac procedure. Has extensive PMH as listed above. POD#3 s/p CABG with aortic valve replacement. Remains orally intubated, sedated on propofol and precedex drip post-op. Pressor support x1. Failed SBT this AM. RN at bedside at time of visit. No family present for diet hx. Pt is well nourished, obese. Currently receiving ~500 non-nutritive kcal from Propofol LE and IV dextrose.       FOOD/NUTRITION RELATED HISTORY:  Appetite: Unknown appetite PTA  Intake: NPO day #3  Intake Meeting Needs: NPO and No  Percent Meals Eaten (last 6 days)       None        Food Allergies: No Known Food Allergies (NKFA)  Cultural/Ethnic/Druze Preferences: Not Obtained    GASTROINTESTINAL: +BM last reported 6/17/25 PTA, OG tube, and abdomen is soft, rounded, with hypoactive bowel sounds  UO: 1685 ml output over the past 24 hrs  I/Os: Net +4.5 L total this admission    MEDICATIONS: reviewed; Noted Propofol at 13.8 ml/hr (364 kcal); IVF of D5W 0.45NS @ 40 ml/hr (960 ml, 48 g dextrose, 163 kcal); Cardiac electrolyte replacement protocol ordered; Levo and Epiniephrine stopped; noted scheduled reglan and senokot; 1.2L intake from IV meds / 24 hrs    furosemide (Lasix) 500 mg in sodium chloride 0.9% 125 mL infusion Stopped (06/22/25 0713)    DOBUTamine 2 mcg/kg/min (06/22/25 0716)    sodium chloride 10 mL/hr at 06/19/25 1653    dexmedetomidine 0.8 mcg/kg/hr (06/22/25 0858)     DOBUTamine 2 mcg/kg/min (06/22/25 0858)    nitroGLYCERIN in dextrose 5%      norepinephrine Stopped (06/22/25 0800)    dextrose 5%-sodium chloride 0.45% 40 mL/hr (06/21/25 1201)    insulin regular 3 Units/hr (06/22/25 0800)    propofol 25 mcg/kg/min (06/22/25 0858)    EPINEPHrine (Adrenalin) 5 mg in sodium chloride 0.9% 250 mL infusion 2 mcg/min (06/20/25 1630)      albumin human  12.5 g Intravenous Q8H    piperacillin-tazobactam  3.375 g Intravenous Q8H    budesonide  0.5 mg Nebulization 2 times daily    ipratropium-albuterol  3 mL Nebulization 4 times per day    sugammadex  2 mg/kg Intravenous Once    sennosides  8.6 mg Oral BID    docusate sodium  100 mg Oral BID    metoclopramide  10 mg Intravenous Q6H    famotidine  20 mg Oral BID    Or    famotidine  20 mg Intravenous BID    metoprolol tartrate  25 mg Oral 2x Daily(Beta Blocker)    mupirocin  1 Application Nasal BID    chlorhexidine gluconate  15 mL Mouth/Throat BID    multivitamin  1 tablet Oral Daily    ascorbic acid  500 mg Oral TID    enoxaparin  40 mg Subcutaneous Daily    clopidogrel  75 mg Oral Daily    aspirin  81 mg Oral Daily    tamsulosin  0.4 mg Oral BID    amiodarone  200 mg Oral TID CC     LABS: reviewed; Recent A1c 8.1 on 6/10/25   Recent Labs     06/20/25  0411 06/20/25  1511 06/20/25  1837 06/21/25  0004 06/21/25  0542 06/21/25  1228 06/21/25  1813 06/22/25  0013 06/22/25  0506   *   < > 164*   < > 121*   < > 107* 118* 157*  157*   BUN 12   < > 11   < > 8*   < > 11 12 12  12   CREATSERUM 0.65*   < > 0.80   < > 0.79   < > 0.75 0.68* 0.73  0.73   CA 8.2*   < > 8.3*   < > 8.1*   < > 8.3* 8.3* 8.3*  8.3*   MG 1.6  --  1.7  --  1.8  --   --   --   --       < > 137   < > 138   < > 137 137 136  136   K 3.6   < > 4.0   < > 3.4*   < > 3.9 3.5 3.6  3.6   *   < > 109   < > 106   < > 106 105 103  103   CO2 26.0   < > 24.0   < > 26.0   < > 25.0 25.0 22.0  22.0   OSMOCALC 295   < > 287   < > 286   < > 284 285 285  285    < > =  values in this interval not displayed.     WEIGHT HISTORY:  Patient Weight(s) for the past 336 hrs:   Weight   06/22/25 0700 98.5 kg (217 lb 2.5 oz)   06/21/25 0500 97.6 kg (215 lb 2.7 oz)   06/20/25 0600 98.4 kg (216 lb 14.9 oz)   06/19/25 0603 92.1 kg (203 lb)   06/10/25 0808 90.7 kg (200 lb)     Wt Readings from Last 10 Encounters:   06/22/25 98.5 kg (217 lb 2.5 oz)   06/18/25 91.5 kg (201 lb 12.8 oz)   06/09/25 90.7 kg (200 lb)   02/18/25 93.4 kg (206 lb)   09/24/24 99.8 kg (220 lb)   04/18/24 99.8 kg (220 lb)   01/19/24 96.6 kg (213 lb)   01/16/24 97.5 kg (215 lb)   08/10/23 96.6 kg (213 lb)   03/16/23 97.1 kg (214 lb)     ANTHROPOMETRICS:  HT: 167.6 cm (5' 6\")  Wt Readings from Last 1 Encounters:   06/22/25 98.5 kg (217 lb 2.5 oz)   Last weight: Likely accurate and wt up 7% (15 lbs) from admission wt with edema present   Dosing Weight: 91.5 kg (202 lbs) - admission weight taken on 6/18/25, utilized for anthropometric calculations  BMI: Body mass index is 32.57 kg/m².  BMI CLASSIFICATION: 30-34.9 kg/m2 - obesity class I  IBW/lbs (Calculated) Male: 142 lbs           142% IBW  Usual Body Wt: 200 lbs       101% UBW    NUTRITION RELATED PHYSICAL FINDINGS:  - Nutrition Focused Physical Exam (NFPE): well nourished and no wasting noted  - Fluid Accumulation: non-pitting Bilateral Upper extremity and Lower extremity and generalized. See RN documentation for details  - Skin Integrity: at risk and surgical wound(s). See RN documentation for details    NUTRITION DIAGNOSIS/PROBLEM:   Inadequate protein energy intake related to Decreased ability to consume sufficient energy as evidenced by orally intubated, sedated, unable to safely take adequate PO, NPO day #3.    NUTRITION INTERVENTION:     NUTRITION PRESCRIPTION:   Estimated Nutrition needs: --dosing wt of 91.5 kg - wt taken on 6/18/25  Calories: 2045 calories/day (PSU 2010 (11.2 L/min, 38C Tmax, calculated on 6/22) or 22 calories per kg Dosing wt)   3833-8862 kcal/day =  70-80% estimated requirement 1st week critical illness  Protein:  g protein/day (1.5-2 g protein/kg Ideal body wt (IBW) (65kg))  Fluid Needs: 1 ml/kcal - adjust for clinical status    - Diet:       Procedures    NPO     - Enteral Nutrition Recommendations:   Promote at 20 ml/hr per OG tube.   Recommend advance rate by 10 ml q 8 hrs to goal rate of 45 ml/hr. Based on average 22 hour infusion time Goal rate provides 990 total TF volume, 990 kcal, 62 grams protein, 832 ml total free water, and 66% RDI's.    Flush with 30 ml H2O q 4 hrs (to provide 180 ml total H2O from FWF daily).  Provide 5 packet(s) Prosource daily (2 qAM, 2qPM, 1 at bedtime) (to provide 200 kcal, 55 g protein and 225 ml daily)  TF with Prosource, H2O flushes and lipid calories from propofol (364 kcal) will provide 1554 kcal (17 kcal/kg), 117 g protein (1.8 g/kg IBW) and 1237 ml fluids total daily.   Meets 76% of estimated energy and 100% of estimated protein needs.       - Nutrition Care Plan: Recommend EN (enteral nutrition) support if unable to take adequate PO safely within 1-2 days  - ONS (Oral Nutrition Supplements)/Meals/Snacks: None at this time   - Vitamin and mineral supplements: multivitamin/mineral and vitamin C  - Feeding assistance: NPO  - Nutrition education: diet education before discharge and not appropriate at this time  - Coordination of nutrition care: collaboration with other providers - discussed with RN on unit  - Discharge and transfer of nutrition care to new setting or provider: monitor plans - from home with spouse    MONITOR AND EVALUATE/NUTRITION GOALS:  - Food and Nutrient Intake:      Monitor: Ability to safely take adequate PO  - Food and Nutrient Administration:      Monitor: enteral nutrition initiation and need for temporary enteral nutrition  - Anthropometric Measurement:    Monitor weight  - Nutrition Goals:      allow wt loss due to fluid losses, labs within acceptable limits, euglycemia, minimize lean body  mass loss, support body systems, and initiate nutrition support via EN for critical illness within the next 24 to 48 hrs    DIETITIAN FOLLOW UP: RD to follow and monitor nutrition status    Sandra Astudillo MS, RD, LDN, [facility]  [id number]         [1]   Past Medical History:   Aortic stenosis    Back problem    BPH (benign prostatic hyperplasia)    Cardiomyopathy (HCC)    Coronary atherosclerosis    Depression    Diabetes (HCC)    Esophageal reflux    Heart valve disease    High blood pressure    High cholesterol    Peripheral vascular disease    Shortness of breath    related to smoking     Visual impairment    glasses

## 2025-06-22 NOTE — PROGRESS NOTES
[facility] - CARDIOLOGY CONSULT NOTE    Jose Alberto Lizama Patient Status:  Inpatient    1965 MRN [id number]   Location [facility] 2W/SW Attending Leonides Pacheco MD   Hosp Day # 3 PCP Fiona Sweeney MD     Date of Admission:  2025  Date of Consult:  2025  I was asked by Leonides Pacheco MD to provide recommendations for evaluation and management of cardiac issues.  Impression:          59-year-old male history of CAD, hypertension, hyperlipidemia, diabetes, severe aortic stenosis.    Recommendations:  1.  History of CAD difficult operation status post CABG x 2 postop day #3  LIMA to LAD and SVG to diagonal.  Severe LV dysfunction  Small RCA and  of the circumflex unable to be bypassed.  Left atrial appendage closure.  Resume aspirin and Plavix   Continue supportive care wean off pressors as tolerated.  Maintaining sinus rhythm postop A-fib protocol.    2.  Hyperlipidemia  Resume rosuvastatin 20 mg daily refused PCSK9 as outpatient.  Latest LDL at goal 43 this admission   3.  Diabetes mellitus  On metformin and Jardiance as outpatient currently on insulin management as per primary team.  4.  Postop hypotension continue supportive care now improving.  Entresto and amlodipine, carvedilol all on hold.  Lasix stopped appears dry on exam.  5.  Severe aortic stenosis status post AVR.  6.  Severe PVD.  Resume aspirin and Plavix as above once extubated.  7.  Postop confusion all sedation will be held appreciate ICU care team brain CT chronic microvascular changes from yesterday psych consult pending suspect heavy alcohol user prior to this started on protocol         Critical care time 31 minutes, will follow.         Overnight remains intubated failed weaning attempts he was very confused and agitated hypertensive.  Telemetry sinus rhythm   Remains intubated and sedated  Drips  Levophed stopped.  Dobutamine 2 mcg         Cardiac output 5.3 L/min,   cardiac index 2.6  CVP 7      Cardiac  tests:    Past Medical History  Past Medical History[1]    Past Surgical History  Past Surgical History[2]    Family History  Family History[3]    Social History  Pediatric History   Patient Parents    Not on file     Other Topics Concern    Not on file   Social History Narrative    Not on file       No smoking or drug use.  Works as a  cleaning offices.    Current Medications:  Current Hospital Medications[4]  Prescriptions Prior to Admission[5]    Allergies  Allergies[6]       Physical Exam:   Blood pressure 92/61, pulse 94, temperature 100 °F (37.8 °C), temperature source Pulmonary Artery, resp. rate 19, height 5' 6\" (1.676 m), weight 217 lb 2.5 oz (98.5 kg), SpO2 96%.    Scheduled Meds: Scheduled Medications[7]      Physical Exam:    General: Intubated and sedated  male no apparent distress. No respiratory or constitutional distress.  HEENT: Normocephalic, anicteric sclera, neck supple  Neck: No JVD, carotids 2+, supple  Cardiac: Regular rate. No pathologic murmur.  Distal sounds  Lungs: Coarse breath sounds   abdomen: Soft, BS-present.  Extremities: Without clubbing, cyanosis.  Peripheral pulsespresent.  Balloon pump in place  Neurologic: Limited exam intubated does not respond to verbal commands  Skin: Warm and dry.     Results:     Laboratory Data:  Lab Results   Component Value Date    WBC 11.7 (H) 06/22/2025    HGB 8.5 (L) 06/22/2025    HCT 27.0 (L) 06/22/2025    .0 (L) 06/22/2025    CREATSERUM 0.73 06/22/2025    CREATSERUM 0.73 06/22/2025    BUN 12 06/22/2025    BUN 12 06/22/2025     06/22/2025     06/22/2025    K 3.6 06/22/2025    K 3.6 06/22/2025     06/22/2025     06/22/2025    CO2 22.0 06/22/2025    CO2 22.0 06/22/2025     (H) 06/22/2025     (H) 06/22/2025    CA 8.3 (L) 06/22/2025    CA 8.3 (L) 06/22/2025    ALB 3.2 06/22/2025    ALKPHO 53 06/22/2025    TP 5.6 (L) 06/22/2025    AST 44 (H) 06/22/2025    ALT 10 06/22/2025    PTT  30.6 06/19/2025    INR 1.17 06/19/2025    PTP 15.6 (H) 06/19/2025    TSH 0.696 06/19/2025    PSA 2.62 08/10/2023    MG 1.8 06/21/2025         Thank you for allowing me to participate in the care of your patient.    David Osullivan MD  [facility]  Interventional Cardiology  6/22/2025         [1]   Past Medical History:   Aortic stenosis    Back problem    BPH (benign prostatic hyperplasia)    Cardiomyopathy (HCC)    Coronary atherosclerosis    Depression    Diabetes (HCC)    Esophageal reflux    Heart valve disease    High blood pressure    High cholesterol    Peripheral vascular disease    Shortness of breath    related to smoking     Visual impairment    glasses   [2]   Past Surgical History:  Procedure Laterality Date    Cardiac catheterization  2016    [facility]    Cath drug eluting stent      Colonoscopy  04/22/2024    Colonoscopy N/A 4/22/2024    Procedure: COLONOSCOPY;  Surgeon: Attila Li MD;  Location: [facility] ENDOSCOPY    Esophagoscopy,diagnostic  04/22/2024    Dr. Li    Hernia surgery      as a child    Nasal scopy,remv part ethmoid  10/14/2020    Nasal scopy,rmv tiss maxill sinus  10/14/2020    Repair of nasal septum  10/14/2020   [3]   Family History  Problem Relation Age of Onset    Diabetes Father     Diabetes Paternal Aunt     Diabetes Paternal Uncle     Seizure Disorder Mother     No Known Problems Brother     No Known Problems Daughter     Glaucoma Neg     Macular degeneration Neg    [4]   Current Facility-Administered Medications   Medication Dose Route Frequency    albumin human (Albumin) 5% injection 12.5 g  12.5 g Intravenous Q8H    piperacillin-tazobactam (Zosyn) 3.375 g in dextrose 5% 100 mL IVPB-ADDV  3.375 g Intravenous Q8H    budesonide (Pulmicort) 0.5 MG/2ML nebulizer suspension 0.5 mg  0.5 mg Nebulization 2 times daily    furosemide (Lasix) 500 mg in sodium chloride 0.9% 125 mL infusion  2.5 mg/hr Intravenous Continuous    DOBUTamine  (Dobutrex) 500 mg in sodium chloride 0.9% 125 mL infusion  2.5 mcg/kg/min (Dosing Weight) Intravenous Continuous    ipratropium-albuterol (Duoneb) 0.5-2.5 (3) MG/3ML inhalation solution 3 mL  3 mL Nebulization 4 times per day    sugammadex (Bridion) 200 MG/2ML injection 180 mg  2 mg/kg Intravenous Once    sodium chloride 0.9% infusion   Intravenous Continuous    melatonin tab 3 mg  3 mg Oral Nightly PRN    sennosides (Senokot) tab 8.6 mg  8.6 mg Oral BID    docusate sodium (Colace) cap 100 mg  100 mg Oral BID    polyethylene glycol (PEG 3350) (Miralax) 17 g oral packet 17 g  17 g Oral Daily PRN    bisacodyl (Dulcolax) 10 MG rectal suppository 10 mg  10 mg Rectal Daily PRN    metoclopramide (Reglan) 5 mg/mL injection 10 mg  10 mg Intravenous Q6H    famotidine (Pepcid) tab 20 mg  20 mg Oral BID    Or    famotidine (Pepcid) 20 mg/2mL injection 20 mg  20 mg Intravenous BID    dexmedeTOMIDine in sodium chloride 0.9% (Precedex) 400 mcg/100mL infusion premix  0.2-1.5 mcg/kg/hr (Dosing Weight) Intravenous Continuous    DOBUTamine in dextrose 5% (Dobutrex) 500 mg/250mL infusion premix  2 mcg/kg/min (Dosing Weight) Intravenous Continuous PRN    nitroGLYCERIN in dextrose 5% 50 mg/250mL infusion premix  5-300 mcg/min Intravenous Continuous PRN    norepinephrine (Levophed) 4 mg/250mL infusion premix  0.5-30 mcg/min Intravenous Continuous PRN    metoprolol tartrate (Lopressor) tab 25 mg  25 mg Oral 2x Daily(Beta Blocker)    potassium chloride 20 mEq/100mL IVPB premix 20 mEq  20 mEq Intravenous PRN    Or    potassium chloride 40 mEq/100mL IVPB premix (central line) 40 mEq  40 mEq Intravenous PRN    magnesium sulfate in dextrose 5% 1 g/100mL infusion premix 1 g  1 g Intravenous PRN    magnesium sulfate in sterile water for injection 2 g/50mL IVPB premix 2 g  2 g Intravenous PRN    mupirocin (Bactroban) 2% nasal ointment 1 Application  1 Application Nasal BID    chlorhexidine gluconate (Peridex) 0.12 % oral solution 15 mL  15 mL  Mouth/Throat BID    multivitamin (Tab-A-Елена/Beta Carotene) tab 1 tablet  1 tablet Oral Daily    ascorbic acid (Vitamin C) tab 500 mg  500 mg Oral TID    dextrose 5%-sodium chloride 0.45% infusion   mL/hr Intravenous Continuous    enoxaparin (Lovenox) 40 MG/0.4ML SUBQ injection 40 mg  40 mg Subcutaneous Daily    acetaminophen (Tylenol) tab 650 mg  650 mg Oral Q4H PRN    Or    HYDROcodone-acetaminophen (Norco) 5-325 MG per tab 1 tablet  1 tablet Oral Q4H PRN    Or    HYDROcodone-acetaminophen (Norco) 5-325 MG per tab 2 tablet  2 tablet Oral Q4H PRN    morphINE PF 2 MG/ML injection 2 mg  2 mg Intravenous Q2H PRN    Or    morphINE PF 4 MG/ML injection 4 mg  4 mg Intravenous Q2H PRN    clopidogrel (Plavix) tab 75 mg  75 mg Oral Daily    aspirin DR tab 81 mg  81 mg Oral Daily    glucose (Dex4) 15 GM/59ML oral liquid 15 g  15 g Oral Q15 Min PRN    Or    glucose (Glutose) 40% oral gel 15 g  15 g Oral Q15 Min PRN    Or    glucose-vitamin C (Dex-4) chewable tab 4 tablet  4 tablet Oral Q15 Min PRN    Or    dextrose 50% injection 50 mL  50 mL Intravenous Q15 Min PRN    Or    glucose (Dex4) 15 GM/59ML oral liquid 30 g  30 g Oral Q15 Min PRN    Or    glucose (Glutose) 40% oral gel 30 g  30 g Oral Q15 Min PRN    Or    glucose-vitamin C (Dex-4) chewable tab 8 tablet  8 tablet Oral Q15 Min PRN    insulin regular human (Novolin R, Humulin R) 100 Units in sodium chloride 0.9% 100 mL standard infusion (100 mL)  1-28 Units/hr Intravenous Continuous    tamsulosin (Flomax) cap 0.4 mg  0.4 mg Oral BID    propofol (Diprivan) 10 mg/mL infusion premix  5-50 mcg/kg/min (Dosing Weight) Intravenous Continuous    amiodarone (Pacerone) tab 200 mg  200 mg Oral TID CC    EPINEPHrine (Adrenalin) 5 mg in sodium chloride 0.9% 250 mL infusion  2 mcg/min Intravenous Continuous   [5]   Medications Prior to Admission   Medication Sig    ENTRESTO 24-26 MG Oral Tab Entresto 24 mg-26 mg tablet, [RxNorm: 8577262]    magnesium 250 MG Oral Tab Take 1  tablet (250 mg total) by mouth every other day.    cyanocobalamin 500 MCG Oral Tab Take 1 tablet (500 mcg total) by mouth every other day.    metFORMIN HCl 1000 MG Oral Tab Take 1 tablet (1,000 mg total) by mouth 2 (two) times daily with meals.    amLODIPine 5 MG Oral Tab Take 1 tablet (5 mg total) by mouth daily.    carvedilol 25 MG Oral Tab Take 1 tablet (25 mg total) by mouth 2 (two) times daily with meals.    clopidogrel 75 MG Oral Tab Take 1 tablet (75 mg total) by mouth daily.    empagliflozin (JARDIANCE) 25 MG Oral Tab Take 1 tablet (25 mg total) by mouth daily.    furosemide 40 MG Oral Tab Take 1 tablet (40 mg total) by mouth daily.    rosuvastatin 20 MG Oral Tab Take 1 tablet (20 mg total) by mouth nightly.    tamsulosin 0.4 MG Oral Cap TAKE 2 CAPSULES BY MOUTH DAILY (Patient taking differently: Take 1 capsule (0.4 mg total) by mouth in the morning and 1 capsule (0.4 mg total) before bedtime.)    aspirin 81 MG Oral Tab EC Take 1 tablet (81 mg total) by mouth nightly.    Thiamine HCl 250 MG Oral Tab Take 1 tablet (250 mg total) by mouth every other day.    pyridoxine 100 MG Oral Tab Take 1 tablet (100 mg total) by mouth every other day.    Glucose Blood (CONTOUR NEXT TEST) In Vitro Strip To test 2 x daily  Dx E11.9    Glucose Blood (CONTOUR NEXT TEST) In Vitro Strip To check glucose bid   Dx  E11.9    Glucose Blood (LORAINE CONTOUR TEST) In Vitro Strip To test daily    LORAINE CONTOUR NEXT TEST In Vitro Strip To test 2 x daily   [6] No Known Allergies  [7]    albumin human  12.5 g Intravenous Q8H    piperacillin-tazobactam  3.375 g Intravenous Q8H    budesonide  0.5 mg Nebulization 2 times daily    ipratropium-albuterol  3 mL Nebulization 4 times per day    sugammadex  2 mg/kg Intravenous Once    sennosides  8.6 mg Oral BID    docusate sodium  100 mg Oral BID    metoclopramide  10 mg Intravenous Q6H    famotidine  20 mg Oral BID    Or    famotidine  20 mg Intravenous BID    metoprolol tartrate  25 mg Oral 2x  Daily(Beta Blocker)    mupirocin  1 Application Nasal BID    chlorhexidine gluconate  15 mL Mouth/Throat BID    multivitamin  1 tablet Oral Daily    ascorbic acid  500 mg Oral TID    enoxaparin  40 mg Subcutaneous Daily    clopidogrel  75 mg Oral Daily    aspirin  81 mg Oral Daily    tamsulosin  0.4 mg Oral BID    amiodarone  200 mg Oral TID CC

## 2025-06-22 NOTE — PLAN OF CARE
Patient intubated and sedated. Turned Q2. SAT/SBT performed today. Failed for high RR and resedated. Patient continues to require pressor support and lasix gtt continued. Mckeon draining clean yellow urine. Patient restrained for safety, wife educated suing interpretor services. Wife also updated on care using interpretor  .   Problem: CARDIOVASCULAR - ADULT  Goal: Absence of cardiac arrhythmias or at baseline  Description: INTERVENTIONS:  - Continuous cardiac monitoring, monitor vital signs, obtain 12 lead EKG if indicated  - Evaluate effectiveness of antiarrhythmic and heart rate control medications as ordered  - Initiate emergency measures for life threatening arrhythmias  - Monitor electrolytes and administer replacement therapy as ordered  Outcome: Progressing     Problem: GASTROINTESTINAL - ADULT  Goal: Minimal or absence of nausea and vomiting  Description: INTERVENTIONS:  - Maintain adequate hydration with IV or PO as ordered and tolerated  - Nasogastric tube to low intermittent suction as ordered  - Evaluate effectiveness of ordered antiemetic medications  - Provide nonpharmacologic comfort measures as appropriate  - Advance diet as tolerated, if ordered  - Obtain nutritional consult as needed  - Evaluate fluid balance  Outcome: Progressing     Problem: GENITOURINARY - ADULT  Goal: Absence of urinary retention  Description: INTERVENTIONS:  - Assess patient’s ability to void and empty bladder  - Monitor intake/output and perform bladder scan as needed  - Follow urinary retention protocol/standard of care  - Consider collaborating with pharmacy to review patient's medication profile  - Implement strategies to promote bladder emptying  Outcome: Progressing     Problem: CARDIOVASCULAR - ADULT  Goal: Maintains optimal cardiac output and hemodynamic stability  Description: INTERVENTIONS:  - Monitor vital signs, rhythm, and trends  - Monitor for bleeding, hypotension and signs of decreased cardiac output  -  Evaluate effectiveness of vasoactive medications to optimize hemodynamic stability  - Monitor arterial and/or venous puncture sites for bleeding and/or hematoma  - Assess quality of pulses, skin color and temperature  - Assess for signs of decreased coronary artery perfusion - ex. Angina  - Evaluate fluid balance, assess for edema, trend weights  Outcome: Not Progressing     Problem: RESPIRATORY - ADULT  Goal: Achieves optimal ventilation and oxygenation  Description: INTERVENTIONS:  - Assess for changes in respiratory status  - Assess for changes in mentation and behavior  - Position to facilitate oxygenation and minimize respiratory effort  - Oxygen supplementation based on oxygen saturation or ABGs  - Provide Smoking Cessation handout, if applicable  - Encourage broncho-pulmonary hygiene including cough, deep breathe, Incentive Spirometry  - Assess the need for suctioning and perform as needed  - Assess and instruct to report SOB or any respiratory difficulty  - Respiratory Therapy support as indicated  - Manage/alleviate anxiety  - Monitor for signs/symptoms of CO2 retention  Outcome: Not Progressing     Problem: GASTROINTESTINAL - ADULT  Goal: Maintains or returns to baseline bowel function  Description: INTERVENTIONS:  - Assess bowel function  - Maintain adequate hydration with IV or PO as ordered and tolerated  - Evaluate effectiveness of GI medications  - Encourage mobilization and activity  - Obtain nutritional consult as needed  - Establish a toileting routine/schedule  - Consider collaborating with pharmacy to review patient's medication profile  Outcome: Not Progressing     Problem: METABOLIC/FLUID AND ELECTROLYTES - ADULT  Goal: Glucose maintained within prescribed range  Description: INTERVENTIONS:  - Monitor Blood Glucose as ordered  - Assess for signs and symptoms of hyperglycemia and hypoglycemia  - Administer ordered medications to maintain glucose within target range  - Assess barriers to adequate  nutritional intake and initiate nutrition consult as needed  - Instruct patient on self management of diabetes  Outcome: Not Progressing  Goal: Electrolytes maintained within normal limits  Description: INTERVENTIONS:  - Monitor labs and rhythm and assess patient for signs and symptoms of electrolyte imbalances  - Administer electrolyte replacement as ordered  - Monitor response to electrolyte replacements, including rhythm and repeat lab results as appropriate  - Fluid restriction as ordered  - Instruct patient on fluid and nutrition restrictions as appropriate  Outcome: Not Progressing     Problem: NEUROLOGICAL - ADULT  Goal: Achieves stable or improved neurological status  Description: INTERVENTIONS  - Assess for and report changes in neurological status  - Initiate measures to prevent increased intracranial pressure  - Maintain blood pressure and fluid volume within ordered parameters to optimize cerebral perfusion and minimize risk of hemorrhage  - Monitor temperature, glucose, and sodium. Initiate appropriate interventions as ordered  Outcome: Not Progressing     Problem: CARDIOVASCULAR - ADULT  Goal: Absence of cardiac arrhythmias or at baseline  Description: INTERVENTIONS:  - Continuous cardiac monitoring, monitor vital signs, obtain 12 lead EKG if indicated  - Evaluate effectiveness of antiarrhythmic and heart rate control medications as ordered  - Initiate emergency measures for life threatening arrhythmias  - Monitor electrolytes and administer replacement therapy as ordered  Outcome: Progressing     Problem: GASTROINTESTINAL - ADULT  Goal: Minimal or absence of nausea and vomiting  Description: INTERVENTIONS:  - Maintain adequate hydration with IV or PO as ordered and tolerated  - Nasogastric tube to low intermittent suction as ordered  - Evaluate effectiveness of ordered antiemetic medications  - Provide nonpharmacologic comfort measures as appropriate  - Advance diet as tolerated, if ordered  - Obtain  nutritional consult as needed  - Evaluate fluid balance  Outcome: Progressing     Problem: GENITOURINARY - ADULT  Goal: Absence of urinary retention  Description: INTERVENTIONS:  - Assess patient’s ability to void and empty bladder  - Monitor intake/output and perform bladder scan as needed  - Follow urinary retention protocol/standard of care  - Consider collaborating with pharmacy to review patient's medication profile  - Implement strategies to promote bladder emptying  Outcome: Progressing     Problem: Safety Risk - Non-Violent Restraints  Goal: Patient will remain free from self-harm  Description: INTERVENTIONS:  - Apply the least restrictive restraint to prevent harm  - Notify patient and family of reasons restraints applied  - Assess for any contributing factors to confusion (electrolyte disturbances, delirium, medications)  - Discontinue any unnecessary medical devices as soon as possible  - Assess the patient's physical comfort, circulation, skin condition, hydration, nutrition and elimination needs   - Reorient and redirection as needed  - Assess for the need to continue restraints  Outcome: Progressing     Problem: CARDIOVASCULAR - ADULT  Goal: Maintains optimal cardiac output and hemodynamic stability  Description: INTERVENTIONS:  - Monitor vital signs, rhythm, and trends  - Monitor for bleeding, hypotension and signs of decreased cardiac output  - Evaluate effectiveness of vasoactive medications to optimize hemodynamic stability  - Monitor arterial and/or venous puncture sites for bleeding and/or hematoma  - Assess quality of pulses, skin color and temperature  - Assess for signs of decreased coronary artery perfusion - ex. Angina  - Evaluate fluid balance, assess for edema, trend weights  Outcome: Not Progressing     Problem: RESPIRATORY - ADULT  Goal: Achieves optimal ventilation and oxygenation  Description: INTERVENTIONS:  - Assess for changes in respiratory status  - Assess for changes in mentation  and behavior  - Position to facilitate oxygenation and minimize respiratory effort  - Oxygen supplementation based on oxygen saturation or ABGs  - Provide Smoking Cessation handout, if applicable  - Encourage broncho-pulmonary hygiene including cough, deep breathe, Incentive Spirometry  - Assess the need for suctioning and perform as needed  - Assess and instruct to report SOB or any respiratory difficulty  - Respiratory Therapy support as indicated  - Manage/alleviate anxiety  - Monitor for signs/symptoms of CO2 retention  Outcome: Not Progressing     Problem: GASTROINTESTINAL - ADULT  Goal: Maintains or returns to baseline bowel function  Description: INTERVENTIONS:  - Assess bowel function  - Maintain adequate hydration with IV or PO as ordered and tolerated  - Evaluate effectiveness of GI medications  - Encourage mobilization and activity  - Obtain nutritional consult as needed  - Establish a toileting routine/schedule  - Consider collaborating with pharmacy to review patient's medication profile  Outcome: Not Progressing     Problem: METABOLIC/FLUID AND ELECTROLYTES - ADULT  Goal: Glucose maintained within prescribed range  Description: INTERVENTIONS:  - Monitor Blood Glucose as ordered  - Assess for signs and symptoms of hyperglycemia and hypoglycemia  - Administer ordered medications to maintain glucose within target range  - Assess barriers to adequate nutritional intake and initiate nutrition consult as needed  - Instruct patient on self management of diabetes  Outcome: Not Progressing  Goal: Electrolytes maintained within normal limits  Description: INTERVENTIONS:  - Monitor labs and rhythm and assess patient for signs and symptoms of electrolyte imbalances  - Administer electrolyte replacement as ordered  - Monitor response to electrolyte replacements, including rhythm and repeat lab results as appropriate  - Fluid restriction as ordered  - Instruct patient on fluid and nutrition restrictions as  appropriate  Outcome: Not Progressing     Problem: NEUROLOGICAL - ADULT  Goal: Achieves stable or improved neurological status  Description: INTERVENTIONS  - Assess for and report changes in neurological status  - Initiate measures to prevent increased intracranial pressure  - Maintain blood pressure and fluid volume within ordered parameters to optimize cerebral perfusion and minimize risk of hemorrhage  - Monitor temperature, glucose, and sodium. Initiate appropriate interventions as ordered  Outcome: Not Progressing

## 2025-06-23 ENCOUNTER — APPOINTMENT (OUTPATIENT)
Dept: CV DIAGNOSTICS | Facility: HOSPITAL | Age: 60
DRG: 219 | End: 2025-06-23
Attending: NURSE PRACTITIONER
Payer: MEDICAID

## 2025-06-23 ENCOUNTER — APPOINTMENT (OUTPATIENT)
Dept: GENERAL RADIOLOGY | Facility: HOSPITAL | Age: 60
DRG: 219 | End: 2025-06-23
Attending: NURSE PRACTITIONER
Payer: MEDICAID

## 2025-06-23 LAB
ANION GAP SERPL CALC-SCNC: 5 MMOL/L (ref 0–18)
ANION GAP SERPL CALC-SCNC: 6 MMOL/L (ref 0–18)
ANTIBODY SCREEN: NEGATIVE
BASE EXCESS BLD CALC-SCNC: 3.4 MMOL/L (ref ?–2)
BASE EXCESS BLD CALC-SCNC: 3.9 MMOL/L (ref ?–2)
BLOOD TYPE BARCODE: 5100
BUN BLD-MCNC: 13 MG/DL (ref 9–23)
BUN BLD-MCNC: 14 MG/DL (ref 9–23)
BUN/CREAT SERPL: 19.4 (ref 10–20)
BUN/CREAT SERPL: 19.7 (ref 10–20)
CA-I BLD-SCNC: 1.04 MMOL/L (ref 0.95–1.32)
CALCIUM BLD-MCNC: 8.2 MG/DL (ref 8.7–10.4)
CALCIUM BLD-MCNC: 8.4 MG/DL (ref 8.7–10.4)
CHLORIDE SERPL-SCNC: 107 MMOL/L (ref 98–112)
CHLORIDE SERPL-SCNC: 108 MMOL/L (ref 98–112)
CO2 SERPL-SCNC: 25 MMOL/L (ref 21–32)
CO2 SERPL-SCNC: 26 MMOL/L (ref 21–32)
COHGB MFR BLD: 1.5 % (ref 0–3)
CREAT BLD-MCNC: 0.66 MG/DL (ref 0.7–1.3)
CREAT BLD-MCNC: 0.72 MG/DL (ref 0.7–1.3)
DEPRECATED RDW RBC AUTO: 46.9 FL (ref 35.1–46.3)
EGFRCR SERPLBLD CKD-EPI 2021: 104 ML/MIN/1.73M2 (ref 60–?)
EGFRCR SERPLBLD CKD-EPI 2021: 107 ML/MIN/1.73M2 (ref 60–?)
ERYTHROCYTE [DISTWIDTH] IN BLOOD BY AUTOMATED COUNT: 15.9 % (ref 11–15)
ERYTHROCYTE [SEDIMENTATION RATE] IN BLOOD: 123 MM/HR (ref 0–20)
GLUCOSE BLD-MCNC: 105 MG/DL (ref 70–99)
GLUCOSE BLD-MCNC: 119 MG/DL (ref 70–99)
GLUCOSE BLDC GLUCOMTR-MCNC: 102 MG/DL (ref 70–99)
GLUCOSE BLDC GLUCOMTR-MCNC: 105 MG/DL (ref 70–99)
GLUCOSE BLDC GLUCOMTR-MCNC: 113 MG/DL (ref 70–99)
GLUCOSE BLDC GLUCOMTR-MCNC: 113 MG/DL (ref 70–99)
GLUCOSE BLDC GLUCOMTR-MCNC: 120 MG/DL (ref 70–99)
GLUCOSE BLDC GLUCOMTR-MCNC: 121 MG/DL (ref 70–99)
GLUCOSE BLDC GLUCOMTR-MCNC: 121 MG/DL (ref 70–99)
GLUCOSE BLDC GLUCOMTR-MCNC: 122 MG/DL (ref 70–99)
GLUCOSE BLDC GLUCOMTR-MCNC: 123 MG/DL (ref 70–99)
GLUCOSE BLDC GLUCOMTR-MCNC: 123 MG/DL (ref 70–99)
GLUCOSE BLDC GLUCOMTR-MCNC: 125 MG/DL (ref 70–99)
GLUCOSE BLDC GLUCOMTR-MCNC: 125 MG/DL (ref 70–99)
GLUCOSE BLDC GLUCOMTR-MCNC: 132 MG/DL (ref 70–99)
GLUCOSE BLDC GLUCOMTR-MCNC: 134 MG/DL (ref 70–99)
GLUCOSE BLDC GLUCOMTR-MCNC: 135 MG/DL (ref 70–99)
GLUCOSE BLDC GLUCOMTR-MCNC: 139 MG/DL (ref 70–99)
GLUCOSE BLDC GLUCOMTR-MCNC: 141 MG/DL (ref 70–99)
GLUCOSE BLDC GLUCOMTR-MCNC: 145 MG/DL (ref 70–99)
GLUCOSE BLDC GLUCOMTR-MCNC: 153 MG/DL (ref 70–99)
GLUCOSE BLDC GLUCOMTR-MCNC: 91 MG/DL (ref 70–99)
HCO3 BLDA-SCNC: 28 MEQ/L (ref 21–27)
HCO3 BLDV-SCNC: 26.3 MEQ/L (ref 22–26)
HCT VFR BLD AUTO: 22.9 % (ref 39–53)
HGB BLD-MCNC: 7.3 G/DL (ref 13–17.5)
HGB BLD-MCNC: 8.4 G/DL (ref 13–17.5)
LACTATE BLD-SCNC: 1 MMOL/L (ref 0.5–2)
MAGNESIUM SERPL-MCNC: 2.1 MG/DL (ref 1.6–2.6)
MAGNESIUM SERPL-MCNC: 2.1 MG/DL (ref 1.6–2.6)
MCH RBC QN AUTO: 25.4 PG (ref 26–34)
MCHC RBC AUTO-ENTMCNC: 31.9 G/DL (ref 31–37)
MCV RBC AUTO: 79.8 FL (ref 80–100)
METHGB MFR BLD: 0.5 % SAT (ref 0.4–1.5)
O2 CT BLD-SCNC: 11.4 VOL% (ref 15–23)
O2/TOTAL GAS SETTING VFR VENT: 30 %
O2/TOTAL GAS SETTING VFR VENT: 30 %
OSMOLALITY SERPL CALC.SUM OF ELEC: 287 MOSM/KG (ref 275–295)
OSMOLALITY SERPL CALC.SUM OF ELEC: 289 MOSM/KG (ref 275–295)
PCO2 BLDA: 37 MM HG (ref 35–45)
PCO2 BLDV: 44 MM HG (ref 38–50)
PEEP SETTING VENT: 5 CM H2O
PH BLDA: 7.48 [PH] (ref 7.35–7.45)
PH BLDV: 7.42 [PH] (ref 7.32–7.43)
PLATELET # BLD AUTO: 145 10(3)UL (ref 150–450)
PO2 BLDA: 76 MM HG (ref 80–100)
PO2 BLDV: 26 MM HG (ref 35–40)
POTASSIUM BLD-SCNC: 3.5 MMOL/L (ref 3.6–5.1)
POTASSIUM SERPL-SCNC: 3.5 MMOL/L (ref 3.5–5.1)
POTASSIUM SERPL-SCNC: 3.7 MMOL/L (ref 3.5–5.1)
PUNCTURE CHARGE: NO
PUNCTURE CHARGE: NO
RBC # BLD AUTO: 2.87 X10(6)UL (ref 4.3–5.7)
RESP RATE: 12 BPM
RESP RATE: 12 BPM
RH BLOOD TYPE: POSITIVE
SAO2 % BLDA: 97 % (ref 94–100)
SAO2 % BLDV: 51.4 % (ref 60–85)
SODIUM BLD-SCNC: 133 MMOL/L (ref 135–145)
SODIUM SERPL-SCNC: 138 MMOL/L (ref 136–145)
SODIUM SERPL-SCNC: 139 MMOL/L (ref 136–145)
SPECIMEN VOL 24H UR: 450 ML
SPECIMEN VOL 24H UR: 450 ML
UNIT VOLUME: 350 ML
WBC # BLD AUTO: 6.6 X10(3) UL (ref 4–11)

## 2025-06-23 PROCEDURE — 99233 SBSQ HOSP IP/OBS HIGH 50: CPT | Performed by: HOSPITALIST

## 2025-06-23 PROCEDURE — 93306 TTE W/DOPPLER COMPLETE: CPT | Performed by: NURSE PRACTITIONER

## 2025-06-23 PROCEDURE — 99291 CRITICAL CARE FIRST HOUR: CPT | Performed by: INTERNAL MEDICINE

## 2025-06-23 PROCEDURE — 99233 SBSQ HOSP IP/OBS HIGH 50: CPT | Performed by: OTHER

## 2025-06-23 PROCEDURE — 71045 X-RAY EXAM CHEST 1 VIEW: CPT | Performed by: NURSE PRACTITIONER

## 2025-06-23 RX ORDER — FUROSEMIDE 10 MG/ML
20 INJECTION INTRAMUSCULAR; INTRAVENOUS ONCE
Status: DISCONTINUED | OUTPATIENT
Start: 2025-06-23 | End: 2025-06-23

## 2025-06-23 RX ORDER — HYDRALAZINE HYDROCHLORIDE 20 MG/ML
10 INJECTION INTRAMUSCULAR; INTRAVENOUS ONCE
Status: COMPLETED | OUTPATIENT
Start: 2025-06-23 | End: 2025-06-23

## 2025-06-23 RX ORDER — IPRATROPIUM BROMIDE AND ALBUTEROL SULFATE 2.5; .5 MG/3ML; MG/3ML
3 SOLUTION RESPIRATORY (INHALATION) EVERY 4 HOURS PRN
Status: DISCONTINUED | OUTPATIENT
Start: 2025-06-23 | End: 2025-07-03

## 2025-06-23 RX ORDER — ACETAMINOPHEN 10 MG/ML
1000 INJECTION, SOLUTION INTRAVENOUS EVERY 6 HOURS
Status: DISCONTINUED | OUTPATIENT
Start: 2025-06-23 | End: 2025-06-29

## 2025-06-23 RX ORDER — FUROSEMIDE 10 MG/ML
INJECTION INTRAMUSCULAR; INTRAVENOUS
Status: COMPLETED
Start: 2025-06-23 | End: 2025-06-23

## 2025-06-23 RX ORDER — FUROSEMIDE 10 MG/ML
20 INJECTION INTRAMUSCULAR; INTRAVENOUS
Status: DISCONTINUED | OUTPATIENT
Start: 2025-06-23 | End: 2025-06-25

## 2025-06-23 RX ORDER — SODIUM CHLORIDE 9 MG/ML
INJECTION, SOLUTION INTRAVENOUS ONCE
Status: COMPLETED | OUTPATIENT
Start: 2025-06-23 | End: 2025-06-23

## 2025-06-23 RX ORDER — IPRATROPIUM BROMIDE AND ALBUTEROL SULFATE 2.5; .5 MG/3ML; MG/3ML
3 SOLUTION RESPIRATORY (INHALATION) 3 TIMES DAILY
Status: DISCONTINUED | OUTPATIENT
Start: 2025-06-23 | End: 2025-06-28

## 2025-06-23 RX ORDER — FUROSEMIDE 10 MG/ML
20 INJECTION INTRAMUSCULAR; INTRAVENOUS ONCE
Status: COMPLETED | OUTPATIENT
Start: 2025-06-23 | End: 2025-06-23

## 2025-06-23 NOTE — PLAN OF CARE
Problem: Safety Risk - Non-Violent Restraints  Goal: Patient will remain free from self-harm  Description: INTERVENTIONS:  - Apply the least restrictive restraint to prevent harm  - Notify patient and family of reasons restraints applied  - Assess for any contributing factors to confusion (electrolyte disturbances, delirium, medications)  - Discontinue any unnecessary medical devices as soon as possible  - Assess the patient's physical comfort, circulation, skin condition, hydration, nutrition and elimination needs   - Reorient and redirection as needed  - Assess for the need to continue restraints  6/23/2025 1615 by Karson Park, RN  Outcome: Progressing  6/23/2025 1614 by Karson Park, RN  Outcome: Progressing

## 2025-06-23 NOTE — PROGRESS NOTES
[facility]  part of [facility]    Progress Note    Jose Alberto Lizama Patient Status:  Inpatient    1965 MRN [id number]   Location [facility] 2W/SW Attending Erin Richardson MD   Hosp Day # 3 PCP Fiona Sweeney MD     Chief complaint: post op  Subjective:     Intubated, sedated  Did not tolerate SBT this morning  Mucus plugs  Urine output better after lasix    D/w wife and dtr at bedside    Objective:   Blood pressure 114/63, pulse 76, temperature 98.8 °F (37.1 °C), resp. rate 24, height 5' 6\" (1.676 m), weight 217 lb 2.5 oz (98.5 kg), SpO2 100%.    HEENT: conjunctivae/corneas clear.    Neck: no adenopathy, no carotid bruit, supple  Pulmonary:  course to auscultation bilaterally  Cardiovascular: S1, S2 normal, no murmur, click, rub or gallop, regular rate and rhythm  Abdominal: soft, non-tender; bowel sounds normal; no masses,  no organomegaly  Extremities: no cyanosis or edema  Pulses: palpable and symmetric  Skin: Warm and dry  Neurologic: Deferred  Psychiatric: Deferred    Assessment and Plan:     Multi-vessel coronary artery disease, h/o prior stents  Ischemic cardiomyopathy, prior EF 30%  PAD  Severe aortic stenosis  Now s/p  coronary artery bypass graft surgery x2 utilizing left internal mammary artery to left anterior descending, saphenous vein graft to the diagonal artery; right leg endoscopic greater saphenous vein graft harvest; left leg endoscopic exploration; aortic valve replacement with Inspiris tissue valve, size 21 mm; left atrial appendage clip, size 40; intraoperative transesophageal echocardiogram; insertion of temporary ventricular pacing wire; sternal plating; and insertion of left femoral intra-aortic balloon pump.   - Complex surgery  - rough recovery, still on vent support  - per family, H/o ETOH but mostly on weekends, doubt withdrawal  -h/o smoking and possible ORAL contributing   (Per wife, snoring got better after nose surgery in the past)  -some  mucous plugs cleared today  -CT brain without acute changes  -per cardiology and CV surgery  -Pulmonary/critical care  -IV pressors support.     DM  -At home on metformin and Jardiance  - Currently insulin drip. Close monitoring of glucose.     HL  BPH  - Currently with Mckeon    Dispo: ICU    Updated family at bedside    Supplementary Documentation:   DVT Mechanical Prophylaxis:   SCDs,    DVT Pharmacologic Prophylaxis   Medication    enoxaparin (Lovenox) 40 MG/0.4ML SUBQ injection 40 mg         DVT Pharmacologic prophylaxis: Aspirin 81 mg      Code Status: Full Code  Mckeon: Mckeon catheter in place  Mckeon Duration (in days): 2  Central line:    VIDAL:                         Chart reviewed, including current vitals, notes, labs and imaging  Pertinent past medical records reviewed  Labs ordered and medications adjusted as outlined above  Home medications reconciliation completed  Coordinate care with care team/consultants     D/w RN     KIET high  severe acute illness/or exacerbation of chronic illness posing a threat to life, IV medications, requiring close monitoring in hospital.       Results:     Lab Results   Component Value Date    WBC 11.7 (H) 06/22/2025    HGB 8.5 (L) 06/22/2025    HCT 27.0 (L) 06/22/2025    .0 (L) 06/22/2025    CREATSERUM 0.63 (L) 06/22/2025    BUN 12 06/22/2025     06/22/2025    K 3.9 06/22/2025     06/22/2025    CO2 26.0 06/22/2025     (H) 06/22/2025    CA 8.4 (L) 06/22/2025    ALB 3.2 06/22/2025    ALKPHO 53 06/22/2025    BILT 0.5 06/22/2025    TP 5.6 (L) 06/22/2025    AST 44 (H) 06/22/2025    ALT 10 06/22/2025    PTT 30.6 06/19/2025    INR 1.17 06/19/2025    TSH 0.696 06/19/2025    PSA 2.62 08/10/2023    MG 1.8 06/21/2025       XR CHEST AP PORTABLE  (CPT=71045)  Result Date: 6/22/2025  CONCLUSION:   Mild improved vascular congestion.  Mild improved aeration of the right lower lung.  Stable opacity in the left lung base which may reflect combination of pleural  effusion and parenchymal opacities.  Support devices not significantly changed.    Dictated by (CST): Antonio Hitchcock MD on 6/22/2025 at 7:35 AM     Finalized by (CST): Antonio Hitchcock MD on 6/22/2025 at 7:37 AM          XR CHEST AP PORTABLE  (CPT=71045)  Result Date: 6/21/2025  PROCEDURE: XR CHEST AP PORTABLE  (CPT=71045) TIME: 5:36 a.m..   COMPARISON: [facility], XR CHEST AP PORTABLE (CPT=71045), 6/20/2025, 6:08 AM.  INDICATIONS: Shortness of breath.  TECHNIQUE:   Single view.   FINDINGS/IMPRESSION:   1. There is an endotracheal tube with tip approximately 4.7 cm above the fred.  There is an enteric feeding tube in expected configuration.  There is a right IJ [place]-Mike catheter with tip projecting over the proximal main pulmonary outflow tract.  There are mediastinal drain/left-sided chest tube in unchanged position.  There is no pneumothorax.  2. The heart mediastinal structures are minimally enlarged.  Pulmonary vascularity is slightly increased.  The findings are compatible with moderate fluid overload.  3. There are increased densities at the lung bases which could represent atelectasis, infiltrate, or a combination in addition to small pleural effusions.     Dictated by (CST): Eligio Andrade MD on 6/21/2025 at 11:36 AM     Finalized by (CST): Eligio Andrade MD on 6/21/2025 at 11:37 AM                  Note: This chart was prepared using voice recognition software and may contain unintended word substitution errors.     VERONA JIMENEZ MD  6/22/2025

## 2025-06-23 NOTE — RESPIRATORY THERAPY NOTE
Patient received intubated and on ventilator 12/450/+5/30%. Bilateral breath sounds auscultated. Suction provided when indicated. No acute events during the daytime.         SBT for 16 minutes today.    Started PS 15/ CPAP 5, weaned to 5/5.  Not following commands well enough for weaning parameters to be observed.    RSBI 111    Patient placed back on full support after 16 min when RN observed tachycardia.     Today's SBT was tolerated for a longer duration than previous trials.    RT will continue to monitor.

## 2025-06-23 NOTE — PROGRESS NOTES
[facility]  part of [facility]    Progress Note    Jose Alberto Lizama Patient Status:  Inpatient    1965 MRN [id number]   Location [facility] 2W/SW Attending Erin Richardson MD   Hosp Day # 4 PCP Fiona Sweeney MD     Chief complaint: post op  Subjective:     Intubated, sedated  Did not tolerate SBT again this morning, however per notes more coherent  Mucus plugs  Urine output better after lasix  Abd distended, still quiet   S/p PRBC x 1 this morning   ECHO pending     No family at bedside, D/w wife and dtr at bedside yesterday    Objective:   Blood pressure 92/57, pulse 73, temperature (!) 100.6 °F (38.1 °C), temperature source Pulmonary Artery, resp. rate 21, height 5' 6\" (1.676 m), weight 215 lb 9.8 oz (97.8 kg), SpO2 98%.    HEENT: conjunctivae/corneas clear.    Neck: no adenopathy, no carotid bruit, supple  Pulmonary:  course to auscultation bilaterally  Cardiovascular: S1, S2 normal, no murmur, click, rub or gallop, regular rate and rhythm  Abdominal: soft, non-tender; bowel sounds normal; no masses,  no organomegaly  Extremities: no cyanosis or edema  Pulses: palpable and symmetric  Skin: Warm and dry  Neurologic: Deferred  Psychiatric: Deferred    Assessment and Plan:     Multi-vessel coronary artery disease, h/o prior stents  Ischemic cardiomyopathy, prior EF 30%  PAD  Severe aortic stenosis  Now s/p  coronary artery bypass graft surgery x2 utilizing left internal mammary artery to left anterior descending, saphenous vein graft to the diagonal artery; right leg endoscopic greater saphenous vein graft harvest; left leg endoscopic exploration; aortic valve replacement with Inspiris tissue valve, size 21 mm; left atrial appendage clip, size 40; intraoperative transesophageal echocardiogram; insertion of temporary ventricular pacing wire; sternal plating; and insertion of left femoral intra-aortic balloon pump.   - Complex long surgery  - rough recovery, still on vent  support  - per family, H/o ETOH but mostly on weekends, doubt withdrawal  -h/o smoking and possible ORAL contributing   (Per wife, snoring got better after nose surgery in the past)  -some mucous plugs cleared   -CT brain without acute changes  -per cardiology and CV surgery  -Pulmonary/critical care  -IV pressors support, gradually weaning off  -possible PTS with very high ESR and low grade temps  -ECHO pending  -s/p PRBC x 1 earlier today    DM  -At home on metformin and Jardiance  - Currently insulin drip. Close monitoring of glucose.     HL  BPH  - Currently with Mckeon    Dispo: ICU         Supplementary Documentation:   DVT Mechanical Prophylaxis:   SCDs,    DVT Pharmacologic Prophylaxis   Medication    enoxaparin (Lovenox) 40 MG/0.4ML SUBQ injection 40 mg         DVT Pharmacologic prophylaxis: Aspirin 81 mg      Code Status: Full Code  Mckeon: Mckeon catheter in place  Mckeon Duration (in days): 2  Central line:    VIDAL:                         Chart reviewed, including current vitals, notes, labs and imaging  Pertinent past medical records reviewed  Labs ordered and medications adjusted as outlined above  Home medications reconciliation completed  Coordinate care with care team/consultants     D/w RN     [facility]  severe acute illness/or exacerbation of chronic illness posing a threat to life, IV medications, requiring close monitoring in hospital.       Results:     Lab Results   Component Value Date    WBC 6.6 06/23/2025    HGB 7.3 (L) 06/23/2025    HCT 22.9 (L) 06/23/2025    .0 (L) 06/23/2025    CREATSERUM 0.66 (L) 06/23/2025    BUN 13 06/23/2025     06/23/2025    K 3.5 06/23/2025     06/23/2025    CO2 26.0 06/23/2025     (H) 06/23/2025    CA 8.4 (L) 06/23/2025    ALB 3.2 06/22/2025    ALKPHO 53 06/22/2025    BILT 0.5 06/22/2025    TP 5.6 (L) 06/22/2025    AST 44 (H) 06/22/2025    ALT 10 06/22/2025    PTT 30.6 06/19/2025    INR 1.17 06/19/2025    TSH 0.696 06/19/2025    PSA 2.62  08/10/2023    ESRML 123 (H) 06/23/2025    MG 2.1 06/23/2025       XR CHEST AP PORTABLE  (CPT=71045)  Result Date: 6/23/2025  CONCLUSION:   Increasing bilateral pleural effusions and pulmonary edema.  Right medial and left basilar chest tubes are again seen.  Recommend correlation for malfunction.  Endotracheal tube, esophagogastric tube, and [place]-Mike catheter are unchanged.      Dictated by (CST): Malachi Wilson MD on 6/23/2025 at 7:25 AM     Finalized by (CST): Malachi Wilson MD on 6/23/2025 at 7:39 AM          XR CHEST AP PORTABLE  (CPT=71045)  Result Date: 6/22/2025  CONCLUSION:   Mild improved vascular congestion.  Mild improved aeration of the right lower lung.  Stable opacity in the left lung base which may reflect combination of pleural effusion and parenchymal opacities.  Support devices not significantly changed.    Dictated by (CST): Antonio Hitchcock MD on 6/22/2025 at 7:35 AM     Finalized by (CST): Antonio Hitchcock MD on 6/22/2025 at 7:37 AM                  Note: This chart was prepared using voice recognition software and may contain unintended word substitution errors.     VERONA JIMENEZ MD  6/23/2025

## 2025-06-23 NOTE — PROGRESS NOTES
[facility]  part of [facility]    Progress Note    Jose Alberto Lizama Patient Status:  Inpatient    1965 MRN [id number]   Location [facility] 2W/SW Attending Leonides Pacheco MD   Hosp Day # 4 PCP Fiona Sweeney MD     Subjective:  Intubated and sedated, FiO2 30%.  Remains on: Dob1, Precedex/propofol/insulin.  With sedation holiday, arousable/follows commands, gets agitated/tachypneic.  Wife at bedside currently -patient/wife speak Polish- used to update wife at bedside this a.m.    Objective:  /63 (BP Location: Right arm)   Pulse 79   Temp 100.3 °F (37.9 °C) (Pulmonary Artery)   Resp 20   Ht 5' 6\" (1.676 m)   Wt 215 lb 9.8 oz (97.8 kg)   SpO2 97%   BMI 34.80 kg/m²     Temp (24hrs), Av.3 °F (37.4 °C), Min:98.8 °F (37.1 °C), Max:100.3 °F (37.9 °C)  Telemetry-NSR    Intake/Output:    Intake/Output Summary (Last 24 hours) at 2025 0836  Last data filed at 2025 0800  Gross per 24 hour   Intake 3217.71 ml   Output 2395 ml   Net 822.71 ml       Wt Readings from Last 3 Encounters:   25 215 lb 9.8 oz (97.8 kg)   25 201 lb 12.8 oz (91.5 kg)   25 200 lb (90.7 kg)       Allergies:  Allergies[1]    Labs:  Lab Results   Component Value Date    WBC 6.6 2025    HGB 7.3 2025    HCT 22.9 2025    .0 2025    CREATSERUM 0.66 2025    BUN 13 2025     2025    K 3.5 2025     2025    CO2 26.0 2025     2025    CA 8.4 2025    ESRML 123 2025    MG 2.1 2025       Physical Exam:  Blood pressure 107/63, pulse 79, temperature 100.3 °F (37.9 °C), temperature source Pulmonary Artery, resp. rate 20, height 5' 6\" (1.676 m), weight 215 lb 9.8 oz (97.8 kg), SpO2 97%.  General: NAD  Neck: RIJ cordis/[place], JVD difficult to assess due to lines  Lungs: Scattered rhonchi, diminished laterally  CT-20 cc / 12-hour, no airleak noted  Mediastinal Dane-25cc / 12-hour,  no airleak  Heart: RRR, S1, S2  Abdomen: Sot/mild distention, BS hypoactive,/-BM  Extremities: Warm, dry, trace generalized edema  Bilateral groins-soft/no hematomas, CDI  Pulses: Dopplerable PT bilateral  Skin: sternotomy stable, dressing CDI. bilateral SVG site CDI  Neurological: Sedated/intubated, when sedation weaned, patient alert,follows simple commands, yet gets tachypneic/agitated very quickly      Assessment/Plan:  S/p CABG x 2 (LIMA-LAD, SVG-diagonal), AVR with 21 mm Inspiris tissue valve, MELINDA clip, placement of L IABP -POD #4  Intubated/sedated-remains on vent FiO2 30%-wean per pulmonary.  CXR reviewed this a.m -increasing edema, albumin stopped.  Given Lasix IV this a.m.  Appreciate psych consult -will give meds as prescribed 1 hour prior to SBT trial this a.m -to assist with anxiety/agitation.  Tenacious sputum-Zosyn started yesterday per pulmonary.Cx pend  Hemodynamically stable at present on  1, levo weaned off.  Plan to wean off dobutamine today-maintain CI greater than 2, echo this afternoon once off dobutamine.  Will DC [place] once off inotropes  On p.o. Amio-for high risk A-fib protocol.  Anticipate stopping at discharge if no AF noted.  Currently maintaining NSR  Pain meds as needed -limit narcotics if able with decreased mental status, IV Tylenol as needed  Increase activity once appropriate  DVT prevention - SCDs/Lovenox  Nutrition - if unable to extubate today, start trickle TF today or tomorrow.  No BM, suspect mild ileus, increase OGT output.  Reglan was stopped due to risk EPS with Haldol  Expected postop anemia - mild/stable, Hgb 7.3-likely some component delutional, transfuse 1 unit PRBC today.  Will start iron once able to take p.o. repeat CBC in a.m.  Expected postop volume overload -Lasix as needed, monitor I's/O, diuretic response, BMP  Hypokalemia/hypomagnesmia as expected with diuresis-replete lytes per protocol  Insulin coverage per protocol -continue insulin drip until extubated,  history DM preop, anticipate resuming home meds at or near discharge  Neuro-head CT negative for CVA, follows commands with sedation holiday, agitated/anxious.  Continue soft restraints until patient AAO.  Patient is Polish speaking-using /family to communicate to patient.  Per family-no history of daily EtOH use, just social.  Appreciate psych recs    Discharge planning:  Patient lives at home with wife, anticipate home with HH once medically stable.  Appreciate SW/CM to assist with DC planning    Plan of care discussed with pt's wife, bedside RN, and CV Surgeon: Dr. Tara Lewis, APRN  6/23/2025  0 730         [1] No Known Allergies

## 2025-06-23 NOTE — IMAGING NOTE
Arrived at patient bedside to do Echo and was informed Echo should not occur until after 1pm when patient is done with pressors.

## 2025-06-23 NOTE — RESPIRATORY THERAPY NOTE
Received patient on full ventilator support, with settings of:     Vent Information   Vent Mode VC/AC   Settings   FiO2 (%) 30 %   Resp Rate (Set) 12   Vt (Set, mL) 450 mL   Waveform Decelerating ramp   PEEP/CPAP (cm H2O) 5 cm H20     Endotracheal tube (ETT) size 8.0 remains secured at 26 at the lip. Bilateral breath sounds were auscultated, and suctioning was performed as needed. No changes to the ventilator settings were made overnight, and there were no acute events reported during this period. The next respiratory therapist will continue to monitor.

## 2025-06-23 NOTE — PROGRESS NOTES
Cardiology Progress Note    Jose Alberto Lizama Patient Status:  Inpatient    1965 MRN [id number]   Location [facility] 2W/SW Attending Erin Richardson MD   Hosp Day # 4 PCP Fiona Sweeney MD     Interval Note:  Patient seen and examined  Remains intubated and sedated  Unable to extubate over the weekend due to tachypnea, restlessness  Mixed review O2 saturations on lower end, receiving dobutamine at low-dose IV diuretics, PRBC      --------------------------------------------------------------------------------------------------------------------------------  ROS 12 systems reviewed, pertinent findings above.  ROS    History:  Past Medical History[1]  Past Surgical History[2]  Family History[3]   reports that he has quit smoking. His smoking use included cigarettes. He has a 20 pack-year smoking history. He has never used smokeless tobacco. He reports current alcohol use. He reports that he does not use drugs.    Objective:   Temp: 100.5 °F (38.1 °C)  Pulse: 79  Resp: 19  BP: 98/58  AO: 109/48  FiO2 (%): 30 %    Intake/Output:     Intake/Output Summary (Last 24 hours) at 2025 1211  Last data filed at 2025 1100  Gross per 24 hour   Intake 2802.94 ml   Output 2860 ml   Net -57.06 ml       Physical Exam:    General: Intubated, sedated  HEENT: Normocephalic, anicteric sclera, neck supple.  Neck: No JVD, carotids 2+, no bruits.  Cardiac: Regular rate and rhythm. S1, S2 normal. No murmur, pericardial rub, S3.  Lungs: Clear without wheezes, rales, rhonchi or dullness.  Normal excursions and effort.  Abdomen: Soft, non-tender. BS-present.  Extremities: Without clubbing, cyanosis or edema.  Peripheral pulses are 2+.  Neurologic: Non-focal  Skin: Warm and dry.       Assessment   Severe AS, CAD status post CABG X2 and SAVR,  MELINDA clip 25 (LIMA-LAD, VG-D; Inspiris #21  tissue valve, #40 MELINDA clip)  Ischemic dermopathy, EF 30%  Anemia  Hypotension  Hyperlipidemia  Diabetes  Peripheral vascular  disease      Plan  Experience O2 saturations, filling pressures labile-diuresed last week with significant drop in CVP after diuretics.  CVP and PA diastolics are elevated, agree with spot diuretics with Lasix 20 with IV push, received 2 dose already-recommend additional dose after PRBCs  Weaning trial coronary with CT surgery and pulmonology later today  Echo has been ordered by surgery to reassess LV function,  prosthetic valve  Continue amiodarone, aspirin, Plavix, metoprolol, statin  Continuous invasive hemodynamic monitoring with [facility]    Thank you for allowing me to take part in the care of Jose Alberto Lizama. Please call with any questions of concerns.    Level of care: L4    Noemy Ackerman DO  [facility]   Interventional Cardiac and Vascular Services      Noemy Ackerman DO  June 23, 2025  12:11 PM         [1]   Past Medical History:   Aortic stenosis    Back problem    BPH (benign prostatic hyperplasia)    Cardiomyopathy (HCC)    Coronary atherosclerosis    Depression    Diabetes (HCC)    Esophageal reflux    Heart valve disease    High blood pressure    High cholesterol    Peripheral vascular disease    Shortness of breath    related to smoking     Visual impairment    glasses   [2]   Past Surgical History:  Procedure Laterality Date    Cardiac catheterization  2016    [facility]    Cath drug eluting stent      Colonoscopy  04/22/2024    Colonoscopy N/A 4/22/2024    Procedure: COLONOSCOPY;  Surgeon: Attila Li MD;  Location: [facility] ENDOSCOPY    Esophagoscopy,diagnostic  04/22/2024    Dr. Li    Hernia surgery      as a child    Nasal scopy,remv part ethmoid  10/14/2020    Nasal scopy,rmv tiss maxill sinus  10/14/2020    Repair of nasal septum  10/14/2020   [3]   Family History  Problem Relation Age of Onset    Diabetes Father     Diabetes Paternal Aunt     Diabetes Paternal Uncle     Seizure Disorder Mother     No Known Problems Brother     No Known Problems  Daughter     Glaucoma Neg     Macular degeneration Neg

## 2025-06-23 NOTE — PROGRESS NOTES
Patient is a 59 year old   male with past medical history of CAD, PVD,  hypertension, hyperlipidemia, diabetes, severe aortic stenosis. who was admitted to the hospital for a scheduled coronary artery bypass graft surgery, aortic valve replacement on 6/19. The patient is intubated and sedated with propofol and precedex. Otherwise the patient has been demonstrating agitation and confusion when weaning from mechanical ventilation is attempted.   Patient indicated for psych consult for evaluation and advise.    Consult Duration     The patient seen for over 50-minute, follow-up evaluation, over 50% counseling and coordinating care addressing agitation and confusion during SBT.  Record reviewed, communication with attending, communication with RN and patient seen face to face evaluation.     History of Present Illness:     Per chart review, the patient presented to the hospital for a scheduled coronary artery bypass graft surgery and aortic valve replacement on 6/19  Patient has had several failed SBTs due to agitation , confusion and tachypnea when weaning from mechanical ventilation is attempted. Otherwise Patient continued to be on intubation with propofol and Precedex.  Patient started on Zyprexa 5 mg IM nightly, combination of Haldol/Ativan prior to extubation trial.    Per RN the patient tolerated 15-20 min off of the ventilator today during a SBT with much improved agitation and confusion following administration of Haldol 5 mg and Ativan 2 mg prior to extubation. Patient was reported to follow commands given in polish and squeeze team members hands when sedation was lowered. Otherwise patient remains on mechanical ventilation due to excessive lung congestion which has shown improvement with the addition of diuretics.    The patient received the following psychotropic medications: Lorazepam 2 mg, Haldol 5 mg.    Labs and imaging reviewed: MG 2.1   CRX: shows increasing B/L pleural effusions and  pulmonary edema.      The patient seen today sedated and intubated, with RN and wife in the room.    Patient is not able to cooperate with interview due to being sedated.    Patient showing improvement in confusion, alternation in his mood and cognition and episodes of agitation upon lowering sedation.      The patient has shown improvement of his delirium with addition of Ativan and Haldol prior to lowering of sedation and extubation attempts.  No report of history of suicide or homicidal ideation or action.  No history of hallucination, psychosis or manic episode.    Goal of lowering sedation and attempt to extubate patient discussed with RN.    Collateral obtained from the patient's wife and daughter.   His daughter states that he drinks 3-5 drinks of Brandon walker on the weekend socially, with a hx of a \"little bit\" of depression and anxiety. 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 The patient's daughter states he quit smoking cigarettes 3 years ago and now vapes instead. She denied any family history of dementia.      Past Psychiatric/Medication History:  1. Prior diagnoses: none reported  2. Past psychiatric inpatient: none reported  3. Past outpatient history: none reported  4. Past suicide history: none reported  5. Medication history: none reported    Social History:   The patient is a 59 yr old polish speaking  white male with one adult daughter. He is reported to have 3-5 drinks every weekend socially, does not use illicit drugs and now vapes nicotine daily after quitting cigarettes 3 years ago.     Family History:  Father, paternal aunt and uncle all have hx of diabetes.  Medical History:   Past Medical History  Past Medical History:    Aortic stenosis    Back problem    BPH (benign prostatic hyperplasia)    Cardiomyopathy (HCC)    Coronary atherosclerosis    Depression    Diabetes (HCC)    Esophageal reflux    Heart valve  disease    High blood pressure    High cholesterol    Peripheral vascular disease    Shortness of breath    related to smoking     Visual impairment    glasses       Past Surgical History  Past Surgical History:   Procedure Laterality Date    Cardiac catheterization  2016    [facility]    Cath drug eluting stent      Colonoscopy  04/22/2024    Colonoscopy N/A 4/22/2024    Procedure: COLONOSCOPY;  Surgeon: Attila Li MD;  Location: [facility] ENDOSCOPY    Esophagoscopy,diagnostic  04/22/2024    Dr. Li    Hernia surgery      as a child    Nasal scopy,remv part ethmoid  10/14/2020    Nasal scopy,rmv tiss maxill sinus  10/14/2020    Repair of nasal septum  10/14/2020       Family History  Family History   Problem Relation Age of Onset    Diabetes Father     Diabetes Paternal Aunt     Diabetes Paternal Uncle     Seizure Disorder Mother     No Known Problems Brother     No Known Problems Daughter     Glaucoma Neg     Macular degeneration Neg        Social History  Social History     Socioeconomic History    Marital status:    Tobacco Use    Smoking status: Former     Current packs/day: 1.00     Average packs/day: 1 pack/day for 20.0 years (20.0 ttl pk-yrs)     Types: Cigarettes    Smokeless tobacco: Never   Vaping Use    Vaping status: Every Day    Substances: Nicotine   Substance and Sexual Activity    Alcohol use: Yes     Comment: 1-2 drinks a week    Drug use: No           Current Medications:  Current Facility-Administered Medications   Medication Dose Route Frequency    acetaminophen (Ofirmev) 10 mg/mL infusion premix 1,000 mg  1,000 mg Intravenous Q6H    furosemide (Lasix) 10 mg/mL injection 20 mg  20 mg Intravenous BID (Diuretic)    ipratropium-albuterol (Duoneb) 0.5-2.5 (3) MG/3ML inhalation solution 3 mL  3 mL Nebulization TID    norepinephrine (Levophed) 8 mg in dextrose 5% 250 mL infusion  0.5-8 mcg/min Intravenous Continuous    ipratropium-albuterol (Duoneb) 0.5-2.5 (3) MG/3ML  inhalation solution 3 mL  3 mL Nebulization Q4H PRN    piperacillin-tazobactam (Zosyn) 3.375 g in dextrose 5% 100 mL IVPB-ADDV  3.375 g Intravenous Q8H    budesonide (Pulmicort) 0.5 MG/2ML nebulizer suspension 0.5 mg  0.5 mg Nebulization 2 times daily    rosuvastatin (Crestor) tab 20 mg  20 mg Per NG Tube Nightly    LORazepam (Ativan) tab 1 mg  1 mg Oral Q1H PRN    Or    LORazepam (Ativan) 2 mg/mL injection 1 mg  1 mg Intravenous Q1H PRN    LORazepam (Ativan) tab 2 mg  2 mg Oral Q1H PRN    Or    LORazepam (Ativan) 2 mg/mL injection 2 mg  2 mg Intravenous Q1H PRN    LORazepam (Ativan) tab 3 mg  3 mg Oral Q1H PRN    Or    LORazepam (Ativan) 2 mg/mL injection 3 mg  3 mg Intravenous Q1H PRN    LORazepam (Ativan) 2 mg/mL injection 4 mg  4 mg Intravenous Q30 Min PRN    LORazepam (Ativan) 2 mg/mL injection 5 mg  5 mg Intravenous Q15 Min PRN    LORazepam (Ativan) 2 mg/mL injection 6 mg  6 mg Intravenous Q10 Min PRN    thiamine (Vitamin B1) tab 100 mg  100 mg Oral Daily    multivitamin (Tab-A-Елена/Beta Carotene) tab 1 tablet  1 tablet Oral Daily    folic acid (Folvite) tab 1 mg  1 mg Oral Daily    OLANZapine (Zyprexa) 5 mg in sterile water for injection (PF) IM injection  5 mg Intramuscular Nightly    haloperidol lactate (Haldol) 5 MG/ML injection 5 mg  5 mg Intramuscular Q6H PRN    LORazepam (Ativan) 2 mg/mL injection 1 mg  1 mg Intramuscular Q6H PRN    haloperidol lactate (Haldol) 5 MG/ML injection 2 mg  2 mg Intravenous Q4H PRN    sugammadex (Bridion) 200 MG/2ML injection 180 mg  2 mg/kg Intravenous Once    sodium chloride 0.9% infusion   Intravenous Continuous    melatonin tab 3 mg  3 mg Oral Nightly PRN    sennosides (Senokot) tab 8.6 mg  8.6 mg Oral BID    docusate sodium (Colace) cap 100 mg  100 mg Oral BID    polyethylene glycol (PEG 3350) (Miralax) 17 g oral packet 17 g  17 g Oral Daily PRN    bisacodyl (Dulcolax) 10 MG rectal suppository 10 mg  10 mg Rectal Daily PRN    famotidine (Pepcid) tab 20 mg  20 mg Oral BID     Or    famotidine (Pepcid) 20 mg/2mL injection 20 mg  20 mg Intravenous BID    dexmedeTOMIDine in sodium chloride 0.9% (Precedex) 400 mcg/100mL infusion premix  0.2-1.5 mcg/kg/hr (Dosing Weight) Intravenous Continuous    metoprolol tartrate (Lopressor) tab 25 mg  25 mg Oral 2x Daily(Beta Blocker)    potassium chloride 20 mEq/100mL IVPB premix 20 mEq  20 mEq Intravenous PRN    Or    potassium chloride 40 mEq/100mL IVPB premix (central line) 40 mEq  40 mEq Intravenous PRN    magnesium sulfate in dextrose 5% 1 g/100mL infusion premix 1 g  1 g Intravenous PRN    magnesium sulfate in sterile water for injection 2 g/50mL IVPB premix 2 g  2 g Intravenous PRN    mupirocin (Bactroban) 2% nasal ointment 1 Application  1 Application Nasal BID    chlorhexidine gluconate (Peridex) 0.12 % oral solution 15 mL  15 mL Mouth/Throat BID    ascorbic acid (Vitamin C) tab 500 mg  500 mg Oral TID    dextrose 5%-sodium chloride 0.45% infusion   mL/hr Intravenous Continuous    enoxaparin (Lovenox) 40 MG/0.4ML SUBQ injection 40 mg  40 mg Subcutaneous Daily    acetaminophen (Tylenol) tab 650 mg  650 mg Oral Q4H PRN    Or    HYDROcodone-acetaminophen (Norco) 5-325 MG per tab 1 tablet  1 tablet Oral Q4H PRN    Or    HYDROcodone-acetaminophen (Norco) 5-325 MG per tab 2 tablet  2 tablet Oral Q4H PRN    morphINE PF 2 MG/ML injection 2 mg  2 mg Intravenous Q2H PRN    clopidogrel (Plavix) tab 75 mg  75 mg Oral Daily    aspirin DR tab 81 mg  81 mg Oral Daily    glucose (Dex4) 15 GM/59ML oral liquid 15 g  15 g Oral Q15 Min PRN    Or    glucose (Glutose) 40% oral gel 15 g  15 g Oral Q15 Min PRN    Or    glucose-vitamin C (Dex-4) chewable tab 4 tablet  4 tablet Oral Q15 Min PRN    Or    dextrose 50% injection 50 mL  50 mL Intravenous Q15 Min PRN    Or    glucose (Dex4) 15 GM/59ML oral liquid 30 g  30 g Oral Q15 Min PRN    Or    glucose (Glutose) 40% oral gel 30 g  30 g Oral Q15 Min PRN    Or    glucose-vitamin C (Dex-4) chewable tab 8 tablet  8  tablet Oral Q15 Min PRN    insulin regular human (Novolin R, Humulin R) 100 Units in sodium chloride 0.9% 100 mL standard infusion (100 mL)  1-28 Units/hr Intravenous Continuous    tamsulosin (Flomax) cap 0.4 mg  0.4 mg Oral BID    propofol (Diprivan) 10 mg/mL infusion premix  5-50 mcg/kg/min (Dosing Weight) Intravenous Continuous    amiodarone (Pacerone) tab 200 mg  200 mg Oral TID CC     Medications Prior to Admission   Medication Sig    ENTRESTO 24-26 MG Oral Tab Entresto 24 mg-26 mg tablet, [RxNorm: 0439284]    magnesium 250 MG Oral Tab Take 1 tablet (250 mg total) by mouth every other day.    cyanocobalamin 500 MCG Oral Tab Take 1 tablet (500 mcg total) by mouth every other day.    metFORMIN HCl 1000 MG Oral Tab Take 1 tablet (1,000 mg total) by mouth 2 (two) times daily with meals.    amLODIPine 5 MG Oral Tab Take 1 tablet (5 mg total) by mouth daily.    carvedilol 25 MG Oral Tab Take 1 tablet (25 mg total) by mouth 2 (two) times daily with meals.    clopidogrel 75 MG Oral Tab Take 1 tablet (75 mg total) by mouth daily.    empagliflozin (JARDIANCE) 25 MG Oral Tab Take 1 tablet (25 mg total) by mouth daily.    furosemide 40 MG Oral Tab Take 1 tablet (40 mg total) by mouth daily.    rosuvastatin 20 MG Oral Tab Take 1 tablet (20 mg total) by mouth nightly.    tamsulosin 0.4 MG Oral Cap TAKE 2 CAPSULES BY MOUTH DAILY (Patient taking differently: Take 1 capsule (0.4 mg total) by mouth in the morning and 1 capsule (0.4 mg total) before bedtime.)    aspirin 81 MG Oral Tab EC Take 1 tablet (81 mg total) by mouth nightly.    Thiamine HCl 250 MG Oral Tab Take 1 tablet (250 mg total) by mouth every other day.    pyridoxine 100 MG Oral Tab Take 1 tablet (100 mg total) by mouth every other day.    Glucose Blood (CONTOUR NEXT TEST) In Vitro Strip To test 2 x daily  Dx E11.9    Glucose Blood (CONTOUR NEXT TEST) In Vitro Strip To check glucose bid   Dx  E11.9    Glucose Blood (LORAINE CONTOUR TEST) In Vitro Strip To test daily     [employer] CONTOUR NEXT TEST In Vitro Strip To test 2 x daily       Allergies  No Known Allergies    Review of Systems:   As by Admitting/Attending    Results:   Laboratory Data:  Lab Results   Component Value Date    WBC 6.6 06/23/2025    HGB 7.3 (L) 06/23/2025    HCT 22.9 (L) 06/23/2025    .0 (L) 06/23/2025    CREATSERUM 0.66 (L) 06/23/2025    BUN 13 06/23/2025     06/23/2025    K 3.5 06/23/2025     06/23/2025    CO2 26.0 06/23/2025     (H) 06/23/2025    CA 8.4 (L) 06/23/2025    ALB 3.2 06/22/2025    ALKPHO 53 06/22/2025    TP 5.6 (L) 06/22/2025    AST 44 (H) 06/22/2025    ALT 10 06/22/2025    PTT 30.6 06/19/2025    INR 1.17 06/19/2025    PTP 15.6 (H) 06/19/2025    TSH 0.696 06/19/2025    PSA 2.62 08/10/2023    ESRML 123 (H) 06/23/2025    MG 2.1 06/23/2025         Imaging:  XR CHEST AP PORTABLE  (CPT=71045)  Result Date: 6/23/2025  CONCLUSION:   Increasing bilateral pleural effusions and pulmonary edema.  Right medial and left basilar chest tubes are again seen.  Recommend correlation for malfunction.  Endotracheal tube, esophagogastric tube, and [place]-Mike catheter are unchanged.      Dictated by (CST): Malachi Wilson MD on 6/23/2025 at 7:25 AM     Finalized by (CST): Malachi Wilson MD on 6/23/2025 at 7:39 AM          XR CHEST AP PORTABLE  (CPT=71045)  Result Date: 6/22/2025  CONCLUSION:   Mild improved vascular congestion.  Mild improved aeration of the right lower lung.  Stable opacity in the left lung base which may reflect combination of pleural effusion and parenchymal opacities.  Support devices not significantly changed.    Dictated by (CST): Antonio Hitchcock MD on 6/22/2025 at 7:35 AM     Finalized by (CST): Antonio Hitchcock MD on 6/22/2025 at 7:37 AM          XR CHEST AP PORTABLE  (CPT=71045)  Result Date: 6/21/2025  PROCEDURE: XR CHEST AP PORTABLE  (CPT=71045) TIME: 5:36 a.m..   COMPARISON: [facility], XR CHEST AP PORTABLE (CPT=71045), 6/20/2025, 6:08 AM.   INDICATIONS: Shortness of breath.  TECHNIQUE:   Single view.   FINDINGS/IMPRESSION:   1. There is an endotracheal tube with tip approximately 4.7 cm above the fred.  There is an enteric feeding tube in expected configuration.  There is a right IJ [place] catheter with tip projecting over the proximal main pulmonary outflow tract.  There are mediastinal drain/left-sided chest tube in unchanged position.  There is no pneumothorax.  2. The heart mediastinal structures are minimally enlarged.  Pulmonary vascularity is slightly increased.  The findings are compatible with moderate fluid overload.  3. There are increased densities at the lung bases which could represent atelectasis, infiltrate, or a combination in addition to small pleural effusions.     Dictated by (CST): Eligio Andrade MD on 6/21/2025 at 11:36 AM     Finalized by (CST): Eligio Andrade MD on 6/21/2025 at 11:37 AM          CT BRAIN OR HEAD (CPT=70450)  Result Date: 6/20/2025  CONCLUSION:   1. Small soft tissue/scalp hematoma at the posterior vertex with no underlying calvarial fracture or acute intracranial process.  2. Small to moderate sized areas of low-attenuation in the subcortical and periventricular white matter most compatible with chronic ischemic white matter changes.  3. Mild to moderate paranasal sinus disease.     Dictated by (CST): Eligio Andrade MD on 6/20/2025 at 6:27 PM     Finalized by (CST): Eligio Andrade MD on 6/20/2025 at 6:29 PM            Vital Signs:   Blood pressure 92/57, pulse 73, temperature (!) 100.6 °F (38.1 °C), temperature source Pulmonary Artery, resp. rate 21, height 66\", weight 97.8 kg (215 lb 9.8 oz), SpO2 98%.    Mental Status Exam:   Appearance: Stated age, male in hospital gown, laying down in hospital bed, intubated and sedated.  Psychomotor: Patient has been demonstrating restlessness and agitation when he is weaned from mechanical ventilation.  Orientation: obtunded.  Gait: Not  evaluated.  Attitude/Coorperation: limited cooperation due to sedation.  Behavior: episodes of restlessness and agitation.  Speech: Speech impacted due to sedation.  Mood: anxious  Affect: restricted  Thought process: Confused, disorganized  Thought content: No reports of  suicidal or homicidal ideation.  Perceptions: Patient has been demonstrating response to internal stimuli.  Concentration: Grossly impaired  Memory: Grossly impaired  Intellect: Average.  Judgment and Insight: Questionable.     Impression:     Episodic mood disorder.  Delirium due to a medical condition    Aortic stenosis   Hx of CABG      Patient is a 59 year old polish speaking white  male with a PMH of CAD, PVD, hypertension, hyperlipidemia, diabetes, severe aortic stenosis and recent CABG on 6/19 who continue indicated sedation and intubation and SBT failed due to excessive agitation..    6/22/2025: The patient has been demonstrating delirium episode with alternation in mood and cognition with episodes of  increased confusion, restlessness, agitation and response to internal stimuli. This has led to extended extubation and sedation of the patient.    QTc is 485.  Otherwise current EKG demonstrated sinus rhythm.  Magnesium level is 1.8.  Patient with no risk of arrhythmia other than the underlying recent surgical procedure.  It is appropriate to have nightly sedation with the Zyprexa to stabilize the mood currently and utilize the combination of Haldol and Ativan 5/2 prior to extubation.  Otherwise tapering sedation slowly utilizing Haldol 2 mg as needed for restlessness.    6/23/2025: The patient has been demonstrating improvement in his delirium following administration of Haldol 5 mg and Ativan 2 mg prior to lowering of sedation and extubation. The patient was able to follow commands and demonstrated less agitation and confusion during SBT today. Patient continues to be sedated and intubated due to tachypnea.     Patient demonstrating  improvement in condition after being started on diuretics for fluid in the lungs. Today magnesium level is 2.1      Discussed risk and benefit, acknowledging the current symptom and severity.  At this point, I would recommend the following approach:     Focus on safety  Focus on education and support.  Focus on insight orientation helping the patient understand diagnosis and treatment plan.  Request EKG  Required 1 dose of magnesium 2 g IV today.  Continued Zyprexa 5 mg nightly  Give Haldol 5 mg and Ativan 2 mg IM 30 min prior to extubation.  Continue Haldol 2 mg PRN for restlessness.  Processed with patient/family/RN at length, the initiation of the above psychotropic medications I advised the patient of the risks, benefits, alternatives and potential side effects. The patient consents to administration of the medications and understands the right to refuse medications at any time. The patient verbalized understanding.   Coordinate plan with team    Orders This Visit:  Orders Placed This Encounter   Procedures    Basic Metabolic Panel (8)    CBC, Platelet; No Differential    CBC With Differential With Platelet    Basic Metabolic Panel (8)    Prothrombin Time (PT)    PTT, Activated    Fibrinogen Activity    Magnesium    Magnesium    Expanded Arterial Blood Gas    Arterial blood gas    Assay, Thyroid Stim Hormone    Lactic Acid, Plasma    Hepatic Function Panel (7)    CBC, Platelet; No Differential    Lactic Acid Reflex Post Positive    Hemoglobin & Hematocrit    CBC, Platelet; No Differential    Magnesium    Lactic Acid, Plasma    Arterial blood gas    Basic Metabolic Panel (8)    Lipid Panel    Venous Blood Gas    Arterial blood gas    Magnesium    Comp Metabolic Panel (14)    CBC, Platelet; No Differential    Expanded Arterial Blood Gas    CBC, Platelet; No Differential    Magnesium    CBC, Platelet; No Differential    Comp Metabolic Panel (14)    Sed Rate, Westergren (Automated)    Magnesium    Expanded Arterial  Blood Gas    Venous Blood Gas    Basic Metabolic Panel (8)    CBC With Differential With Platelet    Arterial blood gas    Prepare RBC STAT    ABORH Confirmation    Prepare platelets Once    Prepare fresh frozen plasma Once    Prepare cryoprecipitate Once    Type and screen    Prepare RBC Once    ABORH (Blood Type)    Antibody Screen    Specimen to Pathology Tissue    MRSA Screen by PCR    Sputum culture    Blood Culture    Emergency MRSA Screen by PCR       Meds This Visit:  Requested Prescriptions      No prescriptions requested or ordered in this encounter       Chivo Solares MD  6/23/2025    Note to Patient: The 21st Century Cures Act makes medical notes like these available to patients in the interest of transparency. However, be advised this is a medical document. It is intended as peer to peer communication. It is written in medical language and may contain abbreviations or verbiage that are unfamiliar. It may appear blunt or direct. Medical documents are intended to carry relevant information, facts as evident, and the clinical opinion of the practitioner. This note may have been transcribed using a voice dictation system. Voice recognition errors may occur. This should not be taken to alter the content or meaning of this note.

## 2025-06-23 NOTE — RESPIRATORY THERAPY NOTE
Pt received intubated A/C 12/450/+5/30%,no changes on current shift,suctioned as needed,RT will continue to monitor.

## 2025-06-23 NOTE — PROGRESS NOTES
[facility]  part of [facility]    Progress Note    Jose Alberto Lizama Patient Status:  Inpatient    1965 MRN [id number]   Location Health system 2W/SW Attending Erin Richardson MD   Hosp Day # 4 PCP Fiona Sweeney MD     Subjective:   Subjective:    Patient was seen and examined  Intubated on mechanical ventilation and sedated with propofol and Precedex  On 1 mics of dobutamine with a stable blood pressure  PA pressure 42/23 with cardiac index of 2.3 and CVP around 18  Patient received Lasix and he diuresed about 1600  Off sedation arousable and awake and following commands with no significant agitation and he received Ativan  Did poorly on spontaneous breathing trial with CPAP of 5 per support of 5 he was placed back on full support        Objective:   Blood pressure 128/70, pulse 84, temperature (!) 100.5 °F (38.1 °C), temperature source Pulmonary Artery, resp. rate (!) 32, height 5' 6\" (1.676 m), weight 215 lb 9.8 oz (97.8 kg), SpO2 96%.  Physical Exam  Vitals and nursing note reviewed.   Constitutional:       General: He is in acute distress.      Appearance: He is ill-appearing.   HENT:      Head: Atraumatic.      Nose: Nose normal.      Mouth/Throat:      Mouth: Mucous membranes are moist.   Eyes:      General: No scleral icterus.  Cardiovascular:      Rate and Rhythm: Normal rate.      Heart sounds:      No gallop.   Pulmonary:      Effort: No respiratory distress.      Breath sounds: No stridor. Rales present. No wheezing or rhonchi.      Comments: Diminished breath sounds in the bases with basilar rales with no wheezes or rhonchi  Abdominal:      General: Abdomen is flat. Bowel sounds are normal.      Palpations: Abdomen is soft.   Musculoskeletal:      Right lower leg: Edema present.      Left lower leg: Edema present.   Lymphadenopathy:      Cervical: No cervical adenopathy.   Neurological:      General: No focal deficit present.      Comments: Awake and alert when  off sedation and following commands         Results:   Lab Results   Component Value Date    WBC 6.6 06/23/2025    HGB 7.3 (L) 06/23/2025    HCT 22.9 (L) 06/23/2025    .0 (L) 06/23/2025    CREATSERUM 0.66 (L) 06/23/2025    BUN 13 06/23/2025     06/23/2025    K 3.5 06/23/2025     06/23/2025    CO2 26.0 06/23/2025     (H) 06/23/2025    CA 8.4 (L) 06/23/2025    ALB 3.2 06/22/2025    ALKPHO 53 06/22/2025    BILT 0.5 06/22/2025    TP 5.6 (L) 06/22/2025    AST 44 (H) 06/22/2025    ALT 10 06/22/2025    PTT 30.6 06/19/2025    INR 1.17 06/19/2025    TSH 0.696 06/19/2025    PSA 2.62 08/10/2023    ESRML 123 (H) 06/23/2025    MG 2.1 06/23/2025       XR CHEST AP PORTABLE  (CPT=71045)  Result Date: 6/23/2025  CONCLUSION:   Increasing bilateral pleural effusions and pulmonary edema.  Right medial and left basilar chest tubes are again seen.  Recommend correlation for malfunction.  Endotracheal tube, esophagogastric tube, and [place]-Mike catheter are unchanged.      Dictated by (CST): Malachi Wilson MD on 6/23/2025 at 7:25 AM     Finalized by (CST): Malachi Wilson MD on 6/23/2025 at 7:39 AM          XR CHEST AP PORTABLE  (CPT=71045)  Result Date: 6/22/2025  CONCLUSION:   Mild improved vascular congestion.  Mild improved aeration of the right lower lung.  Stable opacity in the left lung base which may reflect combination of pleural effusion and parenchymal opacities.  Support devices not significantly changed.    Dictated by (CST): Antonio Hitchcock MD on 6/22/2025 at 7:35 AM     Finalized by (CST): Antonio Hitchcock MD on 6/22/2025 at 7:37 AM                Assessment & Plan:      1- s/p CABG and AVR on 6/19/25 for severe multivessel CAD and severe aortic stenosis  LVEF 30 % prior to surgery  Will follow-up 2D echo  PAP 43/23 and CVP 18-20 and CI 2.1   CXR significant cardiomegaly and pulmonary edema  Still on low-dose dobutamine  Plavix and aspirin and amiodarone    2-postop acute respiratory failure  CHF and  pulmonary edema and also suspected withdrawal?  EtOH abuse  Chest x-ray with significant edema and cardiomegaly  Reported possible mucous plugging    Patient failed SBT today after 10 to 15 minutes as discussed above  Patient seems more cooperative and less agitated during the trial    Plan ;  Vent support and management  Continue daily SAT and SBT  More aggressive diuresis  Continue with bronchial hygiene and nebulizers and empiric antibiotics    3-possible EtOH use?  Withdrawal  Thiamine and folic acid and psych following    4-peripheral artery disease  Plavix and aspirin    5-HTN, HL, DM , obesity BMI 34 with possible ORAL    6-DVT prophylaxis  Lovenox    7-nutrition  Start NG tube feeding      D/w staff and RT   D/w wife at bedside     Upon my evaluation, this patient had a high probability of imminent or life-threatening deterioration due to hypoxemia and respiratory failure, which required my direct attention, intervention, and personal management.    I have personally provided 35 minutes of critical care time, exclusive of time spent on separately billable procedures.  Time includes review of all pertinent laboratory/radiology results, discussion with consultants, and monitoring for potential decompensation.  Performed interventions included managing mechanical ventilation.         Mora Marte MD  6/23/2025

## 2025-06-23 NOTE — PLAN OF CARE
Problem: CARDIOVASCULAR - ADULT  Goal: Maintains optimal cardiac output and hemodynamic stability  Description: INTERVENTIONS:  - Monitor vital signs, rhythm, and trends  - Monitor for bleeding, hypotension and signs of decreased cardiac output  - Evaluate effectiveness of vasoactive medications to optimize hemodynamic stability  - Monitor arterial and/or venous puncture sites for bleeding and/or hematoma  - Assess quality of pulses, skin color and temperature  - Assess for signs of decreased coronary artery perfusion - ex. Angina  - Evaluate fluid balance, assess for edema, trend weights  Outcome: Progressing  Goal: Absence of cardiac arrhythmias or at baseline  Description: INTERVENTIONS:  - Continuous cardiac monitoring, monitor vital signs, obtain 12 lead EKG if indicated  - Evaluate effectiveness of antiarrhythmic and heart rate control medications as ordered  - Initiate emergency measures for life threatening arrhythmias  - Monitor electrolytes and administer replacement therapy as ordered  Outcome: Progressing     Problem: RESPIRATORY - ADULT  Goal: Achieves optimal ventilation and oxygenation  Description: INTERVENTIONS:  - Assess for changes in respiratory status  - Assess for changes in mentation and behavior  - Position to facilitate oxygenation and minimize respiratory effort  - Oxygen supplementation based on oxygen saturation or ABGs  - Provide Smoking Cessation handout, if applicable  - Encourage broncho-pulmonary hygiene including cough, deep breathe, Incentive Spirometry  - Assess the need for suctioning and perform as needed  - Assess and instruct to report SOB or any respiratory difficulty  - Respiratory Therapy support as indicated  - Manage/alleviate anxiety  - Monitor for signs/symptoms of CO2 retention  Outcome: Progressing     Problem: GASTROINTESTINAL - ADULT  Goal: Minimal or absence of nausea and vomiting  Description: INTERVENTIONS:  - Maintain adequate hydration with IV or PO as  ordered and tolerated  - Nasogastric tube to low intermittent suction as ordered  - Evaluate effectiveness of ordered antiemetic medications  - Provide nonpharmacologic comfort measures as appropriate  - Advance diet as tolerated, if ordered  - Obtain nutritional consult as needed  - Evaluate fluid balance  Outcome: Progressing  Goal: Maintains or returns to baseline bowel function  Description: INTERVENTIONS:  - Assess bowel function  - Maintain adequate hydration with IV or PO as ordered and tolerated  - Evaluate effectiveness of GI medications  - Encourage mobilization and activity  - Obtain nutritional consult as needed  - Establish a toileting routine/schedule  - Consider collaborating with pharmacy to review patient's medication profile  Outcome: Progressing     Problem: GENITOURINARY - ADULT  Goal: Absence of urinary retention  Description: INTERVENTIONS:  - Assess patient’s ability to void and empty bladder  - Monitor intake/output and perform bladder scan as needed  - Follow urinary retention protocol/standard of care  - Consider collaborating with pharmacy to review patient's medication profile  - Implement strategies to promote bladder emptying  Outcome: Progressing     Problem: METABOLIC/FLUID AND ELECTROLYTES - ADULT  Goal: Glucose maintained within prescribed range  Description: INTERVENTIONS:  - Monitor Blood Glucose as ordered  - Assess for signs and symptoms of hyperglycemia and hypoglycemia  - Administer ordered medications to maintain glucose within target range  - Assess barriers to adequate nutritional intake and initiate nutrition consult as needed  - Instruct patient on self management of diabetes  Outcome: Progressing  Goal: Electrolytes maintained within normal limits  Description: INTERVENTIONS:  - Monitor labs and rhythm and assess patient for signs and symptoms of electrolyte imbalances  - Administer electrolyte replacement as ordered  - Monitor response to electrolyte replacements,  including rhythm and repeat lab results as appropriate  - Fluid restriction as ordered  - Instruct patient on fluid and nutrition restrictions as appropriate  Outcome: Progressing  Goal: Hemodynamic stability and optimal renal function maintained  Description: INTERVENTIONS:  - Monitor labs and assess for signs and symptoms of volume excess or deficit  - Monitor intake, output and patient weight  - Monitor urine specific gravity, serum osmolarity and serum sodium as indicated or ordered  - Monitor response to interventions for patient's volume status, including labs, urine output, blood pressure (other measures as available)  - Encourage oral intake as appropriate  - Instruct patient on fluid and nutrition restrictions as appropriate  Outcome: Progressing     Problem: SKIN/TISSUE INTEGRITY - ADULT  Goal: Incision(s), wounds(s) or drain site(s) healing without S/S of infection  Description: INTERVENTIONS:  - Assess and document risk factors for pressure ulcer development  - Assess and document skin integrity  - Assess and document dressing/incision, wound bed, drain sites and surrounding tissue  - Implement wound care per orders  - Initiate isolation precautions as appropriate  - Initiate Pressure Ulcer prevention bundle as indicated  Outcome: Progressing  Goal: Oral mucous membranes remain intact  Description: INTERVENTIONS  - Assess oral mucosa and hygiene practices  - Implement preventative oral hygiene regimen  - Implement oral medicated treatments as ordered  Outcome: Progressing     Problem: HEMATOLOGIC - ADULT  Goal: Maintains hematologic stability  Description: INTERVENTIONS  - Assess for signs and symptoms of bleeding or hemorrhage  - Monitor labs and vital signs for trends  - Administer supportive blood products/factors, fluids and medications as ordered and appropriate  - Administer supportive blood products/factors as ordered and appropriate  Outcome: Progressing  Goal: Free from bleeding  injury  Description: (Example usage: patient with low platelets)  INTERVENTIONS:  - Avoid intramuscular injections, enemas and rectal medication administration  - Ensure safe mobilization of patient  - Hold pressure on venipuncture sites to achieve adequate hemostasis  - Assess for signs and symptoms of internal bleeding  - Monitor lab trends  - Patient is to report abnormal signs of bleeding to staff  - Avoid use of toothpicks and dental floss  - Use electric shaver for shaving  - Use soft bristle tooth brush  - Limit straining and forceful nose blowing  Outcome: Progressing     Problem: MUSCULOSKELETAL - ADULT  Goal: Return mobility to safest level of function  Description: INTERVENTIONS:  - Assess patient stability and activity tolerance for standing, transferring and ambulating w/ or w/o assistive devices  - Assist with transfers and ambulation using safe patient handling equipment as needed  - Ensure adequate protection for wounds/incisions during mobilization  - Obtain PT/OT consults as needed  - Advance activity as appropriate  - Communicate ordered activity level and limitations with patient/family  Outcome: Progressing     Problem: NEUROLOGICAL - ADULT  Goal: Achieves stable or improved neurological status  Description: INTERVENTIONS  - Assess for and report changes in neurological status  - Initiate measures to prevent increased intracranial pressure  - Maintain blood pressure and fluid volume within ordered parameters to optimize cerebral perfusion and minimize risk of hemorrhage  - Monitor temperature, glucose, and sodium. Initiate appropriate interventions as ordered  Outcome: Progressing     Problem: Safety Risk - Non-Violent Restraints  Goal: Patient will remain free from self-harm  Description: INTERVENTIONS:  - Apply the least restrictive restraint to prevent harm  - Notify patient and family of reasons restraints applied  - Assess for any contributing factors to confusion (electrolyte disturbances,  delirium, medications)  - Discontinue any unnecessary medical devices as soon as possible  - Assess the patient's physical comfort, circulation, skin condition, hydration, nutrition and elimination needs   - Reorient and redirection as needed  - Assess for the need to continue restraints  6/23/2025 0734 by Aishwarya Boyle RN  Outcome: Progressing  6/23/2025 0303 by Aishwarya Boyle RN  Outcome: Progressing     Problem: Patient Centered Care  Goal: Patient preferences are identified and integrated in the patient's plan of care  Description: Interventions:  - What would you like us to know as we care for you?   - Provide timely, complete, and accurate information to patient/family  - Incorporate patient and family knowledge, values, beliefs, and cultural backgrounds into the planning and delivery of care  - Encourage patient/family to participate in care and decision-making at the level they choose  - Honor patient and family perspectives and choices  Outcome: Progressing     Problem: Patient/Family Goals  Goal: Patient/Family Long Term Goal  Description: Patient's Long Term Goal:     Interventions:  -   - See additional Care Plan goals for specific interventions  Outcome: Progressing  Goal: Patient/Family Short Term Goal  Description: Patient's Short Term Goal:     Interventions:   -   - See additional Care Plan goals for specific interventions  Outcome: Progressing     Problem: Diabetes/Glucose Control  Goal: Glucose maintained within prescribed range  Description: INTERVENTIONS:  - Monitor Blood Glucose as ordered  - Assess for signs and symptoms of hyperglycemia and hypoglycemia  - Administer ordered medications to maintain glucose within target range  - Assess barriers to adequate nutritional intake and initiate nutrition consult as needed  - Instruct patient on self management of diabetes  Outcome: Progressing     Problem: Impaired Functional Mobility  Goal: Achieve highest/safest level of mobility/gait  Description:  Interventions:  - Assess patient's functional ability and stability  - Promote increasing activity/tolerance for mobility and gait  - Educate and engage patient/family in tolerated activity level and precautions    Outcome: Progressing     Problem: Impaired Cognition  Goal: Patient will exhibit improved attention, thought processing and/or memory  Description: Interventions:    Outcome: Progressing   Pt sedated with propofol and precedex, d/t severe agitation on day shift, plan to give meds from Dr Solares to help with sbt in am.  Vs and rhythm stable thru nite, on dobut 1mcg, good u/o, ct drainage minimal.

## 2025-06-24 ENCOUNTER — APPOINTMENT (OUTPATIENT)
Dept: GENERAL RADIOLOGY | Facility: HOSPITAL | Age: 60
DRG: 219 | End: 2025-06-24
Attending: INTERNAL MEDICINE
Payer: MEDICAID

## 2025-06-24 PROBLEM — I25.709: Status: ACTIVE | Noted: 2025-06-24

## 2025-06-24 LAB
ALBUMIN SERPL-MCNC: 3.4 G/DL (ref 3.2–4.8)
ALBUMIN/GLOB SERPL: 1.6 {RATIO} (ref 1–2)
ALP LIVER SERPL-CCNC: 45 U/L (ref 45–117)
ALT SERPL-CCNC: 47 U/L (ref 10–49)
ANION GAP SERPL CALC-SCNC: 6 MMOL/L (ref 0–18)
AST SERPL-CCNC: 130 U/L (ref ?–34)
ATRIAL RATE: 72 BPM
BASE EXCESS BLD CALC-SCNC: -0.5 MMOL/L (ref ?–2)
BASE EXCESS BLDA CALC-SCNC: -1 MMOL/L (ref ?–30)
BASE EXCESS BLDA CALC-SCNC: -2 MMOL/L (ref ?–30)
BASE EXCESS BLDA CALC-SCNC: -2 MMOL/L (ref ?–30)
BASE EXCESS BLDA CALC-SCNC: -3 MMOL/L (ref ?–30)
BASE EXCESS BLDA CALC-SCNC: 0 MMOL/L (ref ?–30)
BASOPHILS # BLD AUTO: 0.05 X10(3) UL (ref 0–0.2)
BASOPHILS NFR BLD AUTO: 0.8 %
BILIRUB SERPL-MCNC: 0.4 MG/DL (ref 0.2–1.1)
BLOOD TYPE BARCODE: 5100
BUN BLD-MCNC: 16 MG/DL (ref 9–23)
BUN/CREAT SERPL: 25.8 (ref 10–20)
CA-I BLD-SCNC: 1.13 MMOL/L (ref 0.95–1.32)
CA-I BLDA-SCNC: 1.05 MMOL/L (ref 1.12–1.32)
CA-I BLDA-SCNC: 1.08 MMOL/L (ref 1.12–1.32)
CA-I BLDA-SCNC: 1.1 MMOL/L (ref 1.12–1.32)
CA-I BLDA-SCNC: 1.11 MMOL/L (ref 1.12–1.32)
CA-I BLDA-SCNC: 1.14 MMOL/L (ref 1.12–1.32)
CA-I BLDA-SCNC: 1.23 MMOL/L (ref 1.12–1.32)
CA-I BLDA-SCNC: 1.37 MMOL/L (ref 1.12–1.32)
CALCIUM BLD-MCNC: 8 MG/DL (ref 8.7–10.4)
CHLORIDE SERPL-SCNC: 108 MMOL/L (ref 98–112)
CO2 BLDA-SCNC: 23 MMOL/L (ref 22–32)
CO2 BLDA-SCNC: 23 MMOL/L (ref 22–32)
CO2 BLDA-SCNC: 25 MMOL/L (ref 22–32)
CO2 BLDA-SCNC: 26 MMOL/L (ref 22–32)
CO2 SERPL-SCNC: 26 MMOL/L (ref 21–32)
COHGB MFR BLD: 1.5 % (ref 0–3)
CREAT BLD-MCNC: 0.62 MG/DL (ref 0.7–1.3)
DEPRECATED RDW RBC AUTO: 45.6 FL (ref 35.1–46.3)
EGFRCR SERPLBLD CKD-EPI 2021: 109 ML/MIN/1.73M2 (ref 60–?)
EOSINOPHIL # BLD AUTO: 0.2 X10(3) UL (ref 0–0.7)
EOSINOPHIL NFR BLD AUTO: 3.2 %
ERYTHROCYTE [DISTWIDTH] IN BLOOD BY AUTOMATED COUNT: 15.7 % (ref 11–15)
GLOBULIN PLAS-MCNC: 2.1 G/DL (ref 2–3.5)
GLUCOSE BLD-MCNC: 115 MG/DL (ref 70–99)
GLUCOSE BLDA-MCNC: 214 MG/DL (ref 70–99)
GLUCOSE BLDA-MCNC: 231 MG/DL (ref 70–99)
GLUCOSE BLDA-MCNC: 244 MG/DL (ref 70–99)
GLUCOSE BLDA-MCNC: 283 MG/DL (ref 70–99)
GLUCOSE BLDA-MCNC: 292 MG/DL (ref 70–99)
GLUCOSE BLDA-MCNC: 295 MG/DL (ref 70–99)
GLUCOSE BLDA-MCNC: 296 MG/DL (ref 70–99)
GLUCOSE BLDC GLUCOMTR-MCNC: 102 MG/DL (ref 70–99)
GLUCOSE BLDC GLUCOMTR-MCNC: 115 MG/DL (ref 70–99)
GLUCOSE BLDC GLUCOMTR-MCNC: 121 MG/DL (ref 70–99)
GLUCOSE BLDC GLUCOMTR-MCNC: 125 MG/DL (ref 70–99)
GLUCOSE BLDC GLUCOMTR-MCNC: 129 MG/DL (ref 70–99)
GLUCOSE BLDC GLUCOMTR-MCNC: 130 MG/DL (ref 70–99)
GLUCOSE BLDC GLUCOMTR-MCNC: 133 MG/DL (ref 70–99)
GLUCOSE BLDC GLUCOMTR-MCNC: 146 MG/DL (ref 70–99)
GLUCOSE BLDC GLUCOMTR-MCNC: 180 MG/DL (ref 70–99)
HCO3 BLDA-SCNC: 21.8 MEQ/L (ref 22–26)
HCO3 BLDA-SCNC: 22.1 MEQ/L (ref 22–26)
HCO3 BLDA-SCNC: 23.3 MEQ/L (ref 22–26)
HCO3 BLDA-SCNC: 23.5 MEQ/L (ref 22–26)
HCO3 BLDA-SCNC: 23.5 MEQ/L (ref 22–26)
HCO3 BLDA-SCNC: 23.8 MEQ/L (ref 22–26)
HCO3 BLDA-SCNC: 24.5 MEQ/L (ref 21–27)
HCO3 BLDA-SCNC: 24.5 MEQ/L (ref 22–26)
HCT VFR BLD AUTO: 25.7 % (ref 39–53)
HCT VFR BLDA CALC: 24 % (ref 37–53)
HCT VFR BLDA CALC: 26 % (ref 37–53)
HCT VFR BLDA CALC: 27 % (ref 37–53)
HCT VFR BLDA CALC: 27 % (ref 37–53)
HCT VFR BLDA CALC: 38 % (ref 37–53)
HGB BLD-MCNC: 8.4 G/DL (ref 13–17.5)
HGB BLD-MCNC: 8.6 G/DL (ref 13–17.5)
IMM GRANULOCYTES # BLD AUTO: 0.02 X10(3) UL (ref 0–1)
IMM GRANULOCYTES NFR BLD: 0.3 %
LACTATE BLD-SCNC: 1 MMOL/L (ref 0.5–2)
LYMPHOCYTES # BLD AUTO: 1.62 X10(3) UL (ref 1–4)
LYMPHOCYTES NFR BLD AUTO: 25.7 %
MAGNESIUM SERPL-MCNC: 2 MG/DL (ref 1.6–2.6)
MCH RBC QN AUTO: 25.9 PG (ref 26–34)
MCHC RBC AUTO-ENTMCNC: 32.7 G/DL (ref 31–37)
MCV RBC AUTO: 79.3 FL (ref 80–100)
METHGB MFR BLD: 0.5 % SAT (ref 0.4–1.5)
MONOCYTES # BLD AUTO: 0.86 X10(3) UL (ref 0.1–1)
MONOCYTES NFR BLD AUTO: 13.6 %
NEUTROPHILS # BLD AUTO: 3.56 X10 (3) UL (ref 1.5–7.7)
NEUTROPHILS # BLD AUTO: 3.56 X10(3) UL (ref 1.5–7.7)
NEUTROPHILS NFR BLD AUTO: 56.4 %
O2 CT BLD-SCNC: 11.5 VOL% (ref 15–23)
OSMOLALITY SERPL CALC.SUM OF ELEC: 292 MOSM/KG (ref 275–295)
OXYGEN LITERS/MINUTE: 4 L/MIN
P AXIS: 52 DEGREES
P-R INTERVAL: 160 MS
PCO2 BLDA: 32.2 MMHG (ref 35–45)
PCO2 BLDA: 34.5 MMHG (ref 35–45)
PCO2 BLDA: 35.3 MMHG (ref 35–45)
PCO2 BLDA: 35.8 MMHG (ref 35–45)
PCO2 BLDA: 39 MM HG (ref 35–45)
PCO2 BLDA: 44.1 MMHG (ref 35–45)
PCO2 BLDA: 44.6 MMHG (ref 35–45)
PCO2 BLDA: 47.4 MMHG (ref 35–45)
PH BLDA: 7.32 [PH] (ref 7.35–7.45)
PH BLDA: 7.33 [PH] (ref 7.35–7.45)
PH BLDA: 7.33 [PH] (ref 7.35–7.45)
PH BLDA: 7.4 [PH] (ref 7.35–7.45)
PH BLDA: 7.4 [PH] (ref 7.35–7.45)
PH BLDA: 7.41 [PH] (ref 7.35–7.45)
PH BLDA: 7.43 [PH] (ref 7.35–7.45)
PH BLDA: 7.48 [PH] (ref 7.35–7.45)
PLATELET # BLD AUTO: 183 10(3)UL (ref 150–450)
PO2 BLDA: 237 MMHG (ref 80–105)
PO2 BLDA: 346 MMHG (ref 80–105)
PO2 BLDA: 75 MM HG (ref 80–100)
PO2 BLDA: >400 MMHG (ref 80–105)
POTASSIUM BLD-SCNC: 3.6 MMOL/L (ref 3.6–5.1)
POTASSIUM SERPL-SCNC: 3.5 MMOL/L (ref 3.5–5.1)
POTASSIUM SERPL-SCNC: 3.6 MMOL/L (ref 3.5–5.1)
PROT SERPL-MCNC: 5.5 G/DL (ref 5.7–8.2)
PUNCTURE CHARGE: NO
Q-T INTERVAL: 422 MS
QRS DURATION: 126 MS
QTC CALCULATION (BEZET): 462 MS
R AXIS: -36 DEGREES
RBC # BLD AUTO: 3.24 X10(6)UL (ref 4.3–5.7)
SAO2 % BLDA: 100 % (ref 92–100)
SAO2 % BLDA: 95.8 % (ref 94–100)
SODIUM BLD-SCNC: 137 MMOL/L (ref 135–145)
SODIUM BLDA-SCNC: 137 MMOL/L (ref 136–145)
SODIUM BLDA-SCNC: 138 MMOL/L (ref 136–145)
SODIUM BLDA-SCNC: 139 MMOL/L (ref 136–145)
SODIUM BLDA-SCNC: 139 MMOL/L (ref 136–145)
SODIUM BLDA-SCNC: 140 MMOL/L (ref 136–145)
SODIUM BLDA-SCNC: 141 MMOL/L (ref 136–145)
SODIUM BLDA-SCNC: 145 MMOL/L (ref 136–145)
SODIUM BLDA-SCNC: 3.3 MMOL/L (ref 3.6–5.1)
SODIUM BLDA-SCNC: 4.2 MMOL/L (ref 3.6–5.1)
SODIUM BLDA-SCNC: 4.3 MMOL/L (ref 3.6–5.1)
SODIUM BLDA-SCNC: 4.7 MMOL/L (ref 3.6–5.1)
SODIUM BLDA-SCNC: 4.7 MMOL/L (ref 3.6–5.1)
SODIUM BLDA-SCNC: 5 MMOL/L (ref 3.6–5.1)
SODIUM BLDA-SCNC: 5.3 MMOL/L (ref 3.6–5.1)
SODIUM SERPL-SCNC: 140 MMOL/L (ref 136–145)
T AXIS: 136 DEGREES
UNIT VOLUME: 277 ML
VENTRICULAR RATE: 72 BPM
WBC # BLD AUTO: 6.3 X10(3) UL (ref 4–11)

## 2025-06-24 PROCEDURE — 99233 SBSQ HOSP IP/OBS HIGH 50: CPT | Performed by: OTHER

## 2025-06-24 PROCEDURE — 71045 X-RAY EXAM CHEST 1 VIEW: CPT | Performed by: INTERNAL MEDICINE

## 2025-06-24 PROCEDURE — 99233 SBSQ HOSP IP/OBS HIGH 50: CPT | Performed by: HOSPITALIST

## 2025-06-24 PROCEDURE — 99291 CRITICAL CARE FIRST HOUR: CPT | Performed by: INTERNAL MEDICINE

## 2025-06-24 RX ORDER — INSULIN DEGLUDEC 100 U/ML
15 INJECTION, SOLUTION SUBCUTANEOUS DAILY
Status: DISCONTINUED | OUTPATIENT
Start: 2025-06-24 | End: 2025-06-25

## 2025-06-24 RX ORDER — NITROGLYCERIN 20 MG/100ML
INJECTION INTRAVENOUS CONTINUOUS
Status: DISCONTINUED | OUTPATIENT
Start: 2025-06-24 | End: 2025-06-27

## 2025-06-24 RX ORDER — NITROGLYCERIN 20 MG/100ML
INJECTION INTRAVENOUS
Status: DISPENSED
Start: 2025-06-24 | End: 2025-06-25

## 2025-06-24 RX ORDER — FERROUS SULFATE 300 MG/5ML
300 LIQUID (ML) ORAL 2 TIMES DAILY
Status: DISCONTINUED | OUTPATIENT
Start: 2025-06-24 | End: 2025-07-03

## 2025-06-24 RX ORDER — ASPIRIN 81 MG/1
81 TABLET, CHEWABLE ORAL DAILY
Status: DISCONTINUED | OUTPATIENT
Start: 2025-06-24 | End: 2025-07-01

## 2025-06-24 RX ORDER — SODIUM BICARBONATE 650 MG/1
650 TABLET ORAL AS NEEDED
Status: DISCONTINUED | OUTPATIENT
Start: 2025-06-24 | End: 2025-06-30

## 2025-06-24 RX ORDER — FUROSEMIDE 10 MG/ML
40 INJECTION INTRAMUSCULAR; INTRAVENOUS ONCE
Status: COMPLETED | OUTPATIENT
Start: 2025-06-24 | End: 2025-06-24

## 2025-06-24 RX ORDER — HYDRALAZINE HYDROCHLORIDE 20 MG/ML
10 INJECTION INTRAMUSCULAR; INTRAVENOUS EVERY 4 HOURS PRN
Status: DISCONTINUED | OUTPATIENT
Start: 2025-06-24 | End: 2025-06-25

## 2025-06-24 RX ORDER — DOCUSATE SODIUM 50 MG/5ML
100 LIQUID ORAL 2 TIMES DAILY
Status: DISCONTINUED | OUTPATIENT
Start: 2025-06-24 | End: 2025-07-01

## 2025-06-24 RX ORDER — MAGNESIUM SULFATE 1 G/100ML
1 INJECTION INTRAVENOUS ONCE
Status: COMPLETED | OUTPATIENT
Start: 2025-06-24 | End: 2025-06-24

## 2025-06-24 NOTE — PROGRESS NOTES
Patient is a 59 year old   male with past medical history of CAD, PVD,  hypertension, hyperlipidemia, diabetes, severe aortic stenosis. who was admitted to the hospital for a scheduled coronary artery bypass graft surgery, aortic valve replacement on 6/19. The patient is intubated and sedated with propofol and precedex. Otherwise the patient has been demonstrating agitation and confusion when weaning from mechanical ventilation is attempted.   Patient indicated for psych consult for evaluation and advise.    Consult Duration     The patient seen for over 50-minute, follow-up evaluation, over 50% counseling and coordinating care addressing agitation and confusion during SBT.  Record reviewed, communication with attending, communication with RN and patient seen face to face evaluation.     History of Present Illness:     In communication with the team the patient remains sedated and intubated, with plan to SBT and attempt extubation later today.  Ativan and Haldol will continue to be used prior to the lowering of sedation and attempt of extubation.     Labs and imaging reviewed: MG 2.0     The patient seen today sedated and intubated, without visitors in the room.    Patient is not able to cooperate with interview due to being sedated.    Patient continued to show improvement in confusion, alternation in his mood and cognition and episodes of agitation upon lowering sedation.      No report of history of suicide or homicidal ideation or action.  No history of hallucination, psychosis or manic episode.    Goal of lowering sedation and attempt to extubate patient discussed with RN.    Collateral obtained from the patient's wife and daughter.   His daughter states that he drinks 3-5 drinks of Brandon walker on the weekend socially, with a hx of a \"little bit\" of depression and anxiety. 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 The  patient's daughter states he quit smoking cigarettes 3 years ago and now vapes instead. She denied any family history of dementia.      Past Psychiatric/Medication History:  1. Prior diagnoses: none reported  2. Past psychiatric inpatient: none reported  3. Past outpatient history: none reported  4. Past suicide history: none reported  5. Medication history: none reported    Social History:   The patient is a 59 yr old polish speaking  white male with one adult daughter. He is reported to have 3-5 drinks every weekend socially, does not use illicit drugs and now vapes nicotine daily after quitting cigarettes 3 years ago.     Family History:  Father, paternal aunt and uncle all have hx of diabetes.  Medical History:   Past Medical History  Past Medical History:    Aortic stenosis    Back problem    BPH (benign prostatic hyperplasia)    Cardiomyopathy (HCC)    Coronary atherosclerosis    Depression    Diabetes (HCC)    Esophageal reflux    Heart valve disease    High blood pressure    High cholesterol    Peripheral vascular disease    Shortness of breath    related to smoking     Visual impairment    glasses       Past Surgical History  Past Surgical History:   Procedure Laterality Date    Cardiac catheterization  2016    [facility]    Cath drug eluting stent      Colonoscopy  04/22/2024    Colonoscopy N/A 4/22/2024    Procedure: COLONOSCOPY;  Surgeon: Attila Li MD;  Location: [facility] ENDOSCOPY    Esophagoscopy,diagnostic  04/22/2024    Dr. Li    Hernia surgery      as a child    Nasal scopy,remv part ethmoid  10/14/2020    Nasal scopy,rmv tiss maxill sinus  10/14/2020    Repair of nasal septum  10/14/2020       Family History  Family History   Problem Relation Age of Onset    Diabetes Father     Diabetes Paternal Aunt     Diabetes Paternal Uncle     Seizure Disorder Mother     No Known Problems Brother     No Known Problems Daughter     Glaucoma Neg     Macular degeneration Neg         Social History  Social History     Socioeconomic History    Marital status:    Tobacco Use    Smoking status: Former     Current packs/day: 1.00     Average packs/day: 1 pack/day for 20.0 years (20.0 ttl pk-yrs)     Types: Cigarettes    Smokeless tobacco: Never   Vaping Use    Vaping status: Every Day    Substances: Nicotine   Substance and Sexual Activity    Alcohol use: Yes     Comment: 1-2 drinks a week    Drug use: No           Current Medications:  Current Facility-Administered Medications   Medication Dose Route Frequency    ferrous sulfate 300 (60 Fe) MG/5ML oral syrup 300 mg  300 mg Oral BID    insulin degludec (Tresiba) 100 units/mL flextouch 15 Units  15 Units Subcutaneous Daily    insulin regular human (Novolin R, Humulin R) 100 UNIT/ML injection 1-5 Units  1-5 Units Subcutaneous 4 times per day    aspirin chewable tab 81 mg  81 mg Per NG Tube Daily    dextrose 10% infusion (TPN no rate)   Intravenous Continuous PRN    sodium bicarbonate tab 650 mg  650 mg Per G Tube PRN    And    pancrelipase (Lip-Prot-Amyl) (Zenpep) DR particles cap 10,000 Units  10,000 Units Per G Tube PRN    docusate (Colace) 50 MG/5ML oral solution 100 mg  100 mg Per NG Tube BID    potassium chloride 40 mEq in 250mL sodium chloride 0.9% IVPB premix  40 mEq Intravenous Once    acetaminophen (Ofirmev) 10 mg/mL infusion premix 1,000 mg  1,000 mg Intravenous Q6H    furosemide (Lasix) 10 mg/mL injection 20 mg  20 mg Intravenous BID (Diuretic)    ipratropium-albuterol (Duoneb) 0.5-2.5 (3) MG/3ML inhalation solution 3 mL  3 mL Nebulization TID    norepinephrine (Levophed) 8 mg in dextrose 5% 250 mL infusion  0.5-8 mcg/min Intravenous Continuous    ipratropium-albuterol (Duoneb) 0.5-2.5 (3) MG/3ML inhalation solution 3 mL  3 mL Nebulization Q4H PRN    DOBUTamine (Dobutrex) 500 mg in sodium chloride 0.9% 125 mL infusion  2 mcg/kg/min (Dosing Weight) Intravenous Continuous    piperacillin-tazobactam (Zosyn) 3.375 g in dextrose 5%  100 mL IVPB-ADDV  3.375 g Intravenous Q8H    budesonide (Pulmicort) 0.5 MG/2ML nebulizer suspension 0.5 mg  0.5 mg Nebulization 2 times daily    rosuvastatin (Crestor) tab 20 mg  20 mg Per NG Tube Nightly    LORazepam (Ativan) tab 1 mg  1 mg Oral Q1H PRN    Or    LORazepam (Ativan) 2 mg/mL injection 1 mg  1 mg Intravenous Q1H PRN    LORazepam (Ativan) tab 2 mg  2 mg Oral Q1H PRN    Or    LORazepam (Ativan) 2 mg/mL injection 2 mg  2 mg Intravenous Q1H PRN    LORazepam (Ativan) tab 3 mg  3 mg Oral Q1H PRN    Or    LORazepam (Ativan) 2 mg/mL injection 3 mg  3 mg Intravenous Q1H PRN    LORazepam (Ativan) 2 mg/mL injection 4 mg  4 mg Intravenous Q30 Min PRN    LORazepam (Ativan) 2 mg/mL injection 5 mg  5 mg Intravenous Q15 Min PRN    LORazepam (Ativan) 2 mg/mL injection 6 mg  6 mg Intravenous Q10 Min PRN    thiamine (Vitamin B1) tab 100 mg  100 mg Oral Daily    multivitamin (Tab-A-Елена/Beta Carotene) tab 1 tablet  1 tablet Oral Daily    folic acid (Folvite) tab 1 mg  1 mg Oral Daily    OLANZapine (Zyprexa) 5 mg in sterile water for injection (PF) IM injection  5 mg Intramuscular Nightly    haloperidol lactate (Haldol) 5 MG/ML injection 5 mg  5 mg Intramuscular Q6H PRN    LORazepam (Ativan) 2 mg/mL injection 1 mg  1 mg Intramuscular Q6H PRN    haloperidol lactate (Haldol) 5 MG/ML injection 2 mg  2 mg Intravenous Q4H PRN    sugammadex (Bridion) 200 MG/2ML injection 180 mg  2 mg/kg Intravenous Once    sodium chloride 0.9% infusion   Intravenous Continuous    melatonin tab 3 mg  3 mg Oral Nightly PRN    sennosides (Senokot) tab 8.6 mg  8.6 mg Oral BID    polyethylene glycol (PEG 3350) (Miralax) 17 g oral packet 17 g  17 g Oral Daily PRN    bisacodyl (Dulcolax) 10 MG rectal suppository 10 mg  10 mg Rectal Daily PRN    famotidine (Pepcid) tab 20 mg  20 mg Oral BID    Or    famotidine (Pepcid) 20 mg/2mL injection 20 mg  20 mg Intravenous BID    dexmedeTOMIDine in sodium chloride 0.9% (Precedex) 400 mcg/100mL infusion premix   0.2-1.5 mcg/kg/hr (Dosing Weight) Intravenous Continuous    [Held by provider] metoprolol tartrate (Lopressor) tab 25 mg  25 mg Oral 2x Daily(Beta Blocker)    potassium chloride 20 mEq/100mL IVPB premix 20 mEq  20 mEq Intravenous PRN    Or    potassium chloride 40 mEq/100mL IVPB premix (central line) 40 mEq  40 mEq Intravenous PRN    magnesium sulfate in dextrose 5% 1 g/100mL infusion premix 1 g  1 g Intravenous PRN    magnesium sulfate in sterile water for injection 2 g/50mL IVPB premix 2 g  2 g Intravenous PRN    mupirocin (Bactroban) 2% nasal ointment 1 Application  1 Application Nasal BID    chlorhexidine gluconate (Peridex) 0.12 % oral solution 15 mL  15 mL Mouth/Throat BID    ascorbic acid (Vitamin C) tab 500 mg  500 mg Oral TID    enoxaparin (Lovenox) 40 MG/0.4ML SUBQ injection 40 mg  40 mg Subcutaneous Daily    acetaminophen (Tylenol) tab 650 mg  650 mg Oral Q4H PRN    Or    HYDROcodone-acetaminophen (Norco) 5-325 MG per tab 1 tablet  1 tablet Oral Q4H PRN    Or    HYDROcodone-acetaminophen (Norco) 5-325 MG per tab 2 tablet  2 tablet Oral Q4H PRN    morphINE PF 2 MG/ML injection 2 mg  2 mg Intravenous Q2H PRN    clopidogrel (Plavix) tab 75 mg  75 mg Oral Daily    glucose (Dex4) 15 GM/59ML oral liquid 15 g  15 g Oral Q15 Min PRN    Or    glucose (Glutose) 40% oral gel 15 g  15 g Oral Q15 Min PRN    Or    glucose-vitamin C (Dex-4) chewable tab 4 tablet  4 tablet Oral Q15 Min PRN    Or    dextrose 50% injection 50 mL  50 mL Intravenous Q15 Min PRN    Or    glucose (Dex4) 15 GM/59ML oral liquid 30 g  30 g Oral Q15 Min PRN    Or    glucose (Glutose) 40% oral gel 30 g  30 g Oral Q15 Min PRN    Or    glucose-vitamin C (Dex-4) chewable tab 8 tablet  8 tablet Oral Q15 Min PRN    tamsulosin (Flomax) cap 0.4 mg  0.4 mg Oral BID    propofol (Diprivan) 10 mg/mL infusion premix  5-50 mcg/kg/min (Dosing Weight) Intravenous Continuous    amiodarone (Pacerone) tab 200 mg  200 mg Oral TID CC     Medications Prior to Admission    Medication Sig    ENTRESTO 24-26 MG Oral Tab Entresto 24 mg-26 mg tablet, [RxNorm: 5920259]    magnesium 250 MG Oral Tab Take 1 tablet (250 mg total) by mouth every other day.    cyanocobalamin 500 MCG Oral Tab Take 1 tablet (500 mcg total) by mouth every other day.    metFORMIN HCl 1000 MG Oral Tab Take 1 tablet (1,000 mg total) by mouth 2 (two) times daily with meals.    amLODIPine 5 MG Oral Tab Take 1 tablet (5 mg total) by mouth daily.    carvedilol 25 MG Oral Tab Take 1 tablet (25 mg total) by mouth 2 (two) times daily with meals.    clopidogrel 75 MG Oral Tab Take 1 tablet (75 mg total) by mouth daily.    empagliflozin (JARDIANCE) 25 MG Oral Tab Take 1 tablet (25 mg total) by mouth daily.    furosemide 40 MG Oral Tab Take 1 tablet (40 mg total) by mouth daily.    rosuvastatin 20 MG Oral Tab Take 1 tablet (20 mg total) by mouth nightly.    tamsulosin 0.4 MG Oral Cap TAKE 2 CAPSULES BY MOUTH DAILY (Patient taking differently: Take 1 capsule (0.4 mg total) by mouth in the morning and 1 capsule (0.4 mg total) before bedtime.)    aspirin 81 MG Oral Tab EC Take 1 tablet (81 mg total) by mouth nightly.    Thiamine HCl 250 MG Oral Tab Take 1 tablet (250 mg total) by mouth every other day.    pyridoxine 100 MG Oral Tab Take 1 tablet (100 mg total) by mouth every other day.    Glucose Blood (CONTOUR NEXT TEST) In Vitro Strip To test 2 x daily  Dx E11.9    Glucose Blood (CONTOUR NEXT TEST) In Vitro Strip To check glucose bid   Dx  E11.9    Glucose Blood (LORAINE CONTOUR TEST) In Vitro Strip To test daily    LORAINE CONTOUR NEXT TEST In Vitro Strip To test 2 x daily       Allergies  No Known Allergies    Review of Systems:   As by Admitting/Attending    Results:   Laboratory Data:  Lab Results   Component Value Date    WBC 6.3 06/24/2025    HGB 8.4 (L) 06/24/2025    HCT 25.7 (L) 06/24/2025    .0 06/24/2025    CREATSERUM 0.62 (L) 06/24/2025    BUN 16 06/24/2025     06/24/2025    K 3.5 06/24/2025      06/24/2025    CO2 26.0 06/24/2025     (H) 06/24/2025    CA 8.0 (L) 06/24/2025    ALB 3.4 06/24/2025    ALKPHO 45 06/24/2025    TP 5.5 (L) 06/24/2025     (H) 06/24/2025    ALT 47 06/24/2025    PTT 30.6 06/19/2025    INR 1.17 06/19/2025    PTP 15.6 (H) 06/19/2025    TSH 0.696 06/19/2025    PSA 2.62 08/10/2023    ESRML 123 (H) 06/23/2025    MG 2.0 06/24/2025         Imaging:  XR CHEST AP PORTABLE  (CPT=71045)  Result Date: 6/23/2025  CONCLUSION:   Increasing bilateral pleural effusions and pulmonary edema.  Right medial and left basilar chest tubes are again seen.  Recommend correlation for malfunction.  Endotracheal tube, esophagogastric tube, and Treadwell-Mike catheter are unchanged.      Dictated by (CST): Malachi Wilson MD on 6/23/2025 at 7:25 AM     Finalized by (CST): Malachi Wilson MD on 6/23/2025 at 7:39 AM          XR CHEST AP PORTABLE  (CPT=71045)  Result Date: 6/22/2025  CONCLUSION:   Mild improved vascular congestion.  Mild improved aeration of the right lower lung.  Stable opacity in the left lung base which may reflect combination of pleural effusion and parenchymal opacities.  Support devices not significantly changed.    Dictated by (CST): Antonio Hitchcock MD on 6/22/2025 at 7:35 AM     Finalized by (CST): Antonio Hitchcock MD on 6/22/2025 at 7:37 AM            Vital Signs:   Blood pressure 103/59, pulse 68, temperature 97.9 °F (36.6 °C), temperature source Axillary, resp. rate 25, height 66\", weight 96.5 kg (212 lb 11.9 oz), SpO2 98%.    Mental Status Exam:   Appearance: Stated age, male in hospital gown, laying down in hospital bed, intubated and sedated.  Psychomotor: Patient has been demonstrating improvement in restlessness and agitation when weaned from mechanical ventilation.  Orientation: obtunded.  Gait: Not evaluated.  Attitude/Coorperation: limited cooperation due to sedation.  Behavior: episodes of restlessness and agitation.  Speech: Speech impacted due to sedation.  Mood:  anxious  Affect: restricted  Thought process: Confused, disorganized  Thought content: No reports of suicidal or homicidal ideation.  Perceptions: Patient has been demonstrating response to internal stimuli.  Concentration: Grossly impaired  Memory: Grossly impaired  Intellect: Average.  Judgment and Insight: Questionable.     Impression:     Episodic mood disorder.  Delirium due to a medical condition    Aortic stenosis   Hx of CABG      Patient is a 59 year old polish speaking white  male with a PMH of CAD, PVD, hypertension, hyperlipidemia, diabetes, severe aortic stenosis and recent CABG on 6/19 who continue indicated sedation and intubation and SBT failed due to excessive agitation..    6/22/2025: The patient has been demonstrating delirium episode with alternation in mood and cognition with episodes of  increased confusion, restlessness, agitation and response to internal stimuli. This has led to extended extubation and sedation of the patient.    QTc is 485.  Otherwise current EKG demonstrated sinus rhythm.  Magnesium level is 1.8.  Patient with no risk of arrhythmia other than the underlying recent surgical procedure.  It is appropriate to have nightly sedation with the Zyprexa to stabilize the mood currently and utilize the combination of Haldol and Ativan 5/2 prior to extubation.  Otherwise tapering sedation slowly utilizing Haldol 2 mg as needed for restlessness.    6/23/2025: The patient has been demonstrating improvement in his delirium following administration of Haldol 5 mg and Ativan 2 mg prior to lowering of sedation and extubation. The patient was able to follow commands and demonstrated less agitation and confusion during SBT today. Patient continues to be sedated and intubated due to tachypnea.     Patient demonstrating improvement in condition after being started on diuretics for fluid in the lungs. Today magnesium level is 2.1    6/24/2025: The patient has continued to demonstrate  improvement in his agitation and confusion when sedation is lowered and extubation is attempted. Currently remains intubated and sedated with another extubation attempt scheduled for later today.      Discussed risk and benefit, acknowledging the current symptom and severity.  At this point, I would recommend the following approach:     Focus on safety  Focus on education and support.  Focus on insight orientation helping the patient understand diagnosis and treatment plan.  Required 1 dose of magnesium 2 g IV today.  Continued Zyprexa 5 mg nightly  Give Haldol 5 mg and Ativan 2 mg IM 30 min prior to extubation.  Continue Haldol 2 mg PRN for restlessness.  Processed with patient/family/RN at length, the initiation of the above psychotropic medications I advised the patient of the risks, benefits, alternatives and potential side effects. The patient consents to administration of the medications and understands the right to refuse medications at any time. The patient verbalized understanding.   Coordinate plan with team    Orders This Visit:  Orders Placed This Encounter   Procedures    Basic Metabolic Panel (8)    CBC, Platelet; No Differential    CBC With Differential With Platelet    Basic Metabolic Panel (8)    Prothrombin Time (PT)    PTT, Activated    Fibrinogen Activity    Magnesium    Magnesium    Expanded Arterial Blood Gas    Arterial blood gas    Assay, Thyroid Stim Hormone    Lactic Acid, Plasma    Hepatic Function Panel (7)    CBC, Platelet; No Differential    Lactic Acid Reflex Post Positive    Hemoglobin & Hematocrit    CBC, Platelet; No Differential    Magnesium    Lactic Acid, Plasma    Arterial blood gas    Basic Metabolic Panel (8)    Lipid Panel    Venous Blood Gas    Arterial blood gas    Magnesium    Comp Metabolic Panel (14)    CBC, Platelet; No Differential    Expanded Arterial Blood Gas    CBC, Platelet; No Differential    Magnesium    Comp Metabolic Panel (14)    Sed Rate, Westergren  (Automated)    Magnesium    Expanded Arterial Blood Gas    Venous Blood Gas    CBC With Differential With Platelet    Arterial blood gas    Basic Metabolic Panel (8)    Magnesium    Basic Metabolic Panel (8)    Phosphorus    Magnesium    Potassium    Potassium    Expanded Arterial Blood Gas    Basic Metabolic Panel (8)    CBC With Differential With Platelet    Magnesium    Prepare RBC STAT    ABORH Confirmation    Prepare platelets Once    Prepare fresh frozen plasma Once    Prepare cryoprecipitate Once    Type and screen    Prepare RBC Once    ABORH (Blood Type)    Antibody Screen    Specimen to Pathology Tissue    MRSA Screen by PCR    Sputum culture    Blood Culture    Emergency MRSA Screen by PCR       Meds This Visit:  Requested Prescriptions      No prescriptions requested or ordered in this encounter       Chivo Solares MD  6/24/2025    Note to Patient: The 21st Century Cures Act makes medical notes like these available to patients in the interest of transparency. However, be advised this is a medical document. It is intended as peer to peer communication. It is written in medical language and may contain abbreviations or verbiage that are unfamiliar. It may appear blunt or direct. Medical documents are intended to carry relevant information, facts as evident, and the clinical opinion of the practitioner. This note may have been transcribed using a voice dictation system. Voice recognition errors may occur. This should not be taken to alter the content or meaning of this note.

## 2025-06-24 NOTE — PLAN OF CARE
Problem: Safety Risk - Non-Violent Restraints  Goal: Patient will remain free from self-harm  Description: INTERVENTIONS:  - Apply the least restrictive restraint to prevent harm  - Notify patient and family of reasons restraints applied  - Assess for any contributing factors to confusion (electrolyte disturbances, delirium, medications)  - Discontinue any unnecessary medical devices as soon as possible  - Assess the patient's physical comfort, circulation, skin condition, hydration, nutrition and elimination needs   - Reorient and redirection as needed  - Assess for the need to continue restraints  6/23/2025 2010 by Karson Park RN  Outcome: Progressing  6/23/2025 2010 by Karson Park, RN  Outcome: Progressing  6/23/2025 1615 by Karson Park, RN  Outcome: Progressing  6/23/2025 1614 by Karson Park, RN  Outcome: Progressing

## 2025-06-24 NOTE — PROGRESS NOTES
06/24/25 0255   Vent Information   Vent Mode VC/AC   Settings   FiO2 (%) 30 %   Resp Rate (Set) 12   Vt (Set, mL) 450 mL   PEEP/CPAP (cm H2O) 5 cm H20   ETT   Placement Date/Time: 06/19/25 (c) 4045   Airway Size: 8 mm  Cuffed: Cuffed  Insertion attempts: 1  Technique: Direct laryngoscopy  Placement Verification: Capnometry  Placed By: Anesthesiologist   Secured at (cm) 26 cm     Pt. is stable on the monitor and the vent, no acute changes overnight. Suction is provided and as needed. Bilateral breath sounds auscultated. RT will continue to monitor.

## 2025-06-24 NOTE — PROGRESS NOTES
Cardiology Progress Note    Jose Alberto Lizama Patient Status:  Inpatient    1965 MRN [id number]   Location [facility] 2W/SW Attending Erin Richardson MD   Hosp Day # 5 PCP Fiona Sweeney MD     Interval Note:  Patient seen and examined  Remains intubated and sedated  For attempt at SBT and extubation hopefully today    --------------------------------------------------------------------------------------------------------------------------------  ROS 12 systems reviewed, pertinent findings above.  ROS    History:  Past Medical History[1]  Past Surgical History[2]  Family History[3]   reports that he has quit smoking. His smoking use included cigarettes. He has a 20 pack-year smoking history. He has never used smokeless tobacco. He reports current alcohol use. He reports that he does not use drugs.    Objective:   Temp: 97.3 °F (36.3 °C)  Pulse: 68  Resp: 17  BP: 95/57  AO: 97/44  FiO2 (%): 30 %    Intake/Output:     Intake/Output Summary (Last 24 hours) at 2025 0933  Last data filed at 2025 0700  Gross per 24 hour   Intake 3461.55 ml   Output 2655 ml   Net 806.55 ml       Physical Exam:    General: Intubated, sedated  HEENT: Normocephalic, anicteric sclera, neck supple.  Neck: No JVD, carotids 2+, no bruits.  Cardiac: Regular rate and rhythm. S1, S2 normal. No murmur, pericardial rub, S3.  Lungs: Clear without wheezes, rales, rhonchi or dullness.  Normal excursions and effort.  Abdomen: Soft, non-tender. BS-present.  Extremities: Without clubbing, cyanosis or edema.  Peripheral pulses are 2+.  Neurologic: Non-focal  Skin: Warm and dry.       Assessment   Severe AS, CAD status post CABG X2 and SAVR,  MELINDA clip 25 (LIMA-LAD, VG-D; Inspiris #21  tissue valve, #40 MELINDA clip)  Ischemic dermopathy, EF 30%  Anemia  Hypotension  Hyperlipidemia  Diabetes  Peripheral vascular disease      Plan  HD catheter removed yesterday  Repeat echo shows EF 35%, intact bioprosthetic valve  Continue  amiodarone, aspirin, Plavix, statin  Judicious use of IV diuretics, consider using right IJ cordis support for transducing CVP's  Extubation per pulmonology    Thank you for allowing me to take part in the care of Jose Alberto Lizama. Please call with any questions of concerns.    Level of care: L4    Noemy Ackerman DO  [facility] [place]   Interventional Cardiac and Vascular Services      Alysemaria fernanda Ackerman   June 23, 2025  12:11 PM         [1]   Past Medical History:   Aortic stenosis    Back problem    BPH (benign prostatic hyperplasia)    Cardiomyopathy (HCC)    Coronary atherosclerosis    Depression    Diabetes (HCC)    Esophageal reflux    Heart valve disease    High blood pressure    High cholesterol    Peripheral vascular disease    Shortness of breath    related to smoking     Visual impairment    glasses   [2]   Past Surgical History:  Procedure Laterality Date    Cardiac catheterization  2016    [facility]    Cath drug eluting stent      Colonoscopy  04/22/2024    Colonoscopy N/A 4/22/2024    Procedure: COLONOSCOPY;  Surgeon: Attila Li MD;  Location: [facility] ENDOSCOPY    Esophagoscopy,diagnostic  04/22/2024    Dr. Li    Hernia surgery      as a child    Nasal scopy,remv part ethmoid  10/14/2020    Nasal scopy,rmv tiss maxill sinus  10/14/2020    Repair of nasal septum  10/14/2020   [3]   Family History  Problem Relation Age of Onset    Diabetes Father     Diabetes Paternal Aunt     Diabetes Paternal Uncle     Seizure Disorder Mother     No Known Problems Brother     No Known Problems Daughter     Glaucoma Neg     Macular degeneration Neg

## 2025-06-24 NOTE — PLAN OF CARE
Cared for patient from [id number]  Drips unchanged, titrating insulin per protocol  Sedated overnight, plan for SAT/SBT this am w/ usage of ativan/haldol prior      Problem: CARDIOVASCULAR - ADULT  Goal: Maintains optimal cardiac output and hemodynamic stability  Description: INTERVENTIONS:  - Monitor vital signs, rhythm, and trends  - Monitor for bleeding, hypotension and signs of decreased cardiac output  - Evaluate effectiveness of vasoactive medications to optimize hemodynamic stability  - Monitor arterial and/or venous puncture sites for bleeding and/or hematoma  - Assess quality of pulses, skin color and temperature  - Assess for signs of decreased coronary artery perfusion - ex. Angina  - Evaluate fluid balance, assess for edema, trend weights  Outcome: Progressing  Goal: Absence of cardiac arrhythmias or at baseline  Description: INTERVENTIONS:  - Continuous cardiac monitoring, monitor vital signs, obtain 12 lead EKG if indicated  - Evaluate effectiveness of antiarrhythmic and heart rate control medications as ordered  - Initiate emergency measures for life threatening arrhythmias  - Monitor electrolytes and administer replacement therapy as ordered  Outcome: Progressing     Problem: RESPIRATORY - ADULT  Goal: Achieves optimal ventilation and oxygenation  Description: INTERVENTIONS:  - Assess for changes in respiratory status  - Assess for changes in mentation and behavior  - Position to facilitate oxygenation and minimize respiratory effort  - Oxygen supplementation based on oxygen saturation or ABGs  - Provide Smoking Cessation handout, if applicable  - Encourage broncho-pulmonary hygiene including cough, deep breathe, Incentive Spirometry  - Assess the need for suctioning and perform as needed  - Assess and instruct to report SOB or any respiratory difficulty  - Respiratory Therapy support as indicated  - Manage/alleviate anxiety  - Monitor for signs/symptoms of CO2 retention  Outcome: Progressing      Problem: GASTROINTESTINAL - ADULT  Goal: Minimal or absence of nausea and vomiting  Description: INTERVENTIONS:  - Maintain adequate hydration with IV or PO as ordered and tolerated  - Nasogastric tube to low intermittent suction as ordered  - Evaluate effectiveness of ordered antiemetic medications  - Provide nonpharmacologic comfort measures as appropriate  - Advance diet as tolerated, if ordered  - Obtain nutritional consult as needed  - Evaluate fluid balance  Outcome: Progressing  Goal: Maintains or returns to baseline bowel function  Description: INTERVENTIONS:  - Assess bowel function  - Maintain adequate hydration with IV or PO as ordered and tolerated  - Evaluate effectiveness of GI medications  - Encourage mobilization and activity  - Obtain nutritional consult as needed  - Establish a toileting routine/schedule  - Consider collaborating with pharmacy to review patient's medication profile  Outcome: Progressing     Problem: GENITOURINARY - ADULT  Goal: Absence of urinary retention  Description: INTERVENTIONS:  - Assess patient’s ability to void and empty bladder  - Monitor intake/output and perform bladder scan as needed  - Follow urinary retention protocol/standard of care  - Consider collaborating with pharmacy to review patient's medication profile  - Implement strategies to promote bladder emptying  Outcome: Progressing     Problem: METABOLIC/FLUID AND ELECTROLYTES - ADULT  Goal: Glucose maintained within prescribed range  Description: INTERVENTIONS:  - Monitor Blood Glucose as ordered  - Assess for signs and symptoms of hyperglycemia and hypoglycemia  - Administer ordered medications to maintain glucose within target range  - Assess barriers to adequate nutritional intake and initiate nutrition consult as needed  - Instruct patient on self management of diabetes  Outcome: Progressing  Goal: Electrolytes maintained within normal limits  Description: INTERVENTIONS:  - Monitor labs and rhythm and  assess patient for signs and symptoms of electrolyte imbalances  - Administer electrolyte replacement as ordered  - Monitor response to electrolyte replacements, including rhythm and repeat lab results as appropriate  - Fluid restriction as ordered  - Instruct patient on fluid and nutrition restrictions as appropriate  Outcome: Progressing  Goal: Hemodynamic stability and optimal renal function maintained  Description: INTERVENTIONS:  - Monitor labs and assess for signs and symptoms of volume excess or deficit  - Monitor intake, output and patient weight  - Monitor urine specific gravity, serum osmolarity and serum sodium as indicated or ordered  - Monitor response to interventions for patient's volume status, including labs, urine output, blood pressure (other measures as available)  - Encourage oral intake as appropriate  - Instruct patient on fluid and nutrition restrictions as appropriate  Outcome: Progressing     Problem: SKIN/TISSUE INTEGRITY - ADULT  Goal: Incision(s), wounds(s) or drain site(s) healing without S/S of infection  Description: INTERVENTIONS:  - Assess and document risk factors for pressure ulcer development  - Assess and document skin integrity  - Assess and document dressing/incision, wound bed, drain sites and surrounding tissue  - Implement wound care per orders  - Initiate isolation precautions as appropriate  - Initiate Pressure Ulcer prevention bundle as indicated  Outcome: Progressing  Goal: Oral mucous membranes remain intact  Description: INTERVENTIONS  - Assess oral mucosa and hygiene practices  - Implement preventative oral hygiene regimen  - Implement oral medicated treatments as ordered  Outcome: Progressing     Problem: HEMATOLOGIC - ADULT  Goal: Maintains hematologic stability  Description: INTERVENTIONS  - Assess for signs and symptoms of bleeding or hemorrhage  - Monitor labs and vital signs for trends  - Administer supportive blood products/factors, fluids and medications as  ordered and appropriate  - Administer supportive blood products/factors as ordered and appropriate  Outcome: Progressing  Goal: Free from bleeding injury  Description: (Example usage: patient with low platelets)  INTERVENTIONS:  - Avoid intramuscular injections, enemas and rectal medication administration  - Ensure safe mobilization of patient  - Hold pressure on venipuncture sites to achieve adequate hemostasis  - Assess for signs and symptoms of internal bleeding  - Monitor lab trends  - Patient is to report abnormal signs of bleeding to staff  - Avoid use of toothpicks and dental floss  - Use electric shaver for shaving  - Use soft bristle tooth brush  - Limit straining and forceful nose blowing  Outcome: Progressing     Problem: MUSCULOSKELETAL - ADULT  Goal: Return mobility to safest level of function  Description: INTERVENTIONS:  - Assess patient stability and activity tolerance for standing, transferring and ambulating w/ or w/o assistive devices  - Assist with transfers and ambulation using safe patient handling equipment as needed  - Ensure adequate protection for wounds/incisions during mobilization  - Obtain PT/OT consults as needed  - Advance activity as appropriate  - Communicate ordered activity level and limitations with patient/family  Outcome: Progressing     Problem: NEUROLOGICAL - ADULT  Goal: Achieves stable or improved neurological status  Description: INTERVENTIONS  - Assess for and report changes in neurological status  - Initiate measures to prevent increased intracranial pressure  - Maintain blood pressure and fluid volume within ordered parameters to optimize cerebral perfusion and minimize risk of hemorrhage  - Monitor temperature, glucose, and sodium. Initiate appropriate interventions as ordered  Outcome: Progressing     Problem: Safety Risk - Non-Violent Restraints  Goal: Patient will remain free from self-harm  Description: INTERVENTIONS:  - Apply the least restrictive restraint to  prevent harm  - Notify patient and family of reasons restraints applied  - Assess for any contributing factors to confusion (electrolyte disturbances, delirium, medications)  - Discontinue any unnecessary medical devices as soon as possible  - Assess the patient's physical comfort, circulation, skin condition, hydration, nutrition and elimination needs   - Reorient and redirection as needed  - Assess for the need to continue restraints  Outcome: Progressing     Problem: Patient Centered Care  Goal: Patient preferences are identified and integrated in the patient's plan of care  Description: Interventions:  - What would you like us to know as we care for you? Polish speaking  - Provide timely, complete, and accurate information to patient/family  - Incorporate patient and family knowledge, values, beliefs, and cultural backgrounds into the planning and delivery of care  - Encourage patient/family to participate in care and decision-making at the level they choose  - Honor patient and family perspectives and choices  Outcome: Progressing     Problem: Patient/Family Goals  Goal: Patient/Family Long Term Goal  Description: Patient's Long Term Goal: MOISE    Interventions:  - MOISE  - See additional Care Plan goals for specific interventions  Outcome: Progressing  Goal: Patient/Family Short Term Goal  Description: Patient's Short Term Goal: MOISE    Interventions:   - MOISE  - See additional Care Plan goals for specific interventions  Outcome: Progressing     Problem: Diabetes/Glucose Control  Goal: Glucose maintained within prescribed range  Description: INTERVENTIONS:  - Monitor Blood Glucose as ordered  - Assess for signs and symptoms of hyperglycemia and hypoglycemia  - Administer ordered medications to maintain glucose within target range  - Assess barriers to adequate nutritional intake and initiate nutrition consult as needed  - Instruct patient on self management of diabetes  Outcome: Progressing     Problem: Impaired  Functional Mobility  Goal: Achieve highest/safest level of mobility/gait  Description: Interventions:  - Assess patient's functional ability and stability  - Promote increasing activity/tolerance for mobility and gait  - Educate and engage patient/family in tolerated activity level and precautions  - Recommend use of chair position in bed 3 times per day  Outcome: Progressing     Problem: Impaired Cognition  Goal: Patient will exhibit improved attention, thought processing and/or memory  Description: Interventions:  - Allow additional time for processing after asking questions or providing instructions  Outcome: Progressing

## 2025-06-24 NOTE — PROGRESS NOTES
[facility]  part of [facility]    Progress Note    Jose Alberto Lizama Patient Status:  Inpatient    1965 MRN [id number]   Location [facility] 2W/SW Attending Tammy Zacarias MD   Hosp Day # 5 PCP Fiona Sweeney MD     Subjective:  Pt currently intubated & sedated on full vent support.   No visitors at bedside.   Pt Polish-speaking only    Objective:  /61 (BP Location: Right arm)   Pulse 67   Temp 98.1 °F (36.7 °C) (Temporal)   Resp 19   Ht 5' 6\" (1.676 m)   Wt 212 lb 11.9 oz (96.5 kg)   SpO2 99%   BMI 34.34 kg/m²     Temp (24hrs), Av °F (37.8 °C), Min:98.1 °F (36.7 °C), Max:100.6 °F (38.1 °C)      Intake/Output:    Intake/Output Summary (Last 24 hours) at 2025 0821  Last data filed at 2025 0700  Gross per 24 hour   Intake 3461.55 ml   Output 2905 ml   Net 556.55 ml       Wt Readings from Last 3 Encounters:   25 212 lb 11.9 oz (96.5 kg)   25 201 lb 12.8 oz (91.5 kg)   25 200 lb (90.7 kg)       Allergies:  Allergies[1]    Labs:  Lab Results   Component Value Date    WBC 6.3 2025    HGB 8.4 2025    HCT 25.7 2025    .0 2025    CREATSERUM 0.62 2025    BUN 16 2025     2025    K 3.5 2025     2025    CO2 26.0 2025     2025    CA 8.0 2025    ALB 3.4 2025    ALKPHO 45 2025    BILT 0.4 2025    TP 5.5 2025     2025    ALT 47 2025    MG 2.0 2025       Physical Exam:  Blood pressure 109/61, pulse 67, temperature 98.1 °F (36.7 °C), temperature source Temporal, resp. rate 19, height 5' 6\" (1.676 m), weight 212 lb 11.9 oz (96.5 kg), SpO2 99%.  General: NAD  Neck: No JVD  Lungs: coarse bilaterally anteriorly/laterally   Mediastinal chest tube=20cc/12 hours-no air leak noted  Pleural chest tubes= 15cc/12 hours- no air leak noted  Heart: RRR, S1, S2  Abdomen: Soft/Round, NT/mildly distended, BS+x 4 diminished  & tympanic/no BM   Extremities: Warm, dry, trace UE & LE generalized edema bilat  Pulses: Doppler bilat DP/PT-feet warm  Skin: sternotomy incision drsg: CDI w/TPW intact/bilateral leg incisions BETTIE=CDI   Neurological: sedated     Assessment/Plan:  S/P CABG x 2/AVR/MELINDA CLIP/INSERTION OF LEFT FEMORAL  IABP POD # 5  Respiratory Failure requiring 30% FiO2 full vent support. Wean as cinthya/extubated when appropriate. Failed previous SBT due to agitation along with increased secretions.  CXR pending today. Sputum cx 6/22=negative. On Zosyn.   Pain meds as needed-limit/avoid narcotics w/post op altered MS. Can use Tylenol.   Post op altered MS w/agitation/confusion/restlessness of unknown etiology; suspect delirium and/or ETOH withdrawal. Psych eval on 6/22- follow recs. CT brain on 6/20=negative   Plan to increase activity when appropriate  Scds/Lovenox prophylaxis DVT prevention   Requiring Dobuta @ 1mcg for hemodynamic stability   Continue ICU status.   Expected post op volume overload- Lasix BID. Mckeon remains due to close I/O monitoring & immobility  Amio protocol for AF prevention as pt considered high risk for post op AF- plan to stop at discharge if no AF noted post op  Expected post op anemia w/o clinical significance/stable: may be slightly diluted due to volume overload- Iron x 1 month. Hb improved after transfusion yesterday  IV Insulin protocol w/hx: DM- transition today. Resume home regimen at/near discharge.    Nutrition- currently NPO- if unable to extubated- will start trickle feedings today  Hx: BPH- on home Flomax  Hx: PVD- on ASA/Plavix  K/Mag replacement per protocol   Blood CX 6/22 pending  Discharge planning: pt lives with his wife. Continue to monitor as pt progresses. SW/ involved w/discharge planning.    Plan of care discussed w/RN and CV Surgeon: Dr. Tara Mast, APRN  6/24/2025  8:21 AM         [1] No Known Allergies

## 2025-06-24 NOTE — RESPIRATORY THERAPY NOTE
Patient received intubated and on ventilator. SBT 5/5 done today, patient tachypneic throughout trial,  used while performing NIF and vital capacity. Proceeded to extubation to 4 L nasal cannula, tolerated well. Patient still tachypneic, expanded ABG drawn; results WNL. Incentive spirometer and EZPAP in room, patient able to do EZPAP but not incentive spirometer at this time. Frequent oral suctioning, moderate white thick secretions. Receiving DuoNeb TID and Pulmicort BID. BiPAP PRN ordered and standby in room.    Weaning paramters on vent:    NIF: -24 cmH2O  Vital capacity: 421 mL  Respiratory rate: 35  Average tidal volume: 283 mL  Minute ventilation: 10.4  RSBI 99     Latest Reference Range & Units 06/24/25 14:27   ABG PH 7.35 - 7.45  7.40   ABG PCO2 35 - 45 mm Hg 39   ABG PO2 80 - 100 mm Hg 75 (L)   ABG HCO3 21.0 - 27.0 mEq/L 24.5   ABG O2 SATURATION 94.0 - 100.0 % 95.8   Blood Gas Base Excess -2.0 - 2.0 mmol/L -0.5   TOTAL HEMOGLOBIN 13.0 - 17.5 g/dL 8.6 (L)   METHEMOGLOBIN 0.4 - 1.5 % SAT 0.5   POTASSIUM BLOOD GAS 3.6 - 5.1 mmol/L 3.6   SODIUM BLOOD  - 145 mmol/L 137   Lactic Acid (Blood Gas) 0.5 - 2.0 mmol/L 1.0   Oxygen Delivery Device  Hi Flow Nasal Canula   Oxygen Content 15.0 - 23.0 Vol% 11.5 (L)   CARBOXYHEMOGLOBIN 0.0 - 3.0 % 1.5   IONIZED CALCIUM 0.95 - 1.32 mmol/L 1.13   L/M L/min 4.00   SAMPLE SITE  Arterial Line   (L): Data is abnormally low

## 2025-06-24 NOTE — DIETARY NOTE
ADULT NUTRITION REASSESSMENT    Pt is at high nutrition risk.  Pt does not meet malnutrition criteria.      RECOMMENDATIONS TO MD:   See Nutrition Intervention for enteral nutrition (EN) and free water flushes (FWF) recommendation specifics     ADMITTING DIAGNOSIS:  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]  Aortic stenosis  PERTINENT PAST MEDICAL HISTORY: Past Medical History[1]    PATIENT STATUS:   Initial 06/22/25: Pt assessed due to critically ill - NPO day #3, Pt presented to the hospital for scheduled cardiac procedure. Has extensive PMH as listed above. POD#3 s/p CABG with aortic valve replacement. Remains orally intubated, sedated on propofol and precedex drip post-op. Pressor support x1. Failed SBT this AM. RN at bedside at time of visit. No family present for diet hx. Pt is well nourished, obese. Currently receiving ~500 non-nutritive kcal from Propofol LE and IV dextrose.       Update: Pt remains intubated, propofol stopped this AM. NPO day 5. Received consult to initiate tube feeds. Recs as below. Plan for attempt at SBT in hopes of extubation.  Will monitor and follow per protocol.    FOOD/NUTRITION RELATED HISTORY:  Appetite: Unknown appetite PTA  Intake: NPO day #5  Intake Meeting Needs: NPO and TF will meet needs once at goal rate  Percent Meals Eaten (last 6 days)       None        Food Allergies: No Known Food Allergies (NKFA)  Cultural/Ethnic/Evangelical Preferences: Not Obtained    GASTROINTESTINAL: +BM last reported 6/17/25 PTA, OG tube, and abdomen is soft, rounded, with hypoactive bowel sounds  UO: 2875 mL output over the past 24 hrs  I/Os: Net +6.7 L total this admission    MEDICATIONS: reviewed; Noted Propofol at 11.1 ml/hr (293 kcal) (stopped this AM); Cardiac electrolyte replacement protocol ordered; noted scheduled reglan and senokot; 1.2L intake from IV meds / 24 hrs    norepinephrine Stopped (06/23/25 1642)    DOBUTamine 0.5 mcg/kg/min (06/24/25 0532)    sodium chloride 10 mL/hr at 06/24/25 0000     dexmedetomidine 0.7 mcg/kg/hr (06/24/25 0256)    propofol 20 mcg/kg/min (06/24/25 0734)      ferrous sulfate  300 mg Oral BID    insulin degludec  15 Units Subcutaneous Daily    insulin regular human  1-5 Units Subcutaneous 4 times per day    acetaminophen  1,000 mg Intravenous Q6H    furosemide  20 mg Intravenous BID (Diuretic)    ipratropium-albuterol  3 mL Nebulization TID    piperacillin-tazobactam  3.375 g Intravenous Q8H    budesonide  0.5 mg Nebulization 2 times daily    rosuvastatin  20 mg Per NG Tube Nightly    thiamine  100 mg Oral Daily    multivitamin  1 tablet Oral Daily    folic acid  1 mg Oral Daily    OLANZapine (Zyprexa) 5 mg in sterile water for injection (PF) IM injection  5 mg Intramuscular Nightly    sugammadex  2 mg/kg Intravenous Once    sennosides  8.6 mg Oral BID    docusate sodium  100 mg Oral BID    famotidine  20 mg Oral BID    Or    famotidine  20 mg Intravenous BID    [Held by provider] metoprolol tartrate  25 mg Oral 2x Daily(Beta Blocker)    mupirocin  1 Application Nasal BID    chlorhexidine gluconate  15 mL Mouth/Throat BID    ascorbic acid  500 mg Oral TID    enoxaparin  40 mg Subcutaneous Daily    clopidogrel  75 mg Oral Daily    aspirin  81 mg Oral Daily    tamsulosin  0.4 mg Oral BID    amiodarone  200 mg Oral TID CC     LABS: reviewed; Recent A1c 8.1 on 6/10/25   Recent Labs     06/23/25  0435 06/23/25  1408 06/24/25  0408   * 105* 115*   BUN 13 14 16   CREATSERUM 0.66* 0.72 0.62*   CA 8.4* 8.2* 8.0*   MG 2.1 2.1 2.0    138 140   K 3.5 3.7 3.5    108 108   CO2 26.0 25.0 26.0   OSMOCALC 289 287 292     WEIGHT HISTORY:  Patient Weight(s) for the past 336 hrs:   Weight   06/24/25 0400 96.5 kg (212 lb 11.9 oz)   06/23/25 0429 97.8 kg (215 lb 9.8 oz)   06/22/25 0700 98.5 kg (217 lb 2.5 oz)   06/21/25 0500 97.6 kg (215 lb 2.7 oz)   06/20/25 0600 98.4 kg (216 lb 14.9 oz)   06/19/25 0603 92.1 kg (203 lb)     Wt Readings from Last 10 Encounters:   06/24/25 96.5 kg  (212 lb 11.9 oz)   06/18/25 91.5 kg (201 lb 12.8 oz)   06/09/25 90.7 kg (200 lb)   02/18/25 93.4 kg (206 lb)   09/24/24 99.8 kg (220 lb)   04/18/24 99.8 kg (220 lb)   01/19/24 96.6 kg (213 lb)   01/16/24 97.5 kg (215 lb)   08/10/23 96.6 kg (213 lb)   03/16/23 97.1 kg (214 lb)     ANTHROPOMETRICS:  HT: 167.6 cm (5' 6\")  Wt Readings from Last 1 Encounters:   06/24/25 96.5 kg (212 lb 11.9 oz)   Last weight: Likely accurate and wt up 19 lbs from admission wt with edema present   Dosing Weight: 92.1 kg (203 lbs) - taken on 6/19, utilized for anthropometric calculations  BMI: Body mass index is 32.57 kg/m².  BMI CLASSIFICATION: 30-34.9 kg/m2 - obesity class I  IBW/lbs (Calculated) Male: 142 lbs           142% IBW  Usual Body Wt: 200 lbs       101% UBW    NUTRITION RELATED PHYSICAL FINDINGS:  - Nutrition Focused Physical Exam (NFPE): well nourished and no wasting noted  - Fluid Accumulation: non-pitting Bilateral Upper extremity and Lower extremity and generalized. See RN documentation for details  - Skin Integrity: at risk and surgical wound(s). See RN documentation for details      NUTRITION DIAGNOSIS/PROBLEM:   Inadequate protein energy intake related to Decreased ability to consume sufficient energy as evidenced by orally intubated, sedated, unable to safely take adequate PO, NPO day #3.    NUTRITION DIAGNOSIS PROGRESS:  Improvement (unresolved) - Plan to initiate EN    NUTRITION INTERVENTION:     NUTRITION PRESCRIPTION:   Estimated Nutrition needs: --dosing wt of 92.1 kg - wt taken on 6/19/25  Calories: 1832 calories/day (PSU 2010 (7.7 L/min, 38.1 C Tmax, calculated on 6/24) or 20 calories per kg Dosing wt)   1282 - 1465 kcal/day = 70-80% estimated requirement 1st week critical illness  Protein:  g protein/day (1.5-2 g protein/kg Ideal body wt (IBW) (65kg))  Fluid Needs: 1 ml/kcal - adjust for clinical status    - Diet:       Procedures    NPO     - Enteral Nutrition Recommendations:   Promote at 20 ml/hr per OG  tube and advance by 10 mL q 8 hrs to goal rate of 45 mL/hr. Based on average 22 hour infusion time Goal rate provides 990 total TF volume, 990 kcal, 62 grams protein, 832 mL free water.    Flush with 30 mL H2O q 4 hrs  Provide 5 packet(s) Prosource daily (2 qAM, 2qPM, 1 at bedtime) (to provide 200 kcal, 55 g protein and 225 mL daily)  Goal rate and lipid calories from propofol (293 kcal) will provide 1483 kcal (16 kcal/kg), 117 g protein (1.8 g/kg IBW) and 1237 mL fluids daily.   Meets 81% of estimated energy and 100% of estimated protein needs.          - Nutrition Care Plan: Recommend EN (enteral nutrition) support if unable to take adequate PO safely within 1-2 days  - ONS (Oral Nutrition Supplements)/Meals/Snacks: None at this time   - Vitamin and mineral supplements: multivitamin/mineral and vitamin C  - Feeding assistance: NPO  - Nutrition education: diet education before discharge and not appropriate at this time  - Coordination of nutrition care: collaboration with other providers - discussed with RN on unit  - Discharge and transfer of nutrition care to new setting or provider: monitor plans - from home with spouse    MONITOR AND EVALUATE/NUTRITION GOALS:  - Food and Nutrient Intake:      Monitor: Ability to safely take adequate PO  - Food and Nutrient Administration:      Monitor: enteral nutrition initiation and need for temporary enteral nutrition  - Anthropometric Measurement:    Monitor weight  - Nutrition Goals:      allow wt loss due to fluid losses, labs within acceptable limits, euglycemia, minimize lean body mass loss, support body systems, and initiate nutrition support via EN for critical illness within the next 24 to 48 hrs    DIETITIAN FOLLOW UP: RD to follow and monitor nutrition status      Ana Maria Charles, MS, RD, LDN  Clinical Dietitian  [id number]         [1]   Past Medical History:   Aortic stenosis    Back problem    BPH (benign prostatic hyperplasia)    Cardiomyopathy (HCC)    Coronary  atherosclerosis    Depression    Diabetes (HCC)    Esophageal reflux    Heart valve disease    High blood pressure    High cholesterol    Peripheral vascular disease    Shortness of breath    related to smoking     Visual impairment    glasses

## 2025-06-24 NOTE — PAYOR COMM NOTE
--------------  CONTINUED STAY REVIEW     Payor: [facility]  Subscriber #:  [id number]  Authorization Number: [id number]    Admit date: 25  Admit time:  5:30 AM    REVIEW DOCUMENTATION:     Date of Service: 2025  2:14 PM       [facility]  part of Located within [facility]     Progress Note           Jose Alberto Lizama Patient Status:  Inpatient    1965 MRN [id number]   Location [facility] 2W/SW Attending Tammy Zacarias MD   Hosp Day # 5 PCP Fiona Sweeney MD         Subjective:   Patient seen and examined.  Intubated sedated tolerated spontaneous breathing trial this morning.  Following simple commands.  Tmax 100.6 degrees     Objective:   Blood pressure 128/65, pulse 92, temperature 97.9 °F (36.6 °C), temperature source Axillary, resp. rate 24, height 5' 6\" (1.676 m), weight 212 lb 11.9 oz (96.5 kg), SpO2 95%.  Intake/Output:                 Last 3 shifts: I/O last 3 completed shifts:  In: 4863.1 [I.V.:3502.1; Blood:436; NG/GT:175; IV PIGGYBACK:750]  Out: 4515 [Urine:4260; Emesis/NG output:150; Chest Tube:105]                 This shift: I/O this shift:  In: 100 [IV PIGGYBACK:100]  Out: -          Vent Settings: Vent Mode: CPAP;PS  FiO2 (%):  [30 %] 30 %  S RR:  [12] 12  S VT:  [450 mL] 450 mL  PEEP/CPAP (cm H2O):  [5 cm H20] 5 cm H20  MAP (cm H2O):  [9.9-12] 10     Hemodynamic parameters (last 24 hours): PAP: (35-45)/(16-22) 38/16  CO:  [4.3 L/min-5.2 L/min] 5.2 L/min  CI:  [2.1 L/min/m2-2.6 L/min/m2] 2.6 L/min/m2     Scheduled Meds: [Current Hospital Medications]    [Current Hospital Medications]         Current Facility-Administered Medications   Medication Dose Route Frequency    ferrous sulfate 300 (60 Fe) MG/5ML oral syrup 300 mg  300 mg Oral BID    insulin degludec (Tresiba) 100 units/mL flextouch 15 Units  15 Units Subcutaneous Daily    insulin regular human (Novolin R, Humulin R) 100 UNIT/ML injection 1-5 Units  1-5 Units Subcutaneous 4  times per day    aspirin chewable tab 81 mg  81 mg Per NG Tube Daily    dextrose 10% infusion (TPN no rate)   Intravenous Continuous PRN    sodium bicarbonate tab 650 mg  650 mg Per G Tube PRN     And    pancrelipase (Lip-Prot-Amyl) (Zenpep) DR particles cap 10,000 Units  10,000 Units Per G Tube PRN    docusate (Colace) 50 MG/5ML oral solution 100 mg  100 mg Per NG Tube BID    potassium chloride 40 mEq in 250mL sodium chloride 0.9% IVPB premix  40 mEq Intravenous Once    acetaminophen (Ofirmev) 10 mg/mL infusion premix 1,000 mg  1,000 mg Intravenous Q6H    furosemide (Lasix) 10 mg/mL injection 20 mg  20 mg Intravenous BID (Diuretic)    ipratropium-albuterol (Duoneb) 0.5-2.5 (3) MG/3ML inhalation solution 3 mL  3 mL Nebulization TID    norepinephrine (Levophed) 8 mg in dextrose 5% 250 mL infusion  0.5-8 mcg/min Intravenous Continuous    ipratropium-albuterol (Duoneb) 0.5-2.5 (3) MG/3ML inhalation solution 3 mL  3 mL Nebulization Q4H PRN    DOBUTamine (Dobutrex) 500 mg in sodium chloride 0.9% 125 mL infusion  2 mcg/kg/min (Dosing Weight) Intravenous Continuous    piperacillin-tazobactam (Zosyn) 3.375 g in dextrose 5% 100 mL IVPB-ADDV  3.375 g Intravenous Q8H    budesonide (Pulmicort) 0.5 MG/2ML nebulizer suspension 0.5 mg  0.5 mg Nebulization 2 times daily    rosuvastatin (Crestor) tab 20 mg  20 mg Per NG Tube Nightly    LORazepam (Ativan) tab 1 mg  1 mg Oral Q1H PRN     Or    LORazepam (Ativan) 2 mg/mL injection 1 mg  1 mg Intravenous Q1H PRN    LORazepam (Ativan) tab 2 mg  2 mg Oral Q1H PRN     Or    LORazepam (Ativan) 2 mg/mL injection 2 mg  2 mg Intravenous Q1H PRN    LORazepam (Ativan) tab 3 mg  3 mg Oral Q1H PRN     Or    LORazepam (Ativan) 2 mg/mL injection 3 mg  3 mg Intravenous Q1H PRN    LORazepam (Ativan) 2 mg/mL injection 4 mg  4 mg Intravenous Q30 Min PRN    LORazepam (Ativan) 2 mg/mL injection 5 mg  5 mg Intravenous Q15 Min PRN    LORazepam (Ativan) 2 mg/mL injection 6 mg  6 mg Intravenous Q10 Min PRN     thiamine (Vitamin B1) tab 100 mg  100 mg Oral Daily    multivitamin (Tab-A-Елена/Beta Carotene) tab 1 tablet  1 tablet Oral Daily    folic acid (Folvite) tab 1 mg  1 mg Oral Daily    OLANZapine (Zyprexa) 5 mg in sterile water for injection (PF) IM injection  5 mg Intramuscular Nightly    haloperidol lactate (Haldol) 5 MG/ML injection 5 mg  5 mg Intramuscular Q6H PRN    LORazepam (Ativan) 2 mg/mL injection 1 mg  1 mg Intramuscular Q6H PRN    haloperidol lactate (Haldol) 5 MG/ML injection 2 mg  2 mg Intravenous Q4H PRN    sugammadex (Bridion) 200 MG/2ML injection 180 mg  2 mg/kg Intravenous Once    sodium chloride 0.9% infusion   Intravenous Continuous    melatonin tab 3 mg  3 mg Oral Nightly PRN    sennosides (Senokot) tab 8.6 mg  8.6 mg Oral BID    polyethylene glycol (PEG 3350) (Miralax) 17 g oral packet 17 g  17 g Oral Daily PRN    bisacodyl (Dulcolax) 10 MG rectal suppository 10 mg  10 mg Rectal Daily PRN    famotidine (Pepcid) tab 20 mg  20 mg Oral BID     Or    famotidine (Pepcid) 20 mg/2mL injection 20 mg  20 mg Intravenous BID    dexmedeTOMIDine in sodium chloride 0.9% (Precedex) 400 mcg/100mL infusion premix  0.2-1.5 mcg/kg/hr (Dosing Weight) Intravenous Continuous    [Held by provider] metoprolol tartrate (Lopressor) tab 25 mg  25 mg Oral 2x Daily(Beta Blocker)    potassium chloride 20 mEq/100mL IVPB premix 20 mEq  20 mEq Intravenous PRN     Or    potassium chloride 40 mEq/100mL IVPB premix (central line) 40 mEq  40 mEq Intravenous PRN    magnesium sulfate in dextrose 5% 1 g/100mL infusion premix 1 g  1 g Intravenous PRN    magnesium sulfate in sterile water for injection 2 g/50mL IVPB premix 2 g  2 g Intravenous PRN    mupirocin (Bactroban) 2% nasal ointment 1 Application  1 Application Nasal BID    chlorhexidine gluconate (Peridex) 0.12 % oral solution 15 mL  15 mL Mouth/Throat BID    ascorbic acid (Vitamin C) tab 500 mg  500 mg Oral TID    enoxaparin (Lovenox) 40 MG/0.4ML SUBQ injection 40 mg  40 mg  Subcutaneous Daily    acetaminophen (Tylenol) tab 650 mg  650 mg Oral Q4H PRN     Or    HYDROcodone-acetaminophen (Norco) 5-325 MG per tab 1 tablet  1 tablet Oral Q4H PRN     Or    HYDROcodone-acetaminophen (Norco) 5-325 MG per tab 2 tablet  2 tablet Oral Q4H PRN    morphINE PF 2 MG/ML injection 2 mg  2 mg Intravenous Q2H PRN    clopidogrel (Plavix) tab 75 mg  75 mg Oral Daily    glucose (Dex4) 15 GM/59ML oral liquid 15 g  15 g Oral Q15 Min PRN     Or    glucose (Glutose) 40% oral gel 15 g  15 g Oral Q15 Min PRN     Or    glucose-vitamin C (Dex-4) chewable tab 4 tablet  4 tablet Oral Q15 Min PRN     Or    dextrose 50% injection 50 mL  50 mL Intravenous Q15 Min PRN     Or    glucose (Dex4) 15 GM/59ML oral liquid 30 g  30 g Oral Q15 Min PRN     Or    glucose (Glutose) 40% oral gel 30 g  30 g Oral Q15 Min PRN     Or    glucose-vitamin C (Dex-4) chewable tab 8 tablet  8 tablet Oral Q15 Min PRN    tamsulosin (Flomax) cap 0.4 mg  0.4 mg Oral BID    propofol (Diprivan) 10 mg/mL infusion premix  5-50 mcg/kg/min (Dosing Weight) Intravenous Continuous    amiodarone (Pacerone) tab 200 mg  200 mg Oral TID CC        Continuous Infusions: [Medication Infusions]    [Medication Infusions]   dextrose 10%      norepinephrine Stopped (06/23/25 1642)    DOBUTamine 0.5 mcg/kg/min (06/24/25 0532)    sodium chloride 10 mL/hr at 06/24/25 0000    dexmedetomidine Stopped (06/24/25 1200)    propofol Stopped (06/24/25 1047)        Physical Exam  Constitutional: no acute distress, arousable but appears somnolent  Eyes: PERRL  ENT: nares pateint  Neck: supple, no JVD  Cardio: RRR, S1 S2  Respiratory: Breath sounds with some scattered crackles present  GI: abdomen soft, non tender, active bowel sounds, no organomegaly  Extremities: no clubbing, cyanosis, + bilateral lower extremity edema  Neurologic: no gross motor deficits  Skin: warm, dry        Results:            Lab Results   Component Value Date     WBC 6.3 06/24/2025     HGB 8.4 06/24/2025      HCT 25.7 06/24/2025     .0 06/24/2025     CREATSERUM 0.62 06/24/2025     BUN 16 06/24/2025      06/24/2025     K 3.6 06/24/2025      06/24/2025     CO2 26.0 06/24/2025      06/24/2025     CA 8.0 06/24/2025     ALB 3.4 06/24/2025     ALKPHO 45 06/24/2025     BILT 0.4 06/24/2025     TP 5.5 06/24/2025      06/24/2025     ALT 47 06/24/2025     MG 2.0 06/24/2025         XR CHEST AP PORTABLE  (CPT=71045)  Result Date: 6/23/2025  CONCLUSION:      Increasing bilateral pleural effusions and pulmonary edema.  Right medial and left basilar chest tubes are again seen.  Recommend correlation for malfunction.  Endotracheal tube, esophagogastric tube, and Preston-Mike catheter are unchanged.      Dictated by (CST): Malachi Wilson MD on 6/23/2025 at 7:25 AM     Finalized by (CST): Malachi Wilson MD on 6/23/2025 at 7:39 AM              EKG 12 Lead  Result Date: 6/24/2025  Normal sinus rhythm Left axis deviation Nonspecific intraventricular block T wave abnormality, consider lateral ischemia Abnormal ECG When compared with ECG of 20-JUN-2025 04:27, No significant change was found Confirmed by TAD KONG ([id number]) on 6/24/2025 11:41:06 AM        Assessment   1.  POD #5 status post CABG and AVR  2.  Severe coronary disease  3.  Severe aortic stenosis  4.  Postoperative respiratory failure  5.  Possible EtOH withdrawal  6.  Anemia  7.  Diabetes mellitus  8.  Hypertension  9.  Hyperlipidemia  10.  Possible ORAL      Plan   -Patient presents status post CABG and AVR on 6/19/2025.  - Difficulty extubating postoperatively with concern for pulmonary edema possible mucous plugging and controlled initial component of EtOH withdrawal.  - Following some simple commands tolerated spontaneous breathing trial today.  Proceeded with extubation.  Currently on 4 L nasal cannula oxygen.  Wean as tolerated  - Postextubation ABG pending  - NIV as necessary depending on respiratory status  - Attempt to wean Precedex gtt.   Further recommendations per psych  - Dobutamine per cardiology recommendation  - Continue tube feedings  - DVT prophylaxis: Lovenox  - Discussed with family, CV surgeon, cardiologist, nursing staff      Upon my evaluation, this patient had a high probability of imminent or life-threatening deterioration due to respiratory failure, which required my direct attention, intervention, and personal management.     I have personally provided 30 minutes of critical care time, exclusive of time spent on separately billable procedures.  Time includes review of all pertinent laboratory/radiology results, discussion with consultants, and monitoring for potential decompensation.  Performed interventions included managing mechanical ventilation.        Teodoro Zamora DO  Pulmonary Critical Care Medicine  [facility]              OP REPORT      Date of Service: 6/19/2025  8:24 AM  Case Time: Procedures: Surgeons:   6/19/2025  8:24 AM CORONARY ARTERY BYPASS GRAFT X 2; UTILIZING THE LEFT INTERNAL MAMMARY ARTERY TO THE LAD; SVG TO THE DIAG; RIGHT LEG ENDOSCOPIC GREATER SAPHENOUS VEIN GRAFT HARVEST, LEFT LEG ENDOSCOPIC EXPLORATION; AORTIC VALVE REPLACEMENT WITH INSPIRIS TISSUE VALVE SIZE 21MM; LEFT ATRIAL APPENDAGE CLIP SIZE 40; INTRAOPERATIVE TRANSESOHPAGEAL ECHOCARDIOGRAM; INSERTION OF TEMPORARY VENTRICULAR PACING WIRE; STERNAL PLATING    Leonides Pacheco MD               Signed         Operative Note     Patient Name: Jose Alberto Lizama     Preoperative Diagnosis: 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]     Ischemic cardiomyopathy  Multivessel coronary disease  Severe aortic stenosis  Peripheral vascular disease  Diabetes mellitus  History of Plavix usage     Postoperative Diagnosis: 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       Procedures:   Insertion of left common femoral arterial line under ultrasound guidance and Seldinger technique  Insertion of right common femoral arterial line under ultrasound guidance and Seldinger technique  Insertion of intra-aortic balloon pump via left common femoral arterial line  Median sternotomy  Coronary artery bypass grafting x 2  Left internal mammary artery to left anterior descending artery  Reverse saphenous vein graft to diagonal artery  Left atrial appendage amputation 40 mm atrial clip  Aortic valve replacement with 21 mm Peres Inspiris Resilia bioprosthetic aortic valve  Complex sternal closure sternal plating system, sternal lock blue     Indication:  59-year-old male with history of cardiac stents and known occlusions of the right and circumflex coronary arteries found to have severe borderline critical aortic stenosis.  After obtaining dental clearance and cardiac catheterization, indication was met for valve replacement in addition to coronary artery bypass grafting.  I discussed the patient on 3 separate occasions alternatives, benefits and risks of surgery, he consents for surgery today and wish for a bioprosthetic valve.     Surgical Findings:   Significant peripheral vascular disease, unable to pass wire from the right femoral artery into the aorta before meeting resistance  Successful placement of a left common femoral arterial intra-aortic balloon pump under ultrasound guidance     Ascending aorta and aortic arch with heavy calcium burden, a minefield of calcium in various locations.  The calcium in the aorta is friable, fragile, almost sand-like consistency in some places and was debrided.     Significant cardiomegaly, very boggy, very heavy and very large/thick heart.     Left  anterior descending artery 2 mm vessel, good target  Diagonal artery 1.5 mm vessel, reasonable target  No appreciable target in the right coronary system  There is single lateral obtuse marginal artery however it appears to be 1 mm in size at best and there is heavy calcification circumferentially on the walls, no safe target for bypass     Trileaflet aortic valve, heavy calcium throughout the leaflets and along the annulus     No clot left atrial appendage     Complete echocardiogram demonstrates a well-seated prosthetic valve with no evidence of perivalvular leak, ejection fraction is around 30% with improved contractility of the walls.     Description of procedure:  Informed consent was obtained.  Patient was taken to the operative room, anesthesia was induced and lines were placed.  Echocardiogram demonstrated reduced ejection fraction realistically 15% range.  Around this time, the EKG began to show some signs of ST depressions and therefore elected to place an intra-aortic balloon pump to assist with coronary perfusion given his coronary disease.  A right common femoral arterial line was placed under ultrasound guidance with a single stick and Seldinger technique.  However when placing the wire, the wire did need some resistance somewhere in the iliac system and would not go into the aorta freely.  The 5 Khmer sheath was placed out issue and there was excellent return of arterial blood.  Then the left common femoral artery was stuck with a needle  Ultrasound guidance and a 5 Khmer sheath was placed, the wire did pass freely into the aorta without resistance.  The wire for the intro to balloon pump is in place through the sheath and into the proximal descending thoracic aorta which was seen on transesophageal cardiogram and then the intro to balloon pump was then placed in the distal and proximal descending thoracic aorta, with the tip near the subclavian artery.  Balloon pump was started at one-to-one and  immediately the ST depressions significantly improved.  Patient was then positioned prepped and draped you sterile fashion timeout was performed verifying patient procedure.  A skin incision was made and median sternotomy was performed.  Pericardium opened.  Right ventricle appears rigorous at this time, left ventricle appears similar with an ejection fraction around 15 to 20% but the EKG demonstrates nearly resolved STs therefore elected to proceed with mammary artery harvest.  The left internal mammary artery was harvested in a pedicled fashion underside of left endothoracic fashion simultaneously a piece of greater saphenous vein was harvested from the right lower extremity in endoscopic fashion as well.  Systemic heparin is then given.  The mammary is clipped thrice a distally beyond suffocation and then transected there is excellent flow.  A clip is applied distally on the pedicle and is wrapped papaverine gauze and placed in the left chest.  Simultaneously the greater sinus vein is prepared by clipping and tying the branches.  Pericardial well is created.  The aorta is inspected.  There is heavy circumferential calcium in the arch and distal ascending aorta.  In the mid to distal portion there is heavy calcium burden and plaque in the posterior aorta and then there is a large plaque of calcium in the anterior aorta proximally just outside of the root.  Ultimately after mapping out the aorta was able to identify sites for cannulation cross-clamp proximal antegrade site and an 8 arteriotomy but the real estate was strongly limited only allowed for these various cannulas and proximals.  Patient is then cannulated in the mid ascending aorta and right atrial appendage with 3 stage venous cannula.  ACT confirmed over 480 and cardiopulmonary bypass commenced.  Retrograde catheter placed in the coronary sinus, antegrade needle placed in the mid ascending aorta.  Cross-clamp is applied and the heart is arrested with  antegrade and retrograde cardioplegia there is excellent arrest after 300 mL antegrade.  Patient is cooled 32 °C and cardioplegia's given at regular 15-20 minute intervals of the operation.  Heart rate is rotated to expose obtuse marginal artery, and arch artery is identified but found as above.Artery bypass.  Diagonal artery is exposed.  Arteriotomy is made a 1.5 mm vessel and a 1 mm probe passed freely proximal and distal, end-to-side anastomosis then made with reverse saphenous vein and a 7-0 Prolene.  Prior to tying down, a 1 mm probe passed freely proximal distal, tying down there is excellent antegrade and retrograde flow.  Before doing the proximal, left atrial pended was sized for a 40 mm atrial clip which is applied to the base of the left atrial appendage.  Heart is then placed back in the pericardial well, the vein graft is then sized to the ascending aorta, transected and spatulated anastomosed to the aorta in end-to-side fashion with 6-0 Prolene.  Heart then rotated expose left anterior descending artery.  There was a large vein overlying however immediately lateral to the vein deep in the groove, the left anterior descending artery is identified, and arteriotomy is made and is a 2 mm vessel and a 1 mm probe passed freely proximal and distal.  The distal end of the mammary pedicle is then partially skeletonized, transected and spatulated and anastomosed to the left anterior descending artery with an 8-0 Prolene.  Prior to tying down, a 1 mm probe passed freely proximal and distal to the target vessel, after tying down the bulldog is removed anastomosis hemostatic and Doppler exam confirmed flow in the antegrade and retrograde left anterior descending artery.  The pedicle is secured to the heart with 6-0 Prolene x 2.  A left ventricular vent is placed through the right superior pulmonary vein an arteriotomy was then made transversely and opened in an inverted hockey-stick fashion toward the viral of the  noncoronary leaflet.  The valve is trileaflet a very heavy calcium burden minimally opening.  Valve leaflets are excised.  Annulus is debrided and free of calcium.  Some of this calcium is quite friable and fragile and there is a large plaque of calcium in the ascending aorta proximal to the arteriotomy which is also fragile and sand-like and required some debridement with irrigation and rongeur.  Ultimately, the annulus was fully debrided and the aorta appeared stable.  Valve she was then placed inferential around the annulus with pledgets in the ventricular side.  The valve was sized for a 21 mm bioprosthetic valve, Resilia.  The valve was an open repair manufacture instructions, the sutures are placed to the sewing ring and the valve is in seat in supra annular position and secured with a core knot device.  Inspection of straits a well-seated valve with no evidence of gaps, and both right and left coronary ostia are visualized.  Patient is rewarmed.  Aorta is closed in a 2 layer fashion with 4-0 Prolene.  De-airing maneuvers are performed.  Hotshot cardioplegia given retrograde and antegrade.  Cross-clamp is removed.  A few additional sutures are placed in the aortotomy which provides hemostasis.  Patient is defibrillated once normal sinus rhythm.  Intra-aortic balloon pump removed started at one-to-one.  Patient is then fully weaned and after resting on cart for bypass approximately 10 to 15 minutes, he is slowly weaned off of cardiopulmonary bypass without event on low-dose epinephrine and nitric oxide infusions.  Valve appears to be seated well with no evidence of paravalvular leak and ejection fraction is now around 30-lisa percent with improved contractility of the left ventricle.  Patient is decannulated and protamine is given.  Cannulation sites are oversewn.  There is diffuse bleeding from all raw surfaces and needle holes therefore blood products are given with improvement in coagulopathy.  Inspection for  hemostasis demonstrates some venous bleeding around the area of the LIMA to LAD anastomosis.  A lap the heart in this area is inspected.  It appears to be venous bleeding from the lateral edges of the tunnel which were not bleeding at all throughout the operation but now are.  The anastomosis is hemostatic and there is no arterial type of bleeding, it is all dark.  The tunnel is then closed proximal distal anastomosis with 4 oh pledgeted suture with excellent hemostasis.  At this point, cannulae sites are hemostatic as are the the operative sites including the anastomotic sites mammary bed and bone edges.  Attention is turned to closure.  A 24 Maldivian Dane drain is placed in the pericardial space and to 32 Maldivian chest tubes are placed, with a straight 1 in the right pleural space in the mediastinal space and an angled on the left little space.  Pericardium is reapproximated to the aorta.  Unable to reapproximate the pericardium over the left ventricle or right ventricle due to the significant cardiomegaly and overall big heart.  The sternum was then reapproximated with multiple double stainless steel wires reinforced sternal plates and sternal lock blue plating system.  All the wounds were then copiously irrigated, closed multiple layers and sterile dressings are applied.  We then performed bleeding trials which the patient passed without issue.  He is then taken to ICU in critical condition with intra-aortic balloon pump at one-to-one, low-dose epinephrine and Levophed infusions and in normal sinus rhythm with echo findings as above.  Sponge, needle and instrument counts were all correct in the case.     Anesthesia: Cardiac     Complications: None     Implants:   40 mm atrial clip  21 mm Edward Inspiris Resilia bioprosthetic aortic valve  Sternal plating system, sternal lock blue     Specimen: Aortic valve leaflets     Drains: 32 Maldivian chest tubes x 2, 24 Maldivian Dane drain x 1     Condition: Critical to CVICU      Estimated Blood Loss: 400 mL      Leonides Pacheco MD                     MEDICATIONS ADMINISTERED IN LAST 1 DAY:  acetaminophen (Ofirmev) 10 mg/mL infusion premix 1,000 mg       Date Action Dose Route User    6/24/2025 1105 New Bag 1,000 mg Intravenous Rae Toro RN    6/24/2025 0430 New Bag 1,000 mg Intravenous Jossy Ceja RN    6/23/2025 2117 New Bag 1,000 mg Intravenous Karson Park RN    6/23/2025 1534 New Bag 1,000 mg Intravenous Karson Park RN          amiodarone (Pacerone) tab 200 mg       Date Action Dose Route User    6/24/2025 1141 Given 200 mg Oral Rae Toro RN    6/24/2025 0849 Given 200 mg Oral Rae Toro RN    6/23/2025 1534 Given 200 mg Oral Karson Park RN          ascorbic acid (Vitamin C) tab 500 mg       Date Action Dose Route User    6/24/2025 0848 Given 500 mg Oral Rae Toro RN    6/23/2025 2117 Given 500 mg Oral Karson Park RN    6/23/2025 1534 Given 500 mg Oral Karson Park RN          aspirin chewable tab 81 mg       Date Action Dose Route User    6/24/2025 0934 Given 81 mg Per NG Tube Rae Toro RN          budesonide (Pulmicort) 0.5 MG/2ML nebulizer suspension 0.5 mg       Date Action Dose Route User    6/24/2025 0727 Given 0.5 mg Nebulization Rudy Rosa RCP    6/23/2025 2011 Given 0.5 mg Nebulization LuciSalud castro RCP          chlorhexidine gluconate (Peridex) 0.12 % oral solution 15 mL       Date Action Dose Route User    6/24/2025 1104 Given 15 mL Mouth/Throat Rae Toro RN    6/23/2025 2117 Given 15 mL Mouth/Throat Karson Park RN          clopidogrel (Plavix) tab 75 mg       Date Action Dose Route User    6/24/2025 0849 Given 75 mg Oral Rae Toro RN          dexmedeTOMIDine in sodium chloride 0.9% (Precedex) 400 mcg/100mL infusion premix       Date Action Dose Route User    6/24/2025 1118 Rate/Dose Change 0.3 mcg/kg/hr × 92.1 kg (Dosing Weight) Intravenous Rae Toro, TATYANA    6/24/2025 0934 New Bag 0.7  mcg/kg/hr × 92.1 kg (Dosing Weight) Intravenous Rae Toro RN    6/24/2025 0256 New Bag 0.7 mcg/kg/hr × 92.1 kg (Dosing Weight) Intravenous Jossy Ceja RN    6/24/2025 0000 Rate/Dose Verify 0.7 mcg/kg/hr × 92.1 kg (Dosing Weight) Intravenous Jossy Ceja RN    6/23/2025 2118 New Bag 0.7 mcg/kg/hr × 92.1 kg (Dosing Weight) Intravenous Karson Park RN    6/23/2025 1604 New Bag 0.7 mcg/kg/hr × 92.1 kg (Dosing Weight) Intravenous Karson Park RN          dextrose 5%-sodium chloride 0.45% infusion       Date Action Dose Route User    6/24/2025 0000 Rate/Dose Verify 40 mL/hr Intravenous Jossy Ceja RN          DOBUTamine (Dobutrex) 500 mg in sodium chloride 0.9% 125 mL infusion       Date Action Dose Route User    6/24/2025 0532 Restarted 0.5 mcg/kg/min × 92.1 kg (Dosing Weight) Intravenous Jossy Ceja RN    6/24/2025 0000 Rate/Dose Change 0.5 mcg/kg/min × 92.1 kg (Dosing Weight) Intravenous Jossy Ceja RN    6/23/2025 1700 Rate/Dose Change 1 mcg/kg/min × 92.1 kg (Dosing Weight) Intravenous Karson Park RN    6/23/2025 1634 New Bag 2 mcg/kg/min × 92.1 kg (Dosing Weight) Intravenous Karson Park RN          docusate sodium (Colace) cap 100 mg       Date Action Dose Route User    6/23/2025 2117 Given 100 mg Oral Karson Park RN          docusate (Colace) 50 MG/5ML oral solution 100 mg       Date Action Dose Route User    6/24/2025 1104 Given 100 mg Per NG Tube Rae Toro RN          enoxaparin (Lovenox) 40 MG/0.4ML SUBQ injection 40 mg       Date Action Dose Route User    6/24/2025 0845 Given 40 mg Subcutaneous (Right Lower Abdomen) Rae Toro RN          famotidine (Pepcid) 20 mg/2mL injection 20 mg       Date Action Dose Route User    6/24/2025 0846 Given 20 mg Intravenous Rae Toro RN          famotidine (Pepcid) tab 20 mg       Date Action Dose Route User    6/23/2025 2117 Given 20 mg Oral Karson Park RN          ferrous sulfate 300 (60 Fe) MG/5ML oral  syrup 300 mg       Date Action Dose Route User    6/24/2025 0934 Given 300 mg Oral Rae Toro RN          folic acid (Folvite) tab 1 mg       Date Action Dose Route User    6/24/2025 0848 Given 1 mg Oral Rae Toro RN          furosemide (Lasix) 10 mg/mL injection 20 mg       Date Action Dose Route User    6/24/2025 0846 Given 20 mg Intravenous Rae Toro RN    6/23/2025 1534 Given 20 mg Intravenous Karson Park RN          haloperidol lactate (Haldol) 5 MG/ML injection 5 mg       Date Action Dose Route User    6/24/2025 1022 Given 5 mg Intramuscular (Left Deltoid) Rae Toro RN          hydrALAzine (Apresoline) 20 mg/mL injection 10 mg       Date Action Dose Route User    6/23/2025 2013 Given 10 mg Intravenous Karson Park RN          insulin degludec (Tresiba) 100 units/mL flextouch 15 Units       Date Action Dose Route User    6/24/2025 0924 Given 15 Units Subcutaneous (Right Upper Arm) Rae Toro RN          insulin regular human (Novolin R, Humulin R) 100 Units in sodium chloride 0.9% 100 mL standard infusion (100 mL)       Date Action Dose Route User    6/24/2025 0700 Rate/Dose Verify 2 Units/hr Intravenous Jossy Ceja RN    6/24/2025 0600 Rate/Dose Verify 2 Units/hr Intravenous Jossy Ceja RN    6/24/2025 0430 New Bag 2 Units/hr Intravenous Jossy Ceja RN    6/24/2025 0200 Rate/Dose Change 2 Units/hr Intravenous Jossy Ceja RN    6/24/2025 0000 Rate/Dose Verify 3 Units/hr Intravenous Jossy Ceja RN    6/23/2025 2300 Rate/Dose Change 3 Units/hr Intravenous Karson Park RN    6/23/2025 2200 Rate/Dose Change 2 Units/hr Intravenous Karson Park RN    6/23/2025 2100 Rate/Dose Change 3 Units/hr Intravenous Karson Park RN    6/23/2025 2000 Rate/Dose Change 3 Units/hr Intravenous Karson Park, TATYANA          ipratropium-albuterol (Duoneb) 0.5-2.5 (3) MG/3ML inhalation solution 3 mL       Date Action Dose Route User    6/24/2025 1244 Given 3 mL  Nebulization Rudy Rosa RCP    6/24/2025 0727 Given 3 mL Nebulization Rudy Rosa RCP    6/23/2025 2010 Given 3 mL Nebulization LuciSalud mendoza, LIN          LORazepam (Ativan) 2 mg/mL injection 1 mg       Date Action Dose Route User    6/24/2025 1023 Given 1 mg Intramuscular (Left Deltoid) Rae Toro RN          magnesium sulfate in dextrose 5% 1 g/100mL infusion premix 1 g       Date Action Dose Route User    6/24/2025 1104 New Bag 1 g Intravenous Rae Toro RN          morphINE PF 2 MG/ML injection 2 mg       Date Action Dose Route User    6/24/2025 1338 Given 2 mg Intravenous Rae Toro RN    6/23/2025 1649 Given 2 mg Intravenous Karson Park RN          mupirocin (Bactroban) 2% nasal ointment 1 Application       Date Action Dose Route User    6/24/2025 0846 Given 1 Application Nasal (Bilateral Nares) Rae Toro RN    6/23/2025 2117 Given 1 Application Nasal (Bilateral Nares) Karson Park RN          norepinephrine (Levophed) 8 mg in dextrose 5% 250 mL infusion       Date Action Dose Route User    6/23/2025 1634 New Bag 2 mcg/min Intravenous Karson Park RN          OLANZapine (Zyprexa) 5 mg in sterile water for injection (PF) IM injection       Date Action Dose Route User    6/23/2025 2110 Given 5 mg Intramuscular (Right Deltoid) Karsno Park RN          Perflutren Lipid Microsphere (DEFINITY) 6.52 MG/ML injection 1.5 mL       Date Action Dose Route User    6/23/2025 1546 Given 1.5 mL Intravenous Svetlana Barney          piperacillin-tazobactam (Zosyn) 3.375 g in dextrose 5% 100 mL IVPB-ADDV       Date Action Dose Route User    6/24/2025 1105 New Bag 3.375 g Intravenous Rae Toro RN    6/24/2025 0215 New Bag 3.375 g Intravenous Jossy Ceja RN    6/23/2025 1806 New Bag 3.375 g Intravenous Vivian, Karson, RN          potassium chloride 20 mEq/100mL IVPB premix 20 mEq       Date Action Dose Route User    6/24/2025 0537 New Bag 20 mEq Intravenous Marcial,  TATYANA Fenton    6/23/2025 1549 New Bag 20 mEq Intravenous Kasron Park RN          propofol (Diprivan) 10 mg/mL infusion premix       Date Action Dose Route User    6/24/2025 0734 New Bag 20 mcg/kg/min × 92.1 kg (Dosing Weight) Intravenous Jossy Ceja RN    6/24/2025 0459 Rate/Dose Change 15 mcg/kg/min × 92.1 kg (Dosing Weight) Intravenous Jossy Ceja RN    6/24/2025 0000 Rate/Dose Verify 20 mcg/kg/min × 92.1 kg (Dosing Weight) Intravenous Jossy Ceja RN    6/23/2025 2117 New Bag 20 mcg/kg/min × 92.1 kg (Dosing Weight) Intravenous Karson Park RN    6/23/2025 1645 Rate/Dose Change 20 mcg/kg/min × 92.1 kg (Dosing Weight) Intravenous Karson Park RN          rosuvastatin (Crestor) tab 20 mg       Date Action Dose Route User    6/23/2025 2117 Given 20 mg Per NG Tube Karson Park RN          sennosides (Senokot) tab 8.6 mg       Date Action Dose Route User    6/24/2025 0847 Given 8.6 mg Oral Rae Toro RN    6/23/2025 2117 Given 8.6 mg Oral Karson Park RN          sodium chloride 0.9% infusion       Date Action Dose Route User    6/24/2025 0000 Rate/Dose Verify (none) Intravenous Jossy Ceja RN          multivitamin (Tab-A-Елена/Beta Carotene) tab 1 tablet       Date Action Dose Route User    6/24/2025 0848 Given 1 tablet Oral Rae Toro RN          tamsulosin (Flomax) cap 0.4 mg       Date Action Dose Route User    6/24/2025 0848 Given 0.4 mg Oral Rae Toro RN    6/23/2025 2117 Given 0.4 mg Oral Karson Park RN          thiamine (Vitamin B1) tab 100 mg       Date Action Dose Route User    6/24/2025 0848 Given 100 mg Oral Rae Toro RN            Vitals (last day)       Date/Time Temp Pulse Resp BP SpO2 Weight O2 Device O2 Flow Rate (L/min) [facility]    06/24/25 1400 -- 92 24 128/65 95 % -- High flow nasal cannula 4 L/min     06/24/25 1300 -- 83 34 122/73 96 % -- High flow nasal cannula 4 L/min     06/24/25 1255 -- -- -- -- 98 % -- High flow nasal cannula 4  L/min     06/24/25 1235 -- -- -- -- 99 % -- High flow nasal cannula 6 L/min JF    06/24/25 1200 97.9 °F (36.6 °C) 68 25 103/59 96 % -- -- -- DS    06/24/25 1100 -- 63 20 107/62 98 % -- Ventilator -- FC    06/24/25 1000 -- 65 22 105/64 97 % -- -- -- DS    06/24/25 1000 -- -- -- -- -- -- Ventilator -- FC    06/24/25 0900 -- 69 21 132/67 97 % -- -- -- DS    06/24/25 0900 -- -- -- -- -- -- Ventilator -- FC    06/24/25 0800 97.3 °F (36.3 °C) 68 17 95/57 97 % -- Ventilator -- DS    06/24/25 0700 -- 67 19 109/61 99 % -- Ventilator -- RB    06/24/25 0600 -- 65 19 93/60 97 % -- Ventilator -- RB    06/24/25 0530 -- 64 16 86/51 98 % -- Ventilator -- RB    06/24/25 0515 -- 68 21 -- 97 % -- Ventilator -- RB    06/24/25 0500 -- 73 19 123/60 98 % -- Ventilator -- RB    06/24/25 0400 98.1 °F (36.7 °C) 72 21 108/60 98 % 212 lb 11.9 oz (96.5 kg) Ventilator -- RB    06/24/25 0300 -- 71 23 123/61 99 % -- Ventilator -- RB    06/24/25 0255 -- -- -- -- -- -- Ventilator -- VN    06/24/25 0200 -- 74 20 110/59 97 % -- Ventilator -- RB    06/24/25 0100 -- 74 21 108/62 97 % -- Ventilator -- RB    06/24/25 0000 98.2 °F (36.8 °C) 75 22 105/59 97 % -- Ventilator -- RB    06/23/25 2316 -- -- -- -- -- -- Ventilator -- VN    06/23/25 2300 -- 75 22 105/60 96 % -- Ventilator --     06/23/25 2200 -- 73 21 95/55 96 % -- Ventilator --     06/23/25 2100 -- 74 26 100/57 97 % -- Ventilator --     06/23/25 2000 99.5 °F (37.5 °C) 72 19 114/64 98 % -- Ventilator --     06/23/25 1900 -- 74 20 111/64 98 % -- Ventilator --     06/23/25 1800 -- 77 19 118/64 97 % -- Ventilator --     06/23/25 1700 99.8 °F (37.7 °C) 80 21 130/69 97 % -- Ventilator --     06/23/25 1600 100.2 °F (37.9 °C) 71 21 102/62 98 % -- Ventilator --     06/23/25 1500 100.5 °F (38.1 °C) 73 21 102/61 99 % -- Ventilator --     06/23/25 1400 100.3 °F (37.9 °C) 74 20 100/62 99 % -- Ventilator --     06/23/25 1300 100.5 °F (38.1 °C) 72 22 98/61 99 % -- Ventilator --      06/23/25 1200 100.6 °F (38.1 °C) 73 21 92/57 98 % -- Ventilator --     06/23/25 1100 100.5 °F (38.1 °C) 79 19 98/58 97 % -- Ventilator --     06/23/25 1000 100.5 °F (38.1 °C) 84 32 128/70 96 % -- Ventilator --     06/23/25 0922 100.3 °F (37.9 °C) 80 22 110/49 97 % -- -- --     06/23/25 0907 100.3 °F (37.9 °C) 81 20 110/49 97 % -- -- --     06/23/25 0900 100.3 °F (37.9 °C) 80 21 102/58 98 % -- Ventilator --     06/23/25 0845 -- 81 23 -- 98 % -- -- --     06/23/25 0800 100.3 °F (37.9 °C) 79 20 107/63 97 % -- Ventilator --     06/23/25 0700 100 °F (37.8 °C) 80 22 113/62 98 % -- Ventilator --     06/23/25 0600 99.9 °F (37.7 °C) 81 21 108/62 98 % -- Ventilator --     06/23/25 0500 99.7 °F (37.6 °C) 81 21 109/63 97 % -- Ventilator --     06/23/25 0429 -- -- -- -- -- 215 lb 9.8 oz (97.8 kg) -- --     06/23/25 0400 99.7 °F (37.6 °C) 83 23 113/64 96 % -- Ventilator --     06/23/25 0300 99.7 °F (37.6 °C) 85 21 107/60 96 % -- -- --     06/23/25 0200 99.5 °F (37.5 °C) 81 18 107/64 98 % -- Ventilator --     06/23/25 0100 99.5 °F (37.5 °C) 82 17 105/64 99 % -- -- --     06/23/25 0000 99.2 °F (37.3 °C) 80 20 115/65 98 % -- Ventilator -- MR              Blood Transfusion Record       Product Unit Status Volume Start End            Transfuse RBC       25  [id number]  [id number] Completed 06/23/25 1252 436 mL 06/23/25 0907 06/23/25 1245

## 2025-06-24 NOTE — PROGRESS NOTES
[facility]  part of [facility]     Progress Note    Jose Alberto Lizama Patient Status:  Inpatient    1965 MRN [id number]   Location [facility] 2W/SW Attending Tammy Zacarias MD   Hosp Day # 5 PCP Fiona Sweeney MD       Subjective:   Patient seen and examined.  Intubated sedated tolerated spontaneous breathing trial this morning.  Following simple commands.  Tmax 100.6 degrees    Objective:   Blood pressure 128/65, pulse 92, temperature 97.9 °F (36.6 °C), temperature source Axillary, resp. rate 24, height 5' 6\" (1.676 m), weight 212 lb 11.9 oz (96.5 kg), SpO2 95%.  Intake/Output:   Last 3 shifts: I/O last 3 completed shifts:  In: 4863.1 [I.V.:3502.1; Blood:436; NG/GT:175; IV PIGGYBACK:750]  Out: 4515 [Urine:4260; Emesis/NG output:150; Chest Tube:105]   This shift: I/O this shift:  In: 100 [IV PIGGYBACK:100]  Out: -      Vent Settings: Vent Mode: CPAP;PS  FiO2 (%):  [30 %] 30 %  S RR:  [12] 12  S VT:  [450 mL] 450 mL  PEEP/CPAP (cm H2O):  [5 cm H20] 5 cm H20  MAP (cm H2O):  [9.9-12] 10    Hemodynamic parameters (last 24 hours): PAP: (35-45)/(16-22) 38/16  CO:  [4.3 L/min-5.2 L/min] 5.2 L/min  CI:  [2.1 L/min/m2-2.6 L/min/m2] 2.6 L/min/m2    Scheduled Meds: Current Hospital Medications[1]    Continuous Infusions: Medication Infusions[2]    Physical Exam  Constitutional: no acute distress, arousable but appears somnolent  Eyes: PERRL  ENT: nares pateint  Neck: supple, no JVD  Cardio: RRR, S1 S2  Respiratory: Breath sounds with some scattered crackles present  GI: abdomen soft, non tender, active bowel sounds, no organomegaly  Extremities: no clubbing, cyanosis, + bilateral lower extremity edema  Neurologic: no gross motor deficits  Skin: warm, dry      Results:     Lab Results   Component Value Date    WBC 6.3 2025    HGB 8.4 2025    HCT 25.7 2025    .0 2025    CREATSERUM 0.62 2025    BUN 16 2025     2025    K 3.6 2025      06/24/2025    CO2 26.0 06/24/2025     06/24/2025    CA 8.0 06/24/2025    ALB 3.4 06/24/2025    ALKPHO 45 06/24/2025    BILT 0.4 06/24/2025    TP 5.5 06/24/2025     06/24/2025    ALT 47 06/24/2025    MG 2.0 06/24/2025       XR CHEST AP PORTABLE  (CPT=71045)  Result Date: 6/23/2025  CONCLUSION:   Increasing bilateral pleural effusions and pulmonary edema.  Right medial and left basilar chest tubes are again seen.  Recommend correlation for malfunction.  Endotracheal tube, esophagogastric tube, and [place]-Mike catheter are unchanged.      Dictated by (CST): Malachi Wilson MD on 6/23/2025 at 7:25 AM     Finalized by (CST): Malachi Wilson MD on 6/23/2025 at 7:39 AM            EKG 12 Lead  Result Date: 6/24/2025  Normal sinus rhythm Left axis deviation Nonspecific intraventricular block T wave abnormality, consider lateral ischemia Abnormal ECG When compared with ECG of 20-JUN-2025 04:27, No significant change was found Confirmed by TAD KONG ([id number]) on 6/24/2025 11:41:06 AM      Assessment   1.  POD #5 status post CABG and AVR  2.  Severe coronary disease  3.  Severe aortic stenosis  4.  Postoperative respiratory failure  5.  Possible EtOH withdrawal  6.  Anemia  7.  Diabetes mellitus  8.  Hypertension  9.  Hyperlipidemia  10.  Possible ORAL     Plan   -Patient presents status post CABG and AVR on 6/19/2025.  - Difficulty extubating postoperatively with concern for pulmonary edema possible mucous plugging and controlled initial component of EtOH withdrawal.  - Following some simple commands tolerated spontaneous breathing trial today.  Proceeded with extubation.  Currently on 4 L nasal cannula oxygen.  Wean as tolerated  - Postextubation ABG pending  - NIV as necessary depending on respiratory status  - Attempt to wean Precedex gtt.  Further recommendations per psych  - Dobutamine per cardiology recommendation  - Continue tube feedings  - DVT prophylaxis: Lovenox  - Discussed with family, CV  surgeon, cardiologist, nursing staff     Upon my evaluation, this patient had a high probability of imminent or life-threatening deterioration due to respiratory failure, which required my direct attention, intervention, and personal management.    I have personally provided 30 minutes of critical care time, exclusive of time spent on separately billable procedures.  Time includes review of all pertinent laboratory/radiology results, discussion with consultants, and monitoring for potential decompensation.  Performed interventions included managing mechanical ventilation.      Teodoro Zamora DO  Pulmonary Critical Care Medicine  [facility]          [1]   Current Facility-Administered Medications   Medication Dose Route Frequency    ferrous sulfate 300 (60 Fe) MG/5ML oral syrup 300 mg  300 mg Oral BID    insulin degludec (Tresiba) 100 units/mL flextouch 15 Units  15 Units Subcutaneous Daily    insulin regular human (Novolin R, Humulin R) 100 UNIT/ML injection 1-5 Units  1-5 Units Subcutaneous 4 times per day    aspirin chewable tab 81 mg  81 mg Per NG Tube Daily    dextrose 10% infusion (TPN no rate)   Intravenous Continuous PRN    sodium bicarbonate tab 650 mg  650 mg Per G Tube PRN    And    pancrelipase (Lip-Prot-Amyl) (Zenpep) DR particles cap 10,000 Units  10,000 Units Per G Tube PRN    docusate (Colace) 50 MG/5ML oral solution 100 mg  100 mg Per NG Tube BID    potassium chloride 40 mEq in 250mL sodium chloride 0.9% IVPB premix  40 mEq Intravenous Once    acetaminophen (Ofirmev) 10 mg/mL infusion premix 1,000 mg  1,000 mg Intravenous Q6H    furosemide (Lasix) 10 mg/mL injection 20 mg  20 mg Intravenous BID (Diuretic)    ipratropium-albuterol (Duoneb) 0.5-2.5 (3) MG/3ML inhalation solution 3 mL  3 mL Nebulization TID    norepinephrine (Levophed) 8 mg in dextrose 5% 250 mL infusion  0.5-8 mcg/min Intravenous Continuous    ipratropium-albuterol (Duoneb) 0.5-2.5 (3) MG/3ML inhalation solution 3 mL  3 mL  Nebulization Q4H PRN    DOBUTamine (Dobutrex) 500 mg in sodium chloride 0.9% 125 mL infusion  2 mcg/kg/min (Dosing Weight) Intravenous Continuous    piperacillin-tazobactam (Zosyn) 3.375 g in dextrose 5% 100 mL IVPB-ADDV  3.375 g Intravenous Q8H    budesonide (Pulmicort) 0.5 MG/2ML nebulizer suspension 0.5 mg  0.5 mg Nebulization 2 times daily    rosuvastatin (Crestor) tab 20 mg  20 mg Per NG Tube Nightly    LORazepam (Ativan) tab 1 mg  1 mg Oral Q1H PRN    Or    LORazepam (Ativan) 2 mg/mL injection 1 mg  1 mg Intravenous Q1H PRN    LORazepam (Ativan) tab 2 mg  2 mg Oral Q1H PRN    Or    LORazepam (Ativan) 2 mg/mL injection 2 mg  2 mg Intravenous Q1H PRN    LORazepam (Ativan) tab 3 mg  3 mg Oral Q1H PRN    Or    LORazepam (Ativan) 2 mg/mL injection 3 mg  3 mg Intravenous Q1H PRN    LORazepam (Ativan) 2 mg/mL injection 4 mg  4 mg Intravenous Q30 Min PRN    LORazepam (Ativan) 2 mg/mL injection 5 mg  5 mg Intravenous Q15 Min PRN    LORazepam (Ativan) 2 mg/mL injection 6 mg  6 mg Intravenous Q10 Min PRN    thiamine (Vitamin B1) tab 100 mg  100 mg Oral Daily    multivitamin (Tab-A-Елена/Beta Carotene) tab 1 tablet  1 tablet Oral Daily    folic acid (Folvite) tab 1 mg  1 mg Oral Daily    OLANZapine (Zyprexa) 5 mg in sterile water for injection (PF) IM injection  5 mg Intramuscular Nightly    haloperidol lactate (Haldol) 5 MG/ML injection 5 mg  5 mg Intramuscular Q6H PRN    LORazepam (Ativan) 2 mg/mL injection 1 mg  1 mg Intramuscular Q6H PRN    haloperidol lactate (Haldol) 5 MG/ML injection 2 mg  2 mg Intravenous Q4H PRN    sugammadex (Bridion) 200 MG/2ML injection 180 mg  2 mg/kg Intravenous Once    sodium chloride 0.9% infusion   Intravenous Continuous    melatonin tab 3 mg  3 mg Oral Nightly PRN    sennosides (Senokot) tab 8.6 mg  8.6 mg Oral BID    polyethylene glycol (PEG 3350) (Miralax) 17 g oral packet 17 g  17 g Oral Daily PRN    bisacodyl (Dulcolax) 10 MG rectal suppository 10 mg  10 mg Rectal Daily PRN     famotidine (Pepcid) tab 20 mg  20 mg Oral BID    Or    famotidine (Pepcid) 20 mg/2mL injection 20 mg  20 mg Intravenous BID    dexmedeTOMIDine in sodium chloride 0.9% (Precedex) 400 mcg/100mL infusion premix  0.2-1.5 mcg/kg/hr (Dosing Weight) Intravenous Continuous    [Held by provider] metoprolol tartrate (Lopressor) tab 25 mg  25 mg Oral 2x Daily(Beta Blocker)    potassium chloride 20 mEq/100mL IVPB premix 20 mEq  20 mEq Intravenous PRN    Or    potassium chloride 40 mEq/100mL IVPB premix (central line) 40 mEq  40 mEq Intravenous PRN    magnesium sulfate in dextrose 5% 1 g/100mL infusion premix 1 g  1 g Intravenous PRN    magnesium sulfate in sterile water for injection 2 g/50mL IVPB premix 2 g  2 g Intravenous PRN    mupirocin (Bactroban) 2% nasal ointment 1 Application  1 Application Nasal BID    chlorhexidine gluconate (Peridex) 0.12 % oral solution 15 mL  15 mL Mouth/Throat BID    ascorbic acid (Vitamin C) tab 500 mg  500 mg Oral TID    enoxaparin (Lovenox) 40 MG/0.4ML SUBQ injection 40 mg  40 mg Subcutaneous Daily    acetaminophen (Tylenol) tab 650 mg  650 mg Oral Q4H PRN    Or    HYDROcodone-acetaminophen (Norco) 5-325 MG per tab 1 tablet  1 tablet Oral Q4H PRN    Or    HYDROcodone-acetaminophen (Norco) 5-325 MG per tab 2 tablet  2 tablet Oral Q4H PRN    morphINE PF 2 MG/ML injection 2 mg  2 mg Intravenous Q2H PRN    clopidogrel (Plavix) tab 75 mg  75 mg Oral Daily    glucose (Dex4) 15 GM/59ML oral liquid 15 g  15 g Oral Q15 Min PRN    Or    glucose (Glutose) 40% oral gel 15 g  15 g Oral Q15 Min PRN    Or    glucose-vitamin C (Dex-4) chewable tab 4 tablet  4 tablet Oral Q15 Min PRN    Or    dextrose 50% injection 50 mL  50 mL Intravenous Q15 Min PRN    Or    glucose (Dex4) 15 GM/59ML oral liquid 30 g  30 g Oral Q15 Min PRN    Or    glucose (Glutose) 40% oral gel 30 g  30 g Oral Q15 Min PRN    Or    glucose-vitamin C (Dex-4) chewable tab 8 tablet  8 tablet Oral Q15 Min PRN    tamsulosin (Flomax) cap 0.4 mg   0.4 mg Oral BID    propofol (Diprivan) 10 mg/mL infusion premix  5-50 mcg/kg/min (Dosing Weight) Intravenous Continuous    amiodarone (Pacerone) tab 200 mg  200 mg Oral TID CC   [2]    dextrose 10%      norepinephrine Stopped (06/23/25 1642)    DOBUTamine 0.5 mcg/kg/min (06/24/25 0532)    sodium chloride 10 mL/hr at 06/24/25 0000    dexmedetomidine Stopped (06/24/25 1200)    propofol Stopped (06/24/25 1047)

## 2025-06-24 NOTE — PROGRESS NOTES
[facility]  part of [facility]    Progress Note    Jose Alberto Lizama Patient Status:  Inpatient    1965 MRN [id number]   Location [facility] 2W/SW Attending Tmamy Zacarias MD   Hosp Day # 5 PCP Fiona Sweeney MD       Subjective:   Jose Alberto Lizama is a(n) 59 year old male who has been extubated today and is doing well. Has a good cough reflex. Vascular surgery at bedside.     Objective:   Blood pressure 149/72, pulse 99, temperature 97.9 °F (36.6 °C), temperature source Axillary, resp. rate (!) 39, height 5' 6\" (1.676 m), weight 212 lb 11.9 oz (96.5 kg), SpO2 92%.    Physical Exam:    General: No acute distress.   Respiratory: Clear to auscultation bilaterally. No wheezes. No rhonchi.  Cardiovascular: S1, S2. Regular rate and rhythm. No murmurs, rubs or gallops.   Abdomen: Soft, nontender, nondistended.  Positive bowel sounds. No rebound or guarding.  Neurologic: No focal neurological deficits.   Musculoskeletal: Moves all extremities.  Extremities: No edema.    Results:     Lab Results   Component Value Date    WBC 6.3 2025    HGB 8.4 (L) 2025    HCT 25.7 (L) 2025    .0 2025    CREATSERUM 0.62 (L) 2025    BUN 16 2025     2025    K 3.6 2025     2025    CO2 26.0 2025     (H) 2025    CA 8.0 (L) 2025    ALB 3.4 2025    ALKPHO 45 2025    BILT 0.4 2025    TP 5.5 (L) 2025     (H) 2025    ALT 47 2025    PTT 30.6 2025    INR 1.17 2025    TSH 0.696 2025    PSA 2.62 08/10/2023    ESRML 123 (H) 2025    MG 2.0 2025       XR CHEST AP PORTABLE  (CPT=71045)  Result Date: 2025  CONCLUSION:   Increasing bilateral pleural effusions and pulmonary edema.  Right medial and left basilar chest tubes are again seen.  Recommend correlation for malfunction.  Endotracheal tube, esophagogastric tube, and [place]-Mike catheter are  unchanged.      Dictated by (CST): Malachi Wilson MD on 6/23/2025 at 7:25 AM     Finalized by (CST): Malachi Wilson MD on 6/23/2025 at 7:39 AM          XR CHEST AP PORTABLE  (CPT=71045)  Result Date: 6/22/2025  CONCLUSION:   Mild improved vascular congestion.  Mild improved aeration of the right lower lung.  Stable opacity in the left lung base which may reflect combination of pleural effusion and parenchymal opacities.  Support devices not significantly changed.    Dictated by (CST): Antonio Hitchcock MD on 6/22/2025 at 7:35 AM     Finalized by (CST): Antonio Hitchcock MD on 6/22/2025 at 7:37 AM          EKG 12 Lead  Result Date: 6/24/2025  Normal sinus rhythm Left axis deviation Nonspecific intraventricular block T wave abnormality, consider lateral ischemia Abnormal ECG When compared with ECG of 20-JUN-2025 04:27, No significant change was found Confirmed by TAD KONG ([id number]) on 6/24/2025 11:41:06 AM    Scheduled Medications[1]  PRN Medications[2]      Assessment and Plan:       CAD status post CABG POD 5 and AVR  - difficult extubation but patient extubated today successfully   - Continue tube feeds  - Lasix BID for volume overload   - wean dobutamine drip     Severe aortic stenosis  - HD catheter removed today   - EF 35%  - amiodarone, asa, plavix     Alcohol abuse/Withdrawal   - CIWA protocol   - wean precedex drip   - Psych on board       DM2  - insulin per Vascular surgery     HTN  Dyslipidemia  - continue home meds         Quality:    CODE status: Full  DVT proph- lovenox       DEYSI FONG MD  06/24/25           [1]    ferrous sulfate  300 mg Oral BID    insulin degludec  15 Units Subcutaneous Daily    insulin regular human  1-5 Units Subcutaneous 4 times per day    aspirin  81 mg Per NG Tube Daily    docusate  100 mg Per NG Tube BID    nitroGLYCERIN in dextrose 5%        acetaminophen  1,000 mg Intravenous Q6H    furosemide  20 mg Intravenous BID (Diuretic)    ipratropium-albuterol  3 mL  Nebulization TID    piperacillin-tazobactam  3.375 g Intravenous Q8H    budesonide  0.5 mg Nebulization 2 times daily    rosuvastatin  20 mg Per NG Tube Nightly    thiamine  100 mg Oral Daily    multivitamin  1 tablet Oral Daily    folic acid  1 mg Oral Daily    OLANZapine (Zyprexa) 5 mg in sterile water for injection (PF) IM injection  5 mg Intramuscular Nightly    sugammadex  2 mg/kg Intravenous Once    sennosides  8.6 mg Oral BID    famotidine  20 mg Oral BID    Or    famotidine  20 mg Intravenous BID    [Held by provider] metoprolol tartrate  25 mg Oral 2x Daily(Beta Blocker)    mupirocin  1 Application Nasal BID    chlorhexidine gluconate  15 mL Mouth/Throat BID    ascorbic acid  500 mg Oral TID    enoxaparin  40 mg Subcutaneous Daily    clopidogrel  75 mg Oral Daily    tamsulosin  0.4 mg Oral BID    amiodarone  200 mg Oral TID CC   [2]   dextrose 10%    sodium bicarbonate **AND** lipase-protease-amylase (Lip-Prot-Amyl)    hydrALAzine    nitroGLYCERIN in dextrose 5%    ipratropium-albuterol    LORazepam **OR** LORazepam    LORazepam **OR** LORazepam    LORazepam **OR** LORazepam    LORazepam    LORazepam    LORazepam    haloperidol lactate    LORazepam    haloperidol lactate    melatonin    polyethylene glycol (PEG 3350)    bisacodyl    potassium chloride **OR** potassium chloride    magnesium sulfate in dextrose 5%    magnesium sulfate in sterile water for injection    acetaminophen **OR** HYDROcodone-acetaminophen **OR** HYDROcodone-acetaminophen    morphINE **OR** [DISCONTINUED] morphINE    glucose **OR** glucose **OR** glucose-vitamin C **OR** dextrose **OR** glucose **OR** glucose **OR** glucose-vitamin C

## 2025-06-25 ENCOUNTER — APPOINTMENT (OUTPATIENT)
Dept: GENERAL RADIOLOGY | Facility: HOSPITAL | Age: 60
DRG: 219 | End: 2025-06-25
Attending: CLINICAL NURSE SPECIALIST
Payer: MEDICAID

## 2025-06-25 ENCOUNTER — APPOINTMENT (OUTPATIENT)
Dept: GENERAL RADIOLOGY | Facility: HOSPITAL | Age: 60
DRG: 219 | End: 2025-06-25
Attending: THORACIC SURGERY (CARDIOTHORACIC VASCULAR SURGERY)
Payer: MEDICAID

## 2025-06-25 PROBLEM — Z01.818 PRE-OP EVALUATION: Status: ACTIVE | Noted: 2025-06-25

## 2025-06-25 LAB
ANION GAP SERPL CALC-SCNC: 9 MMOL/L (ref 0–18)
BASE EXCESS BLD CALC-SCNC: 4.9 MMOL/L (ref ?–2)
BASOPHILS # BLD AUTO: 0.05 X10(3) UL (ref 0–0.2)
BASOPHILS NFR BLD AUTO: 0.7 %
BUN BLD-MCNC: 18 MG/DL (ref 9–23)
BUN/CREAT SERPL: 24.3 (ref 10–20)
CA-I BLD-SCNC: 1.15 MMOL/L (ref 0.95–1.32)
CALCIUM BLD-MCNC: 8.5 MG/DL (ref 8.7–10.4)
CHLORIDE SERPL-SCNC: 104 MMOL/L (ref 98–112)
CO2 SERPL-SCNC: 25 MMOL/L (ref 21–32)
COHGB MFR BLD: 1.5 % (ref 0–3)
CREAT BLD-MCNC: 0.74 MG/DL (ref 0.7–1.3)
DEPRECATED RDW RBC AUTO: 45.1 FL (ref 35.1–46.3)
EGFRCR SERPLBLD CKD-EPI 2021: 103 ML/MIN/1.73M2 (ref 60–?)
EOSINOPHIL # BLD AUTO: 0.01 X10(3) UL (ref 0–0.7)
EOSINOPHIL NFR BLD AUTO: 0.1 %
ERYTHROCYTE [DISTWIDTH] IN BLOOD BY AUTOMATED COUNT: 15.7 % (ref 11–15)
ERYTHROCYTE [SEDIMENTATION RATE] IN BLOOD: 97 MM/HR (ref 0–20)
GLUCOSE BLD-MCNC: 201 MG/DL (ref 70–99)
GLUCOSE BLDC GLUCOMTR-MCNC: 196 MG/DL (ref 70–99)
GLUCOSE BLDC GLUCOMTR-MCNC: 219 MG/DL (ref 70–99)
GLUCOSE BLDC GLUCOMTR-MCNC: 222 MG/DL (ref 70–99)
HCO3 BLDA-SCNC: 28.6 MEQ/L (ref 21–27)
HCT VFR BLD AUTO: 25.6 % (ref 39–53)
HGB BLD-MCNC: 11.5 G/DL (ref 13–17.5)
HGB BLD-MCNC: 8.3 G/DL (ref 13–17.5)
IMM GRANULOCYTES # BLD AUTO: 0.05 X10(3) UL (ref 0–1)
IMM GRANULOCYTES NFR BLD: 0.7 %
LACTATE BLD-SCNC: 1.4 MMOL/L (ref 0.5–2)
LYMPHOCYTES # BLD AUTO: 0.92 X10(3) UL (ref 1–4)
LYMPHOCYTES NFR BLD AUTO: 12 %
MAGNESIUM SERPL-MCNC: 2 MG/DL (ref 1.6–2.6)
MCH RBC QN AUTO: 25.4 PG (ref 26–34)
MCHC RBC AUTO-ENTMCNC: 32.4 G/DL (ref 31–37)
MCV RBC AUTO: 78.3 FL (ref 80–100)
METHGB MFR BLD: 0.8 % SAT (ref 0.4–1.5)
MONOCYTES # BLD AUTO: 1.11 X10(3) UL (ref 0.1–1)
MONOCYTES NFR BLD AUTO: 14.5 %
NEUTROPHILS # BLD AUTO: 5.53 X10 (3) UL (ref 1.5–7.7)
NEUTROPHILS # BLD AUTO: 5.53 X10(3) UL (ref 1.5–7.7)
NEUTROPHILS NFR BLD AUTO: 72 %
O2 CT BLD-SCNC: 14.8 VOL% (ref 15–23)
O2/TOTAL GAS SETTING VFR VENT: 50 %
OSMOLALITY SERPL CALC.SUM OF ELEC: 294 MOSM/KG (ref 275–295)
OXYGEN LITERS/MINUTE: 15 L/MIN
PCO2 BLDA: 41 MM HG (ref 35–45)
PH BLDA: 7.46 [PH] (ref 7.35–7.45)
PHOSPHATE SERPL-MCNC: 2.9 MG/DL (ref 2.4–5.1)
PLATELET # BLD AUTO: 259 10(3)UL (ref 150–450)
PO2 BLDA: 61 MM HG (ref 80–100)
POTASSIUM BLD-SCNC: 3.7 MMOL/L (ref 3.6–5.1)
POTASSIUM SERPL-SCNC: 3.7 MMOL/L (ref 3.5–5.1)
POTASSIUM SERPL-SCNC: 3.7 MMOL/L (ref 3.5–5.1)
PUNCTURE CHARGE: NO
RBC # BLD AUTO: 3.27 X10(6)UL (ref 4.3–5.7)
SAO2 % BLDA: 93.2 % (ref 94–100)
SODIUM BLD-SCNC: 137 MMOL/L (ref 135–145)
SODIUM SERPL-SCNC: 138 MMOL/L (ref 136–145)
WBC # BLD AUTO: 7.7 X10(3) UL (ref 4–11)

## 2025-06-25 PROCEDURE — 99233 SBSQ HOSP IP/OBS HIGH 50: CPT | Performed by: INTERNAL MEDICINE

## 2025-06-25 PROCEDURE — 99232 SBSQ HOSP IP/OBS MODERATE 35: CPT | Performed by: OTHER

## 2025-06-25 PROCEDURE — 71045 X-RAY EXAM CHEST 1 VIEW: CPT | Performed by: CLINICAL NURSE SPECIALIST

## 2025-06-25 PROCEDURE — 74018 RADEX ABDOMEN 1 VIEW: CPT | Performed by: THORACIC SURGERY (CARDIOTHORACIC VASCULAR SURGERY)

## 2025-06-25 RX ORDER — METOPROLOL TARTRATE 1 MG/ML
5 INJECTION, SOLUTION INTRAVENOUS ONCE
Status: COMPLETED | OUTPATIENT
Start: 2025-06-25 | End: 2025-06-25

## 2025-06-25 RX ORDER — METOPROLOL TARTRATE 1 MG/ML
5 INJECTION, SOLUTION INTRAVENOUS EVERY 6 HOURS PRN
Status: DISCONTINUED | OUTPATIENT
Start: 2025-06-25 | End: 2025-06-25

## 2025-06-25 RX ORDER — FUROSEMIDE 10 MG/ML
40 INJECTION INTRAMUSCULAR; INTRAVENOUS 3 TIMES DAILY
Status: DISCONTINUED | OUTPATIENT
Start: 2025-06-25 | End: 2025-06-29

## 2025-06-25 RX ORDER — AMIODARONE HYDROCHLORIDE 200 MG/1
200 TABLET ORAL DAILY
Status: DISCONTINUED | OUTPATIENT
Start: 2025-06-26 | End: 2025-06-25

## 2025-06-25 RX ORDER — METOPROLOL TARTRATE 1 MG/ML
10 INJECTION, SOLUTION INTRAVENOUS EVERY 6 HOURS PRN
Status: DISCONTINUED | OUTPATIENT
Start: 2025-06-25 | End: 2025-06-26

## 2025-06-25 RX ORDER — OLANZAPINE 2.5 MG/1
2.5 TABLET, FILM COATED ORAL NIGHTLY
Status: DISCONTINUED | OUTPATIENT
Start: 2025-06-25 | End: 2025-07-01

## 2025-06-25 RX ORDER — INSULIN DEGLUDEC 100 U/ML
20 INJECTION, SOLUTION SUBCUTANEOUS DAILY
Status: DISCONTINUED | OUTPATIENT
Start: 2025-06-26 | End: 2025-06-28

## 2025-06-25 RX ORDER — MAGNESIUM SULFATE 1 G/100ML
1 INJECTION INTRAVENOUS ONCE
Status: COMPLETED | OUTPATIENT
Start: 2025-06-25 | End: 2025-06-25

## 2025-06-25 RX ORDER — POTASSIUM CHLORIDE 1500 MG/1
20 TABLET, EXTENDED RELEASE ORAL 2 TIMES DAILY
Status: DISCONTINUED | OUTPATIENT
Start: 2025-06-25 | End: 2025-06-26

## 2025-06-25 RX ORDER — INSULIN DEGLUDEC 100 U/ML
5 INJECTION, SOLUTION SUBCUTANEOUS ONCE
Status: COMPLETED | OUTPATIENT
Start: 2025-06-25 | End: 2025-06-25

## 2025-06-25 RX ORDER — COLCHICINE 0.6 MG/1
0.6 TABLET ORAL 2 TIMES DAILY
Status: DISCONTINUED | OUTPATIENT
Start: 2025-06-25 | End: 2025-06-26

## 2025-06-25 NOTE — PLAN OF CARE
Patient alert and oriented.  Difficult to communicate due to patients quiet voice and language barrier.  Attempting to use communication board.  Up to chair today with PT/OT.  Sling in place.  [place]ke[place] removed due to leaking issues.  Lasix given, good response.  Off Bipap to HFNC 6L, then Venturi Mask 15L 50%.  CT to suction, no air leak noted.  Minimal output to both.  Plan to diuresis and mobilize as able.  Problem: CARDIOVASCULAR - ADULT  Goal: Maintains optimal cardiac output and hemodynamic stability  Description: INTERVENTIONS:  - Monitor vital signs, rhythm, and trends  - Monitor for bleeding, hypotension and signs of decreased cardiac output  - Evaluate effectiveness of vasoactive medications to optimize hemodynamic stability  - Monitor arterial and/or venous puncture sites for bleeding and/or hematoma  - Assess quality of pulses, skin color and temperature  - Assess for signs of decreased coronary artery perfusion - ex. Angina  - Evaluate fluid balance, assess for edema, trend weights  Outcome: Progressing  Goal: Absence of cardiac arrhythmias or at baseline  Description: INTERVENTIONS:  - Continuous cardiac monitoring, monitor vital signs, obtain 12 lead EKG if indicated  - Evaluate effectiveness of antiarrhythmic and heart rate control medications as ordered  - Initiate emergency measures for life threatening arrhythmias  - Monitor electrolytes and administer replacement therapy as ordered  Outcome: Progressing     Problem: GASTROINTESTINAL - ADULT  Goal: Minimal or absence of nausea and vomiting  Description: INTERVENTIONS:  - Maintain adequate hydration with IV or PO as ordered and tolerated  - Nasogastric tube to low intermittent suction as ordered  - Evaluate effectiveness of ordered antiemetic medications  - Provide nonpharmacologic comfort measures as appropriate  - Advance diet as tolerated, if ordered  - Obtain nutritional consult as needed  - Evaluate fluid balance  Outcome: Progressing  Goal:  Maintains or returns to baseline bowel function  Description: INTERVENTIONS:  - Assess bowel function  - Maintain adequate hydration with IV or PO as ordered and tolerated  - Evaluate effectiveness of GI medications  - Encourage mobilization and activity  - Obtain nutritional consult as needed  - Establish a toileting routine/schedule  - Consider collaborating with pharmacy to review patient's medication profile  Outcome: Progressing     Problem: GENITOURINARY - ADULT  Goal: Absence of urinary retention  Description: INTERVENTIONS:  - Assess patient’s ability to void and empty bladder  - Monitor intake/output and perform bladder scan as needed  - Follow urinary retention protocol/standard of care  - Consider collaborating with pharmacy to review patient's medication profile  - Implement strategies to promote bladder emptying  Outcome: Progressing     Problem: SKIN/TISSUE INTEGRITY - ADULT  Goal: Incision(s), wounds(s) or drain site(s) healing without S/S of infection  Description: INTERVENTIONS:  - Assess and document risk factors for pressure ulcer development  - Assess and document skin integrity  - Assess and document dressing/incision, wound bed, drain sites and surrounding tissue  - Implement wound care per orders  - Initiate isolation precautions as appropriate  - Initiate Pressure Ulcer prevention bundle as indicated  Outcome: Progressing  Goal: Oral mucous membranes remain intact  Description: INTERVENTIONS  - Assess oral mucosa and hygiene practices  - Implement preventative oral hygiene regimen  - Implement oral medicated treatments as ordered  Outcome: Progressing     Problem: HEMATOLOGIC - ADULT  Goal: Maintains hematologic stability  Description: INTERVENTIONS  - Assess for signs and symptoms of bleeding or hemorrhage  - Monitor labs and vital signs for trends  - Administer supportive blood products/factors, fluids and medications as ordered and appropriate  - Administer supportive blood  products/factors as ordered and appropriate  Outcome: Progressing  Goal: Free from bleeding injury  Description: (Example usage: patient with low platelets)  INTERVENTIONS:  - Avoid intramuscular injections, enemas and rectal medication administration  - Ensure safe mobilization of patient  - Hold pressure on venipuncture sites to achieve adequate hemostasis  - Assess for signs and symptoms of internal bleeding  - Monitor lab trends  - Patient is to report abnormal signs of bleeding to staff  - Avoid use of toothpicks and dental floss  - Use electric shaver for shaving  - Use soft bristle tooth brush  - Limit straining and forceful nose blowing  Outcome: Progressing     Problem: MUSCULOSKELETAL - ADULT  Goal: Return mobility to safest level of function  Description: INTERVENTIONS:  - Assess patient stability and activity tolerance for standing, transferring and ambulating w/ or w/o assistive devices  - Assist with transfers and ambulation using safe patient handling equipment as needed  - Ensure adequate protection for wounds/incisions during mobilization  - Obtain PT/OT consults as needed  - Advance activity as appropriate  - Communicate ordered activity level and limitations with patient/family  Outcome: Progressing     Problem: NEUROLOGICAL - ADULT  Goal: Achieves stable or improved neurological status  Description: INTERVENTIONS  - Assess for and report changes in neurological status  - Initiate measures to prevent increased intracranial pressure  - Maintain blood pressure and fluid volume within ordered parameters to optimize cerebral perfusion and minimize risk of hemorrhage  - Monitor temperature, glucose, and sodium. Initiate appropriate interventions as ordered  Outcome: Progressing

## 2025-06-25 NOTE — OCCUPATIONAL THERAPY NOTE
OCCUPATIONAL THERAPY EVALUATION - INPATIENT     Room Number: 233/233-A  Evaluation Date: 6/25/2025  Type of Evaluation: Initial  Presenting Problem: CABG    Physician Order: IP Consult to Occupational Therapy  Reason for Therapy: ADL/IADL Dysfunction and Discharge Planning    OCCUPATIONAL THERAPY ASSESSMENT   RN cleared pt for participation in OT session, which was completed in collaboration with PT.  services used, pt unable to project; slowly mouths words wife speaks to  on behalf of pt. Upon arrival, pt was supine in bed and agreeable to activity. Spouse present during session. Pt was left in bedside chair. Call light and all needs left in reach. Handoff given to RN.    Patient is a 59 year old male admitted 6/19/2025 for CABG; CIWA protocol.  Prior to admission, pt was I with ADLs and IADLs, working full time and driving. Pt with weak vocal quality and wife responded mostly for him.  Patient is currently requiring up to total A for ADLs overall along with total A for rolling, pt requires annemarie lift to chair.  Pt has the following impairments: decreased functional strength, decreased functional reach, decreased endurance, decreased muscular endurance, cognitive deficits (lethargy), medical status, limited BUE ROM, and increased O2 needs from baseline.     ACTIVITY TOLERANCE  Pulse: 108  Heart Rate Source: Monitor  BP: 133/64  BP Location: Right arm  BP Method: Automatic  Patient Position: Sitting  Oxygen Therapy  SPO2% on Oxygen at Rest: 95  At rest oxygen flow (liters per minute): 5    Education provided  Educated pt about role of OT and hospital therapy process as well as proper safety techniques including proper hand placement, body mechanics, safety techniques, importance of repositioning, sternal precautions. Pt/family verbalized/demonstrated fair carryover.    Patient will benefit from continued skilled OT Services to facilitate return to prior level of function as patient demonstrates  high motivation with excellent tolerance to an intensive therapy program    PLAN  OT Treatment Plan: Balance activities;Energy conservation/work simplification techniques;Continued evaluation;Compensatory technique education;Fine motor coordination activities;Neuromuscluar reeducation;Equipment eval/education;Patient/Family training;Patient/Family education;Cognitive reorientation;Endurance training;UE strengthening/ROM;Functional transfer training;Visual perceptual training;IADL training;ADL training      OCCUPATIONAL THERAPY MEDICAL/SOCIAL HISTORY     Problem List  Active Problems:    Aortic stenosis    Hx of CABG    Episodic mood disorder    Delirium due to another medical condition    Angina concurrent with and due to arteriosclerosis of CABG    Pre-op evaluation    Past Medical History  Past Medical History[1]    Past Surgical History  Past Surgical History[2]    HOME SITUATION  Type of Home: House  Home Layout: Multi-level  Lives With: Spouse, Son  Toilet and Equipment: Standard height toilet  Shower/Tub and Equipment: Walk-in shower  Occupation/Status: cleaning service  Drives: Yes  Patient Regularly Uses: None    SUBJECTIVE  Mouths \"3 bedroom house\"    OCCUPATIONAL THERAPY EXAMINATION      OBJECTIVE  Precautions: Drain(s),  needed, Sternal (Polish speaking)  Fall Risk: High fall risk    COGNITION  Arousal/Alertness:  lethargic; sometimes closes eyes  Decreased command follow  Decreased processing    RANGE OF MOTION   Upper extremity ROM is impaired. Not formally tested d/t sternal precautions post CABG. L wrist splint for art line.    STRENGTH ASSESSMENT  NT d/t L splint for art line and sternal precautions post CABG       ACTIVITIES OF DAILY LIVING ASSESSMENT  AM-PAC ‘6-Clicks’ Inpatient Daily Activity Short Form  How much help from another person does the patient currently need…  -   Putting on and taking off regular lower body clothing?: Total  -   Bathing (including washing, rinsing, drying)?:  Total  -   Toileting, which includes using toilet, bedpan or urinal? : Total  -   Putting on and taking off regular upper body clothing?: A Lot  -   Taking care of personal grooming such as brushing teeth?: A Lot  -   Eating meals?: A Lot    AM-PAC Score:  Score: 9  Approx Degree of Impairment: 79.59%  Standardized Score (AM-PAC Scale): 25.33  CMS Modifier (G-Code): CL      FUNCTIONAL ADL ASSESSMENT  Feeding: NG feeding tube    Grooming: max A     Bathing: total A    UB dressing: max A    LB dressing: total A    Toileting: total A    The patient's Approx Degree of Impairment: 79.59% has been calculated based on documentation in the [facility] '6 clicks' Inpatient Daily Activity Short Form.  Research supports that patients with this level of impairment may benefit from rehab. Final disposition will be made by interdisciplinary medical team.    OT Goals  Patients self stated goal is: none stated     Patient will complete functional transfer with mod A at RW level  Comment:     Patient will complete toileting with mod A  Comment:     Patient will tolerate standing for 2 minutes in prep for adls with mod A   Comment:              Goals  on: 07/15/25  Frequency: 3-5x/week    Patient Evaluation Complexity Level:   Occupational Profile/Medical History HIGH - Extensive review of history including review of medical or therapy records    Specific performance deficits impacting engagement in ADL/IADL HIGH  5+ performance deficits    Client Assessment/Performance Deficits HIGH - Comorbidities and significant modifications of tasks    Clinical Decision Making HIGH - Analysis of occupational profile, comprehensive assessments, multiple treatment options    Overall Complexity HIGH     OT Session Time: 35 minutes  TA : 23 minutes    WILLIAMS Hutson  [facility]  Inpatient Rehabilitation  Occupational Therapy  [phone]         [1]   Past Medical History:   Aortic stenosis    Back problem     BPH (benign prostatic hyperplasia)    Cardiomyopathy (HCC)    Coronary atherosclerosis    Depression    Diabetes (HCC)    Esophageal reflux    Heart valve disease    High blood pressure    High cholesterol    Peripheral vascular disease    Shortness of breath    related to smoking     Visual impairment    glasses   [2]   Past Surgical History:  Procedure Laterality Date    Cardiac catheterization  2016    [facility]    Cath drug eluting stent      Colonoscopy  04/22/2024    Colonoscopy N/A 4/22/2024    Procedure: COLONOSCOPY;  Surgeon: Attila Li MD;  Location: [facility] ENDOSCOPY    Esophagoscopy,diagnostic  04/22/2024    Dr. Li    Hernia surgery      as a child    Nasal scopy,remv part ethmoid  10/14/2020    Nasal scopy,rmv tiss maxill sinus  10/14/2020    Repair of nasal septum  10/14/2020

## 2025-06-25 NOTE — PROGRESS NOTES
[facility]  part of [facility]    Progress Note    Jose Alberto Lizama Patient Status:  Inpatient    1965 MRN [id number]   Location [facility] 2W/SW Attending Tammy Zacarias MD   Hosp Day # 6 PCP Fiona Sweeney MD       Subjective:   Resting comfortably and in NAD. Denied any active complaints at the time of interview.   All questions and concerns addressed. In good spirits.     Objective:   Blood pressure 133/64, pulse 108, temperature 97.7 °F (36.5 °C), temperature source Temporal, resp. rate (!) 33, height 5' 6\" (1.676 m), weight 213 lb 6.5 oz (96.8 kg), SpO2 95%.    Physical Exam:    General: No acute distress.   Respiratory: Clear to auscultation bilaterally. No wheezes. No rhonchi.  Cardiovascular: S1, S2. Regular rate and rhythm. No murmurs, rubs or gallops.   Abdomen: Soft, nontender, nondistended.  Positive bowel sounds. No rebound or guarding.  Neurologic: No focal neurological deficits.   Musculoskeletal: Moves all extremities.  Extremities: No edema.    Results:     Lab Results   Component Value Date    WBC 7.7 2025    HGB 8.3 (L) 2025    HCT 25.6 (L) 2025    .0 2025    CREATSERUM 0.74 2025    BUN 18 2025     2025    K 3.7 2025    K 3.7 2025     2025    CO2 25.0 2025     (H) 2025    CA 8.5 (L) 2025    ALB 3.4 2025    ALKPHO 45 2025    BILT 0.4 2025    TP 5.5 (L) 2025     (H) 2025    ALT 47 2025    PTT 30.6 2025    INR 1.17 2025    TSH 0.696 2025    PSA 2.62 08/10/2023    ESRML 97 (H) 2025    MG 2.0 2025    PHOS 2.9 2025       XR CHEST AP PORTABLE  (CPT=71045)  Result Date: 2025  CONCLUSION:   Increasing bilateral pleural effusions and pulmonary edema.  Right medial and left basilar chest tubes are again seen.  Recommend correlation for malfunction.  Endotracheal tube, esophagogastric  tube, and [place]-Mike catheter are unchanged.      Dictated by (CST): Malachi Wilson MD on 6/23/2025 at 7:25 AM     Finalized by (CST): Malachi Wilson MD on 6/23/2025 at 7:39 AM          EKG 12 Lead  Result Date: 6/24/2025  Normal sinus rhythm Left axis deviation Nonspecific intraventricular block T wave abnormality, consider lateral ischemia Abnormal ECG When compared with ECG of 20-JUN-2025 04:27, No significant change was found Confirmed by TAD KONG ([id number]) on 6/24/2025 11:41:06 AM    Scheduled Medications[1]  PRN Medications[2]      Assessment and Plan:       CAD status post CABG POD 5 and AVR  - difficult extubation but patient extubated successfully 06/24  - Continue tube feeds  - Lasix TID for volume overload   - management per cardiology and CV sx    Severe aortic stenosis  - HD catheter removed today   - EF 35%  - amiodarone, asa, plavix     Alcohol abuse/Withdrawal   - CIWA protocol   - wean precedex drip   - Psych on board       DM2  - insulin per Vascular surgery     HTN  Dyslipidemia  - continue home meds         Quality:    CODE status: Full  DVT proph- lovenox            [1]    furosemide  40 mg Intravenous TID    potassium chloride  20 mEq Oral BID    [START ON 6/26/2025] insulin degludec  20 Units Subcutaneous Daily    insulin degludec  5 Units Subcutaneous Once    colchicine  0.6 mg Oral BID    ferrous sulfate  300 mg Oral BID    insulin regular human  1-5 Units Subcutaneous 4 times per day    aspirin  81 mg Per NG Tube Daily    docusate  100 mg Per NG Tube BID    acetaminophen  1,000 mg Intravenous Q6H    ipratropium-albuterol  3 mL Nebulization TID    piperacillin-tazobactam  3.375 g Intravenous Q8H    budesonide  0.5 mg Nebulization 2 times daily    rosuvastatin  20 mg Per NG Tube Nightly    thiamine  100 mg Oral Daily    multivitamin  1 tablet Oral Daily    folic acid  1 mg Oral Daily    sugammadex  2 mg/kg Intravenous Once    sennosides  8.6 mg Oral BID    famotidine  20 mg Oral BID    Or     famotidine  20 mg Intravenous BID    [Held by provider] metoprolol tartrate  25 mg Oral 2x Daily(Beta Blocker)    mupirocin  1 Application Nasal BID    chlorhexidine gluconate  15 mL Mouth/Throat BID    ascorbic acid  500 mg Oral TID    enoxaparin  40 mg Subcutaneous Daily    clopidogrel  75 mg Oral Daily    tamsulosin  0.4 mg Oral BID   [2]   metoprolol    dextrose 10%    sodium bicarbonate **AND** lipase-protease-amylase (Lip-Prot-Amyl)    ipratropium-albuterol    LORazepam **OR** LORazepam    LORazepam **OR** LORazepam    LORazepam **OR** LORazepam    LORazepam    LORazepam    LORazepam    haloperidol lactate    LORazepam    haloperidol lactate    melatonin    polyethylene glycol (PEG 3350)    bisacodyl    potassium chloride **OR** potassium chloride    magnesium sulfate in dextrose 5%    magnesium sulfate in sterile water for injection    acetaminophen **OR** HYDROcodone-acetaminophen **OR** HYDROcodone-acetaminophen    morphINE **OR** [DISCONTINUED] morphINE    glucose **OR** glucose **OR** glucose-vitamin C **OR** dextrose **OR** glucose **OR** glucose **OR** glucose-vitamin C

## 2025-06-25 NOTE — PROGRESS NOTES
[facility]  part of [facility]     Progress Note    Jose Alberto Lizama Patient Status:  Inpatient    1965 MRN [id number]   Location [facility] 2W/SW Attending Tammy Zacarias MD   Hosp Day # 6 PCP Fiona Sweeney MD       Subjective:   Patient seen and examined.  Resting in bed.  Alert arousable.  No significant distress    Objective:   Blood pressure 112/56, pulse 77, temperature 98 °F (36.7 °C), temperature source Temporal, resp. rate (!) 34, height 5' 6\" (1.676 m), weight 213 lb 6.5 oz (96.8 kg), SpO2 93%.  Intake/Output:   Last 3 shifts: I/O last 3 completed shifts:  In: 4251.8 [I.V.:3651.8; IV PIGGYBACK:600]  Out: 4060 [Urine:3205; Emesis/NG output:10; Chest Tube:845]   This shift: I/O this shift:  In: 523.5 [I.V.:423.5; IV PIGGYBACK:100]  Out: 680 [Urine:600; Chest Tube:80]     Vent Settings: FiO2 (%):  [40 %] 40 %    Hemodynamic parameters (last 24 hours):      Scheduled Meds: Current Hospital Medications[1]    Continuous Infusions: Medication Infusions[2]    Physical Exam  Constitutional: no acute distress, arousable  Eyes: PERRL  ENT: nares pateint  Neck: supple, no JVD  Cardio: RRR, S1 S2  Respiratory: Breath sounds with some scattered crackles present  GI: abdomen soft, non tender, active bowel sounds, no organomegaly  Extremities: no clubbing, cyanosis, + bilateral lower extremity edema  Neurologic: no gross motor deficits  Skin: warm, dry      Results:     Lab Results   Component Value Date    WBC 7.7 2025    HGB 8.3 2025    HCT 25.6 2025    .0 2025    CREATSERUM 0.74 2025    BUN 18 2025     2025    K 3.7 2025    K 3.7 2025     2025    CO2 25.0 2025     2025    CA 8.5 2025    ESRML 97 2025    MG 2.0 2025    PHOS 2.9 2025       XR CHEST AP PORTABLE  (CPT=71045)  Result Date: 2025  CONCLUSION: Lines and tubes in place as described. Mild  interstitial edema with small bilateral pleural effusions which is unchanged. There are median sternotomy wires and postoperative changes of CABG. Electronically Verified and Signed by Attending Radiologist: Refugio Lloyd MD 6/25/2025 3:19 PM Workstation: [id number]      EKG 12 Lead  Result Date: 6/24/2025  Normal sinus rhythm Left axis deviation Nonspecific intraventricular block T wave abnormality, consider lateral ischemia Abnormal ECG When compared with ECG of 20-JUN-2025 04:27, No significant change was found Confirmed by TAD KONG ([id number]) on 6/24/2025 11:41:06 AM      Assessment   1.  POD #6 status post CABG and AVR  2.  Severe coronary disease  3.  Severe aortic stenosis  4.  Postoperative respiratory failure  5.  Possible EtOH withdrawal  6.  Anemia  7.  Diabetes mellitus  8.  Hypertension  9.  Hyperlipidemia  10.  Possible ORAL     Plan   -Patient presents status post CABG and AVR on 6/19/2025.  - Difficulty extubating postoperatively with concern for pulmonary edema possible mucous plugging and possible component of EtOH withdrawal.  - Tolerating extubation on 6/24/2025.  - Intermittent NIV as necessary.  Wean oxygen as tolerated  - Diuresis as tolerated  - Further recommendations per side  - Continue tube feedings  - DVT prophylaxis: Julia Zamora DO  Pulmonary Critical Care Medicine  [facility]          [1]   Current Facility-Administered Medications   Medication Dose Route Frequency    furosemide (Lasix) 10 mg/mL injection 40 mg  40 mg Intravenous TID    potassium chloride (Klor-Con M20) tab 20 mEq  20 mEq Oral BID    [START ON 6/26/2025] insulin degludec (Tresiba) 100 units/mL flextouch 20 Units  20 Units Subcutaneous Daily    insulin degludec (Tresiba) 100 units/mL flextouch 5 Units  5 Units Subcutaneous Once    colchicine tab 0.6 mg  0.6 mg Oral BID    clevidipine (Cleviprex) 25 MG/50ML IV infusion  1-21 mg/hr Intravenous Continuous    metoprolol (Lopressor) 5 mg/5mL  injection 10 mg  10 mg Intravenous Q6H PRN    ferrous sulfate 300 (60 Fe) MG/5ML oral syrup 300 mg  300 mg Oral BID    insulin regular human (Novolin R, Humulin R) 100 UNIT/ML injection 1-5 Units  1-5 Units Subcutaneous 4 times per day    aspirin chewable tab 81 mg  81 mg Per NG Tube Daily    dextrose 10% infusion (TPN no rate)   Intravenous Continuous PRN    sodium bicarbonate tab 650 mg  650 mg Per G Tube PRN    And    pancrelipase (Lip-Prot-Amyl) (Zenpep) DR particles cap 10,000 Units  10,000 Units Per G Tube PRN    docusate (Colace) 50 MG/5ML oral solution 100 mg  100 mg Per NG Tube BID    nitroGLYCERIN in dextrose 5% 50 mg/250mL infusion premix  5-200 mcg/min Intravenous Continuous    acetaminophen (Ofirmev) 10 mg/mL infusion premix 1,000 mg  1,000 mg Intravenous Q6H    ipratropium-albuterol (Duoneb) 0.5-2.5 (3) MG/3ML inhalation solution 3 mL  3 mL Nebulization TID    norepinephrine (Levophed) 8 mg in dextrose 5% 250 mL infusion  0.5-8 mcg/min Intravenous Continuous    ipratropium-albuterol (Duoneb) 0.5-2.5 (3) MG/3ML inhalation solution 3 mL  3 mL Nebulization Q4H PRN    DOBUTamine (Dobutrex) 500 mg in sodium chloride 0.9% 125 mL infusion  2 mcg/kg/min (Dosing Weight) Intravenous Continuous    piperacillin-tazobactam (Zosyn) 3.375 g in dextrose 5% 100 mL IVPB-ADDV  3.375 g Intravenous Q8H    budesonide (Pulmicort) 0.5 MG/2ML nebulizer suspension 0.5 mg  0.5 mg Nebulization 2 times daily    rosuvastatin (Crestor) tab 20 mg  20 mg Per NG Tube Nightly    LORazepam (Ativan) tab 1 mg  1 mg Oral Q1H PRN    Or    LORazepam (Ativan) 2 mg/mL injection 1 mg  1 mg Intravenous Q1H PRN    LORazepam (Ativan) tab 2 mg  2 mg Oral Q1H PRN    Or    LORazepam (Ativan) 2 mg/mL injection 2 mg  2 mg Intravenous Q1H PRN    LORazepam (Ativan) tab 3 mg  3 mg Oral Q1H PRN    Or    LORazepam (Ativan) 2 mg/mL injection 3 mg  3 mg Intravenous Q1H PRN    LORazepam (Ativan) 2 mg/mL injection 4 mg  4 mg Intravenous Q30 Min PRN    LORazepam  (Ativan) 2 mg/mL injection 5 mg  5 mg Intravenous Q15 Min PRN    LORazepam (Ativan) 2 mg/mL injection 6 mg  6 mg Intravenous Q10 Min PRN    thiamine (Vitamin B1) tab 100 mg  100 mg Oral Daily    multivitamin (Tab-A-Елена/Beta Carotene) tab 1 tablet  1 tablet Oral Daily    folic acid (Folvite) tab 1 mg  1 mg Oral Daily    haloperidol lactate (Haldol) 5 MG/ML injection 5 mg  5 mg Intramuscular Q6H PRN    LORazepam (Ativan) 2 mg/mL injection 1 mg  1 mg Intramuscular Q6H PRN    haloperidol lactate (Haldol) 5 MG/ML injection 2 mg  2 mg Intravenous Q4H PRN    sugammadex (Bridion) 200 MG/2ML injection 180 mg  2 mg/kg Intravenous Once    sodium chloride 0.9% infusion   Intravenous Continuous    melatonin tab 3 mg  3 mg Oral Nightly PRN    sennosides (Senokot) tab 8.6 mg  8.6 mg Oral BID    polyethylene glycol (PEG 3350) (Miralax) 17 g oral packet 17 g  17 g Oral Daily PRN    bisacodyl (Dulcolax) 10 MG rectal suppository 10 mg  10 mg Rectal Daily PRN    famotidine (Pepcid) tab 20 mg  20 mg Oral BID    Or    famotidine (Pepcid) 20 mg/2mL injection 20 mg  20 mg Intravenous BID    dexmedeTOMIDine in sodium chloride 0.9% (Precedex) 400 mcg/100mL infusion premix  0.2-1.5 mcg/kg/hr (Dosing Weight) Intravenous Continuous    [Held by provider] metoprolol tartrate (Lopressor) tab 25 mg  25 mg Oral 2x Daily(Beta Blocker)    potassium chloride 20 mEq/100mL IVPB premix 20 mEq  20 mEq Intravenous PRN    Or    potassium chloride 40 mEq/100mL IVPB premix (central line) 40 mEq  40 mEq Intravenous PRN    magnesium sulfate in dextrose 5% 1 g/100mL infusion premix 1 g  1 g Intravenous PRN    magnesium sulfate in sterile water for injection 2 g/50mL IVPB premix 2 g  2 g Intravenous PRN    mupirocin (Bactroban) 2% nasal ointment 1 Application  1 Application Nasal BID    chlorhexidine gluconate (Peridex) 0.12 % oral solution 15 mL  15 mL Mouth/Throat BID    ascorbic acid (Vitamin C) tab 500 mg  500 mg Oral TID    enoxaparin (Lovenox) 40 MG/0.4ML  SUBQ injection 40 mg  40 mg Subcutaneous Daily    acetaminophen (Tylenol) tab 650 mg  650 mg Oral Q4H PRN    Or    HYDROcodone-acetaminophen (Norco) 5-325 MG per tab 1 tablet  1 tablet Oral Q4H PRN    Or    HYDROcodone-acetaminophen (Norco) 5-325 MG per tab 2 tablet  2 tablet Oral Q4H PRN    morphINE PF 2 MG/ML injection 2 mg  2 mg Intravenous Q2H PRN    clopidogrel (Plavix) tab 75 mg  75 mg Oral Daily    glucose (Dex4) 15 GM/59ML oral liquid 15 g  15 g Oral Q15 Min PRN    Or    glucose (Glutose) 40% oral gel 15 g  15 g Oral Q15 Min PRN    Or    glucose-vitamin C (Dex-4) chewable tab 4 tablet  4 tablet Oral Q15 Min PRN    Or    dextrose 50% injection 50 mL  50 mL Intravenous Q15 Min PRN    Or    glucose (Dex4) 15 GM/59ML oral liquid 30 g  30 g Oral Q15 Min PRN    Or    glucose (Glutose) 40% oral gel 30 g  30 g Oral Q15 Min PRN    Or    glucose-vitamin C (Dex-4) chewable tab 8 tablet  8 tablet Oral Q15 Min PRN    tamsulosin (Flomax) cap 0.4 mg  0.4 mg Oral BID   [2]    clevidipine 2 mg/hr (06/25/25 1500)    dextrose 10%      nitroGLYCERIN in dextrose 5% 100 mcg/min (06/25/25 1345)    norepinephrine Stopped (06/23/25 1642)    DOBUTamine Stopped (06/24/25 1900)    sodium chloride 10 mL/hr at 06/24/25 2000    dexmedetomidine Stopped (06/24/25 1200)

## 2025-06-25 NOTE — PLAN OF CARE
Had a difficult night d/t shortness of breath and tachypnea. Strongly encouraged patient to use bipap throughout entire night as well as worked on deep breathing and strong coughing to clear mucous/secretions. RR 30-40, mildly tachypneic (). CVP maintainted 9-12 despite edema and high output from CT and adequate output from [place].  Elevated BP despite uptitrating nitroglycerin (off PO meds at this time d/t NPO status).  Strict NPO - coughing even on mouth swabs  Kept in bed this am until MD can see, patient would like to get OOB and is looking forward to discharge date per conversation w/ daughter acting as .    [place] output = 435 ml + 1 peripad of leaked urine from [place]  Y CT output = 310 ml  Dane CT output = 20 ml        Problem: CARDIOVASCULAR - ADULT  Goal: Maintains optimal cardiac output and hemodynamic stability  Description: INTERVENTIONS:  - Monitor vital signs, rhythm, and trends  - Monitor for bleeding, hypotension and signs of decreased cardiac output  - Evaluate effectiveness of vasoactive medications to optimize hemodynamic stability  - Monitor arterial and/or venous puncture sites for bleeding and/or hematoma  - Assess quality of pulses, skin color and temperature  - Assess for signs of decreased coronary artery perfusion - ex. Angina  - Evaluate fluid balance, assess for edema, trend weights  Outcome: Progressing  Goal: Absence of cardiac arrhythmias or at baseline  Description: INTERVENTIONS:  - Continuous cardiac monitoring, monitor vital signs, obtain 12 lead EKG if indicated  - Evaluate effectiveness of antiarrhythmic and heart rate control medications as ordered  - Initiate emergency measures for life threatening arrhythmias  - Monitor electrolytes and administer replacement therapy as ordered  Outcome: Progressing     Problem: RESPIRATORY - ADULT  Goal: Achieves optimal ventilation and oxygenation  Description: INTERVENTIONS:  - Assess for changes in respiratory status  -  Assess for changes in mentation and behavior  - Position to facilitate oxygenation and minimize respiratory effort  - Oxygen supplementation based on oxygen saturation or ABGs  - Provide Smoking Cessation handout, if applicable  - Encourage broncho-pulmonary hygiene including cough, deep breathe, Incentive Spirometry  - Assess the need for suctioning and perform as needed  - Assess and instruct to report SOB or any respiratory difficulty  - Respiratory Therapy support as indicated  - Manage/alleviate anxiety  - Monitor for signs/symptoms of CO2 retention  Outcome: Progressing     Problem: GASTROINTESTINAL - ADULT  Goal: Minimal or absence of nausea and vomiting  Description: INTERVENTIONS:  - Maintain adequate hydration with IV or PO as ordered and tolerated  - Nasogastric tube to low intermittent suction as ordered  - Evaluate effectiveness of ordered antiemetic medications  - Provide nonpharmacologic comfort measures as appropriate  - Advance diet as tolerated, if ordered  - Obtain nutritional consult as needed  - Evaluate fluid balance  Outcome: Progressing  Goal: Maintains or returns to baseline bowel function  Description: INTERVENTIONS:  - Assess bowel function  - Maintain adequate hydration with IV or PO as ordered and tolerated  - Evaluate effectiveness of GI medications  - Encourage mobilization and activity  - Obtain nutritional consult as needed  - Establish a toileting routine/schedule  - Consider collaborating with pharmacy to review patient's medication profile  Outcome: Progressing     Problem: GENITOURINARY - ADULT  Goal: Absence of urinary retention  Description: INTERVENTIONS:  - Assess patient’s ability to void and empty bladder  - Monitor intake/output and perform bladder scan as needed  - Follow urinary retention protocol/standard of care  - Consider collaborating with pharmacy to review patient's medication profile  - Implement strategies to promote bladder emptying  Outcome: Progressing      Problem: METABOLIC/FLUID AND ELECTROLYTES - ADULT  Goal: Glucose maintained within prescribed range  Description: INTERVENTIONS:  - Monitor Blood Glucose as ordered  - Assess for signs and symptoms of hyperglycemia and hypoglycemia  - Administer ordered medications to maintain glucose within target range  - Assess barriers to adequate nutritional intake and initiate nutrition consult as needed  - Instruct patient on self management of diabetes  Outcome: Progressing  Goal: Electrolytes maintained within normal limits  Description: INTERVENTIONS:  - Monitor labs and rhythm and assess patient for signs and symptoms of electrolyte imbalances  - Administer electrolyte replacement as ordered  - Monitor response to electrolyte replacements, including rhythm and repeat lab results as appropriate  - Fluid restriction as ordered  - Instruct patient on fluid and nutrition restrictions as appropriate  Outcome: Progressing  Goal: Hemodynamic stability and optimal renal function maintained  Description: INTERVENTIONS:  - Monitor labs and assess for signs and symptoms of volume excess or deficit  - Monitor intake, output and patient weight  - Monitor urine specific gravity, serum osmolarity and serum sodium as indicated or ordered  - Monitor response to interventions for patient's volume status, including labs, urine output, blood pressure (other measures as available)  - Encourage oral intake as appropriate  - Instruct patient on fluid and nutrition restrictions as appropriate  Outcome: Progressing     Problem: SKIN/TISSUE INTEGRITY - ADULT  Goal: Incision(s), wounds(s) or drain site(s) healing without S/S of infection  Description: INTERVENTIONS:  - Assess and document risk factors for pressure ulcer development  - Assess and document skin integrity  - Assess and document dressing/incision, wound bed, drain sites and surrounding tissue  - Implement wound care per orders  - Initiate isolation precautions as appropriate  -  Initiate Pressure Ulcer prevention bundle as indicated  Outcome: Progressing  Goal: Oral mucous membranes remain intact  Description: INTERVENTIONS  - Assess oral mucosa and hygiene practices  - Implement preventative oral hygiene regimen  - Implement oral medicated treatments as ordered  Outcome: Progressing     Problem: HEMATOLOGIC - ADULT  Goal: Maintains hematologic stability  Description: INTERVENTIONS  - Assess for signs and symptoms of bleeding or hemorrhage  - Monitor labs and vital signs for trends  - Administer supportive blood products/factors, fluids and medications as ordered and appropriate  - Administer supportive blood products/factors as ordered and appropriate  Outcome: Progressing  Goal: Free from bleeding injury  Description: (Example usage: patient with low platelets)  INTERVENTIONS:  - Avoid intramuscular injections, enemas and rectal medication administration  - Ensure safe mobilization of patient  - Hold pressure on venipuncture sites to achieve adequate hemostasis  - Assess for signs and symptoms of internal bleeding  - Monitor lab trends  - Patient is to report abnormal signs of bleeding to staff  - Avoid use of toothpicks and dental floss  - Use electric shaver for shaving  - Use soft bristle tooth brush  - Limit straining and forceful nose blowing  Outcome: Progressing     Problem: MUSCULOSKELETAL - ADULT  Goal: Return mobility to safest level of function  Description: INTERVENTIONS:  - Assess patient stability and activity tolerance for standing, transferring and ambulating w/ or w/o assistive devices  - Assist with transfers and ambulation using safe patient handling equipment as needed  - Ensure adequate protection for wounds/incisions during mobilization  - Obtain PT/OT consults as needed  - Advance activity as appropriate  - Communicate ordered activity level and limitations with patient/family  Outcome: Progressing     Problem: NEUROLOGICAL - ADULT  Goal: Achieves stable or improved  neurological status  Description: INTERVENTIONS  - Assess for and report changes in neurological status  - Initiate measures to prevent increased intracranial pressure  - Maintain blood pressure and fluid volume within ordered parameters to optimize cerebral perfusion and minimize risk of hemorrhage  - Monitor temperature, glucose, and sodium. Initiate appropriate interventions as ordered  Outcome: Progressing     Problem: Patient Centered Care  Goal: Patient preferences are identified and integrated in the patient's plan of care  Description: Interventions:  - What would you like us to know as we care for you? From home with family  - Provide timely, complete, and accurate information to patient/family  - Incorporate patient and family knowledge, values, beliefs, and cultural backgrounds into the planning and delivery of care  - Encourage patient/family to participate in care and decision-making at the level they choose  - Honor patient and family perspectives and choices  Outcome: Progressing     Problem: Patient/Family Goals  Goal: Patient/Family Long Term Goal  Description: Patient's Long Term Goal: discharge, asking how many days he will be in the hospital    Interventions:  - mobilize, work towards discharge goals  - See additional Care Plan goals for specific interventions  Outcome: Progressing  Goal: Patient/Family Short Term Goal  Description: Patient's Short Term Goal: breathe easier    Interventions:   - diuresis and wean O2  - See additional Care Plan goals for specific interventions  Outcome: Progressing     Problem: Diabetes/Glucose Control  Goal: Glucose maintained within prescribed range  Description: INTERVENTIONS:  - Monitor Blood Glucose as ordered  - Assess for signs and symptoms of hyperglycemia and hypoglycemia  - Administer ordered medications to maintain glucose within target range  - Assess barriers to adequate nutritional intake and initiate nutrition consult as needed  - Instruct patient  on self management of diabetes  Outcome: Progressing     Problem: Impaired Functional Mobility  Goal: Achieve highest/safest level of mobility/gait  Description: Interventions:  - Assess patient's functional ability and stability  - Promote increasing activity/tolerance for mobility and gait  - Educate and engage patient/family in tolerated activity level and precautions  - Recommend use of  RW for transfers and ambulation  - Recommend patient transfer to bedside chair toward strongest side  - Recommend use of chair position in bed 3 times per day  Outcome: Progressing     Problem: Impaired Cognition  Goal: Patient will exhibit improved attention, thought processing and/or memory  Description: Interventions:  - Allow additional time for processing after asking questions or providing instructions  Outcome: Progressing

## 2025-06-25 NOTE — PROGRESS NOTES
Patient is a 59 year old   male with past medical history of CAD, PVD,  hypertension, hyperlipidemia, diabetes, severe aortic stenosis. who was admitted to the hospital for a scheduled coronary artery bypass graft surgery, aortic valve replacement on 6/19. The patient is intubated and sedated with propofol and precedex. Otherwise the patient has been demonstrating agitation and confusion when weaning from mechanical ventilation is attempted.   Patient indicated for psych consult for evaluation and advise.    Consult Duration     The patient seen for over 50-minute, follow-up evaluation, over 50% counseling and coordinating care addressing agitation and confusion during SBT.  Record reviewed, communication with attending, communication with RN and patient seen face to face evaluation.     History of Present Illness:     In communication with the team the patient was successfully extubated yesterday and had his sedation lowered.     No new labs reviewed today.    The patient seen today sitting upright in his chair without visitors in the room. He is no longer sedated or intubated.  Patient was attentive and had appropriate behavior.    Patient is not able to cooperate with interview at this time due to difficultly speaking following extubation.    Patient continued to show improvement in confusion, alternation in his mood and cognition and episodes of agitation.  Per RN the patient continues to be confused, and is not fully oriented to place and condition.    No report of history of suicide or homicidal ideation or action.  No history of hallucination, psychosis or manic episode.      Collateral obtained from the patient's wife and daughter.   His daughter states that he drinks 3-5 drinks of Brandon walker on the weekend socially, with a hx of a \"little bit\" of depression and anxiety. 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  The patient's daughter states he quit smoking cigarettes 3 years ago and now vapes instead. She denied any family history of dementia.      Past Psychiatric/Medication History:  1. Prior diagnoses: none reported  2. Past psychiatric inpatient: none reported  3. Past outpatient history: none reported  4. Past suicide history: none reported  5. Medication history: none reported    Social History:   The patient is a 59 yr old polish speaking  white male with one adult daughter. He is reported to have 3-5 drinks every weekend socially, does not use illicit drugs and now vapes nicotine daily after quitting cigarettes 3 years ago.     Family History:  Father, paternal aunt and uncle all have hx of diabetes.  Medical History:   Past Medical History  Past Medical History:    Aortic stenosis    Back problem    BPH (benign prostatic hyperplasia)    Cardiomyopathy (HCC)    Coronary atherosclerosis    Depression    Diabetes (HCC)    Esophageal reflux    Heart valve disease    High blood pressure    High cholesterol    Peripheral vascular disease    Shortness of breath    related to smoking     Visual impairment    glasses       Past Surgical History  Past Surgical History:   Procedure Laterality Date    Cardiac catheterization  2016    [facility]    Cath drug eluting stent      Colonoscopy  04/22/2024    Colonoscopy N/A 4/22/2024    Procedure: COLONOSCOPY;  Surgeon: Attila Li MD;  Location: [facility] ENDOSCOPY    Esophagoscopy,diagnostic  04/22/2024    Dr. Li    Hernia surgery      as a child    Nasal scopy,remv part ethmoid  10/14/2020    Nasal scopy,rmv tiss maxill sinus  10/14/2020    Repair of nasal septum  10/14/2020       Family History  Family History   Problem Relation Age of Onset    Diabetes Father     Diabetes Paternal Aunt     Diabetes Paternal Uncle     Seizure Disorder Mother     No Known Problems Brother     No Known Problems Daughter     Glaucoma Neg     Macular degeneration  Neg        Social History  Social History     Socioeconomic History    Marital status:    Tobacco Use    Smoking status: Former     Current packs/day: 1.00     Average packs/day: 1 pack/day for 20.0 years (20.0 ttl pk-yrs)     Types: Cigarettes    Smokeless tobacco: Never   Vaping Use    Vaping status: Every Day    Substances: Nicotine   Substance and Sexual Activity    Alcohol use: Yes     Comment: 1-2 drinks a week    Drug use: No           Current Medications:  Current Facility-Administered Medications   Medication Dose Route Frequency    furosemide (Lasix) 10 mg/mL injection 40 mg  40 mg Intravenous TID    potassium chloride (Klor-Con M20) tab 20 mEq  20 mEq Oral BID    [START ON 6/26/2025] insulin degludec (Tresiba) 100 units/mL flextouch 20 Units  20 Units Subcutaneous Daily    insulin degludec (Tresiba) 100 units/mL flextouch 5 Units  5 Units Subcutaneous Once    colchicine tab 0.6 mg  0.6 mg Oral BID    clevidipine (Cleviprex) 25 MG/50ML IV infusion  1-21 mg/hr Intravenous Continuous    metoprolol (Lopressor) 5 mg/5mL injection 10 mg  10 mg Intravenous Q6H PRN    ferrous sulfate 300 (60 Fe) MG/5ML oral syrup 300 mg  300 mg Oral BID    insulin regular human (Novolin R, Humulin R) 100 UNIT/ML injection 1-5 Units  1-5 Units Subcutaneous 4 times per day    aspirin chewable tab 81 mg  81 mg Per NG Tube Daily    dextrose 10% infusion (TPN no rate)   Intravenous Continuous PRN    sodium bicarbonate tab 650 mg  650 mg Per G Tube PRN    And    pancrelipase (Lip-Prot-Amyl) (Zenpep) DR particles cap 10,000 Units  10,000 Units Per G Tube PRN    docusate (Colace) 50 MG/5ML oral solution 100 mg  100 mg Per NG Tube BID    nitroGLYCERIN in dextrose 5% 50 mg/250mL infusion premix  5-200 mcg/min Intravenous Continuous    acetaminophen (Ofirmev) 10 mg/mL infusion premix 1,000 mg  1,000 mg Intravenous Q6H    ipratropium-albuterol (Duoneb) 0.5-2.5 (3) MG/3ML inhalation solution 3 mL  3 mL Nebulization TID     norepinephrine (Levophed) 8 mg in dextrose 5% 250 mL infusion  0.5-8 mcg/min Intravenous Continuous    ipratropium-albuterol (Duoneb) 0.5-2.5 (3) MG/3ML inhalation solution 3 mL  3 mL Nebulization Q4H PRN    DOBUTamine (Dobutrex) 500 mg in sodium chloride 0.9% 125 mL infusion  2 mcg/kg/min (Dosing Weight) Intravenous Continuous    piperacillin-tazobactam (Zosyn) 3.375 g in dextrose 5% 100 mL IVPB-ADDV  3.375 g Intravenous Q8H    budesonide (Pulmicort) 0.5 MG/2ML nebulizer suspension 0.5 mg  0.5 mg Nebulization 2 times daily    rosuvastatin (Crestor) tab 20 mg  20 mg Per NG Tube Nightly    LORazepam (Ativan) tab 1 mg  1 mg Oral Q1H PRN    Or    LORazepam (Ativan) 2 mg/mL injection 1 mg  1 mg Intravenous Q1H PRN    LORazepam (Ativan) tab 2 mg  2 mg Oral Q1H PRN    Or    LORazepam (Ativan) 2 mg/mL injection 2 mg  2 mg Intravenous Q1H PRN    LORazepam (Ativan) tab 3 mg  3 mg Oral Q1H PRN    Or    LORazepam (Ativan) 2 mg/mL injection 3 mg  3 mg Intravenous Q1H PRN    LORazepam (Ativan) 2 mg/mL injection 4 mg  4 mg Intravenous Q30 Min PRN    LORazepam (Ativan) 2 mg/mL injection 5 mg  5 mg Intravenous Q15 Min PRN    LORazepam (Ativan) 2 mg/mL injection 6 mg  6 mg Intravenous Q10 Min PRN    thiamine (Vitamin B1) tab 100 mg  100 mg Oral Daily    multivitamin (Tab-A-Елена/Beta Carotene) tab 1 tablet  1 tablet Oral Daily    folic acid (Folvite) tab 1 mg  1 mg Oral Daily    haloperidol lactate (Haldol) 5 MG/ML injection 5 mg  5 mg Intramuscular Q6H PRN    LORazepam (Ativan) 2 mg/mL injection 1 mg  1 mg Intramuscular Q6H PRN    haloperidol lactate (Haldol) 5 MG/ML injection 2 mg  2 mg Intravenous Q4H PRN    sugammadex (Bridion) 200 MG/2ML injection 180 mg  2 mg/kg Intravenous Once    sodium chloride 0.9% infusion   Intravenous Continuous    melatonin tab 3 mg  3 mg Oral Nightly PRN    sennosides (Senokot) tab 8.6 mg  8.6 mg Oral BID    polyethylene glycol (PEG 3350) (Miralax) 17 g oral packet 17 g  17 g Oral Daily PRN     bisacodyl (Dulcolax) 10 MG rectal suppository 10 mg  10 mg Rectal Daily PRN    famotidine (Pepcid) tab 20 mg  20 mg Oral BID    Or    famotidine (Pepcid) 20 mg/2mL injection 20 mg  20 mg Intravenous BID    dexmedeTOMIDine in sodium chloride 0.9% (Precedex) 400 mcg/100mL infusion premix  0.2-1.5 mcg/kg/hr (Dosing Weight) Intravenous Continuous    [Held by provider] metoprolol tartrate (Lopressor) tab 25 mg  25 mg Oral 2x Daily(Beta Blocker)    potassium chloride 20 mEq/100mL IVPB premix 20 mEq  20 mEq Intravenous PRN    Or    potassium chloride 40 mEq/100mL IVPB premix (central line) 40 mEq  40 mEq Intravenous PRN    magnesium sulfate in dextrose 5% 1 g/100mL infusion premix 1 g  1 g Intravenous PRN    magnesium sulfate in sterile water for injection 2 g/50mL IVPB premix 2 g  2 g Intravenous PRN    mupirocin (Bactroban) 2% nasal ointment 1 Application  1 Application Nasal BID    chlorhexidine gluconate (Peridex) 0.12 % oral solution 15 mL  15 mL Mouth/Throat BID    ascorbic acid (Vitamin C) tab 500 mg  500 mg Oral TID    enoxaparin (Lovenox) 40 MG/0.4ML SUBQ injection 40 mg  40 mg Subcutaneous Daily    acetaminophen (Tylenol) tab 650 mg  650 mg Oral Q4H PRN    Or    HYDROcodone-acetaminophen (Norco) 5-325 MG per tab 1 tablet  1 tablet Oral Q4H PRN    Or    HYDROcodone-acetaminophen (Norco) 5-325 MG per tab 2 tablet  2 tablet Oral Q4H PRN    morphINE PF 2 MG/ML injection 2 mg  2 mg Intravenous Q2H PRN    clopidogrel (Plavix) tab 75 mg  75 mg Oral Daily    glucose (Dex4) 15 GM/59ML oral liquid 15 g  15 g Oral Q15 Min PRN    Or    glucose (Glutose) 40% oral gel 15 g  15 g Oral Q15 Min PRN    Or    glucose-vitamin C (Dex-4) chewable tab 4 tablet  4 tablet Oral Q15 Min PRN    Or    dextrose 50% injection 50 mL  50 mL Intravenous Q15 Min PRN    Or    glucose (Dex4) 15 GM/59ML oral liquid 30 g  30 g Oral Q15 Min PRN    Or    glucose (Glutose) 40% oral gel 30 g  30 g Oral Q15 Min PRN    Or    glucose-vitamin C (Dex-4)  chewable tab 8 tablet  8 tablet Oral Q15 Min PRN    tamsulosin (Flomax) cap 0.4 mg  0.4 mg Oral BID     Medications Prior to Admission   Medication Sig    ENTRESTO 24-26 MG Oral Tab Entresto 24 mg-26 mg tablet, [RxNorm: 3232397]    magnesium 250 MG Oral Tab Take 1 tablet (250 mg total) by mouth every other day.    cyanocobalamin 500 MCG Oral Tab Take 1 tablet (500 mcg total) by mouth every other day.    metFORMIN HCl 1000 MG Oral Tab Take 1 tablet (1,000 mg total) by mouth 2 (two) times daily with meals.    amLODIPine 5 MG Oral Tab Take 1 tablet (5 mg total) by mouth daily.    carvedilol 25 MG Oral Tab Take 1 tablet (25 mg total) by mouth 2 (two) times daily with meals.    clopidogrel 75 MG Oral Tab Take 1 tablet (75 mg total) by mouth daily.    empagliflozin (JARDIANCE) 25 MG Oral Tab Take 1 tablet (25 mg total) by mouth daily.    furosemide 40 MG Oral Tab Take 1 tablet (40 mg total) by mouth daily.    rosuvastatin 20 MG Oral Tab Take 1 tablet (20 mg total) by mouth nightly.    tamsulosin 0.4 MG Oral Cap TAKE 2 CAPSULES BY MOUTH DAILY (Patient taking differently: Take 1 capsule (0.4 mg total) by mouth in the morning and 1 capsule (0.4 mg total) before bedtime.)    aspirin 81 MG Oral Tab EC Take 1 tablet (81 mg total) by mouth nightly.    Thiamine HCl 250 MG Oral Tab Take 1 tablet (250 mg total) by mouth every other day.    pyridoxine 100 MG Oral Tab Take 1 tablet (100 mg total) by mouth every other day.    Glucose Blood (CONTOUR NEXT TEST) In Vitro Strip To test 2 x daily  Dx E11.9    Glucose Blood (CONTOUR NEXT TEST) In Vitro Strip To check glucose bid   Dx  E11.9    Glucose Blood (LORAINE CONTOUR TEST) In Vitro Strip To test daily    LORAINE CONTOUR NEXT TEST In Vitro Strip To test 2 x daily       Allergies  No Known Allergies    Review of Systems:   As by Admitting/Attending    Results:   Laboratory Data:  Lab Results   Component Value Date    WBC 7.7 06/25/2025    HGB 8.3 (L) 06/25/2025    HCT 25.6 (L) 06/25/2025     .0 06/25/2025    CREATSERUM 0.74 06/25/2025    BUN 18 06/25/2025     06/25/2025    K 3.7 06/25/2025    K 3.7 06/25/2025     06/25/2025    CO2 25.0 06/25/2025     (H) 06/25/2025    CA 8.5 (L) 06/25/2025    ALB 3.4 06/24/2025    ALKPHO 45 06/24/2025    TP 5.5 (L) 06/24/2025     (H) 06/24/2025    ALT 47 06/24/2025    PTT 30.6 06/19/2025    INR 1.17 06/19/2025    PTP 15.6 (H) 06/19/2025    TSH 0.696 06/19/2025    PSA 2.62 08/10/2023    ESRML 97 (H) 06/25/2025    MG 2.0 06/25/2025    PHOS 2.9 06/25/2025         Imaging:  XR CHEST AP PORTABLE  (CPT=71045)  Result Date: 6/25/2025  CONCLUSION: Lines and tubes in place as described. Mild interstitial edema with small bilateral pleural effusions which is unchanged. There are median sternotomy wires and postoperative changes of CABG. Electronically Verified and Signed by Attending Radiologist: Refugio Lloyd MD 6/25/2025 3:19 PM Workstation: [id number]    XR CHEST AP PORTABLE  (CPT=71045)  Result Date: 6/23/2025  CONCLUSION:   Increasing bilateral pleural effusions and pulmonary edema.  Right medial and left basilar chest tubes are again seen.  Recommend correlation for malfunction.  Endotracheal tube, esophagogastric tube, and [place] catheter are unchanged.      Dictated by (CST): Malachi Wilson MD on 6/23/2025 at 7:25 AM     Finalized by (CST): Malachi Wilson MD on 6/23/2025 at 7:39 AM            Vital Signs:   Blood pressure 112/56, pulse 77, temperature 98 °F (36.7 °C), temperature source Temporal, resp. rate (!) 34, height 66\", weight 96.8 kg (213 lb 6.5 oz), SpO2 93%.    Mental Status Exam:   Appearance: Stated age, male in hospital gown, sitting in chair, alert and extubated.  Psychomotor: Patient not longer agitated.  Orientation: confused, oriented only to self.  Gait: Not evaluated.  Attitude/Coorperation: limited cooperation due to difficulty speaking at this time.  Behavior: appropriate.  Speech: Speech impacted due to recently  being extubated  Mood: anxious  Affect: restricted  Thought process: Confused, disorganized  Thought content: No reports of suicidal or homicidal ideation.  Perceptions: Patient has been demonstrating response to internal stimuli.  Concentration: Grossly impaired  Memory: Grossly impaired  Intellect: Average.  Judgment and Insight: Questionable.     Impression:     Episodic mood disorder.  Delirium due to a medical condition    Aortic stenosis   Hx of CABG      Patient is a 59 year old polish speaking white  male with a PMH of CAD, PVD, hypertension, hyperlipidemia, diabetes, severe aortic stenosis and recent CABG on 6/19 who continue indicated sedation and intubation and SBT failed due to excessive agitation..    6/22/2025: The patient has been demonstrating delirium episode with alternation in mood and cognition with episodes of  increased confusion, restlessness, agitation and response to internal stimuli. This has led to extended extubation and sedation of the patient.    QTc is 485.  Otherwise current EKG demonstrated sinus rhythm.  Magnesium level is 1.8.  Patient with no risk of arrhythmia other than the underlying recent surgical procedure.  It is appropriate to have nightly sedation with the Zyprexa to stabilize the mood currently and utilize the combination of Haldol and Ativan 5/2 prior to extubation.  Otherwise tapering sedation slowly utilizing Haldol 2 mg as needed for restlessness.    6/23/2025: The patient has been demonstrating improvement in his delirium following administration of Haldol 5 mg and Ativan 2 mg prior to lowering of sedation and extubation. The patient was able to follow commands and demonstrated less agitation and confusion during SBT today. Patient continues to be sedated and intubated due to tachypnea.     Patient demonstrating improvement in condition after being started on diuretics for fluid in the lungs. Today magnesium level is 2.1    6/24/2025: The patient has  continued to demonstrate improvement in his agitation and confusion when sedation is lowered and extubation is attempted. Currently remains intubated and sedated with another extubation attempt scheduled for later today.    6/25/2025: The patient no longer demonstrates agitation and was extubated successfully yesterday. The patient continues to be confused at this time.    Discussed risk and benefit, acknowledging the current symptom and severity.  At this point, I would recommend the following approach:     Focus on safety  Focus on education and support.  Focus on insight orientation helping the patient understand diagnosis and treatment plan.  Change Zyprexa to 2.5 mg orally nightly.  Give Haldol 5 mg and Ativan 2 mg IM 30 min prior to extubation.  Continue Haldol 2 mg PRN for agitation  Processed with patient/family/RN at length, the initiation of the above psychotropic medications I advised the patient of the risks, benefits, alternatives and potential side effects. The patient consents to administration of the medications and understands the right to refuse medications at any time. The patient verbalized understanding.   Coordinate plan with team    Orders This Visit:  Orders Placed This Encounter   Procedures    Basic Metabolic Panel (8)    CBC, Platelet; No Differential    CBC With Differential With Platelet    Basic Metabolic Panel (8)    Prothrombin Time (PT)    PTT, Activated    Fibrinogen Activity    Magnesium    Magnesium    Expanded Arterial Blood Gas    Arterial blood gas    Assay, Thyroid Stim Hormone    Lactic Acid, Plasma    Hepatic Function Panel (7)    CBC, Platelet; No Differential    Lactic Acid Reflex Post Positive    Hemoglobin & Hematocrit    CBC, Platelet; No Differential    Magnesium    Lactic Acid, Plasma    Arterial blood gas    Basic Metabolic Panel (8)    Lipid Panel    Venous Blood Gas    Arterial blood gas    Magnesium    Comp Metabolic Panel (14)    CBC, Platelet; No Differential     Expanded Arterial Blood Gas    CBC, Platelet; No Differential    Magnesium    Comp Metabolic Panel (14)    Sed Rate, Westergren (Automated)    Magnesium    Expanded Arterial Blood Gas    Venous Blood Gas    CBC With Differential With Platelet    Basic Metabolic Panel (8)    Magnesium    Basic Metabolic Panel (8)    Phosphorus    Magnesium    Potassium    Potassium    Expanded Arterial Blood Gas    CBC With Differential With Platelet    Sed Rate, Westergren (Automated)    Expanded Arterial Blood Gas    Prepare RBC STAT    ABORH Confirmation    Prepare platelets Once    Prepare fresh frozen plasma Once    Prepare cryoprecipitate Once    Type and screen    Prepare RBC Once    ABORH (Blood Type)    Antibody Screen    Specimen to Pathology Tissue    MRSA Screen by PCR    Sputum culture    Blood Culture    Emergency MRSA Screen by PCR       Meds This Visit:  Requested Prescriptions      No prescriptions requested or ordered in this encounter       Chivo Solares MD  6/25/2025    Note to Patient: The 21st Century Cures Act makes medical notes like these available to patients in the interest of transparency. However, be advised this is a medical document. It is intended as peer to peer communication. It is written in medical language and may contain abbreviations or verbiage that are unfamiliar. It may appear blunt or direct. Medical documents are intended to carry relevant information, facts as evident, and the clinical opinion of the practitioner. This note may have been transcribed using a voice dictation system. Voice recognition errors may occur. This should not be taken to alter the content or meaning of this note.

## 2025-06-25 NOTE — PHYSICAL THERAPY NOTE
PHYSICAL THERAPY EVALUATION - INPATIENT     Room Number: 233/233-A  Evaluation Date: 6/25/2025  Type of Evaluation: Initial   Physician Order: PT Eval and Treat    Presenting Problem: elective CABGx2 with post op withdrawal/complications   PMH:Multi-vessel coronary artery disease, peripheral arterial disease, diabetes mellitus type 2, ischemic cardiomyopathy, ejection fraction 30%, progressive aortic stenosis moderate to severe, hypertension, hyperlipidemia, benign prostatic hypertrophy    Reason for Therapy: Mobility Dysfunction and Discharge Planning    PHYSICAL THERAPY ASSESSMENT   Patient is a 59 year old male admitted 6/19/2025 for elective CABG x 2, complicated post op course, currently hospital day 6.  Prior to admission, patient's baseline is independent and working full time, driving, lives with his wife in a home.  Patient is currently functioning below baseline with bed mobility, transfers, gait, stair negotiation, maintaining seated position, standing prolonged periods, and performing household tasks.  Patient is requiring maximum assist and dependent as a result of the following impairments: decreased functional strength, decreased endurance/aerobic capacity, pain, impaired sitting and standing balance, impaired motor planning, decreased muscular endurance, difficulty maintaining precautions, cognitive deficits (poor attention, difficulty following directions at times), medical status, limited L wrist due to a-line ROM, and increased O2 needs from baseline.  Physical Therapy will continue to follow for duration of hospitalization.    Patient will benefit from continued skilled PT Services to facilitate return to prior level of function as patient demonstrates high motivation with excellent tolerance to an intensive therapy program .    PLAN DURING HOSPITALIZATION  Nursing Mobility Recommendation : Lift Equipment     PT Treatment Plan: Bed mobility, Endurance, Energy conservation, Patient education,  Family education, Gait training, Strengthening, Range of motion, Transfer training, Balance training  Rehab Potential : Good  Frequency (Obs): 5x/week     PHYSICAL THERAPY MEDICAL/SOCIAL HISTORY   History related to current admission: The patient is a 59-year-old  male with a history of coronary artery disease, multi-vessel, peripheral arterial disease. Had a history of LAD and diagonal PCI stents. Last intervention 2022. 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 He had recurrent dyspnea on exertion and chest pain. Cardiac angiogram on May 17 showed LAD recurrent stenosis at 70%, ostial first diagonal was 90%, left circumflex 100% stenosis. He had carotid ultrasound, showed 50% bilaterally, no hemodynamic stenosis. Referred to Cardiac Surgery and scheduled today for above-mentioned procedure by Dr. Pacheco. Postoperatively transferred to PCCU for close monitoring      Problem List  Active Problems:    Aortic stenosis    Hx of CABG    Episodic mood disorder    Delirium due to another medical condition    Angina concurrent with and due to arteriosclerosis of CABG    Pre-op evaluation      HOME SITUATION  Type of Home: House  Home Layout: Multi-level  Stairs to Enter : 2   Railing: Yes    Stairs to Bedroom: 6    Railing: Yes    Lives With: Spouse, Son    Drives: Yes   Patient Regularly Uses: None     Prior Level of [place]: Patient lives in a home with his wife an adult son. He is normally independent without a device and works full time, drives    SUBJECTIVE  \"It hurts\" mouthed by pt in Polish - wife translating    PHYSICAL THERAPY EXAMINATION   OBJECTIVE  Precautions: Drain(s),  needed, Sternal (Polish speaking)  Fall Risk: High fall risk      PAIN ASSESSMENT  Rating: Unable to rate  Location: grimacing with mobility  Management Techniques: Repositioning, Activity promotion    COGNITION  Overall Cognitive Status:  A&Ox1-2, unable to fully assess due to lethargy and  limited vocalization by pt    RANGE OF MOTION AND STRENGTH ASSESSMENT  Upper extremity ROM and strength are limited to about 90* at shoulder    Lower extremity ROM is within functional limits   Lower extremity strength is limited, able to lift in bed with assist but unable to follow directions for MMT    BALANCE  Static Sitting: Poor +  Dynamic Sitting: Poor  Static Standing: Not tested  Dynamic Standing: Not tested    ACTIVITY TOLERANCE  Pulse: 108  Heart Rate Source: Monitor  Resp: (!) 33  BP: 133/64  BP Location: Right arm  BP Method: Automatic  Patient Position: Sitting    O2 WALK  Oxygen Therapy  SPO2% on Oxygen at Rest: 95  At rest oxygen flow (liters per minute): 5    AM-PAC '6-Clicks' INPATIENT SHORT FORM - BASIC MOBILITY  How much difficulty does the patient currently have...  Patient Difficulty: Turning over in bed (including adjusting bedclothes, sheets and blankets)?: A Lot   Patient Difficulty: Sitting down on and standing up from a chair with arms (e.g., wheelchair, bedside commode, etc.): Unable   Patient Difficulty: Moving from lying on back to sitting on the side of the bed?: A Lot   How much help from another person does the patient currently need...   Help from Another: Moving to and from a bed to a chair (including a wheelchair)?: Total   Help from Another: Need to walk in hospital room?: Total   Help from Another: Climbing 3-5 steps with a railing?: Total     AM-PAC Score:  Raw Score: 8   Approx Degree of Impairment: 86.62%   Standardized Score (AM-PAC Scale): 28.58   CMS Modifier (G-Code): CM    FUNCTIONAL ABILITY STATUS  Functional Mobility/Gait Assessment  Gait Assistance: Not tested  Rolling: maximum assist  Supine to Sit: dependent  Sit to Supine: dependent  Sit to Stand: not tested    Exercise/Education Provided:  Bed mobility  Energy conservation  Functional activity tolerated  Neuromuscular re-educate  Posture  ROM  Strengthening    Skilled Therapy Provided: TATYANA shetty  participation in therapy session. Language line  for Polish #[id number] is utilized throughout but mostly wife is listening to pt whisper in Polish and then telling . Pt is following directions intermittently but needs repetition. He is seen in coordination with OT and help from TATYANA Rodgers as well. He has active movement at all joints but has limited strength throughout, limited by pain, fear and activity tolerance. He is able to perform AA exs to BLE but overhead lift is utilized to get pt to chair and positioned. He is a 2-3 person assist currently. He still has many tubes and lines and pt indicates he is fearful of pulling any of them out. He is positioned to comfort in chair and encouraged to perform deep breathing, LE exs/AROM as possible as well as UE ROM. He is in chair with RN in room and all needs in reach.     The patient's Approx Degree of Impairment: 86.62% has been calculated based on documentation in the [facility] '6 clicks' Inpatient Basic Mobility Short Form.  Research supports that patients with this level of impairment may benefit from LTAC. However, anticipate he will most benefit from skilled PT at IR/acute rehab to maximize return to fullly independent level.  Final disposition will be made by interdisciplinary medical team.    Patient End of Session: Up in chair, With  staff, Needs met, Call light within reach, RN aware of session/findings, Family present, Discussed recommendations with /    CURRENT GOALS  Goals to be met by: 7/15/25  Patient Goal Patient's self-stated goal is: not stated -wife states it is for him to get back to normal   Goal #1 Patient is able to demonstrate supine - sit EOB @ level: moderate assistance     Goal #1   Current Status    Goal #2 Patient is able to demonstrate transfers Sit to/from Stand at assistance level: moderate assistance with walker - rolling     Goal #2  Current Status    Goal #3 Patient is able to ambulate 25 feet  with assist device: walker - rolling at assistance level: moderate assistance   Goal #3   Current Status    Goal #4 Patient will negotiate bed to/from chair transfers with RW with moderate assistance   Goal #4   Current Status    Goal #5 Patient to demonstrate independence with home activity/exercise instructions provided to patient in preparation for discharge.   Goal #5   Current Status    Goal #6    Goal #6  Current Status      Patient Evaluation Complexity Level:  History High - 3 or more personal factors and/or co-morbidities   Examination of body systems Moderate - addressing a total of 3 or more elements   Clinical Presentation  Moderate - Evolving   Clinical Decision Making  Moderate Complexity     Therapeutic Activity:  23 minutes

## 2025-06-25 NOTE — PROGRESS NOTES
[facility]  part of [facility]    Progress Note    Jose Alberto Lizama Patient Status:  Inpatient    1965 MRN [id number]   Location [facility] 2W/SW Attending Nannette Pelayo MD   Hosp Day # 6 PCP Fiona Sweeney MD     Subjective:  Pt currently in bed on BiPap. Extubated yesterday. Language line used for interpretation as pt speaks mostly Polish: # [id number]. Pt has mild sternal discomfort. Denies SOB. He is calm and appropriate and moves all ext. No neuro deficits observed. Denies nausea/feeling bloated.  No visitors at bedside.     Objective:  /70 (BP Location: Right arm)   Pulse 116   Temp 97.7 °F (36.5 °C) (Temporal)   Resp (!) 33   Ht 5' 6\" (1.676 m)   Wt 213 lb 6.5 oz (96.8 kg)   SpO2 95%   BMI 34.44 kg/m²     Temp (24hrs), Av °F (36.7 °C), Min:97.7 °F (36.5 °C), Max:98.9 °F (37.2 °C)      Intake/Output:    Intake/Output Summary (Last 24 hours) at 2025 0845  Last data filed at 2025 0545  Gross per 24 hour   Intake 1226.2 ml   Output 2870 ml   Net -1643.8 ml       Wt Readings from Last 3 Encounters:   25 213 lb 6.5 oz (96.8 kg)   25 201 lb 12.8 oz (91.5 kg)   25 200 lb (90.7 kg)       Allergies:  Allergies[1]    Labs:  Lab Results   Component Value Date    WBC 7.7 2025    HGB 8.3 2025    HCT 25.6 2025    .0 2025    CREATSERUM 0.74 2025    BUN 18 2025     2025    K 3.7 2025    K 3.7 2025     2025    CO2 25.0 2025     2025    CA 8.5 2025    MG 2.0 2025    PHOS 2.9 2025       Physical Exam:  Blood pressure 140/70, pulse 116, temperature 97.7 °F (36.5 °C), temperature source Temporal, resp. rate (!) 33, height 5' 6\" (1.676 m), weight 213 lb 6.5 oz (96.8 kg), SpO2 95%.  General: NAD  Neck: No JVD  Lungs: Coarse bilaterally anteriorly/laterally-slightly improved from yesterday   Mediastinal chest tube=20cc/12 hours- no air leak    Pleural chest tubes= 310cc/12 hours- no air leak   Heart: RRR, S1, S2  Abdomen: Soft/Round, NT/mildly distended, BS+x4 diminished/no BM  Extremities: Warm, dry, trace UE/LE generalized edema bilat  Pulses: doppler bilat DP/PT  Skin: sternotomy stable, incision CDI w/TPW intact/bilateral leg incisions BETTIE=CDI   Neurological:  awake/alert/calm/follows simple commands, MAEW    Assessment/Plan:  S/P CABG x 2/AVR/MELINDA CLIP/INSERTION OF LEFT FEMORAL IABP POD # 6  IABP removed on 6/20  Extubated on 6/24: Encourage pulm toilet: IS- will need assistance to perform at this time/add EZ pap.  CXR reviewed. Likely w/pre op ORAL. Currently on BiPap- wean to HF N/C.   Sputum cx 6/22=negative. On Zosyn per Pulm.   Pain meds as needed-limit/avoid narcotics w/post op altered MS. Can use Tylenol.   Post op altered MS w/agitation/confusion/restlessness of unknown etiology-improving; suspect post op delirium and/or ETOH withdrawal. Psych eval on 6/22- follow recs. CT brain on 6/20=negative   Increase activity-OOB to chair/ambulate in hallway-asking PT/OT to eval & treat  Scds/Lovenox prophylaxis DVT prevention   Hemodynamically stable off Dobuta- Hypertensive w/Tachycardia noted this AM- on increased dose IV NTG- and received IV Hydralazine earlier. Add IV Beta-blocker and use Cleviprex to keep SBP >90 and <140. Check ESR  Continue ICU status- keep A-line/chest tubes & cordis.   Expected post op volume overload- Lasix increased to 40 mg TID-add Potassium BID while on Lasix. Mckeon remains due to close I/O monitoring & immobility  Amio protocol for AF prevention as pt considered high risk for post op AF- plan to stop at discharge if no AF noted post op  Expected post op anemia w/o clinical significance/stable: may be slightly diluted due to volume overload- Iron x 1 month.   Hx: DM-Increased basal insulin today/continue correction scale coverage. Resume home regimen at/near discharge.    Nutrition- currently NPO w/coughing noted with  mouth swabs- swallow eval this AM per ST- anticipate Dobhoff and trickle feeds to start. Avoid oral intake if any concerns for aspiration due to his tenuous resp status.    Hx: BPH- on home Flomax  Hx: PVD- on ASA/Plavix  Mild abdominal distension/constipation- check KUB- continue on Colace/Senna. Reglan stopped due to potential interaction w/Haldol.   K/Mag replacement per protocol   Blood CX 6/22 pending-negative so far  Discharge planning: pt lives with his wife. Continue to monitor as pt progresses. SW/ involved w/discharge planning.    Plan of care discussed w/patient/RN and CV Surgeon: Dr. Tara Mast, APRN  6/25/2025  8:45 AM       [1] No Known Allergies

## 2025-06-25 NOTE — PLAN OF CARE
Problem: CARDIOVASCULAR - ADULT  Goal: Maintains optimal cardiac output and hemodynamic stability  Description: INTERVENTIONS:  - Monitor vital signs, rhythm, and trends  - Monitor for bleeding, hypotension and signs of decreased cardiac output  - Evaluate effectiveness of vasoactive medications to optimize hemodynamic stability  - Monitor arterial and/or venous puncture sites for bleeding and/or hematoma  - Assess quality of pulses, skin color and temperature  - Assess for signs of decreased coronary artery perfusion - ex. Angina  - Evaluate fluid balance, assess for edema, trend weights  Outcome: Progressing  Goal: Absence of cardiac arrhythmias or at baseline  Description: INTERVENTIONS:  - Continuous cardiac monitoring, monitor vital signs, obtain 12 lead EKG if indicated  - Evaluate effectiveness of antiarrhythmic and heart rate control medications as ordered  - Initiate emergency measures for life threatening arrhythmias  - Monitor electrolytes and administer replacement therapy as ordered  Outcome: Progressing     Problem: RESPIRATORY - ADULT  Goal: Achieves optimal ventilation and oxygenation  Description: INTERVENTIONS:  - Assess for changes in respiratory status  - Assess for changes in mentation and behavior  - Position to facilitate oxygenation and minimize respiratory effort  - Oxygen supplementation based on oxygen saturation or ABGs  - Provide Smoking Cessation handout, if applicable  - Encourage broncho-pulmonary hygiene including cough, deep breathe, Incentive Spirometry  - Assess the need for suctioning and perform as needed  - Assess and instruct to report SOB or any respiratory difficulty  - Respiratory Therapy support as indicated  - Manage/alleviate anxiety  - Monitor for signs/symptoms of CO2 retention  Outcome: Progressing     Problem: GASTROINTESTINAL - ADULT  Goal: Minimal or absence of nausea and vomiting  Description: INTERVENTIONS:  - Maintain adequate hydration with IV or PO as  ordered and tolerated  - Nasogastric tube to low intermittent suction as ordered  - Evaluate effectiveness of ordered antiemetic medications  - Provide nonpharmacologic comfort measures as appropriate  - Advance diet as tolerated, if ordered  - Obtain nutritional consult as needed  - Evaluate fluid balance  Outcome: Progressing  Goal: Maintains or returns to baseline bowel function  Description: INTERVENTIONS:  - Assess bowel function  - Maintain adequate hydration with IV or PO as ordered and tolerated  - Evaluate effectiveness of GI medications  - Encourage mobilization and activity  - Obtain nutritional consult as needed  - Establish a toileting routine/schedule  - Consider collaborating with pharmacy to review patient's medication profile  Outcome: Progressing     Problem: GENITOURINARY - ADULT  Goal: Absence of urinary retention  Description: INTERVENTIONS:  - Assess patient’s ability to void and empty bladder  - Monitor intake/output and perform bladder scan as needed  - Follow urinary retention protocol/standard of care  - Consider collaborating with pharmacy to review patient's medication profile  - Implement strategies to promote bladder emptying  Outcome: Progressing     Problem: METABOLIC/FLUID AND ELECTROLYTES - ADULT  Goal: Glucose maintained within prescribed range  Description: INTERVENTIONS:  - Monitor Blood Glucose as ordered  - Assess for signs and symptoms of hyperglycemia and hypoglycemia  - Administer ordered medications to maintain glucose within target range  - Assess barriers to adequate nutritional intake and initiate nutrition consult as needed  - Instruct patient on self management of diabetes  Outcome: Progressing  Goal: Electrolytes maintained within normal limits  Description: INTERVENTIONS:  - Monitor labs and rhythm and assess patient for signs and symptoms of electrolyte imbalances  - Administer electrolyte replacement as ordered  - Monitor response to electrolyte replacements,  including rhythm and repeat lab results as appropriate  - Fluid restriction as ordered  - Instruct patient on fluid and nutrition restrictions as appropriate  Outcome: Progressing  Goal: Hemodynamic stability and optimal renal function maintained  Description: INTERVENTIONS:  - Monitor labs and assess for signs and symptoms of volume excess or deficit  - Monitor intake, output and patient weight  - Monitor urine specific gravity, serum osmolarity and serum sodium as indicated or ordered  - Monitor response to interventions for patient's volume status, including labs, urine output, blood pressure (other measures as available)  - Encourage oral intake as appropriate  - Instruct patient on fluid and nutrition restrictions as appropriate  Outcome: Progressing     Problem: SKIN/TISSUE INTEGRITY - ADULT  Goal: Incision(s), wounds(s) or drain site(s) healing without S/S of infection  Description: INTERVENTIONS:  - Assess and document risk factors for pressure ulcer development  - Assess and document skin integrity  - Assess and document dressing/incision, wound bed, drain sites and surrounding tissue  - Implement wound care per orders  - Initiate isolation precautions as appropriate  - Initiate Pressure Ulcer prevention bundle as indicated  Outcome: Progressing  Goal: Oral mucous membranes remain intact  Description: INTERVENTIONS  - Assess oral mucosa and hygiene practices  - Implement preventative oral hygiene regimen  - Implement oral medicated treatments as ordered  Outcome: Progressing     Problem: HEMATOLOGIC - ADULT  Goal: Maintains hematologic stability  Description: INTERVENTIONS  - Assess for signs and symptoms of bleeding or hemorrhage  - Monitor labs and vital signs for trends  - Administer supportive blood products/factors, fluids and medications as ordered and appropriate  - Administer supportive blood products/factors as ordered and appropriate  Outcome: Progressing  Goal: Free from bleeding  injury  Description: (Example usage: patient with low platelets)  INTERVENTIONS:  - Avoid intramuscular injections, enemas and rectal medication administration  - Ensure safe mobilization of patient  - Hold pressure on venipuncture sites to achieve adequate hemostasis  - Assess for signs and symptoms of internal bleeding  - Monitor lab trends  - Patient is to report abnormal signs of bleeding to staff  - Avoid use of toothpicks and dental floss  - Use electric shaver for shaving  - Use soft bristle tooth brush  - Limit straining and forceful nose blowing  Outcome: Progressing     Problem: MUSCULOSKELETAL - ADULT  Goal: Return mobility to safest level of function  Description: INTERVENTIONS:  - Assess patient stability and activity tolerance for standing, transferring and ambulating w/ or w/o assistive devices  - Assist with transfers and ambulation using safe patient handling equipment as needed  - Ensure adequate protection for wounds/incisions during mobilization  - Obtain PT/OT consults as needed  - Advance activity as appropriate  - Communicate ordered activity level and limitations with patient/family  Outcome: Progressing     Problem: NEUROLOGICAL - ADULT  Goal: Achieves stable or improved neurological status  Description: INTERVENTIONS  - Assess for and report changes in neurological status  - Initiate measures to prevent increased intracranial pressure  - Maintain blood pressure and fluid volume within ordered parameters to optimize cerebral perfusion and minimize risk of hemorrhage  - Monitor temperature, glucose, and sodium. Initiate appropriate interventions as ordered  Outcome: Progressing     Problem: Patient Centered Care  Goal: Patient preferences are identified and integrated in the patient's plan of care  Description: Interventions:  - What would you like us to know as we care for you? I'm Polish speaking  - Provide timely, complete, and accurate information to patient/family  - Incorporate patient  and family knowledge, values, beliefs, and cultural backgrounds into the planning and delivery of care  - Encourage patient/family to participate in care and decision-making at the level they choose  - Honor patient and family perspectives and choices  Outcome: Progressing     Problem: Patient/Family Goals  Goal: Patient/Family Long Term Goal  Description: Patient's Long Term Goal: Discharge    Interventions:  - Medications  - Discharge planning  - See additional Care Plan goals for specific interventions  Outcome: Progressing  Goal: Patient/Family Short Term Goal  Description: Patient's Short Term Goal: Manage pain    Interventions:   - Use non-pharmacological pain relief.  - See additional Care Plan goals for specific interventions  Outcome: Progressing     Problem: Diabetes/Glucose Control  Goal: Glucose maintained within prescribed range  Description: INTERVENTIONS:  - Monitor Blood Glucose as ordered  - Assess for signs and symptoms of hyperglycemia and hypoglycemia  - Administer ordered medications to maintain glucose within target range  - Assess barriers to adequate nutritional intake and initiate nutrition consult as needed  - Instruct patient on self management of diabetes  Outcome: Progressing     Problem: Impaired Functional Mobility  Goal: Achieve highest/safest level of mobility/gait  Description: Interventions:  - Assess patient's functional ability and stability  - Promote increasing activity/tolerance for mobility and gait  - Educate and engage patient/family in tolerated activity level and precautions    Outcome: Progressing     Problem: Impaired Cognition  Goal: Patient will exhibit improved attention, thought processing and/or memory  Description: Interventions:    Outcome: Progressing

## 2025-06-25 NOTE — PROGRESS NOTES
Cardiology Progress Note    Jose Alberto Lizama Patient Status:  Inpatient    1965 MRN [id number]   Location [facility] 2W/SW Attending Erin Richardson MD   Hosp Day # 6 PCP Fiona Sweeney MD     Interval Note:  Patient seen and examined  No events overnight awake.  Denies any complaints looks diaphoretic  ROS 12 systems reviewed, pertinent findings above.  ROS    History:  Past Medical History[1]  Past Surgical History[2]  Family History[3]   reports that he has quit smoking. His smoking use included cigarettes. He has a 20 pack-year smoking history. He has never used smokeless tobacco. He reports current alcohol use. He reports that he does not use drugs.    Objective:   Temp: 98 °F (36.7 °C)  Pulse: 77  Resp: 34  BP: 112/56  AO: 139/55  FiO2 (%): 40 %    Intake/Output:     Intake/Output Summary (Last 24 hours) at 2025 1456  Last data filed at 2025 0545  Gross per 24 hour   Intake 1226.2 ml   Output 2870 ml   Net -1643.8 ml       Physical Exam:    General: Intubated, sedated  HEENT: Normocephalic, anicteric sclera, neck supple.  Neck: No JVD, carotids 2+, no bruits.  Cardiac: Regular rate and rhythm. S1, S2 normal. No murmur, pericardial rub, S3.  Lungs: Clear without wheezes, rales, rhonchi or dullness.  Normal excursions and effort.  Abdomen: Soft, non-tender. BS-present.  Extremities: Without clubbing, cyanosis or edema.  Peripheral pulses are 2+.  Neurologic: Non-focal  Skin: Warm and dry.       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  Hyperlipidemia  Diabetes  Peripheral vascular disease      Plan:  Repeat echo shows EF 35%, intact bioprosthetic valve  Continue amiodarone, aspirin, Plavix, statin  Coarse breath sounds on exam today quite overloaded now on Lasix 40 mg 3 times daily continue  Agree with  Cleviprex and nitroglycerin drips while unable to swallow failed speech eval today.    Thank you for allowing me to take part in the care of Jose Alberto Lizama. Please call with any questions of concerns.      Critical care time 31 minutes.    Will follow with you.         [1]   Past Medical History:   Aortic stenosis    Back problem    BPH (benign prostatic hyperplasia)    Cardiomyopathy (HCC)    Coronary atherosclerosis    Depression    Diabetes (HCC)    Esophageal reflux    Heart valve disease    High blood pressure    High cholesterol    Peripheral vascular disease    Shortness of breath    related to smoking     Visual impairment    glasses   [2]   Past Surgical History:  Procedure Laterality Date    Cardiac catheterization  2016    [facility]    Cath drug eluting stent      Colonoscopy  04/22/2024    Colonoscopy N/A 4/22/2024    Procedure: COLONOSCOPY;  Surgeon: Attila Li MD;  Location: [facility] ENDOSCOPY    Esophagoscopy,diagnostic  04/22/2024    Dr. Li    Hernia surgery      as a child    Nasal scopy,remv part ethmoid  10/14/2020    Nasal scopy,rmv tiss maxill sinus  10/14/2020    Repair of nasal septum  10/14/2020   [3]   Family History  Problem Relation Age of Onset    Diabetes Father     Diabetes Paternal Aunt     Diabetes Paternal Uncle     Seizure Disorder Mother     No Known Problems Brother     No Known Problems Daughter     Glaucoma Neg     Macular degeneration Neg

## 2025-06-25 NOTE — SLP NOTE
ADULT SWALLOWING EVALUATION    ASSESSMENT    ASSESSMENT/OVERALL IMPRESSION:  PPE REQUIRED. THIS THERAPIST WORE GLOVES FOR DURATION OF EVALUATION. HANDS WASHED UPON ENTRANCE/EXIT.    Per MD H&P, \"The patient is a 59-year-old  male with a history of coronary artery disease, multi-vessel, peripheral arterial disease. Had a history of LAD and diagonal PCI stents. Last intervention 2022. 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 He had recurrent dyspnea on exertion and chest pain. Cardiac angiogram on May 17 showed LAD recurrent stenosis at 70%, ostial first diagonal was 90%, left circumflex 100% stenosis. He had carotid ultrasound, showed 50% bilaterally, no hemodynamic stenosis. Referred to Cardiac Surgery and scheduled today for above-mentioned procedure by Dr. Pacheco. Postoperatively transferred to PCCU for close monitoring.\"    SLP BSSE orders received and acknowledged. A swallow evaluation warranted per s/p prolonged intubation. Pt afebrile with very weak vocal quality, on L/HF, with oxygen saturation at 95%. Pt with no hx of dysphagia at [facility].   Pt positioned 90 degrees in bed, alert/cooperative/spouse present. Pt with no complaints of pain. Oral motor examination revealed reduced strength, ROM, and rate of motion. Pt presented with trials of puree and moderately thick liquids via tsp. Pt with adequate oral acceptance and bilabial seal across all trials. Pt with reduced bolus formation/propulsion. Pt's swallow response appears delayed with reduced hyolaryngeal elevation/excursion. Delayed cough observed. 6/23 CXR indicates \"Increasing bilateral pleural effusions and pulmonary edema.  Right medial and left basilar chest tubes are again seen.  Recommend correlation for malfunction. Endotracheal tube, esophagogastric tube, and [place] catheter are unchanged\". Oxygen status remained stable t/o the entire evaluation.     At this time, pt presents with mild oral dysphagia and  probable pharyngeal dysfunction. Recommend remain NPO with ongoing clinical swallow re assessment to determine tx plan. Results and recommendations reviewed with RN, pt, and family. Pt/pt's family v/u to all results/recommendations.        RECOMMENDATIONS   Diet Recommendations - Solids: NPO  Diet Recommendations - Liquids: NPO                          Medication Administration Recommendations: Non-oral  Treatment Plan/Recommendations: SLP to reassess, Aspiration precautions    HISTORY   MEDICAL HISTORY  Reason for Referral:  (s/p prolonged intubation)    Problem List  Active Problems:    Aortic stenosis    Hx of CABG    Episodic mood disorder    Delirium due to another medical condition    Angina concurrent with and due to arteriosclerosis of CABG      Past Medical History  Past Medical History[1]    Prior Living Situation: Home with spouse  Diet Prior to Admission: Regular, Thin liquids  Precautions: Aspiration    Patient/Family Goals: eat/drink    SWALLOWING HISTORY  Current Diet Consistency: NPO  Dysphagia History: none  Imaging Results: see above    SUBJECTIVE       OBJECTIVE   ORAL MOTOR EXAMINATION  Dentition: Functional  Symmetry: Within Functional Limits  Strength: Overall reduced     Range of Motion: Overall reduced  Rate of Motion: Reduced    Voice Quality: Weak  Respiratory Status: Nasal cannula, Supplemental O2  Consistencies Trialed: Honey thick liquids, Puree  Method of Presentation: Staff/Clinician assistance, Spoon  Patient Positioned: Upright, Midline    Oral Phase of Swallow: Impaired  Bolus Retrieval: Intact  Bilabial Seal: Intact  Bolus Formation: Impaired  Bolus Propulsion: Impaired     Retention: Intact    Pharyngeal Phase of Swallow: Appears Impaired  Laryngeal Elevation Timing: Appears impaired  Laryngeal Elevation Strength: Appears impaired  Laryngeal Elevation Coordination: Appears intact  (Please note: Silent aspiration cannot be evaluated clinically. Videofluoroscopic Swallow Study is  required to rule-out silent aspiration.)    Esophageal Phase of Swallow: No complaints consistent with possible esophageal involvement  Comments: NA          GOALS  Goal #1 Pt will participate in ongoing clinical swallow re assessment to determine tx plan  In Progress     FOLLOW UP  Treatment Plan/Recommendations: SLP to reassess, Aspiration precautions  Duration: 1 week  Follow Up Needed (Documentation Required): Yes  SLP Follow-up Date: 06/26/25    Thank you for your referral.   If you have any questions, please contact JARED Parker M.S. CCC-SLP  Speech Language Pathologist  Phone Number Ext. [phone]         [1]   Past Medical History:   Aortic stenosis    Back problem    BPH (benign prostatic hyperplasia)    Cardiomyopathy (HCC)    Coronary atherosclerosis    Depression    Diabetes (HCC)    Esophageal reflux    Heart valve disease    High blood pressure    High cholesterol    Peripheral vascular disease    Shortness of breath    related to smoking     Visual impairment    glasses

## 2025-06-26 PROBLEM — Z95.1 S/P CABG (CORONARY ARTERY BYPASS GRAFT): Status: ACTIVE | Noted: 2025-06-19

## 2025-06-26 LAB
ANION GAP SERPL CALC-SCNC: 7 MMOL/L (ref 0–18)
BASOPHILS # BLD AUTO: 0.05 X10(3) UL (ref 0–0.2)
BASOPHILS NFR BLD AUTO: 0.5 %
BUN BLD-MCNC: 16 MG/DL (ref 9–23)
BUN/CREAT SERPL: 24.6 (ref 10–20)
CALCIUM BLD-MCNC: 8.6 MG/DL (ref 8.7–10.4)
CHLORIDE SERPL-SCNC: 102 MMOL/L (ref 98–112)
CO2 SERPL-SCNC: 27 MMOL/L (ref 21–32)
CREAT BLD-MCNC: 0.65 MG/DL (ref 0.7–1.3)
DEPRECATED RDW RBC AUTO: 45.7 FL (ref 35.1–46.3)
EGFRCR SERPLBLD CKD-EPI 2021: 107 ML/MIN/1.73M2 (ref 60–?)
EOSINOPHIL # BLD AUTO: 0.06 X10(3) UL (ref 0–0.7)
EOSINOPHIL NFR BLD AUTO: 0.6 %
ERYTHROCYTE [DISTWIDTH] IN BLOOD BY AUTOMATED COUNT: 16 % (ref 11–15)
GLUCOSE BLD-MCNC: 183 MG/DL (ref 70–99)
GLUCOSE BLDC GLUCOMTR-MCNC: 153 MG/DL (ref 70–99)
GLUCOSE BLDC GLUCOMTR-MCNC: 181 MG/DL (ref 70–99)
GLUCOSE BLDC GLUCOMTR-MCNC: 184 MG/DL (ref 70–99)
GLUCOSE BLDC GLUCOMTR-MCNC: 191 MG/DL (ref 70–99)
HCT VFR BLD AUTO: 25.9 % (ref 39–53)
HGB BLD-MCNC: 8.2 G/DL (ref 13–17.5)
HGB BLD-MCNC: 8.2 G/DL (ref 13–17.5)
IMM GRANULOCYTES # BLD AUTO: 0.05 X10(3) UL (ref 0–1)
IMM GRANULOCYTES NFR BLD: 0.5 %
LYMPHOCYTES # BLD AUTO: 1.29 X10(3) UL (ref 1–4)
LYMPHOCYTES NFR BLD AUTO: 13.9 %
MAGNESIUM SERPL-MCNC: 2 MG/DL (ref 1.6–2.6)
MAGNESIUM SERPL-MCNC: 2 MG/DL (ref 1.6–2.6)
MAGNESIUM SERPL-MCNC: 2.1 MG/DL (ref 1.6–2.6)
MAGNESIUM SERPL-MCNC: 2.1 MG/DL (ref 1.6–2.6)
MCH RBC QN AUTO: 25.2 PG (ref 26–34)
MCHC RBC AUTO-ENTMCNC: 31.7 G/DL (ref 31–37)
MCV RBC AUTO: 79.4 FL (ref 80–100)
MONOCYTES # BLD AUTO: 1.09 X10(3) UL (ref 0.1–1)
MONOCYTES NFR BLD AUTO: 11.7 %
NEUTROPHILS # BLD AUTO: 6.76 X10 (3) UL (ref 1.5–7.7)
NEUTROPHILS # BLD AUTO: 6.76 X10(3) UL (ref 1.5–7.7)
NEUTROPHILS NFR BLD AUTO: 72.8 %
OSMOLALITY SERPL CALC.SUM OF ELEC: 288 MOSM/KG (ref 275–295)
PLATELET # BLD AUTO: 369 10(3)UL (ref 150–450)
POTASSIUM SERPL-SCNC: 2.9 MMOL/L (ref 3.5–5.1)
POTASSIUM SERPL-SCNC: 3 MMOL/L (ref 3.5–5.1)
POTASSIUM SERPL-SCNC: 3.4 MMOL/L (ref 3.5–5.1)
POTASSIUM SERPL-SCNC: 3.7 MMOL/L (ref 3.5–5.1)
RBC # BLD AUTO: 3.26 X10(6)UL (ref 4.3–5.7)
SODIUM SERPL-SCNC: 136 MMOL/L (ref 136–145)
WBC # BLD AUTO: 9.3 X10(3) UL (ref 4–11)

## 2025-06-26 PROCEDURE — 36410 VNPNXR 3YR/> PHY/QHP DX/THER: CPT | Performed by: NURSE PRACTITIONER

## 2025-06-26 PROCEDURE — 76937 US GUIDE VASCULAR ACCESS: CPT | Performed by: NURSE PRACTITIONER

## 2025-06-26 PROCEDURE — 99233 SBSQ HOSP IP/OBS HIGH 50: CPT | Performed by: OTHER

## 2025-06-26 PROCEDURE — 99233 SBSQ HOSP IP/OBS HIGH 50: CPT | Performed by: INTERNAL MEDICINE

## 2025-06-26 RX ORDER — LIDOCAINE HYDROCHLORIDE 10 MG/ML
5 INJECTION, SOLUTION EPIDURAL; INFILTRATION; INTRACAUDAL; PERINEURAL
Status: ACTIVE | OUTPATIENT
Start: 2025-06-26 | End: 2025-06-27

## 2025-06-26 RX ORDER — ESCITALOPRAM OXALATE 5 MG/1
5 TABLET ORAL NIGHTLY
Status: DISCONTINUED | OUTPATIENT
Start: 2025-06-26 | End: 2025-07-03

## 2025-06-26 RX ORDER — LABETALOL HYDROCHLORIDE 5 MG/ML
10 INJECTION, SOLUTION INTRAVENOUS EVERY 6 HOURS PRN
Status: DISCONTINUED | OUTPATIENT
Start: 2025-06-26 | End: 2025-07-03

## 2025-06-26 RX ORDER — POTASSIUM CHLORIDE 14.9 MG/ML
20 INJECTION INTRAVENOUS 2 TIMES DAILY
Status: DISCONTINUED | OUTPATIENT
Start: 2025-06-26 | End: 2025-06-26

## 2025-06-26 RX ORDER — METOPROLOL TARTRATE 1 MG/ML
10 INJECTION, SOLUTION INTRAVENOUS EVERY 6 HOURS
Status: DISCONTINUED | OUTPATIENT
Start: 2025-06-26 | End: 2025-06-26

## 2025-06-26 RX ORDER — AMIODARONE HYDROCHLORIDE 200 MG/1
200 TABLET ORAL DAILY
Status: DISCONTINUED | OUTPATIENT
Start: 2025-06-26 | End: 2025-07-03

## 2025-06-26 RX ORDER — COLCHICINE 0.6 MG/1
0.6 TABLET ORAL DAILY
Status: DISCONTINUED | OUTPATIENT
Start: 2025-06-27 | End: 2025-06-28

## 2025-06-26 RX ORDER — SODIUM CHLORIDE 9 MG/ML
INJECTION, SOLUTION INTRAVENOUS ONCE
Status: DISCONTINUED | OUTPATIENT
Start: 2025-06-26 | End: 2025-06-30

## 2025-06-26 RX ORDER — METOPROLOL TARTRATE 50 MG
50 TABLET ORAL
Status: DISCONTINUED | OUTPATIENT
Start: 2025-06-26 | End: 2025-06-28

## 2025-06-26 RX ORDER — POTASSIUM CHLORIDE 14.9 MG/ML
20 INJECTION INTRAVENOUS 2 TIMES DAILY
Status: DISCONTINUED | OUTPATIENT
Start: 2025-06-26 | End: 2025-06-30

## 2025-06-26 NOTE — PLAN OF CARE
Patient alert but difficult to communicate with due to language barrier and soft voice.  Follows commands appropriately.  Back to bed with PT, sling utilized.  Pacer wires removed, no complications.  X1 PRBC given.  PICC line placed.  Increased output to laurence noted, CT surgeon aware.  Problem: CARDIOVASCULAR - ADULT  Goal: Maintains optimal cardiac output and hemodynamic stability  Description: INTERVENTIONS:  - Monitor vital signs, rhythm, and trends  - Monitor for bleeding, hypotension and signs of decreased cardiac output  - Evaluate effectiveness of vasoactive medications to optimize hemodynamic stability  - Monitor arterial and/or venous puncture sites for bleeding and/or hematoma  - Assess quality of pulses, skin color and temperature  - Assess for signs of decreased coronary artery perfusion - ex. Angina  - Evaluate fluid balance, assess for edema, trend weights  Outcome: Progressing  Goal: Absence of cardiac arrhythmias or at baseline  Description: INTERVENTIONS:  - Continuous cardiac monitoring, monitor vital signs, obtain 12 lead EKG if indicated  - Evaluate effectiveness of antiarrhythmic and heart rate control medications as ordered  - Initiate emergency measures for life threatening arrhythmias  - Monitor electrolytes and administer replacement therapy as ordered  Outcome: Progressing     Problem: RESPIRATORY - ADULT  Goal: Achieves optimal ventilation and oxygenation  Description: INTERVENTIONS:  - Assess for changes in respiratory status  - Assess for changes in mentation and behavior  - Position to facilitate oxygenation and minimize respiratory effort  - Oxygen supplementation based on oxygen saturation or ABGs  - Provide Smoking Cessation handout, if applicable  - Encourage broncho-pulmonary hygiene including cough, deep breathe, Incentive Spirometry  - Assess the need for suctioning and perform as needed  - Assess and instruct to report SOB or any respiratory difficulty  - Respiratory Therapy  support as indicated  - Manage/alleviate anxiety  - Monitor for signs/symptoms of CO2 retention  Outcome: Progressing     Problem: GASTROINTESTINAL - ADULT  Goal: Minimal or absence of nausea and vomiting  Description: INTERVENTIONS:  - Maintain adequate hydration with IV or PO as ordered and tolerated  - Nasogastric tube to low intermittent suction as ordered  - Evaluate effectiveness of ordered antiemetic medications  - Provide nonpharmacologic comfort measures as appropriate  - Advance diet as tolerated, if ordered  - Obtain nutritional consult as needed  - Evaluate fluid balance  Outcome: Progressing  Goal: Maintains or returns to baseline bowel function  Description: INTERVENTIONS:  - Assess bowel function  - Maintain adequate hydration with IV or PO as ordered and tolerated  - Evaluate effectiveness of GI medications  - Encourage mobilization and activity  - Obtain nutritional consult as needed  - Establish a toileting routine/schedule  - Consider collaborating with pharmacy to review patient's medication profile  Outcome: Progressing     Problem: GENITOURINARY - ADULT  Goal: Absence of urinary retention  Description: INTERVENTIONS:  - Assess patient’s ability to void and empty bladder  - Monitor intake/output and perform bladder scan as needed  - Follow urinary retention protocol/standard of care  - Consider collaborating with pharmacy to review patient's medication profile  - Implement strategies to promote bladder emptying  Outcome: Progressing

## 2025-06-26 NOTE — PROGRESS NOTES
[facility]  part of [facility]    Progress Note    Jose Alberto Lizama Patient Status:  Inpatient    1965 MRN [id number]   Location [facility] 2W/SW Attending Nannette Pelayo MD   Hosp Day # 7 PCP Fiona Sweeney MD     Subjective:  Pt sitting in the chair/drowsy. Language line used for interpretation as pt Polish-speaking only: # [id number]. Denies pain and SOB. Voice is very weak. Feels tired and wants to sleep.   No visitors at bedside.     Objective:  /63   Pulse 76   Temp 98.1 °F (36.7 °C) (Temporal)   Resp 26   Ht 5' 6\" (1.676 m)   Wt 213 lb 6.5 oz (96.8 kg)   SpO2 93%   BMI 34.44 kg/m²     Temp (24hrs), Av.9 °F (36.6 °C), Min:97.6 °F (36.4 °C), Max:98.1 °F (36.7 °C)      Intake/Output:    Intake/Output Summary (Last 24 hours) at 2025 0901  Last data filed at 2025 0700  Gross per 24 hour   Intake 2229.5 ml   Output 2130 ml   Net 99.5 ml       Wt Readings from Last 3 Encounters:   25 213 lb 6.5 oz (96.8 kg)   25 201 lb 12.8 oz (91.5 kg)   25 200 lb (90.7 kg)       Allergies:  Allergies[1]    Labs:  Lab Results   Component Value Date    WBC 9.3 2025    HGB 8.2 2025    HCT 25.9 2025    .0 2025    CREATSERUM 0.65 2025    BUN 16 2025     2025    K 3.0 2025     2025    CO2 27.0 2025     2025    CA 8.6 2025    MG 2.1 2025       Physical Exam:  Blood pressure 127/63, pulse 76, temperature 98.1 °F (36.7 °C), temperature source Temporal, resp. rate 26, height 5' 6\" (1.676 m), weight 213 lb 6.5 oz (96.8 kg), SpO2 93%.  General: NAD  Neck: No JVD  Lungs: Coarse bilaterally   Mediastinal chest tube=100cc/12 hours- no air leak   Pleural chest tubes=50cc/12 hours- no air leak   Heart: RRR, S1, S2  Abdomen: Soft/Round, NT/ND, BS+x4/diminished/+ Flatus (minimal)/no BM. No N/V  Extremities: Warm, dry, trace UE & LE generalized edema bilat  Pulses:  doppler bilat DP/PT  Skin: sternotomy incision drsg: CDI w/TPW intact/bilateral leg drsgs: CDI   Neurological: drowsy/awakens to verbal stimuli/answers simple questions/moves all ext. / Calm.     Assessment/Plan:  S/P CABG x 2/AVR/MELINDA CLIP/INSERTION OF LEFT FEMORAL IABP POD # 7  IABP removed on 6/20  Extubated on 6/24: Encourage pulm toilet: IS- will need assistance to perform at this time/EZ pap. Likely w/pre op ORAL. BiPap at night- wean to HF N/C during the day. CXR tomorrow   Sputum cx 6/22=negative. On Zosyn per Pulm/agree to continue w/recent AVR.   Pain meds as needed-limit/avoid narcotics w/post op altered MS. Can use Tylenol.   Post op altered MS w/agitation/confusion/restlessness of unknown etiology-slowly improving; suspect post op delirium and/or ETOH withdrawal. Psych eval on 6/22- follow recs. CT brain on 6/20=negative   Altered sleep disturbance/sleep pattern due to pt usually works nights-may be contributing to his drowsiness/post op altered MS.  Increase activity-OOB to chair/ambulate in hallway-working with PT/OT  Scds/Lovenox prophylaxis DVT prevention   Continues hemodynamically stable off Dobuta- improved HTN w/resolved tachycardia on IV NTG/IV Beta-blocker/IV Cleviprexw/ SBP goal= >90 and <140. Stopped IV Hydralazine due to concern may have contributed to tachycardia. Elevated  ESR=97-may also have contributed to tachycardia- likely PPS- start Colchicine.   Continue ICU status   Expected post op volume overload- continue IV Lasix 40 mg TID and IV Potassium BID while on Lasix. Mckeon removed yesterday after leaking noted-able to use external male catheter. Check K/Mag every 6 hours- replacement per protocol   Amio protocol for AF prevention as pt considered high risk for post op AF-SR w/freq PACs yesterday evening. Will resume Amio once swallow eval performed this AM ( IV vs PO)-stopped due to potential interaction w/Colchicine (has been NPO & not received). Recheck EKG in AM for prolonged QT.    Expected post op anemia w/o clinical significance/stable: may be slightly diluted due to volume overload- Iron x 1 month. Transfuse one unit PRBCs today  Hx: DM-Continue basal insulin dose today/continue correction scale coverage. Resume home regimen at/near discharge.    Nutrition- currently NPO after swallow eval per ST yesterday- repeat eval today and follow recs. Unable to insert Dobhoff right nares yesterday due to bleeding/clots. Attempt left nares today vs TPN. Avoid oral intake if any concerns for aspiration due to his tenuous resp status.    Hx: BPH- on home Flomax  Hx: PVD- on ASA/Plavix - may give ASA rectal if fails swallow eval today  Mild abdominal distension/constipation- KUB 6/25 w/o acute process- continue on Colace/Senna. Reglan stopped due to potential interaction w/Haldol.   Blood CX 6/22 pending-negative so far  Discharge planning: pt lives with his wife. Continue to monitor as pt progresses. SW/ involved w/discharge planning.  PMR eval performed yesterday- continues to follow progress.     Plan of care discussed w/patient/RN and CV Surgeon: Dr. Tara Mast, APRN  6/26/2025  9:01 AM       [1] No Known Allergies

## 2025-06-26 NOTE — PROGRESS NOTES
[facility]  part of [facility]    Progress Note    Jose Alberto Lizama Patient Status:  Inpatient    1965 MRN [id number]   Location [facility] 2W/SW Attending Tammy Zacarias MD   Hosp Day # 7 PCP Fiona Sweeney MD       Subjective:   Resting comfortably. No overnight events.   Looking forward to discharging soon.     Objective:   Blood pressure 127/63, pulse 76, temperature 98.1 °F (36.7 °C), temperature source Temporal, resp. rate 26, height 5' 6\" (1.676 m), weight 213 lb 6.5 oz (96.8 kg), SpO2 93%.    Physical Exam:    General: No acute distress.   Respiratory: Clear to auscultation bilaterally. No wheezes. No rhonchi.  Cardiovascular: S1, S2. Regular rate and rhythm. No murmurs, rubs or gallops.   Abdomen: Soft, nontender, nondistended.  Positive bowel sounds. No rebound or guarding.  Neurologic: No focal neurological deficits.   Musculoskeletal: Moves all extremities.  Extremities: No edema.    Results:     Lab Results   Component Value Date    WBC 9.3 2025    HGB 8.2 (L) 2025    HCT 25.9 (L) 2025    .0 2025    CREATSERUM 0.65 (L) 2025    BUN 16 2025     2025    K 3.0 (L) 2025     2025    CO2 27.0 2025     (H) 2025    CA 8.6 (L) 2025    ALB 3.4 2025    ALKPHO 45 2025    BILT 0.4 2025    TP 5.5 (L) 2025     (H) 2025    ALT 47 2025    PTT 30.6 2025    INR 1.17 2025    TSH 0.696 2025    PSA 2.62 08/10/2023    ESRML 97 (H) 2025    MG 2.1 2025    PHOS 2.9 2025       XR ABDOMEN (1 VIEW) (CPT=74018)  Result Date: 2025  CONCLUSION: No acute process. Electronically Verified and Signed by Attending Radiologist: Jl Champion MD 2025 5:33 PM Workstation: [id number]    XR CHEST AP PORTABLE  (CPT=71045)  Result Date: 2025  CONCLUSION: Mild pulmonary edema has progressed. Bilateral lower lobe air  space opacities suggestive of atelectasis has progressed. Underlying pneumonia is felt to be less likely but is also in the differential. Electronically Verified and Signed by Attending Radiologist: Refugio Lloyd MD 6/25/2025 5:20 PM Workstation: [employer]    XR CHEST AP PORTABLE  (CPT=71045)  Result Date: 6/25/2025  CONCLUSION: Lines and tubes in place as described. Mild interstitial edema with small bilateral pleural effusions which is unchanged. There are median sternotomy wires and postoperative changes of CABG. Electronically Verified and Signed by Attending Radiologist: Refugio Lloyd MD 6/25/2025 3:19 PM Workstation: [employer]          Scheduled Medications[1]  PRN Medications[2]      Assessment and Plan:       CAD status post CABG and AVR  - difficult extubation but patient extubated successfully 06/24  - Continue tube feeds  - management per cardiology and CV sx   Continue IV Lasix TID, Potassium BID while on Lasix  Repeat CXR in a.m.  Continue amiodarone, asa, plavix, statin  - swallow eval  - chest tube management per surgery  - PT/OT/SLP    Severe aortic stenosis  - HD catheter removed today   - EF 35%  - amiodarone, asa, plavix     Alcohol abuse/Withdrawal   - CIWA protocol   - wean precedex drip   - Psych on board       DM2  - insulin per Vascular surgery     HTN  Dyslipidemia  - continue home meds         Quality:    CODE status: Full  DVT proph- lovenox            [1]    potassium chloride  20 mEq Intravenous BID    sodium chloride   Intravenous Once    metoprolol  10 mg Intravenous Q6H    escitalopram  5 mg Oral Nightly    amiodarone  200 mg Oral Daily    [START ON 6/27/2025] colchicine  0.6 mg Oral Daily    furosemide  40 mg Intravenous TID    insulin degludec  20 Units Subcutaneous Daily    OLANZapine  2.5 mg Oral Nightly    ferrous sulfate  300 mg Oral BID    insulin regular human  1-5 Units Subcutaneous 4 times per day    aspirin  81 mg Per NG Tube Daily    docusate  100 mg Per NG Tube BID     acetaminophen  1,000 mg Intravenous Q6H    ipratropium-albuterol  3 mL Nebulization TID    piperacillin-tazobactam  3.375 g Intravenous Q8H    budesonide  0.5 mg Nebulization 2 times daily    rosuvastatin  20 mg Per NG Tube Nightly    thiamine  100 mg Oral Daily    multivitamin  1 tablet Oral Daily    folic acid  1 mg Oral Daily    sugammadex  2 mg/kg Intravenous Once    sennosides  8.6 mg Oral BID    famotidine  20 mg Oral BID    Or    famotidine  20 mg Intravenous BID    mupirocin  1 Application Nasal BID    chlorhexidine gluconate  15 mL Mouth/Throat BID    ascorbic acid  500 mg Oral TID    enoxaparin  40 mg Subcutaneous Daily    clopidogrel  75 mg Oral Daily    tamsulosin  0.4 mg Oral BID   [2]   dextrose 10%    sodium bicarbonate **AND** lipase-protease-amylase (Lip-Prot-Amyl)    ipratropium-albuterol    LORazepam **OR** LORazepam    LORazepam **OR** LORazepam    LORazepam **OR** LORazepam    LORazepam    LORazepam    LORazepam    haloperidol lactate    LORazepam    haloperidol lactate    melatonin    polyethylene glycol (PEG 3350)    bisacodyl    potassium chloride **OR** potassium chloride    magnesium sulfate in dextrose 5%    magnesium sulfate in sterile water for injection    acetaminophen **OR** HYDROcodone-acetaminophen **OR** [DISCONTINUED] HYDROcodone-acetaminophen    morphINE **OR** [DISCONTINUED] morphINE    glucose **OR** glucose **OR** glucose-vitamin C **OR** dextrose **OR** glucose **OR** glucose **OR** glucose-vitamin C

## 2025-06-26 NOTE — CONSULTS
[facility]  part of [facility]      Jose Alberto Lizama Patient Status:  Inpatient    1965 MRN [id number]   Location [facility] 2W/SW Attending Nannette Pelayo MD   Hosp Day # 7 PCP Fiona Sweeney MD       CC: Self care mobility deficits s/p CABG    History of Present Illness:  59-year-old gentleman known underlying cardiac history and being followed in outpatient basis.  Has a prior history of stenting in his LAD and diagonal artery.  Also has underlying known PVD.  EF known to be around 30% with likely ischemic cardiomyopathy.  Outpatient echocardiogram demonstrated severe aortic stenosis with mild to moderate mitral stenosis and was noted to have dyspnea on exertion.  After outpatient workup has now undergone CABG x 2, aortic valve replacement and exclusion of left atrial appendage.  Postoperative course complicated by respiratory distress requiring prolonged intubation.  Now extubated.  Still in the ICU.  He has also had mental status agitation/confusion/restlessness.  This seems to be slowly improving.  There is question of postop delirium versus EtOH withdrawal.  CIWA protocol has been initiated.  Underlying diabetes mellitus and hypertension which are being managed as per primary service.  He was able to participate with therapies in a limited fashion.  Still has tubes and drains in place.  We are asked to assess his rehab needs.    Prescriptions Prior to Admission[1]  Allergies[2]  Past Medical History[3]  Past Surgical History[4]  Social History     Socioeconomic History    Marital status:      Spouse name: Not on file    Number of children: Not on file    Years of education: Not on file    Highest education level: Not on file   Occupational History    Not on file   Tobacco Use    Smoking status: Former     Current packs/day: 1.00     Average packs/day: 1 pack/day for 20.0 years (20.0 ttl pk-yrs)     Types: Cigarettes    Smokeless tobacco: Never   Vaping Use    Vaping  status: Every Day    Substances: Nicotine   Substance and Sexual Activity    Alcohol use: Yes     Comment: 1-2 drinks a week    Drug use: No    Sexual activity: Not on file   Other Topics Concern    Not on file   Social History Narrative    Not on file     Social Drivers of Health     Food Insecurity: Not on file   Transportation Needs: Not on file   Stress: Not on file   Housing Stability: Not on file     Family History[5]    PAIN SCALE: 3    ACP: Full    Review of Systems  Limited 12 point review secondary to dysphonia/dyspnea.  All pertinent positives as outlined in the HPI.    Physical Exam  Temp:  [97.6 °F (36.4 °C)-98.1 °F (36.7 °C)] 98.1 °F (36.7 °C)  Pulse:  [] 76  Resp:  [18-37] 26  BP: ()/() 127/63  SpO2:  [91 %-100 %] 92 %  AO: (126-186)/(51-77) 142/54  FiO2 (%):  [40 %-50 %] 50 %  92%    I/O last 3 completed shifts:  In: 3455.7 [I.V.:2555.7; IV PIGGYBACK:900]  Out: 2845 [Urine:2285; Chest Tube:560]  I/O this shift:  In: -   Out: 50 [Chest Tube:50]       CVS-S1-S2 RRR 1+ edema bilateral lower extremities  Extremities-warm to touch, peripheral pulses are palpable  Lymph nodes-no palpable lymph nodes or neck or groin  Skin-sternotomy incision has dressing in place, lower extremity vein harvest site CDI  MSK-limited.  Can raise both arms against gravity.  Bilateral lower extremity examination DF PF 4 HF 2 -  Neurological exam-alert and oriented to at least 2.  Sensation appears to be intact.    Previous Function:  Lives with his wife and son in a multilevel home.  Remote history of smoking.  Suspected regular EtOH use.  Normally independent no assistive devices.  Works.      CURRENT FUNCTION:  AM-PAC Score:  Score: 9  Approx Degree of Impairment: 79.59%  Standardized Score (AM-PAC Scale): 25.33  CMS Modifier (G-Code): CL        FUNCTIONAL ADL ASSESSMENT  Feeding: NG feeding tube     Grooming: max A     Bathing: total A     UB dressing: max A     LB dressing: total A     Toileting: total  A    Labs  Lab Results   Component Value Date    WBC 9.3 06/26/2025    HGB 8.2 (L) 06/26/2025    HCT 25.9 (L) 06/26/2025    MCV 79.4 (L) 06/26/2025    .0 06/26/2025     Lab Results   Component Value Date    PTT 30.6 06/19/2025     No results found for: \"PROTIME\"  Lab Results   Component Value Date     06/26/2025    K 3.0 (L) 06/26/2025    CO2 27.0 06/26/2025     06/26/2025    BUN 16 06/26/2025       Radiology:  XR ABDOMEN (1 VIEW) (CPT=74018)  Result Date: 6/25/2025  PROCEDURE: XR ABDOMEN (1 VIEW) (CPT=74018) INDICATIONS: eval stomach distention PATIENT STATED HISTORY: Stomach distention. COMPARISON: There are no comparisons for this exam. FINDINGS: BOWEL GAS PATTERN: Normal. No abnormal dilation or deviation. CALCIFICATIONS: None significant. OTHER: Negative. No abnormal gaseous collections. No evidence of gastric distention. Stomach is decompressed.     CONCLUSION: No acute process. Electronically Verified and Signed by Attending Radiologist: Jl Champion MD 6/25/2025 5:33 PM Workstation: [email or web address]    XR CHEST AP PORTABLE  (CPT=71045)  Result Date: 6/25/2025  PROCEDURE: XR CHEST AP PORTABLE  (CPT=71045) INDICATIONS: f/u PATIENT STATED HISTORY: Routine follow up COMPARISON: 06/23/2025 XR CHEST AP PORTABLE  (CPT=71045). 6/24/2025 TECHNIQUE: Single AP view was performed. FINDINGS: CARDIAC/MEDIASTINUM: The cardiac silhouette is enlarged and unchanged. There is atherosclerotic calcification of the aortic arch and thoracic aorta. There is a right internal jugular sheath with tip within the proximal SVC. Endotracheal and nasogastric tubes have been removed. There are median sternotomy wires and postoperative changes of CABG. and left atrial appendage clip LUNGS: Mild perihilar fullness with bilateral reticulonodular opacities suggestive of mild pulmonary edema. Small areas of perihilar groundglass have progressed. Small bilateral pleural effusions. There are bibasilar airspace opacities, suggestive  of atelectasis. BONES: There is degenerative disease of the thoracic spine.      CONCLUSION: Mild pulmonary edema has progressed. Bilateral lower lobe air space opacities suggestive of atelectasis has progressed. Underlying pneumonia is felt to be less likely but is also in the differential. Electronically Verified and Signed by Attending Radiologist: Refugio Lloyd MD 6/25/2025 5:20 PM Workstation: [email or web address]    XR CHEST AP PORTABLE  (CPT=71045)  Result Date: 6/25/2025  PROCEDURE: XR CHEST AP PORTABLE  (CPT=71045) INDICATIONS: f/u PATIENT STATED HISTORY: Routine follow up COMPARISON: 06/23/2025 XR CHEST AP PORTABLE  (CPT=71045) TECHNIQUE: Single AP view was performed. FINDINGS: CARDIAC/MEDIASTINUM: The cardiac silhouette is enlarged and unchanged. There is atherosclerotic calcification of the aortic arch and thoracic aorta. There is a right internal jugular [place]-Mike catheter with tip at the level of the main pulmonary artery. There are bilateral chest tubes. Endotracheal tube with tip at the level of the fred. There is a nasogastric tube with tip and sidehole within the stomach and below the diaphragm. Right internal jugular sheath with tip within the proximal SVC. There are median sternotomy wires and postoperative changes of CABG. and left atrial appendage clip LUNGS: Mild perihilar fullness with bilateral reticulonodular opacities suggestive of mild interstitial edema. Small bilateral pleural effusions. BONES: There is degenerative disease of the thoracic spine.      CONCLUSION: Lines and tubes in place as described. Mild interstitial edema with small bilateral pleural effusions which is unchanged. There are median sternotomy wires and postoperative changes of CABG. Electronically Verified and Signed by Attending Radiologist: Refugio Lloyd MD 6/25/2025 3:19 PM Workstation: [email or web address]    CARD ECHO 2D DOPPLER CONTRAST (CPT=93306)  Result Date: 6/23/2025  Transthoracic Echocardiogram Name:Jose Alberto Lizama Date:  2025 :  1965 Ht:  (66in)  BP: 100 / 62 MRN:  [id number]    Age:  59years    Wt:  (215lb) HR: 73bpm Loc:  [facility]       Gndr: M          BSA: 2.06m^2 Sonographer: Svetlana CARVALHO Ordering:    Ivanna Lewis Consulting:  Chivo Solares ---------------------------------------------------------------------------- History/Indications:   Post-Op CABG, AVR. ---------------------------------------------------------------------------- Procedure information:  A transthoracic complete 2D study was performed. Additional evaluation included M-mode, complete spectral Doppler, and color Doppler.  Patient status:  Inpatient.  Location:  Bedside.    Comparison was made to the study of 08/10/2019.    This was a routine study. Transthoracic echocardiography for diagnosis. Image quality was adequate. The study was technically limited due to poor acoustic window availability, restricted patient mobility, surgical dressings, patient supine, and patient on ventilator. Intravenous contrast (Definity) was administered to evaluate left ventricular function and enhance regional wall motion assessment. ECG rhythm:   Normal sinus ---------------------------------------------------------------------------- Conclusions: 1. Left ventricle: The cavity size was normal. Wall thickness was normal.    Systolic function was mildly to moderately reduced. The estimated    ejection fraction was 30-35%, by visual assessment. Features are    consistent with a pseudonormal left ventricular filling pattern, with    concomitant abnormal relaxation and increased filling pressure - grade 2    diastolic dysfunction. 2. Right ventricle: Pacer wire noted in the right ventricle. Systolic    function was reduced. 3. Left atrium: The atrium was mildly dilated. 4. Aortic valve: A bioprosthetic valve is present. The peak systolic    velocity was 2.51m/sec. The mean systolic gradient was 12mm Hg. The valve    area (VTI) was 1.12cm^2. The valve area (VTI) index was  0.54cm^2/m^2. 5. Pericardium, extracardiac: There was no pericardial effusion. * ---------------------------------------------------------------------------- * Findings: Left ventricle:  The cavity size was normal. Wall thickness was normal. Systolic function was mildly to moderately reduced. The estimated ejection fraction was 30-35%, by visual assessment. No diagnostic evidence for diffuse regional wall motion abnormalities. Features are consistent with a pseudonormal left ventricular filling pattern, with concomitant abnormal relaxation and increased filling pressure - grade 2 diastolic dysfunction. Left atrium:  Well visualized. The atrium was mildly dilated. Right ventricle:  The cavity size was at the upper limits of normal. Pacer wire noted in the right ventricle. Systolic function was reduced. Right atrium:  Well visualized. The atrium was normal in size. Mitral valve:  Well visualized. The valve was structurally normal. The leaflets were normal thickness. Leaflet separation was normal. No evidence for prolapse.  Doppler:  Transvalvular velocity was within the normal range. There was no evidence for stenosis. There was no significant regurgitation. Aortic valve:  Well visualized. A bioprosthetic valve is present. The leaflets were normal thickness. Cusp separation was normal.  Doppler: Transvalvular velocity was increased. There was no evidence for stenosis. There was no significant regurgitation.    The valve area (VTI) was 1.12cm^2. The valve area (VTI) index was 0.54cm^2/m^2.    The mean systolic gradient was 12mm Hg. The peak systolic gradient was 25mm Hg. Tricuspid valve:  Well visualized. The annulus is normal-sized. The valve is structurally normal. The leaflets are normal thickness. Leaflet separation was normal. No echocardiographic evidence for tricuspid prolapse.  Doppler: Transvalvular velocity was within the normal range. There was no evidence for stenosis. There was no significant  regurgitation. Pulmonic valve:   Well visualized. The annulus is normal-sized. The valve is structurally normal. The leaflets are normal thickness. Cusp separation was normal. No evidence for prolapse.  Doppler:  Transvalvular velocity was within the normal range. There was no evidence for stenosis. There was no significant regurgitation. Pericardium:   There was no pericardial effusion. Pleura:  No evidence of pleural fluid accumulation. Aorta: Aortic root: The aortic root was normal-sized. The aortic root was normal. Ascending aorta: The ascending aorta was normal. The ascending aorta was normal. Pulmonary arteries: The main pulmonary artery was normal-sized.  Systolic pressure could not be accurately estimated. Systemic veins:  Central venous respirophasic diameter changes are blunted (< 50%). Inferior vena cava: The IVC was normally collapsible and normal-sized. The IVC was dilated. ---------------------------------------------------------------------------- Measurements  Left ventricle                  Value          Ref  IVS thickness, ED, PLAX     (H) 1.1   cm       0.6 - 1.0  LV ID, ED, PLAX                 5.6   cm       4.2 - 5.8  LV ID, ES, PLAX             (H) 4.7   cm       2.5 - 4.0  LV PW thickness, ED, PLAX       0.9   cm       0.6 - 1.0  IVS/LV PW ratio, ED, PLAX       1.22           ---------  LV PW/LV ID ratio, ED, PLAX     0.16           ---------  LV ejection fraction        (L) 34    %        52 - 72  Stroke volume/bsa, 2D           21    ml/m^2   ---------  LV e', lateral              (L) 4.3   cm/sec   >=10.0  LV E/e', lateral            (H) 29             <=13  LV e', medial               (L) 3.1   cm/sec   >=7.0  LV E/e', medial                 41             ---------  LV e', average                  3.7   cm/sec   ---------  LV E/e', average            (H) 34             <=14  LVOT                            Value          Ref  LVOT ID                         1.9   cm       ---------   LVOT peak velocity, S           1.03  m/sec    ---------  LVOT VTI, S                     15.1  cm       ---------  LVOT peak gradient, S           4     mm Hg    ---------  LVOT mean gradient, S           2     mm Hg    ---------  Stroke volume (SV), LVOT DP     43    ml       ---------  Stroke index (SV/bsa), LVOT     21    ml/m^2   ---------  DP  Aortic valve                    Value          Ref  Aortic valve peak velocity,     2.51  m/sec    ---------  S  Aortic valve VTI, S             38.1  cm       ---------  Aortic mean gradient, S         12    mm Hg    ---------  Aortic peak gradient, S         25    mm Hg    ---------  Aortic valve area, VTI          1.12  cm^2     ---------  Aortic valve area/bsa, VTI      0.54  cm^2/m^2 ---------  Velocity ratio, peak,           0.41           ---------  LVOT/AV  Ascending aorta                 Value          Ref  Ascending aorta ID              2.8   cm       2.2 - 3.8  Left atrium                     Value          Ref  LA volume, S                (H) 69    ml       18 - 58  LA volume/bsa, S                33    ml/m^2   16 - 34  LA volume, ES, 1-p A4C      (H) 78    ml       18 - 58  LA volume, ES, 1-p A2C          56    ml       18 - 58  LA volume, ES, A/L              71    ml       ---------  LA volume/bsa, ES, A/L      (H) 35    ml/m^2   16 - 34  Mitral valve                    Value          Ref  Mitral E-wave peak velocity     1.26  m/sec    ---------  Mitral A-wave peak velocity     1.14  m/sec    ---------  Mitral deceleration time        229   ms       ---------  Mitral peak gradient, D         6     mm Hg    ---------  Mitral E/A ratio, peak          1.1            ---------  Systemic veins                  Value          Ref  Estimated CVP                   15    mm Hg    ---------  Inferior vena cava              Value          Ref  ID                          (H) 2.7   cm       <=2.1  Right ventricle                 Value          Ref  TAPSE, 2D                    (L) 1.41  cm       >=1.70  TAPSE, MM                   (L) 1.41  cm       >=1.70  RV s', lateral              (L) 6.2   cm/sec   >=9.5 Legend: (L)  and  (H)  steven values outside specified reference range. ---------------------------------------------------------------------------- Prepared and electronically signed by Massimo Deluna 06/23/2025 18:00     XR CHEST AP PORTABLE  (CPT=71045)  Result Date: 6/23/2025  PROCEDURE: XR CHEST AP PORTABLE  (CPT=71045)  COMPARISON: [facility], XR CHEST AP PORTABLE (CPT=71045), 6/22/2025, 5:33 AM.  [facility], XR CHEST AP PORTABLE (CPT=71045), 6/21/2025, 5:32 AM.  [facility], XR CHEST AP PORTABLE (CPT=71045), 6/20/2025, 6:08 AM.  INDICATIONS: Shortness of breath.  FINDINGS: Combined with conclusion.         CONCLUSION:   Increasing bilateral pleural effusions and pulmonary edema.  Right medial and left basilar chest tubes are again seen.  Recommend correlation for malfunction.  Endotracheal tube, esophagogastric tube, and [employer]-Mike catheter are unchanged.      Dictated by (CST): Malachi Wilson MD on 6/23/2025 at 7:25 AM     Finalized by (CST): Malachi Wilson MD on 6/23/2025 at 7:39 AM          XR CHEST AP PORTABLE  (CPT=71045)  Result Date: 6/22/2025  PROCEDURE: XR CHEST AP PORTABLE  (CPT=71045) TIME: 533 hours.   COMPARISON: [facility], XR CHEST AP PORTABLE (CPT=71045), 6/21/2025, 5:32 AM.  [facility], XR CHEST AP PORTABLE (CPT=71045), 6/20/2025, 6:08 AM.  [facility], XR CHEST AP PORTABLE (CPT=71045), 6/19/2025, 7:03 PM.  INDICATIONS: Follow up shortness of breath.  TECHNIQUE:   Single view.   FINDINGS:  CARDIAC/MEDIAST: Stable heart and mediastinum post median sternotomy.  Mildly improved vascular congestion.  Left atrial appendage clip.  LUNGS/PLEURA:  Mild improved opacification of the right mid to lower lung.  Persistent left basilar opacity which could reflect combination  pleural effusion and parenchymal opacity.  No pneumothorax. OTHER:  Osseous structures are unchanged.  Stable support devices.         CONCLUSION:   Mild improved vascular congestion.  Mild improved aeration of the right lower lung.  Stable opacity in the left lung base which may reflect combination of pleural effusion and parenchymal opacities.  Support devices not significantly changed.    Dictated by (CST): Antonio Hitchcock MD on 6/22/2025 at 7:35 AM     Finalized by (CST): Antonio Hitchcock MD on 6/22/2025 at 7:37 AM          XR CHEST AP PORTABLE  (CPT=71045)  Result Date: 6/21/2025         PROCEDURE: XR CHEST AP PORTABLE  (CPT=71045) TIME: 5:36 a.m..   COMPARISON: [facility], XR CHEST AP PORTABLE (CPT=71045), 6/20/2025, 6:08 AM.  INDICATIONS: Shortness of breath.  TECHNIQUE:   Single view.   FINDINGS/IMPRESSION:   1. There is an endotracheal tube with tip approximately 4.7 cm above the fred.  There is an enteric feeding tube in expected configuration.  There is a right IJ [place]-Mike catheter with tip projecting over the proximal main pulmonary outflow tract.  There are mediastinal drain/left-sided chest tube in unchanged position.  There is no pneumothorax.  2. The heart mediastinal structures are minimally enlarged.  Pulmonary vascularity is slightly increased.  The findings are compatible with moderate fluid overload.  3. There are increased densities at the lung bases which could represent atelectasis, infiltrate, or a combination in addition to small pleural effusions.     Dictated by (CST): Eligio Andrade MD on 6/21/2025 at 11:36 AM     Finalized by (CST): Eligio Andrade MD on 6/21/2025 at 11:37 AM          CT BRAIN OR HEAD (CPT=70450)  Result Date: 6/20/2025  PROCEDURE: CT BRAIN OR HEAD (CPT=70450)  COMPARISON: [facility], CT CHEST (CPT=71250), 5/21/2025, 10:17 AM.  INDICATIONS: AMS post CABG. Unable to wake.  TECHNIQUE: CT images were obtained  without contrast material.  Automated exposure control for dose reduction was used.  Dose information is transmitted to the ACR (American College of Radiology) NRDR (National Radiology Data Registry) which includes the Dose Index Registry.  FINDINGS:  There is no acute intracranial hemorrhage, mass lesion, cerebral edema, or secondary signs of acute infarct.  There are small to moderate sized areas of low-attenuation in the subcortical and periventricular white matter most compatible with chronic ischemic white matter changes.  There is no focal area of encephalomalacia to suggest previous infarct.  The ventricular system appears grossly normal with no evidence of hydrocephalus.  On bone windows, there is no calvarial fracture.  On soft tissue windows, note is made of a small scalp hematoma at the vertex of unclear etiology and significance as there are no associated abnormalities.  Additionally, there is mild mucosal thickening within the paranasal sinuses.         CONCLUSION:   1. Small soft tissue/scalp hematoma at the posterior vertex with no underlying calvarial fracture or acute intracranial process.  2. Small to moderate sized areas of low-attenuation in the subcortical and periventricular white matter most compatible with chronic ischemic white matter changes.  3. Mild to moderate paranasal sinus disease.     Dictated by (CST): Eligio Andrade MD on 6/20/2025 at 6:27 PM     Finalized by (CST): Eligio Andrade MD on 6/20/2025 at 6:29 PM          XR CHEST AP PORTABLE  (CPT=71045)  Result Date: 6/20/2025  PROCEDURE: XR CHEST AP PORTABLE  (CPT=71045) TIME: 6:10 a.m.   COMPARISON: [facility], XR CHEST AP PORTABLE (CPT=71045), 6/19/2025, 7:03 PM.  INDICATIONS: Status Post Surgery  TECHNIQUE:   Single view.   FINDINGS:  CARDIAC/VASC: The cardiac silhouette is not enlarged. There are surgical clips over the cardiac silhouette compatible with prior CABG.  Aortic valve prosthesis is present.  Left  atrial appendage closure has been performed. MEDIAST/LESLIE:   Atherosclerotic aorta with no visible aneurysm.  LUNGS/PLEURA: Small left pleural effusion.  Mild vascular congestion and interstitial edema.  BONES: Mild degenerative disc disease and spondylosis without visible acute abnormalities.  Sternotomy wires and plates are present. OTHER: Endotracheal tube, feeding tube, the right neck [place]-Mike catheter code mediastinal drain, and left basal chest tube are in stable position.  Intra-aortic balloon pump with radiodense tip below the aortic knob projecting at the T6 level.         CONCLUSION: Post CABG and aortic valve prosthesis.  Stable/satisfactory position of lines and tubes.  Stable vascular congestion/interstitial edema and stable small left effusion.    Dictated by (CST): Bal Stewart MD on 6/20/2025 at 6:45 AM     Finalized by (CST): Bal Stewart MD on 6/20/2025 at 6:49 AM          XR CHEST AP PORTABLE  (CPT=71045)  Result Date: 6/19/2025         PROCEDURE: XR CHEST AP PORTABLE  (CPT=71045) TIME: 1921.   COMPARISON: [facility], XR CHEST AP PORTABLE (CPT=71045), 6/19/2025, 3:29 PM.  INDICATIONS: ETT and OG placement  TECHNIQUE:   Single view.   FINDINGS/IMPRESSION:   1. There is an endotracheal tube with tip approximately 5.3 cm above the fred.  There is an enteric feeding tube with tip overlying the gastric fundus.  There is a right IJ [place]-Mike catheter with tip overlying the proximal main pulmonary outflow tract.  There are bilateral mediastinal drains as well as a left-sided chest tube in expected configuration there is no pneumothorax.  2. The heart mediastinal structures are enlarged.  Pulmonary vascularity is slightly increased.  The findings likely represent minimal fluid overload.  3. Lung volumes are diminished.  There is minimal hazy opacities at the lung bases which could represent atelectasis, infiltrate, or a combination.     Dictated by (CST): Eligio Andrade MD on  6/19/2025 at 7:37 PM     Finalized by (CST): Eligio Andrade MD on 6/19/2025 at 7:39 PM          XR CHEST AP PORTABLE  (CPT=71045)  Result Date: 6/19/2025  PROCEDURE: XR CHEST AP PORTABLE  (CPT=71045) TIME: 4:51 p.m.   COMPARISON: [facility], XR CHEST PA + LAT CHEST (CPT=71046), 6/10/2025, 11:59 AM.  INDICATIONS: Status post cardiac surgery \"post CABG x2, left atrial appendage amputation with atrial clip placement, and aortic valve replacement.  TECHNIQUE:   Single view.   FINDINGS:  CARDIAC/VASC: Interval cardiac surgery.  Aortic valve prosthesis is present.  Cardiac silhouette is borderline enlarged.  There is mild vascular congestion. MEDIAST/LESLIE:   Atherosclerotic aorta with no visible aneurysm.  LUNGS/PLEURA: Interstitial prominence.  Small left pleural effusion.  Small bibasilar pulmonary opacities likely atelectasis. BONES: Scattered mild degenerative endplate changes in the visualized thoracolumbar spine.  Sternotomy wires are present. OTHER: Injury attic balloon is present with radiodense tip along the proximal margin of the aortic knob.  Right neck Gleason-Mike catheter with access sheath with tip extending into the left pulmonary outflow.  Mediastinal drain and left basal chest tubes are have been placed.  Endotracheal tube with tip over the trachea the level the aortic knob.  Feeding tube extending below the diaphragm with tip in the proximal stomach.         CONCLUSION: Post CABG and aortic valve prosthesis.  The cardiac silhouette is mildly enlarged and there is mild vascular congestion with left pleural effusion and basal atelectasis.  Findings are compatible with recent surgery.  There is expected position of lines and tubes as discussed above.   Dictated by (CST): Bal Stewart MD on 6/19/2025 at 4:48 PM     Finalized by (CST): Bal Stewart MD on 6/19/2025 at 4:52 PM          CATH LEFT HEART CATH W/INTERVENTION  Result Date: 6/18/2025  This exam has been completed. Please  refer to Notes for the results to this procedure.    XR CHEST PA + LAT CHEST (CPT=71046)  Result Date: 6/10/2025  PROCEDURE: XR CHEST PA + LAT CHEST (CPT=71046)  COMPARISON: [facility], XR CHEST PA + LAT CHEST (CPT=71046), 3/08/2022, 10:37 AM.  INDICATIONS: Pre-operative examination for surgery.  TECHNIQUE:   Two views.   FINDINGS: CARDIAC/VASC: Borderline cardiomegaly.  Coronary artery atherosclerosis MEDIAST/LESLIE: Atherosclerotic aorta with no visible aneurysm.  LUNGS/PLEURA: No acute pulmonary disease or pleural effusion.  Minimal left basilar atelectasis/scarring. BONES: Mild scoliosis with mild degenerative disc disease and spondylosis.  OTHER: Negative.          CONCLUSION:  1. Borderline cardiomegaly.  Coronary artery atherosclerosis. 2. No acute pulmonary disease or pleural effusion.    Dictated by (CST): Haider Subramanian MD on 6/10/2025 at 12:10 PM     Finalized by (CST): Haider Subramanian MD on 6/10/2025 at 12:12 PM           v    Assessment    Problem List[6]    Plan     59-year-old gentleman self-care mobility deficits status post CABG x 2, AVR, MELINDA.  Postoperative encephalopathy, possible EtOH withdrawal, volume overload, diabetes mellitus type 2, hypertension.  Decreased ejection fraction, ischemic cardiomyopathy.  PVD.    Continue PT OT  Swallow eval if needed  Optimization of volume status as per cardiology, primary service  Mental status-seems to be improving    Patient has had limited time/ability participate with therapies at this time, still has drains and tubes in place.  Mental status slowly improving.  If unable to progress to the point where he is safe to go home as his medical issues are stabilized will consider for acute inpatient rehabilitation.  Will follow along with this patient.    Thank you for this consultation and allowing me to participate in this patient's care      KEYLA SEGURA MD    6/26/2025    7:50 AM         [1]   Medications Prior to Admission    Medication Sig Dispense Refill Last Dose/Taking    ENTRESTO 24-26 MG Oral Tab Entresto 24 mg-26 mg tablet, [RxNorm: 4911196]   6/16/2025    magnesium 250 MG Oral Tab Take 1 tablet (250 mg total) by mouth every other day.   6/15/2025    cyanocobalamin 500 MCG Oral Tab Take 1 tablet (500 mcg total) by mouth every other day.   6/15/2025    metFORMIN HCl 1000 MG Oral Tab Take 1 tablet (1,000 mg total) by mouth 2 (two) times daily with meals. 180 tablet 3 6/16/2025    amLODIPine 5 MG Oral Tab Take 1 tablet (5 mg total) by mouth daily. 90 tablet 3 6/16/2025    carvedilol 25 MG Oral Tab Take 1 tablet (25 mg total) by mouth 2 (two) times daily with meals. 180 tablet 3 6/18/2025    clopidogrel 75 MG Oral Tab Take 1 tablet (75 mg total) by mouth daily. 90 tablet 3 6/13/2025    empagliflozin (JARDIANCE) 25 MG Oral Tab Take 1 tablet (25 mg total) by mouth daily. 90 tablet 3 6/16/2025    furosemide 40 MG Oral Tab Take 1 tablet (40 mg total) by mouth daily. 90 tablet 3 6/16/2025    rosuvastatin 20 MG Oral Tab Take 1 tablet (20 mg total) by mouth nightly. 90 tablet 3 6/16/2025    tamsulosin 0.4 MG Oral Cap TAKE 2 CAPSULES BY MOUTH DAILY (Patient taking differently: Take 1 capsule (0.4 mg total) by mouth in the morning and 1 capsule (0.4 mg total) before bedtime.) 180 capsule 3 6/16/2025    aspirin 81 MG Oral Tab EC Take 1 tablet (81 mg total) by mouth nightly.   6/18/2025    Thiamine HCl 250 MG Oral Tab Take 1 tablet (250 mg total) by mouth every other day.   6/15/2025    pyridoxine 100 MG Oral Tab Take 1 tablet (100 mg total) by mouth every other day.   6/15/2025    Glucose Blood (CONTOUR NEXT TEST) In Vitro Strip To test 2 x daily  Dx E11.9 100 strip 5     Glucose Blood (CONTOUR NEXT TEST) In Vitro Strip To check glucose bid   Dx  E11.9 200 strip 11     Glucose Blood (LORAINE CONTOUR TEST) In Vitro Strip To test daily 100 strip 3     LORAINE CONTOUR NEXT TEST In Vitro Strip To test 2 x daily 100 strip 6    [2] No Known  Allergies  [3]   Past Medical History:   Aortic stenosis    Back problem    BPH (benign prostatic hyperplasia)    Cardiomyopathy (HCC)    Coronary atherosclerosis    Depression    Diabetes (HCC)    Esophageal reflux    Heart valve disease    High blood pressure    High cholesterol    Peripheral vascular disease    Shortness of breath    related to smoking     Visual impairment    glasses   [4]   Past Surgical History:  Procedure Laterality Date    Cardiac catheterization  2016    [facility]    Cath drug eluting stent      Colonoscopy  04/22/2024    Colonoscopy N/A 4/22/2024    Procedure: COLONOSCOPY;  Surgeon: Attila Li MD;  Location: [facility] ENDOSCOPY    Esophagoscopy,diagnostic  04/22/2024    Dr. Li    Hernia surgery      as a child    Nasal scopy,remv part ethmoid  10/14/2020    Nasal scopy,rmv tiss maxill sinus  10/14/2020    Repair of nasal septum  10/14/2020   [5]   Family History  Problem Relation Age of Onset    Diabetes Father     Diabetes Paternal Aunt     Diabetes Paternal Uncle     Seizure Disorder Mother     No Known Problems Brother     No Known Problems Daughter     Glaucoma Neg     Macular degeneration Neg    [6]   Patient Active Problem List  Diagnosis    DM (diabetes mellitus), type 2 with complications (HCC)    CAD in native artery    Hyperlipidemia    HTN (hypertension)    PAD (peripheral artery disease)    Type 2 diabetes mellitus without retinopathy (HCC)    Meibomian gland dysfunction (MGD) of both eyes    Presbyopia    Age-related nuclear cataract of both eyes    Chronic maxillary sinusitis    Gastroesophageal reflux disease    Encounter for colorectal cancer screening    Aortic stenosis    Hx of CABG    Episodic mood disorder    Delirium due to another medical condition    Angina concurrent with and due to arteriosclerosis of CABG    Pre-op evaluation

## 2025-06-26 NOTE — PROGRESS NOTES
[facility]  part of [facility]     Progress Note    Jose Alberto Lizama Patient Status:  Inpatient    1965 MRN [id number]   Location [facility] 2W/SW Attending Tammy Zacarias MD   Hosp Day # 7 PCP Fiona Sweeney MD       Subjective:   Patient seen and examined.  Resting in bed.  Alert arousable.  On BiPAP this morning.  Objective:   Blood pressure 127/63, pulse 76, temperature 98.1 °F (36.7 °C), temperature source Temporal, resp. rate 26, height 5' 6\" (1.676 m), weight 213 lb 6.5 oz (96.8 kg), SpO2 93%.  Intake/Output:   Last 3 shifts: I/O last 3 completed shifts:  In: 3455.7 [I.V.:2555.7; IV PIGGYBACK:900]  Out: 2845 [Urine:2285; Chest Tube:560]   This shift: I/O this shift:  In: -   Out: 50 [Chest Tube:50]     Vent Settings: FiO2 (%):  [40 %-50 %] 50 %    Hemodynamic parameters (last 24 hours):      Scheduled Meds: Current Hospital Medications[1]    Continuous Infusions: Medication Infusions[2]    Physical Exam  Constitutional: no acute distress, arousable  Eyes: PERRL  ENT: nares pateint  Neck: supple, no JVD  Cardio: RRR, S1 S2  Respiratory: Breath sounds with some scattered crackles present  GI: abdomen soft, non tender, active bowel sounds, no organomegaly  Extremities: no clubbing, cyanosis, + bilateral lower extremity edema  Neurologic: no gross motor deficits  Skin: warm, dry      Results:     Lab Results   Component Value Date    WBC 9.3 2025    HGB 8.2 2025    HCT 25.9 2025    .0 2025    CREATSERUM 0.65 2025    BUN 16 2025     2025    K 3.0 2025     2025    CO2 27.0 2025     2025    CA 8.6 2025    MG 2.1 2025       XR ABDOMEN (1 VIEW) (CPT=74018)  Result Date: 2025  CONCLUSION: No acute process. Electronically Verified and Signed by Attending Radiologist: Jl Champion MD 2025 5:33 PM Workstation: [id number]    XR CHEST AP PORTABLE  (CPT=71045)  Result  Date: 6/25/2025  CONCLUSION: Mild pulmonary edema has progressed. Bilateral lower lobe air space opacities suggestive of atelectasis has progressed. Underlying pneumonia is felt to be less likely but is also in the differential. Electronically Verified and Signed by Attending Radiologist: Refugio Lloyd MD 6/25/2025 5:20 PM Workstation: [id number]              Assessment   1.  POD #7 status post CABG and AVR  2.  Severe coronary disease  3.  Severe aortic stenosis  4.  Postoperative respiratory failure  5.  Possible EtOH withdrawal  6.  Anemia  7.  Diabetes mellitus  8.  Hypertension  9.  Hyperlipidemia  10.  Possible ORAL     Plan   -Patient presents status post CABG and AVR on 6/19/2025.  - Difficulty extubating postoperatively with concern for pulmonary edema possible mucous plugging and possible component of EtOH withdrawal.  - Tolerating extubation on 6/24/2025.  - Intermittent NIV as necessary.  Wean oxygen as tolerated  - Diuresis as tolerated  - Further recommendations per side  - Chest tube management per CV surgery  - Continue tube feedings  - DVT prophylaxis: Julia Zamora DO  Pulmonary Critical Care Medicine  [facility]          [1]   Current Facility-Administered Medications   Medication Dose Route Frequency    potassium chloride 20 mEq/100mL IVPB premix 20 mEq  20 mEq Intravenous BID    sodium chloride 0.9% infusion   Intravenous Once    metoprolol (Lopressor) 5 mg/5mL injection 10 mg  10 mg Intravenous Q6H    escitalopram (Lexapro) tab 5 mg  5 mg Oral Nightly    amiodarone (Pacerone) tab 200 mg  200 mg Oral Daily    [START ON 6/27/2025] colchicine tab 0.6 mg  0.6 mg Oral Daily    furosemide (Lasix) 10 mg/mL injection 40 mg  40 mg Intravenous TID    insulin degludec (Tresiba) 100 units/mL flextouch 20 Units  20 Units Subcutaneous Daily    clevidipine (Cleviprex) 25 MG/50ML IV infusion  1-21 mg/hr Intravenous Continuous    OLANZapine (ZyPREXA) tab 2.5 mg  2.5 mg Oral Nightly     ferrous sulfate 300 (60 Fe) MG/5ML oral syrup 300 mg  300 mg Oral BID    insulin regular human (Novolin R, Humulin R) 100 UNIT/ML injection 1-5 Units  1-5 Units Subcutaneous 4 times per day    aspirin chewable tab 81 mg  81 mg Per NG Tube Daily    dextrose 10% infusion (TPN no rate)   Intravenous Continuous PRN    sodium bicarbonate tab 650 mg  650 mg Per G Tube PRN    And    pancrelipase (Lip-Prot-Amyl) (Zenpep) DR particles cap 10,000 Units  10,000 Units Per G Tube PRN    docusate (Colace) 50 MG/5ML oral solution 100 mg  100 mg Per NG Tube BID    nitroGLYCERIN in dextrose 5% 50 mg/250mL infusion premix  5-200 mcg/min Intravenous Continuous    acetaminophen (Ofirmev) 10 mg/mL infusion premix 1,000 mg  1,000 mg Intravenous Q6H    ipratropium-albuterol (Duoneb) 0.5-2.5 (3) MG/3ML inhalation solution 3 mL  3 mL Nebulization TID    norepinephrine (Levophed) 8 mg in dextrose 5% 250 mL infusion  0.5-8 mcg/min Intravenous Continuous    ipratropium-albuterol (Duoneb) 0.5-2.5 (3) MG/3ML inhalation solution 3 mL  3 mL Nebulization Q4H PRN    piperacillin-tazobactam (Zosyn) 3.375 g in dextrose 5% 100 mL IVPB-ADDV  3.375 g Intravenous Q8H    budesonide (Pulmicort) 0.5 MG/2ML nebulizer suspension 0.5 mg  0.5 mg Nebulization 2 times daily    rosuvastatin (Crestor) tab 20 mg  20 mg Per NG Tube Nightly    LORazepam (Ativan) tab 1 mg  1 mg Oral Q1H PRN    Or    LORazepam (Ativan) 2 mg/mL injection 1 mg  1 mg Intravenous Q1H PRN    LORazepam (Ativan) tab 2 mg  2 mg Oral Q1H PRN    Or    LORazepam (Ativan) 2 mg/mL injection 2 mg  2 mg Intravenous Q1H PRN    LORazepam (Ativan) tab 3 mg  3 mg Oral Q1H PRN    Or    LORazepam (Ativan) 2 mg/mL injection 3 mg  3 mg Intravenous Q1H PRN    LORazepam (Ativan) 2 mg/mL injection 4 mg  4 mg Intravenous Q30 Min PRN    LORazepam (Ativan) 2 mg/mL injection 5 mg  5 mg Intravenous Q15 Min PRN    LORazepam (Ativan) 2 mg/mL injection 6 mg  6 mg Intravenous Q10 Min PRN    thiamine (Vitamin B1) tab 100  mg  100 mg Oral Daily    multivitamin (Tab-A-Елена/Beta Carotene) tab 1 tablet  1 tablet Oral Daily    folic acid (Folvite) tab 1 mg  1 mg Oral Daily    haloperidol lactate (Haldol) 5 MG/ML injection 5 mg  5 mg Intramuscular Q6H PRN    LORazepam (Ativan) 2 mg/mL injection 1 mg  1 mg Intramuscular Q6H PRN    haloperidol lactate (Haldol) 5 MG/ML injection 2 mg  2 mg Intravenous Q4H PRN    sugammadex (Bridion) 200 MG/2ML injection 180 mg  2 mg/kg Intravenous Once    sodium chloride 0.9% infusion   Intravenous Continuous    melatonin tab 3 mg  3 mg Oral Nightly PRN    sennosides (Senokot) tab 8.6 mg  8.6 mg Oral BID    polyethylene glycol (PEG 3350) (Miralax) 17 g oral packet 17 g  17 g Oral Daily PRN    bisacodyl (Dulcolax) 10 MG rectal suppository 10 mg  10 mg Rectal Daily PRN    famotidine (Pepcid) tab 20 mg  20 mg Oral BID    Or    famotidine (Pepcid) 20 mg/2mL injection 20 mg  20 mg Intravenous BID    dexmedeTOMIDine in sodium chloride 0.9% (Precedex) 400 mcg/100mL infusion premix  0.2-1.5 mcg/kg/hr (Dosing Weight) Intravenous Continuous    potassium chloride 20 mEq/100mL IVPB premix 20 mEq  20 mEq Intravenous PRN    Or    potassium chloride 40 mEq/100mL IVPB premix (central line) 40 mEq  40 mEq Intravenous PRN    magnesium sulfate in dextrose 5% 1 g/100mL infusion premix 1 g  1 g Intravenous PRN    magnesium sulfate in sterile water for injection 2 g/50mL IVPB premix 2 g  2 g Intravenous PRN    mupirocin (Bactroban) 2% nasal ointment 1 Application  1 Application Nasal BID    chlorhexidine gluconate (Peridex) 0.12 % oral solution 15 mL  15 mL Mouth/Throat BID    ascorbic acid (Vitamin C) tab 500 mg  500 mg Oral TID    enoxaparin (Lovenox) 40 MG/0.4ML SUBQ injection 40 mg  40 mg Subcutaneous Daily    acetaminophen (Tylenol) tab 650 mg  650 mg Oral Q4H PRN    Or    HYDROcodone-acetaminophen (Norco) 5-325 MG per tab 1 tablet  1 tablet Oral Q4H PRN    morphINE PF 2 MG/ML injection 2 mg  2 mg Intravenous Q2H PRN     clopidogrel (Plavix) tab 75 mg  75 mg Oral Daily    glucose (Dex4) 15 GM/59ML oral liquid 15 g  15 g Oral Q15 Min PRN    Or    glucose (Glutose) 40% oral gel 15 g  15 g Oral Q15 Min PRN    Or    glucose-vitamin C (Dex-4) chewable tab 4 tablet  4 tablet Oral Q15 Min PRN    Or    dextrose 50% injection 50 mL  50 mL Intravenous Q15 Min PRN    Or    glucose (Dex4) 15 GM/59ML oral liquid 30 g  30 g Oral Q15 Min PRN    Or    glucose (Glutose) 40% oral gel 30 g  30 g Oral Q15 Min PRN    Or    glucose-vitamin C (Dex-4) chewable tab 8 tablet  8 tablet Oral Q15 Min PRN    tamsulosin (Flomax) cap 0.4 mg  0.4 mg Oral BID   [2]    clevidipine 5 mg/hr (06/26/25 0611)    dextrose 10%      nitroGLYCERIN in dextrose 5% 120 mcg/min (06/26/25 0305)    norepinephrine Stopped (06/23/25 1642)    sodium chloride 10 mL/hr at 06/24/25 2000    dexmedetomidine Stopped (06/24/25 1200)

## 2025-06-26 NOTE — PLAN OF CARE
Problem: CARDIOVASCULAR - ADULT  Goal: Maintains optimal cardiac output and hemodynamic stability  Description: INTERVENTIONS:  - Monitor vital signs, rhythm, and trends  - Monitor for bleeding, hypotension and signs of decreased cardiac output  - Evaluate effectiveness of vasoactive medications to optimize hemodynamic stability  - Monitor arterial and/or venous puncture sites for bleeding and/or hematoma  - Assess quality of pulses, skin color and temperature  - Assess for signs of decreased coronary artery perfusion - ex. Angina  - Evaluate fluid balance, assess for edema, trend weights  Outcome: Progressing  Goal: Absence of cardiac arrhythmias or at baseline  Description: INTERVENTIONS:  - Continuous cardiac monitoring, monitor vital signs, obtain 12 lead EKG if indicated  - Evaluate effectiveness of antiarrhythmic and heart rate control medications as ordered  - Initiate emergency measures for life threatening arrhythmias  - Monitor electrolytes and administer replacement therapy as ordered  Outcome: Progressing     Problem: RESPIRATORY - ADULT  Goal: Achieves optimal ventilation and oxygenation  Description: INTERVENTIONS:  - Assess for changes in respiratory status  - Assess for changes in mentation and behavior  - Position to facilitate oxygenation and minimize respiratory effort  - Oxygen supplementation based on oxygen saturation or ABGs  - Provide Smoking Cessation handout, if applicable  - Encourage broncho-pulmonary hygiene including cough, deep breathe, Incentive Spirometry  - Assess the need for suctioning and perform as needed  - Assess and instruct to report SOB or any respiratory difficulty  - Respiratory Therapy support as indicated  - Manage/alleviate anxiety  - Monitor for signs/symptoms of CO2 retention  Outcome: Progressing     Problem: GASTROINTESTINAL - ADULT  Goal: Minimal or absence of nausea and vomiting  Description: INTERVENTIONS:  - Maintain adequate hydration with IV or PO as  ordered and tolerated  - Nasogastric tube to low intermittent suction as ordered  - Evaluate effectiveness of ordered antiemetic medications  - Provide nonpharmacologic comfort measures as appropriate  - Advance diet as tolerated, if ordered  - Obtain nutritional consult as needed  - Evaluate fluid balance  Outcome: Progressing  Goal: Maintains or returns to baseline bowel function  Description: INTERVENTIONS:  - Assess bowel function  - Maintain adequate hydration with IV or PO as ordered and tolerated  - Evaluate effectiveness of GI medications  - Encourage mobilization and activity  - Obtain nutritional consult as needed  - Establish a toileting routine/schedule  - Consider collaborating with pharmacy to review patient's medication profile  Outcome: Progressing     Problem: GENITOURINARY - ADULT  Goal: Absence of urinary retention  Description: INTERVENTIONS:  - Assess patient’s ability to void and empty bladder  - Monitor intake/output and perform bladder scan as needed  - Follow urinary retention protocol/standard of care  - Consider collaborating with pharmacy to review patient's medication profile  - Implement strategies to promote bladder emptying  Outcome: Progressing     Problem: METABOLIC/FLUID AND ELECTROLYTES - ADULT  Goal: Glucose maintained within prescribed range  Description: INTERVENTIONS:  - Monitor Blood Glucose as ordered  - Assess for signs and symptoms of hyperglycemia and hypoglycemia  - Administer ordered medications to maintain glucose within target range  - Assess barriers to adequate nutritional intake and initiate nutrition consult as needed  - Instruct patient on self management of diabetes  Outcome: Progressing  Goal: Electrolytes maintained within normal limits  Description: INTERVENTIONS:  - Monitor labs and rhythm and assess patient for signs and symptoms of electrolyte imbalances  - Administer electrolyte replacement as ordered  - Monitor response to electrolyte replacements,  including rhythm and repeat lab results as appropriate  - Fluid restriction as ordered  - Instruct patient on fluid and nutrition restrictions as appropriate  Outcome: Progressing  Goal: Hemodynamic stability and optimal renal function maintained  Description: INTERVENTIONS:  - Monitor labs and assess for signs and symptoms of volume excess or deficit  - Monitor intake, output and patient weight  - Monitor urine specific gravity, serum osmolarity and serum sodium as indicated or ordered  - Monitor response to interventions for patient's volume status, including labs, urine output, blood pressure (other measures as available)  - Encourage oral intake as appropriate  - Instruct patient on fluid and nutrition restrictions as appropriate  Outcome: Progressing     Problem: SKIN/TISSUE INTEGRITY - ADULT  Goal: Incision(s), wounds(s) or drain site(s) healing without S/S of infection  Description: INTERVENTIONS:  - Assess and document risk factors for pressure ulcer development  - Assess and document skin integrity  - Assess and document dressing/incision, wound bed, drain sites and surrounding tissue  - Implement wound care per orders  - Initiate isolation precautions as appropriate  - Initiate Pressure Ulcer prevention bundle as indicated  Outcome: Progressing  Goal: Oral mucous membranes remain intact  Description: INTERVENTIONS  - Assess oral mucosa and hygiene practices  - Implement preventative oral hygiene regimen  - Implement oral medicated treatments as ordered  Outcome: Progressing     Problem: HEMATOLOGIC - ADULT  Goal: Maintains hematologic stability  Description: INTERVENTIONS  - Assess for signs and symptoms of bleeding or hemorrhage  - Monitor labs and vital signs for trends  - Administer supportive blood products/factors, fluids and medications as ordered and appropriate  - Administer supportive blood products/factors as ordered and appropriate  Outcome: Progressing  Goal: Free from bleeding  injury  Description: (Example usage: patient with low platelets)  INTERVENTIONS:  - Avoid intramuscular injections, enemas and rectal medication administration  - Ensure safe mobilization of patient  - Hold pressure on venipuncture sites to achieve adequate hemostasis  - Assess for signs and symptoms of internal bleeding  - Monitor lab trends  - Patient is to report abnormal signs of bleeding to staff  - Avoid use of toothpicks and dental floss  - Use electric shaver for shaving  - Use soft bristle tooth brush  - Limit straining and forceful nose blowing  Outcome: Progressing     Problem: MUSCULOSKELETAL - ADULT  Goal: Return mobility to safest level of function  Description: INTERVENTIONS:  - Assess patient stability and activity tolerance for standing, transferring and ambulating w/ or w/o assistive devices  - Assist with transfers and ambulation using safe patient handling equipment as needed  - Ensure adequate protection for wounds/incisions during mobilization  - Obtain PT/OT consults as needed  - Advance activity as appropriate  - Communicate ordered activity level and limitations with patient/family  Outcome: Progressing     Problem: NEUROLOGICAL - ADULT  Goal: Achieves stable or improved neurological status  Description: INTERVENTIONS  - Assess for and report changes in neurological status  - Initiate measures to prevent increased intracranial pressure  - Maintain blood pressure and fluid volume within ordered parameters to optimize cerebral perfusion and minimize risk of hemorrhage  - Monitor temperature, glucose, and sodium. Initiate appropriate interventions as ordered  Outcome: Progressing     Problem: Patient Centered Care  Goal: Patient preferences are identified and integrated in the patient's plan of care  Description: Interventions:    - Provide timely, complete, and accurate information to patient/family  - Incorporate patient and family knowledge, values, beliefs, and cultural backgrounds into the  planning and delivery of care  - Encourage patient/family to participate in care and decision-making at the level they choose  - Honor patient and family perspectives and choices  Outcome: Progressing       Problem: Diabetes/Glucose Control  Goal: Glucose maintained within prescribed range  Description: INTERVENTIONS:  - Monitor Blood Glucose as ordered  - Assess for signs and symptoms of hyperglycemia and hypoglycemia  - Administer ordered medications to maintain glucose within target range  - Assess barriers to adequate nutritional intake and initiate nutrition consult as needed  - Instruct patient on self management of diabetes  Outcome: Progressing

## 2025-06-26 NOTE — PLAN OF CARE
Problem: RESPIRATORY - ADULT  Goal: Achieves optimal ventilation and oxygenation  Description: INTERVENTIONS:  - Assess for changes in respiratory status  - Assess for changes in mentation and behavior  - Position to facilitate oxygenation and minimize respiratory effort  - Oxygen supplementation based on oxygen saturation or ABGs  - Provide Smoking Cessation handout, if applicable  - Encourage broncho-pulmonary hygiene including cough, deep breathe, Incentive Spirometry  - Assess the need for suctioning and perform as needed  - Assess and instruct to report SOB or any respiratory difficulty  - Respiratory Therapy support as indicated  - Manage/alleviate anxiety  - Monitor for signs/symptoms of CO2 retention  Outcome: Progressing    Received patient on 5lpm hfnc and later when he sleep back on BIPAP 15/5/50%, Given ordered nebs and EZ-PAP, RT will continue to monitor.

## 2025-06-26 NOTE — PROGRESS NOTES
[facility]  Cardiology Progress Note    Jose Alberto Lizama Patient Status:  Inpatient    1965 MRN [id number]   Location [facility] 2W/SW Attending Nannette Pelayo MD   Hosp Day # 7 PCP Fiona Sweeney MD     Subjective     No issues overnight.  Primary complaint is fatigue and feeling tired.    Objective:   Patient Vitals for the past 24 hrs:   BP Temp Temp src Pulse Resp SpO2   25 0800 -- -- -- -- -- 93 %   25 0700 127/63 -- -- 76 26 --   25 0600 128/65 -- -- 74 (!) 27 --   25 0500 145/69 -- -- 91 (!) 28 --   25 0400 115/69 -- -- 95 (!) 29 92 %   25 0300 118/64 -- -- 90 (!) 27 97 %   25 0200 121/74 -- -- 86 (!) 28 97 %   25 0100 127/65 -- -- 89 (!) 30 98 %   25 0000 (!) 113/101 -- -- 89 25 95 %   25 2300 (!) 162/60 -- -- 89 (!) 30 95 %   25 2200 145/69 -- -- 92 (!) 31 100 %   25 2148 132/68 -- -- 90 (!) 28 --   25 2143 (!) 164/83 -- -- 85 26 --   25 2100 121/66 -- -- 82 21 --   25 2000 116/79 98.1 °F (36.7 °C) Temporal (!) 129 (!) 30 --   25 1800 134/75 -- -- 87 18 93 %   25 1700 134/66 -- -- 87 (!) 30 95 %   25 1600 123/62 97.6 °F (36.4 °C) Temporal 86 (!) 30 97 %   25 1500 (!) 110/96 -- -- 84 26 91 %   25 1422 -- -- -- -- -- 93 %   25 1400 112/56 -- -- 77 (!) 34 91 %   25 1300 125/65 -- -- 98 22 92 %   25 1230 133/64 -- -- 108 -- --   25 1215 133/64 -- -- 108 (!) 33 --   25 1200 127/81 98 °F (36.7 °C) Temporal 98 (!) 34 98 %   25 1100 (!) 87/64 -- -- 91 (!) 27 94 %       Intake/Output:   Last 3 shifts:   Intake/Output                   25 0700 - 25 0659 25 0700 - 25 0659 25 0700 - 25 0659       Intake    P.O.  0  --  --    P.O. 0 -- --    I.V.  1026.2  1529.5  --    Volume (mL) Nitroglycerin 492.2 1529.5 --    Volume (mL) (sodium chloride 0.9% infusion) 534 -- --    IV PIGGYBACK  300  700  --     Volume (mL) (piperacillin-tazobactam (Zosyn) 3.375 g in dextrose 5% 100 mL IVPB-ADDV) 100 300 --    Volume (mL) (acetaminophen (Ofirmev) 10 mg/mL infusion premix 1,000 mg) 100 200 --    Volume (mL) (potassium chloride 20 mEq/100mL IVPB premix 20 mEq) 100 200 --    Total Intake 1326.2 2229.5 --       Output    Urine  2060  1850  --    Urine -- 1250 --    Urine Occurrence 1 x -- --    Output (mL) ([REMOVED] Urethral Catheter Double-lumen;Latex;Temperature probe) 2060 600 --    Emesis/NG output  10  --  --    Residual volume (ml) ([REMOVED] NG/OG Tube Orogastric 16 Fr. Center mouth) 10 -- --    Chest Tube  810  230  50    Output (mL) (Y Chest Tube 1 and 2 Anterior Mediastinal 32 Fr. Anterior Pleural 32 Fr.) 750 110 0    Output (mL) (Chest Tube Pericardial 24 Fr.) 60 120 50    Total Output 2880 2080 50       Net I/O     -1553.8 149.5 -50             Vent Settings: FiO2 (%):  [40 %-50 %] 50 %    Scheduled Meds: Scheduled Medications[1]    Continuous Infusions: Medication Infusions[2]    Results:     Lab Results   Component Value Date    WBC 9.3 06/26/2025    HGB 8.2 (L) 06/26/2025    HCT 25.9 (L) 06/26/2025    .0 06/26/2025    CREATSERUM 0.65 (L) 06/26/2025    BUN 16 06/26/2025     06/26/2025    K 3.0 (L) 06/26/2025     06/26/2025    CO2 27.0 06/26/2025     (H) 06/26/2025    CA 8.6 (L) 06/26/2025    ALB 3.4 06/24/2025    ALKPHO 45 06/24/2025    BILT 0.4 06/24/2025    TP 5.5 (L) 06/24/2025     (H) 06/24/2025    ALT 47 06/24/2025    PTT 30.6 06/19/2025    INR 1.17 06/19/2025    TSH 0.696 06/19/2025    PSA 2.62 08/10/2023    ESRML 97 (H) 06/25/2025    MG 2.1 06/26/2025    PHOS 2.9 06/25/2025       Recent Labs   Lab 06/24/25  0408 06/24/25  1323 06/25/25  0542 06/26/25  0243 06/26/25  0510   *  --  201*  --  183*   BUN 16  --  18  --  16   CREATSERUM 0.62*  --  0.74  --  0.65*   CA 8.0*  --  8.5*  --  8.6*     --  138  --  136   K 3.5   < > 3.7  3.7 2.9* 3.0*     --   104  --  102   CO2 26.0  --  25.0  --  27.0    < > = values in this interval not displayed.     Recent Labs   Lab 25  0408 25  0542 25  0510   RBC 3.24* 3.27* 3.26*   HGB 8.4* 8.3* 8.2*   HCT 25.7* 25.6* 25.9*   MCV 79.3* 78.3* 79.4*   MCH 25.9* 25.4* 25.2*   MCHC 32.7 32.4 31.7   RDW 15.7* 15.7* 16.0*   NEPRELIM 3.56 5.53 6.76   WBC 6.3 7.7 9.3   .0 259.0 369.0       No results for input(s): \"BNPML\" in the last 168 hours.    No results for input(s): \"TROP\", \"CK\" in the last 168 hours.    XR ABDOMEN (1 VIEW) (CPT=74018)  Result Date: 2025  CONCLUSION: No acute process. Electronically Verified and Signed by Attending Radiologist: Jl Champion MD 2025 5:33 PM Workstation: [id number]    XR CHEST AP PORTABLE  (CPT=71045)  Result Date: 2025  CONCLUSION: Mild pulmonary edema has progressed. Bilateral lower lobe air space opacities suggestive of atelectasis has progressed. Underlying pneumonia is felt to be less likely but is also in the differential. Electronically Verified and Signed by Attending Radiologist: Refugio Lloyd MD 2025 5:20 PM Workstation: [id number]    XR CHEST AP PORTABLE  (CPT=71045)  Result Date: 2025  CONCLUSION: Lines and tubes in place as described. Mild interstitial edema with small bilateral pleural effusions which is unchanged. There are median sternotomy wires and postoperative changes of CABG. Electronically Verified and Signed by Attending Radiologist: Refugio Lloyd MD 2025 3:19 PM Workstation: [id number]        CARD ECHO 2D DOPPLER CONTRAST (CPT=93306)  Result Date: 2025  Transthoracic Echocardiogram Name:Jose Alberto Lizama Date: 2025 :  1965 Ht:  (66in)  BP: 100 / 62 MRN:  [id number]    Age:  59years    Wt:  (215lb) HR: 73bpm Loc:  [facility]       Gndr: M          BSA: 2.06m^2 Sonographer: Svetlana CARVALHO Ordering:    Ivanna Lewis Consulting:  Chivo Solares  ---------------------------------------------------------------------------- History/Indications:   Post-Op CABG, AVR. ---------------------------------------------------------------------------- Procedure information:  A transthoracic complete 2D study was performed. Additional evaluation included M-mode, complete spectral Doppler, and color Doppler.  Patient status:  Inpatient.  Location:  Bedside.    Comparison was made to the study of 08/10/2019.    This was a routine study. Transthoracic echocardiography for diagnosis. Image quality was adequate. The study was technically limited due to poor acoustic window availability, restricted patient mobility, surgical dressings, patient supine, and patient on ventilator. Intravenous contrast (Definity) was administered to evaluate left ventricular function and enhance regional wall motion assessment. ECG rhythm:   Normal sinus ---------------------------------------------------------------------------- Conclusions: 1. Left ventricle: The cavity size was normal. Wall thickness was normal.    Systolic function was mildly to moderately reduced. The estimated    ejection fraction was 30-35%, by visual assessment. Features are    consistent with a pseudonormal left ventricular filling pattern, with    concomitant abnormal relaxation and increased filling pressure - grade 2    diastolic dysfunction. 2. Right ventricle: Pacer wire noted in the right ventricle. Systolic    function was reduced. 3. Left atrium: The atrium was mildly dilated. 4. Aortic valve: A bioprosthetic valve is present. The peak systolic    velocity was 2.51m/sec. The mean systolic gradient was 12mm Hg. The valve    area (VTI) was 1.12cm^2. The valve area (VTI) index was 0.54cm^2/m^2. 5. Pericardium, extracardiac: There was no pericardial effusion. * ---------------------------------------------------------------------------- * Findings: Left ventricle:  The cavity size was normal. Wall thickness was normal.  Systolic function was mildly to moderately reduced. The estimated ejection fraction was 30-35%, by visual assessment. No diagnostic evidence for diffuse regional wall motion abnormalities. Features are consistent with a pseudonormal left ventricular filling pattern, with concomitant abnormal relaxation and increased filling pressure - grade 2 diastolic dysfunction. Left atrium:  Well visualized. The atrium was mildly dilated. Right ventricle:  The cavity size was at the upper limits of normal. Pacer wire noted in the right ventricle. Systolic function was reduced. Right atrium:  Well visualized. The atrium was normal in size. Mitral valve:  Well visualized. The valve was structurally normal. The leaflets were normal thickness. Leaflet separation was normal. No evidence for prolapse.  Doppler:  Transvalvular velocity was within the normal range. There was no evidence for stenosis. There was no significant regurgitation. Aortic valve:  Well visualized. A bioprosthetic valve is present. The leaflets were normal thickness. Cusp separation was normal.  Doppler: Transvalvular velocity was increased. There was no evidence for stenosis. There was no significant regurgitation.    The valve area (VTI) was 1.12cm^2. The valve area (VTI) index was 0.54cm^2/m^2.    The mean systolic gradient was 12mm Hg. The peak systolic gradient was 25mm Hg. Tricuspid valve:  Well visualized. The annulus is normal-sized. The valve is structurally normal. The leaflets are normal thickness. Leaflet separation was normal. No echocardiographic evidence for tricuspid prolapse.  Doppler: Transvalvular velocity was within the normal range. There was no evidence for stenosis. There was no significant regurgitation. Pulmonic valve:   Well visualized. The annulus is normal-sized. The valve is structurally normal. The leaflets are normal thickness. Cusp separation was normal. No evidence for prolapse.  Doppler:  Transvalvular velocity was within the  normal range. There was no evidence for stenosis. There was no significant regurgitation. Pericardium:   There was no pericardial effusion. Pleura:  No evidence of pleural fluid accumulation. Aorta: Aortic root: The aortic root was normal-sized. The aortic root was normal. Ascending aorta: The ascending aorta was normal. The ascending aorta was normal. Pulmonary arteries: The main pulmonary artery was normal-sized.  Systolic pressure could not be accurately estimated. Systemic veins:  Central venous respirophasic diameter changes are blunted (< 50%). Inferior vena cava: The IVC was normally collapsible and normal-sized. The IVC was dilated. ---------------------------------------------------------------------------- Measurements  Left ventricle                  Value          Ref  IVS thickness, ED, PLAX     (H) 1.1   cm       0.6 - 1.0  LV ID, ED, PLAX                 5.6   cm       4.2 - 5.8  LV ID, ES, PLAX             (H) 4.7   cm       2.5 - 4.0  LV PW thickness, ED, PLAX       0.9   cm       0.6 - 1.0  IVS/LV PW ratio, ED, PLAX       1.22           ---------  LV PW/LV ID ratio, ED, PLAX     0.16           ---------  LV ejection fraction        (L) 34    %        52 - 72  Stroke volume/bsa, 2D           21    ml/m^2   ---------  LV e', lateral              (L) 4.3   cm/sec   >=10.0  LV E/e', lateral            (H) 29             <=13  LV e', medial               (L) 3.1   cm/sec   >=7.0  LV E/e', medial                 41             ---------  LV e', average                  3.7   cm/sec   ---------  LV E/e', average            (H) 34             <=14  LVOT                            Value          Ref  LVOT ID                         1.9   cm       ---------  LVOT peak velocity, S           1.03  m/sec    ---------  LVOT VTI, S                     15.1  cm       ---------  LVOT peak gradient, S           4     mm Hg    ---------  LVOT mean gradient, S           2     mm Hg    ---------  Stroke volume (SV),  LVOT DP     43    ml       ---------  Stroke index (SV/bsa), LVOT     21    ml/m^2   ---------  DP  Aortic valve                    Value          Ref  Aortic valve peak velocity,     2.51  m/sec    ---------  S  Aortic valve VTI, S             38.1  cm       ---------  Aortic mean gradient, S         12    mm Hg    ---------  Aortic peak gradient, S         25    mm Hg    ---------  Aortic valve area, VTI          1.12  cm^2     ---------  Aortic valve area/bsa, VTI      0.54  cm^2/m^2 ---------  Velocity ratio, peak,           0.41           ---------  LVOT/AV  Ascending aorta                 Value          Ref  Ascending aorta ID              2.8   cm       2.2 - 3.8  Left atrium                     Value          Ref  LA volume, S                (H) 69    ml       18 - 58  LA volume/bsa, S                33    ml/m^2   16 - 34  LA volume, ES, 1-p A4C      (H) 78    ml       18 - 58  LA volume, ES, 1-p A2C          56    ml       18 - 58  LA volume, ES, A/L              71    ml       ---------  LA volume/bsa, ES, A/L      (H) 35    ml/m^2   16 - 34  Mitral valve                    Value          Ref  Mitral E-wave peak velocity     1.26  m/sec    ---------  Mitral A-wave peak velocity     1.14  m/sec    ---------  Mitral deceleration time        229   ms       ---------  Mitral peak gradient, D         6     mm Hg    ---------  Mitral E/A ratio, peak          1.1            ---------  Systemic veins                  Value          Ref  Estimated CVP                   15    mm Hg    ---------  Inferior vena cava              Value          Ref  ID                          (H) 2.7   cm       <=2.1  Right ventricle                 Value          Ref  TAPSE, 2D                   (L) 1.41  cm       >=1.70  TAPSE, MM                   (L) 1.41  cm       >=1.70  RV s', lateral              (L) 6.2   cm/sec   >=9.5 Legend: (L)  and  (H)  steven values outside specified reference range.  ---------------------------------------------------------------------------- Prepared and electronically signed by Massimo Deluna 06/23/2025 18:00        Exam:     General: Alert and oriented x 3. No apparent distress.   HEENT: Normocephalic, anicteric sclera, neck supple, no thyromegaly or adenopathy.  Neck: No JVD, carotids 2+, no bruits.  Cardiac: Regular rate and rhythm. S1, S2 normal. No murmur, pericardial rub, S3, or extra cardiac sounds.  Lungs: Clear without wheezes, rales, rhonchi or dullness.  Normal excursions and effort.  Abdomen: Soft, non-tender. No organosplenomegally, mass or rebound, BS-present.  Extremities: Without clubbing or cyanosis. No edema.    Neurologic: Alert and oriented, normal affect. No focal defects  Skin: Warm and dry.     Assessment and Plan:   Assessment   Severe AS, CAD status post CABG X2 and SAVR,  MELINDA clip 6/19/25 (LIMA-LAD, VG-D; Inspiris #21  tissue valve, #40 MELINDA clip)  Ischemic dermopathy, EF 30%  Anemia  Hypotension now resolved now hypertensive.  Hyperlipidemia  Diabetes  Peripheral vascular disease        Plan:  Repeat echo shows EF 35%, intact bioprosthetic valve  Continue amiodarone, aspirin, Plavix, statin  Continue IV Lasix  Continue Cleviprex and nitroglycerin drips while unable to swallow, pending repeat speech evaluation today         Attila Arenas MD  [facility]  6/26/2025         [1]    potassium chloride  20 mEq Intravenous BID    sodium chloride   Intravenous Once    metoprolol  10 mg Intravenous Q6H    escitalopram  5 mg Oral Nightly    furosemide  40 mg Intravenous TID    insulin degludec  20 Units Subcutaneous Daily    colchicine  0.6 mg Oral BID    OLANZapine  2.5 mg Oral Nightly    ferrous sulfate  300 mg Oral BID    insulin regular human  1-5 Units Subcutaneous 4 times per day    aspirin  81 mg Per NG Tube Daily    docusate  100 mg Per NG Tube BID    acetaminophen  1,000 mg Intravenous Q6H    ipratropium-albuterol  3 mL  Nebulization TID    piperacillin-tazobactam  3.375 g Intravenous Q8H    budesonide  0.5 mg Nebulization 2 times daily    rosuvastatin  20 mg Per NG Tube Nightly    thiamine  100 mg Oral Daily    multivitamin  1 tablet Oral Daily    folic acid  1 mg Oral Daily    sugammadex  2 mg/kg Intravenous Once    sennosides  8.6 mg Oral BID    famotidine  20 mg Oral BID    Or    famotidine  20 mg Intravenous BID    mupirocin  1 Application Nasal BID    chlorhexidine gluconate  15 mL Mouth/Throat BID    ascorbic acid  500 mg Oral TID    enoxaparin  40 mg Subcutaneous Daily    clopidogrel  75 mg Oral Daily    tamsulosin  0.4 mg Oral BID   [2]    clevidipine 5 mg/hr (06/26/25 0611)    dextrose 10%      nitroGLYCERIN in dextrose 5% 120 mcg/min (06/26/25 0305)    norepinephrine Stopped (06/23/25 1642)    sodium chloride 10 mL/hr at 06/24/25 2000    dexmedetomidine Stopped (06/24/25 1200)

## 2025-06-26 NOTE — PHYSICAL THERAPY NOTE
PHYSICAL THERAPY TREATMENT NOTE - INPATIENT     Room Number: 233/233-A       Presenting Problem: elective CABGx2 with post op withdrawal/complications       Problem List  Active Problems:    Aortic stenosis    S/P CABG (coronary artery bypass graft)    Episodic mood disorder    Delirium due to another medical condition    Angina concurrent with and due to arteriosclerosis of CABG    Pre-op evaluation      PHYSICAL THERAPY ASSESSMENT   Patient demonstrates fair progress this session, goals  remain in progress.      Patient is requiring maximum assist and dependent as a result of the following impairments: decreased functional strength, decreased endurance/aerobic capacity, pain, impaired sitting and standing balance, impaired motor planning, decreased muscular endurance, cognitive deficits (poor memory, confused (thinks he was walking yesterday), lethargy), medical status, and increased O2 needs from baseline.     Patient continues to function below baseline with bed mobility, transfers, gait, stair negotiation, maintaining seated position, standing prolonged periods, and performing household tasks.  Next session anticipate patient to progress bed mobility, transfers, maintaining seated position, and standing prolonged periods.  Physical Therapy will continue to follow patient for duration of hospitalization.    Patient continues to benefit from continued skilled PT services: to facilitate return to prior level of function as patient demonstrates high motivation with excellent tolerance to an intensive therapy program .    PLAN DURING HOSPITALIZATION  Nursing Mobility Recommendation : Lift Equipment     PT Treatment Plan: Bed mobility, Endurance, Energy conservation, Patient education, Family education, Gait training, Strengthening, Range of motion, Transfer training, Balance training  Frequency (Obs): 5x/week     SUBJECTIVE  \"I was walking yesterday\"    OBJECTIVE  Precautions: Drain(s),  needed, Sternal  (Polish speaking)    WEIGHT BEARING RESTRICTION       PAIN ASSESSMENT   Rating: Unable to rate  Location: reports pain at incision site  Management Techniques: Activity promotion, Breathing techniques, Relaxation, Repositioning, Other (Comment) (use of pillow for support)    BALANCE  Static Sitting: Poor +  Dynamic Sitting: Poor  Static Standing: Not tested  Dynamic Standing: Not tested    ACTIVITY TOLERANCE  Pulse: 93  Heart Rate Source: Monitor     BP: 155/50  BP Location: Other (Comment) (arterial reading)  BP Method: Automatic  Patient Position: Sitting     O2 WALK       AM-PAC '6-Clicks' INPATIENT SHORT FORM - BASIC MOBILITY  How much difficulty does the patient currently have...  Patient Difficulty: Turning over in bed (including adjusting bedclothes, sheets and blankets)?: A Lot   Patient Difficulty: Sitting down on and standing up from a chair with arms (e.g., wheelchair, bedside commode, etc.): A Lot   Patient Difficulty: Moving from lying on back to sitting on the side of the bed?: A Lot   How much help from another person does the patient currently need...   Help from Another: Moving to and from a bed to a chair (including a wheelchair)?: Total   Help from Another: Need to walk in hospital room?: Total   Help from Another: Climbing 3-5 steps with a railing?: Total     AM-PAC Score:  Raw Score: 9   Approx Degree of Impairment: 81.38%   Standardized Score (AM-PAC Scale): 30.55   CMS Modifier (G-Code): CM    FUNCTIONAL ABILITY STATUS  Functional Mobility/Gait Assessment  Gait Assistance: Not tested (pt able to stand for 45 sec,unable to march in place)  Rolling: maximum assist  Supine to Sit: dependent  Sit to Supine: dependent  Sit to Stand: max A x 2    Skilled Therapy Provided: TATYANA Leary approves participation in therapy session. Patient is now on a modified diet but still speaking very quietly and wife needing to listen and and then conveying this to . Polish  #[id number] video   used throughout session. Session coordinated with OT and for transfer with RN as well. Patient is more awake and alert but still confused, thinks he was walking  yesterday and has NO AWARENESS of any sternal precautions. He needs repetition of directions and hand over hand at times. He is able to stand from chair with max A x 2 but unable to march in place or use RW to transfer. He is able to stand about 45 sec with 2-person assist. He is assisted back to be in overhead lift with RN and OT assisting to manage lines. Education to pt and wife regarding LE exs throughout the day and deep breathing as well as use of pillow. He is in bed and positioned to comfort, all needs in reach and wife in room. RN aware    The patient's Approx Degree of Impairment: 81.38% has been calculated based on documentation in the [facility] '6 clicks' Inpatient Daily Activity Short Form.  Research supports that patients with this level of impairment may benefit from LTAC. However, given complicated medical course and that he is significantly below his normal baseline would recommend ACUTE rehab/IR. Final disposition will be made by interdisciplinary medical team.    THERAPEUTIC EXERCISES  Lower Extremity Alternating marching  Ankle pumps  Glut sets  Heel raises  LAQ  Quad sets  Toe raises     Position Sitting & Standing       Patient End of Session: In bed, With  staff, Needs met, Call light within reach, RN aware of session/findings, Bracing education provided per handout, All patient questions and concerns addressed, Hospital anti-slip socks, Family present, Discussed recommendations with /    CURRENT GOALS     Goals to be met by: 7/15/25  Patient Goal Patient's self-stated goal is: not stated -wife states it is for him to get back to normal   Goal #1 Patient is able to demonstrate supine - sit EOB @ level: moderate assistance      Goal #1   Current Status  max A x 2 to dependent   Goal #2 Patient is able  to demonstrate transfers Sit to/from Stand at assistance level: moderate assistance with walker - rolling      Goal #2  Current Status  max A x 2   Goal #3 Patient is able to ambulate 25 feet with assist device: walker - rolling at assistance level: moderate assistance   Goal #3   Current Status  NT   Goal #4 Patient will negotiate bed to/from chair transfers with RW with moderate assistance   Goal #4   Current Status  in progress   Goal #5 Patient to demonstrate independence with home activity/exercise instructions provided to patient in preparation for discharge.   Goal #5   Current Status  in progress   Goal #6     Goal #6  Current Status         Therapeutic Activity: 29 minutes

## 2025-06-26 NOTE — SLP NOTE
ADULT SWALLOWING RE EVALUATION    ASSESSMENT    ASSESSMENT/OVERALL IMPRESSION:  PPE REQUIRED. THIS THERAPIST WORE GLOVES FOR DURATION OF EVALUATION. HANDS WASHED UPON ENTRANCE/EXIT.    A swallow re evaluation warranted as pt remains NPO. Pt afebrile with weak vocal quality, on 5L/HF, with oxygen saturation at 93%. Pt with no hx of dysphagia at [facility].   Pt positioned 90 degrees in bedside chair, alert/cooperative. Pt with no complaints of pain. Oral motor examination revealed reduced strength, ROM, and rate of motion. Pt presented with trials of puree and mildly thick liquids via tsp. Pt with adequate oral acceptance and bilabial seal across all trials. Pt with reduce bolus formation/propulsion. Pt's swallow response appears delayed with reduced hyolaryngeal elevation/excursion. No clinical signs of aspiration (e.g., immediate/delayed throat clear, immediate/delayed cough, wet vocal quality, increased O2 effort) observed across all trials. 6/25 CXR indicates \"Mild pulmonary edema has progressed. Bilateral lower lobe air space opacities suggestive of atelectasis has progressed. Underlying pneumonia is felt to be less likely but is also in the differential\". Oxygen status remained stable t/o the entire evaluation.     At this time, pt presents with mild oral dysphagia and probable pharyngeal dysfunction. Recommend a puree diet and mildly thick liquids with strict adherence to safe swallowing compensatory strategies. Results and recommendations reviewed with RN, pt. Pt v/u to all results/recommendations, no family present. Recommendations remain written on whiteboard. SLP collaborated with RN for MD diet orders.     PLAN: SLP to f/u x2-3 meal assessments, monitor imaging, and VFSS if clinically indicated       RECOMMENDATIONS   Diet Recommendations - Solids: Puree  Diet Recommendations - Liquids: Nectar thick liquids/ Mildly thick                       Aspiration Precautions: Upright position, Slow rate, Small bites,  Small sips, No straw  Medication Administration Recommendations: Crushed in puree  Treatment Plan/Recommendations: Aspiration precautions    HISTORY   MEDICAL HISTORY  Reason for Referral:  (s/p prolonged intubation)    Problem List  Active Problems:    Aortic stenosis    Hx of CABG    Episodic mood disorder    Delirium due to another medical condition    Angina concurrent with and due to arteriosclerosis of CABG    Pre-op evaluation      Past Medical History  Past Medical History[1]    Prior Living Situation: Home with spouse  Diet Prior to Admission: Regular, Thin liquids  Precautions: Aspiration    Patient/Family Goals: eat/drink    SWALLOWING HISTORY  Current Diet Consistency: NPO  Dysphagia History: none  Imaging Results: 6/20 CT BRAIN  CONCLUSION:   1. Small soft tissue/scalp hematoma at the posterior vertex with no underlying calvarial fracture or acute intracranial process.   2. Small to moderate sized areas of low-attenuation in the subcortical and periventricular white matter most compatible with chronic ischemic white matter changes.   3. Mild to moderate paranasal sinus disease.          SUBJECTIVE       OBJECTIVE   ORAL MOTOR EXAMINATION  Dentition: Functional  Symmetry: Within Functional Limits  Strength: Overall reduced     Range of Motion: Overall reduced  Rate of Motion: Reduced    Voice Quality: Weak  Respiratory Status: Nasal cannula, Supplemental O2  Consistencies Trialed: Nectar thick liquids, Puree  Method of Presentation: Staff/Clinician assistance, Spoon  Patient Positioned: Upright, Midline, Bedside chair    Oral Phase of Swallow: Impaired  Bolus Retrieval: Intact  Bilabial Seal: Intact  Bolus Formation: Impaired  Bolus Propulsion: Impaired     Retention: Intact    Pharyngeal Phase of Swallow: Appears Impaired  Laryngeal Elevation Timing: Appears impaired  Laryngeal Elevation Strength: Appears impaired  Laryngeal Elevation Coordination: Appears intact  (Please note: Silent aspiration cannot  be evaluated clinically. Videofluoroscopic Swallow Study is required to rule-out silent aspiration.)    Esophageal Phase of Swallow: No complaints consistent with possible esophageal involvement  Comments: NA          GOALS  Goal #1 The patient will tolerate puree consistency and mildly thick liquids without overt signs or symptoms of aspiration with 100 % accuracy over 1-2 session(s).  In Progress   Goal #2 The patient/family/caregiver will demonstrate understanding and implementation of aspiration precautions and swallow strategies independently over 1-2 session(s).    In Progress   Goal #3 The patient will tolerate trial upgrade of soft/hard solid consistency and thin liquids without overt signs or symptoms of aspiration with 100 % accuracy over 1-2 session(s).  In Progress   Goal #4 The patient will utilize compensatory strategies as outlined by  BSSE (clinical evaluation) including Slow rate, Small bites, Small sips, Multiple swallows, No straws, Upright 90 degrees, Upright 90 degrees 30 mins after meal, Eliminate distractions, Supervision with meals with PRN assistance 100 % of the time across 2 sessions.    In Progress     FOLLOW UP  Treatment Plan/Recommendations: Aspiration precautions  Duration: 1 week  Follow Up Needed (Documentation Required): Yes  SLP Follow-up Date: 06/27/25    Thank you for your referral.   If you have any questions, please contact JAERD Parker M.S. CCC-SLP  Speech Language Pathologist  Phone Number Ext. [phone]         [1]   Past Medical History:   Aortic stenosis    Back problem    BPH (benign prostatic hyperplasia)    Cardiomyopathy (HCC)    Coronary atherosclerosis    Depression    Diabetes (HCC)    Esophageal reflux    Heart valve disease    High blood pressure    High cholesterol    Peripheral vascular disease    Shortness of breath    related to smoking     Visual impairment    glasses

## 2025-06-26 NOTE — CM/SW NOTE
CM received PT/OT recommendation for AIR. CM sent tentative AIR referrals via [facility]. Awaiting responses. CM/SW to follow up.     DC PLAN: TBD-Possible AIR vs Home with [facility]-If AIR, will need list/choice, pending course of stay, progress with therapy, and medical clearance    / to remain available for support and/or discharge planning     Melissa ROBLES RN   Nurse    [phone]

## 2025-06-26 NOTE — PROGRESS NOTES
Patient is a 59 year old   male with past medical history of CAD, PVD,  hypertension, hyperlipidemia, diabetes, severe aortic stenosis. who was admitted to the hospital for a scheduled coronary artery bypass graft surgery, aortic valve replacement on 6/19. The patient is intubated and sedated with propofol and precedex. Otherwise the patient has been demonstrating agitation and confusion when weaning from mechanical ventilation is attempted.   Patient indicated for psych consult for evaluation and advise.    Consult Duration     The patient seen for over 50-minute, follow-up evaluation, over 50% counseling and coordinating care addressing agitation and confusion during SBT.  Record reviewed, communication with attending, communication with RN and patient seen face to face evaluation.     History of Present Illness:     In communication with the team the patient has been recovering without any events occurring overnight. He reportedly feels very tired and wants to sleep.  The patient seen today sitting upright in his chair and was noted to be alert and oriented and attentive to the conversation.  Language line was used for interpretation today, and the patient had difficulty speaking and his voice was noted to be weak at this time.    The patient communicated that he feels good now and denies low mood. He denies ever having a history of depression.  He denies drinking heavily or using any drugs in the recent past.  Patient states he is looking forward to his family coming to see him later.    Patient otherwise continue demonstrating withdrawn behavior, limited engagement, lack of energy, lack of motivation, soft low-tone speech with limited interest in activities.    Otherwise the patient has been demonstrating improvement in his cognitive function, orientation, and has not demonstrated episodes of agitation since he was extubated on 6/24.  Per RN the patient continues to be confused, and is not  fully oriented to place and condition.    No report of history of suicide or homicidal ideation or action.  No history of hallucination, psychosis or manic episode.    Collateral obtained from the patient's wife and daughter.   His daughter states that he drinks 3-5 drinks of Brandon walker on the weekend socially, with a hx of a \"little bit\" of depression and anxiety. 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 The patient's daughter states he quit smoking cigarettes 3 years ago and now vapes instead. She denied any family history of dementia.      Past Psychiatric/Medication History:  1. Prior diagnoses: none reported  2. Past psychiatric inpatient: none reported  3. Past outpatient history: none reported  4. Past suicide history: none reported  5. Medication history: none reported    Social History:   The patient is a 59 yr old polish speaking  white male with one adult daughter. He is reported to have 3-5 drinks every weekend socially, does not use illicit drugs and now vapes nicotine daily after quitting cigarettes 3 years ago.     Family History:  Father, paternal aunt and uncle all have hx of diabetes.  Medical History:   Past Medical History  Past Medical History:    Aortic stenosis    Back problem    BPH (benign prostatic hyperplasia)    Cardiomyopathy (HCC)    Coronary atherosclerosis    Depression    Diabetes (HCC)    Esophageal reflux    Heart valve disease    High blood pressure    High cholesterol    Peripheral vascular disease    Shortness of breath    related to smoking     Visual impairment    glasses       Past Surgical History  Past Surgical History:   Procedure Laterality Date    Cardiac catheterization  2016    [facility]    Cath drug eluting stent      Colonoscopy  04/22/2024    Colonoscopy N/A 4/22/2024    Procedure: COLONOSCOPY;  Surgeon: Attila Li MD;  Location: [facility] ENDOSCOPY     Esophagoscopy,diagnostic  04/22/2024    Dr. iL    Hernia surgery      as a child    Nasal scopy,remv part ethmoid  10/14/2020    Nasal scopy,rmv tiss maxill sinus  10/14/2020    Repair of nasal septum  10/14/2020       Family History  Family History   Problem Relation Age of Onset    Diabetes Father     Diabetes Paternal Aunt     Diabetes Paternal Uncle     Seizure Disorder Mother     No Known Problems Brother     No Known Problems Daughter     Glaucoma Neg     Macular degeneration Neg        Social History  Social History     Socioeconomic History    Marital status:    Tobacco Use    Smoking status: Former     Current packs/day: 1.00     Average packs/day: 1 pack/day for 20.0 years (20.0 ttl pk-yrs)     Types: Cigarettes    Smokeless tobacco: Never   Vaping Use    Vaping status: Every Day    Substances: Nicotine   Substance and Sexual Activity    Alcohol use: Yes     Comment: 1-2 drinks a week    Drug use: No           Current Medications:  Current Facility-Administered Medications   Medication Dose Route Frequency    potassium chloride 20 mEq/100mL IVPB premix 20 mEq  20 mEq Intravenous BID    sodium chloride 0.9% infusion   Intravenous Once    metoprolol (Lopressor) 5 mg/5mL injection 10 mg  10 mg Intravenous Q6H    escitalopram (Lexapro) tab 5 mg  5 mg Oral Nightly    amiodarone (Pacerone) tab 200 mg  200 mg Oral Daily    [START ON 6/27/2025] colchicine tab 0.6 mg  0.6 mg Oral Daily    furosemide (Lasix) 10 mg/mL injection 40 mg  40 mg Intravenous TID    insulin degludec (Tresiba) 100 units/mL flextouch 20 Units  20 Units Subcutaneous Daily    clevidipine (Cleviprex) 25 MG/50ML IV infusion  1-21 mg/hr Intravenous Continuous    OLANZapine (ZyPREXA) tab 2.5 mg  2.5 mg Oral Nightly    ferrous sulfate 300 (60 Fe) MG/5ML oral syrup 300 mg  300 mg Oral BID    insulin regular human (Novolin R, Humulin R) 100 UNIT/ML injection 1-5 Units  1-5 Units Subcutaneous 4 times per day    aspirin chewable tab 81 mg   81 mg Per NG Tube Daily    dextrose 10% infusion (TPN no rate)   Intravenous Continuous PRN    sodium bicarbonate tab 650 mg  650 mg Per G Tube PRN    And    pancrelipase (Lip-Prot-Amyl) (Zenpep) DR particles cap 10,000 Units  10,000 Units Per G Tube PRN    docusate (Colace) 50 MG/5ML oral solution 100 mg  100 mg Per NG Tube BID    nitroGLYCERIN in dextrose 5% 50 mg/250mL infusion premix  5-200 mcg/min Intravenous Continuous    acetaminophen (Ofirmev) 10 mg/mL infusion premix 1,000 mg  1,000 mg Intravenous Q6H    ipratropium-albuterol (Duoneb) 0.5-2.5 (3) MG/3ML inhalation solution 3 mL  3 mL Nebulization TID    norepinephrine (Levophed) 8 mg in dextrose 5% 250 mL infusion  0.5-8 mcg/min Intravenous Continuous    ipratropium-albuterol (Duoneb) 0.5-2.5 (3) MG/3ML inhalation solution 3 mL  3 mL Nebulization Q4H PRN    piperacillin-tazobactam (Zosyn) 3.375 g in dextrose 5% 100 mL IVPB-ADDV  3.375 g Intravenous Q8H    budesonide (Pulmicort) 0.5 MG/2ML nebulizer suspension 0.5 mg  0.5 mg Nebulization 2 times daily    rosuvastatin (Crestor) tab 20 mg  20 mg Per NG Tube Nightly    LORazepam (Ativan) tab 1 mg  1 mg Oral Q1H PRN    Or    LORazepam (Ativan) 2 mg/mL injection 1 mg  1 mg Intravenous Q1H PRN    LORazepam (Ativan) tab 2 mg  2 mg Oral Q1H PRN    Or    LORazepam (Ativan) 2 mg/mL injection 2 mg  2 mg Intravenous Q1H PRN    LORazepam (Ativan) tab 3 mg  3 mg Oral Q1H PRN    Or    LORazepam (Ativan) 2 mg/mL injection 3 mg  3 mg Intravenous Q1H PRN    LORazepam (Ativan) 2 mg/mL injection 4 mg  4 mg Intravenous Q30 Min PRN    LORazepam (Ativan) 2 mg/mL injection 5 mg  5 mg Intravenous Q15 Min PRN    LORazepam (Ativan) 2 mg/mL injection 6 mg  6 mg Intravenous Q10 Min PRN    thiamine (Vitamin B1) tab 100 mg  100 mg Oral Daily    multivitamin (Tab-A-Елена/Beta Carotene) tab 1 tablet  1 tablet Oral Daily    folic acid (Folvite) tab 1 mg  1 mg Oral Daily    haloperidol lactate (Haldol) 5 MG/ML injection 5 mg  5 mg  Intramuscular Q6H PRN    LORazepam (Ativan) 2 mg/mL injection 1 mg  1 mg Intramuscular Q6H PRN    haloperidol lactate (Haldol) 5 MG/ML injection 2 mg  2 mg Intravenous Q4H PRN    sugammadex (Bridion) 200 MG/2ML injection 180 mg  2 mg/kg Intravenous Once    sodium chloride 0.9% infusion   Intravenous Continuous    melatonin tab 3 mg  3 mg Oral Nightly PRN    sennosides (Senokot) tab 8.6 mg  8.6 mg Oral BID    polyethylene glycol (PEG 3350) (Miralax) 17 g oral packet 17 g  17 g Oral Daily PRN    bisacodyl (Dulcolax) 10 MG rectal suppository 10 mg  10 mg Rectal Daily PRN    famotidine (Pepcid) tab 20 mg  20 mg Oral BID    Or    famotidine (Pepcid) 20 mg/2mL injection 20 mg  20 mg Intravenous BID    dexmedeTOMIDine in sodium chloride 0.9% (Precedex) 400 mcg/100mL infusion premix  0.2-1.5 mcg/kg/hr (Dosing Weight) Intravenous Continuous    potassium chloride 20 mEq/100mL IVPB premix 20 mEq  20 mEq Intravenous PRN    Or    potassium chloride 40 mEq/100mL IVPB premix (central line) 40 mEq  40 mEq Intravenous PRN    magnesium sulfate in dextrose 5% 1 g/100mL infusion premix 1 g  1 g Intravenous PRN    magnesium sulfate in sterile water for injection 2 g/50mL IVPB premix 2 g  2 g Intravenous PRN    mupirocin (Bactroban) 2% nasal ointment 1 Application  1 Application Nasal BID    chlorhexidine gluconate (Peridex) 0.12 % oral solution 15 mL  15 mL Mouth/Throat BID    ascorbic acid (Vitamin C) tab 500 mg  500 mg Oral TID    enoxaparin (Lovenox) 40 MG/0.4ML SUBQ injection 40 mg  40 mg Subcutaneous Daily    acetaminophen (Tylenol) tab 650 mg  650 mg Oral Q4H PRN    Or    HYDROcodone-acetaminophen (Norco) 5-325 MG per tab 1 tablet  1 tablet Oral Q4H PRN    morphINE PF 2 MG/ML injection 2 mg  2 mg Intravenous Q2H PRN    clopidogrel (Plavix) tab 75 mg  75 mg Oral Daily    glucose (Dex4) 15 GM/59ML oral liquid 15 g  15 g Oral Q15 Min PRN    Or    glucose (Glutose) 40% oral gel 15 g  15 g Oral Q15 Min PRN    Or    glucose-vitamin C  (Dex-4) chewable tab 4 tablet  4 tablet Oral Q15 Min PRN    Or    dextrose 50% injection 50 mL  50 mL Intravenous Q15 Min PRN    Or    glucose (Dex4) 15 GM/59ML oral liquid 30 g  30 g Oral Q15 Min PRN    Or    glucose (Glutose) 40% oral gel 30 g  30 g Oral Q15 Min PRN    Or    glucose-vitamin C (Dex-4) chewable tab 8 tablet  8 tablet Oral Q15 Min PRN    tamsulosin (Flomax) cap 0.4 mg  0.4 mg Oral BID     Medications Prior to Admission   Medication Sig    ENTRESTO 24-26 MG Oral Tab Entresto 24 mg-26 mg tablet, [RxNorm: 4223680]    magnesium 250 MG Oral Tab Take 1 tablet (250 mg total) by mouth every other day.    cyanocobalamin 500 MCG Oral Tab Take 1 tablet (500 mcg total) by mouth every other day.    metFORMIN HCl 1000 MG Oral Tab Take 1 tablet (1,000 mg total) by mouth 2 (two) times daily with meals.    amLODIPine 5 MG Oral Tab Take 1 tablet (5 mg total) by mouth daily.    carvedilol 25 MG Oral Tab Take 1 tablet (25 mg total) by mouth 2 (two) times daily with meals.    clopidogrel 75 MG Oral Tab Take 1 tablet (75 mg total) by mouth daily.    empagliflozin (JARDIANCE) 25 MG Oral Tab Take 1 tablet (25 mg total) by mouth daily.    furosemide 40 MG Oral Tab Take 1 tablet (40 mg total) by mouth daily.    rosuvastatin 20 MG Oral Tab Take 1 tablet (20 mg total) by mouth nightly.    tamsulosin 0.4 MG Oral Cap TAKE 2 CAPSULES BY MOUTH DAILY (Patient taking differently: Take 1 capsule (0.4 mg total) by mouth in the morning and 1 capsule (0.4 mg total) before bedtime.)    aspirin 81 MG Oral Tab EC Take 1 tablet (81 mg total) by mouth nightly.    Thiamine HCl 250 MG Oral Tab Take 1 tablet (250 mg total) by mouth every other day.    pyridoxine 100 MG Oral Tab Take 1 tablet (100 mg total) by mouth every other day.    Glucose Blood (CONTOUR NEXT TEST) In Vitro Strip To test 2 x daily  Dx E11.9    Glucose Blood (CONTOUR NEXT TEST) In Vitro Strip To check glucose bid   Dx  E11.9    Glucose Blood (LORAINE CONTOUR TEST) In Vitro Strip  To test daily    LORAINE CONTOUR NEXT TEST In Vitro Strip To test 2 x daily       Allergies  No Known Allergies    Review of Systems:   As by Admitting/Attending    Results:   Laboratory Data:  Lab Results   Component Value Date    WBC 9.3 06/26/2025    HGB 8.2 (L) 06/26/2025    HCT 25.9 (L) 06/26/2025    .0 06/26/2025    CREATSERUM 0.65 (L) 06/26/2025    BUN 16 06/26/2025     06/26/2025    K 3.0 (L) 06/26/2025     06/26/2025    CO2 27.0 06/26/2025     (H) 06/26/2025    CA 8.6 (L) 06/26/2025    ALB 3.4 06/24/2025    ALKPHO 45 06/24/2025    TP 5.5 (L) 06/24/2025     (H) 06/24/2025    ALT 47 06/24/2025    PTT 30.6 06/19/2025    INR 1.17 06/19/2025    PTP 15.6 (H) 06/19/2025    TSH 0.696 06/19/2025    PSA 2.62 08/10/2023    ESRML 97 (H) 06/25/2025    MG 2.1 06/26/2025    PHOS 2.9 06/25/2025         Imaging:  XR ABDOMEN (1 VIEW) (CPT=74018)  Result Date: 6/25/2025  CONCLUSION: No acute process. Electronically Verified and Signed by Attending Radiologist: Jl Champion MD 6/25/2025 5:33 PM Workstation: [id number]    XR CHEST AP PORTABLE  (CPT=71045)  Result Date: 6/25/2025  CONCLUSION: Mild pulmonary edema has progressed. Bilateral lower lobe air space opacities suggestive of atelectasis has progressed. Underlying pneumonia is felt to be less likely but is also in the differential. Electronically Verified and Signed by Attending Radiologist: Refugio Lloyd MD 6/25/2025 5:20 PM Workstation: [id number]    XR CHEST AP PORTABLE  (CPT=71045)  Result Date: 6/25/2025  CONCLUSION: Lines and tubes in place as described. Mild interstitial edema with small bilateral pleural effusions which is unchanged. There are median sternotomy wires and postoperative changes of CABG. Electronically Verified and Signed by Attending Radiologist: Refugio Lloyd MD 6/25/2025 3:19 PM Workstation: [id number]      Vital Signs:   Blood pressure 127/63, pulse 76, temperature 98.1 °F (36.7 °C), temperature source Temporal,  resp. rate 26, height 66\", weight 96.8 kg (213 lb 6.5 oz), SpO2 93%.    Mental Status Exam:   Appearance: Stated age, male in hospital gown, sitting in chair, alert and attentive.  Psychomotor: No psychomotor agitation or retardation.  Orientation: Orientation improved today. Oriented to self, location and condition.  Gait: Not evaluated.  Attitude/Coorperation: cooperative   Behavior: appropriate.  Speech: voice was very weak and speech was difficult. Few words used.  Mood: Patient minimizing depression.  Affect: Dysphoric affect, congruent with mood  Thought process: Short sentences and circumstantial thought process.  Thought content: Patient denying suicidal or homicidal ideation.  Perceptions: Patient no longer demonstrates response to internal stimuli.  Concentration: Grossly impaired  Memory: Grossly impaired  Intellect: Average.  Judgment and Insight: Questionable.     Impression:     Episodic mood disorder.  Delirium due to a medical condition    Aortic stenosis   Hx of CABG      Patient is a 59 year old polish speaking white  male with a PMH of CAD, PVD, hypertension, hyperlipidemia, diabetes, severe aortic stenosis and recent CABG on 6/19 who continue indicated sedation and intubation and SBT failed due to excessive agitation..    6/22/2025: The patient has been demonstrating delirium episode with alternation in mood and cognition with episodes of  increased confusion, restlessness, agitation and response to internal stimuli. This has led to extended extubation and sedation of the patient.    QTc is 485.  Otherwise current EKG demonstrated sinus rhythm.  Magnesium level is 1.8.  Patient with no risk of arrhythmia other than the underlying recent surgical procedure.  It is appropriate to have nightly sedation with the Zyprexa to stabilize the mood currently and utilize the combination of Haldol and Ativan 5/2 prior to extubation.  Otherwise tapering sedation slowly utilizing Haldol 2 mg as needed  for restlessness.    6/23/2025: The patient has been demonstrating improvement in his delirium following administration of Haldol 5 mg and Ativan 2 mg prior to lowering of sedation and extubation. The patient was able to follow commands and demonstrated less agitation and confusion during SBT today. Patient continues to be sedated and intubated due to tachypnea.     Patient demonstrating improvement in condition after being started on diuretics for fluid in the lungs. Today magnesium level is 2.1    6/24/2025: The patient has continued to demonstrate improvement in his agitation and confusion when sedation is lowered and extubation is attempted. Currently remains intubated and sedated with another extubation attempt scheduled for later today.    6/25/2025: The patient no longer demonstrates agitation and was extubated successfully yesterday. The patient continues to be confused at this time.    6/25/2025: The patient demonstrates improvement in his alertness, orientation and overall condition. He has not had any agitation since being extubated and his confusion continues to improve.  Otherwise the patient has been demonstrating some symptom of depression and was withdrawn with low interest.    Discussed risk and benefit, acknowledging the current symptom and severity.  At this point, I would recommend the following approach:     Focus on safety  Focus on education and support.  Focus on insight orientation helping the patient understand diagnosis and treatment plan.  Continue Zyprexa 2.5 mg orally nightly. (Patient did not take last night)  Start Lexapro 5 mg nightly.  Continue Haldol 2 mg PRN for agitation  Processed with patient/family/RN at length, the initiation of the above psychotropic medications I advised the patient of the risks, benefits, alternatives and potential side effects. The patient consents to administration of the medications and understands the right to refuse medications at any time. The  patient verbalized understanding.   Coordinate plan with team    Orders This Visit:  Orders Placed This Encounter   Procedures    Basic Metabolic Panel (8)    CBC, Platelet; No Differential    CBC With Differential With Platelet    Basic Metabolic Panel (8)    Prothrombin Time (PT)    PTT, Activated    Fibrinogen Activity    Magnesium    Magnesium    Expanded Arterial Blood Gas    Arterial blood gas    Assay, Thyroid Stim Hormone    Lactic Acid, Plasma    Hepatic Function Panel (7)    CBC, Platelet; No Differential    Lactic Acid Reflex Post Positive    Hemoglobin & Hematocrit    CBC, Platelet; No Differential    Magnesium    Lactic Acid, Plasma    Arterial blood gas    Basic Metabolic Panel (8)    Lipid Panel    Venous Blood Gas    Arterial blood gas    Magnesium    Comp Metabolic Panel (14)    CBC, Platelet; No Differential    Expanded Arterial Blood Gas    CBC, Platelet; No Differential    Magnesium    Comp Metabolic Panel (14)    Sed Rate, Westergren (Automated)    Magnesium    Expanded Arterial Blood Gas    Venous Blood Gas    CBC With Differential With Platelet    Basic Metabolic Panel (8)    Magnesium    Basic Metabolic Panel (8)    Phosphorus    Magnesium    Potassium    Potassium    Expanded Arterial Blood Gas    CBC With Differential With Platelet    Sed Rate, Westergren (Automated)    Expanded Arterial Blood Gas    Potassium    Magnesium    Basic Metabolic Panel (8)    Magnesium    CBC With Differential With Platelet    CBC With Differential With Platelet    Basic Metabolic Panel (8)    Magnesium    Potassium    Magnesium    Comp Metabolic Panel (14)    CBC With Differential With Platelet    Magnesium    Phosphorus    Prepare RBC STAT    ABORH Confirmation    Prepare platelets Once    Prepare fresh frozen plasma Once    Prepare cryoprecipitate Once    Type and screen    Prepare RBC Once    ABORH (Blood Type)    Antibody Screen    Prepare RBC Once    Specimen to Pathology Tissue    MRSA Screen by PCR     Sputum culture    Blood Culture    Emergency MRSA Screen by PCR       Meds This Visit:  Requested Prescriptions      No prescriptions requested or ordered in this encounter       Chivo Solares MD  6/26/2025    Note to Patient: The 21st Century Cures Act makes medical notes like these available to patients in the interest of transparency. However, be advised this is a medical document. It is intended as peer to peer communication. It is written in medical language and may contain abbreviations or verbiage that are unfamiliar. It may appear blunt or direct. Medical documents are intended to carry relevant information, facts as evident, and the clinical opinion of the practitioner. This note may have been transcribed using a voice dictation system. Voice recognition errors may occur. This should not be taken to alter the content or meaning of this note.

## 2025-06-27 ENCOUNTER — APPOINTMENT (OUTPATIENT)
Dept: GENERAL RADIOLOGY | Facility: HOSPITAL | Age: 60
DRG: 219 | End: 2025-06-27
Attending: CLINICAL NURSE SPECIALIST
Payer: MEDICAID

## 2025-06-27 ENCOUNTER — APPOINTMENT (OUTPATIENT)
Dept: GENERAL RADIOLOGY | Facility: HOSPITAL | Age: 60
DRG: 219 | End: 2025-06-27
Payer: MEDICAID

## 2025-06-27 ENCOUNTER — APPOINTMENT (OUTPATIENT)
Dept: CT IMAGING | Facility: HOSPITAL | Age: 60
DRG: 219 | End: 2025-06-27
Payer: MEDICAID

## 2025-06-27 LAB
ALBUMIN SERPL-MCNC: 3.7 G/DL (ref 3.2–4.8)
ALBUMIN/GLOB SERPL: 1.5 {RATIO} (ref 1–2)
ALP LIVER SERPL-CCNC: 57 U/L (ref 45–117)
ALT SERPL-CCNC: 36 U/L (ref 10–49)
ANION GAP SERPL CALC-SCNC: 6 MMOL/L (ref 0–18)
AST SERPL-CCNC: 60 U/L (ref ?–34)
ATRIAL RATE: 86 BPM
BASOPHILS # BLD AUTO: 0.06 X10(3) UL (ref 0–0.2)
BASOPHILS NFR BLD AUTO: 0.6 %
BILIRUB SERPL-MCNC: 0.4 MG/DL (ref 0.2–1.1)
BLOOD TYPE BARCODE: 5100
BUN BLD-MCNC: 12 MG/DL (ref 9–23)
BUN/CREAT SERPL: 18.2 (ref 10–20)
CALCIUM BLD-MCNC: 8.3 MG/DL (ref 8.7–10.4)
CHLORIDE SERPL-SCNC: 101 MMOL/L (ref 98–112)
CO2 SERPL-SCNC: 30 MMOL/L (ref 21–32)
CREAT BLD-MCNC: 0.66 MG/DL (ref 0.7–1.3)
DEPRECATED RDW RBC AUTO: 46.2 FL (ref 35.1–46.3)
EGFRCR SERPLBLD CKD-EPI 2021: 107 ML/MIN/1.73M2 (ref 60–?)
EOSINOPHIL # BLD AUTO: 0.11 X10(3) UL (ref 0–0.7)
EOSINOPHIL NFR BLD AUTO: 1.2 %
ERYTHROCYTE [DISTWIDTH] IN BLOOD BY AUTOMATED COUNT: 15.9 % (ref 11–15)
GLOBULIN PLAS-MCNC: 2.4 G/DL (ref 2–3.5)
GLUCOSE BLD-MCNC: 163 MG/DL (ref 70–99)
GLUCOSE BLDC GLUCOMTR-MCNC: 158 MG/DL (ref 70–99)
GLUCOSE BLDC GLUCOMTR-MCNC: 168 MG/DL (ref 70–99)
GLUCOSE BLDC GLUCOMTR-MCNC: 172 MG/DL (ref 70–99)
GLUCOSE BLDC GLUCOMTR-MCNC: 179 MG/DL (ref 70–99)
GLUCOSE BLDC GLUCOMTR-MCNC: 189 MG/DL (ref 70–99)
HCT VFR BLD AUTO: 29 % (ref 39–53)
HGB BLD-MCNC: 9.1 G/DL (ref 13–17.5)
IMM GRANULOCYTES # BLD AUTO: 0.05 X10(3) UL (ref 0–1)
IMM GRANULOCYTES NFR BLD: 0.5 %
LYMPHOCYTES # BLD AUTO: 1.77 X10(3) UL (ref 1–4)
LYMPHOCYTES NFR BLD AUTO: 18.9 %
MAGNESIUM SERPL-MCNC: 1.9 MG/DL (ref 1.6–2.6)
MAGNESIUM SERPL-MCNC: 2 MG/DL (ref 1.6–2.6)
MAGNESIUM SERPL-MCNC: 2.1 MG/DL (ref 1.6–2.6)
MAGNESIUM SERPL-MCNC: 2.3 MG/DL (ref 1.6–2.6)
MCH RBC QN AUTO: 25.3 PG (ref 26–34)
MCHC RBC AUTO-ENTMCNC: 31.4 G/DL (ref 31–37)
MCV RBC AUTO: 80.6 FL (ref 80–100)
MONOCYTES # BLD AUTO: 0.94 X10(3) UL (ref 0.1–1)
MONOCYTES NFR BLD AUTO: 10 %
NEUTROPHILS # BLD AUTO: 6.45 X10 (3) UL (ref 1.5–7.7)
NEUTROPHILS # BLD AUTO: 6.45 X10(3) UL (ref 1.5–7.7)
NEUTROPHILS NFR BLD AUTO: 68.8 %
OSMOLALITY SERPL CALC.SUM OF ELEC: 287 MOSM/KG (ref 275–295)
P AXIS: 70 DEGREES
P-R INTERVAL: 144 MS
PHOSPHATE SERPL-MCNC: 2.4 MG/DL (ref 2.4–5.1)
PLATELET # BLD AUTO: 411 10(3)UL (ref 150–450)
POTASSIUM SERPL-SCNC: 3.3 MMOL/L (ref 3.5–5.1)
POTASSIUM SERPL-SCNC: 3.5 MMOL/L (ref 3.5–5.1)
POTASSIUM SERPL-SCNC: 3.7 MMOL/L (ref 3.5–5.1)
PROT SERPL-MCNC: 6.1 G/DL (ref 5.7–8.2)
Q-T INTERVAL: 400 MS
QRS DURATION: 126 MS
QTC CALCULATION (BEZET): 478 MS
R AXIS: -45 DEGREES
RBC # BLD AUTO: 3.6 X10(6)UL (ref 4.3–5.7)
SODIUM SERPL-SCNC: 137 MMOL/L (ref 136–145)
T AXIS: 137 DEGREES
UNIT VOLUME: 350 ML
VENTRICULAR RATE: 86 BPM
WBC # BLD AUTO: 9.4 X10(3) UL (ref 4–11)

## 2025-06-27 PROCEDURE — 99233 SBSQ HOSP IP/OBS HIGH 50: CPT | Performed by: OTHER

## 2025-06-27 PROCEDURE — 70450 CT HEAD/BRAIN W/O DYE: CPT

## 2025-06-27 PROCEDURE — 99233 SBSQ HOSP IP/OBS HIGH 50: CPT | Performed by: INTERNAL MEDICINE

## 2025-06-27 PROCEDURE — 71045 X-RAY EXAM CHEST 1 VIEW: CPT | Performed by: CLINICAL NURSE SPECIALIST

## 2025-06-27 PROCEDURE — 73502 X-RAY EXAM HIP UNI 2-3 VIEWS: CPT

## 2025-06-27 RX ORDER — SACUBITRIL AND VALSARTAN 24; 26 MG/1; MG/1
1 TABLET, FILM COATED ORAL 2 TIMES DAILY
Status: DISCONTINUED | OUTPATIENT
Start: 2025-06-27 | End: 2025-06-28

## 2025-06-27 RX ORDER — LIDOCAINE HYDROCHLORIDE 10 MG/ML
5 INJECTION, SOLUTION EPIDURAL; INFILTRATION; INTRACAUDAL; PERINEURAL
Status: ACTIVE | OUTPATIENT
Start: 2025-06-27 | End: 2025-06-28

## 2025-06-27 NOTE — OCCUPATIONAL THERAPY NOTE
OCCUPATIONAL THERAPY TREATMENT NOTE - INPATIENT        Room Number: 233/233-A     Presenting Problem: CABG    Problem List  Active Problems:    Aortic stenosis    S/P CABG (coronary artery bypass graft)    Episodic mood disorder    Delirium due to another medical condition    Angina concurrent with and due to arteriosclerosis of CABG    Pre-op evaluation      OCCUPATIONAL THERAPY ASSESSMENT   Patient demonstrates fair progress this session, goals remain in progress.    Patient is requiring supervision, moderate assist, and maximum assist as a result of the following impairments: decreased functional strength, decreased functional reach, decreased endurance, impaired   balance, decreased muscular endurance, difficulty maintaining precautions, cognitive deficits ( ), medical status, limited   ROM, decreased compliance/participation, increased O2 needs from baseline, decreased insight to deficits, and decreased safety awareness.    Patient continues to function below baseline with ADLs.  Next session anticipate patient to progress lower body dressing, transfers, and static standing balance.  Occupational Therapy will continue to follow patient for duration of hospitalization.    Patient continues to benefit from continued skilled OT services: to facilitate return to prior level of function as patient demonstrates high motivation with excellent tolerance to an intensive therapy program.     PLAN DURING HOSPITALIZATION  OT Device Recommendations: TBD  OT Treatment Plan: Balance activities, Energy conservation/work simplification techniques, ADL training, Functional transfer training, UE strengthening/ROM, Endurance training, Cognitive reorientation, Patient/Family education, Patient/Family training, Equipment eval/education, Compensatory technique education     SUBJECTIVE  \"'how do I do this without my arms?\"    OBJECTIVE  Precautions: Drain(s),  needed, Sternal, Chest tube to suction (polish)       PAIN  ASSESSMENT  Ratin    ACTIVITY TOLERANCE  Pulse: 86  Heart Rate Source: Monitor  BP: 122/63 (after activity 95/54)  BP Location: Left arm  BP Method: Automatic  Patient Position: Sitting     O2 WALK  Oxygen Therapy  SPO2% on Oxygen at Rest: 99  At rest oxygen flow (liters per minute): 9    O2 SATURATIONS  Oxygen Therapy  SPO2% on Oxygen at Rest: 99  At rest oxygen flow (liters per minute): 9    ACTIVITIES OF DAILY LIVING ASSESSMENT  AM-PAC ‘6-Clicks’ Inpatient Daily Activity Short Form  How much help from another person does the patient currently need…  -   Putting on and taking off regular lower body clothing?: A Lot  -   Bathing (including washing, rinsing, drying)?: A Lot  -   Toileting, which includes using toilet, bedpan or urinal? : Total  -   Putting on and taking off regular upper body clothing?: A Lot  -   Taking care of personal grooming such as brushing teeth?: A Little  -   Eating meals?: A Little    AM-PAC Score:  Score: 13  Approx Degree of Impairment: 63.03%  Standardized Score (AM-PAC Scale): 32.03  CMS Modifier (G-Code): CL    FUNCTIONAL TRANSFER ASSESSMENT  Sit to Stand: Chair  Chair: Dependent (max Ax2 first trial, mod ax2, 2 trials with cues and demos for technique, max cues for technique/posture/positioning); provided 2 demos therapist from another chair for visual cues for technique, pt with poor carryover, attempting to use UE on chair, requiring tactile cues to maintain grasp in heart pillow to avoid use of arms; extended sitting rest break between trials; attempted weight shifting activity however pt attempting to sit back into chair prior to initiation and quick fatigue throughout; end of task, required max X2 to scoot back in chair    BED MOBILITY  Rolling: Dependent  Sit to Supine (OT): Dependent    BALANCE ASSESSMENT  Static Sitting: Stand-by Assist  Static Standing: Moderate Assist    FUNCTIONAL ADL ASSESSMENT  Eating: Supervision  Grooming Seated: Contact Guard Assist  LB Dressing  Seated: Maximum Assist  Toileting Seated: Dependent         Skilled Therapy Provided: RN approved session. Coordinated with PT as pt required ax2 for mobility portion of session. Polish  used via Ipad. Received patient in chair. Vitals: BP low at end of session and RN aware. Performed ADLs/transfers as stated above. Pt with increased vocal strength and voicing needs with more clarity as compared to last session. Still with decreased cognition, following 75% of commands with repetition, A&Ox2. At the end of session, patient left in chair with needs met, alarm on/tray table in front of pt and RN aware of session.    EDUCATION PROVIDED  Patient Education : Role of Occupational Therapy; Plan of Care; Functional Transfer Techniques; Fall Prevention; Surgical Precautions; Posture/Positioning; Edema Reduction; Energy Conservation  Patient's Response to Education: Requires Further Education  Family/Caregiver Education : -- (smae as pt)  Family/Caregiver's Response to Education: Verbalized Understanding; Returned Demonstration    The patient's Approx Degree of Impairment: 63.03% has been calculated based on documentation in the [facility] '6 clicks' Inpatient Daily Activity Short Form.  Research supports that patients with this level of impairment may benefit from rehab.  Final disposition will be made by interdisciplinary medical team.    Patient End of Session: Up in chair, Needs met, Call light within reach, RN aware of session/findings, All patient questions and concerns addressed    OT Goals: ongoing   Patient will complete functional transfer with mod A at RW level  Comment:     Patient will complete toileting with mod A  Comment:     Patient will tolerate standing for 2 minutes in prep for adls with mod A   Comment:              Goals  on: 07/15/25  Frequency: 3-5x/week    OT Session Time: 23 minutes  Self-Care Home Management: 5 minutes  Therapeutic Activity: 20 minutes    ALEYDA Cruz/FLAVIA

## 2025-06-27 NOTE — HOME CARE LIAISON
[facility] has accepted this referral.  I tried to meet with patient, undergoing procedure.  I will return later.    [facility] will follow this patient thru their discharge from the hospital.

## 2025-06-27 NOTE — SLP NOTE
SPEECH DAILY NOTE - INPATIENT    ASSESSMENT & PLAN   ASSESSMENT  PPE REQUIRED. THIS THERAPIST WORE GLOVES FOR DURATION OF SESSION. HANDS WASHED UPON ENTRANCE/EXIT.    SLP f/u for ongoing dysphagia tx/meal assessment per recommendations of puree/mildly thick liquids per BSE. RN reports pt tolerates diet and medication well with no overt clinical s/s aspiration. Pt denies any swallowing challenges.     Pt positioned upright in bed, alert/cooperative/weak voice. Pt afebrile, tolerating 6L/HF O2NC with oxygen status 99% prior to the start of oral trials. SLP reviewed aspiration precautions and safe swallowing compensatory strategies with the patient. Safe swallow guidelines remain written on the white board in purple. Patient v/u. Provided 1:1 assistance, pt tolerates puree and mildly thick liquids via tsp with no overt clinical signs/symptoms of aspiration. Oxygen status remained stable t/o the entire session. Recommend remain on current diet with adherence to aspiration precautions and swallow strategies.    SLP to f/u with meal assessment x2-3, monitor  CXR, and VFSS if any overt CSA and/or decline in CXR. RN alerted with results and recommendations.     MOST RECENT CXR 6/25  CONCLUSION:  Mild pulmonary edema has progressed.   Bilateral lower lobe air space opacities suggestive of atelectasis has progressed. Underlying pneumonia is felt to be less likely but is also in the differential.       Diet Recommendations - Solids: Puree  Diet Recommendations - Liquids: Nectar thick liquids/ Mildly thick     Aspiration Precautions: Upright position, Slow rate, Small bites, Small sips, No straw  Medication Administration Recommendations: Crushed in puree    Patient Experiencing Pain: No              Treatment Plan  Treatment Plan/Recommendations: Aspiration precautions    Interdisciplinary Communication: Discussed with RN          GOALS  Goal #1 The patient will tolerate puree consistency and mildly thick liquids without overt  signs or symptoms of aspiration with 100 % accuracy over 1-2 session(s).  In Progress   Goal #2 The patient/family/caregiver will demonstrate understanding and implementation of aspiration precautions and swallow strategies independently over 1-2 session(s).    In Progress   Goal #3 The patient will tolerate trial upgrade of soft/hard solid consistency and thin liquids without overt signs or symptoms of aspiration with 100 % accuracy over 1-2 session(s).  In Progress   Goal #4 The patient will utilize compensatory strategies as outlined by  BSSE (clinical evaluation) including Slow rate, Small bites, Small sips, Multiple swallows, No straws, Upright 90 degrees, Upright 90 degrees 30 mins after meal, Eliminate distractions, Supervision with meals with PRN assistance 100 % of the time across 2 sessions.    In Progress     FOLLOW UP  Follow Up Needed (Documentation Required): Yes  SLP Follow-up Date: 06/28/25  Duration: 1 week    Session: 1    If you have any questions, please contact JARED Parker M.S. CCC-SLP  Speech Language Pathologist  Phone Number [phone]

## 2025-06-27 NOTE — PROGRESS NOTES
Examined s/p fall. Patient has been demonstrating delirium and impulsiveness. Psych is following     Per nursing staff, found on floor. Struck head possibly on sink vs floor and has lac above right eyebrow with bruising. Also bruising to right hip noted   Polish  used for exam via language line  States he was trying to walk to the bathroom  Able to follow commands  Moves all extremities  Denies blurred vision    Will send for stat head CT  Right hip xray   Ice pack to eye  Bleeding has subsided but may need a couple sutures if restarts    Marcella Smith NP  [facility]     Will endorse to nocturnist to f/u xray and ct head results

## 2025-06-27 NOTE — PROGRESS NOTES
[facility]  Cardiology Progress Note    Jose Alberto Lizama Patient Status:  Inpatient    1965 MRN [id number]   Location [facility] 2W/SW Attending Nannette Pelayo MD   Hosp Day # 8 PCP Fiona Sweeney MD     Subjective     No issues overnight.  A-line is positional false readings.  Seems out of it today alert and oriented x 1      Telemetry sinus rhythm 60 to 70 bpm    Objective:   Patient Vitals for the past 24 hrs:   BP Temp Temp src Pulse Resp SpO2   25 0800 -- -- -- -- -- 93 %   25 0700 127/63 -- -- 76 26 --   25 0600 128/65 -- -- 74 (!) 27 --   25 0500 145/69 -- -- 91 (!) 28 --   25 0400 115/69 -- -- 95 (!) 29 92 %   25 0300 118/64 -- -- 90 (!) 27 97 %   25 0200 121/74 -- -- 86 (!) 28 97 %   25 0100 127/65 -- -- 89 (!) 30 98 %   25 0000 (!) 113/101 -- -- 89 25 95 %   25 2300 (!) 162/60 -- -- 89 (!) 30 95 %   25 2200 145/69 -- -- 92 (!) 31 100 %   25 2148 132/68 -- -- 90 (!) 28 --   25 2143 (!) 164/83 -- -- 85 26 --   25 2100 121/66 -- -- 82 21 --   25 2000 116/79 98.1 °F (36.7 °C) Temporal (!) 129 (!) 30 --   25 1800 134/75 -- -- 87 18 93 %   25 1700 134/66 -- -- 87 (!) 30 95 %   25 1600 123/62 97.6 °F (36.4 °C) Temporal 86 (!) 30 97 %   25 1500 (!) 110/96 -- -- 84 26 91 %   25 1422 -- -- -- -- -- 93 %   25 1400 112/56 -- -- 77 (!) 34 91 %   25 1300 125/65 -- -- 98 22 92 %   25 1230 133/64 -- -- 108 -- --   25 1215 133/64 -- -- 108 (!) 33 --   25 1200 127/81 98 °F (36.7 °C) Temporal 98 (!) 34 98 %   25 1100 (!) 87/64 -- -- 91 (!) 27 94 %       Intake/Output:   Last 3 shifts:   Intake/Output                   25 07 - 25 0659 25 0700 - 25 0659 25 0700 - 25 0659       Intake    P.O.  0  --  --    P.O. 0 -- --    I.V.  1026.2  1529.5  --    Volume (mL) Nitroglycerin 492.2 1529.5 --    Volume  (mL) (sodium chloride 0.9% infusion) 534 -- --    IV PIGGYBACK  300  700  --    Volume (mL) (piperacillin-tazobactam (Zosyn) 3.375 g in dextrose 5% 100 mL IVPB-ADDV) 100 300 --    Volume (mL) (acetaminophen (Ofirmev) 10 mg/mL infusion premix 1,000 mg) 100 200 --    Volume (mL) (potassium chloride 20 mEq/100mL IVPB premix 20 mEq) 100 200 --    Total Intake 1326.2 2229.5 --       Output    Urine  2060  1850  --    Urine -- 1250 --    Urine Occurrence 1 x -- --    Output (mL) ([REMOVED] Urethral Catheter Double-lumen;Latex;Temperature probe) 2060 600 --    Emesis/NG output  10  --  --    Residual volume (ml) ([REMOVED] NG/OG Tube Orogastric 16 Fr. Center mouth) 10 -- --    Chest Tube  810  230  50    Output (mL) (Y Chest Tube 1 and 2 Anterior Mediastinal 32 Fr. Anterior Pleural 32 Fr.) 750 110 0    Output (mL) (Chest Tube Pericardial 24 Fr.) 60 120 50    Total Output 2880 2080 50       Net I/O     -1553.8 149.5 -50             Vent Settings: FiO2 (%):  [50 %] 50 %    Scheduled Meds: Scheduled Medications[1]    Continuous Infusions: Medication Infusions[2]    Results:     Lab Results   Component Value Date    WBC 9.4 06/27/2025    HGB 9.1 (L) 06/27/2025    HCT 29.0 (L) 06/27/2025    .0 06/27/2025    CREATSERUM 0.66 (L) 06/27/2025    BUN 12 06/27/2025     06/27/2025    K 3.5 06/27/2025    K 3.5 06/27/2025     06/27/2025    CO2 30.0 06/27/2025     (H) 06/27/2025    CA 8.3 (L) 06/27/2025    ALB 3.7 06/27/2025    ALKPHO 57 06/27/2025    BILT 0.4 06/27/2025    TP 6.1 06/27/2025    AST 60 (H) 06/27/2025    ALT 36 06/27/2025    PTT 30.6 06/19/2025    INR 1.17 06/19/2025    TSH 0.696 06/19/2025    PSA 2.62 08/10/2023    ESRML 97 (H) 06/25/2025    MG 2.0 06/27/2025    PHOS 2.4 06/27/2025       Recent Labs   Lab 06/25/25  0542 06/26/25  0243 06/26/25  0510 06/26/25  1411 06/26/25  1800 06/27/25  0058 06/27/25  0459   *  --  183*  --   --   --  163*   BUN 18  --  16  --   --   --  12   CREATSERUM  0.74  --  0.65*  --   --   --  0.66*   CA 8.5*  --  8.6*  --   --   --  8.3*     --  136  --   --   --  137   K 3.7  3.7   < > 3.0*   < > 3.7 3.5 3.5  3.5     --  102  --   --   --  101   CO2 25.0  --  27.0  --   --   --  30.0    < > = values in this interval not displayed.     Recent Labs   Lab 25  0542 25  0510 25  1533 25  0459   RBC 3.27* 3.26*  --  3.60*   HGB 8.3* 8.2* 8.2* 9.1*   HCT 25.6* 25.9*  --  29.0*   MCV 78.3* 79.4*  --  80.6   MCH 25.4* 25.2*  --  25.3*   MCHC 32.4 31.7  --  31.4   RDW 15.7* 16.0*  --  15.9*   NEPRELIM 5.53 6.76  --  6.45   WBC 7.7 9.3  --  9.4   .0 369.0  --  411.0       No results for input(s): \"BNPML\" in the last 168 hours.    No results for input(s): \"TROP\", \"CK\" in the last 168 hours.    XR ABDOMEN (1 VIEW) (CPT=74018)  Result Date: 2025  CONCLUSION: No acute process. Electronically Verified and Signed by Attending Radiologist: Jl Champion MD 2025 5:33 PM Workstation: [id number]    XR CHEST AP PORTABLE  (CPT=71045)  Result Date: 2025  CONCLUSION: Mild pulmonary edema has progressed. Bilateral lower lobe air space opacities suggestive of atelectasis has progressed. Underlying pneumonia is felt to be less likely but is also in the differential. Electronically Verified and Signed by Attending Radiologist: Refugio Lloyd MD 2025 5:20 PM Workstation: [email or web address]    EKG 12 Lead  Result Date: 2025  Normal sinus rhythm Left axis deviation Nonspecific intraventricular block Cannot rule out Inferior infarct , age undetermined T wave abnormality, consider anterolateral ischemia Abnormal ECG When compared with ECG of 2025 04:43, No significant change was found    CARD ECHO 2D DOPPLER CONTRAST (CPT=93306)  Result Date: 2025  Transthoracic Echocardiogram Name:Jose Alberto Lizama Date: 2025 :  1965 Ht:  (66in)  BP: 100 / 62 MRN:  [id number]    Age:  59years    Wt:  (215lb) HR: 73bpm Loc:  [facility]        Gndr: M          BSA: 2.06m^2 Sonographer: Svetlana CARVALHO Ordering:    Ivanna Lewis Consulting:  Chivo Solares ---------------------------------------------------------------------------- History/Indications:   Post-Op CABG, AVR. ---------------------------------------------------------------------------- Procedure information:  A transthoracic complete 2D study was performed. Additional evaluation included M-mode, complete spectral Doppler, and color Doppler.  Patient status:  Inpatient.  Location:  Bedside.    Comparison was made to the study of 08/10/2019.    This was a routine study. Transthoracic echocardiography for diagnosis. Image quality was adequate. The study was technically limited due to poor acoustic window availability, restricted patient mobility, surgical dressings, patient supine, and patient on ventilator. Intravenous contrast (Definity) was administered to evaluate left ventricular function and enhance regional wall motion assessment. ECG rhythm:   Normal sinus ---------------------------------------------------------------------------- Conclusions: 1. Left ventricle: The cavity size was normal. Wall thickness was normal.    Systolic function was mildly to moderately reduced. The estimated    ejection fraction was 30-35%, by visual assessment. Features are    consistent with a pseudonormal left ventricular filling pattern, with    concomitant abnormal relaxation and increased filling pressure - grade 2    diastolic dysfunction. 2. Right ventricle: Pacer wire noted in the right ventricle. Systolic    function was reduced. 3. Left atrium: The atrium was mildly dilated. 4. Aortic valve: A bioprosthetic valve is present. The peak systolic    velocity was 2.51m/sec. The mean systolic gradient was 12mm Hg. The valve    area (VTI) was 1.12cm^2. The valve area (VTI) index was 0.54cm^2/m^2. 5. Pericardium, extracardiac: There was no pericardial effusion. *  ---------------------------------------------------------------------------- * Findings: Left ventricle:  The cavity size was normal. Wall thickness was normal. Systolic function was mildly to moderately reduced. The estimated ejection fraction was 30-35%, by visual assessment. No diagnostic evidence for diffuse regional wall motion abnormalities. Features are consistent with a pseudonormal left ventricular filling pattern, with concomitant abnormal relaxation and increased filling pressure - grade 2 diastolic dysfunction. Left atrium:  Well visualized. The atrium was mildly dilated. Right ventricle:  The cavity size was at the upper limits of normal. Pacer wire noted in the right ventricle. Systolic function was reduced. Right atrium:  Well visualized. The atrium was normal in size. Mitral valve:  Well visualized. The valve was structurally normal. The leaflets were normal thickness. Leaflet separation was normal. No evidence for prolapse.  Doppler:  Transvalvular velocity was within the normal range. There was no evidence for stenosis. There was no significant regurgitation. Aortic valve:  Well visualized. A bioprosthetic valve is present. The leaflets were normal thickness. Cusp separation was normal.  Doppler: Transvalvular velocity was increased. There was no evidence for stenosis. There was no significant regurgitation.    The valve area (VTI) was 1.12cm^2. The valve area (VTI) index was 0.54cm^2/m^2.    The mean systolic gradient was 12mm Hg. The peak systolic gradient was 25mm Hg. Tricuspid valve:  Well visualized. The annulus is normal-sized. The valve is structurally normal. The leaflets are normal thickness. Leaflet separation was normal. No echocardiographic evidence for tricuspid prolapse.  Doppler: Transvalvular velocity was within the normal range. There was no evidence for stenosis. There was no significant regurgitation. Pulmonic valve:   Well visualized. The annulus is normal-sized. The valve is  structurally normal. The leaflets are normal thickness. Cusp separation was normal. No evidence for prolapse.  Doppler:  Transvalvular velocity was within the normal range. There was no evidence for stenosis. There was no significant regurgitation. Pericardium:   There was no pericardial effusion. Pleura:  No evidence of pleural fluid accumulation. Aorta: Aortic root: The aortic root was normal-sized. The aortic root was normal. Ascending aorta: The ascending aorta was normal. The ascending aorta was normal. Pulmonary arteries: The main pulmonary artery was normal-sized.  Systolic pressure could not be accurately estimated. Systemic veins:  Central venous respirophasic diameter changes are blunted (< 50%). Inferior vena cava: The IVC was normally collapsible and normal-sized. The IVC was dilated. ---------------------------------------------------------------------------- Measurements  Left ventricle                  Value          Ref  IVS thickness, ED, PLAX     (H) 1.1   cm       0.6 - 1.0  LV ID, ED, PLAX                 5.6   cm       4.2 - 5.8  LV ID, ES, PLAX             (H) 4.7   cm       2.5 - 4.0  LV PW thickness, ED, PLAX       0.9   cm       0.6 - 1.0  IVS/LV PW ratio, ED, PLAX       1.22           ---------  LV PW/LV ID ratio, ED, PLAX     0.16           ---------  LV ejection fraction        (L) 34    %        52 - 72  Stroke volume/bsa, 2D           21    ml/m^2   ---------  LV e', lateral              (L) 4.3   cm/sec   >=10.0  LV E/e', lateral            (H) 29             <=13  LV e', medial               (L) 3.1   cm/sec   >=7.0  LV E/e', medial                 41             ---------  LV e', average                  3.7   cm/sec   ---------  LV E/e', average            (H) 34             <=14  LVOT                            Value          Ref  LVOT ID                         1.9   cm       ---------  LVOT peak velocity, S           1.03  m/sec    ---------  LVOT VTI, S                     15.1   cm       ---------  LVOT peak gradient, S           4     mm Hg    ---------  LVOT mean gradient, S           2     mm Hg    ---------  Stroke volume (SV), LVOT DP     43    ml       ---------  Stroke index (SV/bsa), LVOT     21    ml/m^2   ---------  DP  Aortic valve                    Value          Ref  Aortic valve peak velocity,     2.51  m/sec    ---------  S  Aortic valve VTI, S             38.1  cm       ---------  Aortic mean gradient, S         12    mm Hg    ---------  Aortic peak gradient, S         25    mm Hg    ---------  Aortic valve area, VTI          1.12  cm^2     ---------  Aortic valve area/bsa, VTI      0.54  cm^2/m^2 ---------  Velocity ratio, peak,           0.41           ---------  LVOT/AV  Ascending aorta                 Value          Ref  Ascending aorta ID              2.8   cm       2.2 - 3.8  Left atrium                     Value          Ref  LA volume, S                (H) 69    ml       18 - 58  LA volume/bsa, S                33    ml/m^2   16 - 34  LA volume, ES, 1-p A4C      (H) 78    ml       18 - 58  LA volume, ES, 1-p A2C          56    ml       18 - 58  LA volume, ES, A/L              71    ml       ---------  LA volume/bsa, ES, A/L      (H) 35    ml/m^2   16 - 34  Mitral valve                    Value          Ref  Mitral E-wave peak velocity     1.26  m/sec    ---------  Mitral A-wave peak velocity     1.14  m/sec    ---------  Mitral deceleration time        229   ms       ---------  Mitral peak gradient, D         6     mm Hg    ---------  Mitral E/A ratio, peak          1.1            ---------  Systemic veins                  Value          Ref  Estimated CVP                   15    mm Hg    ---------  Inferior vena cava              Value          Ref  ID                          (H) 2.7   cm       <=2.1  Right ventricle                 Value          Ref  TAPSE, 2D                   (L) 1.41  cm       >=1.70  TAPSE, MM                   (L) 1.41  cm       >=1.70  RV  s', lateral              (L) 6.2   cm/sec   >=9.5 Legend: (L)  and  (H)  steven values outside specified reference range. ---------------------------------------------------------------------------- Prepared and electronically signed by Massimo Deluna 06/23/2025 18:00        Exam:     General: Alert and oriented x 1. No apparent distress.   HEENT: Normocephalic, anicteric sclera, neck supple, no thyromegaly or adenopathy.  Neck: No JVD, carotids 2+, no bruits.  Cardiac: Regular rate and rhythm. S1, S2 normal. No murmur, pericardial rub, S3, or extra cardiac sounds.  Lungs: No wheezes.  Bilateral crackles.    Abdomen: Soft, non-tender. No organosplenomegally, mass or rebound, BS-present.  Extremities: Without clubbing or cyanosis. No edema.    Neurologic: Alert and oriented, normal affect. No focal defects  Skin: Warm and dry.     Assessment and Plan:   Assessment   Severe AS, CAD status post CABG X2 and SAVR,  MELINDA clip 6/19/25 (LIMA-LAD, VG-D; Inspiris #21  tissue valve, #40 MELINDA clip)  Ischemic dermopathy, EF 30%  Anemia  Hypotension now resolved now hypertensive.  Hyperlipidemia  Diabetes  Peripheral vascular disease        Plan:  Repeat echo shows EF 35%, intact bioprosthetic valve  Continue amiodarone, aspirin, Plavix, statin  Continue IV Lasix still overloaded on exam.  Lasix 40 mg 3 times daily -2.4 L so far.  Continue Cleviprex and nitroglycerin drips while unable to swallow, passed the swallow eval however too confused to swallow pills.  Will initiate blood pressure medications  Start Entresto 24/26 mg twice daily and uptitrate as tolerated.  Do not resume amlodipine or carvedilol just yet.  Amlodipine should be permanently discontinued given severe LV dysfunction  Hold anxiety/sedation medications as much as possible        L3, will follow       David Osullivan MD  [facility]           [1]    potassium chloride  20 mEq Intravenous BID    sodium chloride   Intravenous Once    escitalopram  5  mg Oral Nightly    amiodarone  200 mg Oral Daily    colchicine  0.6 mg Oral Daily    metoprolol tartrate  50 mg Oral 2x Daily(Beta Blocker)    lidocaine PF  5 mL Intradermal Once    furosemide  40 mg Intravenous TID    insulin degludec  20 Units Subcutaneous Daily    OLANZapine  2.5 mg Oral Nightly    ferrous sulfate  300 mg Oral BID    insulin regular human  1-5 Units Subcutaneous 4 times per day    aspirin  81 mg Per NG Tube Daily    docusate  100 mg Per NG Tube BID    acetaminophen  1,000 mg Intravenous Q6H    ipratropium-albuterol  3 mL Nebulization TID    budesonide  0.5 mg Nebulization 2 times daily    rosuvastatin  20 mg Per NG Tube Nightly    thiamine  100 mg Oral Daily    multivitamin  1 tablet Oral Daily    folic acid  1 mg Oral Daily    sugammadex  2 mg/kg Intravenous Once    sennosides  8.6 mg Oral BID    famotidine  20 mg Oral BID    Or    famotidine  20 mg Intravenous BID    mupirocin  1 Application Nasal BID    chlorhexidine gluconate  15 mL Mouth/Throat BID    ascorbic acid  500 mg Oral TID    enoxaparin  40 mg Subcutaneous Daily    clopidogrel  75 mg Oral Daily    tamsulosin  0.4 mg Oral BID   [2]    clevidipine 6 mg/hr (06/27/25 0612)    dextrose 10%      nitroGLYCERIN in dextrose 5% 200 mcg/min (06/27/25 0708)    norepinephrine Stopped (06/23/25 1642)    sodium chloride 10 mL/hr at 06/24/25 2000    dexmedetomidine Stopped (06/24/25 1200)

## 2025-06-27 NOTE — PROGRESS NOTES
Patient is a 59 year old   male with past medical history of CAD, PVD,  hypertension, hyperlipidemia, diabetes, severe aortic stenosis. who was admitted to the hospital for a scheduled coronary artery bypass graft surgery, aortic valve replacement on 6/19. The patient is intubated and sedated with propofol and precedex. Otherwise the patient has been demonstrating agitation and confusion when weaning from mechanical ventilation is attempted.   Patient indicated for psych consult for evaluation and advise.    Consult Duration     The patient seen for over 50-minute, follow-up evaluation, over 50% counseling and coordinating care addressing agitation and confusion during SBT.  Record reviewed, communication with attending, communication with RN and patient seen face to face evaluation.     History of Present Illness:     In communication with the team the patient had no major events over night, and has been alert and oriented and able to communicate with staff this morning using a raspy and quiet voice.   The patient seen today sitting in his bed, in the presence of his wife and his RN.  The patient received 2 mg ativan this morning for restlessness, but did not receive his Zyprexa or Lexapro last night because he was reportedly very lethargic and the nighttime RN was concerned he would be able to swallow safely. The patient has been cleared by speech and has had no documented issues with swallowing.     Today the patient is noted to more lethargic, and continues to be very tired per RN.  Patient otherwise continue demonstrating withdrawn behavior, limited engagement, lack of energy, lack of motivation, soft low-tone speech with limited interest in activities. Some confusion and irritability.    Otherwise the patient has been demonstrating improvement in his cognitive function, orientation, and has not demonstrated episodes of agitation since he was extubated on 6/24.  The patient has minimized his  psychiatric issues and likely has underlying depression and anxiety which contributed to his post surgical delirious episode.     No report of history of suicide or homicidal ideation or action.  No history of hallucination, psychosis or manic episode.    Collateral obtained from the patient's wife and daughter.   His daughter states that he drinks 3-5 drinks of Brandon walker on the weekend socially, with a hx of a \"little bit\" of depression and anxiety. 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 The patient's daughter states he quit smoking cigarettes 3 years ago and now vapes instead. She denied any family history of dementia.      Past Psychiatric/Medication History:  1. Prior diagnoses: none reported  2. Past psychiatric inpatient: none reported  3. Past outpatient history: none reported  4. Past suicide history: none reported  5. Medication history: none reported    Social History:   The patient is a 59 yr old polish speaking  white male with one adult daughter. He is reported to have 3-5 drinks every weekend socially, does not use illicit drugs and now vapes nicotine daily after quitting cigarettes 3 years ago.     Family History:  Father, paternal aunt and uncle all have hx of diabetes.  Medical History:   Past Medical History  Past Medical History:    Aortic stenosis    Back problem    BPH (benign prostatic hyperplasia)    Cardiomyopathy (HCC)    Coronary atherosclerosis    Depression    Diabetes (HCC)    Esophageal reflux    Heart valve disease    High blood pressure    High cholesterol    Peripheral vascular disease    Shortness of breath    related to smoking     Visual impairment    glasses       Past Surgical History  Past Surgical History:   Procedure Laterality Date    Cardiac catheterization  2016    [facility]    Cath drug eluting stent      Colonoscopy  04/22/2024    Colonoscopy N/A 4/22/2024     Procedure: COLONOSCOPY;  Surgeon: Attila Li MD;  Location: [facility] ENDOSCOPY    Esophagoscopy,diagnostic  04/22/2024    Dr. Li    Hernia surgery      as a child    Nasal scopy,remv part ethmoid  10/14/2020    Nasal scopy,rmv tiss maxill sinus  10/14/2020    Repair of nasal septum  10/14/2020       Family History  Family History   Problem Relation Age of Onset    Diabetes Father     Diabetes Paternal Aunt     Diabetes Paternal Uncle     Seizure Disorder Mother     No Known Problems Brother     No Known Problems Daughter     Glaucoma Neg     Macular degeneration Neg        Social History  Social History     Socioeconomic History    Marital status:    Tobacco Use    Smoking status: Former     Current packs/day: 1.00     Average packs/day: 1 pack/day for 20.0 years (20.0 ttl pk-yrs)     Types: Cigarettes    Smokeless tobacco: Never   Vaping Use    Vaping status: Every Day    Substances: Nicotine   Substance and Sexual Activity    Alcohol use: Yes     Comment: 1-2 drinks a week    Drug use: No           Current Medications:  Current Facility-Administered Medications   Medication Dose Route Frequency    sacubitril-valsartan (Entresto) 24-26 MG per tab 1 tablet  1 tablet Oral BID    potassium chloride 20 mEq/100mL IVPB premix 20 mEq  20 mEq Intravenous BID    sodium chloride 0.9% infusion   Intravenous Once    escitalopram (Lexapro) tab 5 mg  5 mg Oral Nightly    amiodarone (Pacerone) tab 200 mg  200 mg Oral Daily    colchicine tab 0.6 mg  0.6 mg Oral Daily    metoprolol tartrate (Lopressor) tab 50 mg  50 mg Oral 2x Daily(Beta Blocker)    labetalol (Trandate) 5 mg/mL injection 10 mg  10 mg Intravenous Q6H PRN    lidocaine PF (Xylocaine-MPF) 1% injection  5 mL Intradermal Once    furosemide (Lasix) 10 mg/mL injection 40 mg  40 mg Intravenous TID    insulin degludec (Tresiba) 100 units/mL flextouch 20 Units  20 Units Subcutaneous Daily    clevidipine (Cleviprex) 25 MG/50ML IV infusion  1-21 mg/hr  Intravenous Continuous    OLANZapine (ZyPREXA) tab 2.5 mg  2.5 mg Oral Nightly    ferrous sulfate 300 (60 Fe) MG/5ML oral syrup 300 mg  300 mg Oral BID    insulin regular human (Novolin R, Humulin R) 100 UNIT/ML injection 1-5 Units  1-5 Units Subcutaneous 4 times per day    aspirin chewable tab 81 mg  81 mg Per NG Tube Daily    dextrose 10% infusion (TPN no rate)   Intravenous Continuous PRN    sodium bicarbonate tab 650 mg  650 mg Per G Tube PRN    And    pancrelipase (Lip-Prot-Amyl) (Zenpep) DR particles cap 10,000 Units  10,000 Units Per G Tube PRN    docusate (Colace) 50 MG/5ML oral solution 100 mg  100 mg Per NG Tube BID    nitroGLYCERIN in dextrose 5% 50 mg/250mL infusion premix  5-200 mcg/min Intravenous Continuous    acetaminophen (Ofirmev) 10 mg/mL infusion premix 1,000 mg  1,000 mg Intravenous Q6H    ipratropium-albuterol (Duoneb) 0.5-2.5 (3) MG/3ML inhalation solution 3 mL  3 mL Nebulization TID    norepinephrine (Levophed) 8 mg in dextrose 5% 250 mL infusion  0.5-8 mcg/min Intravenous Continuous    ipratropium-albuterol (Duoneb) 0.5-2.5 (3) MG/3ML inhalation solution 3 mL  3 mL Nebulization Q4H PRN    budesonide (Pulmicort) 0.5 MG/2ML nebulizer suspension 0.5 mg  0.5 mg Nebulization 2 times daily    rosuvastatin (Crestor) tab 20 mg  20 mg Per NG Tube Nightly    LORazepam (Ativan) tab 1 mg  1 mg Oral Q1H PRN    Or    LORazepam (Ativan) 2 mg/mL injection 1 mg  1 mg Intravenous Q1H PRN    LORazepam (Ativan) tab 2 mg  2 mg Oral Q1H PRN    Or    LORazepam (Ativan) 2 mg/mL injection 2 mg  2 mg Intravenous Q1H PRN    LORazepam (Ativan) tab 3 mg  3 mg Oral Q1H PRN    Or    LORazepam (Ativan) 2 mg/mL injection 3 mg  3 mg Intravenous Q1H PRN    LORazepam (Ativan) 2 mg/mL injection 4 mg  4 mg Intravenous Q30 Min PRN    LORazepam (Ativan) 2 mg/mL injection 5 mg  5 mg Intravenous Q15 Min PRN    LORazepam (Ativan) 2 mg/mL injection 6 mg  6 mg Intravenous Q10 Min PRN    thiamine (Vitamin B1) tab 100 mg  100 mg Oral  Daily    multivitamin (Tab-A-Елена/Beta Carotene) tab 1 tablet  1 tablet Oral Daily    folic acid (Folvite) tab 1 mg  1 mg Oral Daily    haloperidol lactate (Haldol) 5 MG/ML injection 5 mg  5 mg Intramuscular Q6H PRN    LORazepam (Ativan) 2 mg/mL injection 1 mg  1 mg Intramuscular Q6H PRN    haloperidol lactate (Haldol) 5 MG/ML injection 2 mg  2 mg Intravenous Q4H PRN    sugammadex (Bridion) 200 MG/2ML injection 180 mg  2 mg/kg Intravenous Once    sodium chloride 0.9% infusion   Intravenous Continuous    melatonin tab 3 mg  3 mg Oral Nightly PRN    sennosides (Senokot) tab 8.6 mg  8.6 mg Oral BID    polyethylene glycol (PEG 3350) (Miralax) 17 g oral packet 17 g  17 g Oral Daily PRN    bisacodyl (Dulcolax) 10 MG rectal suppository 10 mg  10 mg Rectal Daily PRN    famotidine (Pepcid) tab 20 mg  20 mg Oral BID    Or    famotidine (Pepcid) 20 mg/2mL injection 20 mg  20 mg Intravenous BID    dexmedeTOMIDine in sodium chloride 0.9% (Precedex) 400 mcg/100mL infusion premix  0.2-1.5 mcg/kg/hr (Dosing Weight) Intravenous Continuous    potassium chloride 20 mEq/100mL IVPB premix 20 mEq  20 mEq Intravenous PRN    Or    potassium chloride 40 mEq/100mL IVPB premix (central line) 40 mEq  40 mEq Intravenous PRN    magnesium sulfate in dextrose 5% 1 g/100mL infusion premix 1 g  1 g Intravenous PRN    magnesium sulfate in sterile water for injection 2 g/50mL IVPB premix 2 g  2 g Intravenous PRN    chlorhexidine gluconate (Peridex) 0.12 % oral solution 15 mL  15 mL Mouth/Throat BID    ascorbic acid (Vitamin C) tab 500 mg  500 mg Oral TID    enoxaparin (Lovenox) 40 MG/0.4ML SUBQ injection 40 mg  40 mg Subcutaneous Daily    acetaminophen (Tylenol) tab 650 mg  650 mg Oral Q4H PRN    Or    HYDROcodone-acetaminophen (Norco) 5-325 MG per tab 1 tablet  1 tablet Oral Q4H PRN    morphINE PF 2 MG/ML injection 2 mg  2 mg Intravenous Q2H PRN    clopidogrel (Plavix) tab 75 mg  75 mg Oral Daily    glucose (Dex4) 15 GM/59ML oral liquid 15 g  15 g  Oral Q15 Min PRN    Or    glucose (Glutose) 40% oral gel 15 g  15 g Oral Q15 Min PRN    Or    glucose-vitamin C (Dex-4) chewable tab 4 tablet  4 tablet Oral Q15 Min PRN    Or    dextrose 50% injection 50 mL  50 mL Intravenous Q15 Min PRN    Or    glucose (Dex4) 15 GM/59ML oral liquid 30 g  30 g Oral Q15 Min PRN    Or    glucose (Glutose) 40% oral gel 30 g  30 g Oral Q15 Min PRN    Or    glucose-vitamin C (Dex-4) chewable tab 8 tablet  8 tablet Oral Q15 Min PRN    tamsulosin (Flomax) cap 0.4 mg  0.4 mg Oral BID     Medications Prior to Admission   Medication Sig    ENTRESTO 24-26 MG Oral Tab Entresto 24 mg-26 mg tablet, [RxNorm: 5591661]    magnesium 250 MG Oral Tab Take 1 tablet (250 mg total) by mouth every other day.    cyanocobalamin 500 MCG Oral Tab Take 1 tablet (500 mcg total) by mouth every other day.    metFORMIN HCl 1000 MG Oral Tab Take 1 tablet (1,000 mg total) by mouth 2 (two) times daily with meals.    amLODIPine 5 MG Oral Tab Take 1 tablet (5 mg total) by mouth daily.    carvedilol 25 MG Oral Tab Take 1 tablet (25 mg total) by mouth 2 (two) times daily with meals.    clopidogrel 75 MG Oral Tab Take 1 tablet (75 mg total) by mouth daily.    empagliflozin (JARDIANCE) 25 MG Oral Tab Take 1 tablet (25 mg total) by mouth daily.    furosemide 40 MG Oral Tab Take 1 tablet (40 mg total) by mouth daily.    rosuvastatin 20 MG Oral Tab Take 1 tablet (20 mg total) by mouth nightly.    tamsulosin 0.4 MG Oral Cap TAKE 2 CAPSULES BY MOUTH DAILY (Patient taking differently: Take 1 capsule (0.4 mg total) by mouth in the morning and 1 capsule (0.4 mg total) before bedtime.)    aspirin 81 MG Oral Tab EC Take 1 tablet (81 mg total) by mouth nightly.    Thiamine HCl 250 MG Oral Tab Take 1 tablet (250 mg total) by mouth every other day.    pyridoxine 100 MG Oral Tab Take 1 tablet (100 mg total) by mouth every other day.    Glucose Blood (CONTOUR NEXT TEST) In Vitro Strip To test 2 x daily  Dx E11.9    Glucose Blood (CONTOUR  NEXT TEST) In Vitro Strip To check glucose bid   Dx  E11.9    Glucose Blood (LORAINE CONTOUR TEST) In Vitro Strip To test daily    LORAINE CONTOUR NEXT TEST In Vitro Strip To test 2 x daily       Allergies  No Known Allergies    Review of Systems:   As by Admitting/Attending    Results:   Laboratory Data:  Lab Results   Component Value Date    WBC 9.4 06/27/2025    HGB 9.1 (L) 06/27/2025    HCT 29.0 (L) 06/27/2025    .0 06/27/2025    CREATSERUM 0.66 (L) 06/27/2025    BUN 12 06/27/2025     06/27/2025    K 3.5 06/27/2025    K 3.5 06/27/2025     06/27/2025    CO2 30.0 06/27/2025     (H) 06/27/2025    CA 8.3 (L) 06/27/2025    ALB 3.7 06/27/2025    ALKPHO 57 06/27/2025    TP 6.1 06/27/2025    AST 60 (H) 06/27/2025    ALT 36 06/27/2025    PTT 30.6 06/19/2025    INR 1.17 06/19/2025    PTP 15.6 (H) 06/19/2025    TSH 0.696 06/19/2025    PSA 2.62 08/10/2023    ESRML 97 (H) 06/25/2025    MG 2.0 06/27/2025    PHOS 2.4 06/27/2025         Imaging:  XR ABDOMEN (1 VIEW) (CPT=74018)  Result Date: 6/25/2025  CONCLUSION: No acute process. Electronically Verified and Signed by Attending Radiologist: Jl Champion MD 6/25/2025 5:33 PM Workstation: [id number]    XR CHEST AP PORTABLE  (CPT=71045)  Result Date: 6/25/2025  CONCLUSION: Mild pulmonary edema has progressed. Bilateral lower lobe air space opacities suggestive of atelectasis has progressed. Underlying pneumonia is felt to be less likely but is also in the differential. Electronically Verified and Signed by Attending Radiologist: Refugio Lloyd MD 6/25/2025 5:20 PM Workstation: [id number]      Vital Signs:   Blood pressure 149/85, pulse 73, temperature 98.8 °F (37.1 °C), temperature source Oral, resp. rate 22, height 66\", weight 96.8 kg (213 lb 6.5 oz), SpO2 99%.    Mental Status Exam:   Appearance: Stated age, male in hospital gown laying in hospital bed. Attentive, but tired today.  Psychomotor: No psychomotor agitation or retardation.  Orientation:  Orientation continues to improve  Gait: Not evaluated.  Attitude/Coorperation: Fluctuation in mood and behavior.  Behavior: Labile  Speech: speech continues to be soft and raspy.  Mood: Patient minimizing depression.  Affect: Dysphoric affect, congruent with mood  Thought process: Short sentences and circumstantial thought process.  Thought content: Patient denying suicidal or homicidal ideation.  Perceptions: Patient no longer demonstrates response to internal stimuli.  Concentration: Grossly impaired  Memory: Grossly impaired  Intellect: Average.  Judgment and Insight: Questionable.     Impression:     Episodic mood disorder.  Delirium due to a medical condition    Aortic stenosis   Hx of CABG      Patient is a 59 year old polish speaking white  male with a PMH of CAD, PVD, hypertension, hyperlipidemia, diabetes, severe aortic stenosis and recent CABG on 6/19 who continue indicated sedation and intubation and SBT failed due to excessive agitation..    6/22/2025: The patient has been demonstrating delirium episode with alternation in mood and cognition with episodes of  increased confusion, restlessness, agitation and response to internal stimuli. This has led to extended extubation and sedation of the patient.    QTc is 485.  Otherwise current EKG demonstrated sinus rhythm.  Magnesium level is 1.8.  Patient with no risk of arrhythmia other than the underlying recent surgical procedure.  It is appropriate to have nightly sedation with the Zyprexa to stabilize the mood currently and utilize the combination of Haldol and Ativan 5/2 prior to extubation.  Otherwise tapering sedation slowly utilizing Haldol 2 mg as needed for restlessness.    6/23/2025: The patient has been demonstrating improvement in his delirium following administration of Haldol 5 mg and Ativan 2 mg prior to lowering of sedation and extubation. The patient was able to follow commands and demonstrated less agitation and confusion during SBT  today. Patient continues to be sedated and intubated due to tachypnea.     Patient demonstrating improvement in condition after being started on diuretics for fluid in the lungs. Today magnesium level is 2.1    6/24/2025: The patient has continued to demonstrate improvement in his agitation and confusion when sedation is lowered and extubation is attempted. Currently remains intubated and sedated with another extubation attempt scheduled for later today.    6/25/2025: The patient no longer demonstrates agitation and was extubated successfully yesterday. The patient continues to be confused at this time.    6/26/2025: The patient demonstrates improvement in his alertness, orientation and overall condition. He has not had any agitation since being extubated and his confusion continues to improve.  Otherwise the patient has been demonstrating some symptom of depression and was withdrawn with low interest.    6/27/2025: The patient continues to improve in his awareness and orientation, but still demonstrates withdrawn behavior and low interest in activities consistent with depression. Patient and team will be encouraged ensure he takes his psychiatric medications as scheduled.     Discussed risk and benefit, acknowledging the current symptom and severity.  At this point, I would recommend the following approach:     Focus on safety  Focus on education and support.  Focus on insight orientation helping the patient understand diagnosis and treatment plan.  Change Zyprexa to 2.5 mg evening.  Change Lexapro to 5 mg in the evening.  Continue Haldol 2 mg PRN for agitation  Processed with patient/family/RN at length, the initiation of the above psychotropic medications I advised the patient of the risks, benefits, alternatives and potential side effects. The patient consents to administration of the medications and understands the right to refuse medications at any time. The patient verbalized understanding.   Coordinate plan  with team    Orders This Visit:  Orders Placed This Encounter   Procedures    Basic Metabolic Panel (8)    CBC, Platelet; No Differential    CBC With Differential With Platelet    Basic Metabolic Panel (8)    Prothrombin Time (PT)    PTT, Activated    Fibrinogen Activity    Magnesium    Magnesium    Expanded Arterial Blood Gas    Arterial blood gas    Assay, Thyroid Stim Hormone    Lactic Acid, Plasma    Hepatic Function Panel (7)    CBC, Platelet; No Differential    Lactic Acid Reflex Post Positive    Hemoglobin & Hematocrit    CBC, Platelet; No Differential    Magnesium    Lactic Acid, Plasma    Arterial blood gas    Basic Metabolic Panel (8)    Lipid Panel    Venous Blood Gas    Arterial blood gas    Magnesium    Comp Metabolic Panel (14)    CBC, Platelet; No Differential    Expanded Arterial Blood Gas    CBC, Platelet; No Differential    Magnesium    Comp Metabolic Panel (14)    Sed Rate, Westergren (Automated)    Magnesium    Expanded Arterial Blood Gas    Venous Blood Gas    CBC With Differential With Platelet    Basic Metabolic Panel (8)    Magnesium    Basic Metabolic Panel (8)    Phosphorus    Magnesium    Potassium    Potassium    Expanded Arterial Blood Gas    CBC With Differential With Platelet    Sed Rate, Westergren (Automated)    Expanded Arterial Blood Gas    Potassium    Magnesium    Basic Metabolic Panel (8)    Magnesium    CBC With Differential With Platelet    CBC With Differential With Platelet    Potassium    Magnesium    Comp Metabolic Panel (14)    Phosphorus    Hemoglobin    Comp Metabolic Panel (14)    CBC With Differential With Platelet    Magnesium    Phosphorus    Comp Metabolic Panel (14)    CBC With Differential With Platelet    Magnesium    Phosphorus    Comp Metabolic Panel (14)    CBC With Differential With Platelet    Magnesium    Phosphorus    Prepare RBC STAT    ABORH Confirmation    Prepare platelets Once    Prepare fresh frozen plasma Once    Prepare cryoprecipitate Once     Type and screen    Prepare RBC Once    ABORH (Blood Type)    Antibody Screen    Prepare RBC Once    Specimen to Pathology Tissue    MRSA Screen by PCR    Sputum culture    Blood Culture    Emergency MRSA Screen by PCR       Meds This Visit:  Requested Prescriptions      No prescriptions requested or ordered in this encounter       Chivo Solares MD  6/27/2025    Note to Patient: The 21st Century Cures Act makes medical notes like these available to patients in the interest of transparency. However, be advised this is a medical document. It is intended as peer to peer communication. It is written in medical language and may contain abbreviations or verbiage that are unfamiliar. It may appear blunt or direct. Medical documents are intended to carry relevant information, facts as evident, and the clinical opinion of the practitioner. This note may have been transcribed using a voice dictation system. Voice recognition errors may occur. This should not be taken to alter the content or meaning of this note.

## 2025-06-27 NOTE — OCCUPATIONAL THERAPY NOTE
OCCUPATIONAL THERAPY TREATMENT NOTE - INPATIENT        Room Number: 233/233-A     Presenting Problem: CABG    Problem List  Active Problems:    Aortic stenosis    S/P CABG (coronary artery bypass graft)    Episodic mood disorder    Delirium due to another medical condition    Angina concurrent with and due to arteriosclerosis of CABG    Pre-op evaluation      OCCUPATIONAL THERAPY ASSESSMENT   Patient demonstrates fair progress this session, goals remain in progress.    Patient is requiring minimal assist, maximum assist, and dependent as a result of the following impairments: decreased functional strength, decreased functional reach, decreased endurance, impaired sitting/standing balance, decreased muscular endurance, cognitive deficits ( ), limited   ROM, decreased compliance/participation, increased O2 needs from baseline, decreased insight to deficits, and decreased safety awareness.    Patient continues to function below baseline with ADLs/mobility.  Next session anticipate patient to progress bed mobility, transfers, and static standing balance.  Occupational Therapy will continue to follow patient for duration of hospitalization.    Patient continues to benefit from continued skilled OT services: to facilitate return to prior level of function as patient demonstrates high motivation with excellent tolerance to an intensive therapy program.     PLAN DURING HOSPITALIZATION     OT Treatment Plan: Balance activities, Energy conservation/work simplification techniques, ADL training, Functional transfer training, UE strengthening/ROM, Endurance training, Cognitive reorientation, Patient/Family education, Patient/Family training, Equipment eval/education, Neuromuscluar reeducation, Fine motor coordination activities, Compensatory technique education     SUBJECTIVE  \"I can't\"- when encouraged to practice another functional transfers    OBJECTIVE  Precautions: Drain(s),  needed, Sternal, Chest tube to  suction (polish)       PAIN ASSESSMENT  Ratin    ACTIVITY TOLERANCE  Pulse: 93  Heart Rate Source: Monitor  BP: 155/50  BP Location: Other (Comment) (arterial reading)  BP Method: Automatic  Patient Position: Sitting       ACTIVITIES OF DAILY LIVING ASSESSMENT  AM-PAC ‘6-Clicks’ Inpatient Daily Activity Short Form  How much help from another person does the patient currently need…  -   Putting on and taking off regular lower body clothing?: Total  -   Bathing (including washing, rinsing, drying)?: A Lot  -   Toileting, which includes using toilet, bedpan or urinal? : Total  -   Putting on and taking off regular upper body clothing?: A Lot  -   Taking care of personal grooming such as brushing teeth?: A Little  -   Eating meals?: A Little    AM-PAC Score:  Score: 12  Approx Degree of Impairment: 66.57%  Standardized Score (AM-PAC Scale): 30.6  CMS Modifier (G-Code): CL    FUNCTIONAL TRANSFER ASSESSMENT  Sit to Stand: Chair  Chair: Dependent (max Ax2 x2 trials); 1st trial unable to achieve full stand; 2nd trials achieves full stand with B knee block, noted with R side buckle but able to correct, max cues for posture/position and able to stand with mod ax2 statically with B HHA for ~45 seconds. Pt with severe fatigue, extended rest break provided in between trials.  Unable to safely pivot chair>bed requiring overhead lift from bed>chair at end of session    BED MOBILITY  Rolling: Dependent  Sit to Supine (OT): Dependent    BALANCE ASSESSMENT  Static Standing: Moderate Assist    FUNCTIONAL ADL ASSESSMENT  Grooming Seated: Minimal Assist  LB Dressing Seated: Maximum Assist  Toileting Seated: Dependent      Skilled Therapy Provided: RN approved session. Video Polish  used via ipad. Coordinated with PT as pt required Ax2 to safely perform functional transfer trials with weakness and impaired cognition. Received patient in chair. Vitals: stable. Performed ADLs/functional transfers as stated above. At the end  of session, patient left in bed with needs met, alarm on and RN aware of session.    EDUCATION PROVIDED  Patient Education : Role of Occupational Therapy; Plan of Care; Functional Transfer Techniques; Fall Prevention; Surgical Precautions; Posture/Positioning; Energy Conservation  Patient's Response to Education: Requires Further Education  Family/Caregiver Education : -- (smae as pt)  Family/Caregiver's Response to Education: Verbalized Understanding; Returned Demonstration    The patient's Approx Degree of Impairment: 66.57% has been calculated based on documentation in the [facility] '6 clicks' Inpatient Daily Activity Short Form.  Research supports that patients with this level of impairment may benefit from rehab.  Final disposition will be made by interdisciplinary medical team.    Patient End of Session: In bed, Needs met, Call light within reach, RN aware of session/findings, All patient questions and concerns addressed, Alarm set, Family present    OT Goals: ongoing     Patient will complete functional transfer with mod A at RW level  Comment:     Patient will complete toileting with mod A  Comment:     Patient will tolerate standing for 2 minutes in prep for adls with mod A   Comment:              Goals  on: 07/15/25  Frequency: 3-5x/week    OT Session Time: 30 minutes  Self-Care Home Management: 5 minutes  Therapeutic Activity: 25 minutes    ALEYDA Cruz/FLAVIA

## 2025-06-27 NOTE — PROGRESS NOTES
Significant Event - Fall Note    Date/Time of Fall: June 27, 2025 at 1700    Fall huddle completed: Yes    Description of patient fall:     Patient fell from: Bed     Activity when fall occurred: Unknown     Where did fall occur: Patient room     Was the fall assisted: Found on floor/unassisted to floor    Who witnessed the fall: Unwitnessed    Patient narrative of fall: Polish  was used. Patient stated that he had to go to the bathroom.     Staff narrative of fall: Found patient on floor on his right side with stool on the ground. Drains and lines intact. Bed alarm on & electrodes found off. Pavithra sling was placed under patient and was moved back to bed with staff assist.     Name of Provider notified of fall: Tara MARS notified. ICU APN notified and at bedside.     Family notification: Family notified, Daughter Agatha notified via telephone.    Factors contributing to fall:     Physical: Unsteady gait     Psychological: Alert, Impulsive     Environmental: Equipment     Medications received in the past 8 hours:   Medication(s) Administered in past 8 Hours from 06/27/2025 0936 to 06/27/2025 1736       Date/Time Order Dose Route Action Action by Comments    06/27/2025 1312 CDT acetaminophen (Ofirmev) 10 mg/mL infusion premix 1,000 mg 1,000 mg Intravenous New Bag Santarromana, Katie, RN --    06/27/2025 0945 CDT amiodarone (Pacerone) tab 200 mg 200 mg Oral Given Santarromana, Katie, RN --    06/27/2025 0945 CDT aspirin chewable tab 81 mg 81 mg Per NG Tube Given Santarromana, Katie, RN --    06/27/2025 0937 CDT chlorhexidine gluconate (Peridex) 0.12 % oral solution 15 mL 15 mL Mouth/Throat Given Santarromana, Katie, RN --    06/27/2025 1001 CDT clevidipine (Cleviprex) 25 MG/50ML IV infusion 6 mg/hr Intravenous New Bag Santarromana, Katie, RN --    06/27/2025 1355 CDT clevidipine (Cleviprex) 25 MG/50ML IV infusion 5 mg/hr Intravenous New Bag Santarromana, Katie, RN --    06/27/2025 0945 CDT clopidogrel  (Plavix) tab 75 mg 75 mg Oral Given Santarromana, Katie, RN --    06/27/2025 0956 CDT colchicine tab 0.6 mg 0.6 mg Oral Given Santarromana, Katie, RN --    06/27/2025 0941 CDT docusate (Colace) 50 MG/5ML oral solution 100 mg 100 mg Per NG Tube Given Santarromana, Katie, RN --    06/27/2025 0937 CDT enoxaparin (Lovenox) 40 MG/0.4ML SUBQ injection 40 mg 40 mg Subcutaneous Given Santarromana, Katie, RN --    06/27/2025 0941 CDT ferrous sulfate 300 (60 Fe) MG/5ML oral syrup 300 mg 300 mg Oral Given Santarromana, Katie, RN --    06/27/2025 1636 CDT furosemide (Lasix) 10 mg/mL injection 40 mg 40 mg Intravenous Given Santarromana, Katie, RN --    06/27/2025 1309 CDT insulin degludec (Tresiba) 100 units/mL flextouch 20 Units 20 Units Subcutaneous Given Santarromana, Katie, RN --    06/27/2025 1308 CDT insulin regular human (Novolin R, Humulin R) 100 UNIT/ML injection 1-5 Units 1 Units Subcutaneous Given Santarromana, Katie, RN --    06/27/2025 1723 CDT labetalol (Trandate) 5 mg/mL injection 10 mg 10 mg Intravenous Given Santarromana, Katie, RN --    06/27/2025 1158 CDT nitroGLYCERIN in dextrose 5% 50 mg/250mL infusion premix 180 mcg/min Intravenous New Bag Santarromana, Katie, RN --    06/27/2025 1613 CDT potassium chloride 40 mEq/100mL IVPB premix (central line) 40 mEq 40 mEq Intravenous New Bag Santarromana, Katie, RN --    06/27/2025 0945 CDT sacubitril-valsartan (Entresto) 24-26 MG per tab 1 tablet 1 tablet Oral Given Santarromana, Katie, RN --    06/27/2025 0957 CDT sennosides (Senokot) tab 8.6 mg 8.6 mg Oral Given Santarromana, Katie, RN --    06/27/2025 0958 CDT tamsulosin (Flomax) cap 0.4 mg 0.4 mg Oral Given Santarromana, Katie, TATYANA --            Was patient identified as high fall risk prior to fall: Yes                               What interventions were in place prior to fall: Assistive devices (wheelchair, commode, walker, gait belt), Bed alarm, Bed in lowest position, Call light within reach, Patient situated  close to nursing station, Personal items within reach, Reality orientation, and Rounding, PT & OT therapy     Interventions post fall: Fall alert wristband, Patient/family involved in fall prevention plan, and Patient situated close to nursing station, Sitter at bedside    Additional comments: CT head completed. Hip X-rays completed. Sitter overnight.

## 2025-06-27 NOTE — PROGRESS NOTES
[facility]  part of [facility]    Progress Note    Jose Alberto Lizama Patient Status:  Inpatient    1965 MRN [id number]   Location [facility] 2W/SW Attending Tammy Zacarias MD   Hosp Day # 8 PCP Fiona Sweeney MD       Subjective:   Laying in bed and in no apparent distress.  Denied any active complaints at the time of interview.  Primarily Polish speaking.  No overnight events reported by the nursing staff.    Objective:   Blood pressure 149/85, pulse 73, temperature 98.8 °F (37.1 °C), temperature source Oral, resp. rate 22, height 5' 6\" (1.676 m), weight 213 lb 6.5 oz (96.8 kg), SpO2 99%.    Physical Exam:    General: No acute distress.   Respiratory: Clear to auscultation bilaterally. No wheezes. No rhonchi.  Cardiovascular: S1, S2. Regular rate and rhythm. No murmurs, rubs or gallops.   Abdomen: Soft, nontender, nondistended.  Positive bowel sounds. No rebound or guarding.  Neurologic: No focal neurological deficits.   Musculoskeletal: Moves all extremities.  Extremities: No edema.    Results:     Lab Results   Component Value Date    WBC 9.4 2025    HGB 9.1 (L) 2025    HCT 29.0 (L) 2025    .0 2025    CREATSERUM 0.66 (L) 2025    BUN 12 2025     2025    K 3.5 2025    K 3.5 2025     2025    CO2 30.0 2025     (H) 2025    CA 8.3 (L) 2025    ALB 3.7 2025    ALKPHO 57 2025    BILT 0.4 2025    TP 6.1 2025    AST 60 (H) 2025    ALT 36 2025    PTT 30.6 2025    INR 1.17 2025    TSH 0.696 2025    PSA 2.62 08/10/2023    ESRML 97 (H) 2025    MG 2.0 2025    PHOS 2.4 2025       XR ABDOMEN (1 VIEW) (CPT=74018)  Result Date: 2025  CONCLUSION: No acute process. Electronically Verified and Signed by Attending Radiologist: Jl Champion MD 2025 5:33 PM Workstation: [id number]    XR CHEST AP PORTABLE   (CPT=71045)  Result Date: 6/25/2025  CONCLUSION: Mild pulmonary edema has progressed. Bilateral lower lobe air space opacities suggestive of atelectasis has progressed. Underlying pneumonia is felt to be less likely but is also in the differential. Electronically Verified and Signed by Attending Radiologist: Refugio Lloyd MD 6/25/2025 5:20 PM Workstation: [id number]    EKG 12 Lead  Result Date: 6/27/2025  Normal sinus rhythm Left axis deviation Nonspecific intraventricular block Cannot rule out Inferior infarct , age undetermined T wave abnormality, consider anterolateral ischemia Abnormal ECG When compared with ECG of 24-JUN-2025 04:43, No significant change was found Confirmed by LENO OLIVEIRA ([id number]) on 6/27/2025 11:20:01 AM      Scheduled Medications[1]  PRN Medications[2]      Assessment and Plan:       CAD status post CABG and AVR  Severe aortic stenosis  - difficult extubation but patient extubated successfully 06/24  - Continue tube feeds  - management per cardiology and CV sx   Continue IV Lasix TID, Potassium BID while on Lasix  Continue amiodarone, asa, plavix, statin  Remains on nitroglycerin, Cleviprex drips  Repeat CXR pending at this time  Start Entresto 24/26 BID. Hold carvedilol at this time. Stop amlodipine indefinitely given severe LV dysfunction  - swallow eval  - chest tube management per surgery  - Monitor vitals  - PT/OT/SLP    Alcohol abuse/Withdrawal   - [facility] protocol   - Psych on board    Zyprexa, Lexapro nightly   Haldol PRN      DM2  - insulin per Vascular surgery     HTN  Dyslipidemia  - continue home meds         Quality:    CODE status: Full  DVT ppx - Lovenox        [1]    sacubitril-valsartan  1 tablet Oral BID    potassium chloride  20 mEq Intravenous BID    sodium chloride   Intravenous Once    escitalopram  5 mg Oral Nightly    amiodarone  200 mg Oral Daily    colchicine  0.6 mg Oral Daily    metoprolol tartrate  50 mg Oral 2x Daily(Beta Blocker)    lidocaine PF  5 mL Intradermal Once     furosemide  40 mg Intravenous TID    insulin degludec  20 Units Subcutaneous Daily    OLANZapine  2.5 mg Oral Nightly    ferrous sulfate  300 mg Oral BID    insulin regular human  1-5 Units Subcutaneous 4 times per day    aspirin  81 mg Per NG Tube Daily    docusate  100 mg Per NG Tube BID    acetaminophen  1,000 mg Intravenous Q6H    ipratropium-albuterol  3 mL Nebulization TID    budesonide  0.5 mg Nebulization 2 times daily    rosuvastatin  20 mg Per NG Tube Nightly    thiamine  100 mg Oral Daily    multivitamin  1 tablet Oral Daily    folic acid  1 mg Oral Daily    sugammadex  2 mg/kg Intravenous Once    sennosides  8.6 mg Oral BID    famotidine  20 mg Oral BID    Or    famotidine  20 mg Intravenous BID    mupirocin  1 Application Nasal BID    chlorhexidine gluconate  15 mL Mouth/Throat BID    ascorbic acid  500 mg Oral TID    enoxaparin  40 mg Subcutaneous Daily    clopidogrel  75 mg Oral Daily    tamsulosin  0.4 mg Oral BID   [2]   labetalol    dextrose 10%    sodium bicarbonate **AND** lipase-protease-amylase (Lip-Prot-Amyl)    ipratropium-albuterol    LORazepam **OR** LORazepam    LORazepam **OR** LORazepam    LORazepam **OR** LORazepam    LORazepam    LORazepam    LORazepam    haloperidol lactate    LORazepam    haloperidol lactate    melatonin    polyethylene glycol (PEG 3350)    bisacodyl    potassium chloride **OR** potassium chloride    magnesium sulfate in dextrose 5%    magnesium sulfate in sterile water for injection    acetaminophen **OR** HYDROcodone-acetaminophen **OR** [DISCONTINUED] HYDROcodone-acetaminophen    morphINE **OR** [DISCONTINUED] morphINE    glucose **OR** glucose **OR** glucose-vitamin C **OR** dextrose **OR** glucose **OR** glucose **OR** glucose-vitamin C

## 2025-06-27 NOTE — PLAN OF CARE
Problem: CARDIOVASCULAR - ADULT  Goal: Maintains optimal cardiac output and hemodynamic stability  Description: INTERVENTIONS:  - Monitor vital signs, rhythm, and trends  - Monitor for bleeding, hypotension and signs of decreased cardiac output  - Evaluate effectiveness of vasoactive medications to optimize hemodynamic stability  - Monitor arterial and/or venous puncture sites for bleeding and/or hematoma  - Assess quality of pulses, skin color and temperature  - Assess for signs of decreased coronary artery perfusion - ex. Angina  - Evaluate fluid balance, assess for edema, trend weights  Outcome: Progressing  Goal: Absence of cardiac arrhythmias or at baseline  Description: INTERVENTIONS:  - Continuous cardiac monitoring, monitor vital signs, obtain 12 lead EKG if indicated  - Evaluate effectiveness of antiarrhythmic and heart rate control medications as ordered  - Initiate emergency measures for life threatening arrhythmias  - Monitor electrolytes and administer replacement therapy as ordered  Outcome: Progressing     Problem: RESPIRATORY - ADULT  Goal: Achieves optimal ventilation and oxygenation  Description: INTERVENTIONS:  - Assess for changes in respiratory status  - Assess for changes in mentation and behavior  - Position to facilitate oxygenation and minimize respiratory effort  - Oxygen supplementation based on oxygen saturation or ABGs  - Provide Smoking Cessation handout, if applicable  - Encourage broncho-pulmonary hygiene including cough, deep breathe, Incentive Spirometry  - Assess the need for suctioning and perform as needed  - Assess and instruct to report SOB or any respiratory difficulty  - Respiratory Therapy support as indicated  - Manage/alleviate anxiety  - Monitor for signs/symptoms of CO2 retention  Outcome: Progressing     Problem: GASTROINTESTINAL - ADULT  Goal: Minimal or absence of nausea and vomiting  Description: INTERVENTIONS:  - Maintain adequate hydration with IV or PO as  ordered and tolerated  - Nasogastric tube to low intermittent suction as ordered  - Evaluate effectiveness of ordered antiemetic medications  - Provide nonpharmacologic comfort measures as appropriate  - Advance diet as tolerated, if ordered  - Obtain nutritional consult as needed  - Evaluate fluid balance  Outcome: Progressing  Goal: Maintains or returns to baseline bowel function  Description: INTERVENTIONS:  - Assess bowel function  - Maintain adequate hydration with IV or PO as ordered and tolerated  - Evaluate effectiveness of GI medications  - Encourage mobilization and activity  - Obtain nutritional consult as needed  - Establish a toileting routine/schedule  - Consider collaborating with pharmacy to review patient's medication profile  Outcome: Progressing     Problem: GENITOURINARY - ADULT  Goal: Absence of urinary retention  Description: INTERVENTIONS:  - Assess patient’s ability to void and empty bladder  - Monitor intake/output and perform bladder scan as needed  - Follow urinary retention protocol/standard of care  - Consider collaborating with pharmacy to review patient's medication profile  - Implement strategies to promote bladder emptying  Outcome: Progressing     Problem: METABOLIC/FLUID AND ELECTROLYTES - ADULT  Goal: Glucose maintained within prescribed range  Description: INTERVENTIONS:  - Monitor Blood Glucose as ordered  - Assess for signs and symptoms of hyperglycemia and hypoglycemia  - Administer ordered medications to maintain glucose within target range  - Assess barriers to adequate nutritional intake and initiate nutrition consult as needed  - Instruct patient on self management of diabetes  Outcome: Progressing  Goal: Electrolytes maintained within normal limits  Description: INTERVENTIONS:  - Monitor labs and rhythm and assess patient for signs and symptoms of electrolyte imbalances  - Administer electrolyte replacement as ordered  - Monitor response to electrolyte replacements,  including rhythm and repeat lab results as appropriate  - Fluid restriction as ordered  - Instruct patient on fluid and nutrition restrictions as appropriate  Outcome: Progressing  Goal: Hemodynamic stability and optimal renal function maintained  Description: INTERVENTIONS:  - Monitor labs and assess for signs and symptoms of volume excess or deficit  - Monitor intake, output and patient weight  - Monitor urine specific gravity, serum osmolarity and serum sodium as indicated or ordered  - Monitor response to interventions for patient's volume status, including labs, urine output, blood pressure (other measures as available)  - Encourage oral intake as appropriate  - Instruct patient on fluid and nutrition restrictions as appropriate  Outcome: Progressing     Problem: SKIN/TISSUE INTEGRITY - ADULT  Goal: Incision(s), wounds(s) or drain site(s) healing without S/S of infection  Description: INTERVENTIONS:  - Assess and document risk factors for pressure ulcer development  - Assess and document skin integrity  - Assess and document dressing/incision, wound bed, drain sites and surrounding tissue  - Implement wound care per orders  - Initiate isolation precautions as appropriate  - Initiate Pressure Ulcer prevention bundle as indicated  Outcome: Progressing  Goal: Oral mucous membranes remain intact  Description: INTERVENTIONS  - Assess oral mucosa and hygiene practices  - Implement preventative oral hygiene regimen  - Implement oral medicated treatments as ordered  Outcome: Progressing     Problem: HEMATOLOGIC - ADULT  Goal: Maintains hematologic stability  Description: INTERVENTIONS  - Assess for signs and symptoms of bleeding or hemorrhage  - Monitor labs and vital signs for trends  - Administer supportive blood products/factors, fluids and medications as ordered and appropriate  - Administer supportive blood products/factors as ordered and appropriate  Outcome: Progressing  Goal: Free from bleeding  injury  Description: (Example usage: patient with low platelets)  INTERVENTIONS:  - Avoid intramuscular injections, enemas and rectal medication administration  - Ensure safe mobilization of patient  - Hold pressure on venipuncture sites to achieve adequate hemostasis  - Assess for signs and symptoms of internal bleeding  - Monitor lab trends  - Patient is to report abnormal signs of bleeding to staff  - Avoid use of toothpicks and dental floss  - Use electric shaver for shaving  - Use soft bristle tooth brush  - Limit straining and forceful nose blowing  Outcome: Progressing     Problem: MUSCULOSKELETAL - ADULT  Goal: Return mobility to safest level of function  Description: INTERVENTIONS:  - Assess patient stability and activity tolerance for standing, transferring and ambulating w/ or w/o assistive devices  - Assist with transfers and ambulation using safe patient handling equipment as needed  - Ensure adequate protection for wounds/incisions during mobilization  - Obtain PT/OT consults as needed  - Advance activity as appropriate  - Communicate ordered activity level and limitations with patient/family  Outcome: Progressing     Problem: NEUROLOGICAL - ADULT  Goal: Achieves stable or improved neurological status  Description: INTERVENTIONS  - Assess for and report changes in neurological status  - Initiate measures to prevent increased intracranial pressure  - Maintain blood pressure and fluid volume within ordered parameters to optimize cerebral perfusion and minimize risk of hemorrhage  - Monitor temperature, glucose, and sodium. Initiate appropriate interventions as ordered  Outcome: Progressing     Problem: Patient Centered Care  Goal: Patient preferences are identified and integrated in the patient's plan of care  Description: Interventions:    - Provide timely, complete, and accurate information to patient/family  - Incorporate patient and family knowledge, values, beliefs, and cultural backgrounds into the  planning and delivery of care  - Encourage patient/family to participate in care and decision-making at the level they choose  - Honor patient and family perspectives and choices  Outcome: Progressing     Problem: Patient/Family Goals  Goal: Patient/Family Long Term Goal      Interventions:  - See additional Care Plan goals for specific interventions  Outcome: Progressing     Problem: Diabetes/Glucose Control  Goal: Glucose maintained within prescribed range  Description: INTERVENTIONS:  - Monitor Blood Glucose as ordered  - Assess for signs and symptoms of hyperglycemia and hypoglycemia  - Administer ordered medications to maintain glucose within target range  - Assess barriers to adequate nutritional intake and initiate nutrition consult as needed  - Instruct patient on self management of diabetes  Outcome: Progressing     Problem: Impaired Functional Mobility  Goal: Achieve highest/safest level of mobility/gait  Description: Interventions:  - Assess patient's functional ability and stability  - Promote increasing activity/tolerance for mobility and gait  - Educate and engage patient/family in tolerated activity level and precautions    Outcome: Progressing     Problem: Impaired Cognition  Goal: Patient will exhibit improved attention, thought processing and/or memory  Description: Interventions:    Outcome: Progressing

## 2025-06-27 NOTE — DIETARY NOTE
ADULT NUTRITION REASSESSMENT    Pt is at moderate nutrition risk.  Pt does not meet malnutrition criteria.      RECOMMENDATIONS TO MD:   See Nutrition Intervention for enteral nutrition (EN) and free water flushes (FWF) recommendation specifics     ADMITTING DIAGNOSIS:  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]  Aortic stenosis  PERTINENT PAST MEDICAL HISTORY: Past Medical History[1]    PATIENT STATUS:   Initial 06/22/25: Pt assessed due to critically ill - NPO day #3, Pt presented to the hospital for scheduled cardiac procedure. Has extensive PMH as listed above. POD#3 s/p CABG with aortic valve replacement. Remains orally intubated, sedated on propofol and precedex drip post-op. Pressor support x1. Failed SBT this AM. RN at bedside at time of visit. No family present for diet hx. Pt is well nourished, obese. Currently receiving ~500 non-nutritive kcal from Propofol LE and IV dextrose.       Update 6/24: Pt remains intubated, propofol stopped this AM. NPO day 5. Received consult to initiate tube feeds. Recs as below. Plan for attempt at SBT in hopes of extubation.  Will monitor and follow per protocol.    Update 6/27: Pt extubated later on 6/24. Pt seen by SLP, diet initiated and recommended pureed solids / nectar thickened liquids on 6/26. Per RN, pt's intake improving when more alert. Pt had 50% of lunch this afternoon- mac and cheese, carrots, yogurt, soup, coffee. Will add ONS to order to help meet nutritional needs while on modified diet. Will monitor intakes and follow per protocol.    FOOD/NUTRITION RELATED HISTORY:  Appetite: Unknown appetite PTA  Intake: ~50% x1 meals documented since last visit since diet adv  Intake Meeting Needs: No, but oral nutrition supplements (ONS) to maximize  Percent Meals Eaten (last 6 days)       Date/Time Percent Meals Eaten (%)    06/27/25 1400 50 %        Food Allergies: No Known Food Allergies (NKFA)  Cultural/Ethnic/Oriental orthodox Preferences: Not Obtained    GASTROINTESTINAL: +BM last  reported 6/27  UO: 4580 mL output over the past 24 hrs  I/Os: Net +2.9 L total this admission    MEDICATIONS: reviewed;Cardiac electrolyte replacement protocol ordered; noted scheduled reglan and senokot   clevidipine 4 mg/hr (06/27/25 1405)    dextrose 10%      nitroGLYCERIN in dextrose 5% 125 mcg/min (06/27/25 1400)    norepinephrine Stopped (06/23/25 1642)    sodium chloride 10 mL/hr at 06/24/25 2000    dexmedetomidine Stopped (06/24/25 1200)      sacubitril-valsartan  1 tablet Oral BID    potassium chloride  20 mEq Intravenous BID    sodium chloride   Intravenous Once    escitalopram  5 mg Oral Nightly    amiodarone  200 mg Oral Daily    colchicine  0.6 mg Oral Daily    metoprolol tartrate  50 mg Oral 2x Daily(Beta Blocker)    furosemide  40 mg Intravenous TID    insulin degludec  20 Units Subcutaneous Daily    OLANZapine  2.5 mg Oral Nightly    ferrous sulfate  300 mg Oral BID    insulin regular human  1-5 Units Subcutaneous 4 times per day    aspirin  81 mg Per NG Tube Daily    docusate  100 mg Per NG Tube BID    acetaminophen  1,000 mg Intravenous Q6H    ipratropium-albuterol  3 mL Nebulization TID    budesonide  0.5 mg Nebulization 2 times daily    rosuvastatin  20 mg Per NG Tube Nightly    thiamine  100 mg Oral Daily    multivitamin  1 tablet Oral Daily    folic acid  1 mg Oral Daily    sugammadex  2 mg/kg Intravenous Once    sennosides  8.6 mg Oral BID    famotidine  20 mg Oral BID    Or    famotidine  20 mg Intravenous BID    chlorhexidine gluconate  15 mL Mouth/Throat BID    ascorbic acid  500 mg Oral TID    enoxaparin  40 mg Subcutaneous Daily    clopidogrel  75 mg Oral Daily    tamsulosin  0.4 mg Oral BID     LABS: reviewed; Recent A1c 8.1 on 6/10/25   Recent Labs     06/25/25  0542 06/26/25  0243 06/26/25  0510 06/26/25  1411 06/26/25  1800 06/27/25  0058 06/27/25  0459   *  --  183*  --   --   --  163*   BUN 18  --  16  --   --   --  12   CREATSERUM 0.74  --  0.65*  --   --   --  0.66*   CA  8.5*  --  8.6*  --   --   --  8.3*   MG 2.0   < > 2.1   < > 2.1 1.9 2.0     --  136  --   --   --  137   K 3.7  3.7   < > 3.0*   < > 3.7 3.5 3.5  3.5     --  102  --   --   --  101   CO2 25.0  --  27.0  --   --   --  30.0   PHOS 2.9  --   --   --   --   --  2.4   OSMOCALC 294  --  288  --   --   --  287    < > = values in this interval not displayed.     WEIGHT HISTORY:  Patient Weight(s) for the past 336 hrs:   Weight   06/25/25 0600 96.8 kg (213 lb 6.5 oz)   06/24/25 0400 96.5 kg (212 lb 11.9 oz)   06/23/25 0429 97.8 kg (215 lb 9.8 oz)   06/22/25 0700 98.5 kg (217 lb 2.5 oz)   06/21/25 0500 97.6 kg (215 lb 2.7 oz)   06/20/25 0600 98.4 kg (216 lb 14.9 oz)   06/19/25 0603 92.1 kg (203 lb)     Wt Readings from Last 10 Encounters:   06/25/25 96.8 kg (213 lb 6.5 oz)   06/18/25 91.5 kg (201 lb 12.8 oz)   06/09/25 90.7 kg (200 lb)   02/18/25 93.4 kg (206 lb)   09/24/24 99.8 kg (220 lb)   04/18/24 99.8 kg (220 lb)   01/19/24 96.6 kg (213 lb)   01/16/24 97.5 kg (215 lb)   08/10/23 96.6 kg (213 lb)   03/16/23 97.1 kg (214 lb)     ANTHROPOMETRICS:  HT: 167.6 cm (5' 6\")  Wt Readings from Last 1 Encounters:   06/25/25 96.8 kg (213 lb 6.5 oz)   Last weight: Likely accurate and wt up 19 lbs from admission wt with edema present   Dosing Weight: 92.1 kg (203 lbs) - taken on 6/19, utilized for anthropometric calculations  BMI: Body mass index is 32.57 kg/m².  BMI CLASSIFICATION: 30-34.9 kg/m2 - obesity class I  IBW/lbs (Calculated) Male: 142 lbs           142% IBW  Usual Body Wt: 200 lbs       101% UBW    NUTRITION RELATED PHYSICAL FINDINGS:  - Nutrition Focused Physical Exam (NFPE): well nourished and no wasting noted  - Fluid Accumulation: non-pitting scrotal and perineal, +1 Bilateral Upper extremity, Lower extremity, and Feet, and +2 Bilateral Hand (s). See RN documentation for details  - Skin Integrity: at risk and surgical wound(s). See RN documentation for details      NUTRITION DIAGNOSIS/PROBLEM:   Inadequate  protein energy intake related to Decreased ability to consume sufficient energy as evidenced by orally intubated, sedated, unable to safely take adequate PO, NPO day #3.    NUTRITION DIAGNOSIS PROGRESS:  Improvement (unresolved) - pt extubated and diet adv to pureed / nectar thick liquids    NUTRITION INTERVENTION:     NUTRITION PRESCRIPTION:   Estimated Nutrition needs: --dosing wt of 92.1 kg - wt taken on 6/19/25  Calories: 1840 - 2300  calories/day (20-25 calories per kg Dosing wt)  Protein:  g protein/day (1.5-2 g protein/kg Ideal body wt (IBW) (65kg))  Fluid Needs: 1 ml/kcal - adjust for clinical status    - Diet:       Procedures    Cardiac diet Cardiac; Calorie Restriction/Carb Controlled: 2000 kcal/75 grams; Fluid Restriction: 1800 ml; Fluid Consistency: Nectar Thick / Mildly Thick Liquids; Texture Consistency: Pureed; Is Patient on Accuchecks? Yes; Is Patient on Suicide Pr...       - Nutrition Care Plan: Encouraged increased PO intake, Encouraged small frequent meals with emphasis on high calorie/high protein, and Initiated ONS (oral nutritional supplements)  - ONS (Oral Nutrition Supplements)/Meals/Snacks: Magic Cup (290 calories/ 9 g protein each) BID Vanilla, Chocolate, or Mixed berry   - Vitamin and mineral supplements: multivitamin/mineral and vitamin C  - Feeding assistance: meal set up  - Nutrition education: diet education before discharge and not appropriate at this time  - Coordination of nutrition care: collaboration with other providers - discussed with RN on unit  - Discharge and transfer of nutrition care to new setting or provider: monitor plans - from home with spouse    MONITOR AND EVALUATE/NUTRITION GOALS:  - Food and Nutrient Intake:      Monitor: adequacy of PO intake  - Food and Nutrient Administration:      Monitor: N/A  - Anthropometric Measurement:    Monitor weight  - Nutrition Goals:      allow wt loss due to fluid losses, intake to meet needs, labs within acceptable limits,  euglycemia, minimize lean body mass loss, and support body systems    DIETITIAN FOLLOW UP: RD to follow and monitor nutrition status      Ana Maria Charles, MS, RD, LDN  Clinical Dietitian  [id number]         [1]   Past Medical History:   Aortic stenosis    Back problem    BPH (benign prostatic hyperplasia)    Cardiomyopathy (HCC)    Coronary atherosclerosis    Depression    Diabetes (HCC)    Esophageal reflux    Heart valve disease    High blood pressure    High cholesterol    Peripheral vascular disease    Shortness of breath    related to smoking     Visual impairment    glasses

## 2025-06-27 NOTE — PROGRESS NOTES
[facility]  part of [facility]    Progress Note    Jose Alberto Lizama Patient Status:  Inpatient    1965 MRN [id number]   Location [facility] 2W/SW Attending Nannette Pelayo MD   Hosp Day # 8 PCP Fiona Sweeney MD       Subjective:   Subjective:  Seen and examined.  Comfortable on 6 L of oxygen  Generalized fatigue and weakness  Awake and alert and moving all extremities and following commands  Occasional cough  No abdominal pain  Objective:   Blood pressure 138/68, pulse 86, temperature 97.8 °F (36.6 °C), temperature source Oral, resp. rate 22, height 5' 6\" (1.676 m), weight 205 lb 14.6 oz (93.4 kg), SpO2 97%.  Physical Exam  Constitutional:       Appearance: He is obese. He is ill-appearing.   HENT:      Head: Atraumatic.      Mouth/Throat:      Mouth: Mucous membranes are moist.   Eyes:      General: No scleral icterus.  Cardiovascular:      Rate and Rhythm: Normal rate.      Heart sounds:      No gallop.   Pulmonary:      Comments: Diminished breath sounds in the bases otherwise clear with no wheezes or rhonchi  Abdominal:      General: Bowel sounds are normal.      Palpations: Abdomen is soft.      Tenderness: There is no guarding.   Musculoskeletal:      Right lower leg: Edema present.      Left lower leg: Edema present.   Lymphadenopathy:      Cervical: No cervical adenopathy.   Neurological:      General: No focal deficit present.      Comments: Generalized weakness and fatigue but no focal weakness and overall awake and oriented x 2 and moving extremities and following commands         Results:   Lab Results   Component Value Date    WBC 9.4 2025    HGB 9.1 (L) 2025    HCT 29.0 (L) 2025    .0 2025    CREATSERUM 0.66 (L) 2025    BUN 12 2025     2025    K 3.3 (L) 2025     2025    CO2 30.0 2025     (H) 2025    CA 8.3 (L) 2025    ALB 3.7 2025    ALKPHO 57 2025    BILT  0.4 06/27/2025    TP 6.1 06/27/2025    AST 60 (H) 06/27/2025    ALT 36 06/27/2025    PTT 30.6 06/19/2025    INR 1.17 06/19/2025    TSH 0.696 06/19/2025    PSA 2.62 08/10/2023    ESRML 97 (H) 06/25/2025    MG 2.1 06/27/2025    PHOS 2.4 06/27/2025       XR CHEST AP PORTABLE  (CPT=71045)  Result Date: 6/27/2025  CONCLUSION: Post cardiac surgery. Stable position of lines and tubes with interval placement of a right upper extremity PICC with tip at the cavoatrial junction. There are findings compatible with increased fluid volume status of the patient,. Electronically Verified and Signed by Attending Radiologist: Bal Stewart MD 6/27/2025 1:31 PM Workstation: Touchbase    EKG 12 Lead  Result Date: 6/27/2025  Normal sinus rhythm Left axis deviation Nonspecific intraventricular block Cannot rule out Inferior infarct , age undetermined T wave abnormality, consider anterolateral ischemia Abnormal ECG When compared with ECG of 24-JUN-2025 04:43, No significant change was found Confirmed by LENO OLIVEIRA ([id number]) on 6/27/2025 11:20:01 AM      Assessment & Plan:     1- s/p CABG and AVR on 6/19/25 for severe multivessel CAD and severe aortic stenosis  LVEF 30 %  HD better and off pressors   Plavix and aspirin and amiodarone     2-postop acute respiratory failure with pulmonary edema and mucous plugging  Extubated on 6/24/25 and tolerated   CXR cardiomegaly less edema , improving  6 L nasal cannula  To complete course of antibiotics  Bronchial hygiene and nebulizers with Pulmicort and DuoNeb  Incentive spirometry  Increase activity  Diuretics      3-possible EtOH use?  Withdrawal  Thiamine and folic acid and psych following     4-peripheral artery disease  Plavix and aspirin     5-HTN, HL, DM , obesity BMI 34 with possible ORAL     6-DVT prophylaxis  Lovenox subcutaneous       D/w staff                  Mora Marte MD  6/27/2025

## 2025-06-27 NOTE — PROGRESS NOTES
[facility]  part of [facility]    Progress Note    Jose Alberto Lizama Patient Status:  Inpatient    1965 MRN [id number]   Location [facility] 2W/SW Attending Nannette Pelayo MD   Hosp Day # 8 PCP Fiona Sweeney MD     Subjective:  Pt seen earlier this AM resting in bed on BiPap-drowsy but awakens to verbal stimuli. Language line used for interpretation # [id number]. Pt has no complaints. Denies pain and SOB.   No visitors at bedside.     Objective:  /74   Pulse 73   Temp 98.8 °F (37.1 °C) (Oral)   Resp 22   Ht 5' 6\" (1.676 m)   Wt 213 lb 6.5 oz (96.8 kg)   SpO2 99%   BMI 34.44 kg/m²     Temp (24hrs), Av.6 °F (37 °C), Min:97.9 °F (36.6 °C), Max:99.1 °F (37.3 °C)      Intake/Output:    Intake/Output Summary (Last 24 hours) at 2025 1101  Last data filed at 2025 1001  Gross per 24 hour   Intake 2005.75 ml   Output 5190 ml   Net -3184.25 ml       Wt Readings from Last 3 Encounters:   25 213 lb 6.5 oz (96.8 kg)   25 201 lb 12.8 oz (91.5 kg)   25 200 lb (90.7 kg)       Allergies:  Allergies[1]    Labs:  Lab Results   Component Value Date    WBC 9.4 2025    HGB 9.1 2025    HCT 29.0 2025    .0 2025    CREATSERUM 0.66 2025    BUN 12 2025     2025    K 3.5 2025    K 3.5 2025     2025    CO2 30.0 2025     2025    CA 8.3 2025    ALB 3.7 2025    ALKPHO 57 2025    BILT 0.4 2025    TP 6.1 2025    AST 60 2025    ALT 36 2025    MG 2.0 2025    PHOS 2.4 2025       Physical Exam:  Blood pressure 146/74, pulse 73, temperature 98.8 °F (37.1 °C), temperature source Oral, resp. rate 22, height 5' 6\" (1.676 m), weight 213 lb 6.5 oz (96.8 kg), SpO2 99%.  General: NAD  Neck: No JVD  Lungs: mildly coarse bilaterally anteriorly/diminished laterally   Mediastinal chest tube=240cc/12 hours- no air leak   Pleural chest  tubes=70cc/12 hours- no air leak   Heart: RRR, S1, S2  Abdomen: Soft/Round, NT/ND, BS+x4/+Flatus/no BM   Extremities: Warm, dry, trace UE/LE generalized edema bilat  Pulses: doppler bilat DP/PT  Skin: sternotomy incision dressing removed- site now BETTIE=CDI-cleaned w/Betadine. Bilateral leg incisions BETTIE=CDI   Neurological: drowsy/awakens to verbal stimuli/answers simple questions/moves all ext. / Calm.     Assessment/Plan:  S/P CABG x 2/AVR/MLEINDA CLIP/INSERTION OF LEFT FEMORAL IABP POD # 8  IABP removed on 6/20  Extubated on 6/24: Encourage pulm toilet: IS- will need assistance to perform at this time/EZ pap. Likely w/pre op ORAL. BiPap at night- wean to HF N/C during the day. CXR reviewed.   Sputum cx 6/22=negative. On Zosyn per Pulm/agree to continue w/recent AVR.   Pain meds as needed-limit/avoid narcotics w/post op altered MS. Can use Tylenol.   Post op altered MS w/agitation/confusion/restlessness of unknown etiology-slowly improving; suspect post op delirium and/or ETOH withdrawal. Psych eval on 6/22- follow recs. CT brain on 6/20=negative   Altered sleep disturbance/sleep pattern due to pt usually works nights-may be contributing to his drowsiness during the day/post op altered MS.  Increase activity-OOB to chair/ambulate in hallway-working with PT/OT- pt extremely weak.   Scds/Lovenox prophylaxis DVT prevention   Continues hemodynamically stable off Dobuta-Continue ICU status   HTN  on IV NTG/IV Beta-blocker/IV Cleviprex-wean as able. SBP goal= >90 and <140. Labetolol Q 6 PRN. Updated Cardiologist.  Elevated  ESR=97-may also have contributed to tachycardia- likely PPS- start Colchicine.   PICC placed 6/26- may remove cordis.   Expected post op volume overload- continue IV Lasix 40 mg TID and IV Potassium BID while on Lasix. Mckeon removed 6/25 after leaking noted-able to use external male catheter for close I/O monitoring.   Check K/Mag every 6 hours while on IV Lasix TID- replacement per protocol   Amio protocol  for AF prevention as pt considered high risk for post op AF. Post op SR w/freq PACs noted: on PO Amio daily.    Expected post op anemia w/o clinical significance/stable: may be slightly diluted due to volume overload- Iron x 1 month. Improved Hb after transfusion yesterday.   Hx: DM-Continue basal insulin dose today/continue correction scale coverage. Resume home regimen at/near discharge.    Nutrition- Solid Puree after swallow eval per ST-following recs. Unable to insert Dobhoff right nares yesterday due to bleeding/clots.    Hx: BPH- on home Flomax  Hx: PVD- on ASA/Plavix   Mild abdominal distension/constipation- KUB 6/25 w/o acute process- continue on Colace/Senna. Reglan stopped due to potential interaction w/Haldol.   Blood CX 6/22 =negative   Discharge planning: pt lives with his wife. Continue to monitor as pt progresses. SW/ involved w/discharge planning.  PMR eval performed 6/25- continues to follow progress. Anticipate pt needing rehab.     Addendum @ 1200:   Pt seen w/CV Surgeon: Dr. Pacheco and plan of care discussed. Able to remove pleural chest tubes today- keep Dane drain.   Wife at bedside and discussed plan of care. Language line used. Pt requesting to sit in chair.     Addendum @ 1448:   Pt up in chair this afternoon and able to take few steps. Able to wean off Cleviprex and will wean off NTG. Ate lunch/feed himself. Taking PO meds.  Plan to remove A-line and cordis this afternoon.     Plan of care discussed w/patient & RN  PARRIS Farfan  6/27/2025  11:01 AM         [1] No Known Allergies

## 2025-06-27 NOTE — PHYSICAL THERAPY NOTE
PHYSICAL THERAPY TREATMENT NOTE - INPATIENT     Room Number: 233/233-A       Presenting Problem: elective CABGx2 with post op withdrawal/complications    Problem List  Active Problems:    Aortic stenosis    S/P CABG (coronary artery bypass graft)    Episodic mood disorder    Delirium due to another medical condition    Angina concurrent with and due to arteriosclerosis of CABG    Pre-op evaluation    PHYSICAL THERAPY ASSESSMENT   Patient demonstrates fair progress this session, goals  remain in progress.      Patient is requiring dependent as a result of the following impairments: decreased functional strength, decreased endurance/aerobic capacity, pain, impaired standing balance, decreased muscular endurance, and medical status.     Patient continues to function below baseline with bed mobility, transfers, gait, stair negotiation, maintaining seated position, standing prolonged periods, and performing household tasks.  Next session anticipate patient to progress bed mobility, transfers, gait, stair negotiation, maintaining seated position, standing prolonged periods, and performing household tasks.  Physical Therapy will continue to follow patient for duration of hospitalization.    Patient continues to benefit from continued skilled PT services: to facilitate return to prior level of function as patient demonstrates high motivation with excellent tolerance to an intensive therapy program .    PLAN DURING HOSPITALIZATION  Nursing Mobility Recommendation : Lift Equipment     PT Treatment Plan: Bed mobility, Body mechanics, Coordination, Endurance, Energy conservation, Gait training, Strengthening, Stair training, Transfer training, Balance training  Frequency (Obs): 5x/week     SUBJECTIVE  \"How am I supposed to stand without using my hands?\" Polish video  utilized during session.    OBJECTIVE  Precautions: Drain(s),  needed, Sternal, Chest tube to suction (polish)    PAIN ASSESSMENT   Rating:  Unable to rate  Location: surgical site  Management Techniques: Activity promotion, Body mechanics, Breathing techniques, Relaxation, Repositioning    BALANCE  Static Sitting: Fair  Dynamic Sitting: Poor  Static Standing: Poor  Dynamic Standing: Dependent    ACTIVITY TOLERANCE  Pulse: 86  Heart Rate Source: Monitor     BP: 122/63 (after activity 95/54)  BP Location: Left arm  BP Method: Automatic  Patient Position: Sitting     O2 WALK  Oxygen Therapy  SPO2% on Oxygen at Rest: 99  At rest oxygen flow (liters per minute): 9    AM-PAC '6-Clicks' INPATIENT SHORT FORM - BASIC MOBILITY  How much difficulty does the patient currently have...  Patient Difficulty: Turning over in bed (including adjusting bedclothes, sheets and blankets)?: Unable   Patient Difficulty: Sitting down on and standing up from a chair with arms (e.g., wheelchair, bedside commode, etc.): Unable   Patient Difficulty: Moving from lying on back to sitting on the side of the bed?: Unable   How much help from another person does the patient currently need...   Help from Another: Moving to and from a bed to a chair (including a wheelchair)?: Total   Help from Another: Need to walk in hospital room?: Total   Help from Another: Climbing 3-5 steps with a railing?: Total     AM-PAC Score:  Raw Score: 6   Approx Degree of Impairment: 100%   Standardized Score (AM-PAC Scale): 23.55   CMS Modifier (G-Code): CN    FUNCTIONAL ABILITY STATUS  Functional Mobility/Gait Assessment  Gait Assistance: Not tested  Rolling: not tested  Supine to Sit: not tested (patient in recliner at start/end of session)  Sit to Supine: not tested  Sit to Stand: dependent (maximal assistance x 2 on first repetition, progressing to moderate assistance x 2)     Skilled Therapy Provided: Session coordinated with RN (communicated to RN to annemarie lift patient to chair, and therapy then able to work on transfers/sternal precautions education). Per RN, ended up pivoting patient with 3 person  assistance. Patient received seated in recliner chair at initiation of session agreeable to participation in PT, lethargic throughout session, Polish video  utilized throughout. Patient tolerates treatment fairly, demonstrates improvement in activity tolerance in comparison to previous session, however continues to require 2 person assistance for all out of bed mobility. Patient requires maximal assistance x 2 to perform sit to stand transfer, maximal verbal/tactile cueing in order to maintain sternal precautions throughout. Increased time spent educating patient on sequencing and use of hear pillow. Patient progressed to moderate assistance x 2 on second rep. Tolerates standing for ~15-20 seconds on each rep. Patient left in bedside chair, lines intact, needs in reach and handoff to RN.    The patient's Approx Degree of Impairment: 100% has been calculated based on documentation in the [facility] '6 clicks' Inpatient Daily Activity Short Form.  Research supports that patients with this level of impairment may benefit from LTAC, however given patient's previous level of function will recommend acute rehab.  Final disposition will be made by interdisciplinary medical team.    THERAPEUTIC EXERCISES  Lower Extremity Ankle pumps  Knee extension     Position Sitting       Patient End of Session: Up in chair, Needs met, Call light within reach, RN aware of session/findings, All patient questions and concerns addressed, Hospital anti-slip socks, Alarm set    CURRENT GOALS   Goals to be met by: 7/15/25  Patient Goal Patient's self-stated goal is: not stated -wife states it is for him to get back to normal   Goal #1 Patient is able to demonstrate supine - sit EOB @ level: moderate assistance      Goal #1   Current Status NT   Goal #2 Patient is able to demonstrate transfers Sit to/from Stand at assistance level: moderate assistance with walker - rolling      Goal #2  Current Status Progressing   Goal #3 Patient is able  to ambulate 25 feet with assist device: walker - rolling at assistance level: moderate assistance   Goal #3   Current Status  NT   Goal #4 Patient will negotiate bed to/from chair transfers with RW with moderate assistance   Goal #4   Current Status Progressing   Goal #5 Patient to demonstrate independence with home activity/exercise instructions provided to patient in preparation for discharge.   Goal #5   Current Status Progressing   Goal #6     Goal #6  Current Status       Therapeutic Activity: 25 minutes    Mitra Richard PT, DPT

## 2025-06-28 ENCOUNTER — APPOINTMENT (OUTPATIENT)
Dept: GENERAL RADIOLOGY | Facility: HOSPITAL | Age: 60
DRG: 219 | End: 2025-06-28
Attending: CLINICAL NURSE SPECIALIST
Payer: MEDICAID

## 2025-06-28 LAB
ALBUMIN SERPL-MCNC: 3.6 G/DL (ref 3.2–4.8)
ALBUMIN/GLOB SERPL: 1.6 {RATIO} (ref 1–2)
ALP LIVER SERPL-CCNC: 56 U/L (ref 45–117)
ALT SERPL-CCNC: 26 U/L (ref 10–49)
ANION GAP SERPL CALC-SCNC: 5 MMOL/L (ref 0–18)
AST SERPL-CCNC: 35 U/L (ref ?–34)
BASOPHILS # BLD AUTO: 0.07 X10(3) UL (ref 0–0.2)
BASOPHILS NFR BLD AUTO: 0.8 %
BILIRUB SERPL-MCNC: 0.4 MG/DL (ref 0.2–1.1)
BUN BLD-MCNC: 12 MG/DL (ref 9–23)
BUN/CREAT SERPL: 17.6 (ref 10–20)
CALCIUM BLD-MCNC: 8.2 MG/DL (ref 8.7–10.4)
CHLORIDE SERPL-SCNC: 103 MMOL/L (ref 98–112)
CO2 SERPL-SCNC: 34 MMOL/L (ref 21–32)
CREAT BLD-MCNC: 0.68 MG/DL (ref 0.7–1.3)
DEPRECATED RDW RBC AUTO: 46.1 FL (ref 35.1–46.3)
EGFRCR SERPLBLD CKD-EPI 2021: 106 ML/MIN/1.73M2 (ref 60–?)
EOSINOPHIL # BLD AUTO: 0.14 X10(3) UL (ref 0–0.7)
EOSINOPHIL NFR BLD AUTO: 1.7 %
ERYTHROCYTE [DISTWIDTH] IN BLOOD BY AUTOMATED COUNT: 15.9 % (ref 11–15)
GLOBULIN PLAS-MCNC: 2.3 G/DL (ref 2–3.5)
GLUCOSE BLD-MCNC: 125 MG/DL (ref 70–99)
GLUCOSE BLDC GLUCOMTR-MCNC: 120 MG/DL (ref 70–99)
GLUCOSE BLDC GLUCOMTR-MCNC: 125 MG/DL (ref 70–99)
GLUCOSE BLDC GLUCOMTR-MCNC: 220 MG/DL (ref 70–99)
GLUCOSE BLDC GLUCOMTR-MCNC: 264 MG/DL (ref 70–99)
HCT VFR BLD AUTO: 30.2 % (ref 39–53)
HGB BLD-MCNC: 9.7 G/DL (ref 13–17.5)
IMM GRANULOCYTES # BLD AUTO: 0.04 X10(3) UL (ref 0–1)
IMM GRANULOCYTES NFR BLD: 0.5 %
LYMPHOCYTES # BLD AUTO: 1.93 X10(3) UL (ref 1–4)
LYMPHOCYTES NFR BLD AUTO: 23 %
MAGNESIUM SERPL-MCNC: 1.9 MG/DL (ref 1.6–2.6)
MAGNESIUM SERPL-MCNC: 1.9 MG/DL (ref 1.6–2.6)
MAGNESIUM SERPL-MCNC: 2.1 MG/DL (ref 1.6–2.6)
MCH RBC QN AUTO: 25.9 PG (ref 26–34)
MCHC RBC AUTO-ENTMCNC: 32.1 G/DL (ref 31–37)
MCV RBC AUTO: 80.7 FL (ref 80–100)
MONOCYTES # BLD AUTO: 0.84 X10(3) UL (ref 0.1–1)
MONOCYTES NFR BLD AUTO: 10 %
NEUTROPHILS # BLD AUTO: 5.37 X10 (3) UL (ref 1.5–7.7)
NEUTROPHILS # BLD AUTO: 5.37 X10(3) UL (ref 1.5–7.7)
NEUTROPHILS NFR BLD AUTO: 64 %
OSMOLALITY SERPL CALC.SUM OF ELEC: 295 MOSM/KG (ref 275–295)
PHOSPHATE SERPL-MCNC: 3.5 MG/DL (ref 2.4–5.1)
PLATELET # BLD AUTO: 517 10(3)UL (ref 150–450)
POTASSIUM SERPL-SCNC: 3.5 MMOL/L (ref 3.5–5.1)
POTASSIUM SERPL-SCNC: 3.7 MMOL/L (ref 3.5–5.1)
POTASSIUM SERPL-SCNC: 3.7 MMOL/L (ref 3.5–5.1)
POTASSIUM SERPL-SCNC: 4.2 MMOL/L (ref 3.5–5.1)
PROT SERPL-MCNC: 5.9 G/DL (ref 5.7–8.2)
RBC # BLD AUTO: 3.74 X10(6)UL (ref 4.3–5.7)
SODIUM SERPL-SCNC: 142 MMOL/L (ref 136–145)
WBC # BLD AUTO: 8.4 X10(3) UL (ref 4–11)

## 2025-06-28 PROCEDURE — 71045 X-RAY EXAM CHEST 1 VIEW: CPT | Performed by: CLINICAL NURSE SPECIALIST

## 2025-06-28 PROCEDURE — 99233 SBSQ HOSP IP/OBS HIGH 50: CPT | Performed by: INTERNAL MEDICINE

## 2025-06-28 RX ORDER — COLCHICINE 0.6 MG/1
0.3 TABLET ORAL EVERY OTHER DAY
Status: DISCONTINUED | OUTPATIENT
Start: 2025-06-30 | End: 2025-07-03

## 2025-06-28 RX ORDER — SACUBITRIL AND VALSARTAN 49; 51 MG/1; MG/1
1 TABLET, FILM COATED ORAL 2 TIMES DAILY
Status: DISCONTINUED | OUTPATIENT
Start: 2025-06-28 | End: 2025-07-03

## 2025-06-28 RX ORDER — CARVEDILOL 6.25 MG/1
6.25 TABLET ORAL ONCE
Status: COMPLETED | OUTPATIENT
Start: 2025-06-28 | End: 2025-06-28

## 2025-06-28 RX ORDER — SACUBITRIL AND VALSARTAN 24; 26 MG/1; MG/1
1 TABLET, FILM COATED ORAL ONCE
Status: COMPLETED | OUTPATIENT
Start: 2025-06-28 | End: 2025-06-28

## 2025-06-28 RX ORDER — INSULIN DEGLUDEC 100 U/ML
15 INJECTION, SOLUTION SUBCUTANEOUS DAILY
Status: DISCONTINUED | OUTPATIENT
Start: 2025-06-28 | End: 2025-07-01

## 2025-06-28 RX ORDER — CARVEDILOL 12.5 MG/1
12.5 TABLET ORAL 2 TIMES DAILY WITH MEALS
Status: DISCONTINUED | OUTPATIENT
Start: 2025-06-28 | End: 2025-07-03

## 2025-06-28 RX ORDER — IPRATROPIUM BROMIDE AND ALBUTEROL SULFATE 2.5; .5 MG/3ML; MG/3ML
3 SOLUTION RESPIRATORY (INHALATION)
Status: DISCONTINUED | OUTPATIENT
Start: 2025-06-28 | End: 2025-07-03

## 2025-06-28 NOTE — PLAN OF CARE
Problem: CARDIOVASCULAR - ADULT  Goal: Maintains optimal cardiac output and hemodynamic stability  Description: INTERVENTIONS:  - Monitor vital signs, rhythm, and trends  - Monitor for bleeding, hypotension and signs of decreased cardiac output  - Evaluate effectiveness of vasoactive medications to optimize hemodynamic stability  - Monitor arterial and/or venous puncture sites for bleeding and/or hematoma  - Assess quality of pulses, skin color and temperature  - Assess for signs of decreased coronary artery perfusion - ex. Angina  - Evaluate fluid balance, assess for edema, trend weights  Outcome: Progressing  Goal: Absence of cardiac arrhythmias or at baseline  Description: INTERVENTIONS:  - Continuous cardiac monitoring, monitor vital signs, obtain 12 lead EKG if indicated  - Evaluate effectiveness of antiarrhythmic and heart rate control medications as ordered  - Initiate emergency measures for life threatening arrhythmias  - Monitor electrolytes and administer replacement therapy as ordered  Outcome: Progressing     Problem: RESPIRATORY - ADULT  Goal: Achieves optimal ventilation and oxygenation  Description: INTERVENTIONS:  - Assess for changes in respiratory status  - Assess for changes in mentation and behavior  - Position to facilitate oxygenation and minimize respiratory effort  - Oxygen supplementation based on oxygen saturation or ABGs  - Provide Smoking Cessation handout, if applicable  - Encourage broncho-pulmonary hygiene including cough, deep breathe, Incentive Spirometry  - Assess the need for suctioning and perform as needed  - Assess and instruct to report SOB or any respiratory difficulty  - Respiratory Therapy support as indicated  - Manage/alleviate anxiety  - Monitor for signs/symptoms of CO2 retention  Outcome: Progressing     Problem: GASTROINTESTINAL - ADULT  Goal: Minimal or absence of nausea and vomiting  Description: INTERVENTIONS:  - Maintain adequate hydration with IV or PO as  ordered and tolerated  - Nasogastric tube to low intermittent suction as ordered  - Evaluate effectiveness of ordered antiemetic medications  - Provide nonpharmacologic comfort measures as appropriate  - Advance diet as tolerated, if ordered  - Obtain nutritional consult as needed  - Evaluate fluid balance  Outcome: Progressing  Goal: Maintains or returns to baseline bowel function  Description: INTERVENTIONS:  - Assess bowel function  - Maintain adequate hydration with IV or PO as ordered and tolerated  - Evaluate effectiveness of GI medications  - Encourage mobilization and activity  - Obtain nutritional consult as needed  - Establish a toileting routine/schedule  - Consider collaborating with pharmacy to review patient's medication profile  Outcome: Progressing     Problem: GENITOURINARY - ADULT  Goal: Absence of urinary retention  Description: INTERVENTIONS:  - Assess patient’s ability to void and empty bladder  - Monitor intake/output and perform bladder scan as needed  - Follow urinary retention protocol/standard of care  - Consider collaborating with pharmacy to review patient's medication profile  - Implement strategies to promote bladder emptying  Outcome: Progressing     Problem: METABOLIC/FLUID AND ELECTROLYTES - ADULT  Goal: Glucose maintained within prescribed range  Description: INTERVENTIONS:  - Monitor Blood Glucose as ordered  - Assess for signs and symptoms of hyperglycemia and hypoglycemia  - Administer ordered medications to maintain glucose within target range  - Assess barriers to adequate nutritional intake and initiate nutrition consult as needed  - Instruct patient on self management of diabetes  Outcome: Progressing  Goal: Electrolytes maintained within normal limits  Description: INTERVENTIONS:  - Monitor labs and rhythm and assess patient for signs and symptoms of electrolyte imbalances  - Administer electrolyte replacement as ordered  - Monitor response to electrolyte replacements,  including rhythm and repeat lab results as appropriate  - Fluid restriction as ordered  - Instruct patient on fluid and nutrition restrictions as appropriate  Outcome: Progressing  Goal: Hemodynamic stability and optimal renal function maintained  Description: INTERVENTIONS:  - Monitor labs and assess for signs and symptoms of volume excess or deficit  - Monitor intake, output and patient weight  - Monitor urine specific gravity, serum osmolarity and serum sodium as indicated or ordered  - Monitor response to interventions for patient's volume status, including labs, urine output, blood pressure (other measures as available)  - Encourage oral intake as appropriate  - Instruct patient on fluid and nutrition restrictions as appropriate  Outcome: Progressing     Problem: SKIN/TISSUE INTEGRITY - ADULT  Goal: Incision(s), wounds(s) or drain site(s) healing without S/S of infection  Description: INTERVENTIONS:  - Assess and document risk factors for pressure ulcer development  - Assess and document skin integrity  - Assess and document dressing/incision, wound bed, drain sites and surrounding tissue  - Implement wound care per orders  - Initiate isolation precautions as appropriate  - Initiate Pressure Ulcer prevention bundle as indicated  Outcome: Progressing  Goal: Oral mucous membranes remain intact  Description: INTERVENTIONS  - Assess oral mucosa and hygiene practices  - Implement preventative oral hygiene regimen  - Implement oral medicated treatments as ordered  Outcome: Progressing     Problem: HEMATOLOGIC - ADULT  Goal: Maintains hematologic stability  Description: INTERVENTIONS  - Assess for signs and symptoms of bleeding or hemorrhage  - Monitor labs and vital signs for trends  - Administer supportive blood products/factors, fluids and medications as ordered and appropriate  - Administer supportive blood products/factors as ordered and appropriate  Outcome: Progressing  Goal: Free from bleeding  injury  Description: (Example usage: patient with low platelets)  INTERVENTIONS:  - Avoid intramuscular injections, enemas and rectal medication administration  - Ensure safe mobilization of patient  - Hold pressure on venipuncture sites to achieve adequate hemostasis  - Assess for signs and symptoms of internal bleeding  - Monitor lab trends  - Patient is to report abnormal signs of bleeding to staff  - Avoid use of toothpicks and dental floss  - Use electric shaver for shaving  - Use soft bristle tooth brush  - Limit straining and forceful nose blowing  Outcome: Progressing     Problem: MUSCULOSKELETAL - ADULT  Goal: Return mobility to safest level of function  Description: INTERVENTIONS:  - Assess patient stability and activity tolerance for standing, transferring and ambulating w/ or w/o assistive devices  - Assist with transfers and ambulation using safe patient handling equipment as needed  - Ensure adequate protection for wounds/incisions during mobilization  - Obtain PT/OT consults as needed  - Advance activity as appropriate  - Communicate ordered activity level and limitations with patient/family  Outcome: Progressing     Problem: NEUROLOGICAL - ADULT  Goal: Achieves stable or improved neurological status  Description: INTERVENTIONS  - Assess for and report changes in neurological status  - Initiate measures to prevent increased intracranial pressure  - Maintain blood pressure and fluid volume within ordered parameters to optimize cerebral perfusion and minimize risk of hemorrhage  - Monitor temperature, glucose, and sodium. Initiate appropriate interventions as ordered  Outcome: Progressing     Problem: Patient Centered Care  Goal: Patient preferences are identified and integrated in the patient's plan of care  Description: Interventions:  - What would you like us to know as we care for you?   - Provide timely, complete, and accurate information to patient/family  - Incorporate patient and family  knowledge, values, beliefs, and cultural backgrounds into the planning and delivery of care  - Encourage patient/family to participate in care and decision-making at the level they choose  - Honor patient and family perspectives and choices  Outcome: Progressing     Problem: Patient/Family Goals  Goal: Patient/Family Long Term Goal  Description: Patient's Long Term Goal:     Interventions:  -   - See additional Care Plan goals for specific interventions  Outcome: Progressing  Goal: Patnt/Family Short Term Goal  Description: Patient's Short Term Goal:     Interventions:   -   - See additional Care Plan goals for specific interventions  Outcome: Progressing     Problem: Diabetes/Glucose Control  Goal: Glucose maintained within prescribed range  Description: INTERVENTIONS:  - Monitor Blood Glucose as ordered  - Assess for signs and symptoms of hyperglycemia and hypoglycemia  - Administer ordered medications to maintain glucose within target range  - Assess barriers to adequate nutritional intake and initiate nutrition consult as needed  - Instruct patient on self management of diabetes  Outcome: Progressing     Problem: Impaired Functional Mobility  Goal: Achieve highest/safest level of mobility/gait  Description: Interventions:  - Assess patient's functional ability and stability  - Promote increasing activity/tolerance for mobility and gait  - Educate and engage patient/family in tolerated activity level and precautions    Outcome: Progressing     Problem: Impaired Cognition  Goal: Patient will exhibit improved attention, thought processing and/or memory  Description: Interventions:    Outcome: Progressing   Pt A&O, remains mildy confused when taking to Polish speaking RN, restless and impulsive, but cooperative, sitter at bedside helps. Drsg to area over rt eyebrow.  Vss, ntg on low dose, Bed alarm on and sitter at bedside.

## 2025-06-28 NOTE — PLAN OF CARE
Problem: CARDIOVASCULAR - ADULT  Goal: Maintains optimal cardiac output and hemodynamic stability  Description: INTERVENTIONS:  - Monitor vital signs, rhythm, and trends  - Monitor for bleeding, hypotension and signs of decreased cardiac output  - Evaluate effectiveness of vasoactive medications to optimize hemodynamic stability  - Monitor arterial and/or venous puncture sites for bleeding and/or hematoma  - Assess quality of pulses, skin color and temperature  - Assess for signs of decreased coronary artery perfusion - ex. Angina  - Evaluate fluid balance, assess for edema, trend weights  Outcome: Progressing  Goal: Absence of cardiac arrhythmias or at baseline  Description: INTERVENTIONS:  - Continuous cardiac monitoring, monitor vital signs, obtain 12 lead EKG if indicated  - Evaluate effectiveness of antiarrhythmic and heart rate control medications as ordered  - Initiate emergency measures for life threatening arrhythmias  - Monitor electrolytes and administer replacement therapy as ordered  Outcome: Progressing     Problem: RESPIRATORY - ADULT  Goal: Achieves optimal ventilation and oxygenation  Description: INTERVENTIONS:  - Assess for changes in respiratory status  - Assess for changes in mentation and behavior  - Position to facilitate oxygenation and minimize respiratory effort  - Oxygen supplementation based on oxygen saturation or ABGs  - Provide Smoking Cessation handout, if applicable  - Encourage broncho-pulmonary hygiene including cough, deep breathe, Incentive Spirometry  - Assess the need for suctioning and perform as needed  - Assess and instruct to report SOB or any respiratory difficulty  - Respiratory Therapy support as indicated  - Manage/alleviate anxiety  - Monitor for signs/symptoms of CO2 retention  Outcome: Progressing     Problem: GASTROINTESTINAL - ADULT  Goal: Minimal or absence of nausea and vomiting  Description: INTERVENTIONS:  - Maintain adequate hydration with IV or PO as  ordered and tolerated  - Nasogastric tube to low intermittent suction as ordered  - Evaluate effectiveness of ordered antiemetic medications  - Provide nonpharmacologic comfort measures as appropriate  - Advance diet as tolerated, if ordered  - Obtain nutritional consult as needed  - Evaluate fluid balance  Outcome: Progressing  Goal: Maintains or returns to baseline bowel function  Description: INTERVENTIONS:  - Assess bowel function  - Maintain adequate hydration with IV or PO as ordered and tolerated  - Evaluate effectiveness of GI medications  - Encourage mobilization and activity  - Obtain nutritional consult as needed  - Establish a toileting routine/schedule  - Consider collaborating with pharmacy to review patient's medication profile  Outcome: Progressing     Problem: GENITOURINARY - ADULT  Goal: Absence of urinary retention  Description: INTERVENTIONS:  - Assess patient’s ability to void and empty bladder  - Monitor intake/output and perform bladder scan as needed  - Follow urinary retention protocol/standard of care  - Consider collaborating with pharmacy to review patient's medication profile  - Implement strategies to promote bladder emptying  Outcome: Progressing     Problem: METABOLIC/FLUID AND ELECTROLYTES - ADULT  Goal: Glucose maintained within prescribed range  Description: INTERVENTIONS:  - Monitor Blood Glucose as ordered  - Assess for signs and symptoms of hyperglycemia and hypoglycemia  - Administer ordered medications to maintain glucose within target range  - Assess barriers to adequate nutritional intake and initiate nutrition consult as needed  - Instruct patient on self management of diabetes  Outcome: Progressing  Goal: Electrolytes maintained within normal limits  Description: INTERVENTIONS:  - Monitor labs and rhythm and assess patient for signs and symptoms of electrolyte imbalances  - Administer electrolyte replacement as ordered  - Monitor response to electrolyte replacements,  including rhythm and repeat lab results as appropriate  - Fluid restriction as ordered  - Instruct patient on fluid and nutrition restrictions as appropriate  Outcome: Progressing  Goal: Hemodynamic stability and optimal renal function maintained  Description: INTERVENTIONS:  - Monitor labs and assess for signs and symptoms of volume excess or deficit  - Monitor intake, output and patient weight  - Monitor urine specific gravity, serum osmolarity and serum sodium as indicated or ordered  - Monitor response to interventions for patient's volume status, including labs, urine output, blood pressure (other measures as available)  - Encourage oral intake as appropriate  - Instruct patient on fluid and nutrition restrictions as appropriate  Outcome: Progressing     Problem: SKIN/TISSUE INTEGRITY - ADULT  Goal: Incision(s), wounds(s) or drain site(s) healing without S/S of infection  Description: INTERVENTIONS:  - Assess and document risk factors for pressure ulcer development  - Assess and document skin integrity  - Assess and document dressing/incision, wound bed, drain sites and surrounding tissue  - Implement wound care per orders  - Initiate isolation precautions as appropriate  - Initiate Pressure Ulcer prevention bundle as indicated  Outcome: Progressing  Goal: Oral mucous membranes remain intact  Description: INTERVENTIONS  - Assess oral mucosa and hygiene practices  - Implement preventative oral hygiene regimen  - Implement oral medicated treatments as ordered  Outcome: Progressing     Problem: HEMATOLOGIC - ADULT  Goal: Maintains hematologic stability  Description: INTERVENTIONS  - Assess for signs and symptoms of bleeding or hemorrhage  - Monitor labs and vital signs for trends  - Administer supportive blood products/factors, fluids and medications as ordered and appropriate  - Administer supportive blood products/factors as ordered and appropriate  Outcome: Progressing  Goal: Free from bleeding  injury  Description: (Example usage: patient with low platelets)  INTERVENTIONS:  - Avoid intramuscular injections, enemas and rectal medication administration  - Ensure safe mobilization of patient  - Hold pressure on venipuncture sites to achieve adequate hemostasis  - Assess for signs and symptoms of internal bleeding  - Monitor lab trends  - Patient is to report abnormal signs of bleeding to staff  - Avoid use of toothpicks and dental floss  - Use electric shaver for shaving  - Use soft bristle tooth brush  - Limit straining and forceful nose blowing  Outcome: Progressing     Problem: MUSCULOSKELETAL - ADULT  Goal: Return mobility to safest level of function  Description: INTERVENTIONS:  - Assess patient stability and activity tolerance for standing, transferring and ambulating w/ or w/o assistive devices  - Assist with transfers and ambulation using safe patient handling equipment as needed  - Ensure adequate protection for wounds/incisions during mobilization  - Obtain PT/OT consults as needed  - Advance activity as appropriate  - Communicate ordered activity level and limitations with patient/family  Outcome: Progressing     Problem: NEUROLOGICAL - ADULT  Goal: Achieves stable or improved neurological status  Description: INTERVENTIONS  - Assess for and report changes in neurological status  - Initiate measures to prevent increased intracranial pressure  - Maintain blood pressure and fluid volume within ordered parameters to optimize cerebral perfusion and minimize risk of hemorrhage  - Monitor temperature, glucose, and sodium. Initiate appropriate interventions as ordered  Outcome: Progressing     Problem: Patient Centered Care  Goal: Patient preferences are identified and integrated in the patient's plan of care  Description: Interventions:  - What would you like us to know as we care for you? I need some coffee.  - Provide timely, complete, and accurate information to patient/family  - Incorporate patient  and family knowledge, values, beliefs, and cultural backgrounds into the planning and delivery of care  - Encourage patient/family to participate in care and decision-making at the level they choose  - Honor patient and family perspectives and choices  Outcome: Progressing     Problem: Patient/Family Goals  Goal: Patient/Family Long Term Goal  Description: Patient's Long Term Goal: To go home from the hospital.    Interventions:  - Up to chair 3x/daily  - Ambulate in the halls  - See additional Care Plan goals for specific interventions  Outcome: Progressing  Goal: Patient/Family Short Term Goal  Description: Patient's Short Term Goal: Tolerate thin liquids.    Interventions:   - Increase oral intake.  - See additional Care Plan goals for specific interventions  Outcome: Progressing     Problem: Diabetes/Glucose Control  Goal: Glucose maintained within prescribed range  Description: INTERVENTIONS:  - Monitor Blood Glucose as ordered  - Assess for signs and symptoms of hyperglycemia and hypoglycemia  - Administer ordered medications to maintain glucose within target range  - Assess barriers to adequate nutritional intake and initiate nutrition consult as needed  - Instruct patient on self management of diabetes  Outcome: Progressing     Problem: Impaired Functional Mobility  Goal: Achieve highest/safest level of mobility/gait  Description: Interventions:  - Assess patient's functional ability and stability  - Promote increasing activity/tolerance for mobility and gait  - Educate and engage patient/family in tolerated activity level and precautions    Outcome: Progressing     Problem: Impaired Cognition  Goal: Patient will exhibit improved attention, thought processing and/or memory  Description: Interventions:    Outcome: Progressing

## 2025-06-28 NOTE — PROGRESS NOTES
[facility]  part of [facility]    Cardiology Progress Note    Jose Alberto Lizama Patient Status:  Inpatient    1965 MRN [id number]   Location [facility] 2W/SW Attending Nannette Pelayo MD   Hosp Day # 9 PCP Fiona Sweeney MD     Interval History   Fell out of bed yesterday evening, now with sitter due to episodes of agitation    Assessment and Plan:   Agitation, possibly EtOH withdrawal    HFrEF  Ischemic cardiomyopathy  -underwent surgical revascularization with 2v CABG  -pre-op and post-op TTE without significant change in LV function  -currently volume overloaded  -BP has been elevated  Plan  -continue IV diuresis with lasix 40mg TID   -strict I/Os and closely monitor renal function / lytes   -GDMT optimization given elevated BP: increase entresto to 49-51mg BID, switch metoprolol to coreg 12.5mg BID  -close volume monitoring    Severe aortic stenosis due to rheumatic dz s/p SAVR  Multivessel CAD s/p CABG  S/p MELINDA clip  -underwent 2v CABG with LIMA-LAD, rSVG-D1, 21mm Peres Inspiris bioprosthetic AV, MELINDA clip on 25  -post-op TTE with normal functioning bioprosthetic AV with Vmax of 2.5m/s, and MG of 12mmHg; LVEF relatively unchanged  -no need for systemic AC for future AF  Plan  -Continue antiplatelet therapy  -Continue statin    Objective:   Patient Vitals for the past 24 hrs:   BP Temp Temp src Pulse Resp SpO2 Weight   25 0800 129/79 98.7 °F (37.1 °C) Oral 72 22 97 % --   25 0700 121/70 -- -- 72 21 99 % --   25 0630 136/76 -- -- 77 24 98 % --   25 0600 145/72 -- -- 74 19 99 % 196 lb 13.9 oz (89.3 kg)   25 0530 143/72 -- -- 76 22 98 % --   25 0500 120/59 -- -- 78 20 95 % --   25 0430 153/78 -- -- 78 24 96 % --   25 0400 153/76 98.8 °F (37.1 °C) Oral 78 22 99 % --   25 0330 138/84 -- -- 74 17 100 % --   25 0300 139/73 -- -- 77 23 99 % --   25 0230 122/59 -- -- 74 21 99 % --   25 0200 147/80 -- -- 74 23  100 % --   06/28/25 0100 158/76 -- -- 75 22 100 % --   06/28/25 0000 (!) 167/82 98 °F (36.7 °C) Oral 76 21 100 % --   06/27/25 2300 (!) 165/75 -- -- 74 24 99 % --   06/27/25 2200 (!) 174/86 -- -- 76 22 99 % --   06/27/25 2000 156/75 97.7 °F (36.5 °C) Oral 77 17 100 % --   06/27/25 1900 145/78 -- -- 79 25 100 % --   06/27/25 1800 126/75 -- -- 77 24 95 % --   06/27/25 1700 141/71 -- -- 93 20 100 % --   06/27/25 1600 138/68 97.8 °F (36.6 °C) Oral 86 22 100 % 205 lb 14.6 oz (93.4 kg)   06/27/25 1500 119/61 -- -- 90 11 100 % --   06/27/25 1416 122/63 -- -- 86 -- -- --   06/27/25 1400 122/63 -- -- 93 25 97 % --   06/27/25 1300 -- -- -- 88 (!) 28 96 % --   06/27/25 1200 132/65 98.7 °F (37.1 °C) Oral 84 20 97 % --   06/27/25 1100 149/85 -- -- 81 23 97 % --   06/27/25 1000 -- -- -- 87 24 94 % --   06/27/25 0900 -- -- -- 76 23 97 % --       Intake/Output:   Last 3 shifts:   Intake/Output                   06/26/25 0700 - 06/27/25 0659 06/27/25 0700 - 06/28/25 0659 06/28/25 0700 - 06/29/25 0659       Intake    P.O.  --  320  --    P.O. -- 320 --    I.V.  1132.8  678  --    I.V. 186 180 --    Volume (mL) Nitroglycerin 946.8 458 --    Volume (mL) (sodium chloride 0.9% infusion) -- 40 --    Blood  400  --  --    Volume (Transfuse RBC) 400 -- --    IV PIGGYBACK  700  300  --    Volume (mL) (piperacillin-tazobactam (Zosyn) 3.375 g in dextrose 5% 100 mL IVPB-ADDV) 100 -- --    Volume (mL) (acetaminophen (Ofirmev) 10 mg/mL infusion premix 1,000 mg) 200 100 --    Volume (mL) (potassium chloride 20 mEq/100mL IVPB premix 20 mEq) 200 100 --    Volume (mL) (potassium chloride 20 mEq/100mL IVPB premix 20 mEq) -- 100 --    Volume (mL) (potassium chloride 40 mEq/100mL IVPB premix (central line) 40 mEq) 200 -- --    Total Intake 2232.8 1298 --       Output    Urine  3800  3250  --    Urine 2050 -- --    Incontinent Urine Occurrence -- 1 x --    Output (mL) (External Urinary Catheter) 1750 3250 --    Emesis/NG output  --  0  --    Emesis -- 0  --    Stool  --  0  --    Stool (mL) -- 0 --    Chest Tube  780  440  --    Output (mL) ([REMOVED] Y Chest Tube 1 and 2 Anterior Mediastinal 32 Fr. Anterior Pleural 32 Fr.) 130 60 --    Output (mL) (Chest Tube Pericardial 24 Fr.) 650 380 --    Total Output 4580 3690 --       Net I/O     -2347.3 -2392 --             Vent Settings:      Hemodynamic parameters (last 24 hours):      Scheduled Meds: Scheduled Medications[1]    Continuous Infusions: Medication Infusions[2]    Results:     Lab Results   Component Value Date    WBC 8.4 06/28/2025    HGB 9.7 (L) 06/28/2025    HCT 30.2 (L) 06/28/2025    .0 (H) 06/28/2025    CREATSERUM 0.68 (L) 06/28/2025    BUN 12 06/28/2025     06/28/2025    K 3.7 06/28/2025    K 3.7 06/28/2025     06/28/2025    CO2 34.0 (H) 06/28/2025     (H) 06/28/2025    CA 8.2 (L) 06/28/2025    ALB 3.6 06/28/2025    ALKPHO 56 06/28/2025    BILT 0.4 06/28/2025    TP 5.9 06/28/2025    AST 35 (H) 06/28/2025    ALT 26 06/28/2025    PTT 30.6 06/19/2025    INR 1.17 06/19/2025    TSH 0.696 06/19/2025    PSA 2.62 08/10/2023    ESRML 97 (H) 06/25/2025    MG 2.1 06/28/2025    PHOS 3.5 06/28/2025       Recent Labs   Lab 06/26/25  0510 06/26/25  1411 06/27/25  0459 06/27/25  1258 06/27/25  2133 06/28/25  0347   *  --  163*  --   --  125*   BUN 16  --  12  --   --  12   CREATSERUM 0.65*  --  0.66*  --   --  0.68*   CA 8.6*  --  8.3*  --   --  8.2*     --  137  --   --  142   K 3.0*   < > 3.5  3.5 3.3* 3.7 3.7  3.7     --  101  --   --  103   CO2 27.0  --  30.0  --   --  34.0*    < > = values in this interval not displayed.     Recent Labs   Lab 06/26/25  0510 06/26/25  1533 06/27/25  0459 06/28/25  0347   RBC 3.26*  --  3.60* 3.74*   HGB 8.2* 8.2* 9.1* 9.7*   HCT 25.9*  --  29.0* 30.2*   MCV 79.4*  --  80.6 80.7   MCH 25.2*  --  25.3* 25.9*   MCHC 31.7  --  31.4 32.1   RDW 16.0*  --  15.9* 15.9*   NEPRELIM 6.76  --  6.45 5.37   WBC 9.3  --  9.4 8.4   .0  --  411.0  517.0*       No results for input(s): \"BNPML\" in the last 168 hours.    No results for input(s): \"TROP\", \"CK\" in the last 168 hours.    XR CHEST AP PORTABLE  (CPT=71045)  Result Date: 6/28/2025  CONCLUSION: Post cardiac surgery. Findings suggesting increased fluid volume status of the patient/CHF with increasing vascular congestion, increasing interstitial edema, and increasing left pleural effusion Electronically Verified and Signed by Attending Radiologist: Bal Stewart MD 6/28/2025 8:10 AM Workstation: [id number]    CT BRAIN OR HEAD (CPT=70450)  Result Date: 6/27/2025  CONCLUSION: No acute intracranial abnormality. Electronically Verified and Signed by Attending Radiologist: Refugio Lloyd MD 6/27/2025 7:35 PM Workstation: [id number]    XR HIP W OR WO PELVIS 2 OR 3 VIEWS, RIGHT (CPT=73502)  Result Date: 6/27/2025  CONCLUSION: Tiny right femoral head and acetabular osteophytes with preserved right hip joint space.  Electronically Verified and Signed by Attending Radiologist: Refugio Lloyd MD 6/27/2025 7:01 PM Workstation: [id number]    XR CHEST AP PORTABLE  (CPT=71045)  Result Date: 6/27/2025  CONCLUSION: Post cardiac surgery. Stable position of lines and tubes with interval placement of a right upper extremity PICC with tip at the cavoatrial junction. There are findings compatible with increased fluid volume status of the patient,. Electronically Verified and Signed by Attending Radiologist: Bal Stewart MD 6/27/2025 1:31 PM Workstation: [id number]    EKG 12 Lead  Result Date: 6/27/2025  Normal sinus rhythm Left axis deviation Nonspecific intraventricular block Cannot rule out Inferior infarct , age undetermined T wave abnormality, consider anterolateral ischemia Abnormal ECG When compared with ECG of 24-JUN-2025 04:43, No significant change was found Confirmed by LENO OLIVEIRA ([id number]) on 6/27/2025 11:20:01 AM    CARD ECHO 2D DOPPLER CONTRAST (CPT=93306)  Result Date: 6/23/2025  Transthoracic Echocardiogram Name:Dl  Jose Alberto Date: 2025 :  1965 Ht:  (66in)  BP: 100 / 62 MRN:  [id number]    Age:  59years    Wt:  (215lb) HR: 73bpm Loc:  [facility]       Gndr: CANDICE          BSA: 2.06m^2 Sonographer: Svetlana CARVALHO Ordering:    Ivanna Lewis Consulting:  Chivo Solares ---------------------------------------------------------------------------- History/Indications:   Post-Op CABG, AVR. ---------------------------------------------------------------------------- Procedure information:  A transthoracic complete 2D study was performed. Additional evaluation included M-mode, complete spectral Doppler, and color Doppler.  Patient status:  Inpatient.  Location:  Bedside.    Comparison was made to the study of 08/10/2019.    This was a routine study. Transthoracic echocardiography for diagnosis. Image quality was adequate. The study was technically limited due to poor acoustic window availability, restricted patient mobility, surgical dressings, patient supine, and patient on ventilator. Intravenous contrast (Definity) was administered to evaluate left ventricular function and enhance regional wall motion assessment. ECG rhythm:   Normal sinus ---------------------------------------------------------------------------- Conclusions: 1. Left ventricle: The cavity size was normal. Wall thickness was normal.    Systolic function was mildly to moderately reduced. The estimated    ejection fraction was 30-35%, by visual assessment. Features are    consistent with a pseudonormal left ventricular filling pattern, with    concomitant abnormal relaxation and increased filling pressure - grade 2    diastolic dysfunction. 2. Right ventricle: Pacer wire noted in the right ventricle. Systolic    function was reduced. 3. Left atrium: The atrium was mildly dilated. 4. Aortic valve: A bioprosthetic valve is present. The peak systolic    velocity was 2.51m/sec. The mean systolic gradient was 12mm Hg. The valve    area (VTI) was 1.12cm^2. The valve area  (VTI) index was 0.54cm^2/m^2. 5. Pericardium, extracardiac: There was no pericardial effusion. * ---------------------------------------------------------------------------- * Findings: Left ventricle:  The cavity size was normal. Wall thickness was normal. Systolic function was mildly to moderately reduced. The estimated ejection fraction was 30-35%, by visual assessment. No diagnostic evidence for diffuse regional wall motion abnormalities. Features are consistent with a pseudonormal left ventricular filling pattern, with concomitant abnormal relaxation and increased filling pressure - grade 2 diastolic dysfunction. Left atrium:  Well visualized. The atrium was mildly dilated. Right ventricle:  The cavity size was at the upper limits of normal. Pacer wire noted in the right ventricle. Systolic function was reduced. Right atrium:  Well visualized. The atrium was normal in size. Mitral valve:  Well visualized. The valve was structurally normal. The leaflets were normal thickness. Leaflet separation was normal. No evidence for prolapse.  Doppler:  Transvalvular velocity was within the normal range. There was no evidence for stenosis. There was no significant regurgitation. Aortic valve:  Well visualized. A bioprosthetic valve is present. The leaflets were normal thickness. Cusp separation was normal.  Doppler: Transvalvular velocity was increased. There was no evidence for stenosis. There was no significant regurgitation.    The valve area (VTI) was 1.12cm^2. The valve area (VTI) index was 0.54cm^2/m^2.    The mean systolic gradient was 12mm Hg. The peak systolic gradient was 25mm Hg. Tricuspid valve:  Well visualized. The annulus is normal-sized. The valve is structurally normal. The leaflets are normal thickness. Leaflet separation was normal. No echocardiographic evidence for tricuspid prolapse.  Doppler: Transvalvular velocity was within the normal range. There was no evidence for stenosis. There was no  significant regurgitation. Pulmonic valve:   Well visualized. The annulus is normal-sized. The valve is structurally normal. The leaflets are normal thickness. Cusp separation was normal. No evidence for prolapse.  Doppler:  Transvalvular velocity was within the normal range. There was no evidence for stenosis. There was no significant regurgitation. Pericardium:   There was no pericardial effusion. Pleura:  No evidence of pleural fluid accumulation. Aorta: Aortic root: The aortic root was normal-sized. The aortic root was normal. Ascending aorta: The ascending aorta was normal. The ascending aorta was normal. Pulmonary arteries: The main pulmonary artery was normal-sized.  Systolic pressure could not be accurately estimated. Systemic veins:  Central venous respirophasic diameter changes are blunted (< 50%). Inferior vena cava: The IVC was normally collapsible and normal-sized. The IVC was dilated. ---------------------------------------------------------------------------- Measurements  Left ventricle                  Value          Ref  IVS thickness, ED, PLAX     (H) 1.1   cm       0.6 - 1.0  LV ID, ED, PLAX                 5.6   cm       4.2 - 5.8  LV ID, ES, PLAX             (H) 4.7   cm       2.5 - 4.0  LV PW thickness, ED, PLAX       0.9   cm       0.6 - 1.0  IVS/LV PW ratio, ED, PLAX       1.22           ---------  LV PW/LV ID ratio, ED, PLAX     0.16           ---------  LV ejection fraction        (L) 34    %        52 - 72  Stroke volume/bsa, 2D           21    ml/m^2   ---------  LV e', lateral              (L) 4.3   cm/sec   >=10.0  LV E/e', lateral            (H) 29             <=13  LV e', medial               (L) 3.1   cm/sec   >=7.0  LV E/e', medial                 41             ---------  LV e', average                  3.7   cm/sec   ---------  LV E/e', average            (H) 34             <=14  LVOT                            Value          Ref  LVOT ID                         1.9   cm        ---------  LVOT peak velocity, S           1.03  m/sec    ---------  LVOT VTI, S                     15.1  cm       ---------  LVOT peak gradient, S           4     mm Hg    ---------  LVOT mean gradient, S           2     mm Hg    ---------  Stroke volume (SV), LVOT DP     43    ml       ---------  Stroke index (SV/bsa), LVOT     21    ml/m^2   ---------  DP  Aortic valve                    Value          Ref  Aortic valve peak velocity,     2.51  m/sec    ---------  S  Aortic valve VTI, S             38.1  cm       ---------  Aortic mean gradient, S         12    mm Hg    ---------  Aortic peak gradient, S         25    mm Hg    ---------  Aortic valve area, VTI          1.12  cm^2     ---------  Aortic valve area/bsa, VTI      0.54  cm^2/m^2 ---------  Velocity ratio, peak,           0.41           ---------  LVOT/AV  Ascending aorta                 Value          Ref  Ascending aorta ID              2.8   cm       2.2 - 3.8  Left atrium                     Value          Ref  LA volume, S                (H) 69    ml       18 - 58  LA volume/bsa, S                33    ml/m^2   16 - 34  LA volume, ES, 1-p A4C      (H) 78    ml       18 - 58  LA volume, ES, 1-p A2C          56    ml       18 - 58  LA volume, ES, A/L              71    ml       ---------  LA volume/bsa, ES, A/L      (H) 35    ml/m^2   16 - 34  Mitral valve                    Value          Ref  Mitral E-wave peak velocity     1.26  m/sec    ---------  Mitral A-wave peak velocity     1.14  m/sec    ---------  Mitral deceleration time        229   ms       ---------  Mitral peak gradient, D         6     mm Hg    ---------  Mitral E/A ratio, peak          1.1            ---------  Systemic veins                  Value          Ref  Estimated CVP                   15    mm Hg    ---------  Inferior vena cava              Value          Ref  ID                          (H) 2.7   cm       <=2.1  Right ventricle                 Value          Ref  TAPSE,  2D                   (L) 1.41  cm       >=1.70  TAPSE, MM                   (L) 1.41  cm       >=1.70  RV s', lateral              (L) 6.2   cm/sec   >=9.5 Legend: (L)  and  (H)  steven values outside specified reference range. ---------------------------------------------------------------------------- Prepared and electronically signed by Massimo Deluna 06/23/2025 18:00        Exam:     Physical Exam:  General: No apparent distress.   HEENT: neck supple, no thyromegaly or adenopathy.  Neck: No JVD, carotids 2+, no bruits.  Cardiac: Regular rate and rhythm. S1, S2 normal. No murmur, pericardial rub, S3, or extra cardiac sounds.  Lungs: diminished BS on R, +crackles  Abdomen: Soft, non-tender. No organosplenomegally, mass or rebound, BS-present.  Extremities: 1+ edema.    Neurologic: No focal defects  Skin: Warm and dry.     Cyrus Wyman, [facility] [place]         [1]    insulin degludec  15 Units Subcutaneous Daily    sacubitril-valsartan  1 tablet Oral BID    lidocaine PF  5 mL Intradermal Once    potassium chloride  20 mEq Intravenous BID    sodium chloride   Intravenous Once    escitalopram  5 mg Oral Nightly    amiodarone  200 mg Oral Daily    colchicine  0.6 mg Oral Daily    metoprolol tartrate  50 mg Oral 2x Daily(Beta Blocker)    furosemide  40 mg Intravenous TID    OLANZapine  2.5 mg Oral Nightly    ferrous sulfate  300 mg Oral BID    insulin regular human  1-5 Units Subcutaneous 4 times per day    aspirin  81 mg Per NG Tube Daily    docusate  100 mg Per NG Tube BID    acetaminophen  1,000 mg Intravenous Q6H    ipratropium-albuterol  3 mL Nebulization TID    budesonide  0.5 mg Nebulization 2 times daily    rosuvastatin  20 mg Per NG Tube Nightly    thiamine  100 mg Oral Daily    multivitamin  1 tablet Oral Daily    folic acid  1 mg Oral Daily    sugammadex  2 mg/kg Intravenous Once    sennosides  8.6 mg Oral BID    famotidine  20 mg Oral BID    Or    famotidine  20 mg Intravenous BID     chlorhexidine gluconate  15 mL Mouth/Throat BID    ascorbic acid  500 mg Oral TID    enoxaparin  40 mg Subcutaneous Daily    clopidogrel  75 mg Oral Daily    tamsulosin  0.4 mg Oral BID   [2]    dextrose 10%      sodium chloride 10 mL/hr at 06/24/25 2000

## 2025-06-28 NOTE — PROGRESS NOTES
[facility]  part of [facility]    Progress Note    Jose Alberto Lizama Patient Status:  Inpatient    1965 MRN [id number]   Location [facility] 2W/SW Attending Tammy Zacarias MD   Hosp Day # 9 PCP Fiona Sweeney MD       Subjective:   Patient resting comfortably. More awake today and following commands.   No overnight events reported.     Objective:   Blood pressure 126/68, pulse 80, temperature 98.7 °F (37.1 °C), temperature source Oral, resp. rate 21, height 5' 6\" (1.676 m), weight 196 lb 13.9 oz (89.3 kg), SpO2 98%.    Physical Exam:    General: No acute distress.   Respiratory: Clear to auscultation bilaterally. No wheezes. No rhonchi.  Cardiovascular: S1, S2. Regular rate and rhythm. No murmurs, rubs or gallops.   Abdomen: Soft, nontender, nondistended.  Positive bowel sounds. No rebound or guarding.  Neurologic: No focal neurological deficits.   Musculoskeletal: Moves all extremities.  Extremities: No edema.    Results:     Lab Results   Component Value Date    WBC 8.4 2025    HGB 9.7 (L) 2025    HCT 30.2 (L) 2025    .0 (H) 2025    CREATSERUM 0.68 (L) 2025    BUN 12 2025     2025    K 3.7 2025    K 3.7 2025     2025    CO2 34.0 (H) 2025     (H) 2025    CA 8.2 (L) 2025    ALB 3.6 2025    ALKPHO 56 2025    BILT 0.4 2025    TP 5.9 2025    AST 35 (H) 2025    ALT 26 2025    PTT 30.6 2025    INR 1.17 2025    TSH 0.696 2025    PSA 2.62 08/10/2023    ESRML 97 (H) 2025    MG 2.1 2025    PHOS 3.5 2025       XR CHEST AP PORTABLE  (CPT=71045)  Result Date: 2025  CONCLUSION: Post cardiac surgery. Findings suggesting increased fluid volume status of the patient/CHF with increasing vascular congestion, increasing interstitial edema, and increasing left pleural effusion Electronically Verified and Signed by  Attending Radiologist: Bal Stewart MD 6/28/2025 8:10 AM Workstation: [id number]    CT BRAIN OR HEAD (CPT=70450)  Result Date: 6/27/2025  CONCLUSION: No acute intracranial abnormality. Electronically Verified and Signed by Attending Radiologist: Refugio Lloyd MD 6/27/2025 7:35 PM Workstation: [id number]    XR HIP W OR WO PELVIS 2 OR 3 VIEWS, RIGHT (CPT=73502)  Result Date: 6/27/2025  CONCLUSION: Tiny right femoral head and acetabular osteophytes with preserved right hip joint space.  Electronically Verified and Signed by Attending Radiologist: Refugio Lloyd MD 6/27/2025 7:01 PM Workstation: [id number]    XR CHEST AP PORTABLE  (CPT=71045)  Result Date: 6/27/2025  CONCLUSION: Post cardiac surgery. Stable position of lines and tubes with interval placement of a right upper extremity PICC with tip at the cavoatrial junction. There are findings compatible with increased fluid volume status of the patient,. Electronically Verified and Signed by Attending Radiologist: Bal Stewart MD 6/27/2025 1:31 PM Workstation: [id number]    EKG 12 Lead  Result Date: 6/27/2025  Normal sinus rhythm Left axis deviation Nonspecific intraventricular block Cannot rule out Inferior infarct , age undetermined T wave abnormality, consider anterolateral ischemia Abnormal ECG When compared with ECG of 24-JUN-2025 04:43, No significant change was found Confirmed by LENO OLIVEIRA ([id number]) on 6/27/2025 11:20:01 AM      Scheduled Medications[1]  PRN Medications[2]      Assessment and Plan:       Mechanical fall  - ground level fall on 06/27/2025  - CT head negative, Him XR negative  - fall precautions    CAD status post CABG and AVR  Severe aortic stenosis  - difficult extubation but patient extubated successfully 06/24  - Continue tube feeds  - management per cardiology and CV sx   Continue IV Lasix TID, Potassium BID while on Lasix  Continue lasix 40 mg TID  Entresto increased to 49/51  Metoprolol changed to coreg 12.5 mg BID  Continue antiplatelet  therapy, statin  Net IO Since Admission: 552.34 mL [06/28/25 1221]  - swallow eval  - chest tube management per surgery  - Monitor vitals  - PT/OT/SLP    Alcohol abuse/Withdrawal   - CIWA protocol   - Psych on board    ZyprexClemente saucedoapro nightly   Haldol PRN      DM2  - insulin per Vascular surgery     HTN  Dyslipidemia  - continue home meds         Quality:    CODE status: Full  DVT ppx - Lovenox        [1]    insulin degludec  15 Units Subcutaneous Daily    sacubitril-valsartan  1 tablet Oral BID    carvedilol  12.5 mg Oral BID with meals    [START ON 6/30/2025] colchicine  0.3 mg Oral QOD    ipratropium-albuterol  3 mL Nebulization 2 times daily    lidocaine PF  5 mL Intradermal Once    potassium chloride  20 mEq Intravenous BID    sodium chloride   Intravenous Once    escitalopram  5 mg Oral Nightly    amiodarone  200 mg Oral Daily    furosemide  40 mg Intravenous TID    OLANZapine  2.5 mg Oral Nightly    ferrous sulfate  300 mg Oral BID    insulin regular human  1-5 Units Subcutaneous 4 times per day    aspirin  81 mg Per NG Tube Daily    docusate  100 mg Per NG Tube BID    acetaminophen  1,000 mg Intravenous Q6H    budesonide  0.5 mg Nebulization 2 times daily    rosuvastatin  20 mg Per NG Tube Nightly    thiamine  100 mg Oral Daily    multivitamin  1 tablet Oral Daily    folic acid  1 mg Oral Daily    sennosides  8.6 mg Oral BID    famotidine  20 mg Oral BID    Or    famotidine  20 mg Intravenous BID    chlorhexidine gluconate  15 mL Mouth/Throat BID    ascorbic acid  500 mg Oral TID    enoxaparin  40 mg Subcutaneous Daily    clopidogrel  75 mg Oral Daily    tamsulosin  0.4 mg Oral BID   [2]   labetalol    dextrose 10%    sodium bicarbonate **AND** lipase-protease-amylase (Lip-Prot-Amyl)    ipratropium-albuterol    LORazepam **OR** LORazepam    LORazepam **OR** LORazepam    LORazepam **OR** LORazepam    LORazepam    LORazepam    LORazepam    haloperidol lactate    LORazepam    haloperidol lactate    melatonin     polyethylene glycol (PEG 3350)    bisacodyl    potassium chloride **OR** potassium chloride    magnesium sulfate in dextrose 5%    magnesium sulfate in sterile water for injection    acetaminophen **OR** HYDROcodone-acetaminophen **OR** [DISCONTINUED] HYDROcodone-acetaminophen    morphINE **OR** [DISCONTINUED] morphINE    glucose **OR** glucose **OR** glucose-vitamin C **OR** dextrose **OR** glucose **OR** glucose **OR** glucose-vitamin C

## 2025-06-28 NOTE — SLP NOTE
SPEECH DAILY NOTE - INPATIENT    ASSESSMENT & PLAN   ASSESSMENT        Proper PPE worn. Hands sanitized upon entrance/exit Pt room.       Language Line [employer] Audio  was used during this session d/t Pt's primary language is Polish.      Pt alert, afebrile and on 4L/min 02 via NC. Pt seen for swallow analysis per ongoing swallow recommendations (after consulting with RN). Pt agreed to participate. Pt's preferred method of learning verbal. Pt upright in bed; observed with current diet of pureed/mildly-thick liquids for monitoring diet tolerance. Additionally, Pt seen with solid and thin liquids trials for candidacy of diet upgrade.   Swallowing precautions/strategies discussed; mild cues needed for execution. Prolonged mastication on solids d/t fatigue and lack of full upper and lower dentition. Lingual skills functional for bolus control of all trials. No significant oral retention. Pharyngeal response appeared to trigger within 1-2 sec per hyolaryngeal elevation to completion (functional rise/strength per palpation). No overt CSA on pureed, solid nor mildly-thick liquids. One episode of wet vocal quality S/P thin liquids via open cup. Diet to be UPGRADED to BITE SIZE food/remain on mildly-thick liquids. Collaborated with RN regarding Pt's swallowing plan of care. Per RN, Pt with good tolerance of current diet. No report of difficulty taking meds. Sp02 ~97  % during this session. Call light within Pt's reach upon SLP discharge from room.          CXR 6/28/25:  CONCLUSION: Post cardiac surgery. Findings suggesting increased fluid volume status of the patient/CHF with increasing vascular congestion, increasing interstitial edema, and increasing left pleural effusion       PLAN:    To f/u x2 sessions to ensure safe intake of NEW UPGRADED diet and reinforce swallowing/aspiration precautions. To monitor for new CXR results. Diet to continue to be upgraded as tolerated. VFSS IF appropriate. Family education as  available.             Diet Recommendations - Solids: Mechanical soft chopped/ Soft & Bite Sized  Diet Recommendations - Liquids: Nectar thick liquids/ Mildly thick      Aspiration Precautions: Upright position, Slow rate, Small bites, Small sips, No straw  Medication Administration Recommendations: Whole in puree    Patient Experiencing Pain: No  Treatment Plan  Treatment Plan/Recommendations: Aspiration precautions  Interdisciplinary Communication: Discussed with RN  Plan posted at bedside     GOALS  Goal #1 The patient will tolerate puree consistency and mildly thick liquids without overt signs or symptoms of aspiration with 100 % accuracy over 1-2 session(s).    No CSA on current diet.        GOAL MET     Goal #2 The patient/family/caregiver will demonstrate understanding and implementation of aspiration precautions and swallow strategies independently over 1-2 session(s).    Swallowing precautions posted in room.     In Progress   Goal #3 The patient will tolerate trial upgrade of soft/hard solid consistency and thin liquids without overt signs or symptoms of aspiration with 100 % accuracy over 1-2 session(s).    Diet upgraded to bite size food; remain on mildly-thick liquids. Continue to trial for possible upgrade.    Partial Upgrade   Goal #4 The patient will utilize compensatory strategies as outlined by  BSSE (clinical evaluation) including Slow rate, Small bites, Small sips, Multiple swallows, No straws, Upright 90 degrees, Upright 90 degrees 30 mins after meal, Eliminate distractions, Supervision with meals with PRN assistance 100 % of the time across 2 sessions.    Mild cues for small sips and bites.      In Progress   FOLLOW UP  Follow Up Needed (Documentation Required): Yes  SLP Follow-up Date: 06/29/25  Duration: 1 week    Session: 2    If you have any questions, please contact   Aishwarya Otero M.S. [facility]/SLP  Speech-Language Pathologist  [facility]  #[phone]

## 2025-06-28 NOTE — PROGRESS NOTES
[facility]  part of [facility]    Progress Note    Jose Alberto Lizama Patient Status:  Inpatient    1965 MRN [id number]   Location [facility] 2W/SW Attending Nannette Pelayo MD   Hosp Day # 9 PCP Fiona Sweeney MD         Subjective:   Subjective:  Patient was seen and examined  Better overall  Down to 3 L of oxygen  More awake and alert  Occasional cough  No abdominal pain  Objective:   Blood pressure 126/68, pulse 80, temperature 98.7 °F (37.1 °C), temperature source Oral, resp. rate 21, height 5' 6\" (1.676 m), weight 196 lb 13.9 oz (89.3 kg), SpO2 98%.  Physical Exam  Constitutional:       General: He is not in acute distress.     Appearance: He is obese.   HENT:      Head: Normocephalic.      Nose: Nose normal.      Mouth/Throat:      Mouth: Mucous membranes are moist.   Eyes:      General: No scleral icterus.  Cardiovascular:      Rate and Rhythm: Normal rate.      Heart sounds:      No gallop.   Pulmonary:      Effort: No respiratory distress.      Breath sounds: No stridor. No wheezing, rhonchi or rales.      Comments: Diminished in the bases but overall better air exchange to the left base  Abdominal:      General: Bowel sounds are normal. There is distension.      Palpations: Abdomen is soft.      Tenderness: There is no abdominal tenderness. There is no guarding or rebound.   Musculoskeletal:      Right lower leg: Edema present.      Left lower leg: Edema present.   Neurological:      General: No focal deficit present.      Mental Status: He is oriented to person, place, and time.         Results:   Lab Results   Component Value Date    WBC 8.4 2025    HGB 9.7 (L) 2025    HCT 30.2 (L) 2025    .0 (H) 2025    CREATSERUM 0.68 (L) 2025    BUN 12 2025     2025    K 3.7 2025    K 3.7 2025     2025    CO2 34.0 (H) 2025     (H) 2025    CA 8.2 (L) 2025    ALB 3.6 2025     ALKPHO 56 06/28/2025    BILT 0.4 06/28/2025    TP 5.9 06/28/2025    AST 35 (H) 06/28/2025    ALT 26 06/28/2025    PTT 30.6 06/19/2025    INR 1.17 06/19/2025    TSH 0.696 06/19/2025    PSA 2.62 08/10/2023    ESRML 97 (H) 06/25/2025    MG 2.1 06/28/2025    PHOS 3.5 06/28/2025       XR CHEST AP PORTABLE  (CPT=71045)  Result Date: 6/28/2025  CONCLUSION: Post cardiac surgery. Findings suggesting increased fluid volume status of the patient/CHF with increasing vascular congestion, increasing interstitial edema, and increasing left pleural effusion Electronically Verified and Signed by Attending Radiologist: Bal Stewart MD 6/28/2025 8:10 AM Workstation: [employer]    CT BRAIN OR HEAD (CPT=70450)  Result Date: 6/27/2025  CONCLUSION: No acute intracranial abnormality. Electronically Verified and Signed by Attending Radiologist: Refugio Lloyd MD 6/27/2025 7:35 PM Workstation: [id number]    XR HIP W OR WO PELVIS 2 OR 3 VIEWS, RIGHT (CPT=73502)  Result Date: 6/27/2025  CONCLUSION: Tiny right femoral head and acetabular osteophytes with preserved right hip joint space.  Electronically Verified and Signed by Attending Radiologist: Refugio Lloyd MD 6/27/2025 7:01 PM Workstation: [id number]    XR CHEST AP PORTABLE  (CPT=71045)  Result Date: 6/27/2025  CONCLUSION: Post cardiac surgery. Stable position of lines and tubes with interval placement of a right upper extremity PICC with tip at the cavoatrial junction. There are findings compatible with increased fluid volume status of the patient,. Electronically Verified and Signed by Attending Radiologist: Bal Stewart MD 6/27/2025 1:31 PM Workstation: [id number]    EKG 12 Lead  Result Date: 6/27/2025  Normal sinus rhythm Left axis deviation Nonspecific intraventricular block Cannot rule out Inferior infarct , age undetermined T wave abnormality, consider anterolateral ischemia Abnormal ECG When compared with ECG of 24-JUN-2025 04:43, No significant change was found Confirmed by LENO OLIVEIRA  ([id number]) on 6/27/2025 11:20:01 AM      Assessment & Plan:       1- s/p CABG and AVR on 6/19/25 for severe multivessel CAD and severe aortic stenosis  LVEF 30 %  HD better and off pressors   Plavix and aspirin and amiodarone     2-postop acute respiratory failure with pulmonary edema and mucous plugging  Extubated on 6/24/25 and tolerated   CXR better / less edema and infiltrate     Down to 3-4 L NC   To complete course of antibiotics  Bronchial hygiene and nebulizers with Pulmicort and DuoNeb  Incentive spirometry  Increase activity  Diuretics      3-possible EtOH use?  Withdrawal  Thiamine and folic acid and psych following  More awake and alert today     4-peripheral artery disease  Plavix and aspirin     5-HTN, HL, DM , obesity BMI 34 with possible ORAL     6-DVT prophylaxis  Lovenox subcutaneous         D/w staff               Mora Marte MD  6/28/2025

## 2025-06-28 NOTE — PLAN OF CARE
Problem: CARDIOVASCULAR - ADULT  Goal: Maintains optimal cardiac output and hemodynamic stability  Description: INTERVENTIONS:  - Monitor vital signs, rhythm, and trends  - Monitor for bleeding, hypotension and signs of decreased cardiac output  - Evaluate effectiveness of vasoactive medications to optimize hemodynamic stability  - Monitor arterial and/or venous puncture sites for bleeding and/or hematoma  - Assess quality of pulses, skin color and temperature  - Assess for signs of decreased coronary artery perfusion - ex. Angina  - Evaluate fluid balance, assess for edema, trend weights  Outcome: Progressing  Goal: Absence of cardiac arrhythmias or at baseline  Description: INTERVENTIONS:  - Continuous cardiac monitoring, monitor vital signs, obtain 12 lead EKG if indicated  - Evaluate effectiveness of antiarrhythmic and heart rate control medications as ordered  - Initiate emergency measures for life threatening arrhythmias  - Monitor electrolytes and administer replacement therapy as ordered  Outcome: Progressing     Problem: RESPIRATORY - ADULT  Goal: Achieves optimal ventilation and oxygenation  Description: INTERVENTIONS:  - Assess for changes in respiratory status  - Assess for changes in mentation and behavior  - Position to facilitate oxygenation and minimize respiratory effort  - Oxygen supplementation based on oxygen saturation or ABGs  - Provide Smoking Cessation handout, if applicable  - Encourage broncho-pulmonary hygiene including cough, deep breathe, Incentive Spirometry  - Assess the need for suctioning and perform as needed  - Assess and instruct to report SOB or any respiratory difficulty  - Respiratory Therapy support as indicated  - Manage/alleviate anxiety  - Monitor for signs/symptoms of CO2 retention  Outcome: Progressing     Problem: HEMATOLOGIC - ADULT  Goal: Free from bleeding injury  Description: (Example usage: patient with low platelets)  INTERVENTIONS:  - Avoid intramuscular  injections, enemas and rectal medication administration  - Ensure safe mobilization of patient  - Hold pressure on venipuncture sites to achieve adequate hemostasis  - Assess for signs and symptoms of internal bleeding  - Monitor lab trends  - Patient is to report abnormal signs of bleeding to staff  - Avoid use of toothpicks and dental floss  - Use electric shaver for shaving  - Use soft bristle tooth brush  - Limit straining and forceful nose blowing  Outcome: Progressing    Chest tubes, central line, arterial line removed. Weaned off of nitroglycerin & cleviprex drip to maintain SBP <140. Given x2 Prn labetolol. Able to take pills crushed in apple sauce when patient is awake and alert. Up to chair x1, max assist.     Dane drain output total: 290 ccs  Urine output total: 1200 ccs

## 2025-06-28 NOTE — PROGRESS NOTES
[facility]  part of [facility]    Progress Note    Jose Alberto Lizama Patient Status:  Inpatient    1965 MRN [id number]   Location [facility] 2W/SW Attending Nannette Pelayo MD   Hosp Day # 9 PCP Fiona Sweeney MD     Subjective:  Pt awake in bed. Language line used for translation # [id number].  Sitter at bedside for safety as pt fell out of bed yesterday evening. He has periods of restlessness and agitation. Impulsive. At this time he is oriented x 3, calm and appropriate. Moves all ext and answers questions appropriately. Denies pain and SOB.   No visitors at bedside.     Objective:  /70   Pulse 72   Temp 98.8 °F (37.1 °C) (Oral)   Resp 21   Ht 5' 6\" (1.676 m)   Wt 196 lb 13.9 oz (89.3 kg)   SpO2 99%   BMI 31.78 kg/m²     Temp (24hrs), Av.3 °F (36.8 °C), Min:97.7 °F (36.5 °C), Max:98.8 °F (37.1 °C)      Intake/Output:    Intake/Output Summary (Last 24 hours) at 2025 0758  Last data filed at 2025 0600  Gross per 24 hour   Intake 1298 ml   Output 3690 ml   Net -2392 ml       Wt Readings from Last 3 Encounters:   25 196 lb 13.9 oz (89.3 kg)   25 201 lb 12.8 oz (91.5 kg)   25 200 lb (90.7 kg)       Allergies:  Allergies[1]    Labs:  Lab Results   Component Value Date    WBC 8.4 2025    HGB 9.7 2025    HCT 30.2 2025    .0 2025    CREATSERUM 0.68 2025    BUN 12 2025     2025    K 3.7 2025    K 3.7 2025     2025    CO2 34.0 2025     2025    CA 8.2 2025    ALB 3.6 2025    ALKPHO 56 2025    BILT 0.4 2025    TP 5.9 2025    AST 35 2025    ALT 26 2025    MG 2.1 2025    PHOS 3.5 2025       Physical Exam:  Blood pressure 121/70, pulse 72, temperature 98.8 °F (37.1 °C), temperature source Oral, resp. rate 21, height 5' 6\" (1.676 m), weight 196 lb 13.9 oz (89.3 kg), SpO2 99%.  General: NAD  Neck: No  JVD  Lungs: Mildly coarse and diminished bilaterally   Mediastinal Dane drain = 30cc/12 hours- no air leak noted  Heart: RRR, S1, S2  Abdomen: Soft/Round, NT/ND, BS+x4/+Flatus/+BM   Extremities: Warm, dry, trace UE/LE generalized edema bilat  Pulses: doppler bilat DP/PT  Skin: sternotomy incision BETTIE=CDI/bilateral leg incisions BETTIE=CDI   Neurological:  AAOx3, MAEW-intermittent periods of restlessness & agitation. Impulsive     Assessment/Plan:  S/P CABG x 2/AVR/MELINDA CLIP/INSERTION OF LEFT FEMORAL IABP POD # 9  IABP removed on 6/20  Extubated on 6/24: Encourage pulm toilet: IS- will need assistance to perform at this time/EZ pap. Likely w/pre op ORAL. BiPap at night- wean to HF N/C during the day. CXR reviewed.   Sputum cx 6/22=negative. On Zosyn per Pulm/agree to continue w/recent AVR.   Pain meds as needed-limit/avoid narcotics w/post op altered MS. Can use Tylenol.   Post op altered MS w/agitation/confusion/restlessness of unknown etiology-slowly improving; suspect post op delirium and/or ETOH withdrawal. Psych eval on 6/22- follow recs. CT brain on 6/20=negative   S/P fall out of bed yesterday evening w/superficial laceration above right eye-should heal with time. CT head=negative/Hip X-ray=okay. Sitter at bedside for safety. Reminded pt about using call light and not to get increase activity w/o assistance.   Altered sleep disturbance/sleep pattern due to pt usually works nights-may be contributing to his drowsiness during the day/post op altered MS.  Increase activity-OOB to chair/ambulate in hallway-working with PT/OT-   Scds/Lovenox prophylaxis DVT prevention   Continues hemodynamically stable: Continue ICU status   HTN w/intermittent IV NTG- on oral meds: mgt per Cardiology  Elevated ESR=97- likely PPS-continues on Colchicine.   PICC placed 6/26  Expected post op volume overload- continue IV Lasix 40 mg TID and IV Potassium BID while on Lasix. Mckeon removed 6/25 after leaking noted-able to use external male  catheter for close I/O monitoring.   Check K/Mag every 6 hours while on IV Lasix TID- replacement per protocol   Amio protocol for AF prevention as pt considered high risk for post op AF. Post op SR w/freq PACs noted: on PO Amio daily.    Expected post op anemia w/o clinical significance/stable: may be slightly diluted due to volume overload- Iron x 1 month. Improved Hb after transfusion yesterday.   Hx: DM-Continue basal insulin dose today/continue correction scale coverage. Resume home regimen at/near discharge.    Nutrition- Solid Puree after swallow eval per ST-following recs. Unable to insert Dobhoff right nares 6/26 due to bleeding/clots which has resolved.  No need for Dobhoff at this time.   Hx: BPH- on home Flomax  Hx: PVD- on ASA/Plavix   Mild abdominal distension/constipation resolved: pt moving his bowels.   Blood CX 6/22 =negative   Discharge planning: pt lives with his wife. Continue to monitor as pt progresses. SW/ involved w/discharge planning.  PMR eval performed 6/25- continues to follow progress. Anticipate pt needing rehab.     Addendum @ 4983:   Pt seen with rounding CV Surgeon: Dr. Yun and plan of care discussed. Right forehead laceration cleaned & steri strips applied. Stopping NTG- Cardiology at bedside and will adjust antihypertensive meds.      Plan of care discussed w/patient/RN   Heather Mast, APRN  6/28/2025  7:58 AM    Agree with above. Will leave laurence today, as it had 70cc output over a couple hours.     Jama Yun MD, FACS  Cardiothoracic Surgeon  [facility]  [phone]           [1] No Known Allergies

## 2025-06-29 LAB
GLUCOSE BLDC GLUCOMTR-MCNC: 144 MG/DL (ref 70–99)
GLUCOSE BLDC GLUCOMTR-MCNC: 150 MG/DL (ref 70–99)
GLUCOSE BLDC GLUCOMTR-MCNC: 181 MG/DL (ref 70–99)
GLUCOSE BLDC GLUCOMTR-MCNC: 235 MG/DL (ref 70–99)
MAGNESIUM SERPL-MCNC: 1.9 MG/DL (ref 1.6–2.6)
MAGNESIUM SERPL-MCNC: 1.9 MG/DL (ref 1.6–2.6)
MAGNESIUM SERPL-MCNC: 2 MG/DL (ref 1.6–2.6)
POTASSIUM SERPL-SCNC: 3.5 MMOL/L (ref 3.5–5.1)
POTASSIUM SERPL-SCNC: 3.7 MMOL/L (ref 3.5–5.1)
POTASSIUM SERPL-SCNC: 4 MMOL/L (ref 3.5–5.1)

## 2025-06-29 PROCEDURE — 99233 SBSQ HOSP IP/OBS HIGH 50: CPT | Performed by: INTERNAL MEDICINE

## 2025-06-29 RX ORDER — FUROSEMIDE 10 MG/ML
40 INJECTION INTRAMUSCULAR; INTRAVENOUS
Status: DISCONTINUED | OUTPATIENT
Start: 2025-06-29 | End: 2025-07-01

## 2025-06-29 RX ORDER — MAGNESIUM OXIDE 400 MG/1
400 TABLET ORAL ONCE
Status: COMPLETED | OUTPATIENT
Start: 2025-06-29 | End: 2025-06-29

## 2025-06-29 NOTE — PLAN OF CARE
Patient ambulated in hallway without difficulties.  Sitter at bedside for patient safety.  Had BM, doing well.  Problem: CARDIOVASCULAR - ADULT  Goal: Maintains optimal cardiac output and hemodynamic stability  Description: INTERVENTIONS:  - Monitor vital signs, rhythm, and trends  - Monitor for bleeding, hypotension and signs of decreased cardiac output  - Evaluate effectiveness of vasoactive medications to optimize hemodynamic stability  - Monitor arterial and/or venous puncture sites for bleeding and/or hematoma  - Assess quality of pulses, skin color and temperature  - Assess for signs of decreased coronary artery perfusion - ex. Angina  - Evaluate fluid balance, assess for edema, trend weights  Outcome: Progressing  Goal: Absence of cardiac arrhythmias or at baseline  Description: INTERVENTIONS:  - Continuous cardiac monitoring, monitor vital signs, obtain 12 lead EKG if indicated  - Evaluate effectiveness of antiarrhythmic and heart rate control medications as ordered  - Initiate emergency measures for life threatening arrhythmias  - Monitor electrolytes and administer replacement therapy as ordered  Outcome: Progressing     Problem: RESPIRATORY - ADULT  Goal: Achieves optimal ventilation and oxygenation  Description: INTERVENTIONS:  - Assess for changes in respiratory status  - Assess for changes in mentation and behavior  - Position to facilitate oxygenation and minimize respiratory effort  - Oxygen supplementation based on oxygen saturation or ABGs  - Provide Smoking Cessation handout, if applicable  - Encourage broncho-pulmonary hygiene including cough, deep breathe, Incentive Spirometry  - Assess the need for suctioning and perform as needed  - Assess and instruct to report SOB or any respiratory difficulty  - Respiratory Therapy support as indicated  - Manage/alleviate anxiety  - Monitor for signs/symptoms of CO2 retention  Outcome: Progressing     Problem: GASTROINTESTINAL - ADULT  Goal: Minimal or  absence of nausea and vomiting  Description: INTERVENTIONS:  - Maintain adequate hydration with IV or PO as ordered and tolerated  - Nasogastric tube to low intermittent suction as ordered  - Evaluate effectiveness of ordered antiemetic medications  - Provide nonpharmacologic comfort measures as appropriate  - Advance diet as tolerated, if ordered  - Obtain nutritional consult as needed  - Evaluate fluid balance  Outcome: Progressing  Goal: Maintains or returns to baseline bowel function  Description: INTERVENTIONS:  - Assess bowel function  - Maintain adequate hydration with IV or PO as ordered and tolerated  - Evaluate effectiveness of GI medications  - Encourage mobilization and activity  - Obtain nutritional consult as needed  - Establish a toileting routine/schedule  - Consider collaborating with pharmacy to review patient's medication profile  Outcome: Progressing     Problem: GENITOURINARY - ADULT  Goal: Absence of urinary retention  Description: INTERVENTIONS:  - Assess patient’s ability to void and empty bladder  - Monitor intake/output and perform bladder scan as needed  - Follow urinary retention protocol/standard of care  - Consider collaborating with pharmacy to review patient's medication profile  - Implement strategies to promote bladder emptying  Outcome: Progressing

## 2025-06-29 NOTE — PROGRESS NOTES
[facility]  part of [facility]    Cardiology Progress Note    Jose Alberto Lizama Patient Status:  Inpatient    1965 MRN [id number]   Location [facility] 2W/SW Attending Nannette Pelayo MD   Hosp Day # 10 PCP Fiona Sweeney MD     Interval History   More awake/alert today  Denies any CP or SOB  BP has improved  Assessment and Plan:   Agitation, possibly EtOH withdrawal    HFrEF  Ischemic cardiomyopathy  -underwent surgical revascularization with 2v CABG  -pre-op and post-op TTE without significant change in LV function  -currently volume overloaded  -BP has been elevated  Plan  -continue IV diuresis, clinically improved, decreased to 40mg BID  -GDMT: entresto to 49-51mg BID, coreg 12.5mg BID  -close volume monitoring    Severe aortic stenosis due to rheumatic dz s/p SAVR  Multivessel CAD s/p CABG  S/p MELINDA clip  -underwent 2v CABG with LIMA-LAD, rSVG-D1, 21mm Peres Inspiris bioprosthetic AV, MELINDA clip on 25  -post-op TTE with normal functioning bioprosthetic AV with Vmax of 2.5m/s, and MG of 12mmHg; LVEF relatively unchanged  -no need for systemic AC for future AF  Plan  -Continue antiplatelet therapy  -Continue statin    Objective:   Patient Vitals for the past 24 hrs:   BP Temp Temp src Pulse Resp SpO2 Weight   25 0800 129/79 98.7 °F (37.1 °C) Oral 72 22 97 % --   25 0700 121/70 -- -- 72 21 99 % --   25 0630 136/76 -- -- 77 24 98 % --   25 0600 145/72 -- -- 74 19 99 % 196 lb 13.9 oz (89.3 kg)   25 0530 143/72 -- -- 76 22 98 % --   25 0500 120/59 -- -- 78 20 95 % --   25 0430 153/78 -- -- 78 24 96 % --   25 0400 153/76 98.8 °F (37.1 °C) Oral 78 22 99 % --   25 0330 138/84 -- -- 74 17 100 % --   25 0300 139/73 -- -- 77 23 99 % --   25 0230 122/59 -- -- 74 21 99 % --   25 0200 147/80 -- -- 74 23 100 % --   25 0100 158/76 -- -- 75 22 100 % --   25 0000 (!) 167/82 98 °F (36.7 °C) Oral 76 21 100 %  --   06/27/25 2300 (!) 165/75 -- -- 74 24 99 % --   06/27/25 2200 (!) 174/86 -- -- 76 22 99 % --   06/27/25 2000 156/75 97.7 °F (36.5 °C) Oral 77 17 100 % --   06/27/25 1900 145/78 -- -- 79 25 100 % --   06/27/25 1800 126/75 -- -- 77 24 95 % --   06/27/25 1700 141/71 -- -- 93 20 100 % --   06/27/25 1600 138/68 97.8 °F (36.6 °C) Oral 86 22 100 % 205 lb 14.6 oz (93.4 kg)   06/27/25 1500 119/61 -- -- 90 11 100 % --   06/27/25 1416 122/63 -- -- 86 -- -- --   06/27/25 1400 122/63 -- -- 93 25 97 % --   06/27/25 1300 -- -- -- 88 (!) 28 96 % --   06/27/25 1200 132/65 98.7 °F (37.1 °C) Oral 84 20 97 % --   06/27/25 1100 149/85 -- -- 81 23 97 % --   06/27/25 1000 -- -- -- 87 24 94 % --   06/27/25 0900 -- -- -- 76 23 97 % --       Intake/Output:   Last 3 shifts:   Intake/Output                   06/26/25 0700 - 06/27/25 0659 06/27/25 0700 - 06/28/25 0659 06/28/25 0700 - 06/29/25 0659       Intake    P.O.  --  320  --    P.O. -- 320 --    I.V.  1132.8  678  --    I.V. 186 180 --    Volume (mL) Nitroglycerin 946.8 458 --    Volume (mL) (sodium chloride 0.9% infusion) -- 40 --    Blood  400  --  --    Volume (Transfuse RBC) 400 -- --    IV PIGGYBACK  700  300  --    Volume (mL) (piperacillin-tazobactam (Zosyn) 3.375 g in dextrose 5% 100 mL IVPB-ADDV) 100 -- --    Volume (mL) (acetaminophen (Ofirmev) 10 mg/mL infusion premix 1,000 mg) 200 100 --    Volume (mL) (potassium chloride 20 mEq/100mL IVPB premix 20 mEq) 200 100 --    Volume (mL) (potassium chloride 20 mEq/100mL IVPB premix 20 mEq) -- 100 --    Volume (mL) (potassium chloride 40 mEq/100mL IVPB premix (central line) 40 mEq) 200 -- --    Total Intake 2232.8 1298 --       Output    Urine  3800  3250  --    Urine 2050 -- --    Incontinent Urine Occurrence -- 1 x --    Output (mL) (External Urinary Catheter) 1750 3250 --    Emesis/NG output  --  0  --    Emesis -- 0 --    Stool  --  0  --    Stool (mL) -- 0 --    Chest Tube  780  440  --    Output (mL) ([REMOVED] Y Chest Tube 1  and 2 Anterior Mediastinal 32 Fr. Anterior Pleural 32 Fr.) 130 60 --    Output (mL) (Chest Tube Pericardial 24 Fr.) 650 380 --    Total Output 4580 3690 --       Net I/O     -2347.3 -2392 --             Vent Settings: FiO2 (%):  [40 %] 40 %    Hemodynamic parameters (last 24 hours):      Scheduled Meds: Scheduled Medications[1]    Continuous Infusions: Medication Infusions[2]    Results:     Lab Results   Component Value Date    WBC 8.4 06/28/2025    HGB 9.7 (L) 06/28/2025    HCT 30.2 (L) 06/28/2025    .0 (H) 06/28/2025    CREATSERUM 0.68 (L) 06/28/2025    BUN 12 06/28/2025     06/28/2025    K 3.5 06/29/2025     06/28/2025    CO2 34.0 (H) 06/28/2025     (H) 06/28/2025    CA 8.2 (L) 06/28/2025    ALB 3.6 06/28/2025    ALKPHO 56 06/28/2025    BILT 0.4 06/28/2025    TP 5.9 06/28/2025    AST 35 (H) 06/28/2025    ALT 26 06/28/2025    PTT 30.6 06/19/2025    INR 1.17 06/19/2025    TSH 0.696 06/19/2025    PSA 2.62 08/10/2023    ESRML 97 (H) 06/25/2025    MG 1.9 06/29/2025    PHOS 3.5 06/28/2025       Recent Labs   Lab 06/26/25  0510 06/26/25  1411 06/27/25  0459 06/27/25  1258 06/28/25  0347 06/28/25  1305 06/28/25  1857 06/28/25  2345 06/29/25  0540   *  --  163*  --  125*  --   --   --   --    BUN 16  --  12  --  12  --   --   --   --    CREATSERUM 0.65*  --  0.66*  --  0.68*  --   --   --   --    CA 8.6*  --  8.3*  --  8.2*  --   --   --   --      --  137  --  142  --   --   --   --    K 3.0*   < > 3.5  3.5   < > 3.7  3.7   < > 3.5 4.0 3.5     --  101  --  103  --   --   --   --    CO2 27.0  --  30.0  --  34.0*  --   --   --   --     < > = values in this interval not displayed.     Recent Labs   Lab 06/26/25  0510 06/26/25  1533 06/27/25  0459 06/28/25  0347   RBC 3.26*  --  3.60* 3.74*   HGB 8.2* 8.2* 9.1* 9.7*   HCT 25.9*  --  29.0* 30.2*   MCV 79.4*  --  80.6 80.7   MCH 25.2*  --  25.3* 25.9*   MCHC 31.7  --  31.4 32.1   RDW 16.0*  --  15.9* 15.9*   NEPRELIM 6.76  --   6.45 5.37   WBC 9.3  --  9.4 8.4   .0  --  411.0 517.0*       No results for input(s): \"BNPML\" in the last 168 hours.    No results for input(s): \"TROP\", \"CK\" in the last 168 hours.    XR CHEST AP PORTABLE  (CPT=71045)  Result Date: 2025  CONCLUSION: Post cardiac surgery. Findings suggesting increased fluid volume status of the patient/CHF with increasing vascular congestion, increasing interstitial edema, and increasing left pleural effusion Electronically Verified and Signed by Attending Radiologist: Bal Stewart MD 2025 8:10 AM Workstation: [email or web address]    CT BRAIN OR HEAD (CPT=70450)  Result Date: 2025  CONCLUSION: No acute intracranial abnormality. Electronically Verified and Signed by Attending Radiologist: Refugio Lloyd MD 2025 7:35 PM Workstation: [email or web address]    XR HIP W OR WO PELVIS 2 OR 3 VIEWS, RIGHT (CPT=73502)  Result Date: 2025  CONCLUSION: Tiny right femoral head and acetabular osteophytes with preserved right hip joint space.  Electronically Verified and Signed by Attending Radiologist: Refugio Lloyd MD 2025 7:01 PM Workstation: [id number]    XR CHEST AP PORTABLE  (CPT=71045)  Result Date: 2025  CONCLUSION: Post cardiac surgery. Stable position of lines and tubes with interval placement of a right upper extremity PICC with tip at the cavoatrial junction. There are findings compatible with increased fluid volume status of the patient,. Electronically Verified and Signed by Attending Radiologist: Bal Stewart MD 2025 1:31 PM Workstation: [email or web address]          CARD ECHO 2D DOPPLER CONTRAST (CPT=93306)  Result Date: 2025  Transthoracic Echocardiogram Name:Jose Alberto Lizama Date: 2025 :  1965 Ht:  (66in)  BP: 100 / 62 MRN:  [id number]    Age:  59years    Wt:  (215lb) HR: 73bpm Loc:  EM       Gndr: M          BSA: 2.06m^2 Sonographer: Svetlana CARVALHO Ordering:    Ivanna Lewis Consulting:  Chivo Solares  ---------------------------------------------------------------------------- History/Indications:   Post-Op CABG, AVR. ---------------------------------------------------------------------------- Procedure information:  A transthoracic complete 2D study was performed. Additional evaluation included M-mode, complete spectral Doppler, and color Doppler.  Patient status:  Inpatient.  Location:  Bedside.    Comparison was made to the study of 08/10/2019.    This was a routine study. Transthoracic echocardiography for diagnosis. Image quality was adequate. The study was technically limited due to poor acoustic window availability, restricted patient mobility, surgical dressings, patient supine, and patient on ventilator. Intravenous contrast (Definity) was administered to evaluate left ventricular function and enhance regional wall motion assessment. ECG rhythm:   Normal sinus ---------------------------------------------------------------------------- Conclusions: 1. Left ventricle: The cavity size was normal. Wall thickness was normal.    Systolic function was mildly to moderately reduced. The estimated    ejection fraction was 30-35%, by visual assessment. Features are    consistent with a pseudonormal left ventricular filling pattern, with    concomitant abnormal relaxation and increased filling pressure - grade 2    diastolic dysfunction. 2. Right ventricle: Pacer wire noted in the right ventricle. Systolic    function was reduced. 3. Left atrium: The atrium was mildly dilated. 4. Aortic valve: A bioprosthetic valve is present. The peak systolic    velocity was 2.51m/sec. The mean systolic gradient was 12mm Hg. The valve    area (VTI) was 1.12cm^2. The valve area (VTI) index was 0.54cm^2/m^2. 5. Pericardium, extracardiac: There was no pericardial effusion. * ---------------------------------------------------------------------------- * Findings: Left ventricle:  The cavity size was normal. Wall thickness was normal.  Systolic function was mildly to moderately reduced. The estimated ejection fraction was 30-35%, by visual assessment. No diagnostic evidence for diffuse regional wall motion abnormalities. Features are consistent with a pseudonormal left ventricular filling pattern, with concomitant abnormal relaxation and increased filling pressure - grade 2 diastolic dysfunction. Left atrium:  Well visualized. The atrium was mildly dilated. Right ventricle:  The cavity size was at the upper limits of normal. Pacer wire noted in the right ventricle. Systolic function was reduced. Right atrium:  Well visualized. The atrium was normal in size. Mitral valve:  Well visualized. The valve was structurally normal. The leaflets were normal thickness. Leaflet separation was normal. No evidence for prolapse.  Doppler:  Transvalvular velocity was within the normal range. There was no evidence for stenosis. There was no significant regurgitation. Aortic valve:  Well visualized. A bioprosthetic valve is present. The leaflets were normal thickness. Cusp separation was normal.  Doppler: Transvalvular velocity was increased. There was no evidence for stenosis. There was no significant regurgitation.    The valve area (VTI) was 1.12cm^2. The valve area (VTI) index was 0.54cm^2/m^2.    The mean systolic gradient was 12mm Hg. The peak systolic gradient was 25mm Hg. Tricuspid valve:  Well visualized. The annulus is normal-sized. The valve is structurally normal. The leaflets are normal thickness. Leaflet separation was normal. No echocardiographic evidence for tricuspid prolapse.  Doppler: Transvalvular velocity was within the normal range. There was no evidence for stenosis. There was no significant regurgitation. Pulmonic valve:   Well visualized. The annulus is normal-sized. The valve is structurally normal. The leaflets are normal thickness. Cusp separation was normal. No evidence for prolapse.  Doppler:  Transvalvular velocity was within the  normal range. There was no evidence for stenosis. There was no significant regurgitation. Pericardium:   There was no pericardial effusion. Pleura:  No evidence of pleural fluid accumulation. Aorta: Aortic root: The aortic root was normal-sized. The aortic root was normal. Ascending aorta: The ascending aorta was normal. The ascending aorta was normal. Pulmonary arteries: The main pulmonary artery was normal-sized.  Systolic pressure could not be accurately estimated. Systemic veins:  Central venous respirophasic diameter changes are blunted (< 50%). Inferior vena cava: The IVC was normally collapsible and normal-sized. The IVC was dilated. ---------------------------------------------------------------------------- Measurements  Left ventricle                  Value          Ref  IVS thickness, ED, PLAX     (H) 1.1   cm       0.6 - 1.0  LV ID, ED, PLAX                 5.6   cm       4.2 - 5.8  LV ID, ES, PLAX             (H) 4.7   cm       2.5 - 4.0  LV PW thickness, ED, PLAX       0.9   cm       0.6 - 1.0  IVS/LV PW ratio, ED, PLAX       1.22           ---------  LV PW/LV ID ratio, ED, PLAX     0.16           ---------  LV ejection fraction        (L) 34    %        52 - 72  Stroke volume/bsa, 2D           21    ml/m^2   ---------  LV e', lateral              (L) 4.3   cm/sec   >=10.0  LV E/e', lateral            (H) 29             <=13  LV e', medial               (L) 3.1   cm/sec   >=7.0  LV E/e', medial                 41             ---------  LV e', average                  3.7   cm/sec   ---------  LV E/e', average            (H) 34             <=14  LVOT                            Value          Ref  LVOT ID                         1.9   cm       ---------  LVOT peak velocity, S           1.03  m/sec    ---------  LVOT VTI, S                     15.1  cm       ---------  LVOT peak gradient, S           4     mm Hg    ---------  LVOT mean gradient, S           2     mm Hg    ---------  Stroke volume (SV),  LVOT DP     43    ml       ---------  Stroke index (SV/bsa), LVOT     21    ml/m^2   ---------  DP  Aortic valve                    Value          Ref  Aortic valve peak velocity,     2.51  m/sec    ---------  S  Aortic valve VTI, S             38.1  cm       ---------  Aortic mean gradient, S         12    mm Hg    ---------  Aortic peak gradient, S         25    mm Hg    ---------  Aortic valve area, VTI          1.12  cm^2     ---------  Aortic valve area/bsa, VTI      0.54  cm^2/m^2 ---------  Velocity ratio, peak,           0.41           ---------  LVOT/AV  Ascending aorta                 Value          Ref  Ascending aorta ID              2.8   cm       2.2 - 3.8  Left atrium                     Value          Ref  LA volume, S                (H) 69    ml       18 - 58  LA volume/bsa, S                33    ml/m^2   16 - 34  LA volume, ES, 1-p A4C      (H) 78    ml       18 - 58  LA volume, ES, 1-p A2C          56    ml       18 - 58  LA volume, ES, A/L              71    ml       ---------  LA volume/bsa, ES, A/L      (H) 35    ml/m^2   16 - 34  Mitral valve                    Value          Ref  Mitral E-wave peak velocity     1.26  m/sec    ---------  Mitral A-wave peak velocity     1.14  m/sec    ---------  Mitral deceleration time        229   ms       ---------  Mitral peak gradient, D         6     mm Hg    ---------  Mitral E/A ratio, peak          1.1            ---------  Systemic veins                  Value          Ref  Estimated CVP                   15    mm Hg    ---------  Inferior vena cava              Value          Ref  ID                          (H) 2.7   cm       <=2.1  Right ventricle                 Value          Ref  TAPSE, 2D                   (L) 1.41  cm       >=1.70  TAPSE, MM                   (L) 1.41  cm       >=1.70  RV s', lateral              (L) 6.2   cm/sec   >=9.5 Legend: (L)  and  (H)  steven values outside specified reference range.  ---------------------------------------------------------------------------- Prepared and electronically signed by Massimo Deluna 06/23/2025 18:00        Exam:     Physical Exam:  General: No apparent distress.   HEENT: neck supple, no thyromegaly or adenopathy.  Neck: No JVD, carotids 2+, no bruits.  Cardiac: Regular rate and rhythm. S1, S2 normal. No murmur, pericardial rub, S3, or extra cardiac sounds.  Lungs: diminished BS on R, +crackles  Abdomen: Soft, non-tender. No organosplenomegally, mass or rebound, BS-present.  Extremities: 1+ edema.    Neurologic: No focal defects  Skin: Warm and dry.     Cyrus Wyman, [facility] [place]         [1]    furosemide  40 mg Intravenous BID (Diuretic)    insulin degludec  15 Units Subcutaneous Daily    sacubitril-valsartan  1 tablet Oral BID    carvedilol  12.5 mg Oral BID with meals    [START ON 6/30/2025] colchicine  0.3 mg Oral QOD    ipratropium-albuterol  3 mL Nebulization 2 times daily    insulin aspart  1-5 Units Subcutaneous TID CC    potassium chloride  20 mEq Intravenous BID    sodium chloride   Intravenous Once    escitalopram  5 mg Oral Nightly    amiodarone  200 mg Oral Daily    OLANZapine  2.5 mg Oral Nightly    ferrous sulfate  300 mg Oral BID    aspirin  81 mg Per NG Tube Daily    docusate  100 mg Per NG Tube BID    budesonide  0.5 mg Nebulization 2 times daily    rosuvastatin  20 mg Per NG Tube Nightly    thiamine  100 mg Oral Daily    multivitamin  1 tablet Oral Daily    folic acid  1 mg Oral Daily    sennosides  8.6 mg Oral BID    famotidine  20 mg Oral BID    Or    famotidine  20 mg Intravenous BID    chlorhexidine gluconate  15 mL Mouth/Throat BID    ascorbic acid  500 mg Oral TID    enoxaparin  40 mg Subcutaneous Daily    clopidogrel  75 mg Oral Daily    tamsulosin  0.4 mg Oral BID   [2]    dextrose 10%

## 2025-06-29 NOTE — PROGRESS NOTES
[facility]  part of [facility]    Progress Note    Jose Alberto Lizama Patient Status:  Inpatient    1965 MRN [id number]   Location [facility] 2W/SW Attending Nannette Pelayo MD   Hosp Day # 10 PCP Fiona Sweeney MD         Subjective:   Subjective:  Patient was seen and examined  Feeling much better  Not agitated and up to the chair on 2 L of oxygen  Comfortable  Denied abdominal pain  Denied chest pain or dyspnea  Less cough  Objective:   Blood pressure 117/64, pulse 78, temperature 98 °F (36.7 °C), temperature source Temporal, resp. rate 21, height 5' 6\" (1.676 m), weight 199 lb 11.8 oz (90.6 kg), SpO2 95%.  Physical Exam  Vitals and nursing note reviewed.   Constitutional:       General: He is not in acute distress.  HENT:      Head: Atraumatic.   Eyes:      General: No scleral icterus.  Cardiovascular:      Rate and Rhythm: Normal rate.      Heart sounds:      No gallop.   Pulmonary:      Comments: Diminished left base otherwise clear  Abdominal:      General: Abdomen is flat.      Tenderness: There is no guarding.   Musculoskeletal:      Right lower leg: No edema.      Left lower leg: No edema.   Skin:     General: Skin is dry.   Neurological:      General: No focal deficit present.      Mental Status: He is oriented to person, place, and time.         Results:   Lab Results   Component Value Date    WBC 8.4 2025    HGB 9.7 (L) 2025    HCT 30.2 (L) 2025    .0 (H) 2025    CREATSERUM 0.68 (L) 2025    BUN 12 2025     2025    K 3.5 2025     2025    CO2 34.0 (H) 2025     (H) 2025    CA 8.2 (L) 2025    ALB 3.6 2025    ALKPHO 56 2025    BILT 0.4 2025    TP 5.9 2025    AST 35 (H) 2025    ALT 26 2025    PTT 30.6 2025    INR 1.17 2025    TSH 0.696 2025    PSA 2.62 08/10/2023    ESRML 97 (H) 2025    MG 1.9 2025    PHOS 3.5  06/28/2025       XR CHEST AP PORTABLE  (CPT=71045)  Result Date: 6/28/2025  CONCLUSION: Post cardiac surgery. Findings suggesting increased fluid volume status of the patient/CHF with increasing vascular congestion, increasing interstitial edema, and increasing left pleural effusion Electronically Verified and Signed by Attending Radiologist: Bal Stewart MD 6/28/2025 8:10 AM Workstation: [id number]    CT BRAIN OR HEAD (CPT=70450)  Result Date: 6/27/2025  CONCLUSION: No acute intracranial abnormality. Electronically Verified and Signed by Attending Radiologist: Refugio Lloyd MD 6/27/2025 7:35 PM Workstation: [id number]    XR HIP W OR WO PELVIS 2 OR 3 VIEWS, RIGHT (CPT=73502)  Result Date: 6/27/2025  CONCLUSION: Tiny right femoral head and acetabular osteophytes with preserved right hip joint space.  Electronically Verified and Signed by Attending Radiologist: Refugio Lloyd MD 6/27/2025 7:01 PM Workstation: [email or web address]          Assessment & Plan:       1- s/p CABG and AVR on 6/19/25 for severe multivessel CAD and severe aortic stenosis  LVEF 30 %  HD better and off pressors   Plavix and aspirin and amiodarone     2-postop acute respiratory failure with pulmonary edema and mucous plugging  Extubated on 6/24/25 and tolerated   CXR better / less edema and infiltrate      Much better and down to 2 L and up to the chair  Completed course of antibiotics  Bronchial hygiene and nebulizers with Pulmicort and DuoNeb  Incentive spirometry  Increase activity  Diuretics      3-possible EtOH use?  Withdrawal  Thiamine and folic acid and psych following  More awake and alert today     4-peripheral artery disease  Plavix and aspirin     5-HTN, HL, DM , obesity BMI 34 with possible ORAL     6-DVT prophylaxis  Lovenox subcutaneous     Better overall                 Mora Marte MD  6/29/2025

## 2025-06-29 NOTE — PLAN OF CARE
Mentation and mood has improved. Pt is conversational and despite language barrier is able to communicate basic needs. Minimal pain noted overnight, able to stand bedside and walk to scale and chair. Currently sitting in recliner comfortably at this time. Placed on bipap overnight with good effects.    CT: 160cc overnight ([id number])  External cath: 1050cc    Problem: CARDIOVASCULAR - ADULT  Goal: Maintains optimal cardiac output and hemodynamic stability  Description: INTERVENTIONS:  - Monitor vital signs, rhythm, and trends  - Monitor for bleeding, hypotension and signs of decreased cardiac output  - Evaluate effectiveness of vasoactive medications to optimize hemodynamic stability  - Monitor arterial and/or venous puncture sites for bleeding and/or hematoma  - Assess quality of pulses, skin color and temperature  - Assess for signs of decreased coronary artery perfusion - ex. Angina  - Evaluate fluid balance, assess for edema, trend weights  Outcome: Progressing  Goal: Absence of cardiac arrhythmias or at baseline  Description: INTERVENTIONS:  - Continuous cardiac monitoring, monitor vital signs, obtain 12 lead EKG if indicated  - Evaluate effectiveness of antiarrhythmic and heart rate control medications as ordered  - Initiate emergency measures for life threatening arrhythmias  - Monitor electrolytes and administer replacement therapy as ordered  Outcome: Progressing     Problem: RESPIRATORY - ADULT  Goal: Achieves optimal ventilation and oxygenation  Description: INTERVENTIONS:  - Assess for changes in respiratory status  - Assess for changes in mentation and behavior  - Position to facilitate oxygenation and minimize respiratory effort  - Oxygen supplementation based on oxygen saturation or ABGs  - Provide Smoking Cessation handout, if applicable  - Encourage broncho-pulmonary hygiene including cough, deep breathe, Incentive Spirometry  - Assess the need for suctioning and perform as needed  - Assess and  instruct to report SOB or any respiratory difficulty  - Respiratory Therapy support as indicated  - Manage/alleviate anxiety  - Monitor for signs/symptoms of CO2 retention  Outcome: Progressing     Problem: GASTROINTESTINAL - ADULT  Goal: Minimal or absence of nausea and vomiting  Description: INTERVENTIONS:  - Maintain adequate hydration with IV or PO as ordered and tolerated  - Nasogastric tube to low intermittent suction as ordered  - Evaluate effectiveness of ordered antiemetic medications  - Provide nonpharmacologic comfort measures as appropriate  - Advance diet as tolerated, if ordered  - Obtain nutritional consult as needed  - Evaluate fluid balance  Outcome: Progressing  Goal: Maintains or returns to baseline bowel function  Description: INTERVENTIONS:  - Assess bowel function  - Maintain adequate hydration with IV or PO as ordered and tolerated  - Evaluate effectiveness of GI medications  - Encourage mobilization and activity  - Obtain nutritional consult as needed  - Establish a toileting routine/schedule  - Consider collaborating with pharmacy to review patient's medication profile  Outcome: Progressing     Problem: GENITOURINARY - ADULT  Goal: Absence of urinary retention  Description: INTERVENTIONS:  - Assess patient’s ability to void and empty bladder  - Monitor intake/output and perform bladder scan as needed  - Follow urinary retention protocol/standard of care  - Consider collaborating with pharmacy to review patient's medication profile  - Implement strategies to promote bladder emptying  Outcome: Progressing     Problem: METABOLIC/FLUID AND ELECTROLYTES - ADULT  Goal: Glucose maintained within prescribed range  Description: INTERVENTIONS:  - Monitor Blood Glucose as ordered  - Assess for signs and symptoms of hyperglycemia and hypoglycemia  - Administer ordered medications to maintain glucose within target range  - Assess barriers to adequate nutritional intake and initiate nutrition consult as  needed  - Instruct patient on self management of diabetes  Outcome: Progressing  Goal: Electrolytes maintained within normal limits  Description: INTERVENTIONS:  - Monitor labs and rhythm and assess patient for signs and symptoms of electrolyte imbalances  - Administer electrolyte replacement as ordered  - Monitor response to electrolyte replacements, including rhythm and repeat lab results as appropriate  - Fluid restriction as ordered  - Instruct patient on fluid and nutrition restrictions as appropriate  Outcome: Progressing  Goal: Hemodynamic stability and optimal renal function maintained  Description: INTERVENTIONS:  - Monitor labs and assess for signs and symptoms of volume excess or deficit  - Monitor intake, output and patient weight  - Monitor urine specific gravity, serum osmolarity and serum sodium as indicated or ordered  - Monitor response to interventions for patient's volume status, including labs, urine output, blood pressure (other measures as available)  - Encourage oral intake as appropriate  - Instruct patient on fluid and nutrition restrictions as appropriate  Outcome: Progressing     Problem: SKIN/TISSUE INTEGRITY - ADULT  Goal: Incision(s), wounds(s) or drain site(s) healing without S/S of infection  Description: INTERVENTIONS:  - Assess and document risk factors for pressure ulcer development  - Assess and document skin integrity  - Assess and document dressing/incision, wound bed, drain sites and surrounding tissue  - Implement wound care per orders  - Initiate isolation precautions as appropriate  - Initiate Pressure Ulcer prevention bundle as indicated  Outcome: Progressing  Goal: Oral mucous membranes remain intact  Description: INTERVENTIONS  - Assess oral mucosa and hygiene practices  - Implement preventative oral hygiene regimen  - Implement oral medicated treatments as ordered  Outcome: Progressing     Problem: HEMATOLOGIC - ADULT  Goal: Maintains hematologic stability  Description:  INTERVENTIONS  - Assess for signs and symptoms of bleeding or hemorrhage  - Monitor labs and vital signs for trends  - Administer supportive blood products/factors, fluids and medications as ordered and appropriate  - Administer supportive blood products/factors as ordered and appropriate  Outcome: Progressing  Goal: Free from bleeding injury  Description: (Example usage: patient with low platelets)  INTERVENTIONS:  - Avoid intramuscular injections, enemas and rectal medication administration  - Ensure safe mobilization of patient  - Hold pressure on venipuncture sites to achieve adequate hemostasis  - Assess for signs and symptoms of internal bleeding  - Monitor lab trends  - Patient is to report abnormal signs of bleeding to staff  - Avoid use of toothpicks and dental floss  - Use electric shaver for shaving  - Use soft bristle tooth brush  - Limit straining and forceful nose blowing  Outcome: Progressing     Problem: MUSCULOSKELETAL - ADULT  Goal: Return mobility to safest level of function  Description: INTERVENTIONS:  - Assess patient stability and activity tolerance for standing, transferring and ambulating w/ or w/o assistive devices  - Assist with transfers and ambulation using safe patient handling equipment as needed  - Ensure adequate protection for wounds/incisions during mobilization  - Obtain PT/OT consults as needed  - Advance activity as appropriate  - Communicate ordered activity level and limitations with patient/family  Outcome: Progressing     Problem: NEUROLOGICAL - ADULT  Goal: Achieves stable or improved neurological status  Description: INTERVENTIONS  - Assess for and report changes in neurological status  - Initiate measures to prevent increased intracranial pressure  - Maintain blood pressure and fluid volume within ordered parameters to optimize cerebral perfusion and minimize risk of hemorrhage  - Monitor temperature, glucose, and sodium. Initiate appropriate interventions as  ordered  Outcome: Progressing     Problem: Patient Centered Care  Goal: Patient preferences are identified and integrated in the patient's plan of care  Description: Interventions:  - What would you like us to know as we care for you? \"I should be going home today right?\"  - Provide timely, complete, and accurate information to patient/family  - Incorporate patient and family knowledge, values, beliefs, and cultural backgrounds into the planning and delivery of care  - Encourage patient/family to participate in care and decision-making at the level they choose  - Honor patient and family perspectives and choices  Outcome: Progressing     Problem: Patient/Family Goals  Goal: Patient/Family Long Term Goal  Description: Patient's Long Term Goal: Discharge    Interventions:  - Follow MD orders  - See additional Care Plan goals for specific interventions  Outcome: Progressing  Goal: Patient/Family Short Term Goal  Description: Patient's Short Term Goal: Chest tube removal    Interventions:   - Fair output overnight, no significant bleeding.  - See additional Care Plan goals for specific interventions  Outcome: Progressing     Problem: Diabetes/Glucose Control  Goal: Glucose maintained within prescribed range  Description: INTERVENTIONS:  - Monitor Blood Glucose as ordered  - Assess for signs and symptoms of hyperglycemia and hypoglycemia  - Administer ordered medications to maintain glucose within target range  - Assess barriers to adequate nutritional intake and initiate nutrition consult as needed  - Instruct patient on self management of diabetes  Outcome: Progressing     Problem: Impaired Functional Mobility  Goal: Achieve highest/safest level of mobility/gait  Description: Interventions:  - Assess patient's functional ability and stability  - Promote increasing activity/tolerance for mobility and gait  - Educate and engage patient/family in tolerated activity level and precautions  - Recommend use of  elvin-walker  for transfers and ambulation  Outcome: Progressing     Problem: Impaired Cognition  Goal: Patient will exhibit improved attention, thought processing and/or memory  Description: Interventions:  - Allow additional time for processing after asking questions or providing instructions  Outcome: Progressing

## 2025-06-29 NOTE — PROGRESS NOTES
[facility]  part of [facility]    Progress Note    Jose Alberto Lizama Patient Status:  Inpatient    1965 MRN [id number]   Location [facility] 2W/SW Attending Tammy Zacarias MD   Hosp Day # 10 PCP Fiona Sweeney MD       Subjective:   Patient sitting up in bed and in NAD. Appears much better than prior.   Denied any active complaints.   Family present at the bedside.   All questions and concerns addressed.     Objective:   Blood pressure 101/58, pulse 82, temperature 98.1 °F (36.7 °C), temperature source Temporal, resp. rate 15, height 5' 6\" (1.676 m), weight 199 lb 11.8 oz (90.6 kg), SpO2 94%.    Physical Exam:    General: No acute distress.   Respiratory: Clear to auscultation bilaterally. No wheezes. No rhonchi.  Cardiovascular: S1, S2. Regular rate and rhythm. No murmurs, rubs or gallops.   Abdomen: Soft, nontender, nondistended.  Positive bowel sounds. No rebound or guarding.  Neurologic: No focal neurological deficits.   Musculoskeletal: Moves all extremities.  Extremities: No edema.    Results:     Lab Results   Component Value Date    WBC 8.4 2025    HGB 9.7 (L) 2025    HCT 30.2 (L) 2025    .0 (H) 2025    CREATSERUM 0.68 (L) 2025    BUN 12 2025     2025    K 3.5 2025     2025    CO2 34.0 (H) 2025     (H) 2025    CA 8.2 (L) 2025    ALB 3.6 2025    ALKPHO 56 2025    BILT 0.4 2025    TP 5.9 2025    AST 35 (H) 2025    ALT 26 2025    PTT 30.6 2025    INR 1.17 2025    TSH 0.696 2025    PSA 2.62 08/10/2023    ESRML 97 (H) 2025    MG 1.9 2025    PHOS 3.5 2025       XR CHEST AP PORTABLE  (CPT=71045)  Result Date: 2025  CONCLUSION: Post cardiac surgery. Findings suggesting increased fluid volume status of the patient/CHF with increasing vascular congestion, increasing interstitial edema, and increasing left  pleural effusion Electronically Verified and Signed by Attending Radiologist: Bal Stewart MD 6/28/2025 8:10 AM Workstation: [id number]    CT BRAIN OR HEAD (CPT=70450)  Result Date: 6/27/2025  CONCLUSION: No acute intracranial abnormality. Electronically Verified and Signed by Attending Radiologist: Refugio Lloyd MD 6/27/2025 7:35 PM Workstation: [id number]    XR HIP W OR WO PELVIS 2 OR 3 VIEWS, RIGHT (CPT=73502)  Result Date: 6/27/2025  CONCLUSION: Tiny right femoral head and acetabular osteophytes with preserved right hip joint space.  Electronically Verified and Signed by Attending Radiologist: Refugio Lloyd MD 6/27/2025 7:01 PM Workstation: [id number]    XR CHEST AP PORTABLE  (CPT=71045)  Result Date: 6/27/2025  CONCLUSION: Post cardiac surgery. Stable position of lines and tubes with interval placement of a right upper extremity PICC with tip at the cavoatrial junction. There are findings compatible with increased fluid volume status of the patient,. Electronically Verified and Signed by Attending Radiologist: Bal Stewart MD 6/27/2025 1:31 PM Workstation: [email or web address]            Scheduled Medications[1]  PRN Medications[2]      Assessment and Plan:       Mechanical fall  - ground level fall on 06/27/2025  - CT head negative, Him XR negative  - fall precautions    CAD status post CABG and AVR  Severe aortic stenosis  - difficult extubation but patient extubated successfully 06/24  - Continue tube feeds  - Management per cardiology and CV sx   Continue IV diuresis, lasix decreased to 40 mg IV BID  Entresto increased to 49/51, coreg 12.5 mg BID  Continue antiplatelet therapy, statin  Net IO Since Admission: -1,433.96 mL [06/29/25 1556]  - chest tube management per surgery  - swallow eval  - Monitor vitals  - PT/OT/SLP    Post op acute respiratory failure  - completed course of abx  - pulm on consult   Continue abx   Bronchial hygiene, nebs, IS   Ambulate as tolerated    Alcohol abuse/Withdrawal   - [facility] protocol   -  Psych on board    Zyprexa, Lexapro nightly   Haldol PRN      DM2  - insulin per Vascular surgery     HTN  Dyslipidemia  - continue home meds         Quality:    CODE status: Full  DVT ppx - Lovenox        [1]    furosemide  40 mg Intravenous BID (Diuretic)    insulin degludec  15 Units Subcutaneous Daily    sacubitril-valsartan  1 tablet Oral BID    carvedilol  12.5 mg Oral BID with meals    [START ON 6/30/2025] colchicine  0.3 mg Oral QOD    ipratropium-albuterol  3 mL Nebulization 2 times daily    insulin aspart  1-5 Units Subcutaneous TID CC    potassium chloride  20 mEq Intravenous BID    sodium chloride   Intravenous Once    escitalopram  5 mg Oral Nightly    amiodarone  200 mg Oral Daily    OLANZapine  2.5 mg Oral Nightly    ferrous sulfate  300 mg Oral BID    aspirin  81 mg Per NG Tube Daily    docusate  100 mg Per NG Tube BID    budesonide  0.5 mg Nebulization 2 times daily    rosuvastatin  20 mg Per NG Tube Nightly    thiamine  100 mg Oral Daily    multivitamin  1 tablet Oral Daily    folic acid  1 mg Oral Daily    sennosides  8.6 mg Oral BID    famotidine  20 mg Oral BID    Or    famotidine  20 mg Intravenous BID    chlorhexidine gluconate  15 mL Mouth/Throat BID    ascorbic acid  500 mg Oral TID    enoxaparin  40 mg Subcutaneous Daily    clopidogrel  75 mg Oral Daily    tamsulosin  0.4 mg Oral BID   [2]   labetalol    dextrose 10%    sodium bicarbonate **AND** lipase-protease-amylase (Lip-Prot-Amyl)    ipratropium-albuterol    LORazepam **OR** LORazepam    LORazepam **OR** LORazepam    LORazepam **OR** LORazepam    LORazepam    LORazepam    LORazepam    haloperidol lactate    LORazepam    haloperidol lactate    melatonin    polyethylene glycol (PEG 3350)    bisacodyl    potassium chloride **OR** potassium chloride    magnesium sulfate in dextrose 5%    magnesium sulfate in sterile water for injection    acetaminophen **OR** HYDROcodone-acetaminophen **OR** [DISCONTINUED] HYDROcodone-acetaminophen     morphINE **OR** [DISCONTINUED] morphINE    glucose **OR** glucose **OR** glucose-vitamin C **OR** dextrose **OR** glucose **OR** glucose **OR** glucose-vitamin C

## 2025-06-29 NOTE — PROGRESS NOTES
[facility]  part of [facility]    Progress Note    Jose Alberto Lizama Patient Status:  Inpatient    1965 MRN [id number]   Location [facility] 2W/SW Attending Nannette Pelayo MD   Hosp Day # 10 PCP Fiona Sweeney MD     Subjective:  Pt sitting in the chair w/sitter at bedside for safety. Language line used for translation # [id number]. Pt awake, alert & oriented x 3, calm and appropriate. MS much improved; near baseline. He has no complaints: denies pain and SOB. States he feels very \"well rested\".   No visitors at bedside.     Objective:  /57 (BP Location: Left arm)   Pulse 77   Temp 97.8 °F (36.6 °C) (Temporal)   Resp 21   Ht 5' 6\" (1.676 m)   Wt 199 lb 11.8 oz (90.6 kg)   SpO2 94%   BMI 32.24 kg/m²     Temp (24hrs), Av.8 °F (36.6 °C), Min:97 °F (36.1 °C), Max:98.6 °F (37 °C)      Intake/Output:    Intake/Output Summary (Last 24 hours) at 2025 0802  Last data filed at 2025 0600  Gross per 24 hour   Intake 693.7 ml   Output 2730 ml   Net -2036.3 ml       Wt Readings from Last 3 Encounters:   25 199 lb 11.8 oz (90.6 kg)   25 201 lb 12.8 oz (91.5 kg)   25 200 lb (90.7 kg)       Allergies:  Allergies[1]    Labs:  Lab Results   Component Value Date    K 3.5 2025    MG 1.9 2025       Physical Exam:  Blood pressure 127/57, pulse 77, temperature 97.8 °F (36.6 °C), temperature source Temporal, resp. rate 21, height 5' 6\" (1.676 m), weight 199 lb 11.8 oz (90.6 kg), SpO2 94%.  General: NAD  Neck: No JVD  Lungs: Diminished throughout bilaterally   Mediastinal Dane drain=70cc/12 hours- no air leak   Heart: RRR, S1, S2  Abdomen: Soft/Round, NT/ND, BS+x4/+Flatus/+BM(s)  Extremities: Warm, dry, trace LE edema bilat  Pulses: 1+ bilat DP  Skin: sternotomy incision BETTIE=CDI/Left leg incision BETTIE=CDI   Neurological:  AAOx3, MAEW-calm and appropriate    Assessment/Plan:  S/P CABG x 2/AVR/MELINDA CLIP/INSERTION OF LEFT FEMORAL IABP POD # 10  IABP removed  on 6/20  Extubated on 6/24: Encourage pulm toilet: IS-now able to perform w/o assistance- 500 cc/continue EZ pap. Likely w/pre op ORAL. BiPap at night- wean to HF N/C during the day.    Sputum cx 6/22=negative. On Zosyn per Pulm/agree to continue w/recent AVR.   Pain meds as needed-limit/avoid narcotics w/post op altered MS. Can use Tylenol.   Post op altered MS w/agitation/confusion/restlessness of unknown etiology-nearly resolved as pt appears close to baseline; suspected post op delirium and/or ETOH withdrawal requiring Psych eval on 6/22- following recs. CT brain on 6/20=negative   Hx: HF-GDMT mgt per Cardiology  S/P fall out of bed 6/27 evening w/superficial laceration above right eye-should heal with time-continues CDI w/steri strips. CT head 6/27=negative/Hip X-ray 6/27=okay. Sitter at bedside for safety. Reminded pt to not get up alone.   Altered sleep disturbance/sleep pattern due to pt usually works nights-may have contributed to his post op altered MS  Increase activity-OOB to chair/ambulate in hallway-working with PT/OT- informed by ambulating in hallway yesterday  Scds/Lovenox prophylaxis DVT prevention   Continues hemodynamically stable: change status to PCU today: Hospitalist following  Continues w/mediastinal Dane drain-will discuss removal plans w/CV Surgeon  HTN - on oral meds: mgt per Cardiology  Elevated ESR=97- likely PPS-continues on Colchicine.   PICC placed 6/26  Expected post op volume overload- continue IV Lasix 40 mg TID and IV Potassium BID while on Lasix. Mckeon removed 6/25 after leaking noted-able to use external male catheter for close I/O monitoring.   Check K/Mag every 6 hours while on IV Lasix TID- replacement per protocol   Amio protocol for AF prevention as pt considered high risk for post op AF. Post op SR w/freq PACs noted: on PO Amio daily-may stop at discharge if no AF noted post op.    Expected post op anemia w/o clinical significance/stable: may be slightly diluted due to  volume overload- Iron x 1 month.   Hx: DM-Continue basal insulin dose today/continue correction scale coverage. Resume home regimen at/near discharge.    Nutrition- Solid Puree after swallow eval per ST-following recs. Unable to insert Dobhoff right nares 6/26 due to bleeding/clots which has resolved.  No need for Dobhoff at this time.   Hx: BPH- on home Flomax  Hx: PVD- on ASA/Plavix   Blood CX 6/22 =negative   Discharge planning: pt lives with his wife. Pt progressing well. Continue to increase activity. Home vs rehab- tentatively ready next 24-48 hours.   PMR consulted 6/26; continues to monitor progress.    Addendum @ 0900:   Pt seen with rounding CV Surgeon: Dr. Yun and plan of care discussed. Keep Dane drain today-likely removal tomorrow. Change Lasix to BID. Check CXR in AM.     Plan of care discussed w/patient and RN   Heather Mast, PARRIS  6/29/2025  8:02 AM    Agree with above.    Jama Yun MD, FACS  Cardiothoracic Surgeon  [facility]  [phone]           [1] No Known Allergies

## 2025-06-30 ENCOUNTER — APPOINTMENT (OUTPATIENT)
Dept: GENERAL RADIOLOGY | Facility: HOSPITAL | Age: 60
DRG: 219 | End: 2025-06-30
Attending: CLINICAL NURSE SPECIALIST
Payer: MEDICAID

## 2025-06-30 ENCOUNTER — APPOINTMENT (OUTPATIENT)
Dept: CT IMAGING | Facility: HOSPITAL | Age: 60
DRG: 219 | End: 2025-06-30
Attending: INTERNAL MEDICINE
Payer: MEDICAID

## 2025-06-30 LAB
ALBUMIN SERPL-MCNC: 3.5 G/DL (ref 3.2–4.8)
ALBUMIN/GLOB SERPL: 1.4 {RATIO} (ref 1–2)
ALP LIVER SERPL-CCNC: 58 U/L (ref 45–117)
ALT SERPL-CCNC: 16 U/L (ref 10–49)
ANION GAP SERPL CALC-SCNC: 6 MMOL/L (ref 0–18)
AST SERPL-CCNC: 22 U/L (ref ?–34)
BASOPHILS # BLD AUTO: 0.07 X10(3) UL (ref 0–0.2)
BASOPHILS NFR BLD AUTO: 0.7 %
BILIRUB SERPL-MCNC: 0.3 MG/DL (ref 0.2–1.1)
BUN BLD-MCNC: 20 MG/DL (ref 9–23)
BUN/CREAT SERPL: 29 (ref 10–20)
CALCIUM BLD-MCNC: 8.4 MG/DL (ref 8.7–10.4)
CHLORIDE SERPL-SCNC: 102 MMOL/L (ref 98–112)
CO2 SERPL-SCNC: 32 MMOL/L (ref 21–32)
CREAT BLD-MCNC: 0.69 MG/DL (ref 0.7–1.3)
DEPRECATED RDW RBC AUTO: 47.2 FL (ref 35.1–46.3)
EGFRCR SERPLBLD CKD-EPI 2021: 105 ML/MIN/1.73M2 (ref 60–?)
EOSINOPHIL # BLD AUTO: 0.25 X10(3) UL (ref 0–0.7)
EOSINOPHIL NFR BLD AUTO: 2.5 %
ERYTHROCYTE [DISTWIDTH] IN BLOOD BY AUTOMATED COUNT: 15.5 % (ref 11–15)
GLOBULIN PLAS-MCNC: 2.5 G/DL (ref 2–3.5)
GLUCOSE BLD-MCNC: 165 MG/DL (ref 70–99)
GLUCOSE BLDC GLUCOMTR-MCNC: 130 MG/DL (ref 70–99)
GLUCOSE BLDC GLUCOMTR-MCNC: 180 MG/DL (ref 70–99)
GLUCOSE BLDC GLUCOMTR-MCNC: 192 MG/DL (ref 70–99)
GLUCOSE BLDC GLUCOMTR-MCNC: 233 MG/DL (ref 70–99)
HCT VFR BLD AUTO: 30.7 % (ref 39–53)
HEMOCCULT STL QL: POSITIVE
HGB BLD-MCNC: 9.6 G/DL (ref 13–17.5)
IMM GRANULOCYTES # BLD AUTO: 0.05 X10(3) UL (ref 0–1)
IMM GRANULOCYTES NFR BLD: 0.5 %
LYMPHOCYTES # BLD AUTO: 1.89 X10(3) UL (ref 1–4)
LYMPHOCYTES NFR BLD AUTO: 18.8 %
MAGNESIUM SERPL-MCNC: 1.9 MG/DL (ref 1.6–2.6)
MCH RBC QN AUTO: 25.9 PG (ref 26–34)
MCHC RBC AUTO-ENTMCNC: 31.3 G/DL (ref 31–37)
MCV RBC AUTO: 83 FL (ref 80–100)
MONOCYTES # BLD AUTO: 0.82 X10(3) UL (ref 0.1–1)
MONOCYTES NFR BLD AUTO: 8.2 %
NEUTROPHILS # BLD AUTO: 6.95 X10 (3) UL (ref 1.5–7.7)
NEUTROPHILS # BLD AUTO: 6.95 X10(3) UL (ref 1.5–7.7)
NEUTROPHILS NFR BLD AUTO: 69.3 %
OSMOLALITY SERPL CALC.SUM OF ELEC: 296 MOSM/KG (ref 275–295)
PHOSPHATE SERPL-MCNC: 3.7 MG/DL (ref 2.4–5.1)
PLATELET # BLD AUTO: 673 10(3)UL (ref 150–450)
POTASSIUM SERPL-SCNC: 3.6 MMOL/L (ref 3.5–5.1)
PROT SERPL-MCNC: 6 G/DL (ref 5.7–8.2)
RBC # BLD AUTO: 3.7 X10(6)UL (ref 4.3–5.7)
SODIUM SERPL-SCNC: 140 MMOL/L (ref 136–145)
WBC # BLD AUTO: 10 X10(3) UL (ref 4–11)

## 2025-06-30 PROCEDURE — 99233 SBSQ HOSP IP/OBS HIGH 50: CPT | Performed by: INTERNAL MEDICINE

## 2025-06-30 PROCEDURE — 71045 X-RAY EXAM CHEST 1 VIEW: CPT | Performed by: CLINICAL NURSE SPECIALIST

## 2025-06-30 PROCEDURE — 71250 CT THORAX DX C-: CPT | Performed by: STUDENT IN AN ORGANIZED HEALTH CARE EDUCATION/TRAINING PROGRAM

## 2025-06-30 PROCEDURE — 99232 SBSQ HOSP IP/OBS MODERATE 35: CPT | Performed by: OTHER

## 2025-06-30 RX ORDER — PANTOPRAZOLE SODIUM 40 MG/1
40 TABLET, DELAYED RELEASE ORAL
Status: DISCONTINUED | OUTPATIENT
Start: 2025-06-30 | End: 2025-07-03

## 2025-06-30 RX ORDER — POTASSIUM CHLORIDE 1.5 G/1.58G
20 POWDER, FOR SOLUTION ORAL 2 TIMES DAILY
Status: DISCONTINUED | OUTPATIENT
Start: 2025-06-30 | End: 2025-07-01

## 2025-06-30 RX ORDER — ASCORBIC ACID 500 MG
500 TABLET ORAL 2 TIMES DAILY
Status: DISCONTINUED | OUTPATIENT
Start: 2025-06-30 | End: 2025-07-03

## 2025-06-30 NOTE — PROGRESS NOTES
Patient is a 59 year old   male with past medical history of CAD, PVD,  hypertension, hyperlipidemia, diabetes, severe aortic stenosis. who was admitted to the hospital for a scheduled coronary artery bypass graft surgery, aortic valve replacement on 6/19. The patient is intubated and sedated with propofol and precedex. Otherwise the patient has been demonstrating agitation and confusion when weaning from mechanical ventilation is attempted.   Patient indicated for psych consult for evaluation and advise.    Consult Duration     The patient seen for over 50-minute, follow-up evaluation, over 50% counseling and coordinating care addressing agitation and confusion during SBT.  Record reviewed, communication with attending, communication with RN and patient seen face to face evaluation.     History of Present Illness:     In communication with the team the patient had no major events over night, but has been requiring more O2 when speaking or walking.  The patient seen today sitting in his chair working with Rehab and another physician.  The patient took his scheduled Lexapro and Zyprexa last night, and has not needed PRN ativan or Haldol since 6/26    The patient has continued to have improvements with his cognition and awareness, otherwise still demonstrates some degree of restlessness in the evening, which has improved with the use of Zyprexa.  At this time the patient likely no longer requires care from the psychiatric team, and will be recommended to continue his prescribed medication and follow up with an outpatient psychiatrist upon discharge from the hospital.    No report of history of suicide or homicidal ideation or action.  No history of hallucination, psychosis or manic episode.    Collateral obtained from the patient's wife and daughter.   His daughter states that he drinks 3-5 drinks of Brandon walker on the weekend socially, with a hx of a \"little bit\" of depression and anxiety.  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 The patient's daughter states he quit smoking cigarettes 3 years ago and now vapes instead. She denied any family history of dementia.      Past Psychiatric/Medication History:  1. Prior diagnoses: none reported  2. Past psychiatric inpatient: none reported  3. Past outpatient history: none reported  4. Past suicide history: none reported  5. Medication history: none reported    Social History:   The patient is a 59 yr old polish speaking  white male with one adult daughter. He is reported to have 3-5 drinks every weekend socially, does not use illicit drugs and now vapes nicotine daily after quitting cigarettes 3 years ago.     Family History:  Father, paternal aunt and uncle all have hx of diabetes.  Medical History:   Past Medical History  Past Medical History:    Aortic stenosis    Back problem    BPH (benign prostatic hyperplasia)    Cardiomyopathy (HCC)    Coronary atherosclerosis    Depression    Diabetes (HCC)    Esophageal reflux    Heart valve disease    High blood pressure    High cholesterol    Peripheral vascular disease    Shortness of breath    related to smoking     Visual impairment    glasses       Past Surgical History  Past Surgical History:   Procedure Laterality Date    Cardiac catheterization  2016    [facility]    Cath drug eluting stent      Colonoscopy  04/22/2024    Colonoscopy N/A 4/22/2024    Procedure: COLONOSCOPY;  Surgeon: Attila Li MD;  Location: [facility] ENDOSCOPY    Esophagoscopy,diagnostic  04/22/2024    Dr. Li    Hernia surgery      as a child    Nasal scopy,remv part ethmoid  10/14/2020    Nasal scopy,rmv tiss maxill sinus  10/14/2020    Repair of nasal septum  10/14/2020       Family History  Family History   Problem Relation Age of Onset    Diabetes Father     Diabetes Paternal Aunt     Diabetes Paternal Uncle     Seizure  Disorder Mother     No Known Problems Brother     No Known Problems Daughter     Glaucoma Neg     Macular degeneration Neg        Social History  Social History     Socioeconomic History    Marital status:    Tobacco Use    Smoking status: Former     Current packs/day: 1.00     Average packs/day: 1 pack/day for 20.0 years (20.0 ttl pk-yrs)     Types: Cigarettes    Smokeless tobacco: Never   Vaping Use    Vaping status: Every Day    Substances: Nicotine   Substance and Sexual Activity    Alcohol use: Yes     Comment: 1-2 drinks a week    Drug use: No           Current Medications:  Current Facility-Administered Medications   Medication Dose Route Frequency    ascorbic acid (Vitamin C) tab 500 mg  500 mg Oral BID    potassium chloride (Klor-Con) 20 MEQ oral powder 20 mEq  20 mEq Oral BID    pantoprazole (Protonix) DR tab 40 mg  40 mg Oral BID AC    furosemide (Lasix) 10 mg/mL injection 40 mg  40 mg Intravenous BID (Diuretic)    insulin degludec (Tresiba) 100 units/mL flextouch 15 Units  15 Units Subcutaneous Daily    sacubitril-valsartan (Entresto) 49-51 MG per tab 1 tablet  1 tablet Oral BID    carvedilol (Coreg) tab 12.5 mg  12.5 mg Oral BID with meals    colchicine tab 0.3 mg  0.3 mg Oral QOD    ipratropium-albuterol (Duoneb) 0.5-2.5 (3) MG/3ML inhalation solution 3 mL  3 mL Nebulization 2 times daily    insulin aspart (NovoLOG) 100 Units/mL FlexPen 1-5 Units  1-5 Units Subcutaneous TID CC    escitalopram (Lexapro) tab 5 mg  5 mg Oral Nightly    amiodarone (Pacerone) tab 200 mg  200 mg Oral Daily    labetalol (Trandate) 5 mg/mL injection 10 mg  10 mg Intravenous Q6H PRN    OLANZapine (ZyPREXA) tab 2.5 mg  2.5 mg Oral Nightly    ferrous sulfate 300 (60 Fe) MG/5ML oral syrup 300 mg  300 mg Oral BID    aspirin chewable tab 81 mg  81 mg Per NG Tube Daily    dextrose 10% infusion (TPN no rate)   Intravenous Continuous PRN    pancrelipase (Lip-Prot-Amyl) (Zenpep) DR particles cap 10,000 Units  10,000 Units Per G  Tube PRN    docusate (Colace) 50 MG/5ML oral solution 100 mg  100 mg Per NG Tube BID    ipratropium-albuterol (Duoneb) 0.5-2.5 (3) MG/3ML inhalation solution 3 mL  3 mL Nebulization Q4H PRN    budesonide (Pulmicort) 0.5 MG/2ML nebulizer suspension 0.5 mg  0.5 mg Nebulization 2 times daily    rosuvastatin (Crestor) tab 20 mg  20 mg Per NG Tube Nightly    LORazepam (Ativan) tab 1 mg  1 mg Oral Q1H PRN    Or    LORazepam (Ativan) 2 mg/mL injection 1 mg  1 mg Intravenous Q1H PRN    LORazepam (Ativan) tab 2 mg  2 mg Oral Q1H PRN    Or    LORazepam (Ativan) 2 mg/mL injection 2 mg  2 mg Intravenous Q1H PRN    LORazepam (Ativan) tab 3 mg  3 mg Oral Q1H PRN    Or    LORazepam (Ativan) 2 mg/mL injection 3 mg  3 mg Intravenous Q1H PRN    LORazepam (Ativan) 2 mg/mL injection 4 mg  4 mg Intravenous Q30 Min PRN    LORazepam (Ativan) 2 mg/mL injection 5 mg  5 mg Intravenous Q15 Min PRN    LORazepam (Ativan) 2 mg/mL injection 6 mg  6 mg Intravenous Q10 Min PRN    thiamine (Vitamin B1) tab 100 mg  100 mg Oral Daily    multivitamin (Tab-A-Елена/Beta Carotene) tab 1 tablet  1 tablet Oral Daily    folic acid (Folvite) tab 1 mg  1 mg Oral Daily    haloperidol lactate (Haldol) 5 MG/ML injection 5 mg  5 mg Intramuscular Q6H PRN    LORazepam (Ativan) 2 mg/mL injection 1 mg  1 mg Intramuscular Q6H PRN    haloperidol lactate (Haldol) 5 MG/ML injection 2 mg  2 mg Intravenous Q4H PRN    melatonin tab 3 mg  3 mg Oral Nightly PRN    sennosides (Senokot) tab 8.6 mg  8.6 mg Oral BID    bisacodyl (Dulcolax) 10 MG rectal suppository 10 mg  10 mg Rectal Daily PRN    potassium chloride 20 mEq/100mL IVPB premix 20 mEq  20 mEq Intravenous PRN    Or    potassium chloride 40 mEq/100mL IVPB premix (central line) 40 mEq  40 mEq Intravenous PRN    magnesium sulfate in dextrose 5% 1 g/100mL infusion premix 1 g  1 g Intravenous PRN    magnesium sulfate in sterile water for injection 2 g/50mL IVPB premix 2 g  2 g Intravenous PRN    enoxaparin (Lovenox) 40  MG/0.4ML SUBQ injection 40 mg  40 mg Subcutaneous Daily    acetaminophen (Tylenol) tab 650 mg  650 mg Oral Q4H PRN    Or    HYDROcodone-acetaminophen (Norco) 5-325 MG per tab 1 tablet  1 tablet Oral Q4H PRN    [Held by provider] clopidogrel (Plavix) tab 75 mg  75 mg Oral Daily    glucose (Dex4) 15 GM/59ML oral liquid 15 g  15 g Oral Q15 Min PRN    Or    glucose (Glutose) 40% oral gel 15 g  15 g Oral Q15 Min PRN    Or    glucose-vitamin C (Dex-4) chewable tab 4 tablet  4 tablet Oral Q15 Min PRN    Or    dextrose 50% injection 50 mL  50 mL Intravenous Q15 Min PRN    Or    glucose (Dex4) 15 GM/59ML oral liquid 30 g  30 g Oral Q15 Min PRN    Or    glucose (Glutose) 40% oral gel 30 g  30 g Oral Q15 Min PRN    Or    glucose-vitamin C (Dex-4) chewable tab 8 tablet  8 tablet Oral Q15 Min PRN    tamsulosin (Flomax) cap 0.4 mg  0.4 mg Oral BID     Medications Prior to Admission   Medication Sig    ENTRESTO 24-26 MG Oral Tab Entresto 24 mg-26 mg tablet, [RxNorm: 6668171]    magnesium 250 MG Oral Tab Take 1 tablet (250 mg total) by mouth every other day.    cyanocobalamin 500 MCG Oral Tab Take 1 tablet (500 mcg total) by mouth every other day.    metFORMIN HCl 1000 MG Oral Tab Take 1 tablet (1,000 mg total) by mouth 2 (two) times daily with meals.    amLODIPine 5 MG Oral Tab Take 1 tablet (5 mg total) by mouth daily.    carvedilol 25 MG Oral Tab Take 1 tablet (25 mg total) by mouth 2 (two) times daily with meals.    clopidogrel 75 MG Oral Tab Take 1 tablet (75 mg total) by mouth daily.    empagliflozin (JARDIANCE) 25 MG Oral Tab Take 1 tablet (25 mg total) by mouth daily.    furosemide 40 MG Oral Tab Take 1 tablet (40 mg total) by mouth daily.    rosuvastatin 20 MG Oral Tab Take 1 tablet (20 mg total) by mouth nightly.    tamsulosin 0.4 MG Oral Cap TAKE 2 CAPSULES BY MOUTH DAILY (Patient taking differently: Take 1 capsule (0.4 mg total) by mouth in the morning and 1 capsule (0.4 mg total) before bedtime.)    aspirin 81 MG Oral  Tab EC Take 1 tablet (81 mg total) by mouth nightly.    Thiamine HCl 250 MG Oral Tab Take 1 tablet (250 mg total) by mouth every other day.    pyridoxine 100 MG Oral Tab Take 1 tablet (100 mg total) by mouth every other day.    Glucose Blood (CONTOUR NEXT TEST) In Vitro Strip To test 2 x daily  Dx E11.9    Glucose Blood (CONTOUR NEXT TEST) In Vitro Strip To check glucose bid   Dx  E11.9    Glucose Blood (LORAINE CONTOUR TEST) In Vitro Strip To test daily    LORAINE CONTOUR NEXT TEST In Vitro Strip To test 2 x daily       Allergies  No Known Allergies    Review of Systems:   As by Admitting/Attending    Results:   Laboratory Data:  Lab Results   Component Value Date    WBC 10.0 06/30/2025    HGB 9.6 (L) 06/30/2025    HCT 30.7 (L) 06/30/2025    .0 (H) 06/30/2025    CREATSERUM 0.69 (L) 06/30/2025    BUN 20 06/30/2025     06/30/2025    K 3.6 06/30/2025     06/30/2025    CO2 32.0 06/30/2025     (H) 06/30/2025    CA 8.4 (L) 06/30/2025    ALB 3.5 06/30/2025    ALKPHO 58 06/30/2025    TP 6.0 06/30/2025    AST 22 06/30/2025    ALT 16 06/30/2025    PTT 30.6 06/19/2025    INR 1.17 06/19/2025    PTP 15.6 (H) 06/19/2025    TSH 0.696 06/19/2025    PSA 2.62 08/10/2023    ESRML 97 (H) 06/25/2025    MG 1.9 06/30/2025    PHOS 3.7 06/30/2025         Imaging:  XR CHEST AP PORTABLE  (CPT=71045)  Result Date: 6/30/2025  Workstation: [id number]    XR CHEST AP PORTABLE  (CPT=71045)  Result Date: 6/28/2025  CONCLUSION: Post cardiac surgery. Findings suggesting increased fluid volume status of the patient/CHF with increasing vascular congestion, increasing interstitial edema, and increasing left pleural effusion Electronically Verified and Signed by Attending Radiologist: Bal Stewart MD 6/28/2025 8:10 AM Workstation: [id number]    CT BRAIN OR HEAD (CPT=70450)  Result Date: 6/27/2025  CONCLUSION: No acute intracranial abnormality. Electronically Verified and Signed by Attending Radiologist: Refugio Lloyd MD 6/27/2025 7:35  PM Workstation: [id number]    XR HIP W OR WO PELVIS 2 OR 3 VIEWS, RIGHT (CPT=73502)  Result Date: 6/27/2025  CONCLUSION: Tiny right femoral head and acetabular osteophytes with preserved right hip joint space.  Electronically Verified and Signed by Attending Radiologist: Refugio Lloyd MD 6/27/2025 7:01 PM Workstation: [id number]      Vital Signs:   Blood pressure 131/65, pulse 65, temperature 97.1 °F (36.2 °C), temperature source Temporal, resp. rate 21, height 66\", weight 90.6 kg (199 lb 11.8 oz), SpO2 92%.    Mental Status Exam:   Appearance: Stated age, male in hospital gown laying in hospital bed. Attentive, but tired today.  Psychomotor: No psychomotor agitation or retardation.  Orientation: Orientation continues to improve  Gait: Not evaluated.  Attitude/Coorperation: Fluctuation in mood and behavior.  Behavior: Labile  Speech: speech continues to be soft and raspy.  Mood: Patient minimizing depression.  Affect: Dysphoric affect, congruent with mood  Thought process: Short sentences and circumstantial thought process.  Thought content: Patient denying suicidal or homicidal ideation.  Perceptions: Patient no longer demonstrates response to internal stimuli.  Concentration: Grossly impaired  Memory: Grossly impaired  Intellect: Average.  Judgment and Insight: Questionable.     Impression:     Episodic mood disorder.  Delirium due to a medical condition    Aortic stenosis   Hx of CABG      Patient is a 59 year old polish speaking white  male with a PMH of CAD, PVD, hypertension, hyperlipidemia, diabetes, severe aortic stenosis and recent CABG on 6/19 who continue indicated sedation and intubation and SBT failed due to excessive agitation..    6/22/2025: The patient has been demonstrating delirium episode with alternation in mood and cognition with episodes of  increased confusion, restlessness, agitation and response to internal stimuli. This has led to extended extubation and sedation of the  patient.    QTc is 485.  Otherwise current EKG demonstrated sinus rhythm.  Magnesium level is 1.8.  Patient with no risk of arrhythmia other than the underlying recent surgical procedure.  It is appropriate to have nightly sedation with the Zyprexa to stabilize the mood currently and utilize the combination of Haldol and Ativan 5/2 prior to extubation.  Otherwise tapering sedation slowly utilizing Haldol 2 mg as needed for restlessness.    6/23/2025: The patient has been demonstrating improvement in his delirium following administration of Haldol 5 mg and Ativan 2 mg prior to lowering of sedation and extubation. The patient was able to follow commands and demonstrated less agitation and confusion during SBT today. Patient continues to be sedated and intubated due to tachypnea.     Patient demonstrating improvement in condition after being started on diuretics for fluid in the lungs. Today magnesium level is 2.1    6/24/2025: The patient has continued to demonstrate improvement in his agitation and confusion when sedation is lowered and extubation is attempted. Currently remains intubated and sedated with another extubation attempt scheduled for later today.    6/25/2025: The patient no longer demonstrates agitation and was extubated successfully yesterday. The patient continues to be confused at this time.    6/26/2025: The patient demonstrates improvement in his alertness, orientation and overall condition. He has not had any agitation since being extubated and his confusion continues to improve.  Otherwise the patient has been demonstrating some symptom of depression and was withdrawn with low interest.    6/27/2025: The patient continues to improve in his awareness and orientation, but still demonstrates withdrawn behavior and low interest in activities consistent with depression. Patient and team will be encouraged ensure he takes his psychiatric medications as scheduled.     6/30/2025: The patient has continued  to improve and has been compliant with his psychiatric medications. At this time the patient no longer requires psychiatric care within the hospital and is recommended to continue his medications and follow up with an outpatient psychiatrist.    Discussed risk and benefit, acknowledging the current symptom and severity.  At this point, I would recommend the following approach:     Focus on safety  Focus on education and support.  Focus on insight orientation helping the patient understand diagnosis and treatment plan.  Continue Zyprexa 2.5 mg in the evening.  Continue Lexapro to 5 mg in the evening.  Discontinue Haldol 2 mg PRN for agitation  Processed with patient/family/RN at length, the initiation of the above psychotropic medications I advised the patient of the risks, benefits, alternatives and potential side effects. The patient consents to administration of the medications and understands the right to refuse medications at any time. The patient verbalized understanding.   Coordinate plan with team    Orders This Visit:  Orders Placed This Encounter   Procedures    Basic Metabolic Panel (8)    CBC, Platelet; No Differential    CBC With Differential With Platelet    Basic Metabolic Panel (8)    Prothrombin Time (PT)    PTT, Activated    Fibrinogen Activity    Magnesium    Magnesium    Expanded Arterial Blood Gas    Arterial blood gas    Assay, Thyroid Stim Hormone    Lactic Acid, Plasma    Hepatic Function Panel (7)    CBC, Platelet; No Differential    Lactic Acid Reflex Post Positive    Hemoglobin & Hematocrit    CBC, Platelet; No Differential    Magnesium    Lactic Acid, Plasma    Arterial blood gas    Basic Metabolic Panel (8)    Lipid Panel    Venous Blood Gas    Arterial blood gas    Magnesium    Comp Metabolic Panel (14)    CBC, Platelet; No Differential    Expanded Arterial Blood Gas    CBC, Platelet; No Differential    Magnesium    Comp Metabolic Panel (14)    Sed Rate, Westergren (Automated)     Magnesium    Expanded Arterial Blood Gas    Venous Blood Gas    CBC With Differential With Platelet    Basic Metabolic Panel (8)    Magnesium    Basic Metabolic Panel (8)    Phosphorus    Magnesium    Potassium    Potassium    Expanded Arterial Blood Gas    CBC With Differential With Platelet    Sed Rate, Westergren (Automated)    Expanded Arterial Blood Gas    Potassium    Magnesium    Basic Metabolic Panel (8)    Magnesium    CBC With Differential With Platelet    CBC With Differential With Platelet    Comp Metabolic Panel (14)    Phosphorus    Hemoglobin    Comp Metabolic Panel (14)    CBC With Differential With Platelet    Magnesium    Phosphorus    CBC With Differential With Platelet    Magnesium    Potassium    Magnesium    Comp Metabolic Panel (14)    Phosphorus    Comp Metabolic Panel (14)    CBC With Differential With Platelet    Magnesium    Phosphorus    Prepare RBC STAT    ABORH Confirmation    Prepare platelets Once    Prepare fresh frozen plasma Once    Prepare cryoprecipitate Once    Type and screen    Prepare RBC Once    ABORH (Blood Type)    Antibody Screen    Prepare RBC Once    Specimen to Pathology Tissue    MRSA Screen by PCR    Sputum culture    Blood Culture    Emergency MRSA Screen by PCR    Occult Blood Stool, Diagnostic       Meds This Visit:  Requested Prescriptions      No prescriptions requested or ordered in this encounter       Chivo Solares MD  6/28/2025    Note to Patient: The 21st Century Cures Act makes medical notes like these available to patients in the interest of transparency. However, be advised this is a medical document. It is intended as peer to peer communication. It is written in medical language and may contain abbreviations or verbiage that are unfamiliar. It may appear blunt or direct. Medical documents are intended to carry relevant information, facts as evident, and the clinical opinion of the practitioner. This note may have been transcribed using a voice  dictation system. Voice recognition errors may occur. This should not be taken to alter the content or meaning of this note.

## 2025-06-30 NOTE — PLAN OF CARE
Chest Tube discontinued, ambulated x 4, showered; stool sample positive for occult blood, otherwise stable and without issues.  Pt cooperative to all cares, all questions answered.    Problem: CARDIOVASCULAR - ADULT  Goal: Maintains optimal cardiac output and hemodynamic stability  Description: INTERVENTIONS:  - Monitor vital signs, rhythm, and trends  - Monitor for bleeding, hypotension and signs of decreased cardiac output  - Evaluate effectiveness of vasoactive medications to optimize hemodynamic stability  - Monitor arterial and/or venous puncture sites for bleeding and/or hematoma  - Assess quality of pulses, skin color and temperature  - Assess for signs of decreased coronary artery perfusion - ex. Angina  - Evaluate fluid balance, assess for edema, trend weights  Outcome: Progressing  Goal: Absence of cardiac arrhythmias or at baseline  Description: INTERVENTIONS:  - Continuous cardiac monitoring, monitor vital signs, obtain 12 lead EKG if indicated  - Evaluate effectiveness of antiarrhythmic and heart rate control medications as ordered  - Initiate emergency measures for life threatening arrhythmias  - Monitor electrolytes and administer replacement therapy as ordered  Outcome: Progressing     Problem: RESPIRATORY - ADULT  Goal: Achieves optimal ventilation and oxygenation  Description: INTERVENTIONS:  - Assess for changes in respiratory status  - Assess for changes in mentation and behavior  - Position to facilitate oxygenation and minimize respiratory effort  - Oxygen supplementation based on oxygen saturation or ABGs  - Provide Smoking Cessation handout, if applicable  - Encourage broncho-pulmonary hygiene including cough, deep breathe, Incentive Spirometry  - Assess the need for suctioning and perform as needed  - Assess and instruct to report SOB or any respiratory difficulty  - Respiratory Therapy support as indicated  - Manage/alleviate anxiety  - Monitor for signs/symptoms of CO2 retention  Outcome:  Progressing     Problem: GASTROINTESTINAL - ADULT  Goal: Minimal or absence of nausea and vomiting  Description: INTERVENTIONS:  - Maintain adequate hydration with IV or PO as ordered and tolerated  - Nasogastric tube to low intermittent suction as ordered  - Evaluate effectiveness of ordered antiemetic medications  - Provide nonpharmacologic comfort measures as appropriate  - Advance diet as tolerated, if ordered  - Obtain nutritional consult as needed  - Evaluate fluid balance  Outcome: Progressing  Goal: Maintains or returns to baseline bowel function  Description: INTERVENTIONS:  - Assess bowel function  - Maintain adequate hydration with IV or PO as ordered and tolerated  - Evaluate effectiveness of GI medications  - Encourage mobilization and activity  - Obtain nutritional consult as needed  - Establish a toileting routine/schedule  - Consider collaborating with pharmacy to review patient's medication profile  Outcome: Progressing     Problem: GENITOURINARY - ADULT  Goal: Absence of urinary retention  Description: INTERVENTIONS:  - Assess patient’s ability to void and empty bladder  - Monitor intake/output and perform bladder scan as needed  - Follow urinary retention protocol/standard of care  - Consider collaborating with pharmacy to review patient's medication profile  - Implement strategies to promote bladder emptying  Outcome: Progressing     Problem: METABOLIC/FLUID AND ELECTROLYTES - ADULT  Goal: Glucose maintained within prescribed range  Description: INTERVENTIONS:  - Monitor Blood Glucose as ordered  - Assess for signs and symptoms of hyperglycemia and hypoglycemia  - Administer ordered medications to maintain glucose within target range  - Assess barriers to adequate nutritional intake and initiate nutrition consult as needed  - Instruct patient on self management of diabetes  Outcome: Progressing  Goal: Electrolytes maintained within normal limits  Description: INTERVENTIONS:  - Monitor labs and  rhythm and assess patient for signs and symptoms of electrolyte imbalances  - Administer electrolyte replacement as ordered  - Monitor response to electrolyte replacements, including rhythm and repeat lab results as appropriate  - Fluid restriction as ordered  - Instruct patient on fluid and nutrition restrictions as appropriate  Outcome: Progressing  Goal: Hemodynamic stability and optimal renal function maintained  Description: INTERVENTIONS:  - Monitor labs and assess for signs and symptoms of volume excess or deficit  - Monitor intake, output and patient weight  - Monitor urine specific gravity, serum osmolarity and serum sodium as indicated or ordered  - Monitor response to interventions for patient's volume status, including labs, urine output, blood pressure (other measures as available)  - Encourage oral intake as appropriate  - Instruct patient on fluid and nutrition restrictions as appropriate  Outcome: Progressing     Problem: SKIN/TISSUE INTEGRITY - ADULT  Goal: Incision(s), wounds(s) or drain site(s) healing without S/S of infection  Description: INTERVENTIONS:  - Assess and document risk factors for pressure ulcer development  - Assess and document skin integrity  - Assess and document dressing/incision, wound bed, drain sites and surrounding tissue  - Implement wound care per orders  - Initiate isolation precautions as appropriate  - Initiate Pressure Ulcer prevention bundle as indicated  Outcome: Progressing  Goal: Oral mucous membranes remain intact  Description: INTERVENTIONS  - Assess oral mucosa and hygiene practices  - Implement preventative oral hygiene regimen  - Implement oral medicated treatments as ordered  Outcome: Progressing     Problem: HEMATOLOGIC - ADULT  Goal: Maintains hematologic stability  Description: INTERVENTIONS  - Assess for signs and symptoms of bleeding or hemorrhage  - Monitor labs and vital signs for trends  - Administer supportive blood products/factors, fluids and  medications as ordered and appropriate  - Administer supportive blood products/factors as ordered and appropriate  Outcome: Progressing  Goal: Free from bleeding injury  Description: (Example usage: patient with low platelets)  INTERVENTIONS:  - Avoid intramuscular injections, enemas and rectal medication administration  - Ensure safe mobilization of patient  - Hold pressure on venipuncture sites to achieve adequate hemostasis  - Assess for signs and symptoms of internal bleeding  - Monitor lab trends  - Patient is to report abnormal signs of bleeding to staff  - Avoid use of toothpicks and dental floss  - Use electric shaver for shaving  - Use soft bristle tooth brush  - Limit straining and forceful nose blowing  Outcome: Progressing     Problem: MUSCULOSKELETAL - ADULT  Goal: Return mobility to safest level of function  Description: INTERVENTIONS:  - Assess patient stability and activity tolerance for standing, transferring and ambulating w/ or w/o assistive devices  - Assist with transfers and ambulation using safe patient handling equipment as needed  - Ensure adequate protection for wounds/incisions during mobilization  - Obtain PT/OT consults as needed  - Advance activity as appropriate  - Communicate ordered activity level and limitations with patient/family  Outcome: Progressing     Problem: NEUROLOGICAL - ADULT  Goal: Achieves stable or improved neurological status  Description: INTERVENTIONS  - Assess for and report changes in neurological status  - Initiate measures to prevent increased intracranial pressure  - Maintain blood pressure and fluid volume within ordered parameters to optimize cerebral perfusion and minimize risk of hemorrhage  - Monitor temperature, glucose, and sodium. Initiate appropriate interventions as ordered  Outcome: Progressing     Problem: Diabetes/Glucose Control  Goal: Glucose maintained within prescribed range  Description: INTERVENTIONS:  - Monitor Blood Glucose as ordered  -  Assess for signs and symptoms of hyperglycemia and hypoglycemia  - Administer ordered medications to maintain glucose within target range  - Assess barriers to adequate nutritional intake and initiate nutrition consult as needed  - Instruct patient on self management of diabetes  Outcome: Progressing

## 2025-06-30 NOTE — PROGRESS NOTES
[facility]  part of [facility]    Progress Note    Jose Alberto Lizama Patient Status:  Inpatient    1965 MRN [id number]   Location [facility] 2W/SW Attending Nannette Pelayo MD   Hosp Day # 11 PCP Fiona Sweeney MD     Subjective:  OOB in chair on exam, no acute events noted overnight.  Language line used for translation, as patient is Polish speaking.  Pt awake, alert & oriented x 3, calm and appropriate. MS much improved; near baseline.  He currently denies: CP, SOB, dizziness, or palpitations.  No visitors at bedside currently    Objective:  /63 (BP Location: Left arm)   Pulse 69   Temp 97.5 °F (36.4 °C) (Temporal)   Resp 21   Ht 5' 6\" (1.676 m)   Wt 199 lb 11.8 oz (90.6 kg)   SpO2 96%   BMI 32.24 kg/m²     Temp (24hrs), Av.8 °F (36.6 °C), Min:97.5 °F (36.4 °C), Max:98.1 °F (36.7 °C)      Intake/Output:    Intake/Output Summary (Last 24 hours) at 2025 0823  Last data filed at 2025 0700  Gross per 24 hour   Intake 520 ml   Output 1185 ml   Net -665 ml       Wt Readings from Last 3 Encounters:   25 199 lb 11.8 oz (90.6 kg)   25 201 lb 12.8 oz (91.5 kg)   25 200 lb (90.7 kg)       Allergies:  Allergies[1]    Labs:  Lab Results   Component Value Date    WBC 10.0 2025    HGB 9.6 2025    HCT 30.7 2025    .0 2025    CREATSERUM 0.69 2025    BUN 20 2025     2025    K 3.6 2025     2025    CO2 32.0 2025     2025    CA 8.4 2025    ALB 3.5 2025    ALKPHO 58 2025    BILT 0.3 2025    TP 6.0 2025    AST 22 2025    ALT 16 2025    MG 1.9 2025    PHOS 3.7 2025       Physical Exam:  Blood pressure 125/63, pulse 69, temperature 97.5 °F (36.4 °C), temperature source Temporal, resp. rate 21, height 5' 6\" (1.676 m), weight 199 lb 11.8 oz (90.6 kg), SpO2 96%.  General: NAD  Neck: No JVD  Lungs: Decreased LLL,  otherwise CTA  Mediastinal Dane drain=10cc/12 hours- no air leak noted  Heart: RRR, S1, S2  Abdomen: Soft/Round, NT/ND, BS+x4/+Flatus/+BM(s)  Extremities: Warm, dry, trace LE edema bilat  Pulses: 1+ bilat DP  Skin: sternotomy incision BETTIE=CDI/Left leg incision BETTIE=CDI   Neurological:  AAOx3, MAEW-calm and appropriate    Assessment/Plan:  S/P CABG x 2/AVR/MELINDA CLIP/INSERTION OF LEFT FEMORAL IABP POD # 11  IABP removed on 6/20  Extubated on 6/24: Encourage pulm toilet: IS-now able to perform w/o assistance- 500 cc/continue EZ pap. Likely w/pre op ORAL. BiPap at night, remains on 2L NC-wean off as tolerated CXR reviewed -persistent L pleural effusion noted -will have pulmonary eval. history of vaping  Sputum cx 6/22=negative. On Zosyn per Pulm/agree to continue w/recent AVR.   Pain meds as needed-limit/avoid narcotics w/post op altered MS. Can use Tylenol.   Post op altered MS w/agitation/confusion/restlessness of unknown etiology-nearly resolved as pt appears close to baseline; suspected post op delirium and/or ETOH withdrawal requiring Psych eval on 6/22- following recs. CT brain on 6/20=negative   Hx: HF-GDMT mgt per Cardiology  S/P fall out of bed 6/27 evening w/superficial laceration above right eye-should heal with time-continues CDI w/steri strips. CT head 6/27=negative/Hip X-ray 6/27=okay. Sitter at bedside for safety. Reminded pt to not get up alone.   Altered sleep disturbance/sleep pattern due to pt usually works nights-may have contributed to his post op altered MS  Increase activity-OOB to chair/ambulate in hallway-working with PT/OT- informed by ambulating in hallway yesterday  Scds/Lovenox prophylaxis DVT prevention   Continues hemodynamically stable -DC Dane drain today, patient may shower  HTN - on oral meds: mgt per Cardiology  Elevated ESR=97- likely PPS-continues on Colchicine.   PICC placed 6/26  Expected post op volume overload- continue IV Lasix 40 mg BID and  Potassium BID while on Lasix. Linda  removed 6/25 after leaking noted-able to use external male catheter for close I/O monitoring.   Amio protocol for AF prevention as pt considered high risk for post op AF. Post op SR w/freq PACs noted: on PO Amio daily-may stop at discharge if no AF noted post op.    Expected post op anemia w/o clinical significance/stable, 9.6: may be slightly diluted due to volume overload- Iron x 1 month.  Patient states he had dark stools-will send for guaiac  Hx: DM-Continue basal insulin dose today/continue correction scale coverage. Resume home regimen at/near discharge.    Nutrition- Solid Puree after swallow eval per ST-following recs.  Hx: BPH- on home Flomax  Hx: PVD- on ASA/Plavix -held this a.m. pending guaiac stool, resume if negative  Blood CX 6/22 =negative     Discharge planning: pt lives with his wife. Pt progressing well. Continue to increase activity. Home vs rehab- tentatively ready next 24-48 hours.   PMR consulted 6/26; continues to monitor progress.      Plan of care discussed w/patient and RN, and rounding CV surgeon - Dr. Tara Lewis APN  6/30/25  1100             [1] No Known Allergies

## 2025-06-30 NOTE — OCCUPATIONAL THERAPY NOTE
OCCUPATIONAL THERAPY TREATMENT NOTE - INPATIENT        Room Number: 233/233-A     Presenting Problem: CABG    Problem List  Active Problems:    Aortic stenosis    Hx of CABG    Episodic mood disorder    Delirium due to another medical condition    Angina concurrent with and due to arteriosclerosis of CABG    Pre-op evaluation      OCCUPATIONAL THERAPY ASSESSMENT   Patient demonstrates fair progress this session, goals remain in progress.    Patient rcvd up in chair with family member at bedside; polish interpretor used for entire session; pt benefits from activity pacing, frequent rest breaks, and consistent reminders and cues in order to adhere to sternal precautions; pt is forgetful and demo'd decreased carryover despite consistent education; pt will continue to benefit from skilled therapy while IP.       Patient continues to benefit from continued skilled OT services: to facilitate return to prior level of function as patient demonstrates high motivation with excellent tolerance to an intensive therapy program.     PLAN DURING HOSPITALIZATION  OT Device Recommendations: TBD  OT Treatment Plan: Balance activities, Energy conservation/work simplification techniques, ADL training, Functional transfer training, UE strengthening/ROM, Endurance training, Cognitive reorientation, Patient/Family education, Patient/Family training, Equipment eval/education, Compensatory technique education     SUBJECTIVE  Thank you     OBJECTIVE  Precautions: Drain(s),  needed, Sternal, Chest tube to suction (polish)    WEIGHT BEARING RESTRICTION     PAIN ASSESSMENT  Ratin    ACTIVITY TOLERANCE  Pulse: 65        BP: 131/[id number]             O2 SATURATIONS  Oxygen Therapy  SPO2% on Oxygen at Rest: 89  At rest oxygen flow (liters per minute): 2 (Increased to 4 with RN permission)  SPO2% Ambulation on Oxygen: 85  Ambulation oxygen flow (liters per minute): 4    ACTIVITIES OF DAILY LIVING ASSESSMENT  AM-PAC ‘6-Clicks’  Inpatient Daily Activity Short Form  How much help from another person does the patient currently need…  -   Putting on and taking off regular lower body clothing?: A Lot  -   Bathing (including washing, rinsing, drying)?: A Lot  -   Toileting, which includes using toilet, bedpan or urinal? : Total  -   Putting on and taking off regular upper body clothing?: A Lot  -   Taking care of personal grooming such as brushing teeth?: A Little  -   Eating meals?: A Little    AM-PAC Score:  Score: 13  Approx Degree of Impairment: 63.03%  Standardized Score (AM-PAC Scale): 32.03  CMS Modifier (G-Code): CL    FUNCTIONAL TRANSFER ASSESSMENT  STS: from low chair Mod A x1  STS: from elevated surface: Min Ax1    FXL Mobility: Min a with chair follow; cues for pacing and energy conservation; x3 rest breaks <25 feet tolerance; O2 desaturated to 85% on 4L; improved to >90% with rest     FUNCTIONAL ADL ASSESSMENT  Eating: Supervision  Grooming Seated: Contact Guard Assist  LB Dressing Seated: Maximum Assist  Toileting Seated: Dependent    THERAPEUTIC EXERCISE       Skilled Therapy Provided: Pt seen for skilled OT intervention; PCT providing assist with chair follow; worked on ADL compensations, STS training, and fxl mobility engagement; pt did well compared to last session, however, still functioning quite below baseline (see levels of assist above); pt educated on energy conservation, sternal precuations, task pacing, and importance of OOB activities; also demonstrated recovery breathing strategies and positioning.     EDUCATION PROVIDED  Patient Education : Role of Occupational Therapy; Plan of Care; Functional Transfer Techniques; Fall Prevention; Surgical Precautions; Posture/Positioning; Edema Reduction; Energy Conservation  Patient's Response to Education: Requires Further Education  Family/Caregiver Education : -- (smae as pt)  Family/Caregiver's Response to Education: Verbalized Understanding; Returned Demonstration    The  patient's Approx Degree of Impairment: 63.03% has been calculated based on documentation in the [facility] '6 clicks' Inpatient Daily Activity Short Form.  Research supports that patients with this level of impairment may benefit from IR.  Final disposition will be made by interdisciplinary medical team.    Patient End of Session: Up in chair    OT Goals:      OT Goals: ongoing   Patient will complete functional transfer with mod A at RW level  Comment: ongoing    Patient will complete toileting with mod A  Comment: ongoing    Patient will tolerate standing for 2 minutes in prep for adls with mod A   Comment:ongoing               Goals  on: 07/15/25  Frequency: 3-5x/week      OT Session Time: 25 minutes  Self-Care Home Management: 0 minutes  Therapeutic Activity: 25 minutes

## 2025-06-30 NOTE — CM/SW NOTE
CM followed with PT/OT for updated progress and recommendation after discharge. PT/OT recommending AIR. CM to follow up on AIR list and choice.    1201 pm CM spoke with patient's daughter Agatha. She reports she had discussed with her family and they are agreeable to AIR. CM to follow up with list tomorrow.     7/1 at 1 pm PT/OT now recommending HH after discharge. [facility] is reserved.     / to remain available for support and/or discharge planning     DC PLAN: Home with [facility]-pending medical clearance    Melissa BEARN RN   Nurse    [phone]

## 2025-06-30 NOTE — PROGRESS NOTES
Cardiology Progress Note    Jose Alberto Lizama Patient Status:  Inpatient    1965 MRN [id number]   Location [facility] 2W/SW Attending Nannette Pelayo MD   Hosp Day # 11 PCP Fiona Sweeney MD     Interval Note:  Patient seen and examined  Appears well this morning with no new complaints  Has intermittent desaturations      --------------------------------------------------------------------------------------------------------------------------------  ROS 12 systems reviewed, pertinent findings above.  ROS    History:  Past Medical History[1]  Past Surgical History[2]  Family History[3]   reports that he has quit smoking. His smoking use included cigarettes. He has a 20 pack-year smoking history. He has never used smokeless tobacco. He reports current alcohol use. He reports that he does not use drugs.    Objective:   Temp: 97.7 °F (36.5 °C)  Pulse: 65  Resp: 16  BP: 121/61    Intake/Output:     Intake/Output Summary (Last 24 hours) at 2025 1732  Last data filed at 2025 1430  Gross per 24 hour   Intake 520 ml   Output 1175 ml   Net -655 ml       Physical Exam:    General: AO x 2, no acute distress  HEENT: Normocephalic, anicteric sclera, neck supple.  Neck: No JVD, carotids 2+, no bruits.  Cardiac: Regular rate and rhythm. S1, S2 normal. No murmur, pericardial rub, S3.  Lungs: Clear without wheezes, rales, rhonchi or dullness.  Normal excursions and effort.  Abdomen: Soft, non-tender. BS-present.  Extremities: Without clubbing, cyanosis or edema.  Peripheral pulses are 2+.  Neurologic: Non-focal  Skin: Warm and dry.       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 Please call with any questions of concerns.      Level of care: L4    Noemy CobianvedDO leelee  [facility]   Interventional Cardiac and Vascular Services      Noemy Ackerman DO  June 30, 2025  5:32 PM         [1]   Past Medical History:   Aortic stenosis    Back problem    BPH (benign prostatic hyperplasia)    Cardiomyopathy (HCC)    Coronary atherosclerosis    Depression    Diabetes (HCC)    Esophageal reflux    Heart valve disease    High blood pressure    High cholesterol    Peripheral vascular disease    Shortness of breath    related to smoking     Visual impairment    glasses   [2]   Past Surgical History:  Procedure Laterality Date    Cardiac catheterization  2016    [facility]    Cath drug eluting stent      Colonoscopy  04/22/2024    Colonoscopy N/A 4/22/2024    Procedure: COLONOSCOPY;  Surgeon: Attila Li MD;  Location: [facility] ENDOSCOPY    Esophagoscopy,diagnostic  04/22/2024    Dr. Li    Hernia surgery      as a child    Nasal scopy,remv part ethmoid  10/14/2020    Nasal scopy,rmv tiss maxill sinus  10/14/2020    Repair of nasal septum  10/14/2020   [3]   Family History  Problem Relation Age of Onset    Diabetes Father     Diabetes Paternal Aunt     Diabetes Paternal Uncle     Seizure Disorder Mother     No Known Problems Brother     No Known Problems Daughter     Glaucoma Neg     Macular degeneration Neg

## 2025-06-30 NOTE — CM/SW NOTE
CM received MDO for Entresto cost check. CM contacted patient's pharmacy, they reported Entresto is 100% covered, there is no copay for a 30 day supply.     / to remain available for support and/or discharge planning     Melissa BEARN RN   Nurse    [phone]

## 2025-06-30 NOTE — PROGRESS NOTES
[facility]  part of [facility]    Progress Note    Jose Alberto Lizama Patient Status:  Inpatient    1965 MRN [id number]   Location [facility] 2W/SW Attending Tammy Zacarias MD   Hosp Day # 11 PCP Fiona Sweeney MD       Subjective:   Patient sitting up in the chair and waiting for breakfast. Denied any active complaints at the time of interview.   All questions and concerns addressed.     Objective:   Blood pressure 125/63, pulse 69, temperature 97.5 °F (36.4 °C), temperature source Temporal, resp. rate 21, height 5' 6\" (1.676 m), weight 199 lb 11.8 oz (90.6 kg), SpO2 96%.    Physical Exam:    General: No acute distress.   Respiratory: Clear to auscultation bilaterally. No wheezes. No rhonchi.  Cardiovascular: S1, S2. Regular rate and rhythm. No murmurs, rubs or gallops.   Abdomen: Soft, nontender, nondistended.  Positive bowel sounds. No rebound or guarding.  Neurologic: No focal neurological deficits.   Musculoskeletal: Moves all extremities.  Extremities: No edema.    Results:     Lab Results   Component Value Date    WBC 10.0 2025    HGB 9.6 (L) 2025    HCT 30.7 (L) 2025    .0 (H) 2025    CREATSERUM 0.69 (L) 2025    BUN 20 2025     2025    K 3.6 2025     2025    CO2 32.0 2025     (H) 2025    CA 8.4 (L) 2025    ALB 3.5 2025    ALKPHO 58 2025    BILT 0.3 2025    TP 6.0 2025    AST 22 2025    ALT 16 2025    PTT 30.6 2025    INR 1.17 2025    TSH 0.696 2025    PSA 2.62 08/10/2023    ESRML 97 (H) 2025    MG 1.9 2025    PHOS 3.7 2025       XR CHEST AP PORTABLE  (CPT=71045)  Result Date: 2025  CONCLUSION: Post cardiac surgery. Findings suggesting increased fluid volume status of the patient/CHF with increasing vascular congestion, increasing interstitial edema, and increasing left pleural effusion Electronically  Verified and Signed by Attending Radiologist: Bal Stewart MD 6/28/2025 8:10 AM Workstation: [id number]    CT BRAIN OR HEAD (CPT=70450)  Result Date: 6/27/2025  CONCLUSION: No acute intracranial abnormality. Electronically Verified and Signed by Attending Radiologist: Refugio Lloyd MD 6/27/2025 7:35 PM Workstation: [id number]    XR HIP W OR WO PELVIS 2 OR 3 VIEWS, RIGHT (CPT=73502)  Result Date: 6/27/2025  CONCLUSION: Tiny right femoral head and acetabular osteophytes with preserved right hip joint space.  Electronically Verified and Signed by Attending Radiologist: Refugio Lloyd MD 6/27/2025 7:01 PM Workstation: [id number]            Scheduled Medications[1]  PRN Medications[2]      Assessment and Plan:       Mechanical fall  - Ground level fall on 06/27/2025  - CT head negative, Him XR negative  - Fall precautions  - continue PT/OT    CAD status post CABG and AVR  Severe aortic stenosis  - difficult extubation but patient extubated successfully 06/24  - Continue tube feeds  - Management per cardiology and CV sx   Continue IV diuresis, lasix decreased to 40 mg IV BID  Entresto increased to 49/51, coreg 12.5 mg BID  Continue antiplatelet therapy, statin  Net IO Since Admission: -1,433.96 mL [06/29/25 1556]  - chest tube management per surgery  - swallow eval  - Monitor vitals  - PT/OT/SLP    Post op acute respiratory failure - improving  - completed course of abx  - pulm on consult   Continue abx   Bronchial hygiene, nebs, IS   Ambulate as tolerated  - currently saturating well on 2L NC, wean as tolerated    Alcohol abuse/Withdrawal   - [facility] protocol   - Psych on board    Zyprexa Lexapro nightly   Haldol PRN      DM2  - insulin per Vascular surgery     HTN  Dyslipidemia  - continue home meds         Quality:    CODE status: Full  DVT ppx - Lovenox          [1]    ascorbic acid  500 mg Oral BID    potassium chloride  20 mEq Oral BID    furosemide  40 mg Intravenous BID (Diuretic)    insulin degludec  15 Units  Subcutaneous Daily    sacubitril-valsartan  1 tablet Oral BID    carvedilol  12.5 mg Oral BID with meals    colchicine  0.3 mg Oral QOD    ipratropium-albuterol  3 mL Nebulization 2 times daily    insulin aspart  1-5 Units Subcutaneous TID CC    escitalopram  5 mg Oral Nightly    amiodarone  200 mg Oral Daily    OLANZapine  2.5 mg Oral Nightly    ferrous sulfate  300 mg Oral BID    aspirin  81 mg Per NG Tube Daily    docusate  100 mg Per NG Tube BID    budesonide  0.5 mg Nebulization 2 times daily    rosuvastatin  20 mg Per NG Tube Nightly    thiamine  100 mg Oral Daily    multivitamin  1 tablet Oral Daily    folic acid  1 mg Oral Daily    sennosides  8.6 mg Oral BID    famotidine  20 mg Oral BID    enoxaparin  40 mg Subcutaneous Daily    clopidogrel  75 mg Oral Daily    tamsulosin  0.4 mg Oral BID   [2]   labetalol    dextrose 10%    [DISCONTINUED] sodium bicarbonate **AND** lipase-protease-amylase (Lip-Prot-Amyl)    ipratropium-albuterol    LORazepam **OR** LORazepam    LORazepam **OR** LORazepam    LORazepam **OR** LORazepam    LORazepam    LORazepam    LORazepam    haloperidol lactate    LORazepam    haloperidol lactate    melatonin    bisacodyl    potassium chloride **OR** potassium chloride    magnesium sulfate in dextrose 5%    magnesium sulfate in sterile water for injection    acetaminophen **OR** HYDROcodone-acetaminophen **OR** [DISCONTINUED] HYDROcodone-acetaminophen    glucose **OR** glucose **OR** glucose-vitamin C **OR** dextrose **OR** glucose **OR** glucose **OR** glucose-vitamin C

## 2025-06-30 NOTE — PLAN OF CARE
Mentation improving. Less impulsive. Sitter discontinued. Minimal CT output overnight ~10cc.    Problem: CARDIOVASCULAR - ADULT  Goal: Maintains optimal cardiac output and hemodynamic stability  Description: INTERVENTIONS:  - Monitor vital signs, rhythm, and trends  - Monitor for bleeding, hypotension and signs of decreased cardiac output  - Evaluate effectiveness of vasoactive medications to optimize hemodynamic stability  - Monitor arterial and/or venous puncture sites for bleeding and/or hematoma  - Assess quality of pulses, skin color and temperature  - Assess for signs of decreased coronary artery perfusion - ex. Angina  - Evaluate fluid balance, assess for edema, trend weights  Outcome: Progressing  Goal: Absence of cardiac arrhythmias or at baseline  Description: INTERVENTIONS:  - Continuous cardiac monitoring, monitor vital signs, obtain 12 lead EKG if indicated  - Evaluate effectiveness of antiarrhythmic and heart rate control medications as ordered  - Initiate emergency measures for life threatening arrhythmias  - Monitor electrolytes and administer replacement therapy as ordered  Outcome: Progressing     Problem: RESPIRATORY - ADULT  Goal: Achieves optimal ventilation and oxygenation  Description: INTERVENTIONS:  - Assess for changes in respiratory status  - Assess for changes in mentation and behavior  - Position to facilitate oxygenation and minimize respiratory effort  - Oxygen supplementation based on oxygen saturation or ABGs  - Provide Smoking Cessation handout, if applicable  - Encourage broncho-pulmonary hygiene including cough, deep breathe, Incentive Spirometry  - Assess the need for suctioning and perform as needed  - Assess and instruct to report SOB or any respiratory difficulty  - Respiratory Therapy support as indicated  - Manage/alleviate anxiety  - Monitor for signs/symptoms of CO2 retention  Outcome: Progressing     Problem: GASTROINTESTINAL - ADULT  Goal: Minimal or absence of nausea  and vomiting  Description: INTERVENTIONS:  - Maintain adequate hydration with IV or PO as ordered and tolerated  - Nasogastric tube to low intermittent suction as ordered  - Evaluate effectiveness of ordered antiemetic medications  - Provide nonpharmacologic comfort measures as appropriate  - Advance diet as tolerated, if ordered  - Obtain nutritional consult as needed  - Evaluate fluid balance  Outcome: Progressing  Goal: Maintains or returns to baseline bowel function  Description: INTERVENTIONS:  - Assess bowel function  - Maintain adequate hydration with IV or PO as ordered and tolerated  - Evaluate effectiveness of GI medications  - Encourage mobilization and activity  - Obtain nutritional consult as needed  - Establish a toileting routine/schedule  - Consider collaborating with pharmacy to review patient's medication profile  Outcome: Progressing     Problem: GENITOURINARY - ADULT  Goal: Absence of urinary retention  Description: INTERVENTIONS:  - Assess patient’s ability to void and empty bladder  - Monitor intake/output and perform bladder scan as needed  - Follow urinary retention protocol/standard of care  - Consider collaborating with pharmacy to review patient's medication profile  - Implement strategies to promote bladder emptying  Outcome: Progressing     Problem: METABOLIC/FLUID AND ELECTROLYTES - ADULT  Goal: Glucose maintained within prescribed range  Description: INTERVENTIONS:  - Monitor Blood Glucose as ordered  - Assess for signs and symptoms of hyperglycemia and hypoglycemia  - Administer ordered medications to maintain glucose within target range  - Assess barriers to adequate nutritional intake and initiate nutrition consult as needed  - Instruct patient on self management of diabetes  Outcome: Progressing  Goal: Electrolytes maintained within normal limits  Description: INTERVENTIONS:  - Monitor labs and rhythm and assess patient for signs and symptoms of electrolyte imbalances  - Administer  electrolyte replacement as ordered  - Monitor response to electrolyte replacements, including rhythm and repeat lab results as appropriate  - Fluid restriction as ordered  - Instruct patient on fluid and nutrition restrictions as appropriate  Outcome: Progressing  Goal: Hemodynamic stability and optimal renal function maintained  Description: INTERVENTIONS:  - Monitor labs and assess for signs and symptoms of volume excess or deficit  - Monitor intake, output and patient weight  - Monitor urine specific gravity, serum osmolarity and serum sodium as indicated or ordered  - Monitor response to interventions for patient's volume status, including labs, urine output, blood pressure (other measures as available)  - Encourage oral intake as appropriate  - Instruct patient on fluid and nutrition restrictions as appropriate  Outcome: Progressing     Problem: SKIN/TISSUE INTEGRITY - ADULT  Goal: Incision(s), wounds(s) or drain site(s) healing without S/S of infection  Description: INTERVENTIONS:  - Assess and document risk factors for pressure ulcer development  - Assess and document skin integrity  - Assess and document dressing/incision, wound bed, drain sites and surrounding tissue  - Implement wound care per orders  - Initiate isolation precautions as appropriate  - Initiate Pressure Ulcer prevention bundle as indicated  Outcome: Progressing  Goal: Oral mucous membranes remain intact  Description: INTERVENTIONS  - Assess oral mucosa and hygiene practices  - Implement preventative oral hygiene regimen  - Implement oral medicated treatments as ordered  Outcome: Progressing     Problem: HEMATOLOGIC - ADULT  Goal: Maintains hematologic stability  Description: INTERVENTIONS  - Assess for signs and symptoms of bleeding or hemorrhage  - Monitor labs and vital signs for trends  - Administer supportive blood products/factors, fluids and medications as ordered and appropriate  - Administer supportive blood products/factors as  ordered and appropriate  Outcome: Progressing  Goal: Free from bleeding injury  Description: (Example usage: patient with low platelets)  INTERVENTIONS:  - Avoid intramuscular injections, enemas and rectal medication administration  - Ensure safe mobilization of patient  - Hold pressure on venipuncture sites to achieve adequate hemostasis  - Assess for signs and symptoms of internal bleeding  - Monitor lab trends  - Patient is to report abnormal signs of bleeding to staff  - Avoid use of toothpicks and dental floss  - Use electric shaver for shaving  - Use soft bristle tooth brush  - Limit straining and forceful nose blowing  Outcome: Progressing     Problem: MUSCULOSKELETAL - ADULT  Goal: Return mobility to safest level of function  Description: INTERVENTIONS:  - Assess patient stability and activity tolerance for standing, transferring and ambulating w/ or w/o assistive devices  - Assist with transfers and ambulation using safe patient handling equipment as needed  - Ensure adequate protection for wounds/incisions during mobilization  - Obtain PT/OT consults as needed  - Advance activity as appropriate  - Communicate ordered activity level and limitations with patient/family  Outcome: Progressing     Problem: NEUROLOGICAL - ADULT  Goal: Achieves stable or improved neurological status  Description: INTERVENTIONS  - Assess for and report changes in neurological status  - Initiate measures to prevent increased intracranial pressure  - Maintain blood pressure and fluid volume within ordered parameters to optimize cerebral perfusion and minimize risk of hemorrhage  - Monitor temperature, glucose, and sodium. Initiate appropriate interventions as ordered  Outcome: Progressing     Problem: Patient Centered Care  Goal: Patient preferences are identified and integrated in the patient's plan of care  Description: Interventions:  - What would you like us to know as we care for you?   - Provide timely, complete, and accurate  information to patient/family  - Incorporate patient and family knowledge, values, beliefs, and cultural backgrounds into the planning and delivery of care  - Encourage patient/family to participate in care and decision-making at the level they choose  - Honor patient and family perspectives and choices  Outcome: Progressing     Problem: Patient/Family Goals  Goal: Patient/Family Long Term Goal  Description: Patient's Long Term Goal:     Interventions:  -   - See additional Care Plan goals for specific interventions  Outcome: Progressing  Goal: Patient/Family Short Term Goal  Description: Patient's Short Term Goal:     Interventions:   -   - See additional Care Plan goals for specific interventions  Outcome: Progressing     Problem: Diabetes/Glucose Control  Goal: Glucose maintained within prescribed range  Description: INTERVENTIONS:  - Monitor Blood Glucose as ordered  - Assess for signs and symptoms of hyperglycemia and hypoglycemia  - Administer ordered medications to maintain glucose within target range  - Assess barriers to adequate nutritional intake and initiate nutrition consult as needed  - Instruct patient on self management of diabetes  Outcome: Progressing     Problem: Impaired Functional Mobility  Goal: Achieve highest/safest level of mobility/gait  Description: Interventions:  - Assess patient's functional ability and stability  - Promote increasing activity/tolerance for mobility and gait  - Educate and engage patient/family in tolerated activity level and precautions    Outcome: Progressing     Problem: Impaired Cognition  Goal: Patient will exhibit improved attention, thought processing and/or memory  Description: Interventions:  Outcome: Progressing

## 2025-06-30 NOTE — SLP NOTE
SPEECH DAILY NOTE - INPATIENT    ASSESSMENT & PLAN   ASSESSMENT  PPE REQUIRED. THIS THERAPIST WORE GLOVES FOR DURATION OF SESSION. HANDS WASHED UPON ENTRANCE/EXIT.    SLP f/u for ongoing dysphagia tx/meal assessment per recommendations of soft bite sized/mildly thick liquids per swallow f/u. RN reports pt tolerates diet and medication well with no overt clinical s/s aspiration. Pt denies any swallowing challenges.     Pt positioned upright in bedside chair, alert/cooperative. Pt afebrile, tolerating 4L/Min O2NC with oxygen status 92% prior to the start of oral trials. SLP reviewed aspiration precautions and safe swallowing compensatory strategies with the patient. Safe swallow guidelines remain written on the white board in purple. Patient v/u. Provided min assistance, pt tolerates soft solids and mildly thick liquids via cup with no overt clinical signs/symptoms of aspiration. Pt presented with trials of hard solids and thin liquids via cup. Pt with adequate oral acceptance and bilabial seal. Pt with intact bite, adequate mastication, and timely A/P transfer. Pharyngeal swallow response appears delayed with reduced laryngeal elevation/excursion. Immediate cough observed after thin liquids.  No clinical signs of aspiration (e.g., immediate/delayed throat clear, immediate/delayed cough, wet vocal quality, increased O2 effort) observed across hard solid trials. Oxygen status remained stable t/o the entire session. Recommend upgrade to soft easy to chew consistency and remain on mildly thick liquids with adherence to aspiration precautions and swallow strategies.     SLP to f/u with meal assessment x2, monitor  CXR, and VFSS if any overt CSA and/or decline in CXR. RN alerted with results and recommendations.     MOST RECENT CXR 6/30  CONCLUSIONS:  No new abnormality. Persistent left pleural effusion and left basilar opacities.   Cardiomegaly again seen.   Lines and tubes are unchanged.       Diet Recommendations -  Solids: Soft/ Easy to chew  Diet Recommendations - Liquids: Nectar thick liquids/ Mildly thick     Aspiration Precautions: Upright position, Slow rate, Small bites, Small sips, No straw  Medication Administration Recommendations: Whole in puree    Patient Experiencing Pain: No              Treatment Plan  Treatment Plan/Recommendations: Aspiration precautions    Interdisciplinary Communication: Discussed with RN          GOALS  Goal #1 The patient will tolerate soft easy to chew consistency and mildly thick liquids without overt signs or symptoms of aspiration with 100 % accuracy over 1-2 session(s).    Revised 6/30     Goal #2 The patient/family/caregiver will demonstrate understanding and implementation of aspiration precautions and swallow strategies independently over 1-2 session(s).    In Progress   Goal #3 The patient will tolerate trial upgrade of hard solid consistency and thin liquids without overt signs or symptoms of aspiration with 100 % accuracy over 1-2 session(s).   Revised 6/30   Goal #4 The patient will utilize compensatory strategies as outlined by  BSSE (clinical evaluation) including Slow rate, Small bites, Small sips, Multiple swallows, No straws, Upright 90 degrees, Upright 90 degrees 30 mins after meal, Eliminate distractions, Supervision with meals with PRN assistance 100 % of the time across 2 sessions.    In Progress     FOLLOW UP  Follow Up Needed (Documentation Required): Yes  SLP Follow-up Date: 07/01/25  Duration: 1 week    Session: 3    If you have any questions, please contact JARED Parker M.S. CCC-SLP  Speech Language Pathologist  Phone Number Ext. [phone]

## 2025-06-30 NOTE — PROGRESS NOTES
Pulmonary/ICU/Critical Care Progress Note         Reason for Consultation: post CABG, IABP  Referring Physician: Dr. Pacheco    Subjective:  On 2 LNC  Afebrile  Sitting in a chair      REVIEW OF SYSTEMS:  Positives and negatives as noted in HPI. All other review of systems otherwise are either limited (due to pt/family inability to provide) or negative.      PAST MEDICAL HISTORY:  Past Medical History[1]      PAST SURGICAL HISTORY:  Past Surgical History[2]      PAST SOCIAL HISTORY:  Social Hx on file[3]      PAST FAMILY HISTORY:  Family History[4]      ALLERGIES:  Allergies[5]      MEDS:  Home Medications:  Medications Taking[6]    Scheduled Medication:  Scheduled Medications[7]  Continuous Infusing Medication:  Medication Infusions[8]  PRN Medications:  PRN Medications[9]       PHYSICAL EXAM:  /63 (BP Location: Left arm)   Pulse 69   Temp 97.5 °F (36.4 °C) (Temporal)   Resp 21   Ht 5' 6\" (1.676 m)   Wt 199 lb 11.8 oz (90.6 kg)   SpO2 96%   BMI 32.24 kg/m²      CONSTITUTIONAL: alert NAD  HEENT: atraumatic normocephalic  MOUTH: MMM  NECK/THROAT: no JVD. Trachea midline. No obvious thyromegaly.+ cordis and swan  LUNG: clear upper b/l no wheezing, crackles. Diminished bases. Chest symmetric with respiratory motion  HEART: midsternal incision healing well, regular rate and rhythm, no obvious murmers or gallops noted. + chest tube  ABD: soft non tender. + bowel sounds. No organomegaly noted  EXT: no clubbing, cyanosis, or edema noted      IMAGES:   CXR 6/30/25  CONCLUSIONS:   No new abnormality. Persistent left pleural effusion and left basilar opacities.   Cardiomegaly again seen.   Lines and tubes are unchanged.        LABS:  Recent Labs   Lab 06/27/25  0459 06/28/25  0347 06/30/25  0516   RBC 3.60* 3.74* 3.70*   HGB 9.1* 9.7* 9.6*   HCT 29.0* 30.2* 30.7*   MCV 80.6 80.7 83.0   MCH 25.3* 25.9* 25.9*   MCHC 31.4 32.1 31.3   RDW 15.9* 15.9* 15.5*   NEPRELIM 6.45 5.37 6.95   WBC 9.4 8.4 10.0   .0 517.0*  673.0*       Recent Labs   Lab 06/27/25  0459 06/27/25  1258 06/28/25  0347 06/28/25  1305 06/29/25  0540 06/29/25  1826 06/30/25  0516   *  --  125*  --   --   --  165*   BUN 12  --  12  --   --   --  20   CREATSERUM 0.66*  --  0.68*  --   --   --  0.69*   EGFRCR 107  --  106  --   --   --  105   CA 8.3*  --  8.2*  --   --   --  8.4*   ALB 3.7  --  3.6  --   --   --  3.5     --  142  --   --   --  140   K 3.5  3.5   < > 3.7  3.7   < > 3.5 3.7 3.6     --  103  --   --   --  102   CO2 30.0  --  34.0*  --   --   --  32.0   ALKPHO 57  --  56  --   --   --  58   AST 60*  --  35*  --   --   --  22   ALT 36  --  26  --   --   --  16   BILT 0.4  --  0.4  --   --   --  0.3   TP 6.1  --  5.9  --   --   --  6.0    < > = values in this interval not displayed.       ASSESSMENT/PLAN:  CAD s/p multivessel disease and severe aortic stenosis  -s/p CABG x 2 and AV repair on 6/19  -s/p IABP removed since  -on ASA, plavix, amio, bb, lasix   -chest tube mgmt as per CV surgery     Post op respiratory care  -was on full MV support  -weaned off NO as per CV  -extubated 6/24 and on RA  -CXR with persistent left effusion vs atelectasis. Check non contrast chest CT    Possible PNA with mucus plugging, leukocytosis   -checked ETT asp, blood cx, MRSA nares  -completed empiric zosyn  -check non contrast chest CT today     Possible ETOH use and there was concern for withdrawal?  -reported hx of drinking  -s/p CIWA protocol   -psych following   -thiamine, folic acid, MVI    Hx of vaping  -duonebs and pulmicort nebs     PVD  -plavix, ASA    DM2  -insulin/dextrose    BPH  -flomax  -stafford    Proph  -DVT: lovenox  -GI: pepcid    Dispo  -full code    Thank you for the opportunity to care for Jose Alberto Ezequiel Rahman DO, MPH  Pulmonary Critical Care Medicine  [facility]                         [1]   Past Medical History:  Diagnosis Date    Aortic stenosis     Back problem      BPH (benign prostatic hyperplasia)     Cardiomyopathy (HCC)     Coronary atherosclerosis     Depression     Diabetes (HCC)     Esophageal reflux     Heart valve disease     High blood pressure     High cholesterol     Peripheral vascular disease     Shortness of breath     related to smoking     Visual impairment     glasses   [2]   Past Surgical History:  Procedure Laterality Date    Cardiac catheterization  2016    [facility]    Cath drug eluting stent      Colonoscopy  04/22/2024    Colonoscopy N/A 4/22/2024    Procedure: COLONOSCOPY;  Surgeon: Attila Li MD;  Location: [facility] ENDOSCOPY    Esophagoscopy,diagnostic  04/22/2024    Dr. Li    Hernia surgery      as a child    Nasal scopy,remv part ethmoid  10/14/2020    Nasal scopy,rmv tiss maxill sinus  10/14/2020    Repair of nasal septum  10/14/2020   [3]   Social History  Socioeconomic History    Marital status:    Tobacco Use    Smoking status: Former     Current packs/day: 1.00     Average packs/day: 1 pack/day for 20.0 years (20.0 ttl pk-yrs)     Types: Cigarettes    Smokeless tobacco: Never   Vaping Use    Vaping status: Every Day    Substances: Nicotine   Substance and Sexual Activity    Alcohol use: Yes     Comment: 1-2 drinks a week    Drug use: No   [4]   Family History  Problem Relation Age of Onset    Diabetes Father     Diabetes Paternal Aunt     Diabetes Paternal Uncle     Seizure Disorder Mother     No Known Problems Brother     No Known Problems Daughter     Glaucoma Neg     Macular degeneration Neg    [5] No Known Allergies  [6]   Outpatient Medications Marked as Taking for the 6/19/25 encounter (Hospital Encounter)   Medication Sig Dispense Refill    ENTRESTO 24-26 MG Oral Tab Entresto 24 mg-26 mg tablet, [RxNorm: 5556105]      magnesium 250 MG Oral Tab Take 1 tablet (250 mg total) by mouth every other day.      cyanocobalamin 500 MCG Oral Tab Take 1 tablet (500 mcg total) by mouth every other day.       metFORMIN HCl 1000 MG Oral Tab Take 1 tablet (1,000 mg total) by mouth 2 (two) times daily with meals. 180 tablet 3    amLODIPine 5 MG Oral Tab Take 1 tablet (5 mg total) by mouth daily. 90 tablet 3    carvedilol 25 MG Oral Tab Take 1 tablet (25 mg total) by mouth 2 (two) times daily with meals. 180 tablet 3    clopidogrel 75 MG Oral Tab Take 1 tablet (75 mg total) by mouth daily. 90 tablet 3    empagliflozin (JARDIANCE) 25 MG Oral Tab Take 1 tablet (25 mg total) by mouth daily. 90 tablet 3    furosemide 40 MG Oral Tab Take 1 tablet (40 mg total) by mouth daily. 90 tablet 3    [DISCONTINUED] losartan 50 MG Oral Tab Take 1 tablet (50 mg total) by mouth 2 (two) times daily. 180 tablet 3    rosuvastatin 20 MG Oral Tab Take 1 tablet (20 mg total) by mouth nightly. 90 tablet 3    tamsulosin 0.4 MG Oral Cap TAKE 2 CAPSULES BY MOUTH DAILY (Patient taking differently: Take 1 capsule (0.4 mg total) by mouth in the morning and 1 capsule (0.4 mg total) before bedtime.) 180 capsule 3    aspirin 81 MG Oral Tab EC Take 1 tablet (81 mg total) by mouth nightly.     [7]    ascorbic acid  500 mg Oral BID    potassium chloride  20 mEq Oral BID    furosemide  40 mg Intravenous BID (Diuretic)    insulin degludec  15 Units Subcutaneous Daily    sacubitril-valsartan  1 tablet Oral BID    carvedilol  12.5 mg Oral BID with meals    colchicine  0.3 mg Oral QOD    ipratropium-albuterol  3 mL Nebulization 2 times daily    insulin aspart  1-5 Units Subcutaneous TID CC    escitalopram  5 mg Oral Nightly    amiodarone  200 mg Oral Daily    OLANZapine  2.5 mg Oral Nightly    ferrous sulfate  300 mg Oral BID    aspirin  81 mg Per NG Tube Daily    docusate  100 mg Per NG Tube BID    budesonide  0.5 mg Nebulization 2 times daily    rosuvastatin  20 mg Per NG Tube Nightly    thiamine  100 mg Oral Daily    multivitamin  1 tablet Oral Daily    folic acid  1 mg Oral Daily    sennosides  8.6 mg Oral BID    famotidine  20 mg Oral BID    enoxaparin  40 mg  Subcutaneous Daily    clopidogrel  75 mg Oral Daily    tamsulosin  0.4 mg Oral BID   [8]    dextrose 10%     [9]   labetalol    dextrose 10%    [DISCONTINUED] sodium bicarbonate **AND** lipase-protease-amylase (Lip-Prot-Amyl)    ipratropium-albuterol    LORazepam **OR** LORazepam    LORazepam **OR** LORazepam    LORazepam **OR** LORazepam    LORazepam    LORazepam    LORazepam    haloperidol lactate    LORazepam    haloperidol lactate    melatonin    bisacodyl    potassium chloride **OR** potassium chloride    magnesium sulfate in dextrose 5%    magnesium sulfate in sterile water for injection    acetaminophen **OR** HYDROcodone-acetaminophen **OR** [DISCONTINUED] HYDROcodone-acetaminophen    glucose **OR** glucose **OR** glucose-vitamin C **OR** dextrose **OR** glucose **OR** glucose **OR** glucose-vitamin C

## 2025-07-01 ENCOUNTER — APPOINTMENT (OUTPATIENT)
Dept: ULTRASOUND IMAGING | Facility: HOSPITAL | Age: 60
DRG: 219 | End: 2025-07-01
Attending: INTERNAL MEDICINE
Payer: MEDICAID

## 2025-07-01 ENCOUNTER — APPOINTMENT (OUTPATIENT)
Dept: GENERAL RADIOLOGY | Facility: HOSPITAL | Age: 60
DRG: 219 | End: 2025-07-01
Attending: INTERNAL MEDICINE
Payer: MEDICAID

## 2025-07-01 LAB
ALBUMIN SERPL-MCNC: 3.8 G/DL (ref 3.2–4.8)
ALBUMIN/GLOB SERPL: 1.5 {RATIO} (ref 1–2)
ALP LIVER SERPL-CCNC: 66 U/L (ref 45–117)
ALT SERPL-CCNC: 17 U/L (ref 10–49)
ANION GAP SERPL CALC-SCNC: 7 MMOL/L (ref 0–18)
AST SERPL-CCNC: 29 U/L (ref ?–34)
BASOPHILS # BLD AUTO: 0.08 X10(3) UL (ref 0–0.2)
BASOPHILS NFR BLD AUTO: 0.8 %
BILIRUB SERPL-MCNC: 0.3 MG/DL (ref 0.2–1.1)
BUN BLD-MCNC: 23 MG/DL (ref 9–23)
BUN/CREAT SERPL: 28 (ref 10–20)
CALCIUM BLD-MCNC: 8.8 MG/DL (ref 8.7–10.4)
CHLORIDE SERPL-SCNC: 101 MMOL/L (ref 98–112)
CO2 SERPL-SCNC: 32 MMOL/L (ref 21–32)
CREAT BLD-MCNC: 0.82 MG/DL (ref 0.7–1.3)
DEPRECATED RDW RBC AUTO: 47.8 FL (ref 35.1–46.3)
EGFRCR SERPLBLD CKD-EPI 2021: 100 ML/MIN/1.73M2 (ref 60–?)
EOSINOPHIL # BLD AUTO: 0.24 X10(3) UL (ref 0–0.7)
EOSINOPHIL NFR BLD AUTO: 2.3 %
ERYTHROCYTE [DISTWIDTH] IN BLOOD BY AUTOMATED COUNT: 15.6 % (ref 11–15)
GLOBULIN PLAS-MCNC: 2.5 G/DL (ref 2–3.5)
GLUCOSE BLD-MCNC: 174 MG/DL (ref 70–99)
GLUCOSE BLDC GLUCOMTR-MCNC: 169 MG/DL (ref 70–99)
GLUCOSE BLDC GLUCOMTR-MCNC: 184 MG/DL (ref 70–99)
GLUCOSE BLDC GLUCOMTR-MCNC: 185 MG/DL (ref 70–99)
GLUCOSE BLDC GLUCOMTR-MCNC: 195 MG/DL (ref 70–99)
HCT VFR BLD AUTO: 31.8 % (ref 39–53)
HGB BLD-MCNC: 9.9 G/DL (ref 13–17.5)
IMM GRANULOCYTES # BLD AUTO: 0.05 X10(3) UL (ref 0–1)
IMM GRANULOCYTES NFR BLD: 0.5 %
LYMPHOCYTES # BLD AUTO: 1.89 X10(3) UL (ref 1–4)
LYMPHOCYTES NFR BLD AUTO: 18.1 %
MAGNESIUM SERPL-MCNC: 2 MG/DL (ref 1.6–2.6)
MCH RBC QN AUTO: 26.2 PG (ref 26–34)
MCHC RBC AUTO-ENTMCNC: 31.1 G/DL (ref 31–37)
MCV RBC AUTO: 84.1 FL (ref 80–100)
MONOCYTES # BLD AUTO: 0.91 X10(3) UL (ref 0.1–1)
MONOCYTES NFR BLD AUTO: 8.7 %
NEUTROPHILS # BLD AUTO: 7.25 X10 (3) UL (ref 1.5–7.7)
NEUTROPHILS # BLD AUTO: 7.25 X10(3) UL (ref 1.5–7.7)
NEUTROPHILS NFR BLD AUTO: 69.6 %
OSMOLALITY SERPL CALC.SUM OF ELEC: 298 MOSM/KG (ref 275–295)
PHOSPHATE SERPL-MCNC: 4.1 MG/DL (ref 2.4–5.1)
PLATELET # BLD AUTO: 736 10(3)UL (ref 150–450)
POTASSIUM SERPL-SCNC: 4 MMOL/L (ref 3.5–5.1)
PROT SERPL-MCNC: 6.3 G/DL (ref 5.7–8.2)
RBC # BLD AUTO: 3.78 X10(6)UL (ref 4.3–5.7)
SODIUM SERPL-SCNC: 140 MMOL/L (ref 136–145)
WBC # BLD AUTO: 10.4 X10(3) UL (ref 4–11)

## 2025-07-01 PROCEDURE — 99233 SBSQ HOSP IP/OBS HIGH 50: CPT | Performed by: OTHER

## 2025-07-01 PROCEDURE — 99233 SBSQ HOSP IP/OBS HIGH 50: CPT | Performed by: INTERNAL MEDICINE

## 2025-07-01 PROCEDURE — 71045 X-RAY EXAM CHEST 1 VIEW: CPT | Performed by: RADIOLOGY

## 2025-07-01 PROCEDURE — 76604 US EXAM CHEST: CPT | Performed by: INTERNAL MEDICINE

## 2025-07-01 RX ORDER — DOCUSATE SODIUM 50 MG/5ML
100 LIQUID ORAL 2 TIMES DAILY
Status: DISCONTINUED | OUTPATIENT
Start: 2025-07-01 | End: 2025-07-03

## 2025-07-01 RX ORDER — POTASSIUM CHLORIDE 1.5 G/1.58G
10 POWDER, FOR SOLUTION ORAL 2 TIMES DAILY
Status: DISCONTINUED | OUTPATIENT
Start: 2025-07-01 | End: 2025-07-03

## 2025-07-01 RX ORDER — INSULIN DEGLUDEC 100 U/ML
15 INJECTION, SOLUTION SUBCUTANEOUS DAILY
Status: DISCONTINUED | OUTPATIENT
Start: 2025-07-02 | End: 2025-07-03

## 2025-07-01 RX ORDER — INSULIN DEGLUDEC 100 U/ML
15 INJECTION, SOLUTION SUBCUTANEOUS NIGHTLY
Status: DISCONTINUED | OUTPATIENT
Start: 2025-07-01 | End: 2025-07-01

## 2025-07-01 RX ORDER — FUROSEMIDE 10 MG/ML
20 INJECTION INTRAMUSCULAR; INTRAVENOUS
Status: COMPLETED | OUTPATIENT
Start: 2025-07-01 | End: 2025-07-03

## 2025-07-01 RX ORDER — ASPIRIN 81 MG/1
81 TABLET, CHEWABLE ORAL DAILY
Status: DISCONTINUED | OUTPATIENT
Start: 2025-07-01 | End: 2025-07-03

## 2025-07-01 NOTE — PROGRESS NOTES
[facility]  part of [facility]    Progress Note    Jose Alberto Lizama Patient Status:  Inpatient    1965 MRN [id number]   Location [facility] 2W/SW Attending Tammy Zacarias MD   Hosp Day # 12 PCP Fiona Sweeney MD       Subjective:   Patient resting comfortably and in NAD. Denied any active complaints at the time of interview. He had occasional desaturations and required an increase in supplemental O2.   The patient had dark stools yesterday and was found to be guaic positive.     Objective:   Blood pressure 134/76, pulse 64, temperature 97.7 °F (36.5 °C), temperature source Temporal, resp. rate 18, height 5' 6\" (1.676 m), weight 198 lb 3.1 oz (89.9 kg), SpO2 99%.    Physical Exam:    General: No acute distress.   Respiratory: Clear to auscultation bilaterally. No wheezes. No rhonchi.  Cardiovascular: S1, S2. Regular rate and rhythm. No murmurs, rubs or gallops.   Abdomen: Soft, nontender, nondistended.  Positive bowel sounds. No rebound or guarding.  Neurologic: No focal neurological deficits.   Musculoskeletal: Moves all extremities.  Extremities: No edema.    Results:     Lab Results   Component Value Date    WBC 10.4 2025    HGB 9.9 (L) 2025    HCT 31.8 (L) 2025    .0 (H) 2025    CREATSERUM 0.82 2025    BUN 23 2025     2025    K 4.0 2025     2025    CO2 32.0 2025     (H) 2025    CA 8.8 2025    ALB 3.8 2025    ALKPHO 66 2025    BILT 0.3 2025    TP 6.3 2025    AST 29 2025    ALT 17 2025    PTT 30.6 2025    INR 1.17 2025    TSH 0.696 2025    PSA 2.62 08/10/2023    ESRML 97 (H) 2025    MG 2.0 2025    PHOS 4.1 2025       CT CHEST (CPT=71250)  Result Date: 2025  CONCLUSION: 1.  Moderate size left pleural effusion without consolidation. Electronically Verified and Signed by Attending Radiologist: Isaiah Arteaga MD  6/30/2025 3:25 PM Workstation: [id number]    XR CHEST AP PORTABLE  (CPT=71045)  Result Date: 6/30/2025  Workstation: [id number]            Scheduled Medications[1]  PRN Medications[2]      Assessment and Plan:       Post-op anemia  - baseline Hb ~12  - Hb this morning 9.9  - stool for occult blood positive  - ASA/Plavix on hold at this time    CAD status post CABG and AVR  Severe aortic stenosis  - difficult extubation but patient extubated successfully 06/24  - Continue tube feeds  - Management per cardiology and CV sx  Continue ASA, Statin, Coreg, Entresto  Continue Lasix  Continue antiplatelet therapy, statin  Net IO Since Admission: -3,238.96 mL [07/01/25 0847]  - chest tube management per surgery  - swallow eval  - Monitor vitals  - PT/OT/SLP    Mechanical fall  - Ground level fall on 06/27/2025  - CT head negative, Him XR negative  - Fall precautions  - continue PT/OT    Post op acute respiratory failure   Pleural effusion  - completed course of abx  - requiring BiPAP  - CT chest reviewed  - pulm on consult   Continue abx   Bronchial hygiene, nebs, IS   Ambulate as tolerated    Alcohol abuse/Withdrawal   - CIWA protocol   - Psych on board    Zyprexa, Lexapro nightly   Haldol PRN    DM2  - insulin per Vascular surgery     HTN  Dyslipidemia  - continue home meds         Quality:    CODE status: Full  DVT ppx - Lovenox          [1]    aspirin  81 mg Oral Daily    docusate  100 mg Oral BID    ascorbic acid  500 mg Oral BID    potassium chloride  20 mEq Oral BID    pantoprazole  40 mg Oral BID AC    furosemide  40 mg Intravenous BID (Diuretic)    insulin degludec  15 Units Subcutaneous Daily    sacubitril-valsartan  1 tablet Oral BID    carvedilol  12.5 mg Oral BID with meals    colchicine  0.3 mg Oral QOD    ipratropium-albuterol  3 mL Nebulization 2 times daily    insulin aspart  1-5 Units Subcutaneous TID CC    escitalopram  5 mg Oral Nightly    amiodarone  200 mg Oral Daily    OLANZapine  2.5 mg Oral  Nightly    ferrous sulfate  300 mg Oral BID    budesonide  0.5 mg Nebulization 2 times daily    rosuvastatin  20 mg Per NG Tube Nightly    thiamine  100 mg Oral Daily    multivitamin  1 tablet Oral Daily    folic acid  1 mg Oral Daily    sennosides  8.6 mg Oral BID    enoxaparin  40 mg Subcutaneous Daily    [Held by provider] clopidogrel  75 mg Oral Daily    tamsulosin  0.4 mg Oral BID   [2]   labetalol    dextrose 10%    [DISCONTINUED] sodium bicarbonate **AND** lipase-protease-amylase (Lip-Prot-Amyl)    ipratropium-albuterol    LORazepam **OR** LORazepam    LORazepam **OR** LORazepam    LORazepam **OR** LORazepam    LORazepam    LORazepam    LORazepam    LORazepam    melatonin    bisacodyl    potassium chloride **OR** potassium chloride    magnesium sulfate in dextrose 5%    magnesium sulfate in sterile water for injection    acetaminophen **OR** HYDROcodone-acetaminophen **OR** [DISCONTINUED] HYDROcodone-acetaminophen    glucose **OR** glucose **OR** glucose-vitamin C **OR** dextrose **OR** glucose **OR** glucose **OR** glucose-vitamin C

## 2025-07-01 NOTE — PHYSICAL THERAPY NOTE
PHYSICAL THERAPY TREATMENT NOTE - INPATIENT     Room Number: 233/233-A       Presenting Problem: elective CABGx2 with post op withdrawal/complications       Problem List  Active Problems:    Aortic stenosis    Hx of CABG    Episodic mood disorder    Delirium due to another medical condition    Angina concurrent with and due to arteriosclerosis of CABG    Pre-op evaluation      PHYSICAL THERAPY ASSESSMENT   Patient demonstrates good  progress this session, goals  updated to reflect patient performance.      Patient is requiring minimal assist as a result of the following impairments: decreased functional strength, decreased endurance/aerobic capacity, pain, impaired standing balance, decreased muscular endurance, difficulty maintaining precautions, and medical status.     Patient continues to function below baseline with bed mobility, transfers, gait, stair negotiation, maintaining seated position, standing prolonged periods, and performing household tasks.  Next session anticipate patient to progress bed mobility, transfers, gait, stair negotiation, maintaining seated position, standing prolonged periods, and performing household tasks.  Physical Therapy will continue to follow patient for duration of hospitalization.    Patient continues to benefit from continued skilled PT services: at discharge to promote prior level of function.  Anticipate patient will return home with home health PT.    PLAN DURING HOSPITALIZATION  Nursing Mobility Recommendation : 1 Assist     PT Treatment Plan: Bed mobility, Body mechanics, Endurance, Energy conservation, Patient education, Gait training, Range of motion, Strengthening, Transfer training, Balance training, Stair training, Stoop training  Frequency (Obs): 5x/week     SUBJECTIVE  I feel better than the first few days of recovery but I am winded with walking. The MD said they were going to take the water off my lungs today.     (Polish   utilized)    OBJECTIVE  Precautions: Drain(s),  needed, Sternal, Chest tube to suction (polish)    WEIGHT BEARING RESTRICTION       PAIN ASSESSMENT   Rating: 3  Location: surgical site with movement  Management Techniques: Activity promotion, Body mechanics, Relaxation, Breathing techniques, Repositioning    BALANCE  Static Sitting: Good  Dynamic Sitting: Good  Static Standing: Fair  Dynamic Standing: Fair -       O2 WALK  Oxygen Therapy  SPO2% on Oxygen at Rest: 98  At rest oxygen flow (liters per minute): 2  SPO2% Ambulation on Oxygen: 96  Ambulation oxygen flow (liters per minute): 2    AM-PAC '6-Clicks' INPATIENT SHORT FORM - BASIC MOBILITY  How much difficulty does the patient currently have...  Patient Difficulty: Turning over in bed (including adjusting bedclothes, sheets and blankets)?: None   Patient Difficulty: Sitting down on and standing up from a chair with arms (e.g., wheelchair, bedside commode, etc.): None   Patient Difficulty: Moving from lying on back to sitting on the side of the bed?: A Little   How much help from another person does the patient currently need...   Help from Another: Moving to and from a bed to a chair (including a wheelchair)?: A Little   Help from Another: Need to walk in hospital room?: A Little   Help from Another: Climbing 3-5 steps with a railing?: A Lot     AM-PAC Score:  Raw Score: 19   Approx Degree of Impairment: 41.77%   Standardized Score (AM-PAC Scale): 45.44   CMS Modifier (G-Code): CK    FUNCTIONAL ABILITY STATUS  Functional Mobility/Gait Assessment  Gait Assistance: Contact guard assist  Distance (ft): 150  Assistive Device: Rolling walker  Pattern:  (step through gait decreased step length and lana)  Rolling: minimal assist  Supine to Sit: minimal assist  Sit to Supine: minimal assist  Sit to Stand: contact guard assist    Skilled Therapy Provided: Pt ed with energy conservation with pursed lip breathing and 02 support 2 lts 97% at rest. Pt ed with  LE therex in chair to warm up for LE strength and ROM. Pt ed with standing with Min A with a RW with chest precautions reviewed. Pt ed with transfers and gait with RW with CGA amb 150' with 96% 02 sats with intermittent standing rest breaks required. Pt ed with pt returned to a chair to work on incentive spirometer for optimal lung health. Thoracentesis is scheduled for later today. Pt is now demonstrating adequate functional mobility and knowledge to work toward a return home with [facility] PT and family assist recommended as medical progress allows. Pt will still require stair training as progress allows.     The patient's Approx Degree of Impairment: 41.77% has been calculated [facility] Short Form.  Research supports that patients with this level of impairment may benefit from [facility] PT with family assist.    Final disposition will be made by interdisciplinary medical team.    THERAPEUTIC EXERCISES  Lower Extremity Ankle pumps  Heel slides  LAQ     Position Sitting & Standing       Patient End of Session: Up in chair, With  staff, Needs met, Call light within reach, RN aware of session/findings, All patient questions and concerns addressed, Alarm set    CURRENT GOALS   CURRENT GOALS   Goals to be met by: 7/15/25  Patient Goal Patient's self-stated goal is: not stated -wife states it is for him to get back to normal   Goal #1 Patient is able to demonstrate supine - sit EOB @ level: moderate assistance      Goal #1   Current Status Min A   Goal #2 Patient is able to demonstrate transfers Sit to/from Stand at assistance level: moderate assistance with walker - rolling      Goal #2  Current Status Min A with RW   Goal #3 Patient is able to ambulate 25 feet with assist device: walker - rolling at assistance level: moderate assistance   Goal #3   Current Status  CGA amb 150' CGA   Goal #4 Patient will negotiate bed to/from chair transfers with RW with moderate  assistance   Goal #4   Current Status Ongoing   Goal #5 Patient to demonstrate independence with home activity/exercise instructions provided to patient in preparation for discharge.   Goal #5   Current Status Ongoing     Gait Training: 15 minutes  Therapeutic Exercise: 8 minutes

## 2025-07-01 NOTE — OCCUPATIONAL THERAPY NOTE
OCCUPATIONAL THERAPY TREATMENT NOTE - INPATIENT        Room Number: 233/233-A     Presenting Problem: CABG    Problem List  Active Problems:    Aortic stenosis    Hx of CABG    Episodic mood disorder    Delirium due to another medical condition    Angina concurrent with and due to arteriosclerosis of CABG    Pre-op evaluation      OCCUPATIONAL THERAPY ASSESSMENT   Patient demonstrates good  progress this session, goals remain in progress.  Patient demonstrates improved command following, increased ease with transitional tasks, and improvement with adherence to sternal precuations after extensive education provided prior to mobilization; pt also tolerated tasks better and O2 maintained >92% throughout with cues for PLB and pacing incorporating to tasks; DC recs have been upgraded to HH with family support in light of these changes; surgical team aware; will benefit from continued therapy in prep to return home.   Patient continues to benefit from continued skilled OT services: at discharge to promote prior level of function and safety with additional support and return home with home health OT.     PLAN DURING HOSPITALIZATION  OT Device Recommendations: TBD  OT Treatment Plan: Balance activities, Energy conservation/work simplification techniques, ADL training, Functional transfer training, UE strengthening/ROM, Endurance training, Cognitive reorientation, Patient/Family education, Patient/Family training, Equipment eval/education, Compensatory technique education     SUBJECTIVE  Thank you    OBJECTIVE  Precautions: Drain(s),  needed, Sternal, Chest tube to suction (polish)    WEIGHT BEARING RESTRICTION     PAIN ASSESSMENT  Ratin    ACTIVITY TOLERANCE            [id number]             O2 SATURATIONS       ACTIVITIES OF DAILY LIVING ASSESSMENT  AM-PAC ‘6-Clicks’ Inpatient Daily Activity Short Form  How much help from another person does the patient currently need…  -   Putting on and taking off regular  lower body clothing?: A Little  -   Bathing (including washing, rinsing, drying)?: A Little  -   Toileting, which includes using toilet, bedpan or urinal? : A Little  -   Putting on and taking off regular upper body clothing?: A Little  -   Taking care of personal grooming such as brushing teeth?: A Little  -   Eating meals?: A Little    AM-PAC Score:  Score: 18  Approx Degree of Impairment: 46.65%  Standardized Score (AM-PAC Scale): 38.66  CMS Modifier (G-Code): CK    THERAPEUTIC EXERCISE       Skilled Therapy Provided: TF Training, Surgical precaution training in relation to ADL/daily activities, fxl mobility engagement and training on energy conservation and pacing.     Pt rcvd in bed at beginning of sesion; left up in chair with all needs in reach; Continue skilled occupational therapy while IP to maximize patient function and increase patient participation, safety, and independence with basic ADL and everyday activities.       EDUCATION PROVIDED  Patient Education : Role of Occupational Therapy; Plan of Care; Functional Transfer Techniques; Fall Prevention; Surgical Precautions; Posture/Positioning; Edema Reduction; Energy Conservation  Patient's Response to Education: Requires Further Education  Family/Caregiver Education : -- (smae as pt)  Family/Caregiver's Response to Education: Verbalized Understanding; Returned Demonstration    The patient's Approx Degree of Impairment: 46.65% has been calculated based on documentation in the [facility] '6 clicks' Inpatient Daily Activity Short Form.  Research supports that patients with this level of impairment may benefit from HH.  Final disposition will be made by interdisciplinary medical team.    Patient End of Session: Up in chair    OT Goals:        OT Goals: ongoing   Patient will complete functional transfer with mod A at RW level  Comment: ongoing    Patient will complete toileting with mod A  Comment: ongoing    Patient will tolerate standing for 2 minutes in prep  for adls with mod A   Comment:ongoing               Goals  on: 07/15/25  Frequency: 3-5x/week    OT Session Time: 24 minutes  Self-Care Home Management: 0 minutes  Therapeutic Activity: 24 minutes

## 2025-07-01 NOTE — PROGRESS NOTES
Cardiology Progress Note    Jose Alberto Lizama Patient Status:  Inpatient    1965 MRN [id number]   Location [facility] 2W/SW Attending Nannette Pelayo MD   Hosp Day # 12 PCP Fiona Sweeney MD     Interval Note:  Patient seen and examined  Vitals stable    --------------------------------------------------------------------------------------------------------------------------------  ROS 12 systems reviewed, pertinent findings above.  ROS    History:  Past Medical History[1]  Past Surgical History[2]  Family History[3]   reports that he has quit smoking. His smoking use included cigarettes. He has a 20 pack-year smoking history. He has never used smokeless tobacco. He reports current alcohol use. He reports that he does not use drugs.    Objective:   Temp: 98.7 °F (37.1 °C)  Pulse: 76  Resp: 18  BP: 114/68  FiO2 (%): 40 %    Intake/Output:     Intake/Output Summary (Last 24 hours) at 2025 1028  Last data filed at 2025 2200  Gross per 24 hour   Intake 400 ml   Output 1450 ml   Net -1050 ml       Physical Exam:    General: AO x 2, no acute distress  HEENT: Normocephalic, anicteric sclera, neck supple.  Neck: No JVD, carotids 2+, no bruits.  Cardiac: Regular rate and rhythm. S1, S2 normal. No murmur, pericardial rub, S3.  Lungs: Clear without wheezes, rales, rhonchi or dullness.  Normal excursions and effort.  Abdomen: Soft, non-tender. BS-present.  Extremities: Without clubbing, cyanosis or edema.  Peripheral pulses are 2+.  Neurologic: Non-focal  Skin: Warm and dry.       Assessment   Severe AS, CAD status post CABG X2 and SAVR,  MELINDA clip 25 (LIMA-LAD, VG-D; Inspiris #21  tissue valve, #40 MELINDA clip)  Hypoxia  Mechanical fall  Ischemic dermopathy, EF 30%  Anemia  Hypotension  Hyperlipidemia  Diabetes  Peripheral vascular disease        Plan  Patient appears hemodynamically stable  Blood pressure in heart rate, telemetry stable  Oxygen dependent, hypoxia  CT chest with moderate left-sided  pleural effusion  Plan for thoracentesis with pulmonology today  Continue IV Lasix, likely transition to oral diuretics tomorrow  Continue amiodarone, aspirin, Coreg, statin, Entresto      Thank you for allowing me to take part in the care of Jose Alberto Lizama. Please call with any questions of concerns.      Level of care: L4    Noemy Ackerman DO  [facility]   Interventional Cardiac and Vascular Services             [1]   Past Medical History:   Aortic stenosis    Back problem    BPH (benign prostatic hyperplasia)    Cardiomyopathy (HCC)    Coronary atherosclerosis    Depression    Diabetes (HCC)    Esophageal reflux    Heart valve disease    High blood pressure    High cholesterol    Peripheral vascular disease    Shortness of breath    related to smoking     Visual impairment    glasses   [2]   Past Surgical History:  Procedure Laterality Date    Cardiac catheterization  2016    [facility]    Cath drug eluting stent      Colonoscopy  04/22/2024    Colonoscopy N/A 4/22/2024    Procedure: COLONOSCOPY;  Surgeon: Attila Li MD;  Location: [facility] ENDOSCOPY    Esophagoscopy,diagnostic  04/22/2024    Dr. Li    Hernia surgery      as a child    Nasal scopy,remv part ethmoid  10/14/2020    Nasal scopy,rmv tiss maxill sinus  10/14/2020    Repair of nasal septum  10/14/2020   [3]   Family History  Problem Relation Age of Onset    Diabetes Father     Diabetes Paternal Aunt     Diabetes Paternal Uncle     Seizure Disorder Mother     No Known Problems Brother     No Known Problems Daughter     Glaucoma Neg     Macular degeneration Neg

## 2025-07-01 NOTE — PLAN OF CARE
Mr. Abrams's safety was the priority last night. He notified staff of  his needs as staff charted near his room and rounded on him frequently in addition to the call light at hand and the bed alarm being armed. He rested well overnight and moves with ease this a.m.    Problem: CARDIOVASCULAR - ADULT  Goal: Maintains optimal cardiac output and hemodynamic stability  Description: INTERVENTIONS:  - Monitor vital signs, rhythm, and trends  - Monitor for bleeding, hypotension and signs of decreased cardiac output  - Evaluate effectiveness of vasoactive medications to optimize hemodynamic stability  - Monitor arterial and/or venous puncture sites for bleeding and/or hematoma  - Assess quality of pulses, skin color and temperature  - Assess for signs of decreased coronary artery perfusion - ex. Angina  - Evaluate fluid balance, assess for edema, trend weights  Outcome: Progressing  Goal: Absence of cardiac arrhythmias or at baseline  Description: INTERVENTIONS:  - Continuous cardiac monitoring, monitor vital signs, obtain 12 lead EKG if indicated  - Evaluate effectiveness of antiarrhythmic and heart rate control medications as ordered  - Initiate emergency measures for life threatening arrhythmias  - Monitor electrolytes and administer replacement therapy as ordered  Outcome: Progressing     Problem: RESPIRATORY - ADULT  Goal: Achieves optimal ventilation and oxygenation  Description: INTERVENTIONS:  - Assess for changes in respiratory status  - Assess for changes in mentation and behavior  - Position to facilitate oxygenation and minimize respiratory effort  - Oxygen supplementation based on oxygen saturation or ABGs  - Provide Smoking Cessation handout, if applicable  - Encourage broncho-pulmonary hygiene including cough, deep breathe, Incentive Spirometry  - Assess the need for suctioning and perform as needed  - Assess and instruct to report SOB or any respiratory difficulty  - Respiratory Therapy support as  indicated  - Manage/alleviate anxiety  - Monitor for signs/symptoms of CO2 retention  Outcome: Progressing     Problem: GASTROINTESTINAL - ADULT  Goal: Minimal or absence of nausea and vomiting  Description: INTERVENTIONS:  - Maintain adequate hydration with IV or PO as ordered and tolerated  - Nasogastric tube to low intermittent suction as ordered  - Evaluate effectiveness of ordered antiemetic medications  - Provide nonpharmacologic comfort measures as appropriate  - Advance diet as tolerated, if ordered  - Obtain nutritional consult as needed  - Evaluate fluid balance  Outcome: Progressing  Goal: Maintains or returns to baseline bowel function  Description: INTERVENTIONS:  - Assess bowel function  - Maintain adequate hydration with IV or PO as ordered and tolerated  - Evaluate effectiveness of GI medications  - Encourage mobilization and activity  - Obtain nutritional consult as needed  - Establish a toileting routine/schedule  - Consider collaborating with pharmacy to review patient's medication profile  Outcome: Progressing     Problem: GENITOURINARY - ADULT  Goal: Absence of urinary retention  Description: INTERVENTIONS:  - Assess patient’s ability to void and empty bladder  - Monitor intake/output and perform bladder scan as needed  - Follow urinary retention protocol/standard of care  - Consider collaborating with pharmacy to review patient's medication profile  - Implement strategies to promote bladder emptying  Outcome: Progressing     Problem: METABOLIC/FLUID AND ELECTROLYTES - ADULT  Goal: Glucose maintained within prescribed range  Description: INTERVENTIONS:  - Monitor Blood Glucose as ordered  - Assess for signs and symptoms of hyperglycemia and hypoglycemia  - Administer ordered medications to maintain glucose within target range  - Assess barriers to adequate nutritional intake and initiate nutrition consult as needed  - Instruct patient on self management of diabetes  Outcome: Progressing  Goal:  Electrolytes maintained within normal limits  Description: INTERVENTIONS:  - Monitor labs and rhythm and assess patient for signs and symptoms of electrolyte imbalances  - Administer electrolyte replacement as ordered  - Monitor response to electrolyte replacements, including rhythm and repeat lab results as appropriate  - Fluid restriction as ordered  - Instruct patient on fluid and nutrition restrictions as appropriate  Outcome: Progressing     Problem: SKIN/TISSUE INTEGRITY - ADULT  Goal: Incision(s), wounds(s) or drain site(s) healing without S/S of infection  Description: INTERVENTIONS:  - Assess and document risk factors for pressure ulcer development  - Assess and document skin integrity  - Assess and document dressing/incision, wound bed, drain sites and surrounding tissue  - Implement wound care per orders  - Initiate isolation precautions as appropriate  - Initiate Pressure Ulcer prevention bundle as indicated  Outcome: Progressing     Problem: HEMATOLOGIC - ADULT  Goal: Maintains hematologic stability  Description: INTERVENTIONS  - Assess for signs and symptoms of bleeding or hemorrhage  - Monitor labs and vital signs for trends  - Administer supportive blood products/factors, fluids and medications as ordered and appropriate  - Administer supportive blood products/factors as ordered and appropriate  Outcome: Progressing  Goal: Free from bleeding injury  Description: (Example usage: patient with low platelets)  INTERVENTIONS:  - Avoid intramuscular injections, enemas and rectal medication administration  - Ensure safe mobilization of patient  - Hold pressure on venipuncture sites to achieve adequate hemostasis  - Assess for signs and symptoms of internal bleeding  - Monitor lab trends  - Patient is to report abnormal signs of bleeding to staff  - Avoid use of toothpicks and dental floss  - Use electric shaver for shaving  - Use soft bristle tooth brush  - Limit straining and forceful nose  blowing  Outcome: Progressing     Problem: MUSCULOSKELETAL - ADULT  Goal: Return mobility to safest level of function  Description: INTERVENTIONS:  - Assess patient stability and activity tolerance for standing, transferring and ambulating w/ or w/o assistive devices  - Assist with transfers and ambulation using safe patient handling equipment as needed  - Ensure adequate protection for wounds/incisions during mobilization  - Obtain PT/OT consults as needed  - Advance activity as appropriate  - Communicate ordered activity level and limitations with patient/family  Outcome: Progressing     Problem: NEUROLOGICAL - ADULT  Goal: Achieves stable or improved neurological status  Description: INTERVENTIONS  - Assess for and report changes in neurological status  - Initiate measures to prevent increased intracranial pressure  - Maintain blood pressure and fluid volume within ordered parameters to optimize cerebral perfusion and minimize risk of hemorrhage  - Monitor temperature, glucose, and sodium. Initiate appropriate interventions as ordered  Outcome: Progressing     Problem: Diabetes/Glucose Control  Goal: Glucose maintained within prescribed range  Description: INTERVENTIONS:  - Monitor Blood Glucose as ordered  - Assess for signs and symptoms of hyperglycemia and hypoglycemia  - Administer ordered medications to maintain glucose within target range  - Assess barriers to adequate nutritional intake and initiate nutrition consult as needed  - Instruct patient on self management of diabetes  Outcome: Progressing

## 2025-07-01 NOTE — PLAN OF CARE
1:1 sitter only to be discontinued by management. Patient on 2L HFNC, not always wearing it. Alert and oriented x4. Consent signed for thoracentesis.   Problem: CARDIOVASCULAR - ADULT  Goal: Maintains optimal cardiac output and hemodynamic stability  Description: INTERVENTIONS:  - Monitor vital signs, rhythm, and trends  - Monitor for bleeding, hypotension and signs of decreased cardiac output  - Evaluate effectiveness of vasoactive medications to optimize hemodynamic stability  - Monitor arterial and/or venous puncture sites for bleeding and/or hematoma  - Assess quality of pulses, skin color and temperature  - Assess for signs of decreased coronary artery perfusion - ex. Angina  - Evaluate fluid balance, assess for edema, trend weights  Outcome: Progressing  Goal: Absence of cardiac arrhythmias or at baseline  Description: INTERVENTIONS:  - Continuous cardiac monitoring, monitor vital signs, obtain 12 lead EKG if indicated  - Evaluate effectiveness of antiarrhythmic and heart rate control medications as ordered  - Initiate emergency measures for life threatening arrhythmias  - Monitor electrolytes and administer replacement therapy as ordered  Outcome: Progressing     Problem: RESPIRATORY - ADULT  Goal: Achieves optimal ventilation and oxygenation  Description: INTERVENTIONS:  - Assess for changes in respiratory status  - Assess for changes in mentation and behavior  - Position to facilitate oxygenation and minimize respiratory effort  - Oxygen supplementation based on oxygen saturation or ABGs  - Provide Smoking Cessation handout, if applicable  - Encourage broncho-pulmonary hygiene including cough, deep breathe, Incentive Spirometry  - Assess the need for suctioning and perform as needed  - Assess and instruct to report SOB or any respiratory difficulty  - Respiratory Therapy support as indicated  - Manage/alleviate anxiety  - Monitor for signs/symptoms of CO2 retention  Outcome: Progressing     Problem:  GENITOURINARY - ADULT  Goal: Absence of urinary retention  Description: INTERVENTIONS:  - Assess patient’s ability to void and empty bladder  - Monitor intake/output and perform bladder scan as needed  - Follow urinary retention protocol/standard of care  - Consider collaborating with pharmacy to review patient's medication profile  - Implement strategies to promote bladder emptying  Outcome: Progressing     Problem: HEMATOLOGIC - ADULT  Goal: Free from bleeding injury  Description: (Example usage: patient with low platelets)  INTERVENTIONS:  - Avoid intramuscular injections, enemas and rectal medication administration  - Ensure safe mobilization of patient  - Hold pressure on venipuncture sites to achieve adequate hemostasis  - Assess for signs and symptoms of internal bleeding  - Monitor lab trends  - Patient is to report abnormal signs of bleeding to staff  - Avoid use of toothpicks and dental floss  - Use electric shaver for shaving  - Use soft bristle tooth brush  - Limit straining and forceful nose blowing  Outcome: Progressing     Problem: NEUROLOGICAL - ADULT  Goal: Achieves stable or improved neurological status  Description: INTERVENTIONS  - Assess for and report changes in neurological status  - Initiate measures to prevent increased intracranial pressure  - Maintain blood pressure and fluid volume within ordered parameters to optimize cerebral perfusion and minimize risk of hemorrhage  - Monitor temperature, glucose, and sodium. Initiate appropriate interventions as ordered  Outcome: Progressing

## 2025-07-01 NOTE — SLP NOTE
SPEECH DAILY NOTE - INPATIENT    ASSESSMENT & PLAN   ASSESSMENT  PPE REQUIRED. THIS THERAPIST WORE GLOVES FOR DURATION OF SESSION. HANDS WASHED UPON ENTRANCE/EXIT.    SLP f/u for ongoing dysphagia tx/meal assessment per recommendations of soft easy to chew/mildly thick liquids per swallow f/u. RN reports pt tolerates diet and medication well with no overt clinical s/s aspiration. Pt denies any swallowing challenges.     Pt positioned upright in bedside chair, alert/cooperative/ utilized. Pt afebrile, tolerating 2L/Min O2NC with oxygen status 95% prior to the start of oral trials. SLP reviewed aspiration precautions and safe swallowing compensatory strategies with the patient. Safe swallow guidelines remain written on the white board in purple. Patient v/u. Provided no assistance, pt tolerates soft easy to chew consistency and mildly thick liquids via cup with no overt clinical signs/symptoms of aspiration. Pt presented with trials of hard solids and thin liquids via cup. Pt with adequate oral acceptance and bilabial seal. Pt with intact bite, adequate mastication, and timely A/P transfer. Pharyngeal swallow response appears delayed with reduced laryngeal elevation/excursion. No clinical signs of aspiration (e.g., immediate/delayed throat clear, immediate/delayed cough, wet vocal quality, increased O2 effort) observed across all trials. Oxygen status remained stable t/o the entire session. Recommend upgrade to regular consistency and thin liquids with adherence to aspiration precautions and swallow strategies.     SLP to f/u with meal assessment x1-2, monitor  CXR, and VFSS if any overt CSA and/or decline in CXR. RN alerted with results and recommendations.     MOST RECENT CXR 6/30  CONCLUSIONS:   No new abnormality. Persistent left pleural effusion and left basilar opacities.   Cardiomegaly again seen.   Lines and tubes are unchanged.       Diet Recommendations - Solids: Regular  Diet Recommendations -  Liquids: Thin Liquids     Aspiration Precautions: Upright position, Slow rate, Small bites, Small sips, No straw  Medication Administration Recommendations: One pill at a time    Patient Experiencing Pain: No              Treatment Plan  Treatment Plan/Recommendations: Aspiration precautions    Interdisciplinary Communication: Discussed with RN          GOALS  Goal #1 The patient will tolerate regular consistency and thin liquids without overt signs or symptoms of aspiration with 100 % accuracy over 1-2 session(s).    Revised 7/1     Goal #2 The patient/family/caregiver will demonstrate understanding and implementation of aspiration precautions and swallow strategies independently over 1-2 session(s).    In Progress   Goal #3 The patient will tolerate trial upgrade of hard solid consistency and thin liquids without overt signs or symptoms of aspiration with 100 % accuracy over 1-2 session(s).   Met 7/1   Goal #4 The patient will utilize compensatory strategies as outlined by  BSSE (clinical evaluation) including Slow rate, Small bites, Small sips, Multiple swallows, No straws, Upright 90 degrees, Upright 90 degrees 30 mins after meal, Eliminate distractions, Supervision with meals with PRN assistance 100 % of the time across 2 sessions.    In Progress     FOLLOW UP  Follow Up Needed (Documentation Required): Yes  SLP Follow-up Date: 07/02/25  Duration: 1 week    Session: 4    If you have any questions, please contact JARED Parker M.S. CCC-SLP  Speech Language Pathologist  Phone Number Ext. [phone]

## 2025-07-01 NOTE — PAYOR COMM NOTE
--------------  CONTINUED STAY REVIEW     Payor: [facility]  Subscriber #:  [id number]  Authorization Number: [id number]    Admit date: 25  Admit time:  5:30 AM    REVIEW DOCUMENTATION:     Date of Service: 2025  3:55 PM     Signed         [facility]  part of [facility]     Progress Note           Jose Alberto Lizama Patient Status:  Inpatient    1965 MRN [id number]   Location [facility] 2W/SW Attending Tammy Zacarias MD   Hosp Day # 10 PCP Fiona Sweeney MD         Subjective:   Patient sitting up in bed and in NAD. Appears much better than prior.   Denied any active complaints.   Family present at the bedside.   All questions and concerns addressed.      Objective:   Blood pressure 101/58, pulse 82, temperature 98.1 °F (36.7 °C), temperature source Temporal, resp. rate 15, height 5' 6\" (1.676 m), weight 199 lb 11.8 oz (90.6 kg), SpO2 94%.     Physical Exam:    General: No acute distress.   Respiratory: Clear to auscultation bilaterally. No wheezes. No rhonchi.  Cardiovascular: S1, S2. Regular rate and rhythm. No murmurs, rubs or gallops.   Abdomen: Soft, nontender, nondistended.  Positive bowel sounds. No rebound or guarding.  Neurologic: No focal neurological deficits.   Musculoskeletal: Moves all extremities.  Extremities: No edema.     Results:            Lab Results   Component Value Date     WBC 8.4 2025     HGB 9.7 (L) 2025     HCT 30.2 (L) 2025     .0 (H) 2025     CREATSERUM 0.68 (L) 2025     BUN 12 2025      2025     K 3.5 2025      2025     CO2 34.0 (H) 2025      (H) 2025     CA 8.2 (L) 2025     ALB 3.6 2025     ALKPHO 56 2025     BILT 0.4 2025     TP 5.9 2025     AST 35 (H) 2025     ALT 26 2025     PTT 30.6 2025     INR 1.17 2025     TSH 0.696 2025     PSA 2.62 08/10/2023      ESRML 97 (H) 06/25/2025     MG 1.9 06/29/2025     PHOS 3.5 06/28/2025         XR CHEST AP PORTABLE  (CPT=71045)  Result Date: 6/28/2025  CONCLUSION: Post cardiac surgery. Findings suggesting increased fluid volume status of the patient/CHF with increasing vascular congestion, increasing interstitial edema, and increasing left pleural effusion Electronically Verified and Signed by Attending Radiologist: Bal Stewart MD 6/28/2025 8:10 AM Workstation: [email or web address]     CT BRAIN OR HEAD (CPT=70450)  Result Date: 6/27/2025  CONCLUSION: No acute intracranial abnormality. Electronically Verified and Signed by Attending Radiologist: Refugio Lloyd MD 6/27/2025 7:35 PM Workstation: [employer]     XR HIP W OR WO PELVIS 2 OR 3 VIEWS, RIGHT (CPT=73502)  Result Date: 6/27/2025  CONCLUSION: Tiny right femoral head and acetabular osteophytes with preserved right hip joint space.  Electronically Verified and Signed by Attending Radiologist: Refugio Lloyd MD 6/27/2025 7:01 PM Workstation: [id number]     XR CHEST AP PORTABLE  (CPT=71045)  Result Date: 6/27/2025  CONCLUSION: Post cardiac surgery. Stable position of lines and tubes with interval placement of a right upper extremity PICC with tip at the cavoatrial junction. There are findings compatible with increased fluid volume status of the patient,. Electronically Verified and Signed by Attending Radiologist: Bal Stewart MD 6/27/2025 1:31 PM Workstation: [id number]               [Scheduled Medications]    [Scheduled Medications]   furosemide  40 mg Intravenous BID (Diuretic)    insulin degludec  15 Units Subcutaneous Daily    sacubitril-valsartan  1 tablet Oral BID    carvedilol  12.5 mg Oral BID with meals    [START ON 6/30/2025] colchicine  0.3 mg Oral QOD    ipratropium-albuterol  3 mL Nebulization 2 times daily    insulin aspart  1-5 Units Subcutaneous TID CC    potassium chloride  20 mEq Intravenous BID    sodium chloride   Intravenous Once    escitalopram  5 mg Oral Nightly     amiodarone  200 mg Oral Daily    OLANZapine  2.5 mg Oral Nightly    ferrous sulfate  300 mg Oral BID    aspirin  81 mg Per NG Tube Daily    docusate  100 mg Per NG Tube BID    budesonide  0.5 mg Nebulization 2 times daily    rosuvastatin  20 mg Per NG Tube Nightly    thiamine  100 mg Oral Daily    multivitamin  1 tablet Oral Daily    folic acid  1 mg Oral Daily    sennosides  8.6 mg Oral BID    famotidine  20 mg Oral BID     Or    famotidine  20 mg Intravenous BID    chlorhexidine gluconate  15 mL Mouth/Throat BID    ascorbic acid  500 mg Oral TID    enoxaparin  40 mg Subcutaneous Daily    clopidogrel  75 mg Oral Daily    tamsulosin  0.4 mg Oral BID     [PRN Medications]    [PRN Medications]    labetalol    dextrose 10%    sodium bicarbonate **AND** lipase-protease-amylase (Lip-Prot-Amyl)    ipratropium-albuterol    LORazepam **OR** LORazepam    LORazepam **OR** LORazepam    LORazepam **OR** LORazepam    LORazepam    LORazepam    LORazepam    haloperidol lactate    LORazepam    haloperidol lactate    melatonin    polyethylene glycol (PEG 3350)    bisacodyl    potassium chloride **OR** potassium chloride    magnesium sulfate in dextrose 5%    magnesium sulfate in sterile water for injection    acetaminophen **OR** HYDROcodone-acetaminophen **OR** [DISCONTINUED] HYDROcodone-acetaminophen    morphINE **OR** [DISCONTINUED] morphINE    glucose **OR** glucose **OR** glucose-vitamin C **OR** dextrose **OR** glucose **OR** glucose **OR** glucose-vitamin C        Assessment and Plan:       Mechanical fall  - ground level fall on 06/27/2025  - CT head negative, Him XR negative  - fall precautions     CAD status post CABG and AVR  Severe aortic stenosis  - difficult extubation but patient extubated successfully 06/24  - Continue tube feeds  - Management per cardiology and CV sx                Continue IV diuresis, lasix decreased to 40 mg IV BID  Entresto increased to 49/51, coreg 12.5 mg BID  Continue antiplatelet therapy,  statin  Net IO Since Admission: -1,433.96 mL [25 1556]  - chest tube management per surgery  - swallow eval  - Monitor vitals  - PT/OT/SLP     Post op acute respiratory failure  - completed course of abx  - pulm on consult                Continue abx                Bronchial hygiene, nebs, IS                Ambulate as tolerated     Alcohol abuse/Withdrawal   - [facility] protocol   - Psych on board                 Zyprexa, Lexapro nightly                Haldol PRN       DM2  - insulin per Vascular surgery      HTN  Dyslipidemia  - continue home meds            Quality:     CODE status: Full  DVT ppx - Lovenox                       Date of Service: 2025  7:43 AM     Signed         [facility]  part of [facility]     Progress Note           Jose Alberto Lizama Patient Status:  Inpatient    1965 MRN [id number]   Location [facility] 2W/SW Attending Tammy Zacarias MD   Hosp Day # 11 PCP Fiona Sweeney MD         Subjective:   Patient sitting up in the chair and waiting for breakfast. Denied any active complaints at the time of interview.   All questions and concerns addressed.      Objective:   Blood pressure 125/63, pulse 69, temperature 97.5 °F (36.4 °C), temperature source Temporal, resp. rate 21, height 5' 6\" (1.676 m), weight 199 lb 11.8 oz (90.6 kg), SpO2 96%.     Physical Exam:    General: No acute distress.   Respiratory: Clear to auscultation bilaterally. No wheezes. No rhonchi.  Cardiovascular: S1, S2. Regular rate and rhythm. No murmurs, rubs or gallops.   Abdomen: Soft, nontender, nondistended.  Positive bowel sounds. No rebound or guarding.  Neurologic: No focal neurological deficits.   Musculoskeletal: Moves all extremities.  Extremities: No edema.     Results:            Lab Results   Component Value Date     WBC 10.0 2025     HGB 9.6 (L) 2025     HCT 30.7 (L) 2025     .0 (H) 2025     CREATSERUM 0.69 (L) 2025     BUN 20 2025       06/30/2025     K 3.6 06/30/2025      06/30/2025     CO2 32.0 06/30/2025      (H) 06/30/2025     CA 8.4 (L) 06/30/2025     ALB 3.5 06/30/2025     ALKPHO 58 06/30/2025     BILT 0.3 06/30/2025     TP 6.0 06/30/2025     AST 22 06/30/2025     ALT 16 06/30/2025     PTT 30.6 06/19/2025     INR 1.17 06/19/2025     TSH 0.696 06/19/2025     PSA 2.62 08/10/2023     ESRML 97 (H) 06/25/2025     MG 1.9 06/30/2025     PHOS 3.7 06/30/2025         XR CHEST AP PORTABLE  (CPT=71045)  Result Date: 6/28/2025  CONCLUSION: Post cardiac surgery. Findings suggesting increased fluid volume status of the patient/CHF with increasing vascular congestion, increasing interstitial edema, and increasing left pleural effusion Electronically Verified and Signed by Attending Radiologist: Bal Stewart MD 6/28/2025 8:10 AM Workstation: [email or web address]     CT BRAIN OR HEAD (CPT=70450)  Result Date: 6/27/2025  CONCLUSION: No acute intracranial abnormality. Electronically Verified and Signed by Attending Radiologist: Refugio Lloyd MD 6/27/2025 7:35 PM Workstation: [id number]     XR HIP W OR WO PELVIS 2 OR 3 VIEWS, RIGHT (CPT=73502)  Result Date: 6/27/2025  CONCLUSION: Tiny right femoral head and acetabular osteophytes with preserved right hip joint space.  Electronically Verified and Signed by Attending Radiologist: Refugio Lloyd MD 6/27/2025 7:01 PM Workstation: [id number]               [Scheduled Medications]    [Scheduled Medications]   ascorbic acid  500 mg Oral BID    potassium chloride  20 mEq Oral BID    furosemide  40 mg Intravenous BID (Diuretic)    insulin degludec  15 Units Subcutaneous Daily    sacubitril-valsartan  1 tablet Oral BID    carvedilol  12.5 mg Oral BID with meals    colchicine  0.3 mg Oral QOD    ipratropium-albuterol  3 mL Nebulization 2 times daily    insulin aspart  1-5 Units Subcutaneous TID CC    escitalopram  5 mg Oral Nightly    amiodarone  200 mg Oral Daily    OLANZapine  2.5 mg Oral Nightly    ferrous  sulfate  300 mg Oral BID    aspirin  81 mg Per NG Tube Daily    docusate  100 mg Per NG Tube BID    budesonide  0.5 mg Nebulization 2 times daily    rosuvastatin  20 mg Per NG Tube Nightly    thiamine  100 mg Oral Daily    multivitamin  1 tablet Oral Daily    folic acid  1 mg Oral Daily    sennosides  8.6 mg Oral BID    famotidine  20 mg Oral BID    enoxaparin  40 mg Subcutaneous Daily    clopidogrel  75 mg Oral Daily    tamsulosin  0.4 mg Oral BID     [PRN Medications]    [PRN Medications]    labetalol    dextrose 10%    [DISCONTINUED] sodium bicarbonate **AND** lipase-protease-amylase (Lip-Prot-Amyl)    ipratropium-albuterol    LORazepam **OR** LORazepam    LORazepam **OR** LORazepam    LORazepam **OR** LORazepam    LORazepam    LORazepam    LORazepam    haloperidol lactate    LORazepam    haloperidol lactate    melatonin    bisacodyl    potassium chloride **OR** potassium chloride    magnesium sulfate in dextrose 5%    magnesium sulfate in sterile water for injection    acetaminophen **OR** HYDROcodone-acetaminophen **OR** [DISCONTINUED] HYDROcodone-acetaminophen    glucose **OR** glucose **OR** glucose-vitamin C **OR** dextrose **OR** glucose **OR** glucose **OR** glucose-vitamin C        Assessment and Plan:       Mechanical fall  - Ground level fall on 06/27/2025  - CT head negative, Him XR negative  - Fall precautions  - continue PT/OT     CAD status post CABG and AVR  Severe aortic stenosis  - difficult extubation but patient extubated successfully 06/24  - Continue tube feeds  - Management per cardiology and CV sx                Continue IV diuresis, lasix decreased to 40 mg IV BID  Entresto increased to 49/51, coreg 12.5 mg BID  Continue antiplatelet therapy, statin  Net IO Since Admission: -1,433.96 mL [06/29/25 1556]  - chest tube management per surgery  - swallow eval  - Monitor vitals  - PT/OT/SLP     Post op acute respiratory failure - improving  - completed course of abx  - pulm on consult                 Continue abx                Bronchial hygiene, nebs, IS                Ambulate as tolerated  - currently saturating well on 2L NC, wean as tolerated     Alcohol abuse/Withdrawal   - [facility] protocol   - Psych on board                 Zyprexa, Lexapro nightly                Haldol PRN       DM2  - insulin per Vascular surgery      HTN  Dyslipidemia  - continue home meds            Quality:     CODE status: Full  DVT ppx - Lovenox                            MEDICATIONS ADMINISTERED IN LAST 1 DAY:  amiodarone (Pacerone) tab 200 mg       Date Action Dose Route User    6/30/2025 0930 Given 200 mg Oral Juan Luis Gomez RN          ascorbic acid (Vitamin C) tab 500 mg       Date Action Dose Route User    6/30/2025 0852 Given 500 mg Oral Juan Luis Gomez RN          aspirin chewable tab 81 mg       Date Action Dose Route User    6/30/2025 0853 Given 81 mg Per NG Tube Juan Luis Gomez RN          budesonide (Pulmicort) 0.5 MG/2ML nebulizer suspension 0.5 mg       Date Action Dose Route User    6/30/2025 0819 Given 0.5 mg Nebulization Louise Correa RCP    6/29/2025 2127 Given 0.5 mg Nebulization Mona Bridges LIN          carvedilol (Coreg) tab 12.5 mg       Date Action Dose Route User    6/30/2025 1714 Given 12.5 mg Oral Juan Luis Gomez RN    6/30/2025 0853 Given 12.5 mg Oral Juan Luis Gomez RN          chlorhexidine gluconate (Peridex) 0.12 % oral solution 15 mL       Date Action Dose Route User    6/29/2025 2107 Given 15 mL Mouth/Throat Art Gonzalez RN          colchicine tab 0.3 mg       Date Action Dose Route User    6/30/2025 0900 Given 0.3 mg Oral Juan Luis Gomez RN          docusate (Colace) 50 MG/5ML oral solution 100 mg       Date Action Dose Route User    6/30/2025 0853 Given 100 mg Per NG Tube Juan Luis Gomez RN          enoxaparin (Lovenox) 40 MG/0.4ML SUBQ injection 40 mg       Date Action Dose Route User    6/30/2025 0853 Given 40 mg Subcutaneous (Right Lower Abdomen) Juan Luis Gomez RN          escitalopram (Lexapro) tab 5 mg       Date Action  Dose Route User    6/29/2025 2107 Given 5 mg Oral Art Gonzalez RN          famotidine (Pepcid) tab 20 mg       Date Action Dose Route User    6/30/2025 0853 Given 20 mg Oral Juan Luis Gomez RN    6/29/2025 2107 Given 20 mg Oral Art Gonzalez RN          ferrous sulfate 300 (60 Fe) MG/5ML oral syrup 300 mg       Date Action Dose Route User    6/30/2025 0853 Given 300 mg Oral Juan Luis Gomez RN    6/29/2025 2107 Given 300 mg Oral Art Gonzalez RN          folic acid (Folvite) tab 1 mg       Date Action Dose Route User    6/30/2025 0853 Given 1 mg Oral Juan Luis Gomez RN          furosemide (Lasix) 10 mg/mL injection 40 mg       Date Action Dose Route User    6/30/2025 1714 Given 40 mg Intravenous Juan Luis Gomez RN    6/30/2025 0855 Given 40 mg Intravenous Juan Luis Gomez RN          HYDROcodone-acetaminophen (Norco) 5-325 MG per tab 1 tablet       Date Action Dose Route User    6/30/2025 1042 Given 1 tablet Oral Juan Luis Gomez RN          insulin aspart (NovoLOG) 100 Units/mL FlexPen 1-5 Units       Date Action Dose Route User    6/30/2025 1308 Given 2 Units Subcutaneous (Right Lower Abdomen) Juan Luis Gomez RN    6/30/2025 0855 Given 1 Units Subcutaneous (Right Lower Abdomen) Juan Luis Gomez RN          insulin degludec (Tresiba) 100 units/mL flextouch 15 Units       Date Action Dose Route User    6/30/2025 0855 Given 15 Units Subcutaneous (Right Lower Abdomen) Juan Luis Gomez RN          ipratropium-albuterol (Duoneb) 0.5-2.5 (3) MG/3ML inhalation solution 3 mL       Date Action Dose Route User    6/30/2025 0813 Given 3 mL Nebulization Louise Correa RCP    6/29/2025 2127 Given 3 mL Nebulization Mona Bridges RCP          melatonin tab 3 mg       Date Action Dose Route User    6/29/2025 2107 Given 3 mg Oral Art Gonzalez RN          OLANZapine (ZyPREXA) tab 2.5 mg       Date Action Dose Route User    6/29/2025 2107 Given 2.5 mg Oral Art Gonzalez RN          pantoprazole (Protonix) DR tab 40 mg       Date Action Dose Route User    6/30/2025 3471 Given 40  mg Oral Juan Luis Gomez RN          potassium chloride (Klor-Con) 20 MEQ oral powder 20 mEq       Date Action Dose Route User    6/30/2025 0852 Given 20 mEq Oral Juan Luis Gomez RN          potassium chloride 20 mEq/100mL IVPB premix 20 mEq       Date Action Dose Route User    6/29/2025 2126 New Bag 20 mEq Intravenous Art Gonzalez RN          rosuvastatin (Crestor) tab 20 mg       Date Action Dose Route User    6/29/2025 2107 Given 20 mg Per NG Tube Art Gonzalez RN          sacubitril-valsartan (Entresto) 49-51 MG per tab 1 tablet       Date Action Dose Route User    6/30/2025 0852 Given 1 tablet Oral Juan Luis Gomez RN    6/29/2025 2107 Given 1 tablet Oral Art Gonzalez RN          sennosides (Senokot) tab 8.6 mg       Date Action Dose Route User    6/30/2025 0852 Given 8.6 mg Oral Juan Luis Gomez RN          multivitamin (Tab-A-Елена/Beta Carotene) tab 1 tablet       Date Action Dose Route User    6/30/2025 0852 Given 1 tablet Oral Juan Luis Gomez RN          tamsulosin (Flomax) cap 0.4 mg       Date Action Dose Route User    6/30/2025 0853 Given 0.4 mg Oral Juan Luis Gomez RN    6/29/2025 2107 Given 0.4 mg Oral Art Gonzalez RN          thiamine (Vitamin B1) tab 100 mg       Date Action Dose Route User    6/30/2025 0852 Given 100 mg Oral Juan Luis Gomez RN            Vitals (last day)       Date/Time Temp Pulse Resp BP SpO2 Weight O2 Device O2 Flow Rate (L/min) Who    06/30/25 1800 -- 72 22 117/64 98 % -- Nasal cannula 2 L/min     06/30/25 1600 97.7 °F (36.5 °C) 65 16 121/61 99 % -- Nasal cannula 2 L/min     06/30/25 1400 -- 72 23 97/70 98 % -- Nasal cannula 2 L/min     06/30/25 1200 97.8 °F (36.6 °C) 71 16 128/67 92 % -- Nasal cannula 2 L/min     06/30/25 1000 -- -- -- -- -- -- Nasal cannula 4 L/min     06/30/25 1000 -- 65 -- 131/65 -- -- -- -- AA    06/30/25 0900 -- -- -- -- -- -- Nasal cannula 2 L/min FS 06/30/25 0800 97.1 °F (36.2 °C) 67 21 134/71 92 % -- Nasal cannula 2 L/min FS    06/30/25 0700 -- 69 21 -- 96 % -- Nasal cannula  2 L/min     06/30/25 0400 97.5 °F (36.4 °C) 72 20 125/63 93 % -- Nasal cannula 2 L/min MT    06/30/25 0200 -- 76 21 124/65 93 % -- Nasal cannula 2 L/min MT    06/30/25 0100 -- 77 9 138/64 90 % -- Nasal cannula 2 L/min MT    06/30/25 0000 97.6 °F (36.4 °C) 76 23 126/69 93 % -- Nasal cannula 2 L/min MT    06/29/25 2300 -- 72 19 92/70 92 % -- Nasal cannula 2 L/min MT    06/29/25 2200 -- 77 24 120/69 93 % -- Nasal cannula 2 L/min MT    06/29/25 2100 -- 81 17 115/54 98 % -- Nasal cannula 2 L/min MT    06/29/25 2000 98 °F (36.7 °C) 80 16 111/63 90 % -- Nasal cannula 2 L/min MT    06/29/25 1900 -- 88 25 116/50 91 % -- -- -- MT    06/29/25 1800 -- 84 21 124/56 91 % -- -- -- OR    06/29/25 1700 -- 77 0 145/78 90 % -- -- -- OR    06/29/25 1600 97.8 °F (36.6 °C) 80 27 119/61 92 % -- -- -- OR    06/29/25 1500 -- 83 19 101/76 92 % -- Nasal cannula 2 L/min OR    06/29/25 1400 -- 82 15 101/58 94 % -- -- -- OR    06/29/25 1300 -- 80 25 101/62 94 % -- -- -- OR    06/29/25 1200 98.1 °F (36.7 °C) -- -- -- -- -- -- -- OR    06/29/25 1100 -- 78 21 117/64 95 % -- -- -- OR    06/29/25 1000 -- 83 22 111/62 94 % -- -- -- OR    06/29/25 0900 -- 86 20 90/70 94 % -- -- -- OR    06/29/25 0800 98 °F (36.7 °C) 76 23 95/69 95 % -- -- 2 L/min OR    06/29/25 0700 -- 72 18 123/61 96 % -- Nasal cannula 2 L/min OR    06/29/25 0600 -- 77 21 127/57 94 % 199 lb 11.8 oz (90.6 kg) High flow nasal cannula 2 L/min MT    06/29/25 0500 -- 71 17 141/65 100 % -- Bi-PAP -- MT    06/29/25 0400 97.8 °F (36.6 °C) 66 20 129/71 100 % -- Bi-PAP -- MT    06/29/25 0200 -- 70 17 129/66 100 % -- Bi-PAP -- MT    06/29/25 0100 -- 69 21 126/67 100 % -- Bi-PAP -- MT    06/29/25 0045 -- 70 19 -- 100 % -- Bi-PAP  -- MT    06/29/25 0000 98.6 °F (37 °C) 79 21 117/51 92 % -- Nasal cannula 2 L/min MT

## 2025-07-01 NOTE — PROGRESS NOTES
[facility]  part of [facility]     Progress Note    Jose Alberto Lizama Patient Status:  Inpatient    1965 MRN [id number]   Location [facility] 2W/SW Attending Tammy Zacarias MD   Hosp Day # 12 PCP Fiona Sweeney MD       Subjective:   Patient seen and examined.  Resting in bed.  On 2 L oxygen.  Dyspnea improved  Objective:   Blood pressure 129/69, pulse 67, temperature 98.6 °F (37 °C), temperature source Oral, resp. rate 18, height 5' 6\" (1.676 m), weight 198 lb 3.1 oz (89.9 kg), SpO2 94%.  Intake/Output:   Last 3 shifts: I/O last 3 completed shifts:  In: 820 [P.O.:700; I.V.:20; IV PIGGYBACK:100]  Out: 2175 [Urine:2125; Chest Tube:50]   This shift: I/O this shift:  In: 360 [P.O.:360]  Out: 1050 [Urine:1050]     Vent Settings: FiO2 (%):  [40 %] 40 %    Hemodynamic parameters (last 24 hours):      Scheduled Meds: Current Hospital Medications[1]    Continuous Infusions: Medication Infusions[2]    Physical Exam  Constitutional: no acute distress, arousable  Eyes: PERRL  ENT: nares pateint  Neck: supple, no JVD  Cardio: RRR, S1 S2  Respiratory: Diminished left basilar breath sounds  GI: abdomen soft, non tender, active bowel sounds, no organomegaly  Extremities: no clubbing, cyanosis, + bilateral lower extremity edema  Neurologic: no gross motor deficits  Skin: warm, dry      Results:     Lab Results   Component Value Date    WBC 10.4 2025    HGB 9.9 2025    HCT 31.8 2025    .0 2025    CREATSERUM 0.82 2025    BUN 23 2025     2025    K 4.0 2025     2025    CO2 32.0 2025     2025    CA 8.8 2025    ALB 3.8 2025    ALKPHO 66 2025    BILT 0.3 2025    TP 6.3 2025    AST 29 2025    ALT 17 2025    MG 2.0 2025    PHOS 4.1 2025       CT CHEST (CPT=71250)  Result Date: 2025  CONCLUSION: 1.  Moderate size left pleural effusion without consolidation.  Electronically Verified and Signed by Attending Radiologist: Isaiah Arteaga MD 6/30/2025 3:25 PM Workstation: [id number]    XR CHEST AP PORTABLE  (CPT=71045)  Result Date: 6/30/2025  Workstation: [id number]              Assessment   1.  POD #11 status post CABG and AVR  2.  Severe coronary disease  3.  Severe aortic stenosis  4.  Postoperative respiratory failure  5.  Left pleural effusion  6.  Anemia  7.  Diabetes mellitus  8.  Hypertension  9.  Hyperlipidemia  10.  Possible ORAL       Plan   -Patient presents status post CABG and AVR on 6/19/2025.  - Tolerating extubation on 6/24/2025.  - Intermittent NIV as necessary.  Wean oxygen as tolerated  -Concern for possible pneumonia with some mucous plugging and leukocytosis postoperatively.  Completed course of antibiotic therapy with Zosyn.  -Chest x-ray and CT chest performed on 6/30/2025.  CT with left pleural effusion without consolidation seen.  - Left ultrasound-guided thoracentesis attempted on 7/1/2025 but unsuccessful.  Will discuss with Dr. Oliveraf will revaluate again and may potentially attempt again tomorrow.  Hold Plavix for now  -Nebulizer treatments  - Diuresis as tolerated  - Further recommendations per side  - Continue tube feedings  - DVT prophylaxis: Julia Zamora DO  Pulmonary Critical Care Medicine  [facility]          [1]   Current Facility-Administered Medications   Medication Dose Route Frequency    aspirin chewable tab 81 mg  81 mg Oral Daily    docusate (Colace) 50 MG/5ML oral solution 100 mg  100 mg Oral BID    ascorbic acid (Vitamin C) tab 500 mg  500 mg Oral BID    potassium chloride (Klor-Con) 20 MEQ oral powder 20 mEq  20 mEq Oral BID    pantoprazole (Protonix) DR tab 40 mg  40 mg Oral BID AC    furosemide (Lasix) 10 mg/mL injection 40 mg  40 mg Intravenous BID (Diuretic)    sacubitril-valsartan (Entresto) 49-51 MG per tab 1 tablet  1 tablet Oral BID    carvedilol (Coreg) tab 12.5 mg  12.5 mg Oral BID with meals     colchicine tab 0.3 mg  0.3 mg Oral QOD    ipratropium-albuterol (Duoneb) 0.5-2.5 (3) MG/3ML inhalation solution 3 mL  3 mL Nebulization 2 times daily    escitalopram (Lexapro) tab 5 mg  5 mg Oral Nightly    amiodarone (Pacerone) tab 200 mg  200 mg Oral Daily    labetalol (Trandate) 5 mg/mL injection 10 mg  10 mg Intravenous Q6H PRN    OLANZapine (ZyPREXA) tab 2.5 mg  2.5 mg Oral Nightly    ferrous sulfate 300 (60 Fe) MG/5ML oral syrup 300 mg  300 mg Oral BID    dextrose 10% infusion (TPN no rate)   Intravenous Continuous PRN    pancrelipase (Lip-Prot-Amyl) (Zenpep) DR particles cap 10,000 Units  10,000 Units Per G Tube PRN    ipratropium-albuterol (Duoneb) 0.5-2.5 (3) MG/3ML inhalation solution 3 mL  3 mL Nebulization Q4H PRN    budesonide (Pulmicort) 0.5 MG/2ML nebulizer suspension 0.5 mg  0.5 mg Nebulization 2 times daily    rosuvastatin (Crestor) tab 20 mg  20 mg Per NG Tube Nightly    LORazepam (Ativan) tab 1 mg  1 mg Oral Q1H PRN    Or    LORazepam (Ativan) 2 mg/mL injection 1 mg  1 mg Intravenous Q1H PRN    LORazepam (Ativan) tab 2 mg  2 mg Oral Q1H PRN    Or    LORazepam (Ativan) 2 mg/mL injection 2 mg  2 mg Intravenous Q1H PRN    LORazepam (Ativan) tab 3 mg  3 mg Oral Q1H PRN    Or    LORazepam (Ativan) 2 mg/mL injection 3 mg  3 mg Intravenous Q1H PRN    LORazepam (Ativan) 2 mg/mL injection 4 mg  4 mg Intravenous Q30 Min PRN    LORazepam (Ativan) 2 mg/mL injection 5 mg  5 mg Intravenous Q15 Min PRN    LORazepam (Ativan) 2 mg/mL injection 6 mg  6 mg Intravenous Q10 Min PRN    thiamine (Vitamin B1) tab 100 mg  100 mg Oral Daily    multivitamin (Tab-A-Елена/Beta Carotene) tab 1 tablet  1 tablet Oral Daily    folic acid (Folvite) tab 1 mg  1 mg Oral Daily    LORazepam (Ativan) 2 mg/mL injection 1 mg  1 mg Intramuscular Q6H PRN    melatonin tab 3 mg  3 mg Oral Nightly PRN    sennosides (Senokot) tab 8.6 mg  8.6 mg Oral BID    bisacodyl (Dulcolax) 10 MG rectal suppository 10 mg  10 mg Rectal Daily PRN     potassium chloride 20 mEq/100mL IVPB premix 20 mEq  20 mEq Intravenous PRN    Or    potassium chloride 40 mEq/100mL IVPB premix (central line) 40 mEq  40 mEq Intravenous PRN    magnesium sulfate in dextrose 5% 1 g/100mL infusion premix 1 g  1 g Intravenous PRN    magnesium sulfate in sterile water for injection 2 g/50mL IVPB premix 2 g  2 g Intravenous PRN    enoxaparin (Lovenox) 40 MG/0.4ML SUBQ injection 40 mg  40 mg Subcutaneous Daily    acetaminophen (Tylenol) tab 650 mg  650 mg Oral Q4H PRN    Or    HYDROcodone-acetaminophen (Norco) 5-325 MG per tab 1 tablet  1 tablet Oral Q4H PRN    [Held by provider] clopidogrel (Plavix) tab 75 mg  75 mg Oral Daily    glucose (Dex4) 15 GM/59ML oral liquid 15 g  15 g Oral Q15 Min PRN    Or    glucose (Glutose) 40% oral gel 15 g  15 g Oral Q15 Min PRN    Or    glucose-vitamin C (Dex-4) chewable tab 4 tablet  4 tablet Oral Q15 Min PRN    Or    dextrose 50% injection 50 mL  50 mL Intravenous Q15 Min PRN    Or    glucose (Dex4) 15 GM/59ML oral liquid 30 g  30 g Oral Q15 Min PRN    Or    glucose (Glutose) 40% oral gel 30 g  30 g Oral Q15 Min PRN    Or    glucose-vitamin C (Dex-4) chewable tab 8 tablet  8 tablet Oral Q15 Min PRN    tamsulosin (Flomax) cap 0.4 mg  0.4 mg Oral BID   [2]    dextrose 10%

## 2025-07-01 NOTE — PROGRESS NOTES
[facility]  part of [facility]    Progress Note    Jose Alberto Lizama Patient Status:  Inpatient    1965 MRN [id number]   Location [facility] 2W/SW Attending Nannette Pelayo MD   Hosp Day # 12 PCP Fiona Sweeney MD     Subjective:  OOB in chair on exam, no acute events noted overnight.  Language line used (ID number [id number]) for translation, as patient is Polish speaking.  Pt awake, alert & oriented x 3, calm and appropriate. MS much improved; appears at baseline.  He currently denies: CP, SOB, dizziness, or palpitations.  Still with some exertional dyspnea when walking.  Denies black stools this a.m.  No visitors at bedside currently    Objective:  /68 (BP Location: Left arm)   Pulse 76   Temp 98.7 °F (37.1 °C) (Oral)   Resp 18   Ht 5' 6\" (1.676 m)   Wt 198 lb 3.1 oz (89.9 kg)   SpO2 95%   BMI 31.99 kg/m²     Temp (24hrs), Av °F (36.7 °C), Min:97.6 °F (36.4 °C), Max:98.7 °F (37.1 °C)      Intake/Output:    Intake/Output Summary (Last 24 hours) at 2025 1026  Last data filed at 2025 2200  Gross per 24 hour   Intake 400 ml   Output 1450 ml   Net -1050 ml       Wt Readings from Last 3 Encounters:   25 198 lb 3.1 oz (89.9 kg)   25 201 lb 12.8 oz (91.5 kg)   25 200 lb (90.7 kg)       Allergies:  Allergies[1]    Labs:  Lab Results   Component Value Date    WBC 10.4 2025    HGB 9.9 2025    HCT 31.8 2025    .0 2025    CREATSERUM 0.82 2025    BUN 23 2025     2025    K 4.0 2025     2025    CO2 32.0 2025     2025    CA 8.8 2025    ALB 3.8 2025    ALKPHO 66 2025    BILT 0.3 2025    TP 6.3 2025    AST 29 2025    ALT 17 2025    MG 2.0 2025    PHOS 4.1 2025       Physical Exam:  Blood pressure 114/68, pulse 76, temperature 98.7 °F (37.1 °C), temperature source Oral, resp. rate 18, height 5' 6\" (1.676 m), weight  198 lb 3.1 oz (89.9 kg), SpO2 95%.  General: NAD  Neck: No JVD  Lungs: Decreased L, otherwise CTA  Heart: RRR, S1, S2  Abdomen: Soft/Round, NT/ND, BS+x4/+Flatus/+BM  Extremities: Warm, dry, trace LE edema bilat  Pulses: 1+ bilat DP  Skin: sternotomy incision BETTIE=CDI -scabs/scant erythema distal aspect of incision, no drainage.  Left leg incision BETTIE=CDI   Neurological:  AAOx3, MAEW-calm and appropriate    Assessment/Plan:  S/P CABG x 2/AVR/MELINDA CLIP/INSERTION OF LEFT FEMORAL IABP POD # 12  IABP removed on 6/20  Extubated on 6/24: Encourage pulm toilet: IS-now able to perform w/o assistance- 500 cc/continue EZ pap. Likely w/pre op ORAL. BiPap at night, remains on 2L NC-wean off as tolerated.  Chest CT/CXR reviewed -persistent L pleural effusion noted -plan for left thoracentesis today with pulmonary.  History of vaping -counseled on cessation  Sputum cx 6/22=negative.  Completed course of Zosyn  Pain meds as needed-limit/avoid narcotics w/post op altered MS. Can use Tylenol.   Post op altered MS w/agitation/confusion/restlessness of unknown etiology-resolved.  Suspected post op delirium and/or ETOH withdrawal requiring Psych eval on 6/22- following recs. CT brain on 6/20=negative   Hx: HF-GDMT mgt per Cardiology  S/P fall out of bed 6/27 evening w/superficial laceration above right eye-should heal with time-continues CDI w/steri strips. CT head 6/27=negative/Hip X-ray 6/27=okay.  Sitter, patient reminded not to get up alone and call for assistance  Increase activity-OOB to chair/ambulating in hallway with walker assist, PT/OT following-appreciate recs  Scds/Lovenox prophylaxis DVT prevention   Continues hemodynamically stable  -all lines have been removed, showered yesterday  Elevated ESR=97- likely PPS-continues on Colchicine.   PICC placed 6/26  Expected post op volume overload- continue IV Lasix 40 mg BID and  Potassium BID while on Lasix. Mckeon removed 6/25 after leaking noted-able to use external male catheter for  close I/O monitoring.   Amio protocol for AF prevention as pt considered high risk for post op AF. Post op SR w/freq PACs noted: on PO Amio daily-may stop at discharge if no AF noted post op.    Expected post op anemia, guaiac positive stool yesterday, Hgb stable, 9.9: changed to Protonix 40 mg twice daily, Pepcid discontinued.  Continue iron x 1 month.    Hx: DM-Continue basal insulin dose today/continue correction scale coverage. Resume home regimen at/near discharge.    Nutrition-appreciate speech reeval-passed for regular diet/thin liquids, advance diet  Hx: BPH- on home Flomax  Hx: PVD- on ASA/Plavix -Plavix held this a.m, until thoracentesis-will resume postprocedure.    Discharge planning: pt lives with his wife. Pt progressing well. Continue to increase activity.  Appreciate PT/OT Meliton-will plan for home with HH/home PT next 48 hours if remains medically stable    Plan of care discussed w/patient and RN, and rounding CV surgeon - Dr. Tara TOLENTINON  7/1/2025  1030               [1] No Known Allergies

## 2025-07-01 NOTE — PROGRESS NOTES
Patient is a 59 year old   male with past medical history of CAD, PVD,  hypertension, hyperlipidemia, diabetes, severe aortic stenosis. who was admitted to the hospital for a scheduled coronary artery bypass graft surgery, aortic valve replacement on 6/19. The patient is intubated and sedated with propofol and precedex. Otherwise the patient has been demonstrating agitation and confusion when weaning from mechanical ventilation is attempted.   Patient indicated for psych consult for evaluation and advise.    Consult Duration     The patient seen for over 50-minute, follow-up evaluation, over 50% counseling and coordinating care addressing agitation and confusion during SBT.  Record reviewed, communication with attending, communication with RN and patient seen face to face evaluation.     History of Present Illness:     In communication with the team the patient had no major events over night and has been improving greatly with rehab. He still requires some supplemental oxygen but his physical condition has been getting better. The patient is reported to have taken all of his scheduled medications last night.      The patient seen today sitting in his chair. He is seen with multiple activities on his table in front of him.   The patient has continued to have improvements with his cognition and awareness, as was noted by his RN. The patient communicated with an  and said he feels improvements in the clarity of his mind.  No report of history of suicide or homicidal ideation or action.  No history of hallucination, psychosis or manic episode.    The patient says he is feeling mentally very well, and even though he has some low physical energy, he wants to return home.   The patient does not believe he needs to receive care from the psychiatry team any more while in the hospital.  The patient is agreeable to working more with rehab and physical therapy, and is waiting to hear if he will go  to a rehab facility or home once he is discharged from the hospital.    Collateral obtained from the patient's wife and daughter.   His daughter states that he drinks 3-5 drinks of Brandon walker on the weekend socially, with a hx of a \"little bit\" of depression and anxiety. 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 The patient's daughter states he quit smoking cigarettes 3 years ago and now vapes instead. She denied any family history of dementia.      Past Psychiatric/Medication History:  1. Prior diagnoses: none reported  2. Past psychiatric inpatient: none reported  3. Past outpatient history: none reported  4. Past suicide history: none reported  5. Medication history: none reported    Social History:   The patient is a 59 yr old polish speaking  white male with one adult daughter. He is reported to have 3-5 drinks every weekend socially, does not use illicit drugs and now vapes nicotine daily after quitting cigarettes 3 years ago.     Family History:  Father, paternal aunt and uncle all have hx of diabetes.  Medical History:   Past Medical History  Past Medical History:    Aortic stenosis    Back problem    BPH (benign prostatic hyperplasia)    Cardiomyopathy (HCC)    Coronary atherosclerosis    Depression    Diabetes (HCC)    Esophageal reflux    Heart valve disease    High blood pressure    High cholesterol    Peripheral vascular disease    Shortness of breath    related to smoking     Visual impairment    glasses       Past Surgical History  Past Surgical History:   Procedure Laterality Date    Cardiac catheterization  2016    [facility]    Cath drug eluting stent      Colonoscopy  04/22/2024    Colonoscopy N/A 4/22/2024    Procedure: COLONOSCOPY;  Surgeon: Attila Li MD;  Location: [facility] ENDOSCOPY    Esophagoscopy,diagnostic  04/22/2024    Dr. Li    Hernia surgery      as a child    Nasal  scopy,remv part ethmoid  10/14/2020    Nasal scopy,rmv tiss maxill sinus  10/14/2020    Repair of nasal septum  10/14/2020       Family History  Family History   Problem Relation Age of Onset    Diabetes Father     Diabetes Paternal Aunt     Diabetes Paternal Uncle     Seizure Disorder Mother     No Known Problems Brother     No Known Problems Daughter     Glaucoma Neg     Macular degeneration Neg        Social History  Social History     Socioeconomic History    Marital status:    Tobacco Use    Smoking status: Former     Current packs/day: 1.00     Average packs/day: 1 pack/day for 20.0 years (20.0 ttl pk-yrs)     Types: Cigarettes    Smokeless tobacco: Never   Vaping Use    Vaping status: Every Day    Substances: Nicotine   Substance and Sexual Activity    Alcohol use: Yes     Comment: 1-2 drinks a week    Drug use: No           Current Medications:  Current Facility-Administered Medications   Medication Dose Route Frequency    aspirin chewable tab 81 mg  81 mg Oral Daily    docusate (Colace) 50 MG/5ML oral solution 100 mg  100 mg Oral BID    ascorbic acid (Vitamin C) tab 500 mg  500 mg Oral BID    potassium chloride (Klor-Con) 20 MEQ oral powder 20 mEq  20 mEq Oral BID    pantoprazole (Protonix) DR tab 40 mg  40 mg Oral BID AC    furosemide (Lasix) 10 mg/mL injection 40 mg  40 mg Intravenous BID (Diuretic)    insulin degludec (Tresiba) 100 units/mL flextouch 15 Units  15 Units Subcutaneous Daily    sacubitril-valsartan (Entresto) 49-51 MG per tab 1 tablet  1 tablet Oral BID    carvedilol (Coreg) tab 12.5 mg  12.5 mg Oral BID with meals    colchicine tab 0.3 mg  0.3 mg Oral QOD    ipratropium-albuterol (Duoneb) 0.5-2.5 (3) MG/3ML inhalation solution 3 mL  3 mL Nebulization 2 times daily    insulin aspart (NovoLOG) 100 Units/mL FlexPen 1-5 Units  1-5 Units Subcutaneous TID CC    escitalopram (Lexapro) tab 5 mg  5 mg Oral Nightly    amiodarone (Pacerone) tab 200 mg  200 mg Oral Daily    labetalol (Trandate)  5 mg/mL injection 10 mg  10 mg Intravenous Q6H PRN    OLANZapine (ZyPREXA) tab 2.5 mg  2.5 mg Oral Nightly    ferrous sulfate 300 (60 Fe) MG/5ML oral syrup 300 mg  300 mg Oral BID    dextrose 10% infusion (TPN no rate)   Intravenous Continuous PRN    pancrelipase (Lip-Prot-Amyl) (Zenpep) DR particles cap 10,000 Units  10,000 Units Per G Tube PRN    ipratropium-albuterol (Duoneb) 0.5-2.5 (3) MG/3ML inhalation solution 3 mL  3 mL Nebulization Q4H PRN    budesonide (Pulmicort) 0.5 MG/2ML nebulizer suspension 0.5 mg  0.5 mg Nebulization 2 times daily    rosuvastatin (Crestor) tab 20 mg  20 mg Per NG Tube Nightly    LORazepam (Ativan) tab 1 mg  1 mg Oral Q1H PRN    Or    LORazepam (Ativan) 2 mg/mL injection 1 mg  1 mg Intravenous Q1H PRN    LORazepam (Ativan) tab 2 mg  2 mg Oral Q1H PRN    Or    LORazepam (Ativan) 2 mg/mL injection 2 mg  2 mg Intravenous Q1H PRN    LORazepam (Ativan) tab 3 mg  3 mg Oral Q1H PRN    Or    LORazepam (Ativan) 2 mg/mL injection 3 mg  3 mg Intravenous Q1H PRN    LORazepam (Ativan) 2 mg/mL injection 4 mg  4 mg Intravenous Q30 Min PRN    LORazepam (Ativan) 2 mg/mL injection 5 mg  5 mg Intravenous Q15 Min PRN    LORazepam (Ativan) 2 mg/mL injection 6 mg  6 mg Intravenous Q10 Min PRN    thiamine (Vitamin B1) tab 100 mg  100 mg Oral Daily    multivitamin (Tab-A-Елена/Beta Carotene) tab 1 tablet  1 tablet Oral Daily    folic acid (Folvite) tab 1 mg  1 mg Oral Daily    LORazepam (Ativan) 2 mg/mL injection 1 mg  1 mg Intramuscular Q6H PRN    melatonin tab 3 mg  3 mg Oral Nightly PRN    sennosides (Senokot) tab 8.6 mg  8.6 mg Oral BID    bisacodyl (Dulcolax) 10 MG rectal suppository 10 mg  10 mg Rectal Daily PRN    potassium chloride 20 mEq/100mL IVPB premix 20 mEq  20 mEq Intravenous PRN    Or    potassium chloride 40 mEq/100mL IVPB premix (central line) 40 mEq  40 mEq Intravenous PRN    magnesium sulfate in dextrose 5% 1 g/100mL infusion premix 1 g  1 g Intravenous PRN    magnesium sulfate in sterile  water for injection 2 g/50mL IVPB premix 2 g  2 g Intravenous PRN    enoxaparin (Lovenox) 40 MG/0.4ML SUBQ injection 40 mg  40 mg Subcutaneous Daily    acetaminophen (Tylenol) tab 650 mg  650 mg Oral Q4H PRN    Or    HYDROcodone-acetaminophen (Norco) 5-325 MG per tab 1 tablet  1 tablet Oral Q4H PRN    [Held by provider] clopidogrel (Plavix) tab 75 mg  75 mg Oral Daily    glucose (Dex4) 15 GM/59ML oral liquid 15 g  15 g Oral Q15 Min PRN    Or    glucose (Glutose) 40% oral gel 15 g  15 g Oral Q15 Min PRN    Or    glucose-vitamin C (Dex-4) chewable tab 4 tablet  4 tablet Oral Q15 Min PRN    Or    dextrose 50% injection 50 mL  50 mL Intravenous Q15 Min PRN    Or    glucose (Dex4) 15 GM/59ML oral liquid 30 g  30 g Oral Q15 Min PRN    Or    glucose (Glutose) 40% oral gel 30 g  30 g Oral Q15 Min PRN    Or    glucose-vitamin C (Dex-4) chewable tab 8 tablet  8 tablet Oral Q15 Min PRN    tamsulosin (Flomax) cap 0.4 mg  0.4 mg Oral BID     Medications Prior to Admission   Medication Sig    ENTRESTO 24-26 MG Oral Tab Entresto 24 mg-26 mg tablet, [RxNorm: 3495470]    magnesium 250 MG Oral Tab Take 1 tablet (250 mg total) by mouth every other day.    cyanocobalamin 500 MCG Oral Tab Take 1 tablet (500 mcg total) by mouth every other day.    metFORMIN HCl 1000 MG Oral Tab Take 1 tablet (1,000 mg total) by mouth 2 (two) times daily with meals.    amLODIPine 5 MG Oral Tab Take 1 tablet (5 mg total) by mouth daily.    carvedilol 25 MG Oral Tab Take 1 tablet (25 mg total) by mouth 2 (two) times daily with meals.    clopidogrel 75 MG Oral Tab Take 1 tablet (75 mg total) by mouth daily.    empagliflozin (JARDIANCE) 25 MG Oral Tab Take 1 tablet (25 mg total) by mouth daily.    furosemide 40 MG Oral Tab Take 1 tablet (40 mg total) by mouth daily.    rosuvastatin 20 MG Oral Tab Take 1 tablet (20 mg total) by mouth nightly.    tamsulosin 0.4 MG Oral Cap TAKE 2 CAPSULES BY MOUTH DAILY (Patient taking differently: Take 1 capsule (0.4 mg total)  by mouth in the morning and 1 capsule (0.4 mg total) before bedtime.)    aspirin 81 MG Oral Tab EC Take 1 tablet (81 mg total) by mouth nightly.    Thiamine HCl 250 MG Oral Tab Take 1 tablet (250 mg total) by mouth every other day.    pyridoxine 100 MG Oral Tab Take 1 tablet (100 mg total) by mouth every other day.    Glucose Blood (CONTOUR NEXT TEST) In Vitro Strip To test 2 x daily  Dx E11.9    Glucose Blood (CONTOUR NEXT TEST) In Vitro Strip To check glucose bid   Dx  E11.9    Glucose Blood (LORAINE CONTOUR TEST) In Vitro Strip To test daily    LORAINE CONTOUR NEXT TEST In Vitro Strip To test 2 x daily       Allergies  No Known Allergies    Review of Systems:   As by Admitting/Attending    Results:   Laboratory Data:  Lab Results   Component Value Date    WBC 10.4 07/01/2025    HGB 9.9 (L) 07/01/2025    HCT 31.8 (L) 07/01/2025    .0 (H) 07/01/2025    CREATSERUM 0.82 07/01/2025    BUN 23 07/01/2025     07/01/2025    K 4.0 07/01/2025     07/01/2025    CO2 32.0 07/01/2025     (H) 07/01/2025    CA 8.8 07/01/2025    ALB 3.8 07/01/2025    ALKPHO 66 07/01/2025    TP 6.3 07/01/2025    AST 29 07/01/2025    ALT 17 07/01/2025    PTT 30.6 06/19/2025    INR 1.17 06/19/2025    PTP 15.6 (H) 06/19/2025    TSH 0.696 06/19/2025    PSA 2.62 08/10/2023    ESRML 97 (H) 06/25/2025    MG 2.0 07/01/2025    PHOS 4.1 07/01/2025         Imaging:  CT CHEST (CPT=71250)  Result Date: 6/30/2025  CONCLUSION: 1.  Moderate size left pleural effusion without consolidation. Electronically Verified and Signed by Attending Radiologist: Isaiah Arteaga MD 6/30/2025 3:25 PM Workstation: [employer]    XR CHEST AP PORTABLE  (CPT=71045)  Result Date: 6/30/2025  Workstation: [id number]      Vital Signs:   Blood pressure 129/69, pulse 67, temperature 98.6 °F (37 °C), temperature source Oral, resp. rate 18, height 66\", weight 89.9 kg (198 lb 3.1 oz), SpO2 94%.    Mental Status Exam:   Appearance: Stated age, [facility]  sitting in the chair.  Psychomotor: No psychomotor agitation or retardation.  Orientation: alert and oriented today.  Gait: Not evaluated.  Attitude/Coorperation: attentive and cooperative.  Behavior: Labile  Speech: speech continues to be soft and raspy.  Mood: Patient minimizing depression.  Affect: Dysphoric affect, congruent with mood  Thought process: Short sentences and circumstantial thought process.  Thought content: Patient denying suicidal or homicidal ideation.  Perceptions: Patient no longer demonstrates response to internal stimuli.  Concentration: Grossly impaired  Memory: Grossly impaired  Intellect: Average.  Judgment and Insight: Questionable.     Impression:     Episodic mood disorder.  Delirium due to a medical condition    Aortic stenosis   Hx of CABG      Patient is a 59 year old polish speaking white  male with a PMH of CAD, PVD, hypertension, hyperlipidemia, diabetes, severe aortic stenosis and recent CABG on 6/19 who continue indicated sedation and intubation and SBT failed due to excessive agitation..    6/22/2025: The patient has been demonstrating delirium episode with alternation in mood and cognition with episodes of  increased confusion, restlessness, agitation and response to internal stimuli. This has led to extended extubation and sedation of the patient.    QTc is 485.  Otherwise current EKG demonstrated sinus rhythm.  Magnesium level is 1.8.  Patient with no risk of arrhythmia other than the underlying recent surgical procedure.  It is appropriate to have nightly sedation with the Zyprexa to stabilize the mood currently and utilize the combination of Haldol and Ativan 5/2 prior to extubation.  Otherwise tapering sedation slowly utilizing Haldol 2 mg as needed for restlessness.    6/23/2025: The patient has been demonstrating improvement in his delirium following administration of Haldol 5 mg and Ativan 2 mg prior to lowering of sedation and extubation. The patient was able  to follow commands and demonstrated less agitation and confusion during SBT today. Patient continues to be sedated and intubated due to tachypnea.     Patient demonstrating improvement in condition after being started on diuretics for fluid in the lungs. Today magnesium level is 2.1    6/24/2025: The patient has continued to demonstrate improvement in his agitation and confusion when sedation is lowered and extubation is attempted. Currently remains intubated and sedated with another extubation attempt scheduled for later today.    6/25/2025: The patient no longer demonstrates agitation and was extubated successfully yesterday. The patient continues to be confused at this time.    6/26/2025: The patient demonstrates improvement in his alertness, orientation and overall condition. He has not had any agitation since being extubated and his confusion continues to improve.  Otherwise the patient has been demonstrating some symptom of depression and was withdrawn with low interest.    6/27/2025: The patient continues to improve in his awareness and orientation, but still demonstrates withdrawn behavior and low interest in activities consistent with depression. Patient and team will be encouraged ensure he takes his psychiatric medications as scheduled.     6/30/2025: The patient has continued to improve and has been compliant with his psychiatric medications. At this time the patient no longer requires psychiatric care within the hospital and is recommended to continue his medications and follow up with an outpatient psychiatrist.    7/1/2025: The patient feels mentally clear and no longer believes he needs care from the psychiatry team and wants to go home. He continues to work with rehab and physical therapy prior to his discharge from the hospital.    Discussed risk and benefit, acknowledging the current symptom and severity.  At this point, I would recommend the following approach:     Focus on safety  Focus on  education and support.  Focus on insight orientation helping the patient understand diagnosis and treatment plan.  Discontinue Zyprexa 2.5 mg in the evening.  Continue Lexapro to 5 mg in the evening.  Processed with patient/family/RN at length, the initiation of the above psychotropic medications I advised the patient of the risks, benefits, alternatives and potential side effects. The patient consents to administration of the medications and understands the right to refuse medications at any time. The patient verbalized understanding.   Coordinate plan with team    Orders This Visit:  Orders Placed This Encounter   Procedures    Basic Metabolic Panel (8)    CBC, Platelet; No Differential    CBC With Differential With Platelet    Basic Metabolic Panel (8)    Prothrombin Time (PT)    PTT, Activated    Fibrinogen Activity    Magnesium    Magnesium    Expanded Arterial Blood Gas    Arterial blood gas    Assay, Thyroid Stim Hormone    Lactic Acid, Plasma    Hepatic Function Panel (7)    CBC, Platelet; No Differential    Lactic Acid Reflex Post Positive    Hemoglobin & Hematocrit    CBC, Platelet; No Differential    Magnesium    Lactic Acid, Plasma    Arterial blood gas    Basic Metabolic Panel (8)    Lipid Panel    Venous Blood Gas    Arterial blood gas    Magnesium    Comp Metabolic Panel (14)    CBC, Platelet; No Differential    Expanded Arterial Blood Gas    CBC, Platelet; No Differential    Magnesium    Comp Metabolic Panel (14)    Sed Rate, Westergren (Automated)    Magnesium    Expanded Arterial Blood Gas    Venous Blood Gas    CBC With Differential With Platelet    Basic Metabolic Panel (8)    Magnesium    Basic Metabolic Panel (8)    Phosphorus    Magnesium    Potassium    Potassium    Expanded Arterial Blood Gas    CBC With Differential With Platelet    Sed Rate, Westergren (Automated)    Expanded Arterial Blood Gas    Potassium    Magnesium    Basic Metabolic Panel (8)    Magnesium    CBC With Differential  With Platelet    CBC With Differential With Platelet    Comp Metabolic Panel (14)    Phosphorus    Hemoglobin    Comp Metabolic Panel (14)    CBC With Differential With Platelet    Phosphorus    CBC With Differential With Platelet    Magnesium    Potassium    Magnesium    Comp Metabolic Panel (14)    Phosphorus    Comp Metabolic Panel (14)    CBC With Differential With Platelet    Magnesium    Phosphorus    Comp Metabolic Panel (14)    CBC With Differential With Platelet    Magnesium    Phosphorus    Prepare RBC STAT    ABORH Confirmation    Prepare platelets Once    Prepare fresh frozen plasma Once    Prepare cryoprecipitate Once    Type and screen    Prepare RBC Once    ABORH (Blood Type)    Antibody Screen    Prepare RBC Once    Specimen to Pathology Tissue    MRSA Screen by PCR    Sputum culture    Blood Culture    Emergency MRSA Screen by PCR    Occult Blood Stool, Diagnostic       Meds This Visit:  Requested Prescriptions      No prescriptions requested or ordered in this encounter       Chivo Solares MD  7/1/2025    Note to Patient: The 21st Century Cures Act makes medical notes like these available to patients in the interest of transparency. However, be advised this is a medical document. It is intended as peer to peer communication. It is written in medical language and may contain abbreviations or verbiage that are unfamiliar. It may appear blunt or direct. Medical documents are intended to carry relevant information, facts as evident, and the clinical opinion of the practitioner. This note may have been transcribed using a voice dictation system. Voice recognition errors may occur. This should not be taken to alter the content or meaning of this note.

## 2025-07-01 NOTE — HOME CARE LIAISON
[facility].  I have meet with this patient at the bedside and they agreed to our home health services.  [facility] will follow this patient thru their discharge from the hospital.

## 2025-07-02 ENCOUNTER — APPOINTMENT (OUTPATIENT)
Dept: INTERVENTIONAL RADIOLOGY/VASCULAR | Facility: HOSPITAL | Age: 60
DRG: 219 | End: 2025-07-02
Attending: INTERNAL MEDICINE
Payer: MEDICAID

## 2025-07-02 LAB
ALBUMIN SERPL-MCNC: 3.5 G/DL (ref 3.2–4.8)
ALBUMIN/GLOB SERPL: 1.5 {RATIO} (ref 1–2)
ALP LIVER SERPL-CCNC: 61 U/L (ref 45–117)
ALT SERPL-CCNC: 15 U/L (ref 10–49)
ANION GAP SERPL CALC-SCNC: 5 MMOL/L (ref 0–18)
AST SERPL-CCNC: 23 U/L (ref ?–34)
BASOPHILS # BLD AUTO: 0.07 X10(3) UL (ref 0–0.2)
BASOPHILS NFR BLD AUTO: 0.7 %
BILIRUB SERPL-MCNC: 0.4 MG/DL (ref 0.2–1.1)
BUN BLD-MCNC: 20 MG/DL (ref 9–23)
BUN/CREAT SERPL: 25.6 (ref 10–20)
CALCIUM BLD-MCNC: 8.4 MG/DL (ref 8.7–10.4)
CHLORIDE SERPL-SCNC: 100 MMOL/L (ref 98–112)
CO2 SERPL-SCNC: 33 MMOL/L (ref 21–32)
CREAT BLD-MCNC: 0.78 MG/DL (ref 0.7–1.3)
DEPRECATED RDW RBC AUTO: 48.8 FL (ref 35.1–46.3)
EGFRCR SERPLBLD CKD-EPI 2021: 101 ML/MIN/1.73M2 (ref 60–?)
EOSINOPHIL # BLD AUTO: 0.25 X10(3) UL (ref 0–0.7)
EOSINOPHIL NFR BLD AUTO: 2.7 %
ERYTHROCYTE [DISTWIDTH] IN BLOOD BY AUTOMATED COUNT: 15.7 % (ref 11–15)
GLOBULIN PLAS-MCNC: 2.4 G/DL (ref 2–3.5)
GLUCOSE BLD-MCNC: 160 MG/DL (ref 70–99)
GLUCOSE BLDC GLUCOMTR-MCNC: 157 MG/DL (ref 70–99)
GLUCOSE BLDC GLUCOMTR-MCNC: 160 MG/DL (ref 70–99)
GLUCOSE BLDC GLUCOMTR-MCNC: 183 MG/DL (ref 70–99)
GLUCOSE BLDC GLUCOMTR-MCNC: 262 MG/DL (ref 70–99)
HCT VFR BLD AUTO: 30 % (ref 39–53)
HGB BLD-MCNC: 9.1 G/DL (ref 13–17.5)
IMM GRANULOCYTES # BLD AUTO: 0.05 X10(3) UL (ref 0–1)
IMM GRANULOCYTES NFR BLD: 0.5 %
LYMPHOCYTES # BLD AUTO: 2 X10(3) UL (ref 1–4)
LYMPHOCYTES NFR BLD AUTO: 21.2 %
MAGNESIUM SERPL-MCNC: 2 MG/DL (ref 1.6–2.6)
MCH RBC QN AUTO: 25.9 PG (ref 26–34)
MCHC RBC AUTO-ENTMCNC: 30.3 G/DL (ref 31–37)
MCV RBC AUTO: 85.2 FL (ref 80–100)
MONOCYTES # BLD AUTO: 0.95 X10(3) UL (ref 0.1–1)
MONOCYTES NFR BLD AUTO: 10.1 %
NEUTROPHILS # BLD AUTO: 6.11 X10 (3) UL (ref 1.5–7.7)
NEUTROPHILS # BLD AUTO: 6.11 X10(3) UL (ref 1.5–7.7)
NEUTROPHILS NFR BLD AUTO: 64.8 %
OSMOLALITY SERPL CALC.SUM OF ELEC: 292 MOSM/KG (ref 275–295)
PHOSPHATE SERPL-MCNC: 4 MG/DL (ref 2.4–5.1)
PLATELET # BLD AUTO: 688 10(3)UL (ref 150–450)
POTASSIUM SERPL-SCNC: 4.2 MMOL/L (ref 3.5–5.1)
PROT SERPL-MCNC: 5.9 G/DL (ref 5.7–8.2)
RBC # BLD AUTO: 3.52 X10(6)UL (ref 4.3–5.7)
SODIUM SERPL-SCNC: 138 MMOL/L (ref 136–145)
WBC # BLD AUTO: 9.4 X10(3) UL (ref 4–11)

## 2025-07-02 PROCEDURE — 99233 SBSQ HOSP IP/OBS HIGH 50: CPT | Performed by: HOSPITALIST

## 2025-07-02 PROCEDURE — 0W9B30Z DRAINAGE OF LEFT PLEURAL CAVITY WITH DRAINAGE DEVICE, PERCUTANEOUS APPROACH: ICD-10-PCS | Performed by: RADIOLOGY

## 2025-07-02 PROCEDURE — 99233 SBSQ HOSP IP/OBS HIGH 50: CPT | Performed by: INTERNAL MEDICINE

## 2025-07-02 RX ORDER — POTASSIUM CHLORIDE 750 MG/1
10 TABLET, EXTENDED RELEASE ORAL 2 TIMES DAILY
Status: DISCONTINUED | OUTPATIENT
Start: 2025-07-02 | End: 2025-07-03

## 2025-07-02 NOTE — SLP NOTE
SPEECH DAILY NOTE - INPATIENT    ASSESSMENT & PLAN   ASSESSMENT  PPE REQUIRED. THIS THERAPIST WORE GLOVES FOR DURATION OF SESSION. HANDS WASHED UPON ENTRANCE/EXIT.    SLP f/u for ongoing dysphagia tx/meal assessment per recommendations of regular/thin liquids per swallow f/u. RN reports pt tolerates diet and medication well with no overt clinical s/s aspiration. Pt denies any swallowing challenges.     Pt positioned upright in bedside chair, alert/cooperative/sitter present. Pt afebrile, tolerating 1L/Min O2NC with oxygen status 93% prior to the start of oral trials. SLP reviewed aspiration precautions and safe swallowing compensatory strategies with the patient. Safe swallow guidelines remain written on the white board in purple. Patient v/u. Provided no assistance, pt tolerates hard solids and thin liquids via straw/cup with no overt clinical signs/symptoms of aspiration. Of note, pt with dry cough t/o session. Will continue to monitor. Oxygen status remained stable t/o the entire session. Recommend remain on current diet with adherence to aspiration precautions and swallow strategies.    SLP to f/u with meal assessment x1, monitor  CXR, and VFSS if any overt CSA and/or decline in CXR. RN alerted with results and recommendations.     MOST RECENT CXR 7/1  CONCLUSION: Findings compatible with CHF. Small-moderate size left pleural effusion without significant interval change. No pneumothorax.        Diet Recommendations - Solids: Regular  Diet Recommendations - Liquids: Thin Liquids     Aspiration Precautions: Upright position, Slow rate, Small bites, Small sips, No straw  Medication Administration Recommendations: One pill at a time    Patient Experiencing Pain: No              Treatment Plan  Treatment Plan/Recommendations: Aspiration precautions    Interdisciplinary Communication: Plan posted at bedside          GOALS  Goal #1 The patient will tolerate regular consistency and thin liquids without overt signs or  symptoms of aspiration with 100 % accuracy over 1-2 session(s).    Revised 7/1     Goal #2 The patient/family/caregiver will demonstrate understanding and implementation of aspiration precautions and swallow strategies independently over 1-2 session(s).    In Progress   Goal #3 The patient will tolerate trial upgrade of hard solid consistency and thin liquids without overt signs or symptoms of aspiration with 100 % accuracy over 1-2 session(s).   Met 7/1   Goal #4 The patient will utilize compensatory strategies as outlined by  BSSE (clinical evaluation) including Slow rate, Small bites, Small sips, Multiple swallows, No straws, Upright 90 degrees, Upright 90 degrees 30 mins after meal, Eliminate distractions, Supervision with meals with PRN assistance 100 % of the time across 2 sessions.    In Progress     FOLLOW UP  Follow Up Needed (Documentation Required): Yes  SLP Follow-up Date: 07/03/25  Duration: 1 week    Session: 5    If you have any questions, please contact JARED Parker M.S. CCC-SLP  Speech Language Pathologist  Phone Number Ext. [phone]

## 2025-07-02 NOTE — PROCEDURES
[facility]  part of [facility]  Procedure Note    Jose Alberto Lizama Patient Status:  Inpatient    1965 MRN [id number]   Location [facility] 2W/SW Attending Angel Damian MD   Hosp Day # 13 PCP Fiona Sweeney MD     Procedure: Thoracentesis    Pre-Procedure Diagnosis:  Left pleural effusion    Post-Procedure Diagnosis: Same    Anesthesia:  Local    Findings:  Technically successful ultrasound-guided left thoracentesis yielding 400 ml of serosang fluid. Fluid sent for culture.     Specimens: As above    Blood Loss:  < 10 ml      Complications:  None    Attila Otto MD  2025

## 2025-07-02 NOTE — DIETARY NOTE
ADULT NUTRITION REASSESSMENT    Pt is at moderate nutrition risk.  Pt does not meet malnutrition criteria.      RECOMMENDATIONS TO MD:   See nutrition intervention for ONS (oral nutrition supplements)    ADMITTING DIAGNOSIS:  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]  Aortic stenosis  PERTINENT PAST MEDICAL HISTORY: Past Medical History[1]    PATIENT STATUS:   Initial 06/22/25: Pt assessed due to critically ill - NPO day #3, Pt presented to the hospital for scheduled cardiac procedure. Has extensive PMH as listed above. POD#3 s/p CABG with aortic valve replacement. Remains orally intubated, sedated on propofol and precedex drip post-op. Pressor support x1. Failed SBT this AM. RN at bedside at time of visit. No family present for diet hx. Pt is well nourished, obese. Currently receiving ~500 non-nutritive kcal from Propofol LE and IV dextrose.     Update 6/24: Pt remains intubated, propofol stopped this AM. NPO day 5. Received consult to initiate tube feeds. Recs as below. Plan for attempt at SBT in hopes of extubation.  Will monitor and follow per protocol.  Update 6/27: Pt extubated later on 6/24. Pt seen by SLP, diet initiated and recommended pureed solids / nectar thickened liquids on 6/26. Per RN, pt's intake improving when more alert. Pt had 50% of lunch this afternoon- mac and cheese, carrots, yogurt, soup, coffee. Will add ONS to order to help meet nutritional needs while on modified diet. Will monitor intakes and follow per protocol.    Update 7/2: Pt reassessed early. Intake improving since last RD visit, had 100% x3 meals yesterday. On regular/thin liquids, 1800 mL fluid restriction. Had thoracentesis at bedside by IR earlier today, removed 400 mL. Adjusted ONS now that pt is cleared for thin liquids. Possible discharge home soon. Will continue to monitor intakes and follow up as appropriate.    FOOD/NUTRITION RELATED HISTORY:  Appetite: Unknown appetite PTA  Intake: ~% x4 meals documented since last  visit  Intake Meeting Needs: Marginal, ONS in place to maximize  Percent Meals Eaten (last 6 days)       Date/Time Percent Meals Eaten (%)    06/27/25 1400 75 %    06/27/25 2230 --     Percent Meals Eaten (%): applesauce with meds. at 06/27/25 2230    06/30/25 1344 90 %    07/01/25 1000 100 %    07/01/25 1300 100 %    07/01/25 1700 100 %        Food Allergies: No Known Food Allergies (NKFA)  Cultural/Ethnic/[place]ruze Preferences: Not Obtained    GASTROINTESTINAL: +BM last reported 7/1  UO: 2400 mL output over the past 24 hrs  I/Os: Net -4.5 L total this admission    MEDICATIONS: reviewed;Cardiac electrolyte replacement protocol ordered; noted scheduled reglan and senokot   dextrose 10%        potassium chloride  10 mEq Oral BID    aspirin  81 mg Oral Daily    docusate  100 mg Oral BID    furosemide  20 mg Intravenous BID (Diuretic)    potassium chloride  10 mEq Oral BID    insulin degludec  15 Units Subcutaneous Daily    insulin aspart  1-5 Units Subcutaneous TID CC    ascorbic acid  500 mg Oral BID    pantoprazole  40 mg Oral BID AC    sacubitril-valsartan  1 tablet Oral BID    carvedilol  12.5 mg Oral BID with meals    colchicine  0.3 mg Oral QOD    ipratropium-albuterol  3 mL Nebulization 2 times daily    escitalopram  5 mg Oral Nightly    amiodarone  200 mg Oral Daily    ferrous sulfate  300 mg Oral BID    budesonide  0.5 mg Nebulization 2 times daily    rosuvastatin  20 mg Per NG Tube Nightly    thiamine  100 mg Oral Daily    multivitamin  1 tablet Oral Daily    folic acid  1 mg Oral Daily    sennosides  8.6 mg Oral BID    [Held by provider] enoxaparin  40 mg Subcutaneous Daily    clopidogrel  75 mg Oral Daily    tamsulosin  0.4 mg Oral BID     LABS: reviewed; Recent A1c 8.1 on 6/10/25   Recent Labs     06/30/25  0516 07/01/25  0644 07/02/25  0308   * 174* 160*   BUN 20 23 20   CREATSERUM 0.69* 0.82 0.78   CA 8.4* 8.8 8.4*   MG 1.9 2.0 2.0    140 138   K 3.6 4.0 4.2    101 100   CO2 32.0 32.0  33.0*   PHOS 3.7 4.1 4.0   OSMOCALC 296* 298* 292     WEIGHT HISTORY:  Patient Weight(s) for the past 336 hrs:   Weight   07/02/25 0325 89.8 kg (197 lb 15.6 oz)   07/01/25 0030 89.9 kg (198 lb 3.1 oz)   06/29/25 0600 90.6 kg (199 lb 11.8 oz)   06/28/25 0600 89.3 kg (196 lb 13.9 oz)   06/27/25 1600 93.4 kg (205 lb 14.6 oz)   06/25/25 0600 96.8 kg (213 lb 6.5 oz)   06/24/25 0400 96.5 kg (212 lb 11.9 oz)   06/23/25 0429 97.8 kg (215 lb 9.8 oz)   06/22/25 0700 98.5 kg (217 lb 2.5 oz)   06/21/25 0500 97.6 kg (215 lb 2.7 oz)   06/20/25 0600 98.4 kg (216 lb 14.9 oz)   06/19/25 0603 92.1 kg (203 lb)     Wt Readings from Last 10 Encounters:   07/02/25 89.8 kg (197 lb 15.6 oz)   06/18/25 91.5 kg (201 lb 12.8 oz)   06/09/25 90.7 kg (200 lb)   02/18/25 93.4 kg (206 lb)   09/24/24 99.8 kg (220 lb)   04/18/24 99.8 kg (220 lb)   01/19/24 96.6 kg (213 lb)   01/16/24 97.5 kg (215 lb)   08/10/23 96.6 kg (213 lb)   03/16/23 97.1 kg (214 lb)     ANTHROPOMETRICS:  HT: 167.6 cm (5' 6\")  Wt Readings from Last 1 Encounters:   07/02/25 89.8 kg (197 lb 15.6 oz)   Last weight: Likely accurate and wt up 19 lbs from admission wt with edema present   Dosing Weight: 92.1 kg (203 lbs) - taken on 6/19, utilized for anthropometric calculations  BMI: Body mass index is 32.57 kg/m².  BMI CLASSIFICATION: 30-34.9 kg/m2 - obesity class I  IBW/lbs (Calculated) Male: 142 lbs           142% IBW  Usual Body Wt: 200 lbs       101% UBW    NUTRITION RELATED PHYSICAL FINDINGS:  - Nutrition Focused Physical Exam (NFPE): well nourished and no wasting noted  - Fluid Accumulation: non-pitting Bilateral Lower extremity and Foot , scrotal, and perineal, +1 Bilateral Upper extremity and abdominal, +2 Bilateral Hand (s), and thoracentesis 7/2 with 400 mL removed . See RN documentation for details  - Skin Integrity: at risk and surgical wound(s). See RN documentation for details      NUTRITION DIAGNOSIS/PROBLEM:   Inadequate protein energy intake related to Decreased  ability to consume sufficient energy as evidenced by orally intubated, sedated, unable to safely take adequate PO, NPO day #3.    NUTRITION DIAGNOSIS PROGRESS:  Improvement (unresolved) -Diet adv to regular/thin liquids on 7/1    NUTRITION INTERVENTION:     NUTRITION PRESCRIPTION:   Estimated Nutrition needs: --dosing wt of 92.1 kg - wt taken on 6/19/25  Calories: 1840 - 2300  calories/day (20-25 calories per kg Dosing wt)  Protein:  g protein/day (1.5-2 g protein/kg Ideal body wt (IBW) (65kg))  Fluid Needs: 1 ml/kcal - adjust for clinical status    - Diet:       Procedures    Regular/General diet Calorie Restriction/Carb Controlled: 2000 kcal/75 grams; Fluid Restriction: 1800 ml; Fluid Consistency: Thin Liquids ; Texture Consistency: Regular; Is Patient on Accuchecks? Yes; Is Patient on Suicide Precautions? No; Misc Re...       - Nutrition Care Plan: Encouraged increased PO intake, Encouraged small frequent meals with emphasis on high calorie/high protein, and Initiated ONS (oral nutritional supplements)  - ONS (Oral Nutrition Supplements)/Meals/Snacks: Magic Cup (290 calories/ 9 g protein each) BID Vanilla, Chocolate, or Mixed berry   - Vitamin and mineral supplements: multivitamin/mineral and vitamin C  - Feeding assistance: meal set up  - Nutrition education: diet education before discharge  - Coordination of nutrition care: collaboration with other providers - discussed with RN on unit  - Discharge and transfer of nutrition care to new setting or provider: monitor plans - from home with spouse    MONITOR AND EVALUATE/NUTRITION GOALS:  - Food and Nutrient Intake:      Monitor: adequacy of PO intake  - Food and Nutrient Administration:      Monitor: N/A  - Anthropometric Measurement:    Monitor weight  - Nutrition Goals:      allow wt loss due to fluid losses, intake to meet needs, labs within acceptable limits, euglycemia, minimize lean body mass loss, and support body systems    DIETITIAN FOLLOW UP: RD to  follow and monitor nutrition status      Ana Maria Charles, MS, RD, LDN  Clinical Dietitian  [id number]         [1]   Past Medical History:   Aortic stenosis    Back problem    BPH (benign prostatic hyperplasia)    Cardiomyopathy (HCC)    Coronary atherosclerosis    Depression    Diabetes (HCC)    Esophageal reflux    Heart valve disease    High blood pressure    High cholesterol    Peripheral vascular disease    Shortness of breath    related to smoking     Visual impairment    glasses

## 2025-07-02 NOTE — PROGRESS NOTES
[facility]  part of [facility]    Progress Note    Jose Alberto Lizama Patient Status:  Inpatient    1965 MRN [id number]   Location [facility] 2W/SW Attending Nannette Pelayo MD   Hosp Day # 13 PCP Fiona Sweeney MD     Subjective:  Patient seen and examined this morning, sitting up in the chair  Left thoracentesis of 400 ml of serosanguinous fluid done this am    Objective:  /67 (BP Location: Left arm)   Pulse 71   Temp 98.2 °F (36.8 °C) (Temporal)   Resp 21   Ht 5' 6\" (1.676 m)   Wt 197 lb 15.6 oz (89.8 kg)   SpO2 96%   BMI 31.95 kg/m²     Temp (24hrs), Av.4 °F (36.9 °C), Min:98.2 °F (36.8 °C), Max:98.7 °F (37.1 °C)      Intake/Output:    Intake/Output Summary (Last 24 hours) at 2025 0733  Last data filed at 2025 0650  Gross per 24 hour   Intake 1080 ml   Output 2400 ml   Net -1320 ml       Wt Readings from Last 3 Encounters:   25 197 lb 15.6 oz (89.8 kg)   25 201 lb 12.8 oz (91.5 kg)   25 200 lb (90.7 kg)       Allergies:  Allergies[1]    Labs:  Lab Results   Component Value Date    WBC 9.4 2025    HGB 9.1 2025    HCT 30.0 2025    .0 2025    CREATSERUM 0.78 2025    BUN 20 2025     2025    K 4.2 2025     2025    CO2 33.0 2025     2025    CA 8.4 2025    ALB 3.5 2025    ALKPHO 61 2025    BILT 0.4 2025    TP 5.9 2025    AST 23 2025    ALT 15 2025    MG 2.0 2025    PHOS 4.0 2025       Physical Exam:  Blood pressure 135/67, pulse 71, temperature 98.2 °F (36.8 °C), temperature source Temporal, resp. rate 21, height 5' 6\" (1.676 m), weight 197 lb 15.6 oz (89.8 kg), SpO2 96%.  General: NAD  Neck: No JVD  Lungs: CTA  Heart: RRR, S1, S2  Abdomen: Soft/Round, NT/ND, BS+x4/+Flatus/+BM  Extremities: Warm, dry, trace LE edema bilat  Pulses: 1+ bilat DP  Skin: sternotomy incision BETTIE=CDI -scabs/scant erythema  distal aspect of incision, no drainage.  Left leg incision BETTIE=CDI   Neurological:  AAOx3, MAEW-calm and appropriate    Assessment/Plan:  S/P CABG x 2/AVR/MELINDA CLIP/INSERTION OF LEFT FEMORAL IABP POD # 13  IABP removed on 6/20  Extubated on 6/24: Encourage pulm toilet: IS-now able to perform w/o assistance- 500 cc/continue EZ pap. Likely w/pre op ORAL. BiPap at night, remains on 2L NC-wean off as tolerated.    Chest CT/CXR - L pleural effusion s/p left thoracentesis 7/2, follow up cxr tomorrow.  Resume plavix tomorrow  History of vaping -counseled on cessation  Sputum cx 6/22=negative.  Completed course of Zosyn  Pain meds as needed-limit/avoid narcotics w/post op altered MS. Can use Tylenol.   Post op altered MS w/agitation/confusion/restlessness of unknown etiology-resolved.  Suspected post op delirium and/or ETOH withdrawal requiring Psych eval on 6/22- following recs. CT brain on 6/20=negative   Hx: HF-GDMT mgt per Cardiology  S/P fall out of bed 6/27 evening w/superficial laceration above right eye-should heal with time-continues CDI w/steri strips. CT head 6/27=negative/Hip X-ray 6/27=okay.  Sitter, patient reminded not to get up alone and call for assistance  Increase activity-OOB to chair/ambulating in hallway with walker assist, PT/OT following-appreciate recs  Scds/Lovenox prophylaxis DVT prevention   Continues hemodynamically stable  -all lines have been removed  Elevated ESR=97 on 6/25- likely PPS-continues on Colchicine.   PICC placed 6/26  Expected post op volume overload- continue IV Lasix 20 mg BID and  Potassium BID while on Lasix. Mckeon removed 6/25 after leaking noted-able to use external male catheter for close I/O monitoring.   Amio protocol for AF prevention as pt considered high risk for post op AF. Post op SR w/freq PACs noted: on PO Amio daily-may stop at discharge if no AF noted post op.    Expected post op anemia, guaiac positive stool 6/30, Hgb stable, 9.1: changed to Protonix 40 mg twice  daily, Pepcid discontinued.  Continue iron x 1 month.    Hx: DM-Continue basal insulin dose today/continue correction scale coverage. Resume home regimen at/near discharge.    Nutrition-appreciate speech reeval-passed for regular diet/thin liquids, advance diet  Hx: BPH- on home Flomax  Hx: PVD- on ASA/Plavix -Plavix held this a.m - resume tomorrow    Discharge planning: pt lives with his wife. Pt progressing well. Continue to increase activity.  Appreciate PT/OT Meliton-will plan for home with HH/home PT next 24-48 hours if remains medically stable    Plan of care discussed w/patient and RN, and rounding CV surgeon - Dr. Tara Malloy PA-C   Cardiothoracic Surgery   [facility]  7/2/2025  7:33 AM                 [1] No Known Allergies

## 2025-07-02 NOTE — PROGRESS NOTES
[facility]  part of [facility]    Progress Note    Jose Alberto Lizama Patient Status:  Inpatient    1965 MRN [id number]   Location [facility] 2W/SW Attending Angel Damian MD   Hosp Day # 13 PCP Fiona Sweeney MD       Subjective:   Pt was seen and examined  No acute events overnight  Sitting in chair, dry cough persists  No cp, sob, f,c,n,v, abd pain or HA     Objective:   Blood pressure 102/62, pulse 76, temperature 97.5 °F (36.4 °C), temperature source Oral, resp. rate 22, height 5' 6\" (1.676 m), weight 197 lb 15.6 oz (89.8 kg), SpO2 94%.    Physical Exam:    General: No acute distress.   Respiratory: CTAB with decreased entry at bases. No wheezes. No rhonchi.  Cardiovascular: S1, S2. Regular rate and rhythm. No murmurs, rubs or gallops.   Abdomen: Soft, nontender, nondistended.  Positive bowel sounds. No rebound or guarding.  Neurologic: No focal neurological deficits.   Musculoskeletal: Moves all extremities.  Extremities: No edema.    Results:     Lab Results   Component Value Date    WBC 9.4 2025    HGB 9.1 (L) 2025    HCT 30.0 (L) 2025    .0 (H) 2025    CREATSERUM 0.78 2025    BUN 20 2025     2025    K 4.2 2025     2025    CO2 33.0 (H) 2025     (H) 2025    CA 8.4 (L) 2025    ALB 3.5 2025    ALKPHO 61 2025    BILT 0.4 2025    TP 5.9 2025    AST 23 2025    ALT 15 2025    PTT 30.6 2025    INR 1.17 2025    TSH 0.696 2025    PSA 2.62 08/10/2023    ESRML 97 (H) 2025    MG 2.0 2025    PHOS 4.0 2025       IR THORACENTESIS WITH INSERTION TUBE  Result Date: 2025  IMPRESSION: Technically successful ultrasound-guided left thoracentesis yielding 400 cc of serosanguineous fluid. A sample was sent for culture. Electronically Verified and Signed by Attending Radiologist: Attila Otto MD 2025 11:37 AM  Workstation: [id number]    XR CHEST AP PORTABLE  (CPT=71045)  Result Date: 7/1/2025  CONCLUSION: Findings compatible with CHF. Small-moderate size left pleural effusion without significant interval change. No pneumothorax. Electronically Verified and Signed by Attending Radiologist: Bal Stewart MD 7/1/2025 4:13 PM Workstation: [id number]    US CHEST (CPT=76604)  Result Date: 7/1/2025  CONCLUSION: Ultrasound imaging was provided for thoracentesis. Electronically Verified and Signed by Attending Radiologist: Bal Stewart MD 7/1/2025 3:53 PM Workstation: [id number]    CT CHEST (CPT=71250)  Result Date: 6/30/2025  CONCLUSION: 1.  Moderate size left pleural effusion without consolidation. Electronically Verified and Signed by Attending Radiologist: Isaiah Arteaga MD 6/30/2025 3:25 PM Workstation: [id number]    XR CHEST AP PORTABLE  (CPT=71045)  Result Date: 6/30/2025  Workstation: [id number]            Scheduled Medications[1]  PRN Medications[2]      Assessment and Plan:       Post-op anemia  - baseline Hb ~12  - Hb this morning 9.1  - stool for occult blood positive  - ASA/Plavix on hold at this time -> now resumed   - cont to monitor     CAD status post CABG and AVR  Severe aortic stenosis  - difficult extubation but patient extubated successfully 06/24  - Management per cardiology and CV sx  Continue ASA, Statin, Coreg, Entresto  Continue Lasix  Continue antiplatelet therapy, statin  Net IO Since Admission: -5,233.96 mL [07/02/25 1503]  Underwent thoracentesis per IR, yielding 400 ml of serosanguinous fluid  - chest tube management per surgery  - Monitor vitals  - PT/OT/SLP    Mechanical fall  - Ground level fall on 06/27/2025  - CT head negative, Him XR negative  - Fall precautions  - continue PT/OT    Post op acute respiratory failure   Pleural effusion  - completed course of abx  - requiring BiPAP  - CT chest reviewed  - pulm on consult   Continue abx   Bronchial hygiene, nebs, IS   Ambulate as  tolerated    Alcohol abuse/Withdrawal   - CIWA protocol   - Psych on board    Zyprexa, Lexapro nightly   Haldol PRN    DM2  - insulin per Vascular surgery     HTN  Dyslipidemia  - continue home meds         Quality:    CODE status: Full  DVT ppx - Lovenox   MDM: High        [1]    potassium chloride  10 mEq Oral BID    aspirin  81 mg Oral Daily    docusate  100 mg Oral BID    furosemide  20 mg Intravenous BID (Diuretic)    potassium chloride  10 mEq Oral BID    insulin degludec  15 Units Subcutaneous Daily    insulin aspart  1-5 Units Subcutaneous TID CC    ascorbic acid  500 mg Oral BID    pantoprazole  40 mg Oral BID AC    sacubitril-valsartan  1 tablet Oral BID    carvedilol  12.5 mg Oral BID with meals    colchicine  0.3 mg Oral QOD    ipratropium-albuterol  3 mL Nebulization 2 times daily    escitalopram  5 mg Oral Nightly    amiodarone  200 mg Oral Daily    ferrous sulfate  300 mg Oral BID    budesonide  0.5 mg Nebulization 2 times daily    rosuvastatin  20 mg Per NG Tube Nightly    thiamine  100 mg Oral Daily    multivitamin  1 tablet Oral Daily    folic acid  1 mg Oral Daily    sennosides  8.6 mg Oral BID    [Held by provider] enoxaparin  40 mg Subcutaneous Daily    clopidogrel  75 mg Oral Daily    tamsulosin  0.4 mg Oral BID   [2]   labetalol    dextrose 10%    [DISCONTINUED] sodium bicarbonate **AND** lipase-protease-amylase (Lip-Prot-Amyl)    ipratropium-albuterol    LORazepam **OR** LORazepam    LORazepam **OR** LORazepam    LORazepam **OR** LORazepam    LORazepam    LORazepam    LORazepam    LORazepam    melatonin    bisacodyl    potassium chloride **OR** potassium chloride    magnesium sulfate in dextrose 5%    magnesium sulfate in sterile water for injection    acetaminophen **OR** HYDROcodone-acetaminophen **OR** [DISCONTINUED] HYDROcodone-acetaminophen    glucose **OR** glucose **OR** glucose-vitamin C **OR** dextrose **OR** glucose **OR** glucose **OR** glucose-vitamin C

## 2025-07-02 NOTE — PLAN OF CARE
Received patient at 1230. VSS. Patient denies any pain. Safety sitter at bedside. Patient up to bathroom with contact assist. Patient remains impulsive, poor safety awareness with ambulating to bathroom and removed steri strips to right eye laceration.      Problem: CARDIOVASCULAR - ADULT  Goal: Maintains optimal cardiac output and hemodynamic stability  Description: INTERVENTIONS:  - Monitor vital signs, rhythm, and trends  - Monitor for bleeding, hypotension and signs of decreased cardiac output  - Evaluate effectiveness of vasoactive medications to optimize hemodynamic stability  - Monitor arterial and/or venous puncture sites for bleeding and/or hematoma  - Assess quality of pulses, skin color and temperature  - Assess for signs of decreased coronary artery perfusion - ex. Angina  - Evaluate fluid balance, assess for edema, trend weights  Outcome: Progressing  Goal: Absence of cardiac arrhythmias or at baseline  Description: INTERVENTIONS:  - Continuous cardiac monitoring, monitor vital signs, obtain 12 lead EKG if indicated  - Evaluate effectiveness of antiarrhythmic and heart rate control medications as ordered  - Initiate emergency measures for life threatening arrhythmias  - Monitor electrolytes and administer replacement therapy as ordered  Outcome: Progressing     Problem: RESPIRATORY - ADULT  Goal: Achieves optimal ventilation and oxygenation  Description: INTERVENTIONS:  - Assess for changes in respiratory status  - Assess for changes in mentation and behavior  - Position to facilitate oxygenation and minimize respiratory effort  - Oxygen supplementation based on oxygen saturation or ABGs  - Provide Smoking Cessation handout, if applicable  - Encourage broncho-pulmonary hygiene including cough, deep breathe, Incentive Spirometry  - Assess the need for suctioning and perform as needed  - Assess and instruct to report SOB or any respiratory difficulty  - Respiratory Therapy support as indicated  -  Manage/alleviate anxiety  - Monitor for signs/symptoms of CO2 retention  Outcome: Progressing     Problem: GASTROINTESTINAL - ADULT  Goal: Minimal or absence of nausea and vomiting  Description: INTERVENTIONS:  - Maintain adequate hydration with IV or PO as ordered and tolerated  - Nasogastric tube to low intermittent suction as ordered  - Evaluate effectiveness of ordered antiemetic medications  - Provide nonpharmacologic comfort measures as appropriate  - Advance diet as tolerated, if ordered  - Obtain nutritional consult as needed  - Evaluate fluid balance  Outcome: Progressing  Goal: Maintains or returns to baseline bowel function  Description: INTERVENTIONS:  - Assess bowel function  - Maintain adequate hydration with IV or PO as ordered and tolerated  - Evaluate effectiveness of GI medications  - Encourage mobilization and activity  - Obtain nutritional consult as needed  - Establish a toileting routine/schedule  - Consider collaborating with pharmacy to review patient's medication profile  Outcome: Progressing     Problem: GENITOURINARY - ADULT  Goal: Absence of urinary retention  Description: INTERVENTIONS:  - Assess patient’s ability to void and empty bladder  - Monitor intake/output and perform bladder scan as needed  - Follow urinary retention protocol/standard of care  - Consider collaborating with pharmacy to review patient's medication profile  - Implement strategies to promote bladder emptying  Outcome: Progressing     Problem: METABOLIC/FLUID AND ELECTROLYTES - ADULT  Goal: Glucose maintained within prescribed range  Description: INTERVENTIONS:  - Monitor Blood Glucose as ordered  - Assess for signs and symptoms of hyperglycemia and hypoglycemia  - Administer ordered medications to maintain glucose within target range  - Assess barriers to adequate nutritional intake and initiate nutrition consult as needed  - Instruct patient on self management of diabetes  Outcome: Progressing  Goal: Electrolytes  maintained within normal limits  Description: INTERVENTIONS:  - Monitor labs and rhythm and assess patient for signs and symptoms of electrolyte imbalances  - Administer electrolyte replacement as ordered  - Monitor response to electrolyte replacements, including rhythm and repeat lab results as appropriate  - Fluid restriction as ordered  - Instruct patient on fluid and nutrition restrictions as appropriate  Outcome: Progressing  Goal: Hemodynamic stability and optimal renal function maintained  Description: INTERVENTIONS:  - Monitor labs and assess for signs and symptoms of volume excess or deficit  - Monitor intake, output and patient weight  - Monitor urine specific gravity, serum osmolarity and serum sodium as indicated or ordered  - Monitor response to interventions for patient's volume status, including labs, urine output, blood pressure (other measures as available)  - Encourage oral intake as appropriate  - Instruct patient on fluid and nutrition restrictions as appropriate  Outcome: Progressing     Problem: SKIN/TISSUE INTEGRITY - ADULT  Goal: Incision(s), wounds(s) or drain site(s) healing without S/S of infection  Description: INTERVENTIONS:  - Assess and document risk factors for pressure ulcer development  - Assess and document skin integrity  - Assess and document dressing/incision, wound bed, drain sites and surrounding tissue  - Implement wound care per orders  - Initiate isolation precautions as appropriate  - Initiate Pressure Ulcer prevention bundle as indicated  Outcome: Progressing  Goal: Oral mucous membranes remain intact  Description: INTERVENTIONS  - Assess oral mucosa and hygiene practices  - Implement preventative oral hygiene regimen  - Implement oral medicated treatments as ordered  Outcome: Progressing     Problem: HEMATOLOGIC - ADULT  Goal: Maintains hematologic stability  Description: INTERVENTIONS  - Assess for signs and symptoms of bleeding or hemorrhage  - Monitor labs and vital  signs for trends  - Administer supportive blood products/factors, fluids and medications as ordered and appropriate  - Administer supportive blood products/factors as ordered and appropriate  Outcome: Progressing  Goal: Free from bleeding injury  Description: (Example usage: patient with low platelets)  INTERVENTIONS:  - Avoid intramuscular injections, enemas and rectal medication administration  - Ensure safe mobilization of patient  - Hold pressure on venipuncture sites to achieve adequate hemostasis  - Assess for signs and symptoms of internal bleeding  - Monitor lab trends  - Patient is to report abnormal signs of bleeding to staff  - Avoid use of toothpicks and dental floss  - Use electric shaver for shaving  - Use soft bristle tooth brush  - Limit straining and forceful nose blowing  Outcome: Progressing     Problem: MUSCULOSKELETAL - ADULT  Goal: Return mobility to safest level of function  Description: INTERVENTIONS:  - Assess patient stability and activity tolerance for standing, transferring and ambulating w/ or w/o assistive devices  - Assist with transfers and ambulation using safe patient handling equipment as needed  - Ensure adequate protection for wounds/incisions during mobilization  - Obtain PT/OT consults as needed  - Advance activity as appropriate  - Communicate ordered activity level and limitations with patient/family  Outcome: Progressing     Problem: NEUROLOGICAL - ADULT  Goal: Achieves stable or improved neurological status  Description: INTERVENTIONS  - Assess for and report changes in neurological status  - Initiate measures to prevent increased intracranial pressure  - Maintain blood pressure and fluid volume within ordered parameters to optimize cerebral perfusion and minimize risk of hemorrhage  - Monitor temperature, glucose, and sodium. Initiate appropriate interventions as ordered  Outcome: Progressing     Problem: Patient Centered Care  Goal: Patient preferences are identified and  integrated in the patient's plan of care  Description: Interventions:  - What would you like us to know as we care for you?   - Provide timely, complete, and accurate information to patient/family  - Incorporate patient and family knowledge, values, beliefs, and cultural backgrounds into the planning and delivery of care  - Encourage patient/family to participate in care and decision-making at the level they choose  - Honor patient and family perspectives and choices  Outcome: Progressing     Problem: Patient/Family Goals  Goal: Patient/Family Long Term Goal  Description: Patient's Long Term Goal:     Interventions:  -   - See additional Care Plan goals for specific interventions  Outcome: Progressing  Goal: Patient/Family Short Term Goal  Description: Patient's Short Term Goal:     Interventions:   -   - See additional Care Plan goals for specific interventions  Outcome: Progressing     Problem: Diabetes/Glucose Control  Goal: Glucose maintained within prescribed range  Description: INTERVENTIONS:  - Monitor Blood Glucose as ordered  - Assess for signs and symptoms of hyperglycemia and hypoglycemia  - Administer ordered medications to maintain glucose within target range  - Assess barriers to adequate nutritional intake and initiate nutrition consult as needed  - Instruct patient on self management of diabetes  Outcome: Progressing     Problem: Impaired Functional Mobility  Goal: Achieve highest/safest level of mobility/gait  Description: Interventions:  - Assess patient's functional ability and stability  - Promote increasing activity/tolerance for mobility and gait  - Educate and engage patient/family in tolerated activity level and precautions    Outcome: Progressing     Problem: Impaired Cognition  Goal: Patient will exhibit improved attention, thought processing and/or memory  Description: Interventions:    Outcome: Progressing

## 2025-07-02 NOTE — PROGRESS NOTES
Cardiology Progress Note    Jose Alberto Lizama Patient Status:  Inpatient    1965 MRN [id number]   Location [facility] 2W/SW Attending Nannette Pelayo MD   Hosp Day # 13 PCP Fiona Sweeney MD     Interval Note:  Patient seen and examined  Vitals stable has a sitter at bedside still confused denies any complaint    Denies any complaints.    Telemetry sinus rhythm    --------------------------------------------------------------------------------------------------------------------------------  ROS 12 systems reviewed, pertinent findings above.  ROS    History:  Past Medical History[1]  Past Surgical History[2]  Family History[3]   reports that he has quit smoking. His smoking use included cigarettes. He has a 20 pack-year smoking history. He has never used smokeless tobacco. He reports current alcohol use. He reports that he does not use drugs.    Objective:   Temp: 97.5 °F (36.4 °C)  Pulse: 76  Resp: 22  BP: 102/62  FiO2 (%): 40 %    Intake/Output:     Intake/Output Summary (Last 24 hours) at 2025 1513  Last data filed at 2025 1200  Gross per 24 hour   Intake 480 ml   Output 2025 ml   Net -1545 ml       Physical Exam:    General: AO x 2, no acute distress  HEENT: Normocephalic, anicteric sclera, neck supple.  Neck: No JVD, carotids 2+, no bruits.  Cardiac: Regular rate and rhythm. S1, S2 normal. No murmur, pericardial rub, S3.  Lungs: Clear without wheezes, rales, rhonchi or dullness.  Normal excursions and effort.  Decreased breath sounds left side base  Abdomen: Soft, non-tender. BS-present.  Extremities: Without clubbing, cyanosis or edema.  Peripheral pulses are 2+.  Neurologic: Non-focal  Skin: Warm and dry.       Assessment   Severe AS, CAD status post CABG X2 and SAVR,  MELINDA clip 25 (LIMA-LAD, VG-D; Inspiris #21  tissue valve, #40 MELINDA clip)  Hypoxia  Mechanical fall  Ischemic dermopathy, EF 30%  Anemia  Hypotension  Hyperlipidemia  Diabetes  Peripheral vascular disease         Plan  Patient appears hemodynamically stable  Blood pressure in heart rate, telemetry stable  Oxygen dependent, hypoxia now improving status post IR guided thoracentesis 400 cc removed.  Continue IV Lasix, allow 24 hours 20 mg twice daily changed to oral diuretics tomorrow  Continue amiodarone, aspirin, Coreg, statin, Entresto  GI bleeding has stopped was stool Hemoccult positive Plavix and aspirin are resumed.  Diabetes mellitus managed per primary      Thank you for allowing me to take part in the care of Jose Alberto Lizama. Please call with any questions of concerns.      Level of care: L4    David Osullivan MD         [1]   Past Medical History:   Aortic stenosis    Back problem    BPH (benign prostatic hyperplasia)    Cardiomyopathy (HCC)    Coronary atherosclerosis    Depression    Diabetes (HCC)    Esophageal reflux    Heart valve disease    High blood pressure    High cholesterol    Peripheral vascular disease    Shortness of breath    related to smoking     Visual impairment    glasses   [2]   Past Surgical History:  Procedure Laterality Date    Cardiac catheterization  2016    [facility]    Cath drug eluting stent      Colonoscopy  04/22/2024    Colonoscopy N/A 4/22/2024    Procedure: COLONOSCOPY;  Surgeon: Attila Li MD;  Location: [facility] ENDOSCOPY    Esophagoscopy,diagnostic  04/22/2024    Dr. Li    Hernia surgery      as a child    Nasal scopy,remv part ethmoid  10/14/2020    Nasal scopy,rmv tiss maxill sinus  10/14/2020    Repair of nasal septum  10/14/2020   [3]   Family History  Problem Relation Age of Onset    Diabetes Father     Diabetes Paternal Aunt     Diabetes Paternal Uncle     Seizure Disorder Mother     No Known Problems Brother     No Known Problems Daughter     Glaucoma Neg     Macular degeneration Neg

## 2025-07-02 NOTE — PROGRESS NOTES
Pulmonary/ICU/Critical Care Progress Note         Reason for Consultation: post CABG, IABP  Referring Physician: Dr. Pacheco    Subjective:  On 1-2 LNC  Afebrile  Sitting in a chair eating breakfast      REVIEW OF SYSTEMS:  Positives and negatives as noted in HPI. All other review of systems otherwise are either limited (due to pt/family inability to provide) or negative.      PAST MEDICAL HISTORY:  Past Medical History[1]      PAST SURGICAL HISTORY:  Past Surgical History[2]      PAST SOCIAL HISTORY:  Social Hx on file[3]      PAST FAMILY HISTORY:  Family History[4]      ALLERGIES:  Allergies[5]      MEDS:  Home Medications:  Medications Taking[6]    Scheduled Medication:  Scheduled Medications[7]  Continuous Infusing Medication:  Medication Infusions[8]  PRN Medications:  PRN Medications[9]       PHYSICAL EXAM:  BP 98/64 (BP Location: Left arm)   Pulse 73   Temp 97.5 °F (36.4 °C) (Oral)   Resp 24   Ht 5' 6\" (1.676 m)   Wt 197 lb 15.6 oz (89.8 kg)   SpO2 97%   BMI 31.95 kg/m²   FiO2 (%):  [40 %] 40 %  CONSTITUTIONAL: alert NAD  HEENT: atraumatic normocephalic  MOUTH: MMM  NECK/THROAT: no JVD. Trachea midline. No obvious thyromegaly  LUNG: clear upper b/l no wheezing, crackles. Diminished bases. Chest symmetric with respiratory motion  HEART: midsternal incision healing well, regular rate and rhythm, no obvious murmers or gallops noted  ABD: soft non tender. + bowel sounds. No organomegaly noted  EXT: no clubbing, cyanosis, or edema noted      IMAGES:   CXR 7/1/25  CONCLUSION: Findings compatible with CHF. Small-moderate size left pleural effusion without significant interval change. No pneumothorax.     Chest CT 6/30/25  CONCLUSION:   1. Moderate size left pleural effusion without consolidation.     CXR 6/30/25  CONCLUSIONS:   No new abnormality. Persistent left pleural effusion and left basilar opacities.   Cardiomegaly again seen.   Lines and tubes are unchanged.        LABS:  Recent Labs   Lab  06/30/25  0516 07/01/25  0644 07/02/25  0308   RBC 3.70* 3.78* 3.52*   HGB 9.6* 9.9* 9.1*   HCT 30.7* 31.8* 30.0*   MCV 83.0 84.1 85.2   MCH 25.9* 26.2 25.9*   MCHC 31.3 31.1 30.3*   RDW 15.5* 15.6* 15.7*   NEPRELIM 6.95 7.25 6.11   WBC 10.0 10.4 9.4   .0* 736.0* 688.0*       Recent Labs   Lab 06/30/25  0516 07/01/25  0644 07/02/25  0308   * 174* 160*   BUN 20 23 20   CREATSERUM 0.69* 0.82 0.78   EGFRCR 105 100 101   CA 8.4* 8.8 8.4*   ALB 3.5 3.8 3.5    140 138   K 3.6 4.0 4.2    101 100   CO2 32.0 32.0 33.0*   ALKPHO 58 66 61   AST 22 29 23   ALT 16 17 15   BILT 0.3 0.3 0.4   TP 6.0 6.3 5.9       ASSESSMENT/PLAN:  CAD s/p multivessel disease and severe aortic stenosis  -s/p CABG x 2 and AV repair on 6/19  -s/p IABP removed since  -on ASA, plavix, amio, bb, lasix, entresto  -CV surgery and cardiology following      Post op respiratory care  -was on full MV support  -weaned off NO as per CV  -extubated 6/24 and on 1-2 LNC 02  -CXR with persistent left effusion vs atelectasis. Checked non contrast chest Ct showed moderate left pleural effusion  -Dr. Zamora attempted left thoracentesis at bedside but unsuccessful. Will ask IR to attempt today     Possible PNA with mucus plugging, leukocytosis   -checked ETT asp, blood cx, MRSA nares  -completed empiric zosyn    Possible ETOH use and there was concern for withdrawal?  -reported hx of drinking  -s/p CIWA protocol   -psych following   -thiamine, folic acid, MVI    Hx of vaping  -duonebs and pulmicort nebs. Can stop on discharge    PVD  -plavix, ASA    DM2  -insulin/dextrose    BPH  -flomax    Proph  -DVT: lovenox  -GI: pepcid    Dispo  -full code    Thank you for the opportunity to care for Jose Alberto MelvinmattMartha Rahman DO, MPH  Pulmonary Critical Care Medicine  [facility]                         [1]   Past Medical History:  Diagnosis Date    Aortic stenosis     Back problem     BPH (benign  prostatic hyperplasia)     Cardiomyopathy (HCC)     Coronary atherosclerosis     Depression     Diabetes (HCC)     Esophageal reflux     Heart valve disease     High blood pressure     High cholesterol     Peripheral vascular disease     Shortness of breath     related to smoking     Visual impairment     glasses   [2]   Past Surgical History:  Procedure Laterality Date    Cardiac catheterization  2016    [facility]    Cath drug eluting stent      Colonoscopy  04/22/2024    Colonoscopy N/A 4/22/2024    Procedure: COLONOSCOPY;  Surgeon: Attila Li MD;  Location: [facility] ENDOSCOPY    Esophagoscopy,diagnostic  04/22/2024    Dr. Li    Hernia surgery      as a child    Nasal scopy,remv part ethmoid  10/14/2020    Nasal scopy,rmv tiss maxill sinus  10/14/2020    Repair of nasal septum  10/14/2020   [3]   Social History  Socioeconomic History    Marital status:    Tobacco Use    Smoking status: Former     Current packs/day: 1.00     Average packs/day: 1 pack/day for 20.0 years (20.0 ttl pk-yrs)     Types: Cigarettes    Smokeless tobacco: Never   Vaping Use    Vaping status: Every Day    Substances: Nicotine   Substance and Sexual Activity    Alcohol use: Yes     Comment: 1-2 drinks a week    Drug use: No   [4]   Family History  Problem Relation Age of Onset    Diabetes Father     Diabetes Paternal Aunt     Diabetes Paternal Uncle     Seizure Disorder Mother     No Known Problems Brother     No Known Problems Daughter     Glaucoma Neg     Macular degeneration Neg    [5] No Known Allergies  [6]   Outpatient Medications Marked as Taking for the 6/19/25 encounter (Hospital Encounter)   Medication Sig Dispense Refill    ENTRESTO 24-26 MG Oral Tab Entresto 24 mg-26 mg tablet, [RxNorm: 6971014]      magnesium 250 MG Oral Tab Take 1 tablet (250 mg total) by mouth every other day.      cyanocobalamin 500 MCG Oral Tab Take 1 tablet (500 mcg total) by mouth every other day.      metFORMIN HCl 1000  MG Oral Tab Take 1 tablet (1,000 mg total) by mouth 2 (two) times daily with meals. 180 tablet 3    amLODIPine 5 MG Oral Tab Take 1 tablet (5 mg total) by mouth daily. 90 tablet 3    carvedilol 25 MG Oral Tab Take 1 tablet (25 mg total) by mouth 2 (two) times daily with meals. 180 tablet 3    clopidogrel 75 MG Oral Tab Take 1 tablet (75 mg total) by mouth daily. 90 tablet 3    empagliflozin (JARDIANCE) 25 MG Oral Tab Take 1 tablet (25 mg total) by mouth daily. 90 tablet 3    furosemide 40 MG Oral Tab Take 1 tablet (40 mg total) by mouth daily. 90 tablet 3    [DISCONTINUED] losartan 50 MG Oral Tab Take 1 tablet (50 mg total) by mouth 2 (two) times daily. 180 tablet 3    rosuvastatin 20 MG Oral Tab Take 1 tablet (20 mg total) by mouth nightly. 90 tablet 3    tamsulosin 0.4 MG Oral Cap TAKE 2 CAPSULES BY MOUTH DAILY (Patient taking differently: Take 1 capsule (0.4 mg total) by mouth in the morning and 1 capsule (0.4 mg total) before bedtime.) 180 capsule 3    aspirin 81 MG Oral Tab EC Take 1 tablet (81 mg total) by mouth nightly.     [7]    potassium chloride  10 mEq Oral BID    aspirin  81 mg Oral Daily    docusate  100 mg Oral BID    furosemide  20 mg Intravenous BID (Diuretic)    potassium chloride  10 mEq Oral BID    insulin degludec  15 Units Subcutaneous Daily    insulin aspart  1-5 Units Subcutaneous TID CC    ascorbic acid  500 mg Oral BID    pantoprazole  40 mg Oral BID AC    sacubitril-valsartan  1 tablet Oral BID    carvedilol  12.5 mg Oral BID with meals    colchicine  0.3 mg Oral QOD    ipratropium-albuterol  3 mL Nebulization 2 times daily    escitalopram  5 mg Oral Nightly    amiodarone  200 mg Oral Daily    ferrous sulfate  300 mg Oral BID    budesonide  0.5 mg Nebulization 2 times daily    rosuvastatin  20 mg Per NG Tube Nightly    thiamine  100 mg Oral Daily    multivitamin  1 tablet Oral Daily    folic acid  1 mg Oral Daily    sennosides  8.6 mg Oral BID    [Held by provider] enoxaparin  40 mg  Subcutaneous Daily    clopidogrel  75 mg Oral Daily    tamsulosin  0.4 mg Oral BID   [8]    dextrose 10%     [9]   labetalol    dextrose 10%    [DISCONTINUED] sodium bicarbonate **AND** lipase-protease-amylase (Lip-Prot-Amyl)    ipratropium-albuterol    LORazepam **OR** LORazepam    LORazepam **OR** LORazepam    LORazepam **OR** LORazepam    LORazepam    LORazepam    LORazepam    LORazepam    melatonin    bisacodyl    potassium chloride **OR** potassium chloride    magnesium sulfate in dextrose 5%    magnesium sulfate in sterile water for injection    acetaminophen **OR** HYDROcodone-acetaminophen **OR** [DISCONTINUED] HYDROcodone-acetaminophen    glucose **OR** glucose **OR** glucose-vitamin C **OR** dextrose **OR** glucose **OR** glucose **OR** glucose-vitamin C

## 2025-07-02 NOTE — PROGRESS NOTES
S/p unsuccessful left sided thoracentesis yesterday  Plan: re-attempt thoracentesis with IR today  Discussed procedure,risks, benefits with patient using a Polish language line interpretor. He agrees to proceed.

## 2025-07-02 NOTE — PLAN OF CARE
AxO x4, safety sitter at bedside along with bed and chair alarms in place. Unable to wan 2L nc, Bipap while asleep. VSS, voiding per urinal. Ambulating halls and up in chair this AM. All safety measures in place, frequent nursing rounds made.     Problem: CARDIOVASCULAR - ADULT  Goal: Maintains optimal cardiac output and hemodynamic stability  Description: INTERVENTIONS:  - Monitor vital signs, rhythm, and trends  - Monitor for bleeding, hypotension and signs of decreased cardiac output  - Evaluate effectiveness of vasoactive medications to optimize hemodynamic stability  - Monitor arterial and/or venous puncture sites for bleeding and/or hematoma  - Assess quality of pulses, skin color and temperature  - Assess for signs of decreased coronary artery perfusion - ex. Angina  - Evaluate fluid balance, assess for edema, trend weights  Outcome: Progressing  Goal: Absence of cardiac arrhythmias or at baseline  Description: INTERVENTIONS:  - Continuous cardiac monitoring, monitor vital signs, obtain 12 lead EKG if indicated  - Evaluate effectiveness of antiarrhythmic and heart rate control medications as ordered  - Initiate emergency measures for life threatening arrhythmias  - Monitor electrolytes and administer replacement therapy as ordered  Outcome: Progressing     Problem: RESPIRATORY - ADULT  Goal: Achieves optimal ventilation and oxygenation  Description: INTERVENTIONS:  - Assess for changes in respiratory status  - Assess for changes in mentation and behavior  - Position to facilitate oxygenation and minimize respiratory effort  - Oxygen supplementation based on oxygen saturation or ABGs  - Provide Smoking Cessation handout, if applicable  - Encourage broncho-pulmonary hygiene including cough, deep breathe, Incentive Spirometry  - Assess the need for suctioning and perform as needed  - Assess and instruct to report SOB or any respiratory difficulty  - Respiratory Therapy support as indicated  - Manage/alleviate  anxiety  - Monitor for signs/symptoms of CO2 retention  Outcome: Progressing     Problem: GASTROINTESTINAL - ADULT  Goal: Minimal or absence of nausea and vomiting  Description: INTERVENTIONS:  - Maintain adequate hydration with IV or PO as ordered and tolerated  - Nasogastric tube to low intermittent suction as ordered  - Evaluate effectiveness of ordered antiemetic medications  - Provide nonpharmacologic comfort measures as appropriate  - Advance diet as tolerated, if ordered  - Obtain nutritional consult as needed  - Evaluate fluid balance  Outcome: Progressing  Goal: Maintains or returns to baseline bowel function  Description: INTERVENTIONS:  - Assess bowel function  - Maintain adequate hydration with IV or PO as ordered and tolerated  - Evaluate effectiveness of GI medications  - Encourage mobilization and activity  - Obtain nutritional consult as needed  - Establish a toileting routine/schedule  - Consider collaborating with pharmacy to review patient's medication profile  Outcome: Progressing     Problem: GENITOURINARY - ADULT  Goal: Absence of urinary retention  Description: INTERVENTIONS:  - Assess patient’s ability to void and empty bladder  - Monitor intake/output and perform bladder scan as needed  - Follow urinary retention protocol/standard of care  - Consider collaborating with pharmacy to review patient's medication profile  - Implement strategies to promote bladder emptying  Outcome: Progressing     Problem: METABOLIC/FLUID AND ELECTROLYTES - ADULT  Goal: Glucose maintained within prescribed range  Description: INTERVENTIONS:  - Monitor Blood Glucose as ordered  - Assess for signs and symptoms of hyperglycemia and hypoglycemia  - Administer ordered medications to maintain glucose within target range  - Assess barriers to adequate nutritional intake and initiate nutrition consult as needed  - Instruct patient on self management of diabetes  Outcome: Progressing  Goal: Electrolytes maintained within  normal limits  Description: INTERVENTIONS:  - Monitor labs and rhythm and assess patient for signs and symptoms of electrolyte imbalances  - Administer electrolyte replacement as ordered  - Monitor response to electrolyte replacements, including rhythm and repeat lab results as appropriate  - Fluid restriction as ordered  - Instruct patient on fluid and nutrition restrictions as appropriate  Outcome: Progressing  Goal: Hemodynamic stability and optimal renal function maintained  Description: INTERVENTIONS:  - Monitor labs and assess for signs and symptoms of volume excess or deficit  - Monitor intake, output and patient weight  - Monitor urine specific gravity, serum osmolarity and serum sodium as indicated or ordered  - Monitor response to interventions for patient's volume status, including labs, urine output, blood pressure (other measures as available)  - Encourage oral intake as appropriate  - Instruct patient on fluid and nutrition restrictions as appropriate  Outcome: Progressing     Problem: SKIN/TISSUE INTEGRITY - ADULT  Goal: Incision(s), wounds(s) or drain site(s) healing without S/S of infection  Description: INTERVENTIONS:  - Assess and document risk factors for pressure ulcer development  - Assess and document skin integrity  - Assess and document dressing/incision, wound bed, drain sites and surrounding tissue  - Implement wound care per orders  - Initiate isolation precautions as appropriate  - Initiate Pressure Ulcer prevention bundle as indicated  Outcome: Progressing  Goal: Oral mucous membranes remain intact  Description: INTERVENTIONS  - Assess oral mucosa and hygiene practices  - Implement preventative oral hygiene regimen  - Implement oral medicated treatments as ordered  Outcome: Progressing     Problem: HEMATOLOGIC - ADULT  Goal: Maintains hematologic stability  Description: INTERVENTIONS  - Assess for signs and symptoms of bleeding or hemorrhage  - Monitor labs and vital signs for trends  -  Administer supportive blood products/factors, fluids and medications as ordered and appropriate  - Administer supportive blood products/factors as ordered and appropriate  Outcome: Progressing  Goal: Free from bleeding injury  Description: (Example usage: patient with low platelets)  INTERVENTIONS:  - Avoid intramuscular injections, enemas and rectal medication administration  - Ensure safe mobilization of patient  - Hold pressure on venipuncture sites to achieve adequate hemostasis  - Assess for signs and symptoms of internal bleeding  - Monitor lab trends  - Patient is to report abnormal signs of bleeding to staff  - Avoid use of toothpicks and dental floss  - Use electric shaver for shaving  - Use soft bristle tooth brush  - Limit straining and forceful nose blowing  Outcome: Progressing     Problem: MUSCULOSKELETAL - ADULT  Goal: Return mobility to safest level of function  Description: INTERVENTIONS:  - Assess patient stability and activity tolerance for standing, transferring and ambulating w/ or w/o assistive devices  - Assist with transfers and ambulation using safe patient handling equipment as needed  - Ensure adequate protection for wounds/incisions during mobilization  - Obtain PT/OT consults as needed  - Advance activity as appropriate  - Communicate ordered activity level and limitations with patient/family  Outcome: Progressing     Problem: NEUROLOGICAL - ADULT  Goal: Achieves stable or improved neurological status  Description: INTERVENTIONS  - Assess for and report changes in neurological status  - Initiate measures to prevent increased intracranial pressure  - Maintain blood pressure and fluid volume within ordered parameters to optimize cerebral perfusion and minimize risk of hemorrhage  - Monitor temperature, glucose, and sodium. Initiate appropriate interventions as ordered  Outcome: Progressing     Problem: Patient Centered Care  Goal: Patient preferences are identified and integrated in the  patient's plan of care  Description: Interventions:  - What would you like us to know as we care for you?   - Provide timely, complete, and accurate information to patient/family  - Incorporate patient and family knowledge, values, beliefs, and cultural backgrounds into the planning and delivery of care  - Encourage patient/family to participate in care and decision-making at the level they choose  - Honor patient and family perspectives and choices  Outcome: Progressing     Problem: Patient/Family Goals  Goal: Patient/Family Long Term Goal  Description: Patient's Long Term Goal:     Interventions:  -   - See additional Care Plan goals for specific interventions  Outcome: Progressing  Goal: Patient/Family Short Term Goal  Description: Patient's Short Term Goal:     Interventions:   -   - See additional Care Plan goals for specific interventions  Outcome: Progressing     Problem: Diabetes/Glucose Control  Goal: Glucose maintained within prescribed range  Description: INTERVENTIONS:  - Monitor Blood Glucose as ordered  - Assess for signs and symptoms of hyperglycemia and hypoglycemia  - Administer ordered medications to maintain glucose within target range  - Assess barriers to adequate nutritional intake and initiate nutrition consult as needed  - Instruct patient on self management of diabetes  Outcome: Progressing     Problem: Impaired Functional Mobility  Goal: Achieve highest/safest level of mobility/gait  Description: Interventions:  - Assess patient's functional ability and stability  - Promote increasing activity/tolerance for mobility and gait  - Educate and engage patient/family in tolerated activity level and precautions    Outcome: Progressing     Problem: Impaired Cognition  Goal: Patient will exhibit improved attention, thought processing and/or memory  Description: Interventions:    Outcome: Progressing

## 2025-07-02 NOTE — PROGRESS NOTES
Pt had a left sided ultra sound guided thoracentesis at bedside done by IR. 400cc removed. Pt tolerated well. IR staff spoke Polish so she assisted this RN in reinforcing pt's fluid restriction because he was requesting orange juice. Wife at bedside, sitter at bedside. Pt ambulated in the [place] and back and forth to bathroom twice. O2 sats 93% on room air but pt was c/o SOB so O2 1L NC reapplied.

## 2025-07-02 NOTE — BRIEF PROCEDURE NOTE
Patient in room 233. Patient A+Ox4, MD to bedside to confirm consent and explain procedure. Patient sitting upright, hinged over table, draped in sterile fashion. Patient received 6mL of Lidocaine for local anesthetic. MD drained 400mL of serosanguineous fluid from left side, patient tolerated procedure well, samples sent for studies. Site covered with sterile bandage. Site clean, dry, and intact without signs of bleeding or hematoma. Report given to Rae JOE.

## 2025-07-03 ENCOUNTER — APPOINTMENT (OUTPATIENT)
Dept: GENERAL RADIOLOGY | Facility: HOSPITAL | Age: 60
DRG: 219 | End: 2025-07-03
Attending: PHYSICIAN ASSISTANT
Payer: MEDICAID

## 2025-07-03 VITALS
HEIGHT: 66 IN | HEART RATE: 73 BPM | RESPIRATION RATE: 27 BRPM | SYSTOLIC BLOOD PRESSURE: 120 MMHG | OXYGEN SATURATION: 95 % | TEMPERATURE: 98 F | WEIGHT: 195.75 LBS | DIASTOLIC BLOOD PRESSURE: 60 MMHG | BODY MASS INDEX: 31.46 KG/M2

## 2025-07-03 LAB
ANION GAP SERPL CALC-SCNC: 5 MMOL/L (ref 0–18)
BUN BLD-MCNC: 15 MG/DL (ref 9–23)
BUN/CREAT SERPL: 18.3 (ref 10–20)
CALCIUM BLD-MCNC: 8.3 MG/DL (ref 8.7–10.4)
CHLORIDE SERPL-SCNC: 100 MMOL/L (ref 98–112)
CO2 SERPL-SCNC: 31 MMOL/L (ref 21–32)
CREAT BLD-MCNC: 0.82 MG/DL (ref 0.7–1.3)
DEPRECATED RDW RBC AUTO: 47.1 FL (ref 35.1–46.3)
EGFRCR SERPLBLD CKD-EPI 2021: 100 ML/MIN/1.73M2 (ref 60–?)
ERYTHROCYTE [DISTWIDTH] IN BLOOD BY AUTOMATED COUNT: 15.8 % (ref 11–15)
GLUCOSE BLD-MCNC: 210 MG/DL (ref 70–99)
GLUCOSE BLDC GLUCOMTR-MCNC: 169 MG/DL (ref 70–99)
GLUCOSE BLDC GLUCOMTR-MCNC: 203 MG/DL (ref 70–99)
GLUCOSE BLDC GLUCOMTR-MCNC: 270 MG/DL (ref 70–99)
HCT VFR BLD AUTO: 30.9 % (ref 39–53)
HGB BLD-MCNC: 9.5 G/DL (ref 13–17.5)
MCH RBC QN AUTO: 25.4 PG (ref 26–34)
MCHC RBC AUTO-ENTMCNC: 30.7 G/DL (ref 31–37)
MCV RBC AUTO: 82.6 FL (ref 80–100)
OSMOLALITY SERPL CALC.SUM OF ELEC: 289 MOSM/KG (ref 275–295)
PLATELET # BLD AUTO: 766 10(3)UL (ref 150–450)
POTASSIUM SERPL-SCNC: 4.4 MMOL/L (ref 3.5–5.1)
RBC # BLD AUTO: 3.74 X10(6)UL (ref 4.3–5.7)
SODIUM SERPL-SCNC: 136 MMOL/L (ref 136–145)
WBC # BLD AUTO: 9 X10(3) UL (ref 4–11)

## 2025-07-03 PROCEDURE — 71046 X-RAY EXAM CHEST 2 VIEWS: CPT | Performed by: RADIOLOGY

## 2025-07-03 PROCEDURE — 99233 SBSQ HOSP IP/OBS HIGH 50: CPT | Performed by: INTERNAL MEDICINE

## 2025-07-03 PROCEDURE — 99239 HOSP IP/OBS DSCHRG MGMT >30: CPT | Performed by: HOSPITALIST

## 2025-07-03 RX ORDER — FUROSEMIDE 20 MG/1
20 TABLET ORAL DAILY
Qty: 30 TABLET | Refills: 0 | Status: SHIPPED | OUTPATIENT
Start: 2025-07-04

## 2025-07-03 RX ORDER — PANTOPRAZOLE SODIUM 40 MG/1
40 TABLET, DELAYED RELEASE ORAL
Qty: 60 TABLET | Refills: 0 | Status: SHIPPED | OUTPATIENT
Start: 2025-07-03

## 2025-07-03 RX ORDER — POTASSIUM CHLORIDE 750 MG/1
10 TABLET, EXTENDED RELEASE ORAL DAILY
Status: DISCONTINUED | OUTPATIENT
Start: 2025-07-04 | End: 2025-07-03

## 2025-07-03 RX ORDER — FOLIC ACID 1 MG/1
1 TABLET ORAL DAILY
Qty: 30 TABLET | Refills: 0 | Status: SHIPPED | OUTPATIENT
Start: 2025-07-04

## 2025-07-03 RX ORDER — ESCITALOPRAM OXALATE 5 MG/1
5 TABLET ORAL NIGHTLY
Qty: 30 TABLET | Refills: 0 | Status: SHIPPED | OUTPATIENT
Start: 2025-07-03

## 2025-07-03 RX ORDER — COLCHICINE 0.6 MG/1
0.3 TABLET ORAL EVERY OTHER DAY
Qty: 10 TABLET | Refills: 0 | Status: SHIPPED | OUTPATIENT
Start: 2025-07-04

## 2025-07-03 RX ORDER — POTASSIUM CHLORIDE 750 MG/1
10 TABLET, EXTENDED RELEASE ORAL DAILY
Qty: 30 TABLET | Refills: 0 | Status: SHIPPED | OUTPATIENT
Start: 2025-07-04

## 2025-07-03 RX ORDER — AMIODARONE HYDROCHLORIDE 200 MG/1
200 TABLET ORAL DAILY
Qty: 30 TABLET | Refills: 0 | Status: SHIPPED | OUTPATIENT
Start: 2025-07-04

## 2025-07-03 RX ORDER — BUDESONIDE 0.5 MG/2ML
0.5 INHALANT ORAL 2 TIMES DAILY
Qty: 1 EACH | Refills: 0 | Status: SHIPPED | OUTPATIENT
Start: 2025-07-03

## 2025-07-03 RX ORDER — ASCORBIC ACID 500 MG
500 TABLET ORAL 2 TIMES DAILY
Qty: 14 TABLET | Refills: 0 | Status: SHIPPED | OUTPATIENT
Start: 2025-07-03

## 2025-07-03 RX ORDER — SACUBITRIL AND VALSARTAN 49; 51 MG/1; MG/1
1 TABLET, FILM COATED ORAL 2 TIMES DAILY
Qty: 60 TABLET | Refills: 0 | Status: SHIPPED | OUTPATIENT
Start: 2025-07-03

## 2025-07-03 RX ORDER — DOXEPIN HYDROCHLORIDE 50 MG/1
1 CAPSULE ORAL DAILY
Qty: 30 TABLET | Refills: 0 | Status: SHIPPED | OUTPATIENT
Start: 2025-07-04

## 2025-07-03 RX ORDER — FUROSEMIDE 20 MG/1
20 TABLET ORAL DAILY
Status: DISCONTINUED | OUTPATIENT
Start: 2025-07-04 | End: 2025-07-03

## 2025-07-03 NOTE — PLAN OF CARE
Pt medically cleared for discharge per [facility] & all consulting services. Spoke to Pt's daughter three times throughout the day & she states she will be on the unit by 18:30 to review Mr. Lizama's medication reconciliation, follow up appointments & all discharge instructions.    19:33- Pt's English speaking daughter declined coming to the unit to assist in communicating salient discharge information.   With the use of a Polish , RN covered Mr. Lizama's medication reconciliation, follow up appointments & all discharge instructions. Pt's wife initially arrived at the bedside to participate in reviewing the documentation, but she seemed disengaged, used the restroom during the discharge education. Pt appeared to be very anxious to leave & said, \"Everything's fine, I want to go now.\" Pt declined having any questions. In addition, Pt declined his device information cards.  Removed RUE PICC line & Pt was escorted to the lobby via wheelchair where his wife was waiting to drive him home. All personal belongings sent home accordingly.    Problem: CARDIOVASCULAR - ADULT  Goal: Maintains optimal cardiac output and hemodynamic stability  Description: INTERVENTIONS:  - Monitor vital signs, rhythm, and trends  - Monitor for bleeding, hypotension and signs of decreased cardiac output  - Evaluate effectiveness of vasoactive medications to optimize hemodynamic stability  - Monitor arterial and/or venous puncture sites for bleeding and/or hematoma  - Assess quality of pulses, skin color and temperature  - Assess for signs of decreased coronary artery perfusion - ex. Angina  - Evaluate fluid balance, assess for edema, trend weights  Outcome: Adequate for Discharge  Goal: Absence of cardiac arrhythmias or at baseline  Description: INTERVENTIONS:  - Continuous cardiac monitoring, monitor vital signs, obtain 12 lead EKG if indicated  - Evaluate effectiveness of antiarrhythmic and heart rate control medications as  ordered  - Initiate emergency measures for life threatening arrhythmias  - Monitor electrolytes and administer replacement therapy as ordered  Outcome: Adequate for Discharge     Problem: RESPIRATORY - ADULT  Goal: Achieves optimal ventilation and oxygenation  Description: INTERVENTIONS:  - Assess for changes in respiratory status  - Assess for changes in mentation and behavior  - Position to facilitate oxygenation and minimize respiratory effort  - Oxygen supplementation based on oxygen saturation or ABGs  - Provide Smoking Cessation handout, if applicable  - Encourage broncho-pulmonary hygiene including cough, deep breathe, Incentive Spirometry  - Assess the need for suctioning and perform as needed  - Assess and instruct to report SOB or any respiratory difficulty  - Respiratory Therapy support as indicated  - Manage/alleviate anxiety  - Monitor for signs/symptoms of CO2 retention  Outcome: Adequate for Discharge     Problem: GASTROINTESTINAL - ADULT  Goal: Minimal or absence of nausea and vomiting  Description: INTERVENTIONS:  - Maintain adequate hydration with IV or PO as ordered and tolerated  - Nasogastric tube to low intermittent suction as ordered  - Evaluate effectiveness of ordered antiemetic medications  - Provide nonpharmacologic comfort measures as appropriate  - Advance diet as tolerated, if ordered  - Obtain nutritional consult as needed  - Evaluate fluid balance  Outcome: Adequate for Discharge  Goal: Maintains or returns to baseline bowel function  Description: INTERVENTIONS:  - Assess bowel function  - Maintain adequate hydration with IV or PO as ordered and tolerated  - Evaluate effectiveness of GI medications  - Encourage mobilization and activity  - Obtain nutritional consult as needed  - Establish a toileting routine/schedule  - Consider collaborating with pharmacy to review patient's medication profile  Outcome: Adequate for Discharge     Problem: GENITOURINARY - ADULT  Goal: Absence of  urinary retention  Description: INTERVENTIONS:  - Assess patient’s ability to void and empty bladder  - Monitor intake/output and perform bladder scan as needed  - Follow urinary retention protocol/standard of care  - Consider collaborating with pharmacy to review patient's medication profile  - Implement strategies to promote bladder emptying  Outcome: Adequate for Discharge     Problem: METABOLIC/FLUID AND ELECTROLYTES - ADULT  Goal: Glucose maintained within prescribed range  Description: INTERVENTIONS:  - Monitor Blood Glucose as ordered  - Assess for signs and symptoms of hyperglycemia and hypoglycemia  - Administer ordered medications to maintain glucose within target range  - Assess barriers to adequate nutritional intake and initiate nutrition consult as needed  - Instruct patient on self management of diabetes  Outcome: Adequate for Discharge  Goal: Electrolytes maintained within normal limits  Description: INTERVENTIONS:  - Monitor labs and rhythm and assess patient for signs and symptoms of electrolyte imbalances  - Administer electrolyte replacement as ordered  - Monitor response to electrolyte replacements, including rhythm and repeat lab results as appropriate  - Fluid restriction as ordered  - Instruct patient on fluid and nutrition restrictions as appropriate  Outcome: Adequate for Discharge  Goal: Hemodynamic stability and optimal renal function maintained  Description: INTERVENTIONS:  - Monitor labs and assess for signs and symptoms of volume excess or deficit  - Monitor intake, output and patient weight  - Monitor urine specific gravity, serum osmolarity and serum sodium as indicated or ordered  - Monitor response to interventions for patient's volume status, including labs, urine output, blood pressure (other measures as available)  - Encourage oral intake as appropriate  - Instruct patient on fluid and nutrition restrictions as appropriate  Outcome: Adequate for Discharge     Problem:  HEMATOLOGIC - ADULT  Goal: Maintains hematologic stability  Description: INTERVENTIONS  - Assess for signs and symptoms of bleeding or hemorrhage  - Monitor labs and vital signs for trends  - Administer supportive blood products/factors, fluids and medications as ordered and appropriate  - Administer supportive blood products/factors as ordered and appropriate  Outcome: Adequate for Discharge  Goal: Free from bleeding injury  Description: (Example usage: patient with low platelets)  INTERVENTIONS:  - Avoid intramuscular injections, enemas and rectal medication administration  - Ensure safe mobilization of patient  - Hold pressure on venipuncture sites to achieve adequate hemostasis  - Assess for signs and symptoms of internal bleeding  - Monitor lab trends  - Patient is to report abnormal signs of bleeding to staff  - Avoid use of toothpicks and dental floss  - Use electric shaver for shaving  - Use soft bristle tooth brush  - Limit straining and forceful nose blowing  Outcome: Adequate for Discharge     Problem: MUSCULOSKELETAL - ADULT  Goal: Return mobility to safest level of function  Description: INTERVENTIONS:  - Assess patient stability and activity tolerance for standing, transferring and ambulating w/ or w/o assistive devices  - Assist with transfers and ambulation using safe patient handling equipment as needed  - Ensure adequate protection for wounds/incisions during mobilization  - Obtain PT/OT consults as needed  - Advance activity as appropriate  - Communicate ordered activity level and limitations with patient/family  Outcome: Adequate for Discharge

## 2025-07-03 NOTE — PROGRESS NOTES
[facility]  part of [facility]    Progress Note    Jose Alberto Lizama Patient Status:  Inpatient    1965 MRN [id number]   Location [facility] 2W/SW Attending Angel Damian MD   Hosp Day # 14 PCP Fiona Sweeney MD     Subjective:  Pt sitting in chair with sitter at bedside for safety. Translation lined used for interpretation # [id number]. Pt has no complaints: states he feels \"perfect\". Feels ready for discharge home today. Denies pain and SOB.   No visitors at bedside.     Objective:  /51 (BP Location: Left arm)   Pulse 74   Temp 98.5 °F (36.9 °C) (Oral)   Resp 20   Ht 5' 6\" (1.676 m)   Wt 195 lb 12.3 oz (88.8 kg)   SpO2 95%   BMI 31.60 kg/m²     Temp (24hrs), Av.1 °F (36.7 °C), Min:97.5 °F (36.4 °C), Max:98.5 °F (36.9 °C)      Intake/Output:    Intake/Output Summary (Last 24 hours) at 7/3/2025 1110  Last data filed at 7/3/2025 1018  Gross per 24 hour   Intake 1000 ml   Output 400 ml   Net 600 ml       Wt Readings from Last 3 Encounters:   25 195 lb 12.3 oz (88.8 kg)   25 201 lb 12.8 oz (91.5 kg)   25 200 lb (90.7 kg)       Allergies:  Allergies[1]    Labs:  Lab Results   Component Value Date    WBC 9.0 2025    HGB 9.5 2025    HCT 30.9 2025    .0 2025    CREATSERUM 0.82 2025    BUN 15 2025     2025    K 4.4 2025     2025    CO2 31.0 2025     2025    CA 8.3 2025       Physical Exam:  Blood pressure 100/51, pulse 74, temperature 98.5 °F (36.9 °C), temperature source Oral, resp. rate 20, height 5' 6\" (1.676 m), weight 195 lb 12.3 oz (88.8 kg), SpO2 95%.  General: NAD  Neck: No JVD  Lungs: Clear and diminished > on right side  Heart: RRR, S1, S2  Abdomen: Soft/Round, NT/ND, BS+x4/+Flatus/+BM   Extremities: Warm, dry, trace LE edema bilat  Pulses: 1+ bilat DP  Skin: sternotomy incision BETTIE=CDI/bilateral leg incisions BETTIE=CDI   Neurological:  AAOx3,  MAEW    Assessment/Plan:  S/P CABG x 2/AVR/MELINDA CLIP/INSERTION OF LEFT FEMORAL IABP POD # 14  IABP removed on 6/20  Extubated on 6/24: Encourage pulm toilet: IS/EZ pap. Likely w/pre op ORAL. BiPap at night-currently on room air cinthya well  S/P Left Thoracentesis [id number] removal     Sputum cx 6/22=negative.   Pain meds as needed-limit/avoid narcotics w/post op altered MS. Can use Tylenol.   Post op altered MS w/agitation/confusion/restlessness of unknown etiology-resolved as pt appears at baseline; suspected post op delirium and/or ETOH withdrawal requiring Psych eval on 6/22- following recs. CT brain on 6/20=negative   Hx: HF-GDMT mgt per Cardiology  S/P fall out of bed 6/27 evening w/superficial laceration above right eye-should heal with time-continues CDI w/steri strips. CT head 6/27=negative/Hip X-ray 6/27=okay. Sitter at bedside for safety. Reminded pt to not get up alone.   Altered sleep disturbance/sleep pattern due to pt usually works nights-may have contributed to his post op altered MS  Increase activity-OOB to chair/ambulate in hallway-working with PT/OT- informed by ambulating in hallway yesterday  Scds/Lovenox prophylaxis DVT prevention   HTN - on oral meds: mgt per Cardiology  Elevated ESR=97- likely PPS-continues on Colchicine.   PICC placed 6/26  Expected post op volume overload- continue Lasix for one month post op   Amio protocol for AF prevention as pt considered high risk for post op AF. Post op SR w/freq PACs noted: on PO Amio daily-may stop at discharge if no AF noted post op.    Expected post op anemia w/o clinical significance/stable: may be slightly diluted due to volume overload- Iron x 1 month.   Hx: DM-Continue basal insulin dose today/continue correction scale coverage. Resume home regimen at/near discharge.    Nutrition- w/Swallow eval per ST-following recs.   Hx: BPH- on home Flomax  Hx: PVD- on ASA/Plavix   Blood CX 6/22 =negative   Discharge planning: pt lives with his wife.  Planning for home today which pt agrees. FU appt made for pt  Written and verbal info provided to pt regarding recovery care including incision care and follow up care  Not planning to send with narcotic prescription- can use plain Tylenol OTC for pain.     Plan of care discussed w/patient/RN and CV Surgeon: Dr. Tara Mast, APRN  7/3/2025  11:10 AM       [1] No Known Allergies

## 2025-07-03 NOTE — CM/SW NOTE
07/03/25 1100   Discharge disposition   Expected discharge disposition Home-Health   Post Acute Care Provider Res Home   Discharge transportation Private car     CM received MDO for discharge today.     CM messaged liaison Chanel at [facility] and made aware of discharge plan for today.     RN informed patient about discharge today.     No transportation needs.    DC PLAN: Home with Residential   [phone]    Melissa BEARN RN   Nurse    [phone]

## 2025-07-03 NOTE — SLP NOTE
SPEECH DAILY NOTE - INPATIENT    ASSESSMENT & PLAN   ASSESSMENT  PPE REQUIRED. THIS THERAPIST WORE GLOVES FOR DURATION OF SESSION. HANDS WASHED UPON ENTRANCE/EXIT.    SLP f/u for ongoing dysphagia tx/meal assessment per recommendations of regular/thin liquids per swallow f/u. RN reports pt tolerates diet and medication well with no overt clinical s/s aspiration. Pt denies any swallowing challenges.     Pt positioned upright in bedside chair, alert/cooperative. Pt afebrile, tolerating room air with oxygen status 92% prior to the start of oral trials. SLP reviewed aspiration precautions and safe swallowing compensatory strategies with the patient. Safe swallow guidelines remain written on the white board in purple. Patient v/u. Provided no assistance, pt tolerates hard solids and thin liquids via cup with no overt clinical signs/symptoms of aspiration. Oxygen status remained stable t/o the entire session. Recommend remain on current diet with adherence to aspiration precautions and swallow strategies. No further swallow services warranted at this time. Please re consult if needed. RN alerted with results and recommendations.     MOST RECENT CXR 7/1  CONCLUSION: Findings compatible with CHF. Small-moderate size left pleural effusion without significant interval change. No pneumothorax.        Diet Recommendations - Solids: Regular  Diet Recommendations - Liquids: Thin Liquids     Aspiration Precautions: Upright position, Slow rate, Small bites, Small sips, No straw  Medication Administration Recommendations: One pill at a time    Patient Experiencing Pain: No              Treatment Plan  Treatment Plan/Recommendations: No further inpatient SLP service warranted; Consider ENT if weak vocal quality persists    Interdisciplinary Communication: Plan posted at bedside          GOALS  Goal #1 The patient will tolerate regular consistency and thin liquids without overt signs or symptoms of aspiration with 100 % accuracy over  1-2 session(s).    Revised 7/1/Met 7/3     Goal #2 The patient/family/caregiver will demonstrate understanding and implementation of aspiration precautions and swallow strategies independently over 1-2 session(s).    Met 7/3   Goal #3 The patient will tolerate trial upgrade of hard solid consistency and thin liquids without overt signs or symptoms of aspiration with 100 % accuracy over 1-2 session(s).   Met 7/1   Goal #4 The patient will utilize compensatory strategies as outlined by  BSSE (clinical evaluation) including Slow rate, Small bites, Small sips, Multiple swallows, No straws, Upright 90 degrees, Upright 90 degrees 30 mins after meal, Eliminate distractions, Supervision with meals with PRN assistance 100 % of the time across 2 sessions.    Met 7/3     FOLLOW UP  Follow Up Needed (Documentation Required): No  SLP Follow-up Date: 07/03/25  Duration: 1 week    Session: 6    If you have any questions, please contact Heather Mondragon SLP

## 2025-07-03 NOTE — OCCUPATIONAL THERAPY NOTE
OCCUPATIONAL THERAPY TREATMENT NOTE - INPATIENT        Room Number: 233/233-A     Presenting Problem: CABG    Problem List  Active Problems:    Aortic stenosis    Hx of CABG    Episodic mood disorder    Delirium due to another medical condition    Angina concurrent with and due to arteriosclerosis of CABG    Pre-op evaluation      OCCUPATIONAL THERAPY ASSESSMENT   Patient demonstrates good  progress this session, goals remain in progress.    Patient continues to benefit from continued skilled OT services: at discharge to promote prior level of function and safety with additional support and return home with home health OT.     PLAN DURING HOSPITALIZATION  OT Device Recommendations: TBD  OT Treatment Plan: Balance activities, Energy conservation/work simplification techniques, ADL training, Functional transfer training, UE strengthening/ROM, Endurance training, Cognitive reorientation, Patient/Family education, Patient/Family training, Equipment eval/education, Compensatory technique education     SUBJECTIVE  Educated pt on ADL strategies- pt reports via polish interpretor his family will assist     OBJECTIVE  Precautions: Drain(s),  needed, Sternal, Chest tube to suction (polish)    WEIGHT BEARING RESTRICTION     PAIN ASSESSMENT  Ratin      ACTIVITIES OF DAILY LIVING ASSESSMENT  AM-PAC ‘6-Clicks’ Inpatient Daily Activity Short Form  How much help from another person does the patient currently need…  -   Putting on and taking off regular lower body clothing?: A Little  -   Bathing (including washing, rinsing, drying)?: A Little  -   Toileting, which includes using toilet, bedpan or urinal? : A Little  -   Putting on and taking off regular upper body clothing?: A Little  -   Taking care of personal grooming such as brushing teeth?: A Little  -   Eating meals?: A Little    AM-PAC Score:  Score: 18  Approx Degree of Impairment: 46.65%  Standardized Score (AM-PAC Scale): 38.66  CMS Modifier (G-Code): SYLVESTER        Skilled Therapy Provided: ADL retraining, sternal precautions training, fxl mobiltiy and fxl transfer training; utilized lanaguage line for entire session; pt verbalizing understanding of all educaiton provided; fxl t/f with CGA; fxl mobiltiy with SBA >household distances; completing toileting with CGA, self feed and groom with setup; able to demo figure 4 for LE self care; pt is progressing well; VSS on RA; 1 desaturation after stairs with PT, but recovers <30 seconds with rest.     Will benefit from continued therapy in pre p to return home.     EDUCATION PROVIDED  Patient Education : Role of Occupational Therapy; Plan of Care; Functional Transfer Techniques; Fall Prevention; Surgical Precautions; Posture/Positioning; Edema Reduction; Energy Conservation  Patient's Response to Education: Requires Further Education  Family/Caregiver Education : -- (smae as pt)  Family/Caregiver's Response to Education: Verbalized Understanding; Returned Demonstration    The patient's Approx Degree of Impairment: 46.65% has been calculated based on documentation in the [facility] '6 clicks' Inpatient Daily Activity Short Form.  Research supports that patients with this level of impairment may benefit from HH.  Final disposition will be made by interdisciplinary medical team.    Patient End of Session: Up in chair    OT Goals:        OT Goals: ongoing   Patient will complete functional transfer with mod A at RW level  Comment: ongoing    Patient will complete toileting with mod A  Comment: ongoing    Patient will tolerate standing for 2 minutes in prep for adls with mod A   Comment:ongoing               Goals  on: 07/15/25  Frequency: 3-5x/week    OT Session Time: 23  minutes  Self-Care Home Management:8  minutes  Therapeutic Activity: 15 minute    Bennett Goff, Occupational Therapist, OTR/L ext [phone]

## 2025-07-03 NOTE — PROGRESS NOTES
Pulmonary/ICU/Critical Care Progress Note         Reason for Consultation: post CABG, IABP  Referring Physician: Dr. Pacheco    Subjective:  On RA feels better  S/p left thora by IR on 7/2/25 with 400 ml removed      REVIEW OF SYSTEMS:  Positives and negatives as noted in HPI. All other review of systems otherwise are either limited (due to pt/family inability to provide) or negative.      PAST MEDICAL HISTORY:  Past Medical History[1]      PAST SURGICAL HISTORY:  Past Surgical History[2]      PAST SOCIAL HISTORY:  Social Hx on file[3]      PAST FAMILY HISTORY:  Family History[4]      ALLERGIES:  Allergies[5]      MEDS:  Home Medications:  Medications Taking[6]    Scheduled Medication:  Scheduled Medications[7]  Continuous Infusing Medication:  Medication Infusions[8]  PRN Medications:  PRN Medications[9]       PHYSICAL EXAM:  /55 (BP Location: Left arm)   Pulse 69   Temp 98.5 °F (36.9 °C) (Oral)   Resp 17   Ht 5' 6\" (1.676 m)   Wt 195 lb 12.3 oz (88.8 kg)   SpO2 92%   BMI 31.60 kg/m²      CONSTITUTIONAL: alert NAD  HEENT: atraumatic normocephalic  MOUTH: MMM  NECK/THROAT: no JVD. Trachea midline. No obvious thyromegaly  LUNG: clear upper b/l no wheezing, crackles. Diminished bases. Chest symmetric with respiratory motion. Thora site clean  HEART: midsternal incision healing well, regular rate and rhythm, no obvious murmers or gallops noted  ABD: soft non tender. + bowel sounds. No organomegaly noted  EXT: no clubbing, cyanosis, or edema noted      IMAGES:   CXR 7/1/25  CONCLUSION: Findings compatible with CHF. Small-moderate size left pleural effusion without significant interval change. No pneumothorax.     Chest CT 6/30/25  CONCLUSION:   1. Moderate size left pleural effusion without consolidation.     CXR 6/30/25  CONCLUSIONS:   No new abnormality. Persistent left pleural effusion and left basilar opacities.   Cardiomegaly again seen.   Lines and tubes are unchanged.        LABS:  Recent Labs   Lab  06/30/25  0516 07/01/25  0644 07/02/25  0308 07/03/25  0557   RBC 3.70* 3.78* 3.52* 3.74*   HGB 9.6* 9.9* 9.1* 9.5*   HCT 30.7* 31.8* 30.0* 30.9*   MCV 83.0 84.1 85.2 82.6   MCH 25.9* 26.2 25.9* 25.4*   MCHC 31.3 31.1 30.3* 30.7*   RDW 15.5* 15.6* 15.7* 15.8*   NEPRELIM 6.95 7.25 6.11  --    WBC 10.0 10.4 9.4 9.0   .0* 736.0* 688.0* 766.0*       Recent Labs   Lab 06/30/25 0516 07/01/25 0644 07/02/25  0308 07/03/25  0557   * 174* 160* 210*   BUN 20 23 20 15   CREATSERUM 0.69* 0.82 0.78 0.82   EGFRCR 105 100 101 100   CA 8.4* 8.8 8.4* 8.3*   ALB 3.5 3.8 3.5  --     140 138 136   K 3.6 4.0 4.2 4.4    101 100 100   CO2 32.0 32.0 33.0* 31.0   ALKPHO 58 66 61  --    AST 22 29 23  --    ALT 16 17 15  --    BILT 0.3 0.3 0.4  --    TP 6.0 6.3 5.9  --        ASSESSMENT/PLAN:  CAD s/p multivessel disease and severe aortic stenosis  -s/p CABG x 2 and AV repair on 6/19  -s/p IABP removed since  -on ASA, plavix, amio, bb, lasix, entresto  -CV surgery and cardiology following      Post op respiratory care  -was on full MV support  -weaned off NO as per CV  -extubated 6/24 and on 1-2 LNC 02  -CXR with persistent left effusion vs atelectasis. Checked non contrast chest Ct showed moderate left pleural effusion  -s/p IR left thoracentesis with 400 ml removed and sent for culture  -recommend repeating a CXR in 2-3 weeks and f/u on cultures     Possible PNA with mucus plugging, leukocytosis   -checked ETT asp, blood cx, MRSA nares  -completed empiric zosyn    Possible ETOH use and there was concern for withdrawal?  -reported hx of drinking  -s/p CIWA protocol   -psych following   -thiamine, folic acid, MVI    Hx of vaping  -duonebs and pulmicort nebs. Can stop on discharge  -should stop vaping    PVD  -plavix, ASA    DM2  -insulin/dextrose    BPH  -flomax    Proph  -DVT: lovenox  -GI: pepcid    Dispo  -full code  -discharge planning as per CV surgery     Thank you for the opportunity to care for Jose Alberto  Ezequiel Rahman DO, MPH  Pulmonary Critical Care Medicine  [facility] Pulmonary and Critical Care Medicine                         [1]   Past Medical History:  Diagnosis Date    Aortic stenosis     Back problem     BPH (benign prostatic hyperplasia)     Cardiomyopathy (HCC)     Coronary atherosclerosis     Depression     Diabetes (HCC)     Esophageal reflux     Heart valve disease     High blood pressure     High cholesterol     Peripheral vascular disease     Shortness of breath     related to smoking     Visual impairment     glasses   [2]   Past Surgical History:  Procedure Laterality Date    Cardiac catheterization  2016    [facility]    Cath drug eluting stent      Colonoscopy  04/22/2024    Colonoscopy N/A 4/22/2024    Procedure: COLONOSCOPY;  Surgeon: Attila Li MD;  [facility] ENDOSCOPY    Esophagoscopy,diagnostic  04/22/2024    Dr. Li    Hernia surgery      as a child    Nasal scopy,remv part ethmoid  10/14/2020    Nasal scopy,rmv tiss maxill sinus  10/14/2020    Repair of nasal septum  10/14/2020   [3]   Social History  Socioeconomic History    Marital status:    Tobacco Use    Smoking status: Former     Current packs/day: 1.00     Average packs/day: 1 pack/day for 20.0 years (20.0 ttl pk-yrs)     Types: Cigarettes    Smokeless tobacco: Never   Vaping Use    Vaping status: Every Day    Substances: Nicotine   Substance and Sexual Activity    Alcohol use: Yes     Comment: 1-2 drinks a week    Drug use: No   [4]   Family History  Problem Relation Age of Onset    Diabetes Father     Diabetes Paternal Aunt     Diabetes Paternal Uncle     Seizure Disorder Mother     No Known Problems Brother     No Known Problems Daughter     Glaucoma Neg     Macular degeneration Neg    [5] No Known Allergies  [6]   Outpatient Medications Marked as Taking for the 6/19/25 encounter (Hospital Encounter)   Medication Sig Dispense Refill    ENTRESTO 24-26 MG Oral Tab  Entresto 24 mg-26 mg tablet, [RxNorm: 4385799]      magnesium 250 MG Oral Tab Take 1 tablet (250 mg total) by mouth every other day.      cyanocobalamin 500 MCG Oral Tab Take 1 tablet (500 mcg total) by mouth every other day.      metFORMIN HCl 1000 MG Oral Tab Take 1 tablet (1,000 mg total) by mouth 2 (two) times daily with meals. 180 tablet 3    amLODIPine 5 MG Oral Tab Take 1 tablet (5 mg total) by mouth daily. 90 tablet 3    carvedilol 25 MG Oral Tab Take 1 tablet (25 mg total) by mouth 2 (two) times daily with meals. 180 tablet 3    clopidogrel 75 MG Oral Tab Take 1 tablet (75 mg total) by mouth daily. 90 tablet 3    empagliflozin (JARDIANCE) 25 MG Oral Tab Take 1 tablet (25 mg total) by mouth daily. 90 tablet 3    furosemide 40 MG Oral Tab Take 1 tablet (40 mg total) by mouth daily. 90 tablet 3    [DISCONTINUED] losartan 50 MG Oral Tab Take 1 tablet (50 mg total) by mouth 2 (two) times daily. 180 tablet 3    rosuvastatin 20 MG Oral Tab Take 1 tablet (20 mg total) by mouth nightly. 90 tablet 3    tamsulosin 0.4 MG Oral Cap TAKE 2 CAPSULES BY MOUTH DAILY (Patient taking differently: Take 1 capsule (0.4 mg total) by mouth in the morning and 1 capsule (0.4 mg total) before bedtime.) 180 capsule 3    aspirin 81 MG Oral Tab EC Take 1 tablet (81 mg total) by mouth nightly.     [7]    potassium chloride  10 mEq Oral BID    aspirin  81 mg Oral Daily    docusate  100 mg Oral BID    furosemide  20 mg Intravenous BID (Diuretic)    potassium chloride  10 mEq Oral BID    insulin degludec  15 Units Subcutaneous Daily    insulin aspart  1-5 Units Subcutaneous TID CC    ascorbic acid  500 mg Oral BID    pantoprazole  40 mg Oral BID AC    sacubitril-valsartan  1 tablet Oral BID    carvedilol  12.5 mg Oral BID with meals    colchicine  0.3 mg Oral QOD    ipratropium-albuterol  3 mL Nebulization 2 times daily    escitalopram  5 mg Oral Nightly    amiodarone  200 mg Oral Daily    ferrous sulfate  300 mg Oral BID    budesonide  0.5  mg Nebulization 2 times daily    rosuvastatin  20 mg Per NG Tube Nightly    thiamine  100 mg Oral Daily    multivitamin  1 tablet Oral Daily    folic acid  1 mg Oral Daily    sennosides  8.6 mg Oral BID    enoxaparin  40 mg Subcutaneous Daily    clopidogrel  75 mg Oral Daily    tamsulosin  0.4 mg Oral BID   [8]    dextrose 10%     [9]   labetalol    dextrose 10%    [DISCONTINUED] sodium bicarbonate **AND** lipase-protease-amylase (Lip-Prot-Amyl)    ipratropium-albuterol    LORazepam **OR** LORazepam    LORazepam **OR** LORazepam    LORazepam **OR** LORazepam    LORazepam    LORazepam    LORazepam    LORazepam    melatonin    bisacodyl    potassium chloride **OR** potassium chloride    magnesium sulfate in dextrose 5%    magnesium sulfate in sterile water for injection    acetaminophen **OR** HYDROcodone-acetaminophen **OR** [DISCONTINUED] HYDROcodone-acetaminophen    glucose **OR** glucose **OR** glucose-vitamin C **OR** dextrose **OR** glucose **OR** glucose **OR** glucose-vitamin C

## 2025-07-03 NOTE — PLAN OF CARE
Pt A&OX4 on 2L/RA overnight. Sitter at bedside.     Bed locked in lowest position, call light within reach. Safety maintained.   Problem: CARDIOVASCULAR - ADULT  Goal: Maintains optimal cardiac output and hemodynamic stability  Description: INTERVENTIONS:  - Monitor vital signs, rhythm, and trends  - Monitor for bleeding, hypotension and signs of decreased cardiac output  - Evaluate effectiveness of vasoactive medications to optimize hemodynamic stability  - Monitor arterial and/or venous puncture sites for bleeding and/or hematoma  - Assess quality of pulses, skin color and temperature  - Assess for signs of decreased coronary artery perfusion - ex. Angina  - Evaluate fluid balance, assess for edema, trend weights  Outcome: Progressing  Goal: Absence of cardiac arrhythmias or at baseline  Description: INTERVENTIONS:  - Continuous cardiac monitoring, monitor vital signs, obtain 12 lead EKG if indicated  - Evaluate effectiveness of antiarrhythmic and heart rate control medications as ordered  - Initiate emergency measures for life threatening arrhythmias  - Monitor electrolytes and administer replacement therapy as ordered  Outcome: Progressing     Problem: RESPIRATORY - ADULT  Goal: Achieves optimal ventilation and oxygenation  Description: INTERVENTIONS:  - Assess for changes in respiratory status  - Assess for changes in mentation and behavior  - Position to facilitate oxygenation and minimize respiratory effort  - Oxygen supplementation based on oxygen saturation or ABGs  - Provide Smoking Cessation handout, if applicable  - Encourage broncho-pulmonary hygiene including cough, deep breathe, Incentive Spirometry  - Assess the need for suctioning and perform as needed  - Assess and instruct to report SOB or any respiratory difficulty  - Respiratory Therapy support as indicated  - Manage/alleviate anxiety  - Monitor for signs/symptoms of CO2 retention  Outcome: Progressing     Problem: GASTROINTESTINAL -  ADULT  Goal: Minimal or absence of nausea and vomiting  Description: INTERVENTIONS:  - Maintain adequate hydration with IV or PO as ordered and tolerated  - Nasogastric tube to low intermittent suction as ordered  - Evaluate effectiveness of ordered antiemetic medications  - Provide nonpharmacologic comfort measures as appropriate  - Advance diet as tolerated, if ordered  - Obtain nutritional consult as needed  - Evaluate fluid balance  Outcome: Progressing  Goal: Maintains or returns to baseline bowel function  Description: INTERVENTIONS:  - Assess bowel function  - Maintain adequate hydration with IV or PO as ordered and tolerated  - Evaluate effectiveness of GI medications  - Encourage mobilization and activity  - Obtain nutritional consult as needed  - Establish a toileting routine/schedule  - Consider collaborating with pharmacy to review patient's medication profile  Outcome: Progressing     Problem: GENITOURINARY - ADULT  Goal: Absence of urinary retention  Description: INTERVENTIONS:  - Assess patient’s ability to void and empty bladder  - Monitor intake/output and perform bladder scan as needed  - Follow urinary retention protocol/standard of care  - Consider collaborating with pharmacy to review patient's medication profile  - Implement strategies to promote bladder emptying  Outcome: Progressing     Problem: METABOLIC/FLUID AND ELECTROLYTES - ADULT  Goal: Glucose maintained within prescribed range  Description: INTERVENTIONS:  - Monitor Blood Glucose as ordered  - Assess for signs and symptoms of hyperglycemia and hypoglycemia  - Administer ordered medications to maintain glucose within target range  - Assess barriers to adequate nutritional intake and initiate nutrition consult as needed  - Instruct patient on self management of diabetes  Outcome: Progressing  Goal: Electrolytes maintained within normal limits  Description: INTERVENTIONS:  - Monitor labs and rhythm and assess patient for signs and  symptoms of electrolyte imbalances  - Administer electrolyte replacement as ordered  - Monitor response to electrolyte replacements, including rhythm and repeat lab results as appropriate  - Fluid restriction as ordered  - Instruct patient on fluid and nutrition restrictions as appropriate  Outcome: Progressing  Goal: Hemodynamic stability and optimal renal function maintained  Description: INTERVENTIONS:  - Monitor labs and assess for signs and symptoms of volume excess or deficit  - Monitor intake, output and patient weight  - Monitor urine specific gravity, serum osmolarity and serum sodium as indicated or ordered  - Monitor response to interventions for patient's volume status, including labs, urine output, blood pressure (other measures as available)  - Encourage oral intake as appropriate  - Instruct patient on fluid and nutrition restrictions as appropriate  Outcome: Progressing     Problem: SKIN/TISSUE INTEGRITY - ADULT  Goal: Incision(s), wounds(s) or drain site(s) healing without S/S of infection  Description: INTERVENTIONS:  - Assess and document risk factors for pressure ulcer development  - Assess and document skin integrity  - Assess and document dressing/incision, wound bed, drain sites and surrounding tissue  - Implement wound care per orders  - Initiate isolation precautions as appropriate  - Initiate Pressure Ulcer prevention bundle as indicated  Outcome: Progressing  Goal: Oral mucous membranes remain intact  Description: INTERVENTIONS  - Assess oral mucosa and hygiene practices  - Implement preventative oral hygiene regimen  - Implement oral medicated treatments as ordered  Outcome: Progressing     Problem: HEMATOLOGIC - ADULT  Goal: Maintains hematologic stability  Description: INTERVENTIONS  - Assess for signs and symptoms of bleeding or hemorrhage  - Monitor labs and vital signs for trends  - Administer supportive blood products/factors, fluids and medications as ordered and appropriate  -  Administer supportive blood products/factors as ordered and appropriate  Outcome: Progressing  Goal: Free from bleeding injury  Description: (Example usage: patient with low platelets)  INTERVENTIONS:  - Avoid intramuscular injections, enemas and rectal medication administration  - Ensure safe mobilization of patient  - Hold pressure on venipuncture sites to achieve adequate hemostasis  - Assess for signs and symptoms of internal bleeding  - Monitor lab trends  - Patient is to report abnormal signs of bleeding to staff  - Avoid use of toothpicks and dental floss  - Use electric shaver for shaving  - Use soft bristle tooth brush  - Limit straining and forceful nose blowing  Outcome: Progressing     Problem: MUSCULOSKELETAL - ADULT  Goal: Return mobility to safest level of function  Description: INTERVENTIONS:  - Assess patient stability and activity tolerance for standing, transferring and ambulating w/ or w/o assistive devices  - Assist with transfers and ambulation using safe patient handling equipment as needed  - Ensure adequate protection for wounds/incisions during mobilization  - Obtain PT/OT consults as needed  - Advance activity as appropriate  - Communicate ordered activity level and limitations with patient/family  Outcome: Progressing     Problem: NEUROLOGICAL - ADULT  Goal: Achieves stable or improved neurological status  Description: INTERVENTIONS  - Assess for and report changes in neurological status  - Initiate measures to prevent increased intracranial pressure  - Maintain blood pressure and fluid volume within ordered parameters to optimize cerebral perfusion and minimize risk of hemorrhage  - Monitor temperature, glucose, and sodium. Initiate appropriate interventions as ordered  Outcome: Progressing     Problem: Patient Centered Care  Goal: Patient preferences are identified and integrated in the patient's plan of care  Description: Interventions:  - What would you like us to know as we care for  you?   - Provide timely, complete, and accurate information to patient/family  - Incorporate patient and family knowledge, values, beliefs, and cultural backgrounds into the planning and delivery of care  - Encourage patient/family to participate in care and decision-making at the level they choose  - Honor patient and family perspectives and choices  Outcome: Progressing     Problem: Diabetes/Glucose Control  Goal: Glucose maintained within prescribed range  Description: INTERVENTIONS:  - Monitor Blood Glucose as ordered  - Assess for signs and symptoms of hyperglycemia and hypoglycemia  - Administer ordered medications to maintain glucose within target range  - Assess barriers to adequate nutritional intake and initiate nutrition consult as needed  - Instruct patient on self management of diabetes  Outcome: Progressing     Problem: Impaired Functional Mobility  Goal: Achieve highest/safest level of mobility/gait  Description: Interventions:  - Assess patient's functional ability and stability  - Promote increasing activity/tolerance for mobility and gait  - Educate and engage patient/family in tolerated activity level and precautions    Outcome: Progressing     Problem: Impaired Cognition  Goal: Patient will exhibit improved attention, thought processing and/or memory  Description: Interventions:  - Minimize distractions in the room when full attention is required  Outcome: Progressing

## 2025-07-03 NOTE — PROGRESS NOTES
Cardiology Progress Note    Jose Alberto Lizama Patient Status:  Inpatient    1965 MRN [id number]   Location [facility] 2W/SW Attending Nannette Pelayo MD   Hosp Day # 14 PCP Fiona Sweeney MD     Interval Note:  Patient seen and examined  No events overnight    Denies any complaints.    Telemetry sinus rhythm    --------------------------------------------------------------------------------------------------------------------------------  ROS 12 systems reviewed, pertinent findings above.  ROS    History:  Past Medical History[1]  Past Surgical History[2]  Family History[3]   reports that he has quit smoking. His smoking use included cigarettes. He has a 20 pack-year smoking history. He has never used smokeless tobacco. He reports current alcohol use. He reports that he does not use drugs.    Objective:   Temp: 98.5 °F (36.9 °C)  Pulse: 74  Resp: 20  BP: 100/51    Intake/Output:     Intake/Output Summary (Last 24 hours) at 7/3/2025 1141  Last data filed at 7/3/2025 1018  Gross per 24 hour   Intake 1000 ml   Output 400 ml   Net 600 ml       Physical Exam:    General: AO x 2, no acute distress  HEENT: Normocephalic, anicteric sclera, neck supple.  Neck: No JVD, carotids 2+, no bruits.  Cardiac: Regular rate and rhythm. S1, S2 normal. No murmur, pericardial rub, S3.  Lungs: Clear without wheezes, rales, rhonchi or dullness.  Normal excursions and effort.  Decreased breath sounds left side base  Abdomen: Soft, non-tender. BS-present.  Extremities: Without clubbing, cyanosis or edema.  Peripheral pulses are 2+.  Neurologic: Non-focal  Skin: Warm and dry.       Assessment   Severe AS, CAD status post CABG X2 and SAVR,  MELINDA clip 6/19/25 (LIMA-LAD, VG-D; Inspiris #21  tissue valve, #40 MELINDA clip)  Hypoxia  Mechanical fall  Ischemic dermopathy, EF 30%  Anemia  Hypotension  Hyperlipidemia  Diabetes  Peripheral vascular disease        Plan  Patient appears hemodynamically stable  Blood pressure in heart rate,  telemetry stable  Oxygen dependent, hypoxia now improving status post IR guided thoracentesis 400 cc removed.  Euvolemic on exam plan for chest x-ray today should go home on Lasix 20 mg daily.  Continue amiodarone, aspirin, Coreg, statin, Entresto  GI bleeding has stopped was stool Hemoccult positive Plavix and aspirin are resumed.  Diabetes mellitus managed per primary      Thank you for allowing me to take part in the care of Jose Alberto Lizama. Please call with any questions of concerns.    L3    Plan for home discharge today will sign off  David Osullivan MD         [1]   Past Medical History:   Aortic stenosis    Back problem    BPH (benign prostatic hyperplasia)    Cardiomyopathy (HCC)    Coronary atherosclerosis    Depression    Diabetes (HCC)    Esophageal reflux    Heart valve disease    High blood pressure    High cholesterol    Peripheral vascular disease    Shortness of breath    related to smoking     Visual impairment    glasses   [2]   Past Surgical History:  Procedure Laterality Date    Cardiac catheterization  2016    [facility]    Cath drug eluting stent      Colonoscopy  04/22/2024    Colonoscopy N/A 4/22/2024    Procedure: COLONOSCOPY;  Surgeon: Attila Li MD;  Location: [facility] ENDOSCOPY    Esophagoscopy,diagnostic  04/22/2024    Dr. Li    Hernia surgery      as a child    Nasal scopy,remv part ethmoid  10/14/2020    Nasal scopy,rmv tiss maxill sinus  10/14/2020    Repair of nasal septum  10/14/2020   [3]   Family History  Problem Relation Age of Onset    Diabetes Father     Diabetes Paternal Aunt     Diabetes Paternal Uncle     Seizure Disorder Mother     No Known Problems Brother     No Known Problems Daughter     Glaucoma Neg     Macular degeneration Neg

## 2025-07-03 NOTE — DISCHARGE SUMMARY
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    TCM Follow-Up Recommendation:  LACE 29-58: Moderate Risk of readmission after discharge from the hospital.    Problem List: Problem List[1]    Reason for Admission: elective cabg, avr     Physical Exam:   Vitals:    25 1300   BP:    Pulse: 64   Resp: 22   Temp:    General: No acute distress.   Respiratory: CTAB with decreased entry at bases. No wheezes. No rhonchi.  Cardiovascular: S1, S2. Regular rate and rhythm. No murmurs, rubs or gallops.   Abdomen: Soft, nontender, nondistended.  Positive bowel sounds. No rebound or guarding.  Neurologic: No focal neurological deficits.   Musculoskeletal: Moves all extremities.  Extremities: No edema.      History of Present Illness:   Per Dr. Obrien    The patient is a 59-year-old  male with a history of coronary artery disease, multi-vessel, peripheral arterial disease. Had a history of LAD and diagonal PCI stents. Last intervention . 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 He had recurrent dyspnea on exertion and chest pain. Cardiac angiogram on May 17 showed LAD recurrent stenosis at 70%, ostial first diagonal was 90%, left circumflex 100% stenosis. He had carotid ultrasound, showed 50% bilaterally, no hemodynamic stenosis. Referred to Cardiac Surgery and scheduled today for above-mentioned procedure by Dr. Pacheco. [facility] monitoring.     Hospital Course:   Post-op anemia  IMPROVED  - baseline Hb ~12  - Hb this morning 9.5  - stool for occult blood positive  - ASA/Plavix on hold at this time -> now resumed      CAD status post CABG and AVR  Severe aortic stenosis  - difficult extubation but patient extubated successfully 06/24  - Management per cardiology and CV sx  Continue ASA, Statin, Coreg, Entresto  Continue Lasix  Continue antiplatelet therapy, statin  Net IO Since Admission: -4,393.96 mL [07/03/25 1456]  Underwent thoracentesis per IR, yielding 400 ml of serosanguinous fluid  - chest tube removed  - PT/OT/SLP -> stable for dc home     Mechanical fall  - Ground level fall on 06/27/2025  - CT head negative, Him XR negative     Post op acute respiratory failure   Pleural effusion  RESOLVED  - completed course of abx  - requiring BiPAP  - CT chest reviewed  - pulm on consult               Rec'd abx               Bronchial hygiene, nebs, IS          Alcohol abuse/Withdrawal   - psych was on board  - cont meds   - counseled on cessation     DM2  - cont home meds     HTN  Dyslipidemia  - continue home meds     Consultations: PMR, Psych, Cardiology, Pulm/Crit, CV surgery    Procedures: see above    Complications: see above    Discharge Condition: Stable    Discharge Medications:      Discharge Medications        START taking these medications        Instructions Prescription details   amiodarone 200 MG Tabs  Commonly known as: Pacerone  Start taking on: July 4, 2025      Take 1 tablet (200 mg total) by mouth daily.   Quantity: 30 tablet  Refills: 0     ascorbic acid 500 MG Tabs  Commonly known as: Vitamin C      Take 1 tablet (500 mg total) by mouth 2 (two) times daily.   Quantity: 14 tablet  Refills: 0     budesonide 0.5 MG/2ML Susp  Commonly known as: Pulmicort      Take 2 mL (0.5 mg total) by nebulization 2 (two) times daily.   Quantity: 1 each  Refills: 0     colchicine 0.6 MG Tabs  Start taking on: July 4, 2025      Take  0.5 tablets (0.3 mg total) by mouth every other day.   Quantity: 10 tablet  Refills: 0     escitalopram 5 MG Tabs  Commonly known as: Lexapro      Take 1 tablet (5 mg total) by mouth nightly.   Quantity: 30 tablet  Refills: 0     folic acid 1 MG Tabs  Commonly known as: Folvite  Start taking on: July 4, 2025      Take 1 tablet (1 mg total) by mouth daily.   Quantity: 30 tablet  Refills: 0     multivitamin Tabs  Start taking on: July 4, 2025      Take 1 tablet by mouth daily.   Quantity: 30 tablet  Refills: 0     pantoprazole 40 MG Tbec  Commonly known as: Protonix      Take 1 tablet (40 mg total) by mouth 2 (two) times daily before meals.   Quantity: 60 tablet  Refills: 0     potassium chloride 10 MEQ Tbcr  Commonly known as: Klor-Con M10  Start taking on: July 4, 2025      Take 1 tablet (10 mEq total) by mouth daily.   Quantity: 30 tablet  Refills: 0     sacubitril-valsartan 49-51 MG Tabs  Commonly known as: Entresto  Replaces: Entresto 24-26 MG Tabs      Take 1 tablet by mouth 2 (two) times daily.   Quantity: 60 tablet  Refills: 0            CHANGE how you take these medications        Instructions Prescription details   furosemide 20 MG Tabs  Commonly known as: Lasix  Start taking on: July 4, 2025  What changed:   medication strength  how much to take      Take 1 tablet (20 mg total) by mouth daily.   Quantity: 30 tablet  Refills: 0     tamsulosin 0.4 MG Caps  Commonly known as: Flomax  What changed:   how much to take  how to take this  when to take this  additional instructions      TAKE 2 CAPSULES BY MOUTH DAILY   Quantity: 180 capsule  Refills: 3            CONTINUE taking these medications        Instructions Prescription details   aspirin 81 MG Tbec      Take 1 tablet (81 mg total) by mouth nightly.   Refills: 0     Thanh Contour Next Test Strp      To test 2 x daily   Quantity: 100 strip  Refills: 6     Thanh Contour Test Strp      To test daily   Quantity: 100 strip  Refills: 3     Contour Next Test  Strp  Generic drug: Glucose Blood      To test 2 x daily  Dx E11.9   Quantity: 100 strip  Refills: 5     Contour Next Test Strp  Generic drug: Glucose Blood      To check glucose bid   Dx  E11.9   Quantity: 200 strip  Refills: 11     carvedilol 25 MG Tabs  Commonly known as: Coreg      Take 1 tablet (25 mg total) by mouth 2 (two) times daily with meals.   Quantity: 180 tablet  Refills: 3     clopidogrel 75 MG Tabs  Commonly known as: Plavix      Take 1 tablet (75 mg total) by mouth daily.   Quantity: 90 tablet  Refills: 3     cyanocobalamin 500 MCG Tabs  Commonly known as: Vitamin B12      Take 1 tablet (500 mcg total) by mouth every other day.   Refills: 0     empagliflozin 25 MG Tabs  Commonly known as: Jardiance      Take 1 tablet (25 mg total) by mouth daily.   Quantity: 90 tablet  Refills: 3     magnesium 250 MG Tabs      Take 1 tablet (250 mg total) by mouth every other day.   Refills: 0     metFORMIN HCl 1000 MG Tabs  Commonly known as: GLUCOPHAGE      Take 1 tablet (1,000 mg total) by mouth 2 (two) times daily with meals.   Quantity: 180 tablet  Refills: 3     pyridoxine 100 MG Tabs  Commonly known as: Vitamin B6      Take 1 tablet (100 mg total) by mouth every other day.   Refills: 0     rosuvastatin 20 MG Tabs  Commonly known as: Crestor      Take 1 tablet (20 mg total) by mouth nightly.   Quantity: 90 tablet  Refills: 3     Thiamine HCl 250 MG Tabs      Take 1 tablet (250 mg total) by mouth every other day.   Refills: 0            STOP taking these medications      amLODIPine 5 MG Tabs  Commonly known as: Norvasc        Entresto 24-26 MG Tabs  Generic drug: sacubitril-valsartan  Replaced by: sacubitril-valsartan 49-51 MG Tabs                  Where to Get Your Medications        These medications were sent to [id number] - [place], [place] - [address] AT [facility], [phone], [phone]  [address], [place] [address]      Phone: [phone]    amiodarone 200 MG Tabs  ascorbic acid 500 MG Tabs  budesonide 0.5 MG/2ML Susp  colchicine 0.6 MG Tabs  escitalopram 5 MG Tabs  folic acid 1 MG Tabs  furosemide 20 MG Tabs  multivitamin Tabs  pantoprazole 40 MG Tbec  potassium chloride 10 MEQ Tbcr  sacubitril-valsartan 49-51 MG Tabs         Greater than 35 minutes spent, >50% spent counseling re: treatment plan and workup     Angel Damian MD  7/3/2025         [1]   Patient Active Problem List  Diagnosis    DM (diabetes mellitus), type 2 with complications (HCC)    CAD in native artery    Hyperlipidemia    HTN (hypertension)    PAD (peripheral artery disease)    Type 2 diabetes mellitus without retinopathy (HCC)    Meibomian gland dysfunction (MGD) of both eyes    Presbyopia    Age-related nuclear cataract of both eyes    Chronic maxillary sinusitis    Gastroesophageal reflux disease    Encounter for colorectal cancer screening    Aortic stenosis    Hx of CABG    Episodic mood disorder    Delirium due to another medical condition    Angina concurrent with and due to arteriosclerosis of CABG    Pre-op evaluation

## 2025-07-03 NOTE — PHYSICAL THERAPY NOTE
PHYSICAL THERAPY TREATMENT NOTE - INPATIENT     Room Number: 233/233-A       Presenting Problem: elective CABGx2 with post op withdrawal/complications       Problem List  Active Problems:    Aortic stenosis    Hx of CABG    Episodic mood disorder    Delirium due to another medical condition    Angina concurrent with and due to arteriosclerosis of CABG    Pre-op evaluation      PHYSICAL THERAPY ASSESSMENT   Patient demonstrates good  progress this session, goals  updated to reflect patient performance.      Patient is requiring contact guard assist as a result of the following impairments: decreased functional strength, decreased endurance/aerobic capacity, pain, impaired standing balance, decreased muscular endurance, and medical status.     Patient continues to function below baseline with bed mobility, transfers, gait, stair negotiation, maintaining seated position, standing prolonged periods, and performing household tasks.  Next session anticipate patient to progress bed mobility, transfers, gait, stair negotiation, maintaining seated position, standing prolonged periods, and performing household tasks.  Physical Therapy will continue to follow patient for duration of hospitalization.    Patient continues to benefit from continued skilled PT services: at discharge to promote prior level of function and safety with additional support and return home with home health PT.    PLAN DURING HOSPITALIZATION  Nursing Mobility Recommendation : 1 Assist     PT Treatment Plan: Bed mobility, Body mechanics, Endurance, Energy conservation, Patient education, Gait training, Range of motion, Strengthening, Transfer training, Balance training, Stoop training, Stair training  Frequency (Obs): 5x/week     SUBJECTIVE  I feel good to go home I have some family assist. I did have a L calf cramp walking at home I take magnesium and other supplements to help.     (Pt informed that he should only take what is advised by his medical team  and RN informed pt is taking supplements for cramps to request MD feedback for pt ed)    OBJECTIVE  Precautions: Drain(s),  needed, Sternal, Chest tube to suction (polish)    WEIGHT BEARING RESTRICTION       PAIN ASSESSMENT   Ratin  Location: surgical site with movement  Management Techniques: Activity promotion, Body mechanics, Breathing techniques, Relaxation, Repositioning    BALANCE  Static Sitting: Good  Dynamic Sitting: Fair  Static Standing: Fair -  Dynamic Standing: Fair -       O2 WALK  Oxygen Therapy  SPO2% on Oxygen at Rest: 98  SPO2% Ambulation on Oxygen: 87 (returned to 94% with 30 sec seated rest)    AM-PAC '6-Clicks' INPATIENT SHORT FORM - BASIC MOBILITY  How much difficulty does the patient currently have...  Patient Difficulty: Turning over in bed (including adjusting bedclothes, sheets and blankets)?: None   Patient Difficulty: Sitting down on and standing up from a chair with arms (e.g., wheelchair, bedside commode, etc.): None   Patient Difficulty: Moving from lying on back to sitting on the side of the bed?: None   How much help from another person does the patient currently need...   Help from Another: Moving to and from a bed to a chair (including a wheelchair)?: A Little   Help from Another: Need to walk in hospital room?: A Little   Help from Another: Climbing 3-5 steps with a railing?: A Little     AM-PAC Score:  Raw Score: 21   Approx Degree of Impairment: 28.97%   Standardized Score (AM-PAC Scale): 50.25   CMS Modifier (G-Code): CJ    FUNCTIONAL ABILITY STATUS  Functional Mobility/Gait Assessment  Gait Assistance: Contact guard assist  Distance (ft): 300  Assistive Device: Rolling walker  Pattern:  (decreased step length and lana)  Stairs: Stairs  How Many Stairs: 6  Device: 1 Rail  Assist: Contact guard assist  Pattern: Ascend and Descend  Ascend and Descend : Step to  Rolling: contact guard assist  Supine to Sit: contact guard assist  Sit to Supine: contact guard  assist  Sit to Stand: contact guard assist    Skilled Therapy Provided: Pt ed with bed mobility and transfers with CGA with RW. Pt ed with a review of CABG sternal precautions and progressive mobility training with a RW. Pt ed with gait progression 300' and stair training x 6 steps with 1 SR support. Pt ed with therex x 10 reps. Pt is on track to return home with [facility] PT with family assist.     The patient's Approx Degree of Impairment: 28.97% has been calculated based on documentation in the [facility] '6 clicks' Inpatient Daily Activity Short Form.  Research supports that patients with this level of impairment may benefit from [facility] PT with family assist.    Final disposition will be made by interdisciplinary medical team.    THERAPEUTIC EXERCISES  Lower Extremity Ankle pumps  Heel slides  LAQ     Position Sitting & Standing       Patient End of Session: Up in chair, With  staff, Needs met, Call light within reach, RN aware of session/findings, All patient questions and concerns addressed, Alarm set    CURRENT GOALS   CURRENT GOALS   Goals to be met by: 7/15/25  Patient Goal Patient's self-stated goal is: not stated -wife states it is for him to get back to normal   Goal #1 Patient is able to demonstrate supine - sit EOB @ level: moderate assistance      Goal #1   Current Status CGA   Goal #2 Patient is able to demonstrate transfers Sit to/from Stand at assistance level: moderate assistance with walker - rolling      Goal #2  Current Status CGA with RW   Goal #3 Patient is able to ambulate 25 feet with assist device: walker - rolling at assistance level: moderate assistance   Goal #3   Current Status  CGA amb 300' CGA completed 6 steps with 1 SR support   Goal #4 Patient will negotiate bed to/from chair transfers with RW with moderate assistance   Goal #4   Current Status Ongoing     Gait Trainin minutes  Therapeutic Exercise: 8 minutes

## 2025-07-07 ENCOUNTER — PATIENT OUTREACH (OUTPATIENT)
Dept: CASE MANAGEMENT | Age: 60
End: 2025-07-07

## 2025-07-07 NOTE — PAYOR COMM NOTE
--------------  DISCHARGE REVIEW    Payor: [facility]  Subscriber #:  [id number]  Authorization Number: [id number]    Admit date: 25  Admit time:   5:30 AM  Discharge Date: 7/3/2025  7:44 PM             [facility]  [facility] of [facility]    Discharge Summary    Jose Alberto Lizama Patient Status:  Inpatient    1965 MRN [id number]   Location [facility] 2W/SW Attending Angel Damian MD   Hosp Day # 14 PCP Fiona Sweeney MD     Date of Admission: 2025 Disposition: Home or Self Care     Date of Discharge: 7/3/25    Admitting Diagnosis: 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]  Aortic stenosis    Hospital Discharge Diagnoses: CAD s/p CABG, AVR, anemia, etoh withdrawal     Hospital Course:   Post-op anemia  IMPROVED  - baseline Hb ~12  - Hb this morning 9.5  - stool for occult blood positive  - ASA/Plavix on hold at this time -> now resumed      CAD status post CABG and AVR  Severe aortic stenosis  - difficult extubation but patient extubated successfully   - Management per cardiology and CV sx  Continue ASA, Statin, Coreg, Entresto  Continue Lasix  Continue antiplatelet therapy, statin  Net IO Since Admission: -4,393.96 mL [25 1456]  Underwent thoracentesis per IR, yielding 400 ml of serosanguinous fluid  - chest tube removed  - PT/OT/SLP -> stable for dc home     Mechanical fall  - Ground level fall on 2025  - CT head negative, Him XR negative     Post op acute respiratory failure   Pleural effusion  RESOLVED  - completed course of abx  - requiring BiPAP  - CT chest reviewed  - pulm on consult               Rec'd abx               Bronchial hygiene, nebs, IS          Alcohol abuse/Withdrawal   - psych was on board  - cont meds   - counseled on cessation     DM2  - cont home meds     HTN  Dyslipidemia  - continue home meds

## 2025-07-14 ENCOUNTER — TELEPHONE (OUTPATIENT)
Dept: INTERNAL MEDICINE CLINIC | Facility: CLINIC | Age: 60
End: 2025-07-14

## 2025-07-14 NOTE — TELEPHONE ENCOUNTER
[facility] is providing a patient update.    The patient was given Nebulizer treatments when he was released from the hospital, but the patient needs the Nebulizer machine.    Please order a Nebulizer machine for the patient.    Any questions or concerns, please call patient at:    [phone]     KG

## 2025-07-25 ENCOUNTER — HOSPITAL ENCOUNTER (OUTPATIENT)
Dept: GENERAL RADIOLOGY | Age: 60
Discharge: HOME | End: 2025-07-25
Attending: PHYSICIAN ASSISTANT
Payer: MEDICAID

## 2025-07-25 DIAGNOSIS — Z95.1 S/P CABG (CORONARY ARTERY BYPASS GRAFT): ICD-10-CM

## 2025-07-25 PROCEDURE — 71045 X-RAY EXAM CHEST 1 VIEW: CPT | Performed by: PHYSICIAN ASSISTANT

## 2025-07-28 ENCOUNTER — OFFICE VISIT (OUTPATIENT)
Dept: INTERNAL MEDICINE CLINIC | Facility: CLINIC | Age: 60
End: 2025-07-28
Payer: MEDICAID

## 2025-07-28 VITALS
BODY MASS INDEX: 31.98 KG/M2 | WEIGHT: 199 LBS | DIASTOLIC BLOOD PRESSURE: 80 MMHG | HEIGHT: 66 IN | SYSTOLIC BLOOD PRESSURE: 140 MMHG

## 2025-07-28 DIAGNOSIS — I35.0 NONRHEUMATIC AORTIC VALVE STENOSIS: ICD-10-CM

## 2025-07-28 DIAGNOSIS — F32.0 CURRENT MILD EPISODE OF MAJOR DEPRESSIVE DISORDER WITHOUT PRIOR EPISODE: ICD-10-CM

## 2025-07-28 DIAGNOSIS — Z95.1 S/P CABG (CORONARY ARTERY BYPASS GRAFT): Primary | ICD-10-CM

## 2025-07-28 DIAGNOSIS — E11.8 TYPE 2 DIABETES MELLITUS WITH COMPLICATION, WITHOUT LONG-TERM CURRENT USE OF INSULIN (HCC): ICD-10-CM

## 2025-07-28 DIAGNOSIS — I10 ESSENTIAL HYPERTENSION: ICD-10-CM

## 2025-07-28 DIAGNOSIS — E78.2 MIXED HYPERLIPIDEMIA: ICD-10-CM

## 2025-07-28 DIAGNOSIS — J43.8 OTHER EMPHYSEMA (HCC): ICD-10-CM

## 2025-07-28 DIAGNOSIS — E11.9 DIABETES MELLITUS TYPE 2, NONINSULIN DEPENDENT (HCC): ICD-10-CM

## 2025-07-28 DIAGNOSIS — I25.10 CORONARY ARTERY DISEASE DUE TO LIPID RICH PLAQUE: ICD-10-CM

## 2025-07-28 DIAGNOSIS — I25.83 CORONARY ARTERY DISEASE DUE TO LIPID RICH PLAQUE: ICD-10-CM

## 2025-07-28 RX ORDER — CHLORHEXIDINE GLUCONATE ORAL RINSE 1.2 MG/ML
SOLUTION DENTAL
COMMUNITY
Start: 2025-05-07

## 2025-07-28 RX ORDER — AMOXICILLIN 500 MG/1
500 TABLET, FILM COATED ORAL EVERY 6 HOURS
COMMUNITY
Start: 2025-05-02

## 2025-07-28 RX ORDER — LOSARTAN POTASSIUM 50 MG/1
TABLET ORAL
COMMUNITY
Start: 2025-06-27

## 2025-07-28 NOTE — PROGRESS NOTES
Subjective:   Jose Alberto Lizama is a 60 year old male who presents for Hospital F/U   Patient here for a fu after Cabg surgery 2 vessel and AV repair. Had some complications post operatively with prolonged mechanical support.Has been healing well. Is eager to resume work.  Has not yet started cardiac rehab.      History/Other:    Chief Complaint Reviewed and Verified  No Further Nursing Notes to   Review  Medications Reviewed  Problem List Reviewed         Tobacco: non smoker  quit 6 weeks  Tobacco Use[1]  E-Cigarettes/Vaping       Questions Responses    E-Cigarette Use Current Every Day User          E-Cigarette/Vaping Substances       Questions Responses    Nicotine Yes    THC No    CBD No    Flavoring No          E-Cigarette/Vaping Devices       Questions Responses    Disposable No    Pre-filled or Refillable Cartridge No    Refillable Tank No    Pre-filled Pod No           Tobacco cessation counseling for <3 minutes.  He smoked tobacco in the past but quit greater than 12 months ago.  Tobacco Use[2]       Current Medications[3]      Review of Systems:  Review of Systems   Constitutional:  Positive for fatigue.   Respiratory:  Negative for shortness of breath.    Cardiovascular:  Negative for leg swelling.   Gastrointestinal:  Negative for abdominal pain.   Endocrine:        Bs controlled   Musculoskeletal:  Positive for back pain (lumbar).   Neurological:  Positive for numbness (left arm).   Psychiatric/Behavioral:  Positive for dysphoric mood.          Objective:   /60   Ht 5' 6\" (1.676 m)   Wt 199 lb (90.3 kg)   BMI 32.12 kg/m²  Estimated body mass index is 32.12 kg/m² as calculated from the following:    Height as of this encounter: 5' 6\" (1.676 m).    Weight as of this encounter: 199 lb (90.3 kg).  Physical Exam  Constitutional:       Comments: overweight   HENT:      Head: Normocephalic and atraumatic.   Eyes:      General: No scleral icterus.     Pupils: Pupils are equal, round, and reactive to  light.   Neck:      Vascular: No carotid bruit.      Comments: No JVD  Cardiovascular:      Rate and Rhythm: Normal rate and regular rhythm.      Heart sounds: No murmur heard.     No friction rub. No gallop.      Comments: Well healed sternotomy scar  Pulmonary:      Effort: Pulmonary effort is normal.      Breath sounds: Normal breath sounds. No rales.   Abdominal:      Palpations: Abdomen is soft.      Tenderness: There is no abdominal tenderness.   Musculoskeletal:         General: Tenderness (lumbar spine) present.      Right lower leg: No edema.      Left lower leg: No edema.   Skin:     Findings: Lesion (calve scars from vein grafts) present.   Neurological:      Mental Status: He is alert and oriented to person, place, and time.   Psychiatric:         Thought Content: Thought content normal.           Assessment & Plan:   1. S/P CABG (coronary artery bypass graft) (Primary) - doing well - needs cardai rehab  2. Diabetes mellitus type 2, noninsulin dependent (HCC) A1c 6.7 on Metformin and Jardiance 25 mg  3. Nonrheumatic aortic valve stenosis sp repair  4. Other emphysema (HCC) on Home nebulizer tid  5. Reactive depression - Zoloft 50 mg daily      No follow-ups on file.    Fiona Sweeney MD, 7/28/2025, 1:10 PM        [1]   Social History  Tobacco Use   Smoking Status Former    Current packs/day: 1.00    Average packs/day: 1 pack/day for 20.0 years (20.0 ttl pk-yrs)    Types: Cigarettes   Smokeless Tobacco Never   [2]   Social History  Tobacco Use   Smoking Status Former    Current packs/day: 1.00    Average packs/day: 1 pack/day for 20.0 years (20.0 ttl pk-yrs)    Types: Cigarettes   Smokeless Tobacco Never   [3]   Current Outpatient Medications   Medication Sig Dispense Refill    amoxicillin 500 MG Oral Tab Take 1 tablet (500 mg total) by mouth every 6 (six) hours.      chlorhexidine gluconate 0.12 % Mouth/Throat Solution RINSE MOUTH FOR 30 SECOIUNDS TWICE DAILY THEN SPIT OUT AS DIRECTED DO NOT SWALLOW       losartan 50 MG Oral Tab       Respiratory Therapy Supplies (FULL KIT NEBULIZER SET) Does not apply Misc To use for nebulizer 2 x aday (Patient not taking: Reported on 7/22/2025) 1 each 0    ascorbic acid 500 MG Oral Tab Take 1 tablet (500 mg total) by mouth 2 (two) times daily. 14 tablet 0    colchicine 0.6 MG Oral Tab Take 0.5 tablets (0.3 mg total) by mouth every other day. 10 tablet 0    folic acid 1 MG Oral Tab Take 1 tablet (1 mg total) by mouth daily. 30 tablet 0    multivitamin Oral Tab Take 1 tablet by mouth daily. 30 tablet 0    pantoprazole 40 MG Oral Tab EC Take 1 tablet (40 mg total) by mouth 2 (two) times daily before meals. 60 tablet 0    potassium chloride 10 MEQ Oral Tab CR Take 1 tablet (10 mEq total) by mouth daily. 30 tablet 0    furosemide 20 MG Oral Tab Take 1 tablet (20 mg total) by mouth daily. 30 tablet 0    amiodarone 200 MG Oral Tab Take 1 tablet (200 mg total) by mouth daily. 30 tablet 0    escitalopram 5 MG Oral Tab Take 1 tablet (5 mg total) by mouth nightly. 30 tablet 0    budesonide 0.5 MG/2ML Inhalation Suspension Take 2 mL (0.5 mg total) by nebulization 2 (two) times daily. (Patient not taking: Reported on 7/22/2025) 1 each 0    sacubitril-valsartan 49-51 MG Oral Tab Take 1 tablet by mouth 2 (two) times daily. 60 tablet 0    magnesium 250 MG Oral Tab Take 1 tablet (250 mg total) by mouth every other day.      Thiamine HCl 250 MG Oral Tab Take 1 tablet (250 mg total) by mouth every other day.      pyridoxine 100 MG Oral Tab Take 1 tablet (100 mg total) by mouth every other day.      cyanocobalamin 500 MCG Oral Tab Take 1 tablet (500 mcg total) by mouth every other day.      metFORMIN HCl 1000 MG Oral Tab Take 1 tablet (1,000 mg total) by mouth 2 (two) times daily with meals. 180 tablet 3    carvedilol 25 MG Oral Tab Take 1 tablet (25 mg total) by mouth 2 (two) times daily with meals. 180 tablet 3    clopidogrel 75 MG Oral Tab Take 1 tablet (75 mg total) by mouth daily. 90 tablet 3     empagliflozin (JARDIANCE) 25 MG Oral Tab Take 1 tablet (25 mg total) by mouth daily. 90 tablet 3    rosuvastatin 20 MG Oral Tab Take 1 tablet (20 mg total) by mouth nightly. 90 tablet 3    tamsulosin 0.4 MG Oral Cap TAKE 2 CAPSULES BY MOUTH DAILY (Patient taking differently: Take 1 capsule (0.4 mg total) by mouth in the morning and 1 capsule (0.4 mg total) before bedtime.) 180 capsule 3    Glucose Blood (CONTOUR NEXT TEST) In Vitro Strip To test 2 x daily  Dx E11.9 100 strip 5    Glucose Blood (CONTOUR NEXT TEST) In Vitro Strip To check glucose bid   Dx  E11.9 200 strip 11    aspirin 81 MG Oral Tab EC Take 1 tablet (81 mg total) by mouth nightly.      Glucose Blood (LORAINE CONTOUR TEST) In Vitro Strip To test daily 100 strip 3    LORAINE CONTOUR NEXT TEST In Vitro Strip To test 2 x daily 100 strip 6

## (undated) DIAGNOSIS — I10 ESSENTIAL HYPERTENSION: ICD-10-CM

## (undated) DIAGNOSIS — E11.8 TYPE 2 DIABETES MELLITUS WITH COMPLICATION, WITHOUT LONG-TERM CURRENT USE OF INSULIN (HCC): ICD-10-CM

## (undated) DIAGNOSIS — I73.9 PAD (PERIPHERAL ARTERY DISEASE) (HCC): Primary | ICD-10-CM

## (undated) DIAGNOSIS — I25.83 CORONARY ARTERY DISEASE DUE TO LIPID RICH PLAQUE: ICD-10-CM

## (undated) DIAGNOSIS — I25.10 CORONARY ARTERY DISEASE DUE TO LIPID RICH PLAQUE: ICD-10-CM

## (undated) DIAGNOSIS — E78.2 MIXED HYPERLIPIDEMIA: ICD-10-CM

## (undated) DEVICE — KIT CLEAN ENDOKIT 1.1OZ GOWNX2

## (undated) DEVICE — GOWN,SIRUS,FABRNF,RAGLAN,L,ST,30/CS: Brand: MEDLINE

## (undated) DEVICE — SUT ETHBND XL 0 30IN CT-1 NABSRB GRN 36MM 1/2

## (undated) DEVICE — OR TOWEL, 16" X 26" XRAY DETECTABLE STERILE, GREEN: Brand: PREMIERPRO

## (undated) DEVICE — ABSORBABLE HEMOSTAT (OXIDIZED REGENERATED CELLULOSE): Brand: SURGICEL NU-KNIT

## (undated) DEVICE — PD DEFIB QUIK COMB REDI-PK

## (undated) DEVICE — DRAIN SUR 24FR L5/16IN DIA8MM SIL RND HUBLESS

## (undated) DEVICE — 12 ML SYRINGE LUER-LOCK TIP: Brand: MONOJECT

## (undated) DEVICE — SUT PERMA- 2-0 30IN SH NABSRB BLK L26MM 1/

## (undated) DEVICE — CS5/5+ FASTPACK, 225ML 150U RES: Brand: HAEMONETICS CELL SAVER 5/5+ SYSTEMS

## (undated) DEVICE — SUT PROL 6-0 24IN C-1 NABSRB BLU 13MM 3/8 CIR

## (undated) DEVICE — NEEDLE ANGIO 18GA X 2 3/4IN X THN 1 WALL SMTH

## (undated) DEVICE — A STERILE, SEMI-RIGID TUBE USED IN OPEN HEART SURGERY TO FACILITATE THE TRANSFER OF PRIMING FLUIDS DURING THE PRIMING OF THE EXTRACORPOREAL CIRCUIT OF A CARDIOPULMONARY BYPASS SYSTEM. IT IS A NONINVASIVE PRODUCT IN THE FORM OF A TUBE OR TUBING USED TO CHANNEL THE FLUIDS (I.E., PRIMER/BLOOD) TO FACILITATE PRIMING OF THE EXTRACORPOREAL CIRCUIT AND CONNECTION OF THE CARDIOPULMONARY BYPASS SYSTEM (HEART/LUNG MACHINE) TO THE PATIENT. IT IS TYPICALLY A MOULDED PLASTIC TUBE WITH HARD PLASTIC SPIKE AT THE DISTAL END THAT CONNECTS TO THE BAG OR CONTAINER OF PRIMER SOLUTION, A CONNECTOR AT THE PROXIMAL END, AND A CENTRAL PINCH CLAMP. THIS IS A SINGLE-USE DEVICE.: Brand: QUICKIE PRIME - 1/4" X 1/16"

## (undated) DEVICE — SUCTION CANISTER, 3000CC,SAFELINER: Brand: DEROYAL

## (undated) DEVICE — [HIGH FLOW INSUFFLATOR,  DO NOT USE IF PACKAGE IS DAMAGED,  KEEP DRY,  KEEP AWAY FROM SUNLIGHT,  PROTECT FROM HEAT AND RADIOACTIVE SOURCES.]: Brand: PNEUMOSURE

## (undated) DEVICE — HEART A: Brand: MEDLINE INDUSTRIES, INC.

## (undated) DEVICE — PINNACLE INTRODUCER SHEATH: Brand: PINNACLE

## (undated) DEVICE — Device

## (undated) DEVICE — SOLUTION IV 1000ML 0.9% NACL PRESERVATIVE

## (undated) DEVICE — SOLUTION IRRIG 1000ML 0.9% NACL USP BTL

## (undated) DEVICE — ABSORBABLE HEMOSTAT (OXIDIZED REGENERATED CELLULOSE): Brand: SURGICEL

## (undated) DEVICE — DECANTER BAG 9": Brand: MEDLINE INDUSTRIES, INC.

## (undated) DEVICE — SUT VCRL 1-0 36IN CTX ABSRB UD L48MM 1/2 CIR

## (undated) DEVICE — SHEETING SIL 2X2IN THK.04IN

## (undated) DEVICE — COR-KNOT MINI® COMBO KITBASE PACKAGE TYPE - KITEACH STERILE PACKAGE KIT CONTAINS (2) SINGLE PATIENT USE COR-KNOT MINI® DEVICES AND (12) COR-KNOT® QUICK LOADS®.: Brand: COR-KNOT MINI®

## (undated) DEVICE — SUT ETHBND XL 2-0 30IN V-5 NABSRB GRN WHT L17

## (undated) DEVICE — PISTOL GRIP SKIN STAPLER: Brand: MULTIFIRE PREMIUM

## (undated) DEVICE — PACK ASSRY CUSTOM TUBING

## (undated) DEVICE — TOWEL,OR,DSP,ST,BLUE,DLX,2/PK,40PK/CS: Brand: MEDLINE

## (undated) DEVICE — SUT PROL 7-0 18IN BV-1 NABSRB BLU L9.3MM 3/8

## (undated) DEVICE — DRAIN CHST SGL COLL 1 PT TB FOR ATS BG CMPTBL

## (undated) DEVICE — PATIENT TRACKER 9734887XOM NON-INVASIVE

## (undated) DEVICE — HEMOSTAT KIT SURGIFLO THROM 8ML

## (undated) DEVICE — INSTRUMENT TRACKER 9733533XOM ENT 1PK

## (undated) DEVICE — LEAD PACE 475MM CHN A OR V MYOCARDIAL STEROID

## (undated) DEVICE — SUT PDS II 2-0 27IN CT-1 ABSRB VLT L36MM 1/2

## (undated) DEVICE — SUT PROL 4-0 36IN RB-1 NABSRB BLU 17MM 1/2 CI

## (undated) DEVICE — BNDG,ELSTC,MATRIX,STRL,4"X5YD,LF,HOOK&LP: Brand: MEDLINE

## (undated) DEVICE — KIT ENDO ORCAPOD 160/180/190

## (undated) DEVICE — PACK PERFUSION CUSTOM W BCARE5

## (undated) DEVICE — NDLCTR: FOAM/ADHESIVE 10CT 96/CS: Brand: MEDICAL ACTION INDUSTRIES

## (undated) DEVICE — Device: Brand: VIRTUOSAPH PLUS WITH RADIAL INDICATION

## (undated) DEVICE — TOURNIQUET 12FR L7IN 3 CLR 1 SNR VENA CAVA

## (undated) DEVICE — CLIP SM W INTNL HRZN TI TPE LF

## (undated) DEVICE — SOCK CNSTR 4IN TNPSL UNV SPEC

## (undated) DEVICE — HEAD & NECK: Brand: MEDLINE INDUSTRIES, INC.

## (undated) DEVICE — INTENT TO BE USED WITH SUTURE MATERIAL FOR TISSUE CLOSURE: Brand: RICHARD-ALLAN® NEEDLE 1/2 CIRCLE TAPER

## (undated) DEVICE — SINU FOAM: Brand: SINU-FOAM

## (undated) DEVICE — SUT PROL 3-0 30IN NABSRB BLU 22MM SH-1 1/2

## (undated) DEVICE — 3M™ BAIR HUGGER® UNDERBODY BLANKET, FULL ACCESS, 10 PER CASE 63500: Brand: BAIR HUGGER™

## (undated) DEVICE — TRAY CATH FOLEY 350CC 16FR CATH BACTIGUARD SIL

## (undated) DEVICE — MEDI-VAC NON-CONDUCTIVE SUCTION TUBING 6MM X 1.8M (6FT.) L: Brand: CARDINAL HEALTH

## (undated) DEVICE — FORESIGHT LARGE SENSOR: Brand: FORESIGHT

## (undated) DEVICE — DISPOSABLE CABLE TIE GUN: Brand: BIOSEAL INC.

## (undated) DEVICE — SUT PERMAHAND 1 L30IN N ABSRB BLK TIE SILK

## (undated) DEVICE — IMPLANTABLE DEVICE: Type: IMPLANTABLE DEVICE | Site: HEART | Status: NON-FUNCTIONAL

## (undated) DEVICE — CLIP INT USE SM TI LIG HORZ

## (undated) DEVICE — SUT PROL 6-0 18IN C-1 NABSRB BLU 13MM 3/8 CIR

## (undated) DEVICE — SUT VCRL 2-0 36IN CT-1 ABSRB UD L36MM 1/2 CIR

## (undated) DEVICE — CATHETER THOR 32FR L23IN BLU RADPQ STRP THRM

## (undated) DEVICE — YANKAUER,BULB TIP,W/O VENT,RIGID,STERILE: Brand: MEDLINE

## (undated) DEVICE — SUTURE CHROMIC GUT 4-0 PS-2

## (undated) DEVICE — GAMMEX® PI HYBRID SIZE 7, STERILE POWDER-FREE SURGICAL GLOVE, POLYISOPRENE AND NEOPRENE BLEND: Brand: GAMMEX

## (undated) DEVICE — SUT MCRYL 4-0 18IN PS-2 ABSRB UD 19MM 3/8 CIR

## (undated) DEVICE — PRESSURE TUBING: Brand: TRUWAVE

## (undated) DEVICE — PROVE COVER: Brand: UNBRANDED

## (undated) DEVICE — SOL  .9 1000ML BAG

## (undated) DEVICE — SUTURE ETHILON 3-0 669H

## (undated) DEVICE — GAUZE,SPONGE,USP,4"X4",12PLY,STRL,10/PK: Brand: MEDLINE INDUSTRIES, INC.

## (undated) DEVICE — Device: Brand: DUAL NARE NASAL CANNULAE FEMALE LUER CON 7FT O2 TUBE

## (undated) DEVICE — CONMED SCOPE SAVER BITE BLOCK, 20X27 MM: Brand: SCOPE SAVER

## (undated) DEVICE — SUT 6 DBL WIRE 14IN CCS-1 NABSRB L49MM 1/2 CI

## (undated) DEVICE — EYE PADSSTERILENOT MADE WITH NATURAL RUBBER LATEXSINGLE USE ONLYDO NOT USE IF PACKAGE OPENED OR DAMAGED: Brand: CARDINAL HEALTH

## (undated) DEVICE — SET TRNQT SUR 12FR TWO 1 BLU 1 SNR DLP

## (undated) DEVICE — SUT MCRYL 3-0 27IN ABSRB UD L24MM PS-1

## (undated) DEVICE — PUNCH AORT 4MM SS THRMPLSTC SLF ALIGNING

## (undated) DEVICE — HANDPIECE SUR ROTARY NRECHRG BTTRY PWR ZDRIVE

## (undated) DEVICE — HEART DRAPE AND SUPPLY: Brand: MEDLINE INDUSTRIES, INC.

## (undated) DEVICE — SUT PERMA- 4-0 18IN NABSRB BLK TIE SILK

## (undated) DEVICE — MEDI-VAC NON-CONDUCTIVE SUCTION TUBING: Brand: CARDINAL HEALTH

## (undated) DEVICE — IMMOBILIZER KNEE 1 SZ FIT MOST CUTAWAY TRIM

## (undated) DEVICE — DRAPE DISC W112XL168CM DISP FOR HUSH SLUSH

## (undated) DEVICE — COR-KNOT® QUICK LOAD® SINGLES: Brand: COR-KNOT® QUICK LOAD®

## (undated) DEVICE — SUT PERMA- 2-0 18IN SH NABSRB BLK CR 26MM

## (undated) DEVICE — DEVICE BLWR/MSTR ACCUMIST ATCH

## (undated) DEVICE — ADAPTER CRDPLG ANTGRD RTRGD 3W

## (undated) DEVICE — DRESSING FOAM 4.25IN LEN 12.5IN W WND PD N

## (undated) DEVICE — NASAL ACCESSORY: Brand: MEDLINE INDUSTRIES, INC.

## (undated) DEVICE — 12 FOOT DISPOSABLE EXTENSION CABLE WITH SAFE CONNECT / SCREW-DOWN

## (undated) DEVICE — SOL  .9 1000ML BTL

## (undated) DEVICE — SOL H2O 1000ML BTL

## (undated) DEVICE — BNDG,ELSTC,MATRIX,STRL,6"X5YD,LF,HOOK&LP: Brand: MEDLINE

## (undated) DEVICE — INSERT SUT HLDR RETEN SLOT DISP FOR OCTOBASE

## (undated) DEVICE — 60 ML SYRINGE REGULAR TIP: Brand: MONOJECT

## (undated) DEVICE — BLADE 1884380EM QUADCUT 4.3MMX13CM ROHS: Brand: ROTATABLE FUSION®

## (undated) DEVICE — CANNULA PERF PED AD 9FR L5.5IN AORT ROOT FLNG

## (undated) NOTE — LETTER
07/26/18        Jessica Maldonado      Dear Chris Curtis records indicate that you have outstanding lab work and or testing that was ordered for you and has not yet been completed:          Comp Metabolic Pa

## (undated) NOTE — LETTER
[facility]  [address], [place], [place]  Authorization for Surgical Operation and Procedure                                                                                           I hereby authorize Attila Li MD, my physician and his/her assistants (if applicable), which may include medical students, residents, and/or fellows, to perform the following surgical operation/ procedure and administer such anesthesia as may be determined necessary by my physician: Operation/Procedure name (s) COLONOSCOPY/ESOPHAGOGASTRODUODENOSCOPY on Jose Alberto Lizama   2.   I recognize that during the surgical operation/procedure, unforeseen conditions may necessitate additional or different procedures than those listed above.  I, therefore, further authorize and request that the above-named surgeon, assistants, or designees perform such procedures as are, in their judgment, necessary and desirable.    3.   My surgeon/physician has discussed prior to my surgery the potential benefits, risks and side effects of this procedure; the likelihood of achieving goals; and potential problems that might occur during recuperation.  They also discussed reasonable alternatives to the procedure, including risks, benefits, and side effects related to the alternatives and risks related to not receiving this procedure.  I have had all my questions answered and I acknowledge that no guarantee has been made as to the result that may be obtained.    4.   Should the need arise during my operation/procedure, which includes change of level of care prior to discharge, I also consent to the administration of blood and/or blood products.  Further, I understand that despite careful testing and screening of blood or blood products by collecting agencies, I may still be subject to ill effects as a result of receiving a blood transfusion and/or blood products.  The following are some, but not all, of the potential risks that can  occur: fever and allergic reactions, hemolytic reactions, transmission of diseases such as Hepatitis, AIDS and Cytomegalovirus (CMV) and fluid overload.  In the event that I wish to have an autologous transfusion of my own blood, or a directed donor transfusion, I will discuss this with my physician.  Check only if Refusing Blood or Blood Products  I understand refusal of blood or blood products as deemed necessary by my physician may have serious consequences to my condition to include possible death. I hereby assume responsibility for my refusal and release the hospital, its personnel, and my physicians from any responsibility for the consequences of my refusal.    o  Refuse   5.   I authorize the use of any specimen, organs, tissues, body parts or foreign objects that may be removed from my body during the operation/procedure for diagnosis, research or teaching purposes and their subsequent disposal by hospital authorities.  I also authorize the release of specimen test results and/or written reports to my treating physician on the hospital medical staff or other referring or consulting physicians involved in my care, at the discretion of the Pathologist or my treating physician.    6.   I consent to the photographing or videotaping of the operations or procedures to be performed, including appropriate portions of my body for medical, scientific, or educational purposes, provided my identity is not revealed by the pictures or by descriptive texts accompanying them.  If the procedure has been photographed/videotaped, the surgeon will obtain the original picture, image, videotape or CD.  The hospital will not be responsible for storage, release or maintenance of the picture, image, tape or CD.    7.   I consent to the presence of a  or observers in the operating room as deemed necessary by my physician or their designees.    8.   I recognize that in the event my procedure results in extended  X-Ray/fluoroscopy time, I may develop a skin reaction.    9. If I have a Do Not Attempt Resuscitation (DNAR) order in place, that status will be suspended while in the operating room, procedural suite, and during the recovery period unless otherwise explicitly stated by me (or a person authorized to consent on my behalf). The surgeon or my attending physician will determine when the applicable recovery period ends for purposes of reinstating the DNAR order.  10. Patients having a sterilization procedure: I understand that if the procedure is successful the results will be permanent and it will therefore be impossible for me to inseminate, conceive, or bear children.  I also understand that the procedure is intended to result in sterility, although the result has not been guaranteed.   11. I acknowledge that my physician has explained sedation/analgesia administration to me including the risk and benefits I consent to the administration of sedation/analgesia as may be necessary or desirable in the judgment of my physician.    I CERTIFY THAT I HAVE READ AND FULLY UNDERSTAND THE ABOVE CONSENT TO OPERATION and/or OTHER PROCEDURE.     _________________________________________ _________________________________     ___________________________________  Signature of Patient     Signature of Responsible Person                   Printed Name of Responsible Person                              _________________________________________ ______________________________        ___________________________________  Signature of Witness         Date  Time         Relationship to Patient    STATEMENT OF PHYSICIAN My signature below affirms that prior to the time of the procedure; I have explained to the patient and/or his/her legal representative, the risks and benefits involved in the proposed treatment and any reasonable alternative to the proposed treatment. I have also explained the risks and benefits involved in refusal of the  proposed treatment and alternatives to the proposed treatment and have answered the patient's questions. If I have a significant financial interest in a co-management agreement or a significant financial interest in any product or implant, or other significant relationship used in this procedure/surgery, I have disclosed this and had a discussion with my patient.     _______________________________________________________________ _____________________________  (Signature of Physician)                                                                                         (Date)                                   (Time)  Patient Name: Jose Alberto Lizama    : 1965   Printed: 2024      Medical Record #: [id number]                                              Page 1 of 1

## (undated) NOTE — MR AVS SNAPSHOT
[address] [facility] [address] [facility] [id number] [phone]               Thank you for choosing us for your health care visit with Yasmine Teague MD.  We are glad to serve you and happy to provide you with [facility] Esomeprazole Magnesium 40 MG Cpdr   Take 1 capsule (40 mg total) by mouth 2 (two) times daily.    Commonly known as:  NEXIUM           Fluticasone Propionate 50 MCG/ACT Susp   USE 2 SPRAYS IN EACH NOSTRIL DAILY   Commonly known as:  FLONASE           furos medical emergencies, dial 911.            Visit [facility] online at  [email or web address]

## (undated) NOTE — LETTER
5/24/2019     Jose Alberto Walolimpiamatt        [address]        [facility] [address]            Dear Dylan Almonte,      Our records indicate that you are due for an appointment for a Colonoscopy  with Merly Quinones MD.    Please call Dr. Caryn Kaplan

## (undated) NOTE — LETTER
[facility]  Administration of Anesthesia  IJose Alberto agree to be cared for by a physician anesthesiologist alone and/or with a nurse anesthetist, who is specially trained to monitor me and give me medicine to put me to sleep or keep me comfortable during my procedure    I understand that my anesthesiologist and/or anesthetist is not an employee or agent of [facility] or [employer]. He or she works for [facility]    As the patient asking for anesthesia services, I agree to:  Allow the anesthesiologist (anesthesia doctor) to give me medicine and do additional procedures as necessary. Some examples are: Starting or using an “IV” to give me medicine, fluids or blood during my procedure, and having a breathing tube placed to help me breathe when I’m asleep (intubation). In the event that my heart stops working properly, I understand that my anesthesiologist will make every effort to sustain my life, unless otherwise directed by [facility] Do Not Resuscitate documents.  Tell my anesthesia doctor before my procedure:  If I am pregnant.  The last time that I ate or drank.  iii. All of the medicines I take (including prescriptions, herbal supplements, and pills I can buy without a prescription (including street drugs/illegal medications). Failure to inform my anesthesiologist about these medicines may increase my risk of anesthetic complications.  iv.If I am allergic to anything or have had a reaction to anesthesia before.  I understand how the anesthesia medicine will help me (benefits).  I understand that with any type of anesthesia medicine there are risks:  The most common risks are: nausea, vomiting, sore throat, muscle soreness, damage to my eyes, mouth, or teeth (from breathing tube placement).  Rare risks include: remembering what happened during my procedure, allergic reactions to medications, injury to my airway, heart, lungs, vision, nerves, or  muscles and in extremely rare instances death.  My doctor has explained to me other choices available to me for my care (alternatives).  Pregnant Patients (“epidural”):  I understand that the risks of having an epidural (medicine given into my back to help control pain during labor), include itching, low blood pressure, difficulty urinating, headache or slowing of the baby’s heart. Very rare risks include infection, bleeding, seizure, irregular heart rhythms and nerve injury.  Regional Anesthesia (“spinal”, “epidural”, & “nerve blocks”):  I understand that rare but potential complications include headache, bleeding, infection, seizure, irregular heart rhythms, and nerve injury.    _____________________________________________________________________________  Patient (or Representative) Signature/Relationship to Patient  Date   Time    _____________________________________________________________________________   Name (if used)    Language/Organization   Time    _____________________________________________________________________________  Nurse Anesthetist Signature     Date   Time  _____________________________________________________________________________  Anesthesiologist Signature     Date   Time  I have discussed the procedure and information above with the patient (or patient’s representative) and answered their questions. The patient or their representative has agreed to have anesthesia services.    _____________________________________________________________________________  Witness        Date   Time  I have verified that the signature is that of the patient or patient’s representative, and that it was signed before the procedure  Patient Name: Jose Alberto Lizama     : 1965                 Printed: 2024 at 2:13 PM    Medical Record #: [id number]                                            Page 1 of 1  ----------ANESTHESIA CONSENT----------

## (undated) NOTE — LETTER
[address]  Hardik Jacobo       09/11/20        Patient: Nichelle Garcia   YOB: 1965   Date of Visit: 9/11/2020       Dear  Dr. An Hawkins MD,      Thank you for referring Nichelle Garcia to my practice.

## (undated) NOTE — LETTER
[facility]  part of [facility]     PICC INSERTION CONSENT     I agree to have a Peripherally Inserted Central Catheter (PICC) placed in my arm.   1. The PICC insertion procedure, care, maintenance, risks, benefits, and complications have been explained to me by my physician, ________________________, and I understand them.   2. I understand that this may not be the only way I can receive my medication. I understand that my physician has determined that the PICC would be the safest and most effective means of administering my medication at this time. If there are other options of giving medication into my veins those options have been explained to me by my physician and I have chosen this one.   3. I realize a nurse who has been specially trained and certified by the hospital and ’s representative to insert a PICC will perform this procedure. My catheter will be inserted by _____________________________.   4. I have been informed by my doctor of the nature and purpose of this procedure and the risks involved and the possibility of complications. I realize that this is an invasive procedure and has certain risks such as air embolism (air entering the catheter or my vein), arterial puncture (a tearing of one of my arteries), infection, irregular heartbeat and venous thrombosis (a blood clot in a vein) nerve injury and fracture of the catheter with or without migration.   5. In order to numb the area where the line will be placed, a small amount of anesthetic medication will be injected as ordered by my physician.   6. I understand that while the catheter will be placed in my upper arm the end of the catheter will come to rest in an area near my heart.     7. The person performing this procedure has discussed the potential benefits, risks, and side effects of the PICC; the likelihood of achieving goals; and potential problems that might occur during recuperation. They also discussed  reasonable alternatives to the PICC, including risks, benefits, and side effects related to the alternatives and risks related to not receiving this procedure.    8.  I have expressed any questions about this procedure to my physician or the PICC Proceduralist and he/she has answered them.  I certify that I have read and understand this consent to the insertion of a PICC.      _________________________________________________________   Date     Time     Patient/Guardian Signature       ____________________________________   Printed name of Patient/Guardian          ________________________________________________________________    Date        Time                   Witnessing RN Signature      Patient Name: Jose Alberto Lizama     : 1965                 Printed: 2025     Medical Record #: [id number]

## (undated) NOTE — LETTER
[place] [address]  Authorization for Invasive Procedures  Date: 07/02/2025           Time: 1020    I hereby authorize Dr. Otto & Dr. Joseph, my physician and his/her assistants (if applicable), which may include medical students, residents, and/or fellows, to perform the following surgical operation/ procedure and administer such anesthesia as may be determined necessary by my physician: left ultrasound guided thoracentesis on Jose Alberto Lizama  2.   I recognize that during the surgical operation/procedure, unforeseen conditions may necessitate additional or different procedures than those listed above.  I, therefore, further authorize and request that the above-named surgeon, assistants, or designees perform such procedures as are, in their judgment, necessary and desirable.    3.   My surgeon/physician has discussed prior to my surgery the potential benefits, risks and side effects of this procedure; the likelihood of achieving goals; and potential problems that might occur during recuperation.  They also discussed reasonable alternatives to the procedure, including risks, benefits, and side effects related to the alternatives and risks related to not receiving this procedure.  I have had all my questions answered and I acknowledge that no guarantee has been made as to the result that may be obtained.    4.   Should the need arise during my operation/procedure, which includes change of level of care prior to discharge, I also consent to the administration of blood and/or blood products.  Further, I understand that despite careful testing and screening of blood or blood products by collecting agencies, I may still be subject to ill effects as a result of receiving a blood transfusion and/or blood products.  The following are some, but not all, of the potential risks that can occur: fever and allergic reactions, hemolytic reactions, transmission of diseases such as Hepatitis, AIDS and  Cytomegalovirus (CMV) and fluid overload.  In the event that I wish to have an autologous transfusion of my own blood, or a directed donor transfusion, I will discuss this with my physician.   Check only if Refusing Blood or Blood Products  I understand refusal of blood or blood products as deemed necessary by my physician may have serious consequences to my condition to include possible death. I hereby assume responsibility for my refusal and release the hospital, its personnel, and my physicians from any responsibility for the consequences of my refusal.         o  Refuse         5.   I authorize the use of any specimen, organs, tissues, body parts or foreign objects that may be removed from my body during the operation/procedure for diagnosis, research or teaching purposes and their subsequent disposal by hospital authorities.  I also authorize the release of specimen test results and/or written reports to my treating physician on the hospital medical staff or other referring or consulting physicians involved in my care, at the discretion of the Pathologist or my treating physician.    6.   I consent to the photographing or videotaping of the operations or procedures to be performed, including appropriate portions of my body for medical, scientific, or educational purposes, provided my identity is not revealed by the pictures or by descriptive texts accompanying them.  If the procedure has been photographed/videotaped, the surgeon will obtain the original picture, image, videotape or CD.  The hospital will not be responsible for storage, release or maintenance of the picture, image, tape or CD.    7.   I consent to the presence of a  or observers in the operating room as deemed necessary by my physician or their designees.    8.   I recognize that in the event my procedure results in extended X-Ray/fluoroscopy time, I may develop a skin reaction.    9. If I have a Do Not Attempt Resuscitation  (DNAR) order in place, that status will be suspended while in the operating room, procedural suite, and during the recovery period unless otherwise explicitly stated by me (or a person authorized to consent on my behalf). The surgeon or my attending physician will determine when the applicable recovery period ends for purposes of reinstating the DNAR order.  10. Patients having a sterilization procedure: I understand that if the procedure is successful the results will be permanent and it will therefore be impossible for me to inseminate, conceive, or bear children.  I also understand that the procedure is intended to result in sterility, although the result has not been guaranteed.   11. I acknowledge that my physician has explained sedation/analgesia administration to me including the risk and benefits I consent to the administration of sedation/analgesia as may be necessary or desirable in the judgment of my physician.    I CERTIFY THAT I HAVE READ AND FULLY UNDERSTAND THE ABOVE CONSENT TO OPERATION and/or OTHER PROCEDURE.        ____________________________________       _________________________________      ______________________________  Signature of Patient         Signature of Responsible Person        Printed Name of Responsible Person        ____________________________________      _________________________________      ______________________________       Signature of Witness          Relationship to Patient                       Date                                       Time  Patient Name: Jose Alberto Lizama  : 1965    Reviewed: 2024   Printed: 2025  Medical Record #: [id number] Page 1 of 2             STATEMENT OF PHYSICIAN My signature below affirms that prior to the time of the procedure; I have explained to the patient and/or his/her legal representative, the risks and benefits involved in the proposed treatment and any reasonable alternative to the proposed treatment. I have  also explained the risks and benefits involved in refusal of the proposed treatment and alternatives to the proposed treatment and have answered the patient's questions. If I have a significant financial interest in a co-management agreement or a significant financial interest in any product or implant, or other significant relationship used in this procedure/surgery, I have disclosed this and had a discussion with my patient.     _______________________________________________________________ _____________________________  (Signature of Physician)                                                                                         (Date)                                   (Time)  Patient Name: Jose Alberto Lizama  : 1965    Reviewed: 2024   Printed: 2025  Medical Record #: [id number] Page 2 of 2

## (undated) NOTE — LETTER
[address], [place], [place]    Authorization for Surgical Operation and Procedure                               I hereby authorize Leonides Pacheco MD, my physician and his/her assistants (if applicable), which may include medical students, residents, and/or fellows, to perform the following surgical operation/ procedure and administer such anesthesia as may be determined necessary by my physician: Operation/Procedure name (s) AORTIC VALVE REPLACEMENT; INTRAOPERATIVE TRANSESOPHAGEAL ECHOCARDIOGRAM; LEFT ARTIAL APPENDAGE CLIP on Jose Alberto Lizama   2.   I recognize that during the surgical operation/procedure, unforeseen conditions may necessitate additional or different procedures than those listed above.  I, therefore, further authorize and request that the above-named surgeon, assistants, or designees perform such procedures as are, in their judgment, necessary and desirable.    3.   My surgeon/physician has discussed prior to my surgery the potential benefits, risks and side effects of this procedure; the likelihood of achieving goals; and potential problems that might occur during recuperation.  They also discussed reasonable alternatives to the procedure, including risks, benefits, and side effects related to the alternatives and risks related to not receiving this procedure.  I have had all my questions answered and I acknowledge that no guarantee has been made as to the result that may be obtained.    4.   Should the need arise during my operation/procedure, which includes change of level of care prior to discharge, I also consent to the administration of blood and/or blood products.  Further, I understand that despite careful testing and screening of blood or blood products by collecting agencies, I may still be subject to ill effects as a result of receiving a blood transfusion and/or blood products.  The following are some, but not all, of the potential risks that can occur:  fever and allergic reactions, hemolytic reactions, transmission of diseases such as Hepatitis, AIDS and Cytomegalovirus (CMV) and fluid overload.  In the event that I wish to have an autologous transfusion of my own blood, or a directed donor transfusion, I will discuss this with my physician.  Check only if Refusing Blood or Blood Products  I understand refusal of blood or blood products as deemed necessary by my physician may have serious consequences to my condition to include possible death. I hereby assume responsibility for my refusal and release the hospital, its personnel, and my physicians from any responsibility for the consequences of my refusal.    o  Refuse   5.   I authorize the use of any specimen, organs, tissues, body parts or foreign objects that may be removed from my body during the operation/procedure for diagnosis, research or teaching purposes and their subsequent disposal by hospital authorities.  I also authorize the release of specimen test results and/or written reports to my treating physician on the hospital medical staff or other referring or consulting physicians involved in my care, at the discretion of the Pathologist or my treating physician.    6.   I consent to the photographing or videotaping of the operations or procedures to be performed, including appropriate portions of my body for medical, scientific, or educational purposes, provided my identity is not revealed by the pictures or by descriptive texts accompanying them.  If the procedure has been photographed/videotaped, the surgeon will obtain the original picture, image, videotape or CD.  The hospital will not be responsible for storage, release or maintenance of the picture, image, tape or CD.    7.   I consent to the presence of a  or observers in the operating room as deemed necessary by my physician or their designees.    8.   I recognize that in the event my procedure results in extended  X-Ray/fluoroscopy time, I may develop a skin reaction.    9. If I have a Do Not Attempt Resuscitation (DNAR) order in place, that status will be suspended while in the operating room, procedural suite, and during the recovery period unless otherwise explicitly stated by me (or a person authorized to consent on my behalf). The surgeon or my attending physician will determine when the applicable recovery period ends for purposes of reinstating the DNAR order.  10. Patients having a sterilization procedure: I understand that if the procedure is successful the results will be permanent and it will therefore be impossible for me to inseminate, conceive, or bear children.  I also understand that the procedure is intended to result in sterility, although the result has not been guaranteed.   11. I acknowledge that my physician has explained sedation/analgesia administration to me including the risk and benefits I consent to the administration of sedation/analgesia as may be necessary or desirable in the judgment of my physician.    I CERTIFY THAT I HAVE READ AND FULLY UNDERSTAND THE ABOVE CONSENT TO OPERATION and/or OTHER PROCEDURE.     ____________________________________  _________________________________        ______________________________  Signature of Patient    Signature of Responsible Person                Printed Name of Responsible Person                                      ____________________________________  _____________________________                ________________________________  Signature of Witness        Date  Time         Relationship to Patient    STATEMENT OF PHYSICIAN My signature below affirms that prior to the time of the procedure; I have explained to the patient and/or his/her legal representative, the risks and benefits involved in the proposed treatment and any reasonable alternative to the proposed treatment. I have also explained the risks and benefits involved in refusal of the proposed  treatment and alternatives to the proposed treatment and have answered the patient's questions. If I have a significant financial interest in a co-management agreement or a significant financial interest in any product or implant, or other significant relationship used in this procedure/surgery, I have disclosed this and had a discussion with my patient.     _____________________________________________________              _____________________________  (Signature of Physician)                                                                                         (Date)                                   (Time)  Patient Name: Jose Alberto Walsholimpiamatt      : 1965      Printed: 2025     Medical Record #: [id number]                                      Page 1 of 1

## (undated) NOTE — LETTER
[facility]  Administration of Anesthesia  IJose Alberto agree to be cared for by a physician anesthesiologist alone and/or with a nurse anesthetist, who is specially trained to monitor me and give me medicine to put me to sleep or keep me comfortable during my procedure    I understand that my anesthesiologist and/or anesthetist is not an employee or agent of [facility] or [employer]. He or she works for [facility]    As the patient asking for anesthesia services, I agree to:  Allow the anesthesiologist (anesthesia doctor) to give me medicine and do additional procedures as necessary. Some examples are: Starting or using an “IV” to give me medicine, fluids or blood during my procedure, and having a breathing tube placed to help me breathe when I’m asleep (intubation). In the event that my heart stops working properly, I understand that my anesthesiologist will make every effort to sustain my life, unless otherwise directed by [facility] Do Not Resuscitate documents.  Tell my anesthesia doctor before my procedure:  If I am pregnant.  The last time that I ate or drank.  iii. All of the medicines I take (including prescriptions, herbal supplements, and pills I can buy without a prescription (including street drugs/illegal medications). Failure to inform my anesthesiologist about these medicines may increase my risk of anesthetic complications.  iv.If I am allergic to anything or have had a reaction to anesthesia before.  I understand how the anesthesia medicine will help me (benefits).  I understand that with any type of anesthesia medicine there are risks:  The most common risks are: nausea, vomiting, sore throat, muscle soreness, damage to my eyes, mouth, or teeth (from breathing tube placement).  Rare risks include: remembering what happened during my procedure, allergic reactions to medications, injury to my airway, heart, lungs, vision, nerves, or  muscles and in extremely rare instances death.  My doctor has explained to me other choices available to me for my care (alternatives).  Pregnant Patients (“epidural”):  I understand that the risks of having an epidural (medicine given into my back to help control pain during labor), include itching, low blood pressure, difficulty urinating, headache or slowing of the baby’s heart. Very rare risks include infection, bleeding, seizure, irregular heart rhythms and nerve injury.  Regional Anesthesia (“spinal”, “epidural”, & “nerve blocks”):  I understand that rare but potential complications include headache, bleeding, infection, seizure, irregular heart rhythms, and nerve injury.    _____________________________________________________________________________  Patient (or Representative) Signature/Relationship to Patient  Date   Time    _____________________________________________________________________________   Name (if used)    Language/Organization   Time    _____________________________________________________________________________  Nurse Anesthetist Signature     Date   Time  _____________________________________________________________________________  Anesthesiologist Signature     Date   Time  I have discussed the procedure and information above with the patient (or patient’s representative) and answered their questions. The patient or their representative has agreed to have anesthesia services.    _____________________________________________________________________________  Witness        Date   Time  I have verified that the signature is that of the patient or patient’s representative, and that it was signed before the procedure  Patient Name: Jose Alberto Lizama     : 1965                 Printed: 2025 at 4:35 PM    Medical Record #: [id number]                                            Page 1 of 1  ----------ANESTHESIA CONSENT----------

## (undated) NOTE — LETTER
[address], [place]  Authorization for Invasive Procedures  1.  I hereby authorize Dr. Latanya Coelho  my physician and whomever may be designated as the doctor's assistant, to perform the following operation and/or procedure: Cardiac Ca 5. I consent to the photographing of the operations or procedures to be performed for the purposes of advancing medicine, science, and/or education, provided my identity is not revealed.  If the procedure has been videotaped, the physician/surgeon will obta __________ Time: ___________    Statement of Physician  My signature below affirms that prior to the time of the procedure, I have explained to the patient and/or his legal representative, the risks and benefits involved in the proposed treatment and any r

## (undated) NOTE — LETTER
[place] [address]  Authorization for Invasive Procedures  Date: 6/1/2025           Time: 0939    I hereby authorize Dr. Teodoro Zamora, my physician and his/her assistants (if applicable), which may include medical students, residents, and/or fellows, to perform the following surgical operation/ procedure and administer such anesthesia as may be determined necessary by my physician: Thoracentesis for left pleural effusion on Jose Alberto Lizama  2.   I recognize that during the surgical operation/procedure, unforeseen conditions may necessitate additional or different procedures than those listed above.  I, therefore, further authorize and request that the above-named surgeon, assistants, or designees perform such procedures as are, in their judgment, necessary and desirable.    3.   My surgeon/physician has discussed prior to my surgery the potential benefits, risks and side effects of this procedure; the likelihood of achieving goals; and potential problems that might occur during recuperation.  They also discussed reasonable alternatives to the procedure, including risks, benefits, and side effects related to the alternatives and risks related to not receiving this procedure.  I have had all my questions answered and I acknowledge that no guarantee has been made as to the result that may be obtained.    4.   Should the need arise during my operation/procedure, which includes change of level of care prior to discharge, I also consent to the administration of blood and/or blood products.  Further, I understand that despite careful testing and screening of blood or blood products by collecting agencies, I may still be subject to ill effects as a result of receiving a blood transfusion and/or blood products.  The following are some, but not all, of the potential risks that can occur: fever and allergic reactions, hemolytic reactions, transmission of diseases such as Hepatitis, AIDS and  Cytomegalovirus (CMV) and fluid overload.  In the event that I wish to have an autologous transfusion of my own blood, or a directed donor transfusion, I will discuss this with my physician.   Check only if Refusing Blood or Blood Products  I understand refusal of blood or blood products as deemed necessary by my physician may have serious consequences to my condition to include possible death. I hereby assume responsibility for my refusal and release the hospital, its personnel, and my physicians from any responsibility for the consequences of my refusal.         o  Refuse         5.   I authorize the use of any specimen, organs, tissues, body parts or foreign objects that may be removed from my body during the operation/procedure for diagnosis, research or teaching purposes and their subsequent disposal by hospital authorities.  I also authorize the release of specimen test results and/or written reports to my treating physician on the hospital medical staff or other referring or consulting physicians involved in my care, at the discretion of the Pathologist or my treating physician.    6.   I consent to the photographing or videotaping of the operations or procedures to be performed, including appropriate portions of my body for medical, scientific, or educational purposes, provided my identity is not revealed by the pictures or by descriptive texts accompanying them.  If the procedure has been photographed/videotaped, the surgeon will obtain the original picture, image, videotape or CD.  The hospital will not be responsible for storage, release or maintenance of the picture, image, tape or CD.    7.   I consent to the presence of a  or observers in the operating room as deemed necessary by my physician or their designees.    8.   I recognize that in the event my procedure results in extended X-Ray/fluoroscopy time, I may develop a skin reaction.    9. If I have a Do Not Attempt Resuscitation  (DNAR) order in place, that status will be suspended while in the operating room, procedural suite, and during the recovery period unless otherwise explicitly stated by me (or a person authorized to consent on my behalf). The surgeon or my attending physician will determine when the applicable recovery period ends for purposes of reinstating the DNAR order.  10. Patients having a sterilization procedure: I understand that if the procedure is successful the results will be permanent and it will therefore be impossible for me to inseminate, conceive, or bear children.  I also understand that the procedure is intended to result in sterility, although the result has not been guaranteed.   11. I acknowledge that my physician has explained sedation/analgesia administration to me including the risk and benefits I consent to the administration of sedation/analgesia as may be necessary or desirable in the judgment of my physician.    I CERTIFY THAT I HAVE READ AND FULLY UNDERSTAND THE ABOVE CONSENT TO OPERATION and/or OTHER PROCEDURE.        ____________________________________       _________________________________      ______________________________  Signature of Patient         Signature of Responsible Person        Printed Name of Responsible Person        ____________________________________      _________________________________      ______________________________       Signature of Witness          Relationship to Patient                       Date                                       Time  Patient Name: Jose Alberto Lizama  : 1965    Reviewed: 2024   Printed: 2025  Medical Record #: [id number] Page 1 of 2             STATEMENT OF PHYSICIAN My signature below affirms that prior to the time of the procedure; I have explained to the patient and/or his/her legal representative, the risks and benefits involved in the proposed treatment and any reasonable alternative to the proposed treatment. I have  also explained the risks and benefits involved in refusal of the proposed treatment and alternatives to the proposed treatment and have answered the patient's questions. If I have a significant financial interest in a co-management agreement or a significant financial interest in any product or implant, or other significant relationship used in this procedure/surgery, I have disclosed this and had a discussion with my patient.     _______________________________________________________________ _____________________________  (Signature of Physician)                                                                                         (Date)                                   (Time)  Patient Name: Jose Alberto Lizama  : 1965    Reviewed: 2024   Printed: 2025  Medical Record #: [id number] Page 2 of 2

## (undated) NOTE — LETTER
October 24, 2019    Monika Greenberg MD  [id number]     Patient: Delfina Daigle   YOB: 1965   Date of Visit: 10/24/2019       Dear Dr. Lani Villagomez MD:    Thank you for referring Delfina Daigle to me for evaluatio escitalopram 10 MG Oral Tab, Take 1 tablet (10 mg total) by mouth daily. , Disp: 30 tablet, Rfl: 11  cefdinir 300 MG Oral Cap, Take 1 capsule (300 mg total) by mouth 2 (two) times daily. , Disp: 20 capsule, Rfl: 0  cefdinir 300 MG Oral Cap, Take 1 capsule (3 NVA checked with a +1.50 trial lens           Tonometry ([facility], 9:01 AM)       Right Left    Pressure 16 14          Pupils       Pupils    Right PERRL    Left PERRL          Visual Fields       Left Right     Full Full          Extraocular Movement Patient should place wash compresses on both eyelids for 5 minutes every morning and every night. After 5 minutes of holding the warm compresses on the eyelids, patient should gently rub the eyelashes and then rinse thoroughly with warm water.    For itchi

## (undated) NOTE — LETTER
12/07/18            Dear Patient:    I am writing to you to remind you to schedule your annual diabetic eye exam and a follow up visit with Dr. Yong Marie to adjust your medication. Diabetes remains one of the leading causes of blindness in American adults.